# Patient Record
Sex: FEMALE | Race: WHITE | NOT HISPANIC OR LATINO | Employment: OTHER | URBAN - METROPOLITAN AREA
[De-identification: names, ages, dates, MRNs, and addresses within clinical notes are randomized per-mention and may not be internally consistent; named-entity substitution may affect disease eponyms.]

---

## 2018-03-15 PROBLEM — Z98.890 S/P LEFT ROTATOR CUFF REPAIR: Status: ACTIVE | Noted: 2018-03-15

## 2018-03-15 PROBLEM — F41.9 ANXIETY: Status: ACTIVE | Noted: 2018-03-15

## 2018-03-15 PROBLEM — F90.9 ATTENTION DEFICIT HYPERACTIVITY DISORDER (ADHD): Status: ACTIVE | Noted: 2018-03-15

## 2018-03-15 PROBLEM — E11.9 DIABETES MELLITUS WITHOUT COMPLICATION: Status: ACTIVE | Noted: 2018-03-15

## 2018-03-15 PROBLEM — E78.5 HYPERLIPIDEMIA: Status: ACTIVE | Noted: 2018-03-15

## 2018-03-15 PROBLEM — I10 HYPERTENSION: Status: ACTIVE | Noted: 2018-03-15

## 2018-04-04 LAB
LEFT EYE DM RETINOPATHY: NEGATIVE
RIGHT EYE DM RETINOPATHY: NEGATIVE

## 2018-05-02 ENCOUNTER — OFFICE VISIT (OUTPATIENT)
Dept: ORTHOPEDICS | Facility: CLINIC | Age: 68
End: 2018-05-02
Payer: COMMERCIAL

## 2018-05-02 VITALS
HEART RATE: 111 BPM | WEIGHT: 198 LBS | BODY MASS INDEX: 31.08 KG/M2 | HEIGHT: 67 IN | DIASTOLIC BLOOD PRESSURE: 95 MMHG | SYSTOLIC BLOOD PRESSURE: 132 MMHG

## 2018-05-02 DIAGNOSIS — M75.112 INCOMPLETE TEAR OF LEFT ROTATOR CUFF: Primary | ICD-10-CM

## 2018-05-02 PROCEDURE — 99024 POSTOP FOLLOW-UP VISIT: CPT | Mod: ,,, | Performed by: ORTHOPAEDIC SURGERY

## 2018-05-02 NOTE — PROGRESS NOTES
[facility] ORTHOPEDICS POST-OP NOTE    Subjective:           Chief Complaint:   Chief Complaint   Patient presents with    Left Shoulder - Post-op Evaluation     Follow up with left shoulder Scope 2/16/18. States that it is feeing pretty good. Feels a big difference with PT. Still has some limitation but not much.  States not much pain at all.        Past Medical History:   Diagnosis Date    Hyperlipidemia     Hypertension     Pre-diabetes        Past Surgical History:   Procedure Laterality Date    BACK SURGERY      BREAST SURGERY      GALLBLADDER SURGERY      HERNIA REPAIR      lymphnode remove      SHOULDER SURGERY         Current Outpatient Prescriptions   Medication Sig    ALPRAZolam (XANAX) 0.5 MG tablet Take 1 tablet (0.5 mg total) by mouth 2 (two) times daily as needed for Anxiety.    atorvastatin (LIPITOR) 40 MG tablet Take 40 mg by mouth once daily.    dextroamphetamine-amphetamine (ADDERALL) 20 mg tablet Take 1 tablet (20 mg total) by mouth 2 (two) times daily.    lisinopril (PRINIVIL,ZESTRIL) 40 MG tablet     metFORMIN (GLUCOPHAGE-XR) 500 MG 24 hr tablet      No current facility-administered medications for this visit.        Review of patient's allergies indicates:   Allergen Reactions    Bactrim [sulfamethoxazole-trimethoprim]     Levaquin [levofloxacin]     Pcn [penicillins]        Family History   Problem Relation Age of Onset    Mental illness Mother        Social History     Social History    Marital status:      Spouse name: N/A    Number of children: 2    Years of education: N/A     Occupational History    Not on file.     Social History Main Topics    Smoking status: Former Smoker    Smokeless tobacco: Former User    Alcohol use Yes      Comment: rare    Drug use: No    Sexual activity: Not on file     Other Topics Concern    Not on file     Social History Narrative    No narrative on file       History of present illness: Follow-up left shoulder rotator  cuff repair 2 and half months. Been going to therapy. She feels pretty good close to normal can sleep on the left shoulder.      Review of Systems:    Musculoskeletal:  See HPI      Objective:        Physical Examination:    Vital Signs:    Vitals:    05/02/18 1409   BP: (!) 132/95   Pulse: (!) 111       Body mass index is 31.01 kg/m².    This a well-developed, well nourished patient in no acute distress.  They are alert and oriented and cooperative to examination.        Physical exam shows good passive range of motion left shoulder very minimal painful arc pretty good abduction strength. Overall looks good    XRAY Report / Interpretation:       Assessment/Plan:      Plan we are going to send her back therapy one more visit to get a home program for strength. I'm satisfied with her motion. And core strength will take a while. We will see her back when necessary. 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    This note was created using Dragon voice recognition software that occasionally misinterpreted phrases or words.

## 2018-07-05 PROBLEM — G47.00 INSOMNIA: Status: ACTIVE | Noted: 2018-07-05

## 2018-07-05 PROBLEM — Z12.11 COLON CANCER SCREENING: Status: ACTIVE | Noted: 2018-07-05

## 2018-07-05 PROBLEM — F32.A DEPRESSION: Status: ACTIVE | Noted: 2018-07-05

## 2018-09-25 ENCOUNTER — OFFICE VISIT (OUTPATIENT)
Dept: GASTROENTEROLOGY | Facility: CLINIC | Age: 68
End: 2018-09-25
Payer: COMMERCIAL

## 2018-09-25 VITALS
TEMPERATURE: 99 F | SYSTOLIC BLOOD PRESSURE: 132 MMHG | RESPIRATION RATE: 18 BRPM | WEIGHT: 193.38 LBS | BODY MASS INDEX: 30.35 KG/M2 | DIASTOLIC BLOOD PRESSURE: 92 MMHG | HEIGHT: 67 IN | HEART RATE: 125 BPM

## 2018-09-25 DIAGNOSIS — K57.92 DIVERTICULITIS: Primary | ICD-10-CM

## 2018-09-25 DIAGNOSIS — E11.9 DIABETES MELLITUS WITHOUT COMPLICATION: ICD-10-CM

## 2018-09-25 DIAGNOSIS — R91.1 LUNG NODULE: ICD-10-CM

## 2018-09-25 DIAGNOSIS — K76.0 HEPATIC STEATOSIS: ICD-10-CM

## 2018-09-25 DIAGNOSIS — E78.5 HYPERLIPIDEMIA, UNSPECIFIED HYPERLIPIDEMIA TYPE: ICD-10-CM

## 2018-09-25 DIAGNOSIS — F32.A DEPRESSION, UNSPECIFIED DEPRESSION TYPE: ICD-10-CM

## 2018-09-25 DIAGNOSIS — K21.9 GASTROESOPHAGEAL REFLUX DISEASE, ESOPHAGITIS PRESENCE NOT SPECIFIED: ICD-10-CM

## 2018-09-25 DIAGNOSIS — I10 ESSENTIAL HYPERTENSION: ICD-10-CM

## 2018-09-25 PROCEDURE — 99205 OFFICE O/P NEW HI 60 MIN: CPT | Mod: ,,, | Performed by: INTERNAL MEDICINE

## 2018-09-25 PROCEDURE — 3075F SYST BP GE 130 - 139MM HG: CPT | Mod: ,,, | Performed by: INTERNAL MEDICINE

## 2018-09-25 PROCEDURE — 3080F DIAST BP >= 90 MM HG: CPT | Mod: ,,, | Performed by: INTERNAL MEDICINE

## 2018-09-25 PROCEDURE — 3046F HEMOGLOBIN A1C LEVEL >9.0%: CPT | Mod: ,,, | Performed by: INTERNAL MEDICINE

## 2018-09-25 PROCEDURE — 1101F PT FALLS ASSESS-DOCD LE1/YR: CPT | Mod: ,,, | Performed by: INTERNAL MEDICINE

## 2018-09-25 RX ORDER — LISINOPRIL 40 MG/1
40 TABLET ORAL DAILY
COMMUNITY
End: 2019-07-11 | Stop reason: SDUPTHER

## 2018-09-25 RX ORDER — POLYETHYLENE GLYCOL 3350, SODIUM SULFATE ANHYDROUS, SODIUM BICARBONATE, SODIUM CHLORIDE, POTASSIUM CHLORIDE 236; 22.74; 6.74; 5.86; 2.97 G/4L; G/4L; G/4L; G/4L; G/4L
4 POWDER, FOR SOLUTION ORAL ONCE
Qty: 1 BOTTLE | Refills: 0 | Status: SHIPPED | OUTPATIENT
Start: 2018-09-25 | End: 2018-09-25

## 2018-10-07 NOTE — PROGRESS NOTES
[facility] hospital New Patient Visit    Subjective:           PCP: Blane Mancini III    Referring Provider: No ref. provider found    Chief Complaint: Hospital Follow Up; Colonoscopy; and Diverticulitis       HPI:  Debby Brown is a 68 y.o. female here for a hospital follow up. Patient had recently gone to the ER on 4/13/2018. Patient's ER diagnosis was diverticulitis, an UTI, and pulmonary nodule. Patient was given antibiotics by way of Cefdinir and Flagyl. Lab work was reviewed WBC was 10.6. The rest of the parameters were normal. CMP showed a high glucose and mildly abnormal LFT's. Urine analysis showed positive nitrite, many WBC's, many hyaline casts. CT of abd and pelvis was reviewed under direct vision and shows hepatic steatosis. Evidence of colitis o the sigmoid colon. Consideration for repeat CT of the chest in 6 months. Left lower lobe nodular consolidation, which was not present in 11/2016. Old colonoscopy report was reviewed and did not show any evidence of tumor. Old colonoscopy was done in 2007.     ROS:   Constitutional: No fevers, chills, weight loss  ENT: No allergies, sore throat, rhinorrhea  CV: No chest pain, palpitations, edema  Pulm: No cough, shortness of breath, wheezing  Ophtho: No vision changes  GI: No blood in stools, change in bowel habits, nausea/vomiting  Denies alternating diarrhea/constipation.   Derm: No rash  Heme: No easy bruising or lymphadenopathy  MSK: No arthralgias or myalgias  : No dysuria, hematuria, frequency, polyuria, or flank tenderness  Endo: No hot or cold intolerance  Neuro: No syncope or seizure, or dizziness  Psych: No hallucination, depression or suicidal ideation      Medical History:  has a past medical history of Allergy, Anxiety, Depression, Hyperlipidemia, Hypertension, and Pre-diabetes.      Surgical History:  has a past surgical history that includes Shoulder surgery; Back surgery; Breast surgery; lymphnode remove; Gallbladder surgery; Hernia  "repair; Appendectomy; Colonoscopy; and Tubal ligation.    Family History:   Family History   Problem Relation Age of Onset    Mental illness Mother     Cancer Mother     Depression Mother     Heart disease Mother     Hypertension Mother     Stroke Mother     Heart disease Father     Hypertension Father     Stroke Father        Social History:   Social History     Tobacco Use    Smoking status: Former Smoker    Smokeless tobacco: Former User   Substance Use Topics    Alcohol use: Yes     Comment: rare    Drug use: No       The patient's social and family histories were reviewed by me and updated in the appropriate section of the electronic medical record.    Allergies:   Review of patient's allergies indicates:   Allergen Reactions    Bactrim [sulfamethoxazole-trimethoprim]     Levaquin [levofloxacin]     Pcn [penicillins]        Medications:   Current Outpatient Medications   Medication Sig Dispense Refill    ALPRAZolam (XANAX) 0.5 MG tablet Take 1 tablet (0.5 mg total) by mouth 2 (two) times daily as needed for Anxiety. 30 tablet 0    atorvastatin (LIPITOR) 40 MG tablet Take 1 tablet (40 mg total) by mouth once daily. 30 tablet 5    dextroamphetamine-amphetamine (ADDERALL) 20 mg tablet Take 1 tablet (20 mg total) by mouth 2 (two) times daily. (Patient taking differently: Take 20 mg by mouth once daily. ) 60 tablet 0    lisinopril (PRINIVIL,ZESTRIL) 40 MG tablet Take 40 mg by mouth once daily.      metFORMIN (GLUCOPHAGE-XR) 500 MG 24 hr tablet Take 2 tablets (1,000 mg total) by mouth 2 (two) times daily with meals. 60 tablet 5    zolpidem (AMBIEN) 10 mg Tab Take 1 tablet (10 mg total) by mouth nightly as needed. 30 tablet 2     No current facility-administered medications for this visit.      All medications and past history have been reviewed.        Objective:        Vital Signs:  Blood pressure (!) 132/92, pulse (!) 125, temperature 98.8 °F (37.1 °C), resp. rate 18, height 5' 7" (1.702 m), " weight 87.7 kg (193 lb 6.4 oz). Body mass index is 30.29 kg/m².    Physical Exam:   General Appearance: Well appearing in no acute distress, well developed, well                 nourished  Head: Normocephalic, without obvious abnormality  Eyes:  Pupils equal, round and reactive to light  Throat: Lips, mucosa, and tongue normal; teeth and gums normal  Lungs: CTA bilaterally in anterior and posterior fields, no wheezes, no crackles  Heart:  Regular rate and rhythm, no murmurs heard  Abdomen: Soft, non tender, non distended with positive bowel sounds in all four quadrants. No hepatosplenomegaly, ascites, or mass. Negative for succusion splash  : female   Extremities: No cyanosis, edema or deformity  Skin: No rash  Neurologic: Normal exam      Labs/Imaging:  All lab results and imaging results have been reviewed and discussed with the patient.    Assessment/Plan:    Assessment:       1. Diverticulitis        Plan:       Debby was seen today for hospital follow up, colonoscopy and diverticulitis.    Diagnoses and all orders for this visit:    Diverticulitis  -     polyethylene glycol (GOLYTELY,NULYTELY) 236-22.74-6.74 -5.86 gram suspension; Take 4,000 mLs (4 L total) by mouth once. Take as directed for 1 dose  -     Ambulatory Referral to External Surgery        See HPI    Follow-up for After Test(s).    The plan was discussed with the patient and all questions/concerns have been answered to the patient's satisfaction.    CC: No ref. provider found    Electronically signed by Piero Lomeli MD    This note was dictated using voice recognition software and may contain grammatical errors.

## 2018-11-29 PROBLEM — G25.0 BENIGN ESSENTIAL TREMOR: Status: ACTIVE | Noted: 2018-11-29

## 2018-11-29 PROBLEM — Z12.39 BREAST CANCER SCREENING: Status: ACTIVE | Noted: 2018-11-29

## 2018-11-29 PROBLEM — R20.0 NUMBNESS OF FOOT: Status: ACTIVE | Noted: 2018-11-29

## 2018-11-29 PROBLEM — R00.2 PALPITATION: Status: ACTIVE | Noted: 2018-11-29

## 2019-01-05 ENCOUNTER — OFFICE VISIT (OUTPATIENT)
Dept: URGENT CARE | Facility: CLINIC | Age: 69
End: 2019-01-05
Payer: COMMERCIAL

## 2019-01-05 VITALS
HEART RATE: 105 BPM | HEIGHT: 67 IN | SYSTOLIC BLOOD PRESSURE: 144 MMHG | TEMPERATURE: 97 F | BODY MASS INDEX: 29.51 KG/M2 | OXYGEN SATURATION: 99 % | RESPIRATION RATE: 14 BRPM | DIASTOLIC BLOOD PRESSURE: 86 MMHG | WEIGHT: 188 LBS

## 2019-01-05 DIAGNOSIS — J01.90 ACUTE NON-RECURRENT SINUSITIS, UNSPECIFIED LOCATION: Primary | ICD-10-CM

## 2019-01-05 DIAGNOSIS — R05.9 COUGH: ICD-10-CM

## 2019-01-05 PROCEDURE — 1101F PT FALLS ASSESS-DOCD LE1/YR: CPT | Mod: CPTII,S$GLB,, | Performed by: NURSE PRACTITIONER

## 2019-01-05 PROCEDURE — 1101F PR PT FALLS ASSESS DOC 0-1 FALLS W/OUT INJ PAST YR: ICD-10-PCS | Mod: CPTII,S$GLB,, | Performed by: NURSE PRACTITIONER

## 2019-01-05 PROCEDURE — 96372 THER/PROPH/DIAG INJ SC/IM: CPT | Mod: S$GLB,,, | Performed by: NURSE PRACTITIONER

## 2019-01-05 PROCEDURE — 3079F DIAST BP 80-89 MM HG: CPT | Mod: CPTII,S$GLB,, | Performed by: NURSE PRACTITIONER

## 2019-01-05 PROCEDURE — 3077F SYST BP >= 140 MM HG: CPT | Mod: CPTII,S$GLB,, | Performed by: NURSE PRACTITIONER

## 2019-01-05 PROCEDURE — 99204 PR OFFICE/OUTPT VISIT, NEW, LEVL IV, 45-59 MIN: ICD-10-PCS | Mod: 25,S$GLB,, | Performed by: NURSE PRACTITIONER

## 2019-01-05 PROCEDURE — 99204 OFFICE O/P NEW MOD 45 MIN: CPT | Mod: 25,S$GLB,, | Performed by: NURSE PRACTITIONER

## 2019-01-05 PROCEDURE — 3077F PR MOST RECENT SYSTOLIC BLOOD PRESSURE >= 140 MM HG: ICD-10-PCS | Mod: CPTII,S$GLB,, | Performed by: NURSE PRACTITIONER

## 2019-01-05 PROCEDURE — 3079F PR MOST RECENT DIASTOLIC BLOOD PRESSURE 80-89 MM HG: ICD-10-PCS | Mod: CPTII,S$GLB,, | Performed by: NURSE PRACTITIONER

## 2019-01-05 PROCEDURE — 96372 PR INJECTION,THERAP/PROPH/DIAG2ST, IM OR SUBCUT: ICD-10-PCS | Mod: S$GLB,,, | Performed by: NURSE PRACTITIONER

## 2019-01-05 RX ORDER — FLUTICASONE PROPIONATE 50 MCG
2 SPRAY, SUSPENSION (ML) NASAL DAILY
Qty: 15.8 ML | Refills: 0 | Status: SHIPPED | OUTPATIENT
Start: 2019-01-05 | End: 2019-03-15

## 2019-01-05 RX ORDER — AZITHROMYCIN 250 MG/1
TABLET, FILM COATED ORAL
Qty: 6 TABLET | Refills: 0 | Status: SHIPPED | OUTPATIENT
Start: 2019-01-05 | End: 2019-03-15

## 2019-01-05 RX ORDER — CETIRIZINE HYDROCHLORIDE 10 MG/1
10 TABLET ORAL DAILY
Qty: 30 TABLET | Refills: 0 | Status: SHIPPED | OUTPATIENT
Start: 2019-01-05 | End: 2020-07-14

## 2019-01-05 RX ORDER — PREDNISONE 20 MG/1
40 TABLET ORAL DAILY
Qty: 10 TABLET | Refills: 0 | Status: SHIPPED | OUTPATIENT
Start: 2019-01-05 | End: 2019-01-10

## 2019-01-05 RX ORDER — DEXAMETHASONE SODIUM PHOSPHATE 4 MG/ML
8 INJECTION, SOLUTION INTRA-ARTICULAR; INTRALESIONAL; INTRAMUSCULAR; INTRAVENOUS; SOFT TISSUE
Status: COMPLETED | OUTPATIENT
Start: 2019-01-05 | End: 2019-01-05

## 2019-01-05 RX ORDER — PROMETHAZINE HYDROCHLORIDE AND DEXTROMETHORPHAN HYDROBROMIDE 6.25; 15 MG/5ML; MG/5ML
5 SYRUP ORAL NIGHTLY PRN
Qty: 118 ML | Refills: 0 | Status: SHIPPED | OUTPATIENT
Start: 2019-01-05 | End: 2019-01-15

## 2019-01-05 RX ADMIN — DEXAMETHASONE SODIUM PHOSPHATE 8 MG: 4 INJECTION, SOLUTION INTRA-ARTICULAR; INTRALESIONAL; INTRAMUSCULAR; INTRAVENOUS; SOFT TISSUE at 05:01

## 2019-01-05 NOTE — PROGRESS NOTES
"Subjective:       Patient ID: Debby Brown is a 68 y.o. female.    Vitals:  height is 5' 7" (1.702 m) and weight is 85.3 kg (188 lb). Her temperature is 97.4 °F (36.3 °C). Her blood pressure is 144/86 (abnormal) and her pulse is 105. Her respiration is 14 and oxygen saturation is 99%.     Chief Complaint: Cough    Patient complains of sinus congestion, mild productive cough, muscles aches due to coughing for 2 days. Denies fever, sob, chest pain, nausea, vomiting, diarrhea.       Cough   This is a new problem. The current episode started in the past 7 days. The problem has been unchanged. The cough is non-productive. Associated symptoms include headaches and myalgias. Pertinent negatives include no chest pain, chills, fever, rash, sore throat or shortness of breath.       Constitution: Negative for chills, fatigue and fever.   HENT: Positive for congestion, sinus pain and sinus pressure. Negative for sore throat.    Neck: Negative for painful lymph nodes.   Cardiovascular: Negative for chest pain and leg swelling.   Eyes: Negative for double vision and blurred vision.   Respiratory: Positive for cough. Negative for sputum production and shortness of breath.    Gastrointestinal: Negative for nausea, vomiting and diarrhea.   Endocrine: negative.   Genitourinary: Negative for dysuria, frequency, urgency and history of kidney stones.   Musculoskeletal: Positive for muscle ache. Negative for joint pain, joint swelling and muscle cramps.   Skin: Negative for color change, pale, rash and bruising.   Allergic/Immunologic: Negative for seasonal allergies.   Neurological: Positive for headaches. Negative for dizziness, history of vertigo, light-headedness and passing out.   Hematologic/Lymphatic: Negative for swollen lymph nodes.   Psychiatric/Behavioral: Negative for nervous/anxious, sleep disturbance and depression. The patient is not nervous/anxious.        Objective:      Physical Exam   Constitutional: She is oriented " to person, place, and time. She appears well-developed and well-nourished. She is cooperative.  Non-toxic appearance. She does not have a sickly appearance. She does not appear ill. No distress.   HENT:   Head: Normocephalic and atraumatic.   Right Ear: Hearing, tympanic membrane, external ear and ear canal normal.   Left Ear: Hearing, tympanic membrane, external ear and ear canal normal.   Nose: Mucosal edema and rhinorrhea present. No nasal deformity. No epistaxis. Right sinus exhibits maxillary sinus tenderness and frontal sinus tenderness. Left sinus exhibits maxillary sinus tenderness and frontal sinus tenderness.   Mouth/Throat: Uvula is midline and mucous membranes are normal. No trismus in the jaw. Normal dentition. No uvula swelling. Posterior oropharyngeal erythema present. No oropharyngeal exudate or posterior oropharyngeal edema.   Eyes: Conjunctivae and lids are normal. Right eye exhibits no discharge. Left eye exhibits no discharge. No scleral icterus.   Neck: Trachea normal, normal range of motion, full passive range of motion without pain and phonation normal. Neck supple.   Cardiovascular: Normal rate, regular rhythm, normal heart sounds, intact distal pulses and normal pulses.   Pulmonary/Chest: Effort normal and breath sounds normal. No respiratory distress.   Abdominal: Soft. Normal appearance and bowel sounds are normal. She exhibits no distension, no pulsatile midline mass and no mass. There is no tenderness.   Musculoskeletal: Normal range of motion. She exhibits no edema or deformity.   Lymphadenopathy:     She has no cervical adenopathy.   Neurological: She is alert and oriented to person, place, and time. She exhibits normal muscle tone. Coordination normal. GCS eye subscore is 4. GCS verbal subscore is 5. GCS motor subscore is 6.   Skin: Skin is warm, dry and intact. No rash noted. She is not diaphoretic. No pallor.   Psychiatric: She has a normal mood and affect. Her speech is normal and  behavior is normal. Judgment and thought content normal. Cognition and memory are normal.   Nursing note and vitals reviewed.      Assessment:       1. Acute non-recurrent sinusitis, unspecified location    2. Cough        Plan:       Due to patient presentation as well as length of symptoms, will treat for bacterial sinusitis. Patient is being discharged home. Discussed reasons to return and importance of followup. All questions addressed and patient given discharge instructions and followup information.    Lungs clear throughout, sats 98% , and no respiratory distress.  I do not suspect pneumonia, pulmonary embolism or any other more serious condition requiring further treatment. Advised pt to return to clinic or go to ER for any worsening of symptoms. Based on my clinical evaluation, I do not appreciate any immediate, emergent, or life threatening condition or etiology that warrants additional workup today and feel that the patient can be discharged with close follow up car    Acute non-recurrent sinusitis, unspecified location    Cough    Other orders  -     dexamethasone injection 8 mg  -     fluticasone (FLONASE) 50 mcg/actuation nasal spray; 2 sprays (100 mcg total) by Each Nare route once daily.  Dispense: 15.8 mL; Refill: 0  -     cetirizine (ZYRTEC) 10 MG tablet; Take 1 tablet (10 mg total) by mouth once daily.  Dispense: 30 tablet; Refill: 0  -     azithromycin (ZITHROMAX) 250 MG tablet; Take 2 tablets (500 mg) on  Day 1,  followed by 1 tablet (250 mg) once daily on Days 2 through 5.  Dispense: 6 tablet; Refill: 0  -     predniSONE (DELTASONE) 20 MG tablet; Take 2 tablets (40 mg total) by mouth once daily. for 5 days  Dispense: 10 tablet; Refill: 0  -     promethazine-dextromethorphan (PROMETHAZINE-DM) 6.25-15 mg/5 mL Syrp; Take 5 mLs by mouth nightly as needed.  Dispense: 118 mL; Refill: 0

## 2019-01-05 NOTE — PATIENT INSTRUCTIONS
What Is Acute Bronchitis?  Acute bronchitis is when the airways in your lungs (bronchial tubes) become red and swollen (inflamed). It is usually caused by a viral infection. But it can also occur because of a bacteria or allergen. Symptoms include a cough that produces yellow or greenish mucus and can last for days or sometimes weeks.  Inside healthy lungs    Air travels in and out of the lungs through the airways. The linings of these airways produce sticky mucus. This mucus traps particles that enter the lungs. Tiny structures called cilia then sweep the particles out of the airways.     Healthy airway: Airways are normally open. Air moves in and out easily.      Healthy cilia: Tiny, hairlike cilia sweep mucus and particles up and out of the airways.   Lungs with bronchitis  Bronchitis often occurs with a cold or the flu virus. The airways become inflamed (red and swollen). There is a deep hacking cough from the extra mucus. Other symptoms may include:  · Wheezing  · Chest discomfort  · Shortness of breath  · Mild fever  A second infection, this time due to bacteria, may then occur. And airways irritated by allergens or smoke are more likely to get infected.        Inflamed airway: Inflammation and extra mucus narrow the airway, causing shortness of breath.      Impaired cilia: Extra mucus impairs cilia, causing congestion and wheezing. Smoking makes the problem worse.   Making a diagnosis  A physical exam, health history, and certain tests help your healthcare provider make the diagnosis.  Health history  Your healthcare provider will ask you about your symptoms.  The exam  Your provider listens to your chest for signs of congestion. He or she may also check your ears, nose, and throat.  Possible tests  · A sputum test for bacteria. This requires a sample of mucus from your lungs.  · A nasal or throat swab. This tests to see if you have a bacterial infection.  · A chest X-ray. This is done if your healthcare  provider thinks you have pneumonia.  · Tests to check for an underlying condition. Other tests may be done to check for things such as allergies, asthma, or COPD (chronic obstructive pulmonary disease). You may need to see a specialist for more lung function testing.  Treating a cough  The main treatment for bronchitis is easing symptoms. Avoiding smoke, allergens, and other things that trigger coughing can often help. If the infection is bacterial, you may be given antibiotics. During the illness, it's important to get plenty of sleep. To ease symptoms:  · Dont smoke. Also avoid secondhand smoke.  · Use a humidifier. Or try breathing in steam from a hot shower. This may help loosen mucus.  · Drink a lot of water and juice. They can soothe the throat and may help thin mucus.  · Sit up or use extra pillows when in bed. This helps to lessen coughing and congestion.  · Ask your provider about using medicine. Ask about using cough medicine, pain and fever medicine, or a decongestant.  Antibiotics  Most cases of bronchitis are caused by cold or flu viruses. They dont need antibiotics to treat them, even if your mucus is thick and green or yellow. Antibiotics dont treat viral illness and antibiotics have not been shown to have any benefit in cases of acute bronchitis. Taking antibiotics when they are not needed increases your risk of getting an infection later that is antibiotic-resistant. Antibiotics can also cause severe cases of diarrhea that require other antibiotics to treat.  It is important that you accept your healthcare provider's opinion to not use antibiotics. Your provider will prescribe antibiotics if the infection is caused by bacteria. If they are prescribed:  · Take all of the medicine. Take the medicine until it is used up, even if symptoms have improved. If you dont, the bronchitis may come back.  · Take the medicines as directed. For instance, some medicines should be taken with food.  · Ask about  side effects. Ask your provider or pharmacist what side effects are common, and what to do about them.  Follow-up care  You should see your provider again in 2 to 3 weeks. By this time, symptoms should have improved. An infection that lasts longer may mean you have a more serious problem.  Prevention  · Avoid tobacco smoke. If you smoke, quit. Stay away from smoky places. Ask friends and family not to smoke around you, or in your home or car.  · Get checked for allergies.  · Ask your provider about getting a yearly flu shot. Also ask about pneumococcal or pneumonia shots.  · Wash your hands often. This helps reduce the chance of picking up viruses that cause colds and flu.  Call your healthcare provider if:  · Symptoms worsen, or you have new symptoms  · Breathing problems worsen or  become severe  · Symptoms dont get better within a week, or within 3 days of taking antibiotics   Date Last Reviewed: 2/1/2017  © 9473-1243 [employer]. [address] [place] [address]. All rights reserved. This information is not intended as a substitute for professional medical care. Always follow your healthcare professional's instructions.        Sinusitis (Antibiotic Treatment)    The sinuses are air-filled spaces within the bones of the face. They connect to the inside of the nose. Sinusitis is an inflammation of the tissue lining the sinus cavity. Sinus inflammation can occur during a cold. It can also be due to allergies to pollens and other particles in the air. Sinusitis can cause symptoms of sinus congestion and fullness. A sinus infection causes fever, headache and facial pain. There is often green or yellow drainage from the nose or into the back of the throat (post-nasal drip). You have been given antibiotics to treat this condition.  Home care:  · Take the full course of antibiotics as instructed. Do not stop taking them, even if you feel better.  · Drink plenty of water, hot tea, and other  liquids. This may help thin mucus. It also may promote sinus drainage.  · Heat may help soothe painful areas of the face. Use a towel soaked in hot water. Or,  the shower and direct the hot spray onto your face. Using a vaporizer along with a menthol rub at night may also help.   · An expectorant containing guaifenesin may help thin the mucus and promote drainage from the sinuses.  · Over-the-counter decongestants may be used unless a similar medicine was prescribed. Nasal sprays work the fastest. Use one that contains phenylephrine or oxymetazoline. First blow the nose gently. Then use the spray. Do not use these medicines more often than directed on the label or symptoms may get worse. You may also use tablets containing pseudoephedrine. Avoid products that combine ingredients, because side effects may be increased. Read labels. You can also ask the pharmacist for help. (NOTE: Persons with high blood pressure should not use decongestants. They can raise blood pressure.)  · Over-the-counter antihistamines may help if allergies contributed to your sinusitis.    · Do not use nasal rinses or irrigation during an acute sinus infection, unless told to by your health care provider. Rinsing may spread the infection to other sinuses.  · Use acetaminophen or ibuprofen to control pain, unless another pain medicine was prescribed. (If you have chronic liver or kidney disease or ever had a stomach ulcer, talk with your doctor before using these medicines. Aspirin should never be used in anyone under 18 years of age who is ill with a fever. It may cause severe liver damage.)  · Don't smoke. This can worsen symptoms.  Follow-up care  Follow up with your healthcare provider or our staff if you are not improving within the next week.  When to seek medical advice  Call your healthcare provider if any of these occur:  · Facial pain or headache becoming more severe  · Stiff neck  · Unusual drowsiness or confusion  · Swelling  of the forehead or eyelids  · Vision problems, including blurred or double vision  · Fever of 100.4ºF (38ºC) or higher, or as directed by your healthcare provider  · Seizure  · Breathing problems  · Symptoms not resolving within 10 days  Date Last Reviewed: 4/13/2015  © 3600-5631 [employer]. [address] [place] [address], [place] [address]. All rights reserved. This information is not intended as a substitute for professional medical care. Always follow your healthcare professional's instructions.        Self-Care for Sinusitis     Drinking plenty of water can help sinuses drain.   Sinusitis can often be managed with self-care. Self-care can keep sinuses moist and make you feel more comfortable. Remember to follow your doctor's instructions closely. This can make a big difference in getting your sinus problem under control.  Drink fluids  Drinking extra fluids helps thin your mucus. This lets it drain from your sinuses more easily. Have a glass of water every hour or two. A humidifier helps in much the same way. Fluids can also offset the drying effects of certain medicines. If you use a humidifier, follow the product maker's instructions on how to use it. Clean it on a regular schedule.  Use saltwater rinses  Rinses help keep your sinuses and nose moist. Mix a teaspoon of salt in 8 ounces of fresh, warm water. Use a bulb syringe to gently squirt the water into your nose a few times a day. You can also buy ready-made saline nasal sprays.  Apply hot or cold packs  Applying heat to the area surrounding your sinuses may make you feel more comfortable. Use a hot water bottle or a hand towel dipped in hot water. Some people also find ice packs effective for relieving pain.  Medicines  Your doctor may prescribe medications to help treat your sinusitis. If you have an infection, antibiotics can help clear it up. If you are prescribed antibiotics, take all pills on schedule until they are gone, even if you feel  better. Decongestants help relieve swelling. Use decongestant sprays for short periods only under the direction of your doctor. If you have allergies, your doctor may prescribe medications to help relieve them.   Date Last Reviewed: 10/1/2016  © 8925-6360 [employer] [address] [place] [address]. All rights reserved. This information is not intended as a substitute for professional medical care. Always follow your healthcare professional's instructions.

## 2019-06-05 LAB
LEFT EYE DM RETINOPATHY: NEGATIVE
RIGHT EYE DM RETINOPATHY: NEGATIVE

## 2019-06-19 PROBLEM — H26.9 CATARACTS, BILATERAL: Status: ACTIVE | Noted: 2019-06-19

## 2020-02-13 DIAGNOSIS — N63.41 UNSPECIFIED LUMP IN RIGHT BREAST, SUBAREOLAR: Primary | ICD-10-CM

## 2020-02-19 ENCOUNTER — OFFICE VISIT (OUTPATIENT)
Dept: ORTHOPEDICS | Facility: CLINIC | Age: 70
End: 2020-02-19
Payer: COMMERCIAL

## 2020-02-19 VITALS
DIASTOLIC BLOOD PRESSURE: 74 MMHG | HEART RATE: 103 BPM | SYSTOLIC BLOOD PRESSURE: 101 MMHG | WEIGHT: 170 LBS | BODY MASS INDEX: 26.68 KG/M2 | HEIGHT: 67 IN

## 2020-02-19 DIAGNOSIS — M25.561 ACUTE PAIN OF RIGHT KNEE: ICD-10-CM

## 2020-02-19 DIAGNOSIS — M25.512 ACUTE PAIN OF LEFT SHOULDER: ICD-10-CM

## 2020-02-19 DIAGNOSIS — Z98.890 HISTORY OF REPAIR OF LEFT ROTATOR CUFF: ICD-10-CM

## 2020-02-19 DIAGNOSIS — M23.203 DEGENERATIVE TEAR OF MEDIAL MENISCUS, RIGHT: Primary | ICD-10-CM

## 2020-02-19 PROCEDURE — 1159F PR MEDICATION LIST DOCUMENTED IN MEDICAL RECORD: ICD-10-PCS | Mod: S$GLB,,, | Performed by: ORTHOPAEDIC SURGERY

## 2020-02-19 PROCEDURE — 99213 OFFICE O/P EST LOW 20 MIN: CPT | Mod: 25,S$GLB,, | Performed by: ORTHOPAEDIC SURGERY

## 2020-02-19 PROCEDURE — 1101F PT FALLS ASSESS-DOCD LE1/YR: CPT | Mod: S$GLB,,, | Performed by: ORTHOPAEDIC SURGERY

## 2020-02-19 PROCEDURE — 1159F MED LIST DOCD IN RCRD: CPT | Mod: S$GLB,,, | Performed by: ORTHOPAEDIC SURGERY

## 2020-02-19 PROCEDURE — 20610 DRAIN/INJ JOINT/BURSA W/O US: CPT | Mod: RT,S$GLB,, | Performed by: ORTHOPAEDIC SURGERY

## 2020-02-19 PROCEDURE — 1125F AMNT PAIN NOTED PAIN PRSNT: CPT | Mod: S$GLB,,, | Performed by: ORTHOPAEDIC SURGERY

## 2020-02-19 PROCEDURE — 3074F SYST BP LT 130 MM HG: CPT | Mod: S$GLB,,, | Performed by: ORTHOPAEDIC SURGERY

## 2020-02-19 PROCEDURE — 1101F PR PT FALLS ASSESS DOC 0-1 FALLS W/OUT INJ PAST YR: ICD-10-PCS | Mod: S$GLB,,, | Performed by: ORTHOPAEDIC SURGERY

## 2020-02-19 PROCEDURE — 1125F PR PAIN SEVERITY QUANTIFIED, PAIN PRESENT: ICD-10-PCS | Mod: S$GLB,,, | Performed by: ORTHOPAEDIC SURGERY

## 2020-02-19 PROCEDURE — 3074F PR MOST RECENT SYSTOLIC BLOOD PRESSURE < 130 MM HG: ICD-10-PCS | Mod: S$GLB,,, | Performed by: ORTHOPAEDIC SURGERY

## 2020-02-19 PROCEDURE — 99213 PR OFFICE/OUTPT VISIT, EST, LEVL III, 20-29 MIN: ICD-10-PCS | Mod: 25,S$GLB,, | Performed by: ORTHOPAEDIC SURGERY

## 2020-02-19 PROCEDURE — 3078F PR MOST RECENT DIASTOLIC BLOOD PRESSURE < 80 MM HG: ICD-10-PCS | Mod: S$GLB,,, | Performed by: ORTHOPAEDIC SURGERY

## 2020-02-19 PROCEDURE — 20610 LARGE JOINT ASPIRATION/INJECTION: R KNEE: ICD-10-PCS | Mod: RT,S$GLB,, | Performed by: ORTHOPAEDIC SURGERY

## 2020-02-19 PROCEDURE — 3078F DIAST BP <80 MM HG: CPT | Mod: S$GLB,,, | Performed by: ORTHOPAEDIC SURGERY

## 2020-02-19 RX ORDER — METHYLPREDNISOLONE ACETATE 40 MG/ML
40 INJECTION, SUSPENSION INTRA-ARTICULAR; INTRALESIONAL; INTRAMUSCULAR; SOFT TISSUE
Status: DISCONTINUED | OUTPATIENT
Start: 2020-02-19 | End: 2020-02-19 | Stop reason: HOSPADM

## 2020-02-19 RX ADMIN — METHYLPREDNISOLONE ACETATE 40 MG: 40 INJECTION, SUSPENSION INTRA-ARTICULAR; INTRALESIONAL; INTRAMUSCULAR; SOFT TISSUE at 08:02

## 2020-02-19 NOTE — PROGRESS NOTES
[facility]    Subjective:     Chief Complaint:   Chief Complaint   Patient presents with    Right Knee - Pain     Right knee pain x last week. States that her pain is worse when she goes from sitting to standing, the more she walks the better the knee feels.    Left Shoulder - Pain     Left shoulder pain a while. States that she had surgery on her shoulder back in 02/16/18. States that her shoulder bothers her off and on and lately her pain has been more painful with certain movements.         Past Medical History:   Diagnosis Date    Allergy     Anxiety     Depression     Diabetes mellitus, type 2     Hyperlipidemia     Hypertension     Pre-diabetes        Past Surgical History:   Procedure Laterality Date    APPENDECTOMY      BACK SURGERY      BREAST SURGERY      CHOLECYSTECTOMY      COLONOSCOPY      GALLBLADDER SURGERY      HERNIA REPAIR      lymphnode remove      SHOULDER SURGERY      TUBAL LIGATION         Current Outpatient Medications   Medication Sig    ALPRAZolam (XANAX) 0.5 MG tablet Take 1 tablet (0.5 mg total) by mouth 2 (two) times daily as needed for Anxiety.    dextroamphetamine-amphetamine (ADDERALL) 20 mg tablet Take 1 tablet (20 mg total) by mouth once daily.    FLUoxetine (PROZAC) 10 MG capsule Take 20 mg by mouth once daily.     lisinopril (PRINIVIL,ZESTRIL) 40 MG tablet Take 1 tablet (40 mg total) by mouth once daily.    metFORMIN (GLUCOPHAGE-XR) 500 MG 24 hr tablet Take 2 tablets (1,000 mg total) by mouth 2 (two) times daily with meals.    rosuvastatin (CRESTOR) 20 MG tablet Take 1 tablet (20 mg total) by mouth once daily.    zolpidem (AMBIEN) 10 mg Tab Take 1 tablet (10 mg total) by mouth nightly as needed.    cetirizine (ZYRTEC) 10 MG tablet Take 1 tablet (10 mg total) by mouth once daily.     No current facility-administered medications for this visit.        Review of patient's allergies indicates:   Allergen Reactions    Bactrim  [sulfamethoxazole-trimethoprim]     Codeine     Levaquin [levofloxacin]     Pcn [penicillins]        Family History   Problem Relation Age of Onset    Mental illness Mother     Cancer Mother     Depression Mother     Heart disease Mother     Hypertension Mother     Stroke Mother     Heart disease Father     Hypertension Father     Stroke Father        Social History     Socioeconomic History    Marital status:      Spouse name: Not on file    Number of children: 2    Years of education: Not on file    Highest education level: Not on file   Occupational History    Occupation: COMPTRALLER   Social Needs    Financial resource strain: Not on file    Food insecurity:     Worry: Not on file     Inability: Not on file    Transportation needs:     Medical: Not on file     Non-medical: Not on file   Tobacco Use    Smoking status: Former Smoker    Smokeless tobacco: Never Used   Substance and Sexual Activity    Alcohol use: Yes     Comment: rare    Drug use: No    Sexual activity: Yes   Lifestyle    Physical activity:     Days per week: Not on file     Minutes per session: Not on file    Stress: Not at all   Relationships    Social connections:     Talks on phone: Not on file     Gets together: Not on file     Attends [employer] service: Not on file     Active member of club or organization: Not on file     Attends meetings of clubs or organizations: Not on file     Relationship status: Not on file   Other Topics Concern    Not on file   Social History Narrative    Not on file       History of present illness:  Right knee pain.  Has really had discomfort when she gets up.  No real swelling there is no catching there is no giving way there is no particular injury right knee has been bothered her couple weeks never treated before this.  Does not have any unusual activities.  She also is having some occasional pain left shoulder.  She had rotator cuff surgery couple years ago left shoulder  ache comes and goes there are plenty a good times work functions normally.  An has never had an extended period of continuous pain in left shoulder since surgery    Review of Systems:    Constitution: Negative for chills, fever, and sweats.  Negative for unexplained weight loss.    HENT:  Negative for headaches and blurry vision.    Cardiovascular:Negative for chest pain or irregular heart beat. Negative for hypertension.    Respiratory:  Negative for cough and shortness of breath.    Gastrointestinal: Negative for abdominal pain, heartburn, melena, nausea, and vomitting.    Genitourinary:  Negative bladder incontinence and dysuria.    Musculoskeletal:  See HPI for details.     Neurological: Negative for numbness.    Psychiatric/Behavioral: Negative for depression.  The patient is not nervous/anxious.      Endocrine: Negative for polyuria    Hematologic/Lymphatic: Negative for bleeding problem.  Does not bruise/bleed easily.    Skin: Negative for poor would healing and rash    Objective:      Physical Examination:    Vital Signs:    Vitals:    02/19/20 0829   BP: 101/74   Pulse: 103       Body mass index is 26.63 kg/m².    This a well-developed, well nourished patient in no acute distress.  They are alert and oriented and cooperative to examination.        So the right knee shows she got excellent range of motion neutral alignment minimal tenderness at the medial joint line minimal medial Alejandro pain mild quad atrophy no effusion mild pain medially with full flexion she has no flexion contracture and there is no tenderness along the pes.  The left shoulder has minimal crepitation with arc.  She has got good abduction strength without pain she has got good passive range of motion left shoulder.  Pertinent New Results:    XRAY Report / Interpretation:   AP lateral sunrise right knee shows very minimal narrowing medial compartment.  Of note is some early calcification within the menisci more present left knee than  right consistent with the pseudogout not extensive.  No acute findings.  Neutral alignment. AP x-ray left shoulder shows the 3 anchors humeral head.  1 May be position slightly lateral but of none of in a suspicious zone.  No superior migration of the humeral head. No acute findings.  Signature    Assessment/Plan:      My impression is the right knee probably has a degenerative medial meniscus tear. I do not think this is a major tear co as we do not have that much Alejandro pain or swelling motor treat this with a steroid injection is our 1st treatment we did 1 cc Depo-Medrol 5 cc of lidocaine superolateral right knee.  If she does not get reasonable relief within a few weeks then we would get an MRI see if she needs arthroscopy.  She does not appear to need a new knee.  The left shoulder of she may not have 100% normal healed rotator cuff but she only has occasional mild pain so she was concerned if there is if she is doing something to aggravate her shoulder I do not think so she is not doing any unusual activities the fact that most of the time the shoulder behaves normally do not think she has a significant tear that needs another operation.  I am not at all concerned about the hardware.  If she has more consistent symptoms left shoulder I told her again MRI to examine the rotator cuff would be appropriate.      This note was created using Dragon voice recognition software that occasionally misinterpreted phrases or words.

## 2020-02-19 NOTE — PROCEDURES
Large Joint Aspiration/Injection: R knee  Performed by: Kevyn Linn Jr., MD  Authorized by: Kevyn Linn Jr., MD  Date/Time: 2/19/2020 8:00 AM    Consent Done?:  Yes (Verbal)  Indications:  pain  Timeout: Immediately prior to procedure a time out was called to verify the correct patient, procedure, equipment, support staff and site/side marked as required.  Prep:Patient was prepped and draped in the usual sterile fashion.  Procedure site marked: Yes     Anesthesia  Local anesthesia used  Anesthetic: lidocaine 1% without epinephrine    Details:   Needle size: 25 G    Ultrasonic Guidance for needle placement: No    Medications: 40 mg methylPREDNISolone acetate 40 mg/mL  Patient tolerance:  patient tolerated the procedure well with no immediate complications

## 2020-03-03 ENCOUNTER — HOSPITAL ENCOUNTER (OUTPATIENT)
Dept: RADIOLOGY | Facility: HOSPITAL | Age: 70
Discharge: HOME | End: 2020-03-03
Attending: FAMILY MEDICINE
Payer: COMMERCIAL

## 2020-03-03 VITALS — HEIGHT: 67 IN | WEIGHT: 170 LBS | BODY MASS INDEX: 26.68 KG/M2

## 2020-03-03 DIAGNOSIS — N63.0 BREAST NODULE: ICD-10-CM

## 2020-03-03 DIAGNOSIS — N63.41 UNSPECIFIED LUMP IN RIGHT BREAST, SUBAREOLAR: ICD-10-CM

## 2020-03-03 PROCEDURE — 77062 BREAST TOMOSYNTHESIS BI: CPT | Mod: TC,PO

## 2020-07-21 PROBLEM — F98.8 ATTENTION DEFICIT DISORDER: Status: ACTIVE | Noted: 2018-03-15

## 2020-08-11 ENCOUNTER — PATIENT OUTREACH (OUTPATIENT)
Dept: ADMINISTRATIVE | Facility: HOSPITAL | Age: 70
End: 2020-08-11

## 2020-08-12 ENCOUNTER — LAB VISIT (OUTPATIENT)
Dept: LAB | Facility: HOSPITAL | Age: 70
End: 2020-08-12
Attending: FAMILY MEDICINE
Payer: COMMERCIAL

## 2020-08-12 DIAGNOSIS — E11.9 DIABETES MELLITUS WITHOUT COMPLICATION: Primary | ICD-10-CM

## 2020-08-12 DIAGNOSIS — E78.5 HYPERLIPEMIA: ICD-10-CM

## 2020-08-12 DIAGNOSIS — G25.0 BENIGN ESSENTIAL TREMOR: ICD-10-CM

## 2020-08-12 DIAGNOSIS — F45.8 ANXIETY HYPERVENTILATION: ICD-10-CM

## 2020-08-12 DIAGNOSIS — F41.9 ANXIETY HYPERVENTILATION: ICD-10-CM

## 2020-08-12 LAB
ALBUMIN SERPL BCP-MCNC: 3.6 G/DL (ref 3.5–5.2)
ALBUMIN/CREAT UR: 14.3 UG/MG (ref 0–30)
ALP SERPL-CCNC: 67 U/L (ref 55–135)
ALT SERPL W/O P-5'-P-CCNC: 36 U/L (ref 10–44)
ANION GAP SERPL CALC-SCNC: 12 MMOL/L (ref 8–16)
AST SERPL-CCNC: 27 U/L (ref 10–40)
BASOPHILS # BLD AUTO: 0.07 K/UL (ref 0–0.2)
BASOPHILS NFR BLD: 1 % (ref 0–1.9)
BILIRUB SERPL-MCNC: 1 MG/DL (ref 0.1–1)
BUN SERPL-MCNC: 28 MG/DL (ref 8–23)
CALCIUM SERPL-MCNC: 9.4 MG/DL (ref 8.7–10.5)
CHLORIDE SERPL-SCNC: 94 MMOL/L (ref 95–110)
CHOLEST SERPL-MCNC: 334 MG/DL (ref 120–199)
CHOLEST/HDLC SERPL: 10.8 {RATIO} (ref 2–5)
CO2 SERPL-SCNC: 24 MMOL/L (ref 23–29)
CREAT SERPL-MCNC: 1.1 MG/DL (ref 0.5–1.4)
CREAT UR-MCNC: 70 MG/DL (ref 15–325)
DIFFERENTIAL METHOD: NORMAL
EOSINOPHIL # BLD AUTO: 0.3 K/UL (ref 0–0.5)
EOSINOPHIL NFR BLD: 4.7 % (ref 0–8)
ERYTHROCYTE [DISTWIDTH] IN BLOOD BY AUTOMATED COUNT: 13.2 % (ref 11.5–14.5)
EST. GFR  (AFRICAN AMERICAN): 59.2 ML/MIN/1.73 M^2
EST. GFR  (NON AFRICAN AMERICAN): 51.3 ML/MIN/1.73 M^2
ESTIMATED AVG GLUCOSE: 424 MG/DL (ref 68–131)
GLUCOSE SERPL-MCNC: 653 MG/DL (ref 70–110)
HBA1C MFR BLD HPLC: 16.4 % (ref 4.5–6.2)
HCT VFR BLD AUTO: 45 % (ref 37–48.5)
HDLC SERPL-MCNC: 31 MG/DL (ref 40–75)
HDLC SERPL: 9.3 % (ref 20–50)
HGB BLD-MCNC: 14.7 G/DL (ref 12–16)
IMM GRANULOCYTES # BLD AUTO: 0.02 K/UL (ref 0–0.04)
IMM GRANULOCYTES NFR BLD AUTO: 0.3 % (ref 0–0.5)
LDLC SERPL CALC-MCNC: ABNORMAL MG/DL (ref 63–159)
LYMPHOCYTES # BLD AUTO: 3.2 K/UL (ref 1–4.8)
LYMPHOCYTES NFR BLD: 45.2 % (ref 18–48)
MCH RBC QN AUTO: 28.7 PG (ref 27–31)
MCHC RBC AUTO-ENTMCNC: 32.7 G/DL (ref 32–36)
MCV RBC AUTO: 88 FL (ref 82–98)
MICROALBUMIN UR DL<=1MG/L-MCNC: 10 UG/ML
MONOCYTES # BLD AUTO: 0.5 K/UL (ref 0.3–1)
MONOCYTES NFR BLD: 7.6 % (ref 4–15)
NEUTROPHILS # BLD AUTO: 2.9 K/UL (ref 1.8–7.7)
NEUTROPHILS NFR BLD: 41.2 % (ref 38–73)
NONHDLC SERPL-MCNC: 303 MG/DL
NRBC BLD-RTO: 0 /100 WBC
PLATELET # BLD AUTO: 269 K/UL (ref 150–350)
PMV BLD AUTO: 11.9 FL (ref 9.2–12.9)
POTASSIUM SERPL-SCNC: 4.4 MMOL/L (ref 3.5–5.1)
PROT SERPL-MCNC: 7 G/DL (ref 6–8.4)
RBC # BLD AUTO: 5.13 M/UL (ref 4–5.4)
SODIUM SERPL-SCNC: 130 MMOL/L (ref 136–145)
TRIGL SERPL-MCNC: 866 MG/DL (ref 30–150)
WBC # BLD AUTO: 7.08 K/UL (ref 3.9–12.7)

## 2020-08-12 PROCEDURE — 80053 COMPREHEN METABOLIC PANEL: CPT

## 2020-08-12 PROCEDURE — 85025 COMPLETE CBC W/AUTO DIFF WBC: CPT

## 2020-08-12 PROCEDURE — 80061 LIPID PANEL: CPT

## 2020-08-12 PROCEDURE — 36415 COLL VENOUS BLD VENIPUNCTURE: CPT

## 2020-08-12 PROCEDURE — 82043 UR ALBUMIN QUANTITATIVE: CPT

## 2020-08-12 PROCEDURE — 83036 HEMOGLOBIN GLYCOSYLATED A1C: CPT

## 2020-09-28 ENCOUNTER — OFFICE VISIT (OUTPATIENT)
Dept: FAMILY MEDICINE | Facility: CLINIC | Age: 70
End: 2020-09-28
Payer: COMMERCIAL

## 2020-09-28 VITALS
TEMPERATURE: 98 F | HEIGHT: 65 IN | DIASTOLIC BLOOD PRESSURE: 80 MMHG | BODY MASS INDEX: 29.66 KG/M2 | HEART RATE: 106 BPM | SYSTOLIC BLOOD PRESSURE: 110 MMHG | WEIGHT: 178 LBS | OXYGEN SATURATION: 98 %

## 2020-09-28 DIAGNOSIS — E11.9 TYPE 2 DIABETES MELLITUS WITHOUT COMPLICATION, WITHOUT LONG-TERM CURRENT USE OF INSULIN: ICD-10-CM

## 2020-09-28 DIAGNOSIS — I10 ESSENTIAL HYPERTENSION: ICD-10-CM

## 2020-09-28 DIAGNOSIS — E78.5 HYPERLIPIDEMIA, UNSPECIFIED HYPERLIPIDEMIA TYPE: ICD-10-CM

## 2020-09-28 DIAGNOSIS — R94.31 ABNORMAL EKG: ICD-10-CM

## 2020-09-28 DIAGNOSIS — G47.00 INSOMNIA, UNSPECIFIED TYPE: ICD-10-CM

## 2020-09-28 DIAGNOSIS — F41.9 ANXIETY: ICD-10-CM

## 2020-09-28 DIAGNOSIS — R00.2 PALPITATIONS: Primary | ICD-10-CM

## 2020-09-28 PROCEDURE — 99214 PR OFFICE/OUTPT VISIT, EST, LEVL IV, 30-39 MIN: ICD-10-PCS | Mod: S$GLB,,, | Performed by: FAMILY MEDICINE

## 2020-09-28 PROCEDURE — 1159F MED LIST DOCD IN RCRD: CPT | Mod: S$GLB,,, | Performed by: FAMILY MEDICINE

## 2020-09-28 PROCEDURE — 99214 OFFICE O/P EST MOD 30 MIN: CPT | Mod: S$GLB,,, | Performed by: FAMILY MEDICINE

## 2020-09-28 PROCEDURE — 93010 EKG 12-LEAD: ICD-10-PCS | Mod: S$GLB,,, | Performed by: INTERNAL MEDICINE

## 2020-09-28 PROCEDURE — 93005 ELECTROCARDIOGRAM TRACING: CPT | Mod: S$GLB,,, | Performed by: FAMILY MEDICINE

## 2020-09-28 PROCEDURE — 3079F PR MOST RECENT DIASTOLIC BLOOD PRESSURE 80-89 MM HG: ICD-10-PCS | Mod: CPTII,S$GLB,, | Performed by: FAMILY MEDICINE

## 2020-09-28 PROCEDURE — 3074F SYST BP LT 130 MM HG: CPT | Mod: CPTII,S$GLB,, | Performed by: FAMILY MEDICINE

## 2020-09-28 PROCEDURE — 3008F PR BODY MASS INDEX (BMI) DOCUMENTED: ICD-10-PCS | Mod: CPTII,S$GLB,, | Performed by: FAMILY MEDICINE

## 2020-09-28 PROCEDURE — 1101F PT FALLS ASSESS-DOCD LE1/YR: CPT | Mod: CPTII,S$GLB,, | Performed by: FAMILY MEDICINE

## 2020-09-28 PROCEDURE — 1126F AMNT PAIN NOTED NONE PRSNT: CPT | Mod: S$GLB,,, | Performed by: FAMILY MEDICINE

## 2020-09-28 PROCEDURE — 3008F BODY MASS INDEX DOCD: CPT | Mod: CPTII,S$GLB,, | Performed by: FAMILY MEDICINE

## 2020-09-28 PROCEDURE — 3079F DIAST BP 80-89 MM HG: CPT | Mod: CPTII,S$GLB,, | Performed by: FAMILY MEDICINE

## 2020-09-28 PROCEDURE — 3074F PR MOST RECENT SYSTOLIC BLOOD PRESSURE < 130 MM HG: ICD-10-PCS | Mod: CPTII,S$GLB,, | Performed by: FAMILY MEDICINE

## 2020-09-28 PROCEDURE — 93010 ELECTROCARDIOGRAM REPORT: CPT | Mod: S$GLB,,, | Performed by: INTERNAL MEDICINE

## 2020-09-28 PROCEDURE — 1101F PR PT FALLS ASSESS DOC 0-1 FALLS W/OUT INJ PAST YR: ICD-10-PCS | Mod: CPTII,S$GLB,, | Performed by: FAMILY MEDICINE

## 2020-09-28 PROCEDURE — 93005 EKG 12-LEAD: ICD-10-PCS | Mod: S$GLB,,, | Performed by: FAMILY MEDICINE

## 2020-09-28 PROCEDURE — 3046F PR MOST RECENT HEMOGLOBIN A1C LEVEL > 9.0%: ICD-10-PCS | Mod: CPTII,S$GLB,, | Performed by: FAMILY MEDICINE

## 2020-09-28 PROCEDURE — 1126F PR PAIN SEVERITY QUANTIFIED, NO PAIN PRESENT: ICD-10-PCS | Mod: S$GLB,,, | Performed by: FAMILY MEDICINE

## 2020-09-28 PROCEDURE — 1159F PR MEDICATION LIST DOCUMENTED IN MEDICAL RECORD: ICD-10-PCS | Mod: S$GLB,,, | Performed by: FAMILY MEDICINE

## 2020-09-28 PROCEDURE — 3046F HEMOGLOBIN A1C LEVEL >9.0%: CPT | Mod: CPTII,S$GLB,, | Performed by: FAMILY MEDICINE

## 2020-09-28 RX ORDER — TRIAMCINOLONE ACETONIDE 0.25 MG/G
CREAM TOPICAL
COMMUNITY
Start: 2020-09-04 | End: 2022-07-28

## 2020-09-28 RX ORDER — ALPRAZOLAM 0.5 MG/1
0.5 TABLET ORAL 2 TIMES DAILY PRN
Qty: 30 TABLET | Refills: 0 | Status: SHIPPED | OUTPATIENT
Start: 2020-09-28 | End: 2020-12-09 | Stop reason: SDUPTHER

## 2020-09-28 RX ORDER — CALCIPOTRIENE 50 UG/G
AEROSOL, FOAM TOPICAL
COMMUNITY
Start: 2020-09-08 | End: 2021-09-15

## 2020-09-28 RX ORDER — CLOBETASOL PROPIONATE 0.5 MG/G
AEROSOL, FOAM TOPICAL
COMMUNITY
Start: 2020-09-04 | End: 2021-09-15

## 2020-09-28 RX ORDER — ZOLPIDEM TARTRATE 10 MG/1
10 TABLET ORAL NIGHTLY PRN
Qty: 30 TABLET | Refills: 0 | Status: SHIPPED | OUTPATIENT
Start: 2020-09-28 | End: 2020-12-09 | Stop reason: SDUPTHER

## 2020-09-28 RX ORDER — METFORMIN HYDROCHLORIDE 500 MG/1
TABLET, EXTENDED RELEASE ORAL
COMMUNITY
Start: 2020-08-29 | End: 2020-10-16 | Stop reason: SDUPTHER

## 2020-09-29 ENCOUNTER — LAB VISIT (OUTPATIENT)
Dept: LAB | Facility: HOSPITAL | Age: 70
End: 2020-09-29
Attending: FAMILY MEDICINE
Payer: COMMERCIAL

## 2020-09-29 DIAGNOSIS — R00.2 PALPITATIONS: ICD-10-CM

## 2020-09-29 LAB — TSH SERPL DL<=0.005 MIU/L-ACNC: 1.41 UIU/ML (ref 0.34–5.6)

## 2020-09-29 PROCEDURE — 84443 ASSAY THYROID STIM HORMONE: CPT

## 2020-09-29 PROCEDURE — 36415 COLL VENOUS BLD VENIPUNCTURE: CPT

## 2020-09-29 NOTE — PROGRESS NOTES
Follow-up of diabetes.  Still on 20 units.  Procedure which increase the dose.  Her fasting sugars room 2243.  Has a hard check sugars.  Using her Lantus daily.  Insomnia needs refill of Ambien takes 1/2 p.r.n..  Not tightly.  Anxiety Xanax also is as needed.  Less than use of the.  A.  She has improved her diet at off sugar.  Condition is she is complaining of heart racing she has checked her pulse.  Lasts for P acnes no shortness of or chest with.  She does get hot.  It does daily.  To also feels flushed occurs at rest bed at.  No.    EKG shows a first-degree AV block and Q-waves V 1 and V2.  Reviewed by me directly.    Physical examination vital signs are noted.  Obese female no acute distress.  Alert oriented x3.  Neck is without bruit.  Chest clear to auscultation.  No wheezes or crackles or dyspnea.  Heart regular rate rhythm without murmur gallop or rub..  Abdomen bowel sounds positive soft nontender no guarding or rebound no hepatosplenomegaly.  Extremities are without edema no clubbing cyanosis.    Impression diabetes mellitus type 2.  Anxiety.  Insomnia.  Palpitations.    Plan increase her Lantus to 30 units daily notify us of her fasting sugars after 4 days.  Want to get this under control as soon as possible.  Refill Ambien 10 mg HS p.r.n. number 30 no refills.  Refill Xanax 0.5 daily maximum number 30 with no refills.  Holter monitor ordered.  TSH ordered.  Echocardiogram ordered.  Follow-up regarding episodes and blood sugar in about 2 weeks.

## 2020-09-30 ENCOUNTER — TELEPHONE (OUTPATIENT)
Dept: FAMILY MEDICINE | Facility: CLINIC | Age: 70
End: 2020-09-30

## 2020-10-07 ENCOUNTER — TELEPHONE (OUTPATIENT)
Dept: FAMILY MEDICINE | Facility: CLINIC | Age: 70
End: 2020-10-07

## 2020-10-07 NOTE — TELEPHONE ENCOUNTER
PT CALLED WITH BLOOD SUGARS 10/1/20 329 10/2/20 238 10/3/20 241 10/4/20 217 10/5/20 285 10/6/20 230 ON LANTUS 30 UNITS. PT ADVISED UP 40 UNITS CALL IN 3 DAYS

## 2020-10-12 ENCOUNTER — CLINICAL SUPPORT (OUTPATIENT)
Dept: CARDIOLOGY | Facility: HOSPITAL | Age: 70
End: 2020-10-12
Attending: FAMILY MEDICINE
Payer: COMMERCIAL

## 2020-10-12 ENCOUNTER — TELEPHONE (OUTPATIENT)
Dept: FAMILY MEDICINE | Facility: CLINIC | Age: 70
End: 2020-10-12

## 2020-10-12 DIAGNOSIS — R00.2 PALPITATIONS: ICD-10-CM

## 2020-10-12 DIAGNOSIS — R94.31 ABNORMAL EKG: ICD-10-CM

## 2020-10-12 PROCEDURE — 93306 TTE W/DOPPLER COMPLETE: CPT

## 2020-10-12 PROCEDURE — 93227 HOLTER MONITOR - 24 HOUR (CUPID ONLY): ICD-10-PCS | Mod: ,,, | Performed by: INTERNAL MEDICINE

## 2020-10-12 PROCEDURE — 93306 TTE W/DOPPLER COMPLETE: CPT | Mod: 26,,, | Performed by: INTERNAL MEDICINE

## 2020-10-12 PROCEDURE — 93227 XTRNL ECG REC<48 HR R&I: CPT | Mod: ,,, | Performed by: INTERNAL MEDICINE

## 2020-10-12 PROCEDURE — 93226 XTRNL ECG REC<48 HR SCAN A/R: CPT

## 2020-10-12 PROCEDURE — 93306 ECHO (CUPID ONLY): ICD-10-PCS | Mod: 26,,, | Performed by: INTERNAL MEDICINE

## 2020-10-12 NOTE — TELEPHONE ENCOUNTER
----- Message from Stefania Rand MA sent at 10/12/2020  3:31 PM CDT -----  GLUCOSE LEVELS    10/12     244        10/11     249    10/10     216  10/09     252    10/08    226        TAKING UP IT TO 40 UNITS DAILY .    PT ADVISED UP LANTUS 55 UNITS

## 2020-10-15 LAB
OHS CV EVENT MONITOR DAY: 0
OHS CV HOLTER LENGTH DECIMAL HOURS: 24
OHS CV HOLTER LENGTH HOURS: 24
OHS CV HOLTER LENGTH MINUTES: 0

## 2020-10-16 ENCOUNTER — OFFICE VISIT (OUTPATIENT)
Dept: FAMILY MEDICINE | Facility: CLINIC | Age: 70
End: 2020-10-16
Payer: COMMERCIAL

## 2020-10-16 VITALS
BODY MASS INDEX: 29.99 KG/M2 | WEIGHT: 180 LBS | HEART RATE: 85 BPM | SYSTOLIC BLOOD PRESSURE: 132 MMHG | DIASTOLIC BLOOD PRESSURE: 80 MMHG | OXYGEN SATURATION: 99 % | HEIGHT: 65 IN | TEMPERATURE: 98 F

## 2020-10-16 DIAGNOSIS — I47.10 SVT (SUPRAVENTRICULAR TACHYCARDIA): ICD-10-CM

## 2020-10-16 DIAGNOSIS — I51.7 LVH (LEFT VENTRICULAR HYPERTROPHY): ICD-10-CM

## 2020-10-16 DIAGNOSIS — E11.9 TYPE 2 DIABETES MELLITUS WITHOUT COMPLICATION, WITH LONG-TERM CURRENT USE OF INSULIN: Primary | ICD-10-CM

## 2020-10-16 DIAGNOSIS — I35.0 AORTIC VALVE STENOSIS, ETIOLOGY OF CARDIAC VALVE DISEASE UNSPECIFIED: ICD-10-CM

## 2020-10-16 DIAGNOSIS — Z79.4 TYPE 2 DIABETES MELLITUS WITHOUT COMPLICATION, WITH LONG-TERM CURRENT USE OF INSULIN: Primary | ICD-10-CM

## 2020-10-16 LAB
AORTIC ROOT ANNULUS: 3.17 CM
AORTIC VALVE CUSP SEPERATION: 0.87 CM
AV INDEX (PROSTH): 0.48
AV MEAN GRADIENT: 9 MMHG
AV PEAK GRADIENT: 16 MMHG
AV VALVE AREA: 1.57 CM2
AV VELOCITY RATIO: 47.9
CV ECHO LV RWT: 0.53 CM
DOP CALC AO PEAK VEL: 1.99 M/S
DOP CALC AO VTI: 32.77 CM
DOP CALC LVOT AREA: 3.3 CM2
DOP CALC LVOT DIAMETER: 2.04 CM
DOP CALC LVOT PEAK VEL: 95.33 M/S
DOP CALC LVOT STROKE VOLUME: 51.39 CM3
DOP CALCLVOT PEAK VEL VTI: 15.73 CM
E WAVE DECELERATION TIME: 204.59 MSEC
E/A RATIO: 0.55
E/E' RATIO: 10.83 M/S
ECHO LV POSTERIOR WALL: 1.1 CM (ref 0.6–1.1)
FRACTIONAL SHORTENING: 26 % (ref 28–44)
INTERVENTRICULAR SEPTUM: 1.1 CM (ref 0.6–1.1)
LEFT ATRIUM SIZE: 3.63 CM
LEFT INTERNAL DIMENSION IN SYSTOLE: 3.06 CM (ref 2.1–4)
LEFT VENTRICULAR INTERNAL DIMENSION IN DIASTOLE: 4.12 CM (ref 3.5–6)
LEFT VENTRICULAR MASS: 152.44 G
LV LATERAL E/E' RATIO: 9.29 M/S
LV SEPTAL E/E' RATIO: 13 M/S
MV PEAK A VEL: 1.19 M/S
MV PEAK E VEL: 0.65 M/S
PISA TR MAX VEL: 2.72 M/S
PV PEAK VELOCITY: 78.22 CM/S
RA PRESSURE: 3 MMHG
TDI LATERAL: 0.07 M/S
TDI SEPTAL: 0.05 M/S
TDI: 0.06 M/S
TR MAX PG: 30 MMHG
TV REST PULMONARY ARTERY PRESSURE: 33 MMHG

## 2020-10-16 PROCEDURE — 3046F HEMOGLOBIN A1C LEVEL >9.0%: CPT | Mod: CPTII,S$GLB,, | Performed by: FAMILY MEDICINE

## 2020-10-16 PROCEDURE — 1159F MED LIST DOCD IN RCRD: CPT | Mod: S$GLB,,, | Performed by: FAMILY MEDICINE

## 2020-10-16 PROCEDURE — 99214 OFFICE O/P EST MOD 30 MIN: CPT | Mod: S$GLB,,, | Performed by: FAMILY MEDICINE

## 2020-10-16 PROCEDURE — 3079F DIAST BP 80-89 MM HG: CPT | Mod: CPTII,S$GLB,, | Performed by: FAMILY MEDICINE

## 2020-10-16 PROCEDURE — 1101F PT FALLS ASSESS-DOCD LE1/YR: CPT | Mod: CPTII,S$GLB,, | Performed by: FAMILY MEDICINE

## 2020-10-16 PROCEDURE — 1101F PR PT FALLS ASSESS DOC 0-1 FALLS W/OUT INJ PAST YR: ICD-10-PCS | Mod: CPTII,S$GLB,, | Performed by: FAMILY MEDICINE

## 2020-10-16 PROCEDURE — 1126F AMNT PAIN NOTED NONE PRSNT: CPT | Mod: S$GLB,,, | Performed by: FAMILY MEDICINE

## 2020-10-16 PROCEDURE — 99214 PR OFFICE/OUTPT VISIT, EST, LEVL IV, 30-39 MIN: ICD-10-PCS | Mod: S$GLB,,, | Performed by: FAMILY MEDICINE

## 2020-10-16 PROCEDURE — 3075F PR MOST RECENT SYSTOLIC BLOOD PRESS GE 130-139MM HG: ICD-10-PCS | Mod: CPTII,S$GLB,, | Performed by: FAMILY MEDICINE

## 2020-10-16 PROCEDURE — 1126F PR PAIN SEVERITY QUANTIFIED, NO PAIN PRESENT: ICD-10-PCS | Mod: S$GLB,,, | Performed by: FAMILY MEDICINE

## 2020-10-16 PROCEDURE — 3008F BODY MASS INDEX DOCD: CPT | Mod: CPTII,S$GLB,, | Performed by: FAMILY MEDICINE

## 2020-10-16 PROCEDURE — 3075F SYST BP GE 130 - 139MM HG: CPT | Mod: CPTII,S$GLB,, | Performed by: FAMILY MEDICINE

## 2020-10-16 PROCEDURE — 3046F PR MOST RECENT HEMOGLOBIN A1C LEVEL > 9.0%: ICD-10-PCS | Mod: CPTII,S$GLB,, | Performed by: FAMILY MEDICINE

## 2020-10-16 PROCEDURE — 3008F PR BODY MASS INDEX (BMI) DOCUMENTED: ICD-10-PCS | Mod: CPTII,S$GLB,, | Performed by: FAMILY MEDICINE

## 2020-10-16 PROCEDURE — 1159F PR MEDICATION LIST DOCUMENTED IN MEDICAL RECORD: ICD-10-PCS | Mod: S$GLB,,, | Performed by: FAMILY MEDICINE

## 2020-10-16 PROCEDURE — 3079F PR MOST RECENT DIASTOLIC BLOOD PRESSURE 80-89 MM HG: ICD-10-PCS | Mod: CPTII,S$GLB,, | Performed by: FAMILY MEDICINE

## 2020-10-16 RX ORDER — METOPROLOL SUCCINATE 25 MG/1
25 TABLET, EXTENDED RELEASE ORAL DAILY
Qty: 30 TABLET | Refills: 1 | Status: SHIPPED | OUTPATIENT
Start: 2020-10-16 | End: 2021-03-20

## 2020-10-16 RX ORDER — METFORMIN HYDROCHLORIDE 500 MG/1
500 TABLET, EXTENDED RELEASE ORAL 2 TIMES DAILY WITH MEALS
Qty: 180 TABLET | Refills: 1 | Status: SHIPPED | OUTPATIENT
Start: 2020-10-16 | End: 2021-04-19

## 2020-10-16 RX ORDER — INSULIN GLARGINE 100 [IU]/ML
80 INJECTION, SOLUTION SUBCUTANEOUS NIGHTLY
Qty: 8 SYRINGE | Refills: 4 | Status: SHIPPED | OUTPATIENT
Start: 2020-10-16 | End: 2020-12-09 | Stop reason: DRUGHIGH

## 2020-10-18 NOTE — PROGRESS NOTES
Follow-up of diabetes.  We had told her to increase to 55 units of insulin the other day when her sugars were still running in the 200s.  Most recent sugar is 253.  She only went up to 45 units for some reason.  TSH is 1.41 microalbumin is negative.  Cardiovascular she was having the palpitations.  So we had ordered echo and Holter on her.  She is still feeling some rapid heart rate here in there.  And did have it while she was wearing the Holter.  Pulmonary no cough or sputum.  She is on her metformin 1 per day and so far no GI upset with this.  The echocardiogram shows some left ventricular hypertrophy.  Ejection fraction of 55% some grade 1 diastolic dysfunction.  Mild aortic stenosis.  Holter monitor showed 3 minutes and 13 seconds of tachycardia at 120 2 beats per minute.  This occurred while she was at rest.  She did know the episode.    Physical examination vital signs are noted.  Overweight female no acute distress.  Neck is without bruit or adenopathy.  No thyromegaly.  Chest clear to auscultation no wheezes or crackles heart regular rate rhythm without murmur gallop or rub extremities are without edema.    Impression diabetes mellitus type 2 on insulin.  Aortic stenosis.  Mild left ventricular hypertrophy.  Tachycardia most likely PSVT.    Plan try metformin extended release 500 b.i.d. 1.  One hundred eighty with a refill.  Increase her Lantus to the 55 units and call us with her blood sugars as recommended in 4 days.  New prescription for Lantus written for up to 80 per day maximum.  Eight PMNs with 5 refills.  Start Toprol XL 25 daily 30 with 1 refill.  Follow-up in 1 month.  Diet weight reduction.  Avoid caffeine as much as possible.  May have to discontinue her ADD medication.

## 2020-10-19 ENCOUNTER — TELEPHONE (OUTPATIENT)
Dept: FAMILY MEDICINE | Facility: CLINIC | Age: 70
End: 2020-10-19

## 2020-10-19 NOTE — TELEPHONE ENCOUNTER
Left message with echo results.   ----- Message from Blane Mancini III, MD sent at 10/17/2020  7:38 PM CDT -----  NORMAL followup as needed.  Only mild aortic stenosis.  Will follow this with repeat echo in a year or 2.

## 2020-10-30 LAB
LEFT EYE DM RETINOPATHY: NEGATIVE
RIGHT EYE DM RETINOPATHY: NEGATIVE

## 2020-12-09 ENCOUNTER — OFFICE VISIT (OUTPATIENT)
Dept: FAMILY MEDICINE | Facility: CLINIC | Age: 70
End: 2020-12-09
Payer: COMMERCIAL

## 2020-12-09 VITALS
DIASTOLIC BLOOD PRESSURE: 80 MMHG | SYSTOLIC BLOOD PRESSURE: 119 MMHG | TEMPERATURE: 96 F | WEIGHT: 185 LBS | BODY MASS INDEX: 30.79 KG/M2 | OXYGEN SATURATION: 97 % | HEART RATE: 106 BPM

## 2020-12-09 DIAGNOSIS — Z20.822 EXPOSURE TO COVID-19 VIRUS: Primary | ICD-10-CM

## 2020-12-09 DIAGNOSIS — E11.9 TYPE 2 DIABETES MELLITUS WITHOUT COMPLICATION, WITHOUT LONG-TERM CURRENT USE OF INSULIN: ICD-10-CM

## 2020-12-09 DIAGNOSIS — F90.9 ATTENTION DEFICIT HYPERACTIVITY DISORDER (ADHD), UNSPECIFIED ADHD TYPE: ICD-10-CM

## 2020-12-09 DIAGNOSIS — G47.00 INSOMNIA, UNSPECIFIED TYPE: ICD-10-CM

## 2020-12-09 DIAGNOSIS — I10 ESSENTIAL HYPERTENSION: ICD-10-CM

## 2020-12-09 DIAGNOSIS — I51.7 LVH (LEFT VENTRICULAR HYPERTROPHY): ICD-10-CM

## 2020-12-09 DIAGNOSIS — E78.5 HYPERLIPIDEMIA, UNSPECIFIED HYPERLIPIDEMIA TYPE: ICD-10-CM

## 2020-12-09 DIAGNOSIS — M89.8X1 PAIN IN SCAPULA: ICD-10-CM

## 2020-12-09 DIAGNOSIS — I35.0 AORTIC VALVE STENOSIS, ETIOLOGY OF CARDIAC VALVE DISEASE UNSPECIFIED: ICD-10-CM

## 2020-12-09 PROCEDURE — 3288F PR FALLS RISK ASSESSMENT DOCUMENTED: ICD-10-PCS | Mod: CPTII,S$GLB,, | Performed by: FAMILY MEDICINE

## 2020-12-09 PROCEDURE — 3074F PR MOST RECENT SYSTOLIC BLOOD PRESSURE < 130 MM HG: ICD-10-PCS | Mod: CPTII,S$GLB,, | Performed by: FAMILY MEDICINE

## 2020-12-09 PROCEDURE — 3079F DIAST BP 80-89 MM HG: CPT | Mod: CPTII,S$GLB,, | Performed by: FAMILY MEDICINE

## 2020-12-09 PROCEDURE — 3046F PR MOST RECENT HEMOGLOBIN A1C LEVEL > 9.0%: ICD-10-PCS | Mod: CPTII,S$GLB,, | Performed by: FAMILY MEDICINE

## 2020-12-09 PROCEDURE — 99214 PR OFFICE/OUTPT VISIT, EST, LEVL IV, 30-39 MIN: ICD-10-PCS | Mod: S$GLB,,, | Performed by: FAMILY MEDICINE

## 2020-12-09 PROCEDURE — 3074F SYST BP LT 130 MM HG: CPT | Mod: CPTII,S$GLB,, | Performed by: FAMILY MEDICINE

## 2020-12-09 PROCEDURE — 3008F PR BODY MASS INDEX (BMI) DOCUMENTED: ICD-10-PCS | Mod: CPTII,S$GLB,, | Performed by: FAMILY MEDICINE

## 2020-12-09 PROCEDURE — 1101F PR PT FALLS ASSESS DOC 0-1 FALLS W/OUT INJ PAST YR: ICD-10-PCS | Mod: CPTII,S$GLB,, | Performed by: FAMILY MEDICINE

## 2020-12-09 PROCEDURE — 3079F PR MOST RECENT DIASTOLIC BLOOD PRESSURE 80-89 MM HG: ICD-10-PCS | Mod: CPTII,S$GLB,, | Performed by: FAMILY MEDICINE

## 2020-12-09 PROCEDURE — 1101F PT FALLS ASSESS-DOCD LE1/YR: CPT | Mod: CPTII,S$GLB,, | Performed by: FAMILY MEDICINE

## 2020-12-09 PROCEDURE — 3008F BODY MASS INDEX DOCD: CPT | Mod: CPTII,S$GLB,, | Performed by: FAMILY MEDICINE

## 2020-12-09 PROCEDURE — 3288F FALL RISK ASSESSMENT DOCD: CPT | Mod: CPTII,S$GLB,, | Performed by: FAMILY MEDICINE

## 2020-12-09 PROCEDURE — 3046F HEMOGLOBIN A1C LEVEL >9.0%: CPT | Mod: CPTII,S$GLB,, | Performed by: FAMILY MEDICINE

## 2020-12-09 PROCEDURE — 1125F PR PAIN SEVERITY QUANTIFIED, PAIN PRESENT: ICD-10-PCS | Mod: S$GLB,,, | Performed by: FAMILY MEDICINE

## 2020-12-09 PROCEDURE — 1125F AMNT PAIN NOTED PAIN PRSNT: CPT | Mod: S$GLB,,, | Performed by: FAMILY MEDICINE

## 2020-12-09 PROCEDURE — 1159F MED LIST DOCD IN RCRD: CPT | Mod: S$GLB,,, | Performed by: FAMILY MEDICINE

## 2020-12-09 PROCEDURE — 99214 OFFICE O/P EST MOD 30 MIN: CPT | Mod: S$GLB,,, | Performed by: FAMILY MEDICINE

## 2020-12-09 PROCEDURE — 1159F PR MEDICATION LIST DOCUMENTED IN MEDICAL RECORD: ICD-10-PCS | Mod: S$GLB,,, | Performed by: FAMILY MEDICINE

## 2020-12-09 RX ORDER — ZOLPIDEM TARTRATE 10 MG/1
10 TABLET ORAL NIGHTLY PRN
Qty: 30 TABLET | Refills: 0
Start: 2020-12-09 | End: 2021-01-19 | Stop reason: SDUPTHER

## 2020-12-09 RX ORDER — ALPRAZOLAM 0.5 MG/1
0.5 TABLET ORAL 2 TIMES DAILY PRN
Qty: 30 TABLET | Refills: 0
Start: 2020-12-09 | End: 2021-01-19 | Stop reason: SDUPTHER

## 2020-12-09 RX ORDER — INSULIN GLARGINE 100 [IU]/ML
80 INJECTION, SOLUTION SUBCUTANEOUS NIGHTLY
Qty: 8 SYRINGE | Refills: 4 | Status: CANCELLED | OUTPATIENT
Start: 2020-12-09 | End: 2021-12-09

## 2020-12-09 RX ORDER — INSULIN GLARGINE 300 U/ML
55 INJECTION, SOLUTION SUBCUTANEOUS DAILY
Qty: 10 SYRINGE | Refills: 0 | Status: SHIPPED | OUTPATIENT
Start: 2020-12-09 | End: 2021-01-19 | Stop reason: SDUPTHER

## 2020-12-09 RX ORDER — TIZANIDINE 4 MG/1
4 TABLET ORAL 2 TIMES DAILY PRN
Qty: 30 TABLET | Refills: 0 | Status: SHIPPED | OUTPATIENT
Start: 2020-12-09 | End: 2020-12-19

## 2020-12-09 NOTE — PROGRESS NOTES
Subjective:       Patient ID: Debby Brown is a 70 y.o. female.    Chief Complaint: Medication Refill    Covid exposure on Saturday by her daughter. Her daughter tested positive on Sunday. Her six year-old grandchild has been going back and forth between her house and her daughter's house since Monday. She is not showing any symptoms. She is working from home. Cardiovascular ok no chest pain or palpitations. DM 2 blood sugar running in the low 100s. Taking 55 units of the Lantus. Hemoglobin A1c is due. ADHD, not taking her Adderall much now that she is part time and working from home. Additionally notes intermittent scapular pain, worse on the right side than the left for about 3 weeks. No exacerbating factors. She tried a muscle relaxer, which she states helped some. States the pain sometimes occurs with neck pain.    Needs refill of her Ambien.  Does not leave the house much in is working from home does not need her Adderall as much.  Review of Systems   Constitutional: Negative.  Negative for appetite change, chills, fatigue and fever.   HENT: Negative.  Negative for ear pain, rhinorrhea, sinus pressure/congestion and sore throat.    Respiratory: Negative.  Negative for cough, chest tightness, shortness of breath and wheezing.    Cardiovascular: Negative.  Negative for chest pain, palpitations and leg swelling.   Gastrointestinal: Negative.  Negative for abdominal pain, diarrhea, nausea and vomiting.   Genitourinary: Negative.  Negative for dysuria, frequency, hematuria and urgency.   Musculoskeletal:        Scapular pain   Integumentary:  Negative.   Neurological: Negative.  Negative for dizziness, weakness, numbness and headaches.   All other systems reviewed and are negative.        Objective:      Physical Exam  Vitals signs reviewed.   Constitutional:       General: She is not in acute distress.     Appearance: Normal appearance.   HENT:      Head: Normocephalic and atraumatic.      Right Ear: External  ear normal.      Left Ear: External ear normal.      Nose: Nose normal.      Mouth/Throat:      Mouth: Mucous membranes are moist.   Eyes:      Pupils: Pupils are equal, round, and reactive to light.   Neck:      Musculoskeletal: Normal range of motion and neck supple.      Vascular: No carotid bruit.   Cardiovascular:      Rate and Rhythm: Normal rate and regular rhythm.      Pulses: Normal pulses.      Heart sounds: Normal heart sounds. No murmur. No friction rub. No gallop.    Pulmonary:      Effort: Pulmonary effort is normal.      Breath sounds: Normal breath sounds. No wheezing, rhonchi or rales.   Abdominal:      Palpations: Abdomen is soft.   Musculoskeletal: Normal range of motion.         General: No tenderness.      Right lower leg: No edema.      Left lower leg: No edema.   Skin:     General: Skin is warm and dry.      Capillary Refill: Capillary refill takes less than 2 seconds.   Neurological:      General: No focal deficit present.      Mental Status: She is alert and oriented to person, place, and time.   Psychiatric:         Mood and Affect: Mood normal.         Behavior: Behavior normal.         Assessment:       1. Exposure to COVID-19 virus    2. Type 2 diabetes mellitus without complication, without long-term current use of insulin    3. Essential hypertension    4. Hyperlipidemia, unspecified hyperlipidemia type    5. Attention deficit hyperactivity disorder (ADHD), unspecified ADHD type    6. LVH (left ventricular hypertrophy)    7. Aortic valve stenosis, etiology of cardiac valve disease unspecified        Plan:       **Lipid, CMP, and hemoglobin A1c ordered. Isolate for 12 days.  6-year-old granddaughter has ADD and quite hard to handle.  They are sending her back and forth between the mother who has COVID hand the patient.  Discussed the fact that this is really not a good idea with her comorbidities.  Either the girl needs to stay with her mother or with her exclusively to avoid  transmission.  Will change her from the Lantus to inject a smaller volume of insulin if her insurance will let her have the more concentrated insulin.  Change to Tujeo 55 units daily #10 pens. Xray thoracic spine. Chest xray. Zanaflex 4 mg bid prn #30.  Refill her Ambien 10 mg HS 30 with 2 refills.  *

## 2020-12-10 ENCOUNTER — TELEPHONE (OUTPATIENT)
Dept: FAMILY MEDICINE | Facility: CLINIC | Age: 70
End: 2020-12-10

## 2020-12-11 ENCOUNTER — HOSPITAL ENCOUNTER (OUTPATIENT)
Dept: RADIOLOGY | Facility: HOSPITAL | Age: 70
Discharge: HOME | End: 2020-12-11
Attending: FAMILY MEDICINE
Payer: COMMERCIAL

## 2020-12-11 DIAGNOSIS — M89.8X1 PAIN IN SCAPULA: ICD-10-CM

## 2020-12-11 DIAGNOSIS — Z20.822 EXPOSURE TO COVID-19 VIRUS: ICD-10-CM

## 2020-12-11 PROCEDURE — 72070 X-RAY EXAM THORAC SPINE 2VWS: CPT | Mod: TC

## 2020-12-11 PROCEDURE — 71046 X-RAY EXAM CHEST 2 VIEWS: CPT | Mod: TC

## 2021-01-05 ENCOUNTER — OFFICE VISIT (OUTPATIENT)
Dept: FAMILY MEDICINE | Facility: CLINIC | Age: 71
End: 2021-01-05
Payer: COMMERCIAL

## 2021-01-05 VITALS
DIASTOLIC BLOOD PRESSURE: 100 MMHG | WEIGHT: 188 LBS | HEART RATE: 88 BPM | OXYGEN SATURATION: 98 % | SYSTOLIC BLOOD PRESSURE: 142 MMHG | TEMPERATURE: 98 F | BODY MASS INDEX: 31.32 KG/M2 | HEIGHT: 65 IN

## 2021-01-05 DIAGNOSIS — I47.10 PSVT (PAROXYSMAL SUPRAVENTRICULAR TACHYCARDIA): ICD-10-CM

## 2021-01-05 DIAGNOSIS — E11.9 TYPE 2 DIABETES MELLITUS WITHOUT COMPLICATION, WITH LONG-TERM CURRENT USE OF INSULIN: Primary | ICD-10-CM

## 2021-01-05 DIAGNOSIS — R10.32 LEFT LOWER QUADRANT PAIN: ICD-10-CM

## 2021-01-05 DIAGNOSIS — R10.30 LOWER ABDOMINAL PAIN: ICD-10-CM

## 2021-01-05 DIAGNOSIS — M54.2 ARTHRALGIA OF CERVICAL SPINE: ICD-10-CM

## 2021-01-05 DIAGNOSIS — Z79.4 TYPE 2 DIABETES MELLITUS WITHOUT COMPLICATION, WITH LONG-TERM CURRENT USE OF INSULIN: Primary | ICD-10-CM

## 2021-01-05 PROCEDURE — 1125F AMNT PAIN NOTED PAIN PRSNT: CPT | Mod: S$GLB,,, | Performed by: FAMILY MEDICINE

## 2021-01-05 PROCEDURE — 1159F MED LIST DOCD IN RCRD: CPT | Mod: S$GLB,,, | Performed by: FAMILY MEDICINE

## 2021-01-05 PROCEDURE — 3080F PR MOST RECENT DIASTOLIC BLOOD PRESSURE >= 90 MM HG: ICD-10-PCS | Mod: CPTII,S$GLB,, | Performed by: FAMILY MEDICINE

## 2021-01-05 PROCEDURE — 3046F PR MOST RECENT HEMOGLOBIN A1C LEVEL > 9.0%: ICD-10-PCS | Mod: CPTII,S$GLB,, | Performed by: FAMILY MEDICINE

## 2021-01-05 PROCEDURE — 3288F PR FALLS RISK ASSESSMENT DOCUMENTED: ICD-10-PCS | Mod: CPTII,S$GLB,, | Performed by: FAMILY MEDICINE

## 2021-01-05 PROCEDURE — 3288F FALL RISK ASSESSMENT DOCD: CPT | Mod: CPTII,S$GLB,, | Performed by: FAMILY MEDICINE

## 2021-01-05 PROCEDURE — 3046F HEMOGLOBIN A1C LEVEL >9.0%: CPT | Mod: CPTII,S$GLB,, | Performed by: FAMILY MEDICINE

## 2021-01-05 PROCEDURE — 3077F SYST BP >= 140 MM HG: CPT | Mod: CPTII,S$GLB,, | Performed by: FAMILY MEDICINE

## 2021-01-05 PROCEDURE — 3008F BODY MASS INDEX DOCD: CPT | Mod: CPTII,S$GLB,, | Performed by: FAMILY MEDICINE

## 2021-01-05 PROCEDURE — 3080F DIAST BP >= 90 MM HG: CPT | Mod: CPTII,S$GLB,, | Performed by: FAMILY MEDICINE

## 2021-01-05 PROCEDURE — 3008F PR BODY MASS INDEX (BMI) DOCUMENTED: ICD-10-PCS | Mod: CPTII,S$GLB,, | Performed by: FAMILY MEDICINE

## 2021-01-05 PROCEDURE — 1125F PR PAIN SEVERITY QUANTIFIED, PAIN PRESENT: ICD-10-PCS | Mod: S$GLB,,, | Performed by: FAMILY MEDICINE

## 2021-01-05 PROCEDURE — 99214 PR OFFICE/OUTPT VISIT, EST, LEVL IV, 30-39 MIN: ICD-10-PCS | Mod: S$GLB,,, | Performed by: FAMILY MEDICINE

## 2021-01-05 PROCEDURE — 1159F PR MEDICATION LIST DOCUMENTED IN MEDICAL RECORD: ICD-10-PCS | Mod: S$GLB,,, | Performed by: FAMILY MEDICINE

## 2021-01-05 PROCEDURE — 3077F PR MOST RECENT SYSTOLIC BLOOD PRESSURE >= 140 MM HG: ICD-10-PCS | Mod: CPTII,S$GLB,, | Performed by: FAMILY MEDICINE

## 2021-01-05 PROCEDURE — 99214 OFFICE O/P EST MOD 30 MIN: CPT | Mod: S$GLB,,, | Performed by: FAMILY MEDICINE

## 2021-01-05 PROCEDURE — 1101F PT FALLS ASSESS-DOCD LE1/YR: CPT | Mod: CPTII,S$GLB,, | Performed by: FAMILY MEDICINE

## 2021-01-05 PROCEDURE — 1101F PR PT FALLS ASSESS DOC 0-1 FALLS W/OUT INJ PAST YR: ICD-10-PCS | Mod: CPTII,S$GLB,, | Performed by: FAMILY MEDICINE

## 2021-01-05 RX ORDER — METRONIDAZOLE 250 MG/1
250 TABLET ORAL 3 TIMES DAILY
COMMUNITY
End: 2021-01-05 | Stop reason: SDUPTHER

## 2021-01-05 RX ORDER — METRONIDAZOLE 250 MG/1
250 TABLET ORAL 3 TIMES DAILY
Qty: 30 TABLET | Refills: 0 | Status: ON HOLD | OUTPATIENT
Start: 2021-01-05 | End: 2021-09-01 | Stop reason: HOSPADM

## 2021-01-05 RX ORDER — CEFUROXIME AXETIL 500 MG/1
500 TABLET ORAL 2 TIMES DAILY
Qty: 20 TABLET | Refills: 0 | Status: ON HOLD | OUTPATIENT
Start: 2021-01-05 | End: 2021-09-01 | Stop reason: HOSPADM

## 2021-01-05 RX ORDER — CEFUROXIME AXETIL 500 MG/1
500 TABLET ORAL 2 TIMES DAILY
COMMUNITY
End: 2021-01-05 | Stop reason: SDUPTHER

## 2021-01-07 ENCOUNTER — TELEPHONE (OUTPATIENT)
Dept: FAMILY MEDICINE | Facility: CLINIC | Age: 71
End: 2021-01-07

## 2021-01-08 ENCOUNTER — PATIENT OUTREACH (OUTPATIENT)
Dept: ADMINISTRATIVE | Facility: HOSPITAL | Age: 71
End: 2021-01-08

## 2021-01-11 ENCOUNTER — PATIENT OUTREACH (OUTPATIENT)
Dept: ADMINISTRATIVE | Facility: HOSPITAL | Age: 71
End: 2021-01-11

## 2021-01-13 ENCOUNTER — HOSPITAL ENCOUNTER (OUTPATIENT)
Dept: RADIOLOGY | Facility: HOSPITAL | Age: 71
Discharge: HOME | End: 2021-01-13
Attending: FAMILY MEDICINE
Payer: COMMERCIAL

## 2021-01-13 DIAGNOSIS — R10.32 LEFT LOWER QUADRANT PAIN: ICD-10-CM

## 2021-01-13 LAB
CREAT SERPL-MCNC: 0.9 MG/DL (ref 0.5–1.4)
SAMPLE: NORMAL

## 2021-01-13 PROCEDURE — 74177 CT ABD & PELVIS W/CONTRAST: CPT | Mod: TC,PO

## 2021-01-13 PROCEDURE — 25500020 PHARM REV CODE 255: Mod: PO | Performed by: FAMILY MEDICINE

## 2021-01-13 RX ADMIN — IOHEXOL 100 ML: 350 INJECTION, SOLUTION INTRAVENOUS at 10:01

## 2021-01-14 ENCOUNTER — TELEPHONE (OUTPATIENT)
Dept: FAMILY MEDICINE | Facility: CLINIC | Age: 71
End: 2021-01-14

## 2021-01-19 ENCOUNTER — OFFICE VISIT (OUTPATIENT)
Dept: FAMILY MEDICINE | Facility: CLINIC | Age: 71
End: 2021-01-19
Payer: COMMERCIAL

## 2021-01-19 VITALS
BODY MASS INDEX: 31.28 KG/M2 | WEIGHT: 188 LBS | SYSTOLIC BLOOD PRESSURE: 137 MMHG | DIASTOLIC BLOOD PRESSURE: 84 MMHG | TEMPERATURE: 98 F | HEART RATE: 82 BPM | OXYGEN SATURATION: 97 %

## 2021-01-19 DIAGNOSIS — Z13.820 SPECIAL SCREENING FOR OSTEOPOROSIS: ICD-10-CM

## 2021-01-19 DIAGNOSIS — I10 ESSENTIAL HYPERTENSION: ICD-10-CM

## 2021-01-19 DIAGNOSIS — Z12.31 BREAST CANCER SCREENING BY MAMMOGRAM: ICD-10-CM

## 2021-01-19 DIAGNOSIS — Z79.4 TYPE 2 DIABETES MELLITUS WITHOUT COMPLICATION, WITH LONG-TERM CURRENT USE OF INSULIN: ICD-10-CM

## 2021-01-19 DIAGNOSIS — G47.00 INSOMNIA, UNSPECIFIED TYPE: ICD-10-CM

## 2021-01-19 DIAGNOSIS — E78.5 HYPERLIPIDEMIA, UNSPECIFIED HYPERLIPIDEMIA TYPE: ICD-10-CM

## 2021-01-19 DIAGNOSIS — F41.9 ANXIETY: Primary | ICD-10-CM

## 2021-01-19 DIAGNOSIS — Z78.0 MENOPAUSE: ICD-10-CM

## 2021-01-19 DIAGNOSIS — K57.92 DIVERTICULITIS: ICD-10-CM

## 2021-01-19 DIAGNOSIS — E11.9 TYPE 2 DIABETES MELLITUS WITHOUT COMPLICATION, WITH LONG-TERM CURRENT USE OF INSULIN: ICD-10-CM

## 2021-01-19 PROCEDURE — 3079F DIAST BP 80-89 MM HG: CPT | Mod: CPTII,S$GLB,, | Performed by: FAMILY MEDICINE

## 2021-01-19 PROCEDURE — 1126F PR PAIN SEVERITY QUANTIFIED, NO PAIN PRESENT: ICD-10-PCS | Mod: S$GLB,,, | Performed by: FAMILY MEDICINE

## 2021-01-19 PROCEDURE — 3075F SYST BP GE 130 - 139MM HG: CPT | Mod: CPTII,S$GLB,, | Performed by: FAMILY MEDICINE

## 2021-01-19 PROCEDURE — 1101F PR PT FALLS ASSESS DOC 0-1 FALLS W/OUT INJ PAST YR: ICD-10-PCS | Mod: CPTII,S$GLB,, | Performed by: FAMILY MEDICINE

## 2021-01-19 PROCEDURE — 99214 OFFICE O/P EST MOD 30 MIN: CPT | Mod: S$GLB,,, | Performed by: FAMILY MEDICINE

## 2021-01-19 PROCEDURE — 99214 PR OFFICE/OUTPT VISIT, EST, LEVL IV, 30-39 MIN: ICD-10-PCS | Mod: S$GLB,,, | Performed by: FAMILY MEDICINE

## 2021-01-19 PROCEDURE — 1159F PR MEDICATION LIST DOCUMENTED IN MEDICAL RECORD: ICD-10-PCS | Mod: S$GLB,,, | Performed by: FAMILY MEDICINE

## 2021-01-19 PROCEDURE — 3008F PR BODY MASS INDEX (BMI) DOCUMENTED: ICD-10-PCS | Mod: CPTII,S$GLB,, | Performed by: FAMILY MEDICINE

## 2021-01-19 PROCEDURE — 3008F BODY MASS INDEX DOCD: CPT | Mod: CPTII,S$GLB,, | Performed by: FAMILY MEDICINE

## 2021-01-19 PROCEDURE — 1126F AMNT PAIN NOTED NONE PRSNT: CPT | Mod: S$GLB,,, | Performed by: FAMILY MEDICINE

## 2021-01-19 PROCEDURE — 3046F PR MOST RECENT HEMOGLOBIN A1C LEVEL > 9.0%: ICD-10-PCS | Mod: CPTII,S$GLB,, | Performed by: FAMILY MEDICINE

## 2021-01-19 PROCEDURE — 3288F PR FALLS RISK ASSESSMENT DOCUMENTED: ICD-10-PCS | Mod: CPTII,S$GLB,, | Performed by: FAMILY MEDICINE

## 2021-01-19 PROCEDURE — 3075F PR MOST RECENT SYSTOLIC BLOOD PRESS GE 130-139MM HG: ICD-10-PCS | Mod: CPTII,S$GLB,, | Performed by: FAMILY MEDICINE

## 2021-01-19 PROCEDURE — 1159F MED LIST DOCD IN RCRD: CPT | Mod: S$GLB,,, | Performed by: FAMILY MEDICINE

## 2021-01-19 PROCEDURE — 3046F HEMOGLOBIN A1C LEVEL >9.0%: CPT | Mod: CPTII,S$GLB,, | Performed by: FAMILY MEDICINE

## 2021-01-19 PROCEDURE — 1101F PT FALLS ASSESS-DOCD LE1/YR: CPT | Mod: CPTII,S$GLB,, | Performed by: FAMILY MEDICINE

## 2021-01-19 PROCEDURE — 3288F FALL RISK ASSESSMENT DOCD: CPT | Mod: CPTII,S$GLB,, | Performed by: FAMILY MEDICINE

## 2021-01-19 PROCEDURE — 3079F PR MOST RECENT DIASTOLIC BLOOD PRESSURE 80-89 MM HG: ICD-10-PCS | Mod: CPTII,S$GLB,, | Performed by: FAMILY MEDICINE

## 2021-01-19 RX ORDER — INSULIN GLARGINE 300 U/ML
55 INJECTION, SOLUTION SUBCUTANEOUS DAILY
Qty: 10 SYRINGE | Refills: 0 | Status: SHIPPED | OUTPATIENT
Start: 2021-01-19 | End: 2021-04-19 | Stop reason: SDUPTHER

## 2021-01-19 RX ORDER — ALPRAZOLAM 0.5 MG/1
0.5 TABLET ORAL 2 TIMES DAILY PRN
Qty: 30 TABLET | Refills: 0 | Status: SHIPPED | OUTPATIENT
Start: 2021-01-19 | End: 2021-04-19 | Stop reason: SDUPTHER

## 2021-01-19 RX ORDER — ZOLPIDEM TARTRATE 10 MG/1
10 TABLET ORAL NIGHTLY PRN
Qty: 30 TABLET | Refills: 2 | Status: SHIPPED | OUTPATIENT
Start: 2021-01-19 | End: 2021-07-20

## 2021-01-19 RX ORDER — LISINOPRIL 40 MG/1
40 TABLET ORAL DAILY
Qty: 90 TABLET | Refills: 1 | Status: SHIPPED | OUTPATIENT
Start: 2021-01-19 | End: 2021-08-23 | Stop reason: SDUPTHER

## 2021-02-02 ENCOUNTER — OFFICE VISIT (OUTPATIENT)
Dept: FAMILY MEDICINE | Facility: CLINIC | Age: 71
End: 2021-02-02
Payer: COMMERCIAL

## 2021-02-02 VITALS
HEART RATE: 87 BPM | TEMPERATURE: 97 F | DIASTOLIC BLOOD PRESSURE: 69 MMHG | SYSTOLIC BLOOD PRESSURE: 126 MMHG | WEIGHT: 188 LBS | OXYGEN SATURATION: 96 % | BODY MASS INDEX: 31.28 KG/M2

## 2021-02-02 DIAGNOSIS — E11.9 TYPE 2 DIABETES MELLITUS WITHOUT COMPLICATION, WITH LONG-TERM CURRENT USE OF INSULIN: Primary | ICD-10-CM

## 2021-02-02 DIAGNOSIS — Z79.4 TYPE 2 DIABETES MELLITUS WITHOUT COMPLICATION, WITH LONG-TERM CURRENT USE OF INSULIN: Primary | ICD-10-CM

## 2021-02-02 DIAGNOSIS — J32.9 SINUSITIS, UNSPECIFIED CHRONICITY, UNSPECIFIED LOCATION: ICD-10-CM

## 2021-02-02 PROCEDURE — 96372 THER/PROPH/DIAG INJ SC/IM: CPT | Mod: S$GLB,,, | Performed by: FAMILY MEDICINE

## 2021-02-02 PROCEDURE — 1101F PT FALLS ASSESS-DOCD LE1/YR: CPT | Mod: CPTII,S$GLB,, | Performed by: FAMILY MEDICINE

## 2021-02-02 PROCEDURE — 96372 PR INJECTION,THERAP/PROPH/DIAG2ST, IM OR SUBCUT: ICD-10-PCS | Mod: S$GLB,,, | Performed by: FAMILY MEDICINE

## 2021-02-02 PROCEDURE — 99213 PR OFFICE/OUTPT VISIT, EST, LEVL III, 20-29 MIN: ICD-10-PCS | Mod: 25,S$GLB,, | Performed by: FAMILY MEDICINE

## 2021-02-02 PROCEDURE — 1159F MED LIST DOCD IN RCRD: CPT | Mod: S$GLB,,, | Performed by: FAMILY MEDICINE

## 2021-02-02 PROCEDURE — 1125F AMNT PAIN NOTED PAIN PRSNT: CPT | Mod: S$GLB,,, | Performed by: FAMILY MEDICINE

## 2021-02-02 PROCEDURE — 3288F PR FALLS RISK ASSESSMENT DOCUMENTED: ICD-10-PCS | Mod: CPTII,S$GLB,, | Performed by: FAMILY MEDICINE

## 2021-02-02 PROCEDURE — 3074F PR MOST RECENT SYSTOLIC BLOOD PRESSURE < 130 MM HG: ICD-10-PCS | Mod: CPTII,S$GLB,, | Performed by: FAMILY MEDICINE

## 2021-02-02 PROCEDURE — 3046F HEMOGLOBIN A1C LEVEL >9.0%: CPT | Mod: CPTII,S$GLB,, | Performed by: FAMILY MEDICINE

## 2021-02-02 PROCEDURE — 1159F PR MEDICATION LIST DOCUMENTED IN MEDICAL RECORD: ICD-10-PCS | Mod: S$GLB,,, | Performed by: FAMILY MEDICINE

## 2021-02-02 PROCEDURE — 99213 OFFICE O/P EST LOW 20 MIN: CPT | Mod: 25,S$GLB,, | Performed by: FAMILY MEDICINE

## 2021-02-02 PROCEDURE — 3008F PR BODY MASS INDEX (BMI) DOCUMENTED: ICD-10-PCS | Mod: CPTII,S$GLB,, | Performed by: FAMILY MEDICINE

## 2021-02-02 PROCEDURE — 3046F PR MOST RECENT HEMOGLOBIN A1C LEVEL > 9.0%: ICD-10-PCS | Mod: CPTII,S$GLB,, | Performed by: FAMILY MEDICINE

## 2021-02-02 PROCEDURE — 1101F PR PT FALLS ASSESS DOC 0-1 FALLS W/OUT INJ PAST YR: ICD-10-PCS | Mod: CPTII,S$GLB,, | Performed by: FAMILY MEDICINE

## 2021-02-02 PROCEDURE — 1125F PR PAIN SEVERITY QUANTIFIED, PAIN PRESENT: ICD-10-PCS | Mod: S$GLB,,, | Performed by: FAMILY MEDICINE

## 2021-02-02 PROCEDURE — 3078F DIAST BP <80 MM HG: CPT | Mod: CPTII,S$GLB,, | Performed by: FAMILY MEDICINE

## 2021-02-02 PROCEDURE — 3288F FALL RISK ASSESSMENT DOCD: CPT | Mod: CPTII,S$GLB,, | Performed by: FAMILY MEDICINE

## 2021-02-02 PROCEDURE — 3078F PR MOST RECENT DIASTOLIC BLOOD PRESSURE < 80 MM HG: ICD-10-PCS | Mod: CPTII,S$GLB,, | Performed by: FAMILY MEDICINE

## 2021-02-02 PROCEDURE — 3008F BODY MASS INDEX DOCD: CPT | Mod: CPTII,S$GLB,, | Performed by: FAMILY MEDICINE

## 2021-02-02 PROCEDURE — 3074F SYST BP LT 130 MM HG: CPT | Mod: CPTII,S$GLB,, | Performed by: FAMILY MEDICINE

## 2021-02-02 RX ORDER — DOXYCYCLINE 100 MG/1
100 CAPSULE ORAL EVERY 12 HOURS
COMMUNITY
End: 2021-02-02 | Stop reason: SDUPTHER

## 2021-02-02 RX ORDER — DEXAMETHASONE SODIUM PHOSPHATE 4 MG/ML
4 INJECTION, SOLUTION INTRA-ARTICULAR; INTRALESIONAL; INTRAMUSCULAR; INTRAVENOUS; SOFT TISSUE
Status: COMPLETED | OUTPATIENT
Start: 2021-02-02 | End: 2021-02-02

## 2021-02-02 RX ORDER — DOXYCYCLINE 100 MG/1
100 CAPSULE ORAL EVERY 12 HOURS
Qty: 20 CAPSULE | Refills: 0 | Status: ON HOLD | OUTPATIENT
Start: 2021-02-02 | End: 2021-09-01 | Stop reason: HOSPADM

## 2021-02-02 RX ADMIN — DEXAMETHASONE SODIUM PHOSPHATE 4 MG: 4 INJECTION, SOLUTION INTRA-ARTICULAR; INTRALESIONAL; INTRAMUSCULAR; INTRAVENOUS; SOFT TISSUE at 11:02

## 2021-03-09 ENCOUNTER — TELEPHONE (OUTPATIENT)
Dept: FAMILY MEDICINE | Facility: CLINIC | Age: 71
End: 2021-03-09

## 2021-03-27 ENCOUNTER — HOSPITAL ENCOUNTER (EMERGENCY)
Facility: HOSPITAL | Age: 71
Discharge: HOME | End: 2021-03-27
Attending: EMERGENCY MEDICINE
Payer: COMMERCIAL

## 2021-03-27 VITALS
TEMPERATURE: 99 F | OXYGEN SATURATION: 98 % | SYSTOLIC BLOOD PRESSURE: 123 MMHG | DIASTOLIC BLOOD PRESSURE: 77 MMHG | HEIGHT: 67 IN | HEART RATE: 79 BPM | WEIGHT: 179 LBS | BODY MASS INDEX: 28.09 KG/M2 | RESPIRATION RATE: 16 BRPM

## 2021-03-27 DIAGNOSIS — R00.2 PALPITATIONS: Primary | ICD-10-CM

## 2021-03-27 LAB
ALBUMIN SERPL BCP-MCNC: 3.7 G/DL (ref 3.5–5.2)
ALP SERPL-CCNC: 57 U/L (ref 55–135)
ALT SERPL W/O P-5'-P-CCNC: 28 U/L (ref 10–44)
ANION GAP SERPL CALC-SCNC: 8 MMOL/L (ref 8–16)
AST SERPL-CCNC: 22 U/L (ref 10–40)
BASOPHILS # BLD AUTO: 0.06 K/UL (ref 0–0.2)
BASOPHILS NFR BLD: 0.8 % (ref 0–1.9)
BILIRUB SERPL-MCNC: 0.6 MG/DL (ref 0.1–1)
BNP SERPL-MCNC: 26 PG/ML (ref 0–99)
BUN SERPL-MCNC: 18 MG/DL (ref 8–23)
CALCIUM SERPL-MCNC: 9.5 MG/DL (ref 8.7–10.5)
CHLORIDE SERPL-SCNC: 106 MMOL/L (ref 95–110)
CO2 SERPL-SCNC: 24 MMOL/L (ref 23–29)
CREAT SERPL-MCNC: 0.8 MG/DL (ref 0.5–1.4)
D DIMER PPP IA.FEU-MCNC: 0.71 UG/ML FEU
DIFFERENTIAL METHOD: ABNORMAL
EOSINOPHIL # BLD AUTO: 0.3 K/UL (ref 0–0.5)
EOSINOPHIL NFR BLD: 4.2 % (ref 0–8)
ERYTHROCYTE [DISTWIDTH] IN BLOOD BY AUTOMATED COUNT: 14.6 % (ref 11.5–14.5)
EST. GFR  (AFRICAN AMERICAN): >60 ML/MIN/1.73 M^2
EST. GFR  (NON AFRICAN AMERICAN): >60 ML/MIN/1.73 M^2
GLUCOSE SERPL-MCNC: 253 MG/DL (ref 70–110)
HCT VFR BLD AUTO: 42.5 % (ref 37–48.5)
HGB BLD-MCNC: 14 G/DL (ref 12–16)
IMM GRANULOCYTES # BLD AUTO: 0.02 K/UL (ref 0–0.04)
IMM GRANULOCYTES NFR BLD AUTO: 0.3 % (ref 0–0.5)
LYMPHOCYTES # BLD AUTO: 3.4 K/UL (ref 1–4.8)
LYMPHOCYTES NFR BLD: 44.2 % (ref 18–48)
MAGNESIUM SERPL-MCNC: 1.8 MG/DL (ref 1.6–2.6)
MCH RBC QN AUTO: 28.9 PG (ref 27–31)
MCHC RBC AUTO-ENTMCNC: 32.9 G/DL (ref 32–36)
MCV RBC AUTO: 88 FL (ref 82–98)
MONOCYTES # BLD AUTO: 0.5 K/UL (ref 0.3–1)
MONOCYTES NFR BLD: 7.1 % (ref 4–15)
NEUTROPHILS # BLD AUTO: 3.3 K/UL (ref 1.8–7.7)
NEUTROPHILS NFR BLD: 43.4 % (ref 38–73)
NRBC BLD-RTO: 0 /100 WBC
PLATELET # BLD AUTO: 310 K/UL (ref 150–350)
PMV BLD AUTO: 10.1 FL (ref 9.2–12.9)
POTASSIUM SERPL-SCNC: 3.9 MMOL/L (ref 3.5–5.1)
PROT SERPL-MCNC: 7.2 G/DL (ref 6–8.4)
RBC # BLD AUTO: 4.85 M/UL (ref 4–5.4)
SODIUM SERPL-SCNC: 138 MMOL/L (ref 136–145)
TROPONIN I SERPL DL<=0.01 NG/ML-MCNC: <0.03 NG/ML
TSH SERPL DL<=0.005 MIU/L-ACNC: 1.13 UIU/ML (ref 0.34–5.6)
WBC # BLD AUTO: 7.65 K/UL (ref 3.9–12.7)

## 2021-03-27 PROCEDURE — 93010 EKG 12-LEAD: ICD-10-PCS | Mod: ,,, | Performed by: SPECIALIST

## 2021-03-27 PROCEDURE — 83735 ASSAY OF MAGNESIUM: CPT | Performed by: STUDENT IN AN ORGANIZED HEALTH CARE EDUCATION/TRAINING PROGRAM

## 2021-03-27 PROCEDURE — 25500020 PHARM REV CODE 255: Performed by: EMERGENCY MEDICINE

## 2021-03-27 PROCEDURE — 93010 ELECTROCARDIOGRAM REPORT: CPT | Mod: ,,, | Performed by: SPECIALIST

## 2021-03-27 PROCEDURE — 93005 ELECTROCARDIOGRAM TRACING: CPT | Performed by: SPECIALIST

## 2021-03-27 PROCEDURE — 84484 ASSAY OF TROPONIN QUANT: CPT | Performed by: STUDENT IN AN ORGANIZED HEALTH CARE EDUCATION/TRAINING PROGRAM

## 2021-03-27 PROCEDURE — 85025 COMPLETE CBC W/AUTO DIFF WBC: CPT | Performed by: STUDENT IN AN ORGANIZED HEALTH CARE EDUCATION/TRAINING PROGRAM

## 2021-03-27 PROCEDURE — 85379 FIBRIN DEGRADATION QUANT: CPT | Performed by: STUDENT IN AN ORGANIZED HEALTH CARE EDUCATION/TRAINING PROGRAM

## 2021-03-27 PROCEDURE — 83880 ASSAY OF NATRIURETIC PEPTIDE: CPT | Performed by: STUDENT IN AN ORGANIZED HEALTH CARE EDUCATION/TRAINING PROGRAM

## 2021-03-27 PROCEDURE — 80053 COMPREHEN METABOLIC PANEL: CPT | Performed by: STUDENT IN AN ORGANIZED HEALTH CARE EDUCATION/TRAINING PROGRAM

## 2021-03-27 PROCEDURE — 99285 EMERGENCY DEPT VISIT HI MDM: CPT | Mod: 25

## 2021-03-27 PROCEDURE — 84443 ASSAY THYROID STIM HORMONE: CPT | Performed by: STUDENT IN AN ORGANIZED HEALTH CARE EDUCATION/TRAINING PROGRAM

## 2021-03-27 RX ADMIN — IOHEXOL 100 ML: 350 INJECTION, SOLUTION INTRAVENOUS at 07:03

## 2021-03-29 ENCOUNTER — OFFICE VISIT (OUTPATIENT)
Dept: FAMILY MEDICINE | Facility: CLINIC | Age: 71
End: 2021-03-29
Payer: COMMERCIAL

## 2021-03-29 VITALS
BODY MASS INDEX: 28.51 KG/M2 | HEART RATE: 80 BPM | OXYGEN SATURATION: 98 % | TEMPERATURE: 98 F | WEIGHT: 182 LBS | SYSTOLIC BLOOD PRESSURE: 131 MMHG | DIASTOLIC BLOOD PRESSURE: 76 MMHG

## 2021-03-29 DIAGNOSIS — Z79.4 TYPE 2 DIABETES MELLITUS WITHOUT COMPLICATION, WITH LONG-TERM CURRENT USE OF INSULIN: ICD-10-CM

## 2021-03-29 DIAGNOSIS — R00.0 TACHYCARDIA: Primary | ICD-10-CM

## 2021-03-29 DIAGNOSIS — R91.1 PULMONARY NODULE: ICD-10-CM

## 2021-03-29 DIAGNOSIS — E11.9 TYPE 2 DIABETES MELLITUS WITHOUT COMPLICATION, WITH LONG-TERM CURRENT USE OF INSULIN: ICD-10-CM

## 2021-03-29 PROCEDURE — 99214 OFFICE O/P EST MOD 30 MIN: CPT | Mod: S$GLB,,, | Performed by: FAMILY MEDICINE

## 2021-03-29 PROCEDURE — 1126F PR PAIN SEVERITY QUANTIFIED, NO PAIN PRESENT: ICD-10-PCS | Mod: S$GLB,,, | Performed by: FAMILY MEDICINE

## 2021-03-29 PROCEDURE — 3008F PR BODY MASS INDEX (BMI) DOCUMENTED: ICD-10-PCS | Mod: CPTII,S$GLB,, | Performed by: FAMILY MEDICINE

## 2021-03-29 PROCEDURE — 3046F HEMOGLOBIN A1C LEVEL >9.0%: CPT | Mod: CPTII,S$GLB,, | Performed by: FAMILY MEDICINE

## 2021-03-29 PROCEDURE — 3288F FALL RISK ASSESSMENT DOCD: CPT | Mod: CPTII,S$GLB,, | Performed by: FAMILY MEDICINE

## 2021-03-29 PROCEDURE — 3008F BODY MASS INDEX DOCD: CPT | Mod: CPTII,S$GLB,, | Performed by: FAMILY MEDICINE

## 2021-03-29 PROCEDURE — 3078F DIAST BP <80 MM HG: CPT | Mod: CPTII,S$GLB,, | Performed by: FAMILY MEDICINE

## 2021-03-29 PROCEDURE — 3288F PR FALLS RISK ASSESSMENT DOCUMENTED: ICD-10-PCS | Mod: CPTII,S$GLB,, | Performed by: FAMILY MEDICINE

## 2021-03-29 PROCEDURE — 3046F PR MOST RECENT HEMOGLOBIN A1C LEVEL > 9.0%: ICD-10-PCS | Mod: CPTII,S$GLB,, | Performed by: FAMILY MEDICINE

## 2021-03-29 PROCEDURE — 1159F PR MEDICATION LIST DOCUMENTED IN MEDICAL RECORD: ICD-10-PCS | Mod: S$GLB,,, | Performed by: FAMILY MEDICINE

## 2021-03-29 PROCEDURE — 1159F MED LIST DOCD IN RCRD: CPT | Mod: S$GLB,,, | Performed by: FAMILY MEDICINE

## 2021-03-29 PROCEDURE — 1101F PR PT FALLS ASSESS DOC 0-1 FALLS W/OUT INJ PAST YR: ICD-10-PCS | Mod: CPTII,S$GLB,, | Performed by: FAMILY MEDICINE

## 2021-03-29 PROCEDURE — 1101F PT FALLS ASSESS-DOCD LE1/YR: CPT | Mod: CPTII,S$GLB,, | Performed by: FAMILY MEDICINE

## 2021-03-29 PROCEDURE — 3075F SYST BP GE 130 - 139MM HG: CPT | Mod: CPTII,S$GLB,, | Performed by: FAMILY MEDICINE

## 2021-03-29 PROCEDURE — 1126F AMNT PAIN NOTED NONE PRSNT: CPT | Mod: S$GLB,,, | Performed by: FAMILY MEDICINE

## 2021-03-29 PROCEDURE — 3075F PR MOST RECENT SYSTOLIC BLOOD PRESS GE 130-139MM HG: ICD-10-PCS | Mod: CPTII,S$GLB,, | Performed by: FAMILY MEDICINE

## 2021-03-29 PROCEDURE — 99214 PR OFFICE/OUTPT VISIT, EST, LEVL IV, 30-39 MIN: ICD-10-PCS | Mod: S$GLB,,, | Performed by: FAMILY MEDICINE

## 2021-03-29 PROCEDURE — 3078F PR MOST RECENT DIASTOLIC BLOOD PRESSURE < 80 MM HG: ICD-10-PCS | Mod: CPTII,S$GLB,, | Performed by: FAMILY MEDICINE

## 2021-03-29 RX ORDER — METOPROLOL SUCCINATE 50 MG/1
50 TABLET, EXTENDED RELEASE ORAL DAILY
Qty: 90 TABLET | Refills: 1 | Status: SHIPPED | OUTPATIENT
Start: 2021-03-29 | End: 2021-08-23 | Stop reason: SDUPTHER

## 2021-03-31 PROBLEM — R91.1 PULMONARY NODULE: Status: ACTIVE | Noted: 2021-03-31

## 2021-04-05 ENCOUNTER — PATIENT MESSAGE (OUTPATIENT)
Dept: ADMINISTRATIVE | Facility: HOSPITAL | Age: 71
End: 2021-04-05

## 2021-04-19 ENCOUNTER — OFFICE VISIT (OUTPATIENT)
Dept: FAMILY MEDICINE | Facility: CLINIC | Age: 71
End: 2021-04-19
Payer: COMMERCIAL

## 2021-04-19 VITALS
WEIGHT: 192 LBS | DIASTOLIC BLOOD PRESSURE: 73 MMHG | OXYGEN SATURATION: 97 % | TEMPERATURE: 98 F | HEART RATE: 88 BPM | SYSTOLIC BLOOD PRESSURE: 117 MMHG | HEIGHT: 67 IN | BODY MASS INDEX: 30.13 KG/M2

## 2021-04-19 DIAGNOSIS — Z79.4 TYPE 2 DIABETES MELLITUS WITHOUT COMPLICATION, WITH LONG-TERM CURRENT USE OF INSULIN: ICD-10-CM

## 2021-04-19 DIAGNOSIS — F41.9 ANXIETY: ICD-10-CM

## 2021-04-19 DIAGNOSIS — N39.0 URINARY TRACT INFECTION WITH HEMATURIA, SITE UNSPECIFIED: Primary | ICD-10-CM

## 2021-04-19 DIAGNOSIS — R31.9 URINARY TRACT INFECTION WITH HEMATURIA, SITE UNSPECIFIED: Primary | ICD-10-CM

## 2021-04-19 DIAGNOSIS — E11.9 TYPE 2 DIABETES MELLITUS WITHOUT COMPLICATION, WITH LONG-TERM CURRENT USE OF INSULIN: ICD-10-CM

## 2021-04-19 DIAGNOSIS — K58.9 IRRITABLE BOWEL SYNDROME, UNSPECIFIED TYPE: ICD-10-CM

## 2021-04-19 LAB
BILIRUB SERPL-MCNC: ABNORMAL MG/DL
BLOOD URINE, POC: ABNORMAL
COLOR, POC UA: YELLOW
GLUCOSE UR QL STRIP: 250
KETONES UR QL STRIP: ABNORMAL
LEUKOCYTE ESTERASE URINE, POC: ABNORMAL
NITRITE, POC UA: ABNORMAL
PH, POC UA: 5
PROTEIN, POC: 30
SPECIFIC GRAVITY, POC UA: 1.01
UROBILINOGEN, POC UA: ABNORMAL

## 2021-04-19 PROCEDURE — 1101F PT FALLS ASSESS-DOCD LE1/YR: CPT | Mod: CPTII,S$GLB,, | Performed by: FAMILY MEDICINE

## 2021-04-19 PROCEDURE — 1126F PR PAIN SEVERITY QUANTIFIED, NO PAIN PRESENT: ICD-10-PCS | Mod: S$GLB,,, | Performed by: FAMILY MEDICINE

## 2021-04-19 PROCEDURE — 3008F PR BODY MASS INDEX (BMI) DOCUMENTED: ICD-10-PCS | Mod: CPTII,S$GLB,, | Performed by: FAMILY MEDICINE

## 2021-04-19 PROCEDURE — 3288F FALL RISK ASSESSMENT DOCD: CPT | Mod: CPTII,S$GLB,, | Performed by: FAMILY MEDICINE

## 2021-04-19 PROCEDURE — 99214 OFFICE O/P EST MOD 30 MIN: CPT | Mod: 25,S$GLB,, | Performed by: FAMILY MEDICINE

## 2021-04-19 PROCEDURE — 1101F PR PT FALLS ASSESS DOC 0-1 FALLS W/OUT INJ PAST YR: ICD-10-PCS | Mod: CPTII,S$GLB,, | Performed by: FAMILY MEDICINE

## 2021-04-19 PROCEDURE — 81003 POCT URINALYSIS W/O SCOPE: ICD-10-PCS | Mod: QW,S$GLB,, | Performed by: FAMILY MEDICINE

## 2021-04-19 PROCEDURE — 1126F AMNT PAIN NOTED NONE PRSNT: CPT | Mod: S$GLB,,, | Performed by: FAMILY MEDICINE

## 2021-04-19 PROCEDURE — 1159F MED LIST DOCD IN RCRD: CPT | Mod: S$GLB,,, | Performed by: FAMILY MEDICINE

## 2021-04-19 PROCEDURE — 81003 URINALYSIS AUTO W/O SCOPE: CPT | Mod: QW,S$GLB,, | Performed by: FAMILY MEDICINE

## 2021-04-19 PROCEDURE — 1159F PR MEDICATION LIST DOCUMENTED IN MEDICAL RECORD: ICD-10-PCS | Mod: S$GLB,,, | Performed by: FAMILY MEDICINE

## 2021-04-19 PROCEDURE — 3288F PR FALLS RISK ASSESSMENT DOCUMENTED: ICD-10-PCS | Mod: CPTII,S$GLB,, | Performed by: FAMILY MEDICINE

## 2021-04-19 PROCEDURE — 99214 PR OFFICE/OUTPT VISIT, EST, LEVL IV, 30-39 MIN: ICD-10-PCS | Mod: 25,S$GLB,, | Performed by: FAMILY MEDICINE

## 2021-04-19 PROCEDURE — 3008F BODY MASS INDEX DOCD: CPT | Mod: CPTII,S$GLB,, | Performed by: FAMILY MEDICINE

## 2021-04-19 RX ORDER — NITROFURANTOIN 25; 75 MG/1; MG/1
100 CAPSULE ORAL 2 TIMES DAILY
Qty: 20 CAPSULE | Refills: 0 | Status: ON HOLD | OUTPATIENT
Start: 2021-04-19 | End: 2021-09-01 | Stop reason: HOSPADM

## 2021-04-19 RX ORDER — ALPRAZOLAM 0.5 MG/1
0.5 TABLET ORAL 2 TIMES DAILY PRN
Qty: 30 TABLET | Refills: 1 | Status: SHIPPED | OUTPATIENT
Start: 2021-04-19 | End: 2021-08-23 | Stop reason: SDUPTHER

## 2021-04-19 RX ORDER — HYOSCYAMINE SULFATE 0.12 MG/1
0.12 TABLET SUBLINGUAL EVERY 6 HOURS PRN
Qty: 30 TABLET | Refills: 0 | Status: SHIPPED | OUTPATIENT
Start: 2021-04-19 | End: 2022-10-31

## 2021-04-19 RX ORDER — INSULIN GLARGINE 300 U/ML
55 INJECTION, SOLUTION SUBCUTANEOUS DAILY
Qty: 10 SYRINGE | Refills: 0 | Status: ON HOLD | OUTPATIENT
Start: 2021-04-19 | End: 2021-09-06 | Stop reason: SDUPTHER

## 2021-04-22 LAB — BACTERIA UR CULT: ABNORMAL

## 2021-07-07 ENCOUNTER — PATIENT MESSAGE (OUTPATIENT)
Dept: ADMINISTRATIVE | Facility: HOSPITAL | Age: 71
End: 2021-07-07

## 2021-08-04 ENCOUNTER — PATIENT MESSAGE (OUTPATIENT)
Dept: ADMINISTRATIVE | Facility: HOSPITAL | Age: 71
End: 2021-08-04

## 2021-08-17 ENCOUNTER — TELEPHONE (OUTPATIENT)
Dept: FAMILY MEDICINE | Facility: CLINIC | Age: 71
End: 2021-08-17

## 2021-08-23 ENCOUNTER — OFFICE VISIT (OUTPATIENT)
Dept: FAMILY MEDICINE | Facility: CLINIC | Age: 71
End: 2021-08-23
Payer: COMMERCIAL

## 2021-08-23 VITALS
TEMPERATURE: 98 F | OXYGEN SATURATION: 98 % | SYSTOLIC BLOOD PRESSURE: 128 MMHG | HEART RATE: 77 BPM | WEIGHT: 187 LBS | DIASTOLIC BLOOD PRESSURE: 77 MMHG | BODY MASS INDEX: 29.35 KG/M2 | HEIGHT: 67 IN

## 2021-08-23 DIAGNOSIS — J34.89 SINUS PRESSURE: ICD-10-CM

## 2021-08-23 DIAGNOSIS — Z79.4 TYPE 2 DIABETES MELLITUS WITHOUT COMPLICATION, WITH LONG-TERM CURRENT USE OF INSULIN: ICD-10-CM

## 2021-08-23 DIAGNOSIS — E11.9 TYPE 2 DIABETES MELLITUS WITHOUT COMPLICATION, WITH LONG-TERM CURRENT USE OF INSULIN: ICD-10-CM

## 2021-08-23 DIAGNOSIS — S90.222A CONTUSION OF TOENAIL OF LEFT FOOT, INITIAL ENCOUNTER: ICD-10-CM

## 2021-08-23 DIAGNOSIS — F98.8 ATTENTION DEFICIT DISORDER, UNSPECIFIED HYPERACTIVITY PRESENCE: Primary | ICD-10-CM

## 2021-08-23 DIAGNOSIS — F32.A DEPRESSION, UNSPECIFIED DEPRESSION TYPE: ICD-10-CM

## 2021-08-23 DIAGNOSIS — R00.0 TACHYCARDIA: ICD-10-CM

## 2021-08-23 DIAGNOSIS — I10 ESSENTIAL HYPERTENSION: ICD-10-CM

## 2021-08-23 DIAGNOSIS — R05.9 COUGH: ICD-10-CM

## 2021-08-23 DIAGNOSIS — G47.00 INSOMNIA, UNSPECIFIED TYPE: ICD-10-CM

## 2021-08-23 PROCEDURE — 1101F PT FALLS ASSESS-DOCD LE1/YR: CPT | Mod: CPTII,S$GLB,, | Performed by: FAMILY MEDICINE

## 2021-08-23 PROCEDURE — 4010F ACE/ARB THERAPY RXD/TAKEN: CPT | Mod: CPTII,S$GLB,, | Performed by: FAMILY MEDICINE

## 2021-08-23 PROCEDURE — 4010F PR ACE/ARB THEARPY RXD/TAKEN: ICD-10-PCS | Mod: CPTII,S$GLB,, | Performed by: FAMILY MEDICINE

## 2021-08-23 PROCEDURE — 3078F DIAST BP <80 MM HG: CPT | Mod: CPTII,S$GLB,, | Performed by: FAMILY MEDICINE

## 2021-08-23 PROCEDURE — 3288F FALL RISK ASSESSMENT DOCD: CPT | Mod: CPTII,S$GLB,, | Performed by: FAMILY MEDICINE

## 2021-08-23 PROCEDURE — 1101F PR PT FALLS ASSESS DOC 0-1 FALLS W/OUT INJ PAST YR: ICD-10-PCS | Mod: CPTII,S$GLB,, | Performed by: FAMILY MEDICINE

## 2021-08-23 PROCEDURE — 3046F HEMOGLOBIN A1C LEVEL >9.0%: CPT | Mod: CPTII,S$GLB,, | Performed by: FAMILY MEDICINE

## 2021-08-23 PROCEDURE — 3074F SYST BP LT 130 MM HG: CPT | Mod: CPTII,S$GLB,, | Performed by: FAMILY MEDICINE

## 2021-08-23 PROCEDURE — 1125F AMNT PAIN NOTED PAIN PRSNT: CPT | Mod: CPTII,S$GLB,, | Performed by: FAMILY MEDICINE

## 2021-08-23 PROCEDURE — 99214 PR OFFICE/OUTPT VISIT, EST, LEVL IV, 30-39 MIN: ICD-10-PCS | Mod: S$GLB,,, | Performed by: FAMILY MEDICINE

## 2021-08-23 PROCEDURE — 3288F PR FALLS RISK ASSESSMENT DOCUMENTED: ICD-10-PCS | Mod: CPTII,S$GLB,, | Performed by: FAMILY MEDICINE

## 2021-08-23 PROCEDURE — 3008F PR BODY MASS INDEX (BMI) DOCUMENTED: ICD-10-PCS | Mod: CPTII,S$GLB,, | Performed by: FAMILY MEDICINE

## 2021-08-23 PROCEDURE — 1125F PR PAIN SEVERITY QUANTIFIED, PAIN PRESENT: ICD-10-PCS | Mod: CPTII,S$GLB,, | Performed by: FAMILY MEDICINE

## 2021-08-23 PROCEDURE — 99214 OFFICE O/P EST MOD 30 MIN: CPT | Mod: S$GLB,,, | Performed by: FAMILY MEDICINE

## 2021-08-23 PROCEDURE — 3008F BODY MASS INDEX DOCD: CPT | Mod: CPTII,S$GLB,, | Performed by: FAMILY MEDICINE

## 2021-08-23 PROCEDURE — 3078F PR MOST RECENT DIASTOLIC BLOOD PRESSURE < 80 MM HG: ICD-10-PCS | Mod: CPTII,S$GLB,, | Performed by: FAMILY MEDICINE

## 2021-08-23 PROCEDURE — 3074F PR MOST RECENT SYSTOLIC BLOOD PRESSURE < 130 MM HG: ICD-10-PCS | Mod: CPTII,S$GLB,, | Performed by: FAMILY MEDICINE

## 2021-08-23 PROCEDURE — 3046F PR MOST RECENT HEMOGLOBIN A1C LEVEL > 9.0%: ICD-10-PCS | Mod: CPTII,S$GLB,, | Performed by: FAMILY MEDICINE

## 2021-08-23 RX ORDER — TERBINAFINE HYDROCHLORIDE 250 MG/1
250 TABLET ORAL DAILY
Qty: 90 TABLET | Refills: 0 | Status: ON HOLD | OUTPATIENT
Start: 2021-08-23 | End: 2021-09-01 | Stop reason: HOSPADM

## 2021-08-23 RX ORDER — METOPROLOL SUCCINATE 50 MG/1
50 TABLET, EXTENDED RELEASE ORAL DAILY
Qty: 90 TABLET | Refills: 1 | Status: SHIPPED | OUTPATIENT
Start: 2021-08-23 | End: 2021-10-08 | Stop reason: SDUPTHER

## 2021-08-23 RX ORDER — DOXYCYCLINE 100 MG/1
100 CAPSULE ORAL EVERY 12 HOURS
Qty: 20 CAPSULE | Refills: 0 | Status: ON HOLD | OUTPATIENT
Start: 2021-08-23 | End: 2021-09-01 | Stop reason: HOSPADM

## 2021-08-23 RX ORDER — ZOLPIDEM TARTRATE 10 MG/1
10 TABLET ORAL NIGHTLY PRN
Qty: 30 TABLET | Refills: 2 | Status: SHIPPED | OUTPATIENT
Start: 2021-08-23 | End: 2022-03-03 | Stop reason: SDUPTHER

## 2021-08-23 RX ORDER — ALPRAZOLAM 0.5 MG/1
0.5 TABLET ORAL 2 TIMES DAILY PRN
Qty: 30 TABLET | Refills: 2 | Status: SHIPPED | OUTPATIENT
Start: 2021-08-23 | End: 2022-03-03 | Stop reason: SDUPTHER

## 2021-08-23 RX ORDER — LISINOPRIL 40 MG/1
40 TABLET ORAL DAILY
Qty: 90 TABLET | Refills: 1 | Status: SHIPPED | OUTPATIENT
Start: 2021-08-23 | End: 2021-09-02

## 2021-08-23 RX ORDER — BUPROPION HYDROCHLORIDE 150 MG/1
150 TABLET ORAL DAILY
Qty: 30 TABLET | Refills: 0 | Status: SHIPPED | OUTPATIENT
Start: 2021-08-23 | End: 2021-10-08 | Stop reason: SDUPTHER

## 2021-09-01 ENCOUNTER — HOSPITAL ENCOUNTER (INPATIENT)
Facility: HOSPITAL | Age: 71
LOS: 1 days | Discharge: HOME | DRG: 682 | End: 2021-09-01
Attending: EMERGENCY MEDICINE | Admitting: INTERNAL MEDICINE
Payer: COMMERCIAL

## 2021-09-01 VITALS
DIASTOLIC BLOOD PRESSURE: 65 MMHG | HEART RATE: 78 BPM | TEMPERATURE: 99 F | OXYGEN SATURATION: 94 % | RESPIRATION RATE: 16 BRPM | SYSTOLIC BLOOD PRESSURE: 111 MMHG | WEIGHT: 188.5 LBS | HEIGHT: 67 IN | BODY MASS INDEX: 29.58 KG/M2

## 2021-09-01 DIAGNOSIS — U07.1 COVID-19: ICD-10-CM

## 2021-09-01 DIAGNOSIS — N17.9 ACUTE KIDNEY INJURY: Primary | ICD-10-CM

## 2021-09-01 LAB
ALBUMIN SERPL BCP-MCNC: 3.3 G/DL (ref 3.5–5.2)
ALP SERPL-CCNC: 53 U/L (ref 55–135)
ALT SERPL W/O P-5'-P-CCNC: 30 U/L (ref 10–44)
ANION GAP SERPL CALC-SCNC: 12 MMOL/L (ref 8–16)
AST SERPL-CCNC: 31 U/L (ref 10–40)
BILIRUB SERPL-MCNC: 0.6 MG/DL (ref 0.1–1)
BUN SERPL-MCNC: 31 MG/DL (ref 8–23)
CALCIUM SERPL-MCNC: 9 MG/DL (ref 8.7–10.5)
CHLORIDE SERPL-SCNC: 103 MMOL/L (ref 95–110)
CO2 SERPL-SCNC: 19 MMOL/L (ref 23–29)
CREAT SERPL-MCNC: 1.3 MG/DL (ref 0.5–1.4)
EST. GFR  (AFRICAN AMERICAN): 48 ML/MIN/1.73 M^2
EST. GFR  (NON AFRICAN AMERICAN): 41.7 ML/MIN/1.73 M^2
ESTIMATED AVG GLUCOSE: 298 MG/DL (ref 68–131)
ESTIMATED AVG GLUCOSE: 298 MG/DL (ref 68–131)
GLUCOSE SERPL-MCNC: 269 MG/DL (ref 70–110)
GLUCOSE SERPL-MCNC: 300 MG/DL (ref 70–110)
GLUCOSE SERPL-MCNC: 307 MG/DL (ref 70–110)
GLUCOSE SERPL-MCNC: 318 MG/DL (ref 70–110)
HBA1C MFR BLD: 12 % (ref 4.5–6.2)
HBA1C MFR BLD: 12 % (ref 4.5–6.2)
POTASSIUM SERPL-SCNC: 3.7 MMOL/L (ref 3.5–5.1)
PROT SERPL-MCNC: 6.5 G/DL (ref 6–8.4)
SODIUM SERPL-SCNC: 134 MMOL/L (ref 136–145)
TROPONIN I SERPL DL<=0.01 NG/ML-MCNC: <0.03 NG/ML
TROPONIN I SERPL DL<=0.01 NG/ML-MCNC: <0.03 NG/ML

## 2021-09-01 PROCEDURE — 63600175 PHARM REV CODE 636 W HCPCS: Performed by: INTERNAL MEDICINE

## 2021-09-01 PROCEDURE — C9399 UNCLASSIFIED DRUGS OR BIOLOG: HCPCS | Performed by: INTERNAL MEDICINE

## 2021-09-01 PROCEDURE — 36415 COLL VENOUS BLD VENIPUNCTURE: CPT | Performed by: INTERNAL MEDICINE

## 2021-09-01 PROCEDURE — 83036 HEMOGLOBIN GLYCOSYLATED A1C: CPT | Performed by: INTERNAL MEDICINE

## 2021-09-01 PROCEDURE — 99900035 HC TECH TIME PER 15 MIN (STAT)

## 2021-09-01 PROCEDURE — 80053 COMPREHEN METABOLIC PANEL: CPT | Performed by: INTERNAL MEDICINE

## 2021-09-01 PROCEDURE — 25000003 PHARM REV CODE 250: Performed by: INTERNAL MEDICINE

## 2021-09-01 PROCEDURE — 94761 N-INVAS EAR/PLS OXIMETRY MLT: CPT

## 2021-09-01 PROCEDURE — 99285 EMERGENCY DEPT VISIT HI MDM: CPT

## 2021-09-01 PROCEDURE — 21400001 HC TELEMETRY ROOM

## 2021-09-01 PROCEDURE — 63600175 PHARM REV CODE 636 W HCPCS: Performed by: EMERGENCY MEDICINE

## 2021-09-01 PROCEDURE — 84484 ASSAY OF TROPONIN QUANT: CPT | Mod: 91 | Performed by: INTERNAL MEDICINE

## 2021-09-01 RX ORDER — ALPRAZOLAM 0.5 MG/1
0.5 TABLET ORAL 2 TIMES DAILY PRN
Status: DISCONTINUED | OUTPATIENT
Start: 2021-09-01 | End: 2021-09-01 | Stop reason: HOSPADM

## 2021-09-01 RX ORDER — ACETAMINOPHEN 325 MG/1
650 TABLET ORAL EVERY 6 HOURS PRN
Status: DISCONTINUED | OUTPATIENT
Start: 2021-09-01 | End: 2021-09-01 | Stop reason: HOSPADM

## 2021-09-01 RX ORDER — ZOLPIDEM TARTRATE 5 MG/1
10 TABLET ORAL NIGHTLY PRN
Status: DISCONTINUED | OUTPATIENT
Start: 2021-09-01 | End: 2021-09-01 | Stop reason: HOSPADM

## 2021-09-01 RX ORDER — METOPROLOL SUCCINATE 50 MG/1
50 TABLET, EXTENDED RELEASE ORAL DAILY
Status: DISCONTINUED | OUTPATIENT
Start: 2021-09-01 | End: 2021-09-01 | Stop reason: HOSPADM

## 2021-09-01 RX ORDER — BUPROPION HYDROCHLORIDE 150 MG/1
150 TABLET ORAL DAILY
Status: DISCONTINUED | OUTPATIENT
Start: 2021-09-01 | End: 2021-09-01 | Stop reason: HOSPADM

## 2021-09-01 RX ORDER — SODIUM CHLORIDE 9 MG/ML
INJECTION, SOLUTION INTRAVENOUS CONTINUOUS
Status: DISCONTINUED | OUTPATIENT
Start: 2021-09-01 | End: 2021-09-01 | Stop reason: HOSPADM

## 2021-09-01 RX ORDER — LISINOPRIL 20 MG/1
40 TABLET ORAL DAILY
Status: DISCONTINUED | OUTPATIENT
Start: 2021-09-01 | End: 2021-09-01 | Stop reason: HOSPADM

## 2021-09-01 RX ORDER — ONDANSETRON 2 MG/ML
4 INJECTION INTRAMUSCULAR; INTRAVENOUS EVERY 6 HOURS PRN
Status: DISCONTINUED | OUTPATIENT
Start: 2021-09-01 | End: 2021-09-01 | Stop reason: HOSPADM

## 2021-09-01 RX ADMIN — LISINOPRIL 40 MG: 20 TABLET ORAL at 08:09

## 2021-09-01 RX ADMIN — SODIUM CHLORIDE: 0.9 INJECTION, SOLUTION INTRAVENOUS at 04:09

## 2021-09-01 RX ADMIN — BUPROPION HYDROCHLORIDE 150 MG: 150 TABLET, FILM COATED, EXTENDED RELEASE ORAL at 08:09

## 2021-09-01 RX ADMIN — HUMAN INSULIN 5 UNITS: 100 INJECTION, SOLUTION SUBCUTANEOUS at 08:09

## 2021-09-01 RX ADMIN — SODIUM CHLORIDE, SODIUM LACTATE, POTASSIUM CHLORIDE, AND CALCIUM CHLORIDE 1000 ML: .6; .31; .03; .02 INJECTION, SOLUTION INTRAVENOUS at 02:09

## 2021-09-01 RX ADMIN — ACETAMINOPHEN 650 MG: 325 TABLET, FILM COATED ORAL at 08:09

## 2021-09-01 RX ADMIN — METOPROLOL SUCCINATE 50 MG: 50 TABLET, FILM COATED, EXTENDED RELEASE ORAL at 08:09

## 2021-09-01 RX ADMIN — INSULIN DETEMIR 55 UNITS: 100 INJECTION, SOLUTION SUBCUTANEOUS at 04:09

## 2021-09-03 ENCOUNTER — INFUSION (OUTPATIENT)
Dept: INFECTIOUS DISEASES | Facility: HOSPITAL | Age: 71
End: 2021-09-03
Attending: EMERGENCY MEDICINE
Payer: COMMERCIAL

## 2021-09-03 ENCOUNTER — NURSE TRIAGE (OUTPATIENT)
Dept: ADMINISTRATIVE | Facility: CLINIC | Age: 71
End: 2021-09-03

## 2021-09-03 ENCOUNTER — HOSPITAL ENCOUNTER (INPATIENT)
Facility: HOSPITAL | Age: 71
LOS: 2 days | Discharge: HOME | DRG: 177 | End: 2021-09-06
Attending: EMERGENCY MEDICINE | Admitting: STUDENT IN AN ORGANIZED HEALTH CARE EDUCATION/TRAINING PROGRAM
Payer: COMMERCIAL

## 2021-09-03 VITALS
HEART RATE: 75 BPM | OXYGEN SATURATION: 98 % | TEMPERATURE: 98 F | DIASTOLIC BLOOD PRESSURE: 69 MMHG | RESPIRATION RATE: 18 BRPM | SYSTOLIC BLOOD PRESSURE: 132 MMHG

## 2021-09-03 DIAGNOSIS — U07.1 COVID-19: Primary | ICD-10-CM

## 2021-09-03 DIAGNOSIS — U07.1 COVID-19: ICD-10-CM

## 2021-09-03 DIAGNOSIS — Z20.822 SUSPECTED COVID-19 VIRUS INFECTION: ICD-10-CM

## 2021-09-03 PROCEDURE — M0243 CASIRIVI AND IMDEVI INFUSION: HCPCS | Performed by: EMERGENCY MEDICINE

## 2021-09-03 PROCEDURE — 25000003 PHARM REV CODE 250: Performed by: EMERGENCY MEDICINE

## 2021-09-03 PROCEDURE — 93005 ELECTROCARDIOGRAM TRACING: CPT

## 2021-09-03 PROCEDURE — 99285 EMERGENCY DEPT VISIT HI MDM: CPT | Mod: 25

## 2021-09-03 PROCEDURE — 63600175 PHARM REV CODE 636 W HCPCS: Performed by: EMERGENCY MEDICINE

## 2021-09-03 RX ORDER — EPINEPHRINE 0.3 MG/.3ML
0.3 INJECTION SUBCUTANEOUS
Status: DISCONTINUED | OUTPATIENT
Start: 2021-09-03 | End: 2021-09-06 | Stop reason: HOSPADM

## 2021-09-03 RX ORDER — ALBUTEROL SULFATE 90 UG/1
2 AEROSOL, METERED RESPIRATORY (INHALATION)
Status: DISCONTINUED | OUTPATIENT
Start: 2021-09-03 | End: 2021-09-06 | Stop reason: HOSPADM

## 2021-09-03 RX ORDER — SODIUM CHLORIDE 0.9 % (FLUSH) 0.9 %
10 SYRINGE (ML) INJECTION
Status: DISCONTINUED | OUTPATIENT
Start: 2021-09-03 | End: 2021-09-06 | Stop reason: HOSPADM

## 2021-09-03 RX ORDER — ONDANSETRON 4 MG/1
4 TABLET, ORALLY DISINTEGRATING ORAL ONCE AS NEEDED
Status: DISCONTINUED | OUTPATIENT
Start: 2021-09-03 | End: 2021-09-06 | Stop reason: HOSPADM

## 2021-09-03 RX ORDER — ACETAMINOPHEN 325 MG/1
650 TABLET ORAL ONCE AS NEEDED
Status: DISCONTINUED | OUTPATIENT
Start: 2021-09-03 | End: 2021-09-06 | Stop reason: HOSPADM

## 2021-09-03 RX ORDER — DIPHENHYDRAMINE HYDROCHLORIDE 50 MG/ML
25 INJECTION INTRAMUSCULAR; INTRAVENOUS ONCE AS NEEDED
Status: DISCONTINUED | OUTPATIENT
Start: 2021-09-03 | End: 2021-09-06 | Stop reason: HOSPADM

## 2021-09-03 RX ADMIN — CASIRIVIMAB AND IMDEVIMAB 600 MG: 600; 600 INJECTION, SOLUTION, CONCENTRATE INTRAVENOUS at 11:09

## 2021-09-04 PROBLEM — Z20.822 SUSPECTED COVID-19 VIRUS INFECTION: Status: ACTIVE | Noted: 2021-09-04

## 2021-09-04 PROBLEM — E11.10 DKA (DIABETIC KETOACIDOSES): Status: ACTIVE | Noted: 2021-09-04

## 2021-09-04 LAB
25(OH)D3+25(OH)D2 SERPL-MCNC: 27 NG/ML (ref 30–96)
ALBUMIN SERPL BCP-MCNC: 3.1 G/DL (ref 3.5–5.2)
ALBUMIN SERPL BCP-MCNC: 3.5 G/DL (ref 3.5–5.2)
ALP SERPL-CCNC: 53 U/L (ref 55–135)
ALP SERPL-CCNC: 61 U/L (ref 55–135)
ALT SERPL W/O P-5'-P-CCNC: 36 U/L (ref 10–44)
ALT SERPL W/O P-5'-P-CCNC: 40 U/L (ref 10–44)
AMMONIA PLAS-SCNC: 33 UMOL/L (ref 10–50)
AMORPH CRY URNS QL MICRO: NORMAL
ANION GAP SERPL CALC-SCNC: 11 MMOL/L (ref 8–16)
ANION GAP SERPL CALC-SCNC: 13 MMOL/L (ref 8–16)
ANION GAP SERPL CALC-SCNC: 14 MMOL/L (ref 8–16)
ANION GAP SERPL CALC-SCNC: 17 MMOL/L (ref 8–16)
ANION GAP SERPL CALC-SCNC: 17 MMOL/L (ref 8–16)
APTT BLDCRRT: 31.1 SEC (ref 21–32)
AST SERPL-CCNC: 41 U/L (ref 10–40)
AST SERPL-CCNC: 46 U/L (ref 10–40)
B-OH-BUTYR BLD STRIP-SCNC: 2.8 MMOL/L (ref 0–0.5)
BACTERIA #/AREA URNS HPF: NORMAL /HPF
BASOPHILS # BLD AUTO: 0.02 K/UL (ref 0–0.2)
BASOPHILS # BLD AUTO: 0.02 K/UL (ref 0–0.2)
BASOPHILS NFR BLD: 0.2 % (ref 0–1.9)
BASOPHILS NFR BLD: 0.2 % (ref 0–1.9)
BILIRUB SERPL-MCNC: 0.3 MG/DL (ref 0.1–1)
BILIRUB SERPL-MCNC: 0.3 MG/DL (ref 0.1–1)
BILIRUB UR QL STRIP: NEGATIVE
BNP SERPL-MCNC: 29 PG/ML (ref 0–99)
BUN SERPL-MCNC: 17 MG/DL (ref 8–23)
BUN SERPL-MCNC: 20 MG/DL (ref 8–23)
BUN SERPL-MCNC: 21 MG/DL (ref 8–23)
BUN SERPL-MCNC: 25 MG/DL (ref 8–23)
BUN SERPL-MCNC: 29 MG/DL (ref 8–23)
CALCIUM SERPL-MCNC: 8.5 MG/DL (ref 8.7–10.5)
CALCIUM SERPL-MCNC: 8.6 MG/DL (ref 8.7–10.5)
CALCIUM SERPL-MCNC: 8.8 MG/DL (ref 8.7–10.5)
CALCIUM SERPL-MCNC: 9.2 MG/DL (ref 8.7–10.5)
CALCIUM SERPL-MCNC: 9.3 MG/DL (ref 8.7–10.5)
CHLORIDE SERPL-SCNC: 102 MMOL/L (ref 95–110)
CHLORIDE SERPL-SCNC: 103 MMOL/L (ref 95–110)
CHLORIDE SERPL-SCNC: 108 MMOL/L (ref 95–110)
CK SERPL-CCNC: 162 U/L (ref 20–180)
CLARITY UR: CLEAR
CO2 SERPL-SCNC: 13 MMOL/L (ref 23–29)
CO2 SERPL-SCNC: 13 MMOL/L (ref 23–29)
CO2 SERPL-SCNC: 14 MMOL/L (ref 23–29)
CO2 SERPL-SCNC: 16 MMOL/L (ref 23–29)
CO2 SERPL-SCNC: 17 MMOL/L (ref 23–29)
COLOR UR: YELLOW
CREAT SERPL-MCNC: 1.2 MG/DL (ref 0.5–1.4)
CREAT SERPL-MCNC: 1.2 MG/DL (ref 0.5–1.4)
CREAT SERPL-MCNC: 1.3 MG/DL (ref 0.5–1.4)
CREAT SERPL-MCNC: 1.3 MG/DL (ref 0.5–1.4)
CREAT SERPL-MCNC: 1.4 MG/DL (ref 0.5–1.4)
CRP SERPL-MCNC: 55.2 MG/L (ref 0–8.2)
D DIMER PPP IA.FEU-MCNC: 1.22 MG/L FEU
DIFFERENTIAL METHOD: ABNORMAL
DIFFERENTIAL METHOD: ABNORMAL
EOSINOPHIL # BLD AUTO: 0 K/UL (ref 0–0.5)
EOSINOPHIL # BLD AUTO: 0 K/UL (ref 0–0.5)
EOSINOPHIL NFR BLD: 0 % (ref 0–8)
EOSINOPHIL NFR BLD: 0 % (ref 0–8)
ERYTHROCYTE [DISTWIDTH] IN BLOOD BY AUTOMATED COUNT: 13.6 % (ref 11.5–14.5)
ERYTHROCYTE [DISTWIDTH] IN BLOOD BY AUTOMATED COUNT: 13.6 % (ref 11.5–14.5)
ERYTHROCYTE [SEDIMENTATION RATE] IN BLOOD BY WESTERGREN METHOD: 21 MM/HR (ref 0–20)
EST. GFR  (AFRICAN AMERICAN): 44 ML/MIN/1.73 M^2
EST. GFR  (AFRICAN AMERICAN): 48 ML/MIN/1.73 M^2
EST. GFR  (AFRICAN AMERICAN): 48 ML/MIN/1.73 M^2
EST. GFR  (AFRICAN AMERICAN): 53 ML/MIN/1.73 M^2
EST. GFR  (AFRICAN AMERICAN): 53 ML/MIN/1.73 M^2
EST. GFR  (NON AFRICAN AMERICAN): 38 ML/MIN/1.73 M^2
EST. GFR  (NON AFRICAN AMERICAN): 42 ML/MIN/1.73 M^2
EST. GFR  (NON AFRICAN AMERICAN): 42 ML/MIN/1.73 M^2
EST. GFR  (NON AFRICAN AMERICAN): 46 ML/MIN/1.73 M^2
EST. GFR  (NON AFRICAN AMERICAN): 46 ML/MIN/1.73 M^2
FERRITIN SERPL-MCNC: 1534 NG/ML (ref 20–300)
GLUCOSE SERPL-MCNC: 357 MG/DL (ref 70–110)
GLUCOSE SERPL-MCNC: 379 MG/DL (ref 70–110)
GLUCOSE SERPL-MCNC: 383 MG/DL (ref 70–110)
GLUCOSE SERPL-MCNC: 487 MG/DL (ref 70–110)
GLUCOSE SERPL-MCNC: 502 MG/DL (ref 70–110)
GLUCOSE UR QL STRIP: ABNORMAL
HCT VFR BLD AUTO: 46.3 % (ref 37–48.5)
HCT VFR BLD AUTO: 48.6 % (ref 37–48.5)
HGB BLD-MCNC: 15.4 G/DL (ref 12–16)
HGB BLD-MCNC: 15.6 G/DL (ref 12–16)
HGB UR QL STRIP: ABNORMAL
HYALINE CASTS #/AREA URNS LPF: 0 /LPF
IMM GRANULOCYTES # BLD AUTO: 0.03 K/UL (ref 0–0.04)
IMM GRANULOCYTES # BLD AUTO: 0.07 K/UL (ref 0–0.04)
IMM GRANULOCYTES NFR BLD AUTO: 0.3 % (ref 0–0.5)
IMM GRANULOCYTES NFR BLD AUTO: 0.5 % (ref 0–0.5)
INR PPP: 1.1 (ref 0.8–1.2)
KETONES UR QL STRIP: ABNORMAL
LACTATE SERPL-SCNC: 1.9 MMOL/L (ref 0.5–2.2)
LACTATE SERPL-SCNC: 2.6 MMOL/L (ref 0.5–2.2)
LDH SERPL L TO P-CCNC: 274 U/L (ref 110–260)
LEUKOCYTE ESTERASE UR QL STRIP: NEGATIVE
LYMPHOCYTES # BLD AUTO: 2 K/UL (ref 1–4.8)
LYMPHOCYTES # BLD AUTO: 3.3 K/UL (ref 1–4.8)
LYMPHOCYTES NFR BLD: 18.2 % (ref 18–48)
LYMPHOCYTES NFR BLD: 24.7 % (ref 18–48)
MAGNESIUM SERPL-MCNC: 1.8 MG/DL (ref 1.6–2.6)
MCH RBC QN AUTO: 28.9 PG (ref 27–31)
MCH RBC QN AUTO: 28.9 PG (ref 27–31)
MCHC RBC AUTO-ENTMCNC: 32.1 G/DL (ref 32–36)
MCHC RBC AUTO-ENTMCNC: 33.3 G/DL (ref 32–36)
MCV RBC AUTO: 87 FL (ref 82–98)
MCV RBC AUTO: 90 FL (ref 82–98)
MICROSCOPIC COMMENT: NORMAL
MONOCYTES # BLD AUTO: 0.5 K/UL (ref 0.3–1)
MONOCYTES # BLD AUTO: 0.7 K/UL (ref 0.3–1)
MONOCYTES NFR BLD: 4.3 % (ref 4–15)
MONOCYTES NFR BLD: 5 % (ref 4–15)
NEUTROPHILS # BLD AUTO: 8.5 K/UL (ref 1.8–7.7)
NEUTROPHILS # BLD AUTO: 9.3 K/UL (ref 1.8–7.7)
NEUTROPHILS NFR BLD: 69.6 % (ref 38–73)
NEUTROPHILS NFR BLD: 77 % (ref 38–73)
NITRITE UR QL STRIP: NEGATIVE
NRBC BLD-RTO: 0 /100 WBC
NRBC BLD-RTO: 0 /100 WBC
PH UR STRIP: 6 [PH] (ref 5–8)
PHOSPHATE SERPL-MCNC: 2.8 MG/DL (ref 2.7–4.5)
PLATELET # BLD AUTO: 139 K/UL (ref 150–450)
PLATELET # BLD AUTO: 151 K/UL (ref 150–450)
PLATELET BLD QL SMEAR: ABNORMAL
PMV BLD AUTO: 10.5 FL (ref 9.2–12.9)
PMV BLD AUTO: 10.9 FL (ref 9.2–12.9)
POCT GLUCOSE: 333 MG/DL (ref 70–110)
POCT GLUCOSE: 336 MG/DL (ref 70–110)
POCT GLUCOSE: 345 MG/DL (ref 70–110)
POCT GLUCOSE: 357 MG/DL (ref 70–110)
POCT GLUCOSE: 365 MG/DL (ref 70–110)
POCT GLUCOSE: 441 MG/DL (ref 70–110)
POCT GLUCOSE: 452 MG/DL (ref 70–110)
POCT GLUCOSE: 455 MG/DL (ref 70–110)
POCT GLUCOSE: 460 MG/DL (ref 70–110)
POCT GLUCOSE: 463 MG/DL (ref 70–110)
POCT GLUCOSE: 476 MG/DL (ref 70–110)
POCT GLUCOSE: 490 MG/DL (ref 70–110)
POCT GLUCOSE: >500 MG/DL (ref 70–110)
POTASSIUM SERPL-SCNC: 3.6 MMOL/L (ref 3.5–5.1)
POTASSIUM SERPL-SCNC: 3.7 MMOL/L (ref 3.5–5.1)
POTASSIUM SERPL-SCNC: 3.8 MMOL/L (ref 3.5–5.1)
POTASSIUM SERPL-SCNC: 3.9 MMOL/L (ref 3.5–5.1)
POTASSIUM SERPL-SCNC: 4.4 MMOL/L (ref 3.5–5.1)
PROCALCITONIN SERPL IA-MCNC: 0.57 NG/ML
PROCALCITONIN SERPL IA-MCNC: 0.87 NG/ML
PROT SERPL-MCNC: 7.2 G/DL (ref 6–8.4)
PROT SERPL-MCNC: 7.8 G/DL (ref 6–8.4)
PROT UR QL STRIP: ABNORMAL
PROTHROMBIN TIME: 11.8 SEC (ref 9–12.5)
RBC # BLD AUTO: 5.32 M/UL (ref 4–5.4)
RBC # BLD AUTO: 5.39 M/UL (ref 4–5.4)
RBC #/AREA URNS HPF: 2 /HPF (ref 0–4)
SODIUM SERPL-SCNC: 130 MMOL/L (ref 136–145)
SODIUM SERPL-SCNC: 132 MMOL/L (ref 136–145)
SODIUM SERPL-SCNC: 132 MMOL/L (ref 136–145)
SODIUM SERPL-SCNC: 133 MMOL/L (ref 136–145)
SODIUM SERPL-SCNC: 135 MMOL/L (ref 136–145)
SP GR UR STRIP: 1.02 (ref 1–1.03)
SQUAMOUS #/AREA URNS HPF: 4 /HPF
TROPONIN I SERPL DL<=0.01 NG/ML-MCNC: <0.006 NG/ML (ref 0–0.03)
URN SPEC COLLECT METH UR: ABNORMAL
UROBILINOGEN UR STRIP-ACNC: NEGATIVE EU/DL
WBC # BLD AUTO: 10.97 K/UL (ref 3.9–12.7)
WBC # BLD AUTO: 13.3 K/UL (ref 3.9–12.7)
WBC #/AREA URNS HPF: 5 /HPF (ref 0–5)
YEAST URNS QL MICRO: NORMAL

## 2021-09-04 PROCEDURE — 83735 ASSAY OF MAGNESIUM: CPT | Performed by: NURSE PRACTITIONER

## 2021-09-04 PROCEDURE — 36415 COLL VENOUS BLD VENIPUNCTURE: CPT | Performed by: EMERGENCY MEDICINE

## 2021-09-04 PROCEDURE — 25000003 PHARM REV CODE 250: Performed by: NURSE PRACTITIONER

## 2021-09-04 PROCEDURE — 27000207 HC ISOLATION

## 2021-09-04 PROCEDURE — 81000 URINALYSIS NONAUTO W/SCOPE: CPT | Performed by: EMERGENCY MEDICINE

## 2021-09-04 PROCEDURE — 25000242 PHARM REV CODE 250 ALT 637 W/ HCPCS: Performed by: NURSE PRACTITIONER

## 2021-09-04 PROCEDURE — 63600175 PHARM REV CODE 636 W HCPCS: Performed by: NURSE PRACTITIONER

## 2021-09-04 PROCEDURE — 82306 VITAMIN D 25 HYDROXY: CPT | Performed by: NURSE PRACTITIONER

## 2021-09-04 PROCEDURE — 36415 COLL VENOUS BLD VENIPUNCTURE: CPT | Performed by: NURSE PRACTITIONER

## 2021-09-04 PROCEDURE — 25000003 PHARM REV CODE 250: Performed by: STUDENT IN AN ORGANIZED HEALTH CARE EDUCATION/TRAINING PROGRAM

## 2021-09-04 PROCEDURE — 83605 ASSAY OF LACTIC ACID: CPT | Mod: 91 | Performed by: NURSE PRACTITIONER

## 2021-09-04 PROCEDURE — 80053 COMPREHEN METABOLIC PANEL: CPT | Performed by: EMERGENCY MEDICINE

## 2021-09-04 PROCEDURE — 27100098 HC SPACER

## 2021-09-04 PROCEDURE — 94761 N-INVAS EAR/PLS OXIMETRY MLT: CPT

## 2021-09-04 PROCEDURE — 83615 LACTATE (LD) (LDH) ENZYME: CPT | Performed by: EMERGENCY MEDICINE

## 2021-09-04 PROCEDURE — 82140 ASSAY OF AMMONIA: CPT | Performed by: NURSE PRACTITIONER

## 2021-09-04 PROCEDURE — 94640 AIRWAY INHALATION TREATMENT: CPT

## 2021-09-04 PROCEDURE — 85610 PROTHROMBIN TIME: CPT | Performed by: NURSE PRACTITIONER

## 2021-09-04 PROCEDURE — 84100 ASSAY OF PHOSPHORUS: CPT | Performed by: NURSE PRACTITIONER

## 2021-09-04 PROCEDURE — 82728 ASSAY OF FERRITIN: CPT | Performed by: EMERGENCY MEDICINE

## 2021-09-04 PROCEDURE — 84145 PROCALCITONIN (PCT): CPT | Mod: 91 | Performed by: NURSE PRACTITIONER

## 2021-09-04 PROCEDURE — 83880 ASSAY OF NATRIURETIC PEPTIDE: CPT | Performed by: NURSE PRACTITIONER

## 2021-09-04 PROCEDURE — 80048 BASIC METABOLIC PNL TOTAL CA: CPT | Mod: 91 | Performed by: NURSE PRACTITIONER

## 2021-09-04 PROCEDURE — 63600175 PHARM REV CODE 636 W HCPCS: Performed by: STUDENT IN AN ORGANIZED HEALTH CARE EDUCATION/TRAINING PROGRAM

## 2021-09-04 PROCEDURE — 20000000 HC ICU ROOM

## 2021-09-04 PROCEDURE — 82550 ASSAY OF CK (CPK): CPT | Performed by: EMERGENCY MEDICINE

## 2021-09-04 PROCEDURE — 86140 C-REACTIVE PROTEIN: CPT | Performed by: EMERGENCY MEDICINE

## 2021-09-04 PROCEDURE — 85730 THROMBOPLASTIN TIME PARTIAL: CPT | Performed by: NURSE PRACTITIONER

## 2021-09-04 PROCEDURE — 82010 KETONE BODYS QUAN: CPT | Performed by: NURSE PRACTITIONER

## 2021-09-04 PROCEDURE — 83605 ASSAY OF LACTIC ACID: CPT | Performed by: EMERGENCY MEDICINE

## 2021-09-04 PROCEDURE — 80053 COMPREHEN METABOLIC PANEL: CPT | Mod: 91 | Performed by: NURSE PRACTITIONER

## 2021-09-04 PROCEDURE — 85025 COMPLETE CBC W/AUTO DIFF WBC: CPT | Performed by: EMERGENCY MEDICINE

## 2021-09-04 PROCEDURE — 85651 RBC SED RATE NONAUTOMATED: CPT | Performed by: NURSE PRACTITIONER

## 2021-09-04 PROCEDURE — 84484 ASSAY OF TROPONIN QUANT: CPT | Performed by: EMERGENCY MEDICINE

## 2021-09-04 PROCEDURE — 85025 COMPLETE CBC W/AUTO DIFF WBC: CPT | Mod: 91 | Performed by: NURSE PRACTITIONER

## 2021-09-04 PROCEDURE — 85379 FIBRIN DEGRADATION QUANT: CPT | Performed by: NURSE PRACTITIONER

## 2021-09-04 PROCEDURE — 84145 PROCALCITONIN (PCT): CPT | Performed by: EMERGENCY MEDICINE

## 2021-09-04 RX ORDER — DEXTROSE MONOHYDRATE 100 MG/ML
INJECTION, SOLUTION INTRAVENOUS
Status: DISCONTINUED | OUTPATIENT
Start: 2021-09-04 | End: 2021-09-06 | Stop reason: HOSPADM

## 2021-09-04 RX ORDER — IBUPROFEN 200 MG
24 TABLET ORAL
Status: DISCONTINUED | OUTPATIENT
Start: 2021-09-04 | End: 2021-09-06 | Stop reason: HOSPADM

## 2021-09-04 RX ORDER — SODIUM CHLORIDE 9 MG/ML
INJECTION, SOLUTION INTRAVENOUS CONTINUOUS
Status: DISCONTINUED | OUTPATIENT
Start: 2021-09-04 | End: 2021-09-04

## 2021-09-04 RX ORDER — BUPROPION HYDROCHLORIDE 150 MG/1
150 TABLET ORAL DAILY
Status: DISCONTINUED | OUTPATIENT
Start: 2021-09-04 | End: 2021-09-06 | Stop reason: HOSPADM

## 2021-09-04 RX ORDER — SODIUM,POTASSIUM PHOSPHATES 280-250MG
2 POWDER IN PACKET (EA) ORAL
Status: DISCONTINUED | OUTPATIENT
Start: 2021-09-04 | End: 2021-09-06 | Stop reason: HOSPADM

## 2021-09-04 RX ORDER — LISINOPRIL 40 MG/1
40 TABLET ORAL DAILY
Status: DISCONTINUED | OUTPATIENT
Start: 2021-09-04 | End: 2021-09-06 | Stop reason: HOSPADM

## 2021-09-04 RX ORDER — LANOLIN ALCOHOL/MO/W.PET/CERES
800 CREAM (GRAM) TOPICAL
Status: DISCONTINUED | OUTPATIENT
Start: 2021-09-04 | End: 2021-09-06 | Stop reason: HOSPADM

## 2021-09-04 RX ORDER — SODIUM CHLORIDE AND POTASSIUM CHLORIDE 150; 900 MG/100ML; MG/100ML
INJECTION, SOLUTION INTRAVENOUS CONTINUOUS
Status: DISCONTINUED | OUTPATIENT
Start: 2021-09-04 | End: 2021-09-05

## 2021-09-04 RX ORDER — GUAIFENESIN/DEXTROMETHORPHAN 100-10MG/5
10 SYRUP ORAL EVERY 4 HOURS PRN
Status: DISCONTINUED | OUTPATIENT
Start: 2021-09-04 | End: 2021-09-06 | Stop reason: HOSPADM

## 2021-09-04 RX ORDER — ENOXAPARIN SODIUM 100 MG/ML
1 INJECTION SUBCUTANEOUS 2 TIMES DAILY
Status: DISCONTINUED | OUTPATIENT
Start: 2021-09-04 | End: 2021-09-04

## 2021-09-04 RX ORDER — ONDANSETRON 2 MG/ML
8 INJECTION INTRAMUSCULAR; INTRAVENOUS EVERY 8 HOURS PRN
Status: DISCONTINUED | OUTPATIENT
Start: 2021-09-04 | End: 2021-09-06 | Stop reason: HOSPADM

## 2021-09-04 RX ORDER — LOPERAMIDE HYDROCHLORIDE 2 MG/1
2 CAPSULE ORAL 4 TIMES DAILY PRN
Status: DISCONTINUED | OUTPATIENT
Start: 2021-09-04 | End: 2021-09-06 | Stop reason: HOSPADM

## 2021-09-04 RX ORDER — METOPROLOL SUCCINATE 50 MG/1
50 TABLET, EXTENDED RELEASE ORAL DAILY
Status: DISCONTINUED | OUTPATIENT
Start: 2021-09-04 | End: 2021-09-06 | Stop reason: HOSPADM

## 2021-09-04 RX ORDER — ENOXAPARIN SODIUM 100 MG/ML
40 INJECTION SUBCUTANEOUS 2 TIMES DAILY
Status: DISCONTINUED | OUTPATIENT
Start: 2021-09-04 | End: 2021-09-04

## 2021-09-04 RX ORDER — SODIUM CHLORIDE 0.9 % (FLUSH) 0.9 %
10 SYRINGE (ML) INJECTION
Status: DISCONTINUED | OUTPATIENT
Start: 2021-09-04 | End: 2021-09-04

## 2021-09-04 RX ORDER — ENOXAPARIN SODIUM 100 MG/ML
40 INJECTION SUBCUTANEOUS
Status: DISCONTINUED | OUTPATIENT
Start: 2021-09-04 | End: 2021-09-06 | Stop reason: HOSPADM

## 2021-09-04 RX ORDER — ASCORBIC ACID 500 MG
500 TABLET ORAL 2 TIMES DAILY
Status: DISCONTINUED | OUTPATIENT
Start: 2021-09-04 | End: 2021-09-06 | Stop reason: HOSPADM

## 2021-09-04 RX ORDER — ACETAMINOPHEN 325 MG/1
650 TABLET ORAL EVERY 8 HOURS PRN
Status: DISCONTINUED | OUTPATIENT
Start: 2021-09-04 | End: 2021-09-06 | Stop reason: HOSPADM

## 2021-09-04 RX ORDER — INSULIN ASPART 100 [IU]/ML
1-10 INJECTION, SOLUTION INTRAVENOUS; SUBCUTANEOUS
Status: DISCONTINUED | OUTPATIENT
Start: 2021-09-04 | End: 2021-09-06 | Stop reason: HOSPADM

## 2021-09-04 RX ORDER — IBUPROFEN 200 MG
16 TABLET ORAL
Status: DISCONTINUED | OUTPATIENT
Start: 2021-09-04 | End: 2021-09-06 | Stop reason: HOSPADM

## 2021-09-04 RX ORDER — GLUCAGON 1 MG
1 KIT INJECTION
Status: DISCONTINUED | OUTPATIENT
Start: 2021-09-04 | End: 2021-09-06 | Stop reason: HOSPADM

## 2021-09-04 RX ORDER — ALBUTEROL SULFATE 90 UG/1
2 AEROSOL, METERED RESPIRATORY (INHALATION) EVERY 6 HOURS
Status: DISCONTINUED | OUTPATIENT
Start: 2021-09-04 | End: 2021-09-05

## 2021-09-04 RX ORDER — CALCIUM CARBONATE 200(500)MG
1000 TABLET,CHEWABLE ORAL 3 TIMES DAILY PRN
Status: DISCONTINUED | OUTPATIENT
Start: 2021-09-04 | End: 2021-09-06 | Stop reason: HOSPADM

## 2021-09-04 RX ORDER — ALBUTEROL SULFATE 90 UG/1
2 AEROSOL, METERED RESPIRATORY (INHALATION)
Status: DISCONTINUED | OUTPATIENT
Start: 2021-09-04 | End: 2021-09-06 | Stop reason: HOSPADM

## 2021-09-04 RX ORDER — ALPRAZOLAM 0.25 MG/1
0.5 TABLET ORAL 2 TIMES DAILY PRN
Status: DISCONTINUED | OUTPATIENT
Start: 2021-09-04 | End: 2021-09-06 | Stop reason: HOSPADM

## 2021-09-04 RX ORDER — SODIUM CHLORIDE 0.9 % (FLUSH) 0.9 %
10 SYRINGE (ML) INJECTION
Status: DISCONTINUED | OUTPATIENT
Start: 2021-09-04 | End: 2021-09-06 | Stop reason: HOSPADM

## 2021-09-04 RX ORDER — INSULIN ASPART 100 [IU]/ML
10 INJECTION, SOLUTION INTRAVENOUS; SUBCUTANEOUS ONCE
Status: COMPLETED | OUTPATIENT
Start: 2021-09-04 | End: 2021-09-04

## 2021-09-04 RX ADMIN — CEFTRIAXONE 1 G: 1 INJECTION, SOLUTION INTRAVENOUS at 03:09

## 2021-09-04 RX ADMIN — ONDANSETRON 8 MG: 2 INJECTION INTRAMUSCULAR; INTRAVENOUS at 08:09

## 2021-09-04 RX ADMIN — DEXAMETHASONE 6 MG: 4 TABLET ORAL at 09:09

## 2021-09-04 RX ADMIN — ONDANSETRON 8 MG: 2 INJECTION INTRAMUSCULAR; INTRAVENOUS at 11:09

## 2021-09-04 RX ADMIN — LISINOPRIL 40 MG: 40 TABLET ORAL at 09:09

## 2021-09-04 RX ADMIN — POTASSIUM BICARBONATE 50 MEQ: 977.5 TABLET, EFFERVESCENT ORAL at 05:09

## 2021-09-04 RX ADMIN — LOPERAMIDE HYDROCHLORIDE 2 MG: 2 CAPSULE ORAL at 11:09

## 2021-09-04 RX ADMIN — INSULIN ASPART 10 UNITS: 100 INJECTION, SOLUTION INTRAVENOUS; SUBCUTANEOUS at 12:09

## 2021-09-04 RX ADMIN — BUPROPION HYDROCHLORIDE 150 MG: 150 TABLET, EXTENDED RELEASE ORAL at 03:09

## 2021-09-04 RX ADMIN — CALCIUM CARBONATE (ANTACID) CHEW TAB 500 MG 1000 MG: 500 CHEW TAB at 08:09

## 2021-09-04 RX ADMIN — ALBUTEROL SULFATE 2 PUFF: 90 AEROSOL, METERED RESPIRATORY (INHALATION) at 12:09

## 2021-09-04 RX ADMIN — GUAIFENESIN SYRUP AND DEXTROMETHORPHAN 10 ML: 100; 10 SYRUP ORAL at 08:09

## 2021-09-04 RX ADMIN — ALPRAZOLAM 0.5 MG: 0.25 TABLET ORAL at 08:09

## 2021-09-04 RX ADMIN — OXYCODONE HYDROCHLORIDE AND ACETAMINOPHEN 500 MG: 500 TABLET ORAL at 09:09

## 2021-09-04 RX ADMIN — SODIUM CHLORIDE 3 UNITS/HR: 9 INJECTION, SOLUTION INTRAVENOUS at 04:09

## 2021-09-04 RX ADMIN — MULTIPLE VITAMINS W/ MINERALS TAB 1 TABLET: TAB at 09:09

## 2021-09-04 RX ADMIN — ALBUTEROL SULFATE 2 PUFF: 90 AEROSOL, METERED RESPIRATORY (INHALATION) at 10:09

## 2021-09-04 RX ADMIN — OXYCODONE HYDROCHLORIDE AND ACETAMINOPHEN 500 MG: 500 TABLET ORAL at 08:09

## 2021-09-04 RX ADMIN — ALBUTEROL SULFATE 2 PUFF: 90 AEROSOL, METERED RESPIRATORY (INHALATION) at 07:09

## 2021-09-04 RX ADMIN — ENOXAPARIN SODIUM 90 MG: 100 INJECTION SUBCUTANEOUS at 09:09

## 2021-09-04 RX ADMIN — REMDESIVIR 200 MG: 100 INJECTION, POWDER, LYOPHILIZED, FOR SOLUTION INTRAVENOUS at 05:09

## 2021-09-04 RX ADMIN — ACETAMINOPHEN 650 MG: 325 TABLET ORAL at 06:09

## 2021-09-04 RX ADMIN — AZITHROMYCIN MONOHYDRATE 500 MG: 500 INJECTION, POWDER, LYOPHILIZED, FOR SOLUTION INTRAVENOUS at 05:09

## 2021-09-04 RX ADMIN — METOPROLOL SUCCINATE 50 MG: 50 TABLET, EXTENDED RELEASE ORAL at 09:09

## 2021-09-04 RX ADMIN — SODIUM CHLORIDE: 0.9 INJECTION, SOLUTION INTRAVENOUS at 12:09

## 2021-09-04 RX ADMIN — SODIUM CHLORIDE AND POTASSIUM CHLORIDE: .9; .15 SOLUTION INTRAVENOUS at 04:09

## 2021-09-04 RX ADMIN — INSULIN ASPART 10 UNITS: 100 INJECTION, SOLUTION INTRAVENOUS; SUBCUTANEOUS at 11:09

## 2021-09-05 PROBLEM — E66.811 OBESITY (BMI 30.0-34.9): Status: ACTIVE | Noted: 2021-09-05

## 2021-09-05 PROBLEM — R00.2 PALPITATION: Status: RESOLVED | Noted: 2018-11-29 | Resolved: 2021-09-05

## 2021-09-05 PROBLEM — E11.10 DKA (DIABETIC KETOACIDOSES): Status: RESOLVED | Noted: 2021-09-04 | Resolved: 2021-09-05

## 2021-09-05 PROBLEM — E66.9 OBESITY (BMI 30.0-34.9): Status: ACTIVE | Noted: 2021-09-05

## 2021-09-05 PROBLEM — R20.0 NUMBNESS OF FOOT: Status: RESOLVED | Noted: 2018-11-29 | Resolved: 2021-09-05

## 2021-09-05 LAB
ALBUMIN SERPL BCP-MCNC: 2.7 G/DL (ref 3.5–5.2)
ALP SERPL-CCNC: 47 U/L (ref 55–135)
ALT SERPL W/O P-5'-P-CCNC: 31 U/L (ref 10–44)
ANION GAP SERPL CALC-SCNC: 10 MMOL/L (ref 8–16)
ANION GAP SERPL CALC-SCNC: 11 MMOL/L (ref 8–16)
ANION GAP SERPL CALC-SCNC: 13 MMOL/L (ref 8–16)
AST SERPL-CCNC: 31 U/L (ref 10–40)
BASOPHILS # BLD AUTO: 0.02 K/UL (ref 0–0.2)
BASOPHILS NFR BLD: 0.2 % (ref 0–1.9)
BILIRUB SERPL-MCNC: 0.2 MG/DL (ref 0.1–1)
BUN SERPL-MCNC: 33 MG/DL (ref 8–23)
BUN SERPL-MCNC: 33 MG/DL (ref 8–23)
BUN SERPL-MCNC: 34 MG/DL (ref 8–23)
CALCIUM SERPL-MCNC: 8.4 MG/DL (ref 8.7–10.5)
CALCIUM SERPL-MCNC: 8.5 MG/DL (ref 8.7–10.5)
CALCIUM SERPL-MCNC: 8.7 MG/DL (ref 8.7–10.5)
CHLORIDE SERPL-SCNC: 110 MMOL/L (ref 95–110)
CHLORIDE SERPL-SCNC: 111 MMOL/L (ref 95–110)
CHLORIDE SERPL-SCNC: 111 MMOL/L (ref 95–110)
CO2 SERPL-SCNC: 12 MMOL/L (ref 23–29)
CO2 SERPL-SCNC: 13 MMOL/L (ref 23–29)
CO2 SERPL-SCNC: 15 MMOL/L (ref 23–29)
CREAT SERPL-MCNC: 1.2 MG/DL (ref 0.5–1.4)
CREAT SERPL-MCNC: 1.2 MG/DL (ref 0.5–1.4)
CREAT SERPL-MCNC: 1.3 MG/DL (ref 0.5–1.4)
DIFFERENTIAL METHOD: ABNORMAL
EOSINOPHIL # BLD AUTO: 0 K/UL (ref 0–0.5)
EOSINOPHIL NFR BLD: 0 % (ref 0–8)
ERYTHROCYTE [DISTWIDTH] IN BLOOD BY AUTOMATED COUNT: 13.9 % (ref 11.5–14.5)
EST. GFR  (AFRICAN AMERICAN): 48 ML/MIN/1.73 M^2
EST. GFR  (AFRICAN AMERICAN): 53 ML/MIN/1.73 M^2
EST. GFR  (AFRICAN AMERICAN): 53 ML/MIN/1.73 M^2
EST. GFR  (NON AFRICAN AMERICAN): 42 ML/MIN/1.73 M^2
EST. GFR  (NON AFRICAN AMERICAN): 46 ML/MIN/1.73 M^2
EST. GFR  (NON AFRICAN AMERICAN): 46 ML/MIN/1.73 M^2
GLUCOSE SERPL-MCNC: 219 MG/DL (ref 70–110)
GLUCOSE SERPL-MCNC: 236 MG/DL (ref 70–110)
GLUCOSE SERPL-MCNC: 302 MG/DL (ref 70–110)
HCT VFR BLD AUTO: 42.6 % (ref 37–48.5)
HGB BLD-MCNC: 13.8 G/DL (ref 12–16)
IMM GRANULOCYTES # BLD AUTO: 0.05 K/UL (ref 0–0.04)
IMM GRANULOCYTES NFR BLD AUTO: 0.5 % (ref 0–0.5)
LYMPHOCYTES # BLD AUTO: 2.3 K/UL (ref 1–4.8)
LYMPHOCYTES NFR BLD: 25.6 % (ref 18–48)
MAGNESIUM SERPL-MCNC: 1.9 MG/DL (ref 1.6–2.6)
MCH RBC QN AUTO: 29 PG (ref 27–31)
MCHC RBC AUTO-ENTMCNC: 32.4 G/DL (ref 32–36)
MCV RBC AUTO: 90 FL (ref 82–98)
MONOCYTES # BLD AUTO: 0.6 K/UL (ref 0.3–1)
MONOCYTES NFR BLD: 6.2 % (ref 4–15)
NEUTROPHILS # BLD AUTO: 6.1 K/UL (ref 1.8–7.7)
NEUTROPHILS NFR BLD: 67.5 % (ref 38–73)
NRBC BLD-RTO: 0 /100 WBC
PHOSPHATE SERPL-MCNC: 2.1 MG/DL (ref 2.7–4.5)
PLATELET # BLD AUTO: 164 K/UL (ref 150–450)
PMV BLD AUTO: 11.1 FL (ref 9.2–12.9)
POCT GLUCOSE: 187 MG/DL (ref 70–110)
POCT GLUCOSE: 212 MG/DL (ref 70–110)
POCT GLUCOSE: 213 MG/DL (ref 70–110)
POCT GLUCOSE: 248 MG/DL (ref 70–110)
POCT GLUCOSE: 273 MG/DL (ref 70–110)
POCT GLUCOSE: 274 MG/DL (ref 70–110)
POCT GLUCOSE: 275 MG/DL (ref 70–110)
POCT GLUCOSE: 282 MG/DL (ref 70–110)
POCT GLUCOSE: 291 MG/DL (ref 70–110)
POCT GLUCOSE: 291 MG/DL (ref 70–110)
POCT GLUCOSE: 309 MG/DL (ref 70–110)
POCT GLUCOSE: 311 MG/DL (ref 70–110)
POCT GLUCOSE: 331 MG/DL (ref 70–110)
POCT GLUCOSE: 344 MG/DL (ref 70–110)
POTASSIUM SERPL-SCNC: 3.8 MMOL/L (ref 3.5–5.1)
POTASSIUM SERPL-SCNC: 3.8 MMOL/L (ref 3.5–5.1)
POTASSIUM SERPL-SCNC: 4.1 MMOL/L (ref 3.5–5.1)
PROT SERPL-MCNC: 6.3 G/DL (ref 6–8.4)
RBC # BLD AUTO: 4.76 M/UL (ref 4–5.4)
SODIUM SERPL-SCNC: 135 MMOL/L (ref 136–145)
SODIUM SERPL-SCNC: 135 MMOL/L (ref 136–145)
SODIUM SERPL-SCNC: 136 MMOL/L (ref 136–145)
WBC # BLD AUTO: 9.1 K/UL (ref 3.9–12.7)

## 2021-09-05 PROCEDURE — 36415 COLL VENOUS BLD VENIPUNCTURE: CPT | Performed by: NURSE PRACTITIONER

## 2021-09-05 PROCEDURE — 63600175 PHARM REV CODE 636 W HCPCS: Performed by: NURSE PRACTITIONER

## 2021-09-05 PROCEDURE — C9399 UNCLASSIFIED DRUGS OR BIOLOG: HCPCS | Performed by: STUDENT IN AN ORGANIZED HEALTH CARE EDUCATION/TRAINING PROGRAM

## 2021-09-05 PROCEDURE — 25000003 PHARM REV CODE 250: Performed by: NURSE PRACTITIONER

## 2021-09-05 PROCEDURE — 85025 COMPLETE CBC W/AUTO DIFF WBC: CPT | Performed by: STUDENT IN AN ORGANIZED HEALTH CARE EDUCATION/TRAINING PROGRAM

## 2021-09-05 PROCEDURE — 80048 BASIC METABOLIC PNL TOTAL CA: CPT | Mod: 91 | Performed by: NURSE PRACTITIONER

## 2021-09-05 PROCEDURE — 80053 COMPREHEN METABOLIC PANEL: CPT | Performed by: STUDENT IN AN ORGANIZED HEALTH CARE EDUCATION/TRAINING PROGRAM

## 2021-09-05 PROCEDURE — 83735 ASSAY OF MAGNESIUM: CPT | Performed by: STUDENT IN AN ORGANIZED HEALTH CARE EDUCATION/TRAINING PROGRAM

## 2021-09-05 PROCEDURE — 63600175 PHARM REV CODE 636 W HCPCS: Performed by: STUDENT IN AN ORGANIZED HEALTH CARE EDUCATION/TRAINING PROGRAM

## 2021-09-05 PROCEDURE — 36415 COLL VENOUS BLD VENIPUNCTURE: CPT | Performed by: STUDENT IN AN ORGANIZED HEALTH CARE EDUCATION/TRAINING PROGRAM

## 2021-09-05 PROCEDURE — 84100 ASSAY OF PHOSPHORUS: CPT | Performed by: STUDENT IN AN ORGANIZED HEALTH CARE EDUCATION/TRAINING PROGRAM

## 2021-09-05 PROCEDURE — 99900035 HC TECH TIME PER 15 MIN (STAT)

## 2021-09-05 PROCEDURE — 27000207 HC ISOLATION

## 2021-09-05 PROCEDURE — 94640 AIRWAY INHALATION TREATMENT: CPT

## 2021-09-05 PROCEDURE — 80048 BASIC METABOLIC PNL TOTAL CA: CPT | Performed by: NURSE PRACTITIONER

## 2021-09-05 PROCEDURE — 25000003 PHARM REV CODE 250: Performed by: STUDENT IN AN ORGANIZED HEALTH CARE EDUCATION/TRAINING PROGRAM

## 2021-09-05 PROCEDURE — 20000000 HC ICU ROOM

## 2021-09-05 PROCEDURE — 94761 N-INVAS EAR/PLS OXIMETRY MLT: CPT

## 2021-09-05 RX ORDER — DEXTROSE MONOHYDRATE AND SODIUM CHLORIDE 5; .9 G/100ML; G/100ML
INJECTION, SOLUTION INTRAVENOUS CONTINUOUS
Status: DISCONTINUED | OUTPATIENT
Start: 2021-09-05 | End: 2021-09-05

## 2021-09-05 RX ORDER — MUPIROCIN 20 MG/G
OINTMENT TOPICAL 2 TIMES DAILY
Status: DISCONTINUED | OUTPATIENT
Start: 2021-09-05 | End: 2021-09-06 | Stop reason: HOSPADM

## 2021-09-05 RX ORDER — ALBUTEROL SULFATE 90 UG/1
2 AEROSOL, METERED RESPIRATORY (INHALATION) EVERY 6 HOURS PRN
Status: DISCONTINUED | OUTPATIENT
Start: 2021-09-05 | End: 2021-09-06 | Stop reason: HOSPADM

## 2021-09-05 RX ADMIN — INSULIN ASPART 6 UNITS: 100 INJECTION, SOLUTION INTRAVENOUS; SUBCUTANEOUS at 12:09

## 2021-09-05 RX ADMIN — ONDANSETRON 8 MG: 2 INJECTION INTRAMUSCULAR; INTRAVENOUS at 08:09

## 2021-09-05 RX ADMIN — SODIUM CHLORIDE AND POTASSIUM CHLORIDE: .9; .15 SOLUTION INTRAVENOUS at 12:09

## 2021-09-05 RX ADMIN — OXYCODONE HYDROCHLORIDE AND ACETAMINOPHEN 500 MG: 500 TABLET ORAL at 08:09

## 2021-09-05 RX ADMIN — INSULIN ASPART 6 UNITS: 100 INJECTION, SOLUTION INTRAVENOUS; SUBCUTANEOUS at 06:09

## 2021-09-05 RX ADMIN — CEFTRIAXONE SODIUM 1 G: 1 INJECTION, POWDER, FOR SOLUTION INTRAMUSCULAR; INTRAVENOUS at 04:09

## 2021-09-05 RX ADMIN — ALBUTEROL SULFATE 2 PUFF: 90 AEROSOL, METERED RESPIRATORY (INHALATION) at 07:09

## 2021-09-05 RX ADMIN — ALBUTEROL SULFATE 2 PUFF: 90 AEROSOL, METERED RESPIRATORY (INHALATION) at 12:09

## 2021-09-05 RX ADMIN — AZITHROMYCIN MONOHYDRATE 500 MG: 500 INJECTION, POWDER, LYOPHILIZED, FOR SOLUTION INTRAVENOUS at 05:09

## 2021-09-05 RX ADMIN — ALPRAZOLAM 0.5 MG: 0.25 TABLET ORAL at 08:09

## 2021-09-05 RX ADMIN — BUPROPION HYDROCHLORIDE 150 MG: 150 TABLET, EXTENDED RELEASE ORAL at 08:09

## 2021-09-05 RX ADMIN — GUAIFENESIN SYRUP AND DEXTROMETHORPHAN 10 ML: 100; 10 SYRUP ORAL at 08:09

## 2021-09-05 RX ADMIN — MUPIROCIN: 20 OINTMENT TOPICAL at 08:09

## 2021-09-05 RX ADMIN — CALCIUM CARBONATE (ANTACID) CHEW TAB 500 MG 1000 MG: 500 CHEW TAB at 08:09

## 2021-09-05 RX ADMIN — INSULIN ASPART 4 UNITS: 100 INJECTION, SOLUTION INTRAVENOUS; SUBCUTANEOUS at 09:09

## 2021-09-05 RX ADMIN — LISINOPRIL 40 MG: 40 TABLET ORAL at 08:09

## 2021-09-05 RX ADMIN — METOPROLOL SUCCINATE 50 MG: 50 TABLET, EXTENDED RELEASE ORAL at 08:09

## 2021-09-05 RX ADMIN — INSULIN DETEMIR 30 UNITS: 100 INJECTION, SOLUTION SUBCUTANEOUS at 09:09

## 2021-09-05 RX ADMIN — DEXTROSE AND SODIUM CHLORIDE: 5; .9 INJECTION, SOLUTION INTRAVENOUS at 03:09

## 2021-09-05 RX ADMIN — ENOXAPARIN SODIUM 40 MG: 100 INJECTION SUBCUTANEOUS at 08:09

## 2021-09-05 RX ADMIN — MULTIPLE VITAMINS W/ MINERALS TAB 1 TABLET: TAB at 08:09

## 2021-09-05 RX ADMIN — SODIUM CHLORIDE 1.5 UNITS/HR: 9 INJECTION, SOLUTION INTRAVENOUS at 03:09

## 2021-09-06 ENCOUNTER — PATIENT MESSAGE (OUTPATIENT)
Dept: ADMINISTRATIVE | Facility: CLINIC | Age: 71
End: 2021-09-06

## 2021-09-06 VITALS
BODY MASS INDEX: 29.94 KG/M2 | TEMPERATURE: 98 F | OXYGEN SATURATION: 100 % | HEIGHT: 65 IN | HEART RATE: 58 BPM | DIASTOLIC BLOOD PRESSURE: 63 MMHG | RESPIRATION RATE: 20 BRPM | WEIGHT: 179.69 LBS | SYSTOLIC BLOOD PRESSURE: 111 MMHG

## 2021-09-06 LAB
ALBUMIN SERPL BCP-MCNC: 2.5 G/DL (ref 3.5–5.2)
ALP SERPL-CCNC: 44 U/L (ref 55–135)
ALT SERPL W/O P-5'-P-CCNC: 48 U/L (ref 10–44)
ANION GAP SERPL CALC-SCNC: 10 MMOL/L (ref 8–16)
AST SERPL-CCNC: 59 U/L (ref 10–40)
BASOPHILS # BLD AUTO: 0.01 K/UL (ref 0–0.2)
BASOPHILS NFR BLD: 0.1 % (ref 0–1.9)
BILIRUB SERPL-MCNC: 0.2 MG/DL (ref 0.1–1)
BUN SERPL-MCNC: 31 MG/DL (ref 8–23)
CALCIUM SERPL-MCNC: 8.9 MG/DL (ref 8.7–10.5)
CHLORIDE SERPL-SCNC: 113 MMOL/L (ref 95–110)
CO2 SERPL-SCNC: 16 MMOL/L (ref 23–29)
CREAT SERPL-MCNC: 1.2 MG/DL (ref 0.5–1.4)
CRP SERPL-MCNC: 35 MG/L (ref 0–8.2)
D DIMER PPP IA.FEU-MCNC: 1 MG/L FEU
DIFFERENTIAL METHOD: NORMAL
EOSINOPHIL # BLD AUTO: 0 K/UL (ref 0–0.5)
EOSINOPHIL NFR BLD: 0 % (ref 0–8)
ERYTHROCYTE [DISTWIDTH] IN BLOOD BY AUTOMATED COUNT: 14 % (ref 11.5–14.5)
EST. GFR  (AFRICAN AMERICAN): 53 ML/MIN/1.73 M^2
EST. GFR  (NON AFRICAN AMERICAN): 46 ML/MIN/1.73 M^2
FERRITIN SERPL-MCNC: 1832 NG/ML (ref 20–300)
GLUCOSE SERPL-MCNC: 200 MG/DL (ref 70–110)
HCT VFR BLD AUTO: 40.3 % (ref 37–48.5)
HGB BLD-MCNC: 13.4 G/DL (ref 12–16)
IMM GRANULOCYTES # BLD AUTO: 0.04 K/UL (ref 0–0.04)
IMM GRANULOCYTES NFR BLD AUTO: 0.4 % (ref 0–0.5)
LDH SERPL L TO P-CCNC: 300 U/L (ref 110–260)
LYMPHOCYTES # BLD AUTO: 2.6 K/UL (ref 1–4.8)
LYMPHOCYTES NFR BLD: 29.6 % (ref 18–48)
MAGNESIUM SERPL-MCNC: 1.9 MG/DL (ref 1.6–2.6)
MCH RBC QN AUTO: 29.2 PG (ref 27–31)
MCHC RBC AUTO-ENTMCNC: 33.3 G/DL (ref 32–36)
MCV RBC AUTO: 88 FL (ref 82–98)
MONOCYTES # BLD AUTO: 0.6 K/UL (ref 0.3–1)
MONOCYTES NFR BLD: 6.5 % (ref 4–15)
NEUTROPHILS # BLD AUTO: 5.6 K/UL (ref 1.8–7.7)
NEUTROPHILS NFR BLD: 63.4 % (ref 38–73)
NRBC BLD-RTO: 0 /100 WBC
PHOSPHATE SERPL-MCNC: 2.3 MG/DL (ref 2.7–4.5)
PLATELET # BLD AUTO: 173 K/UL (ref 150–450)
PMV BLD AUTO: 11 FL (ref 9.2–12.9)
POCT GLUCOSE: 183 MG/DL (ref 70–110)
POCT GLUCOSE: 207 MG/DL (ref 70–110)
POTASSIUM SERPL-SCNC: 3.4 MMOL/L (ref 3.5–5.1)
PROCALCITONIN SERPL IA-MCNC: 0.36 NG/ML
PROT SERPL-MCNC: 5.8 G/DL (ref 6–8.4)
RBC # BLD AUTO: 4.59 M/UL (ref 4–5.4)
SODIUM SERPL-SCNC: 139 MMOL/L (ref 136–145)
WBC # BLD AUTO: 8.89 K/UL (ref 3.9–12.7)

## 2021-09-06 PROCEDURE — 80053 COMPREHEN METABOLIC PANEL: CPT | Performed by: STUDENT IN AN ORGANIZED HEALTH CARE EDUCATION/TRAINING PROGRAM

## 2021-09-06 PROCEDURE — 83615 LACTATE (LD) (LDH) ENZYME: CPT | Performed by: STUDENT IN AN ORGANIZED HEALTH CARE EDUCATION/TRAINING PROGRAM

## 2021-09-06 PROCEDURE — 83735 ASSAY OF MAGNESIUM: CPT | Performed by: STUDENT IN AN ORGANIZED HEALTH CARE EDUCATION/TRAINING PROGRAM

## 2021-09-06 PROCEDURE — 25000003 PHARM REV CODE 250: Performed by: NURSE PRACTITIONER

## 2021-09-06 PROCEDURE — 85379 FIBRIN DEGRADATION QUANT: CPT | Performed by: STUDENT IN AN ORGANIZED HEALTH CARE EDUCATION/TRAINING PROGRAM

## 2021-09-06 PROCEDURE — 36415 COLL VENOUS BLD VENIPUNCTURE: CPT | Performed by: STUDENT IN AN ORGANIZED HEALTH CARE EDUCATION/TRAINING PROGRAM

## 2021-09-06 PROCEDURE — 86140 C-REACTIVE PROTEIN: CPT | Performed by: STUDENT IN AN ORGANIZED HEALTH CARE EDUCATION/TRAINING PROGRAM

## 2021-09-06 PROCEDURE — 94618 PULMONARY STRESS TESTING: CPT

## 2021-09-06 PROCEDURE — 25000003 PHARM REV CODE 250: Performed by: STUDENT IN AN ORGANIZED HEALTH CARE EDUCATION/TRAINING PROGRAM

## 2021-09-06 PROCEDURE — 63600175 PHARM REV CODE 636 W HCPCS: Performed by: NURSE PRACTITIONER

## 2021-09-06 PROCEDURE — C9399 UNCLASSIFIED DRUGS OR BIOLOG: HCPCS | Performed by: STUDENT IN AN ORGANIZED HEALTH CARE EDUCATION/TRAINING PROGRAM

## 2021-09-06 PROCEDURE — 82728 ASSAY OF FERRITIN: CPT | Performed by: STUDENT IN AN ORGANIZED HEALTH CARE EDUCATION/TRAINING PROGRAM

## 2021-09-06 PROCEDURE — 84100 ASSAY OF PHOSPHORUS: CPT | Performed by: STUDENT IN AN ORGANIZED HEALTH CARE EDUCATION/TRAINING PROGRAM

## 2021-09-06 PROCEDURE — 94761 N-INVAS EAR/PLS OXIMETRY MLT: CPT

## 2021-09-06 PROCEDURE — 63600175 PHARM REV CODE 636 W HCPCS: Performed by: STUDENT IN AN ORGANIZED HEALTH CARE EDUCATION/TRAINING PROGRAM

## 2021-09-06 PROCEDURE — 84145 PROCALCITONIN (PCT): CPT | Performed by: STUDENT IN AN ORGANIZED HEALTH CARE EDUCATION/TRAINING PROGRAM

## 2021-09-06 PROCEDURE — 85025 COMPLETE CBC W/AUTO DIFF WBC: CPT | Performed by: STUDENT IN AN ORGANIZED HEALTH CARE EDUCATION/TRAINING PROGRAM

## 2021-09-06 PROCEDURE — 99900035 HC TECH TIME PER 15 MIN (STAT)

## 2021-09-06 RX ORDER — INSULIN GLARGINE 300 U/ML
55 INJECTION, SOLUTION SUBCUTANEOUS DAILY
Qty: 2 PEN | Refills: 3 | Status: SHIPPED | OUTPATIENT
Start: 2021-09-06 | End: 2021-09-07

## 2021-09-06 RX ORDER — ONDANSETRON 4 MG/1
4 TABLET, FILM COATED ORAL EVERY 8 HOURS PRN
Qty: 12 TABLET | Refills: 0 | Status: SHIPPED | OUTPATIENT
Start: 2021-09-06 | End: 2021-09-07

## 2021-09-06 RX ADMIN — ONDANSETRON 8 MG: 2 INJECTION INTRAMUSCULAR; INTRAVENOUS at 11:09

## 2021-09-06 RX ADMIN — METOPROLOL SUCCINATE 50 MG: 50 TABLET, EXTENDED RELEASE ORAL at 08:09

## 2021-09-06 RX ADMIN — MULTIPLE VITAMINS W/ MINERALS TAB 1 TABLET: TAB at 08:09

## 2021-09-06 RX ADMIN — LOPERAMIDE HYDROCHLORIDE 2 MG: 2 CAPSULE ORAL at 08:09

## 2021-09-06 RX ADMIN — BUPROPION HYDROCHLORIDE 150 MG: 150 TABLET, EXTENDED RELEASE ORAL at 08:09

## 2021-09-06 RX ADMIN — INSULIN DETEMIR 30 UNITS: 100 INJECTION, SOLUTION SUBCUTANEOUS at 09:09

## 2021-09-06 RX ADMIN — OXYCODONE HYDROCHLORIDE AND ACETAMINOPHEN 500 MG: 500 TABLET ORAL at 08:09

## 2021-09-06 RX ADMIN — INSULIN ASPART 4 UNITS: 100 INJECTION, SOLUTION INTRAVENOUS; SUBCUTANEOUS at 11:09

## 2021-09-06 RX ADMIN — INSULIN ASPART 2 UNITS: 100 INJECTION, SOLUTION INTRAVENOUS; SUBCUTANEOUS at 05:09

## 2021-09-06 RX ADMIN — MUPIROCIN: 20 OINTMENT TOPICAL at 08:09

## 2021-09-06 RX ADMIN — ENOXAPARIN SODIUM 40 MG: 100 INJECTION SUBCUTANEOUS at 08:09

## 2021-09-06 RX ADMIN — LISINOPRIL 40 MG: 40 TABLET ORAL at 08:09

## 2021-09-07 ENCOUNTER — TELEPHONE (OUTPATIENT)
Dept: ADMINISTRATIVE | Facility: OTHER | Age: 71
End: 2021-09-07

## 2021-09-07 ENCOUNTER — TELEPHONE (OUTPATIENT)
Dept: MEDSURG UNIT | Facility: HOSPITAL | Age: 71
End: 2021-09-07

## 2021-09-07 ENCOUNTER — TELEPHONE (OUTPATIENT)
Dept: FAMILY MEDICINE | Facility: CLINIC | Age: 71
End: 2021-09-07

## 2021-09-07 RX ORDER — INSULIN GLARGINE 300 U/ML
55 INJECTION, SOLUTION SUBCUTANEOUS DAILY
Qty: 2 PEN | Refills: 3 | Status: SHIPPED | OUTPATIENT
Start: 2021-09-07 | End: 2022-03-03 | Stop reason: SDUPTHER

## 2021-09-07 RX ORDER — ONDANSETRON 4 MG/1
4 TABLET, FILM COATED ORAL EVERY 8 HOURS PRN
Qty: 12 TABLET | Refills: 0 | Status: SHIPPED | OUTPATIENT
Start: 2021-09-07 | End: 2021-09-15

## 2021-09-11 PROBLEM — Z12.39 BREAST CANCER SCREENING: Status: RESOLVED | Noted: 2018-11-29 | Resolved: 2021-09-11

## 2021-09-11 PROBLEM — E66.9 OBESITY (BMI 30.0-34.9): Status: RESOLVED | Noted: 2021-09-05 | Resolved: 2021-09-11

## 2021-09-11 PROBLEM — Z12.11 COLON CANCER SCREENING: Status: RESOLVED | Noted: 2018-07-05 | Resolved: 2021-09-11

## 2021-09-11 PROBLEM — E66.811 OBESITY (BMI 30.0-34.9): Status: RESOLVED | Noted: 2021-09-05 | Resolved: 2021-09-11

## 2021-09-13 ENCOUNTER — OFFICE VISIT (OUTPATIENT)
Dept: FAMILY MEDICINE | Facility: CLINIC | Age: 71
End: 2021-09-13
Payer: COMMERCIAL

## 2021-09-13 VITALS
OXYGEN SATURATION: 97 % | HEART RATE: 81 BPM | DIASTOLIC BLOOD PRESSURE: 75 MMHG | HEIGHT: 65 IN | SYSTOLIC BLOOD PRESSURE: 132 MMHG | TEMPERATURE: 97 F | WEIGHT: 178 LBS | BODY MASS INDEX: 29.66 KG/M2

## 2021-09-13 DIAGNOSIS — E11.9 TYPE 2 DIABETES MELLITUS WITHOUT COMPLICATION, WITH LONG-TERM CURRENT USE OF INSULIN: Primary | ICD-10-CM

## 2021-09-13 DIAGNOSIS — Z00.00 PHYSICAL EXAM: ICD-10-CM

## 2021-09-13 DIAGNOSIS — Z79.4 TYPE 2 DIABETES MELLITUS WITHOUT COMPLICATION, WITH LONG-TERM CURRENT USE OF INSULIN: Primary | ICD-10-CM

## 2021-09-13 DIAGNOSIS — I10 ESSENTIAL HYPERTENSION: ICD-10-CM

## 2021-09-13 PROCEDURE — 3008F PR BODY MASS INDEX (BMI) DOCUMENTED: ICD-10-PCS | Mod: CPTII,S$GLB,, | Performed by: FAMILY MEDICINE

## 2021-09-13 PROCEDURE — 3008F BODY MASS INDEX DOCD: CPT | Mod: CPTII,S$GLB,, | Performed by: FAMILY MEDICINE

## 2021-09-13 PROCEDURE — 4010F PR ACE/ARB THEARPY RXD/TAKEN: ICD-10-PCS | Mod: CPTII,S$GLB,, | Performed by: FAMILY MEDICINE

## 2021-09-13 PROCEDURE — 99214 PR OFFICE/OUTPT VISIT, EST, LEVL IV, 30-39 MIN: ICD-10-PCS | Mod: S$GLB,,, | Performed by: FAMILY MEDICINE

## 2021-09-13 PROCEDURE — 3078F DIAST BP <80 MM HG: CPT | Mod: CPTII,S$GLB,, | Performed by: FAMILY MEDICINE

## 2021-09-13 PROCEDURE — 99214 OFFICE O/P EST MOD 30 MIN: CPT | Mod: S$GLB,,, | Performed by: FAMILY MEDICINE

## 2021-09-13 PROCEDURE — 3078F PR MOST RECENT DIASTOLIC BLOOD PRESSURE < 80 MM HG: ICD-10-PCS | Mod: CPTII,S$GLB,, | Performed by: FAMILY MEDICINE

## 2021-09-13 PROCEDURE — 3075F SYST BP GE 130 - 139MM HG: CPT | Mod: CPTII,S$GLB,, | Performed by: FAMILY MEDICINE

## 2021-09-13 PROCEDURE — 4010F ACE/ARB THERAPY RXD/TAKEN: CPT | Mod: CPTII,S$GLB,, | Performed by: FAMILY MEDICINE

## 2021-09-13 PROCEDURE — 3046F PR MOST RECENT HEMOGLOBIN A1C LEVEL > 9.0%: ICD-10-PCS | Mod: CPTII,S$GLB,, | Performed by: FAMILY MEDICINE

## 2021-09-13 PROCEDURE — 3288F PR FALLS RISK ASSESSMENT DOCUMENTED: ICD-10-PCS | Mod: CPTII,S$GLB,, | Performed by: FAMILY MEDICINE

## 2021-09-13 PROCEDURE — 1126F PR PAIN SEVERITY QUANTIFIED, NO PAIN PRESENT: ICD-10-PCS | Mod: CPTII,S$GLB,, | Performed by: FAMILY MEDICINE

## 2021-09-13 PROCEDURE — 3046F HEMOGLOBIN A1C LEVEL >9.0%: CPT | Mod: CPTII,S$GLB,, | Performed by: FAMILY MEDICINE

## 2021-09-13 PROCEDURE — 1101F PT FALLS ASSESS-DOCD LE1/YR: CPT | Mod: CPTII,S$GLB,, | Performed by: FAMILY MEDICINE

## 2021-09-13 PROCEDURE — 3288F FALL RISK ASSESSMENT DOCD: CPT | Mod: CPTII,S$GLB,, | Performed by: FAMILY MEDICINE

## 2021-09-13 PROCEDURE — 1126F AMNT PAIN NOTED NONE PRSNT: CPT | Mod: CPTII,S$GLB,, | Performed by: FAMILY MEDICINE

## 2021-09-13 PROCEDURE — 1101F PR PT FALLS ASSESS DOC 0-1 FALLS W/OUT INJ PAST YR: ICD-10-PCS | Mod: CPTII,S$GLB,, | Performed by: FAMILY MEDICINE

## 2021-09-13 PROCEDURE — 3075F PR MOST RECENT SYSTOLIC BLOOD PRESS GE 130-139MM HG: ICD-10-PCS | Mod: CPTII,S$GLB,, | Performed by: FAMILY MEDICINE

## 2021-09-15 ENCOUNTER — OFFICE VISIT (OUTPATIENT)
Dept: FAMILY MEDICINE | Facility: CLINIC | Age: 71
End: 2021-09-15
Payer: COMMERCIAL

## 2021-09-15 ENCOUNTER — TELEPHONE (OUTPATIENT)
Dept: FAMILY MEDICINE | Facility: CLINIC | Age: 71
End: 2021-09-15

## 2021-09-15 DIAGNOSIS — Z20.822 SUSPECTED COVID-19 VIRUS INFECTION: ICD-10-CM

## 2021-09-15 DIAGNOSIS — Z79.4 TYPE 2 DIABETES MELLITUS WITHOUT COMPLICATION, WITH LONG-TERM CURRENT USE OF INSULIN: Primary | ICD-10-CM

## 2021-09-15 DIAGNOSIS — E11.9 TYPE 2 DIABETES MELLITUS WITHOUT COMPLICATION, WITH LONG-TERM CURRENT USE OF INSULIN: Primary | ICD-10-CM

## 2021-09-15 PROCEDURE — 1159F PR MEDICATION LIST DOCUMENTED IN MEDICAL RECORD: ICD-10-PCS | Mod: CPTII,95,, | Performed by: FAMILY MEDICINE

## 2021-09-15 PROCEDURE — 4010F PR ACE/ARB THEARPY RXD/TAKEN: ICD-10-PCS | Mod: CPTII,95,, | Performed by: FAMILY MEDICINE

## 2021-09-15 PROCEDURE — 1111F PR DISCHARGE MEDS RECONCILED W/ CURRENT OUTPATIENT MED LIST: ICD-10-PCS | Mod: CPTII,95,, | Performed by: FAMILY MEDICINE

## 2021-09-15 PROCEDURE — 1160F RVW MEDS BY RX/DR IN RCRD: CPT | Mod: CPTII,95,, | Performed by: FAMILY MEDICINE

## 2021-09-15 PROCEDURE — 3046F PR MOST RECENT HEMOGLOBIN A1C LEVEL > 9.0%: ICD-10-PCS | Mod: CPTII,95,, | Performed by: FAMILY MEDICINE

## 2021-09-15 PROCEDURE — 99213 PR OFFICE/OUTPT VISIT, EST, LEVL III, 20-29 MIN: ICD-10-PCS | Mod: 95,,, | Performed by: FAMILY MEDICINE

## 2021-09-15 PROCEDURE — 1159F MED LIST DOCD IN RCRD: CPT | Mod: CPTII,95,, | Performed by: FAMILY MEDICINE

## 2021-09-15 PROCEDURE — 1111F DSCHRG MED/CURRENT MED MERGE: CPT | Mod: CPTII,95,, | Performed by: FAMILY MEDICINE

## 2021-09-15 PROCEDURE — 99213 OFFICE O/P EST LOW 20 MIN: CPT | Mod: 95,,, | Performed by: FAMILY MEDICINE

## 2021-09-15 PROCEDURE — 3046F HEMOGLOBIN A1C LEVEL >9.0%: CPT | Mod: CPTII,95,, | Performed by: FAMILY MEDICINE

## 2021-09-15 PROCEDURE — 4010F ACE/ARB THERAPY RXD/TAKEN: CPT | Mod: CPTII,95,, | Performed by: FAMILY MEDICINE

## 2021-09-15 PROCEDURE — 1160F PR REVIEW ALL MEDS BY PRESCRIBER/CLIN PHARMACIST DOCUMENTED: ICD-10-PCS | Mod: CPTII,95,, | Performed by: FAMILY MEDICINE

## 2021-09-15 RX ORDER — PEN NEEDLE, DIABETIC 30 GX3/16"
NEEDLE, DISPOSABLE MISCELLANEOUS
Qty: 100 EACH | Refills: 5 | Status: SHIPPED | OUTPATIENT
Start: 2021-09-15 | End: 2022-07-26 | Stop reason: SDUPTHER

## 2021-10-05 ENCOUNTER — PATIENT MESSAGE (OUTPATIENT)
Dept: ADMINISTRATIVE | Facility: HOSPITAL | Age: 71
End: 2021-10-05

## 2021-10-08 ENCOUNTER — PATIENT MESSAGE (OUTPATIENT)
Dept: ADMINISTRATIVE | Facility: HOSPITAL | Age: 71
End: 2021-10-08

## 2021-10-08 ENCOUNTER — OFFICE VISIT (OUTPATIENT)
Dept: FAMILY MEDICINE | Facility: CLINIC | Age: 71
End: 2021-10-08
Payer: COMMERCIAL

## 2021-10-08 ENCOUNTER — TELEPHONE (OUTPATIENT)
Dept: FAMILY MEDICINE | Facility: CLINIC | Age: 71
End: 2021-10-08

## 2021-10-08 ENCOUNTER — PATIENT OUTREACH (OUTPATIENT)
Dept: ADMINISTRATIVE | Facility: HOSPITAL | Age: 71
End: 2021-10-08

## 2021-10-08 VITALS
DIASTOLIC BLOOD PRESSURE: 84 MMHG | OXYGEN SATURATION: 98 % | SYSTOLIC BLOOD PRESSURE: 138 MMHG | HEIGHT: 65 IN | WEIGHT: 179 LBS | BODY MASS INDEX: 29.82 KG/M2 | TEMPERATURE: 98 F | HEART RATE: 71 BPM

## 2021-10-08 DIAGNOSIS — E11.9 DIABETES MELLITUS WITHOUT COMPLICATION: Primary | ICD-10-CM

## 2021-10-08 DIAGNOSIS — Z12.31 BREAST CANCER SCREENING BY MAMMOGRAM: ICD-10-CM

## 2021-10-08 DIAGNOSIS — E11.9 TYPE 2 DIABETES MELLITUS WITHOUT COMPLICATION, WITH LONG-TERM CURRENT USE OF INSULIN: Primary | ICD-10-CM

## 2021-10-08 DIAGNOSIS — Z79.4 TYPE 2 DIABETES MELLITUS WITHOUT COMPLICATION, WITH LONG-TERM CURRENT USE OF INSULIN: Primary | ICD-10-CM

## 2021-10-08 DIAGNOSIS — F32.A DEPRESSION, UNSPECIFIED DEPRESSION TYPE: ICD-10-CM

## 2021-10-08 DIAGNOSIS — J32.9 SINUSITIS, UNSPECIFIED CHRONICITY, UNSPECIFIED LOCATION: ICD-10-CM

## 2021-10-08 PROCEDURE — 4010F ACE/ARB THERAPY RXD/TAKEN: CPT | Mod: CPTII,S$GLB,, | Performed by: FAMILY MEDICINE

## 2021-10-08 PROCEDURE — 1126F PR PAIN SEVERITY QUANTIFIED, NO PAIN PRESENT: ICD-10-PCS | Mod: CPTII,S$GLB,, | Performed by: FAMILY MEDICINE

## 2021-10-08 PROCEDURE — 99214 OFFICE O/P EST MOD 30 MIN: CPT | Mod: S$GLB,,, | Performed by: FAMILY MEDICINE

## 2021-10-08 PROCEDURE — 1126F AMNT PAIN NOTED NONE PRSNT: CPT | Mod: CPTII,S$GLB,, | Performed by: FAMILY MEDICINE

## 2021-10-08 PROCEDURE — 4010F PR ACE/ARB THEARPY RXD/TAKEN: ICD-10-PCS | Mod: CPTII,S$GLB,, | Performed by: FAMILY MEDICINE

## 2021-10-08 PROCEDURE — 1101F PR PT FALLS ASSESS DOC 0-1 FALLS W/OUT INJ PAST YR: ICD-10-PCS | Mod: CPTII,S$GLB,, | Performed by: FAMILY MEDICINE

## 2021-10-08 PROCEDURE — 3288F PR FALLS RISK ASSESSMENT DOCUMENTED: ICD-10-PCS | Mod: CPTII,S$GLB,, | Performed by: FAMILY MEDICINE

## 2021-10-08 PROCEDURE — 3046F PR MOST RECENT HEMOGLOBIN A1C LEVEL > 9.0%: ICD-10-PCS | Mod: CPTII,S$GLB,, | Performed by: FAMILY MEDICINE

## 2021-10-08 PROCEDURE — 3075F PR MOST RECENT SYSTOLIC BLOOD PRESS GE 130-139MM HG: ICD-10-PCS | Mod: CPTII,S$GLB,, | Performed by: FAMILY MEDICINE

## 2021-10-08 PROCEDURE — 3046F HEMOGLOBIN A1C LEVEL >9.0%: CPT | Mod: CPTII,S$GLB,, | Performed by: FAMILY MEDICINE

## 2021-10-08 PROCEDURE — 1101F PT FALLS ASSESS-DOCD LE1/YR: CPT | Mod: CPTII,S$GLB,, | Performed by: FAMILY MEDICINE

## 2021-10-08 PROCEDURE — 3008F PR BODY MASS INDEX (BMI) DOCUMENTED: ICD-10-PCS | Mod: CPTII,S$GLB,, | Performed by: FAMILY MEDICINE

## 2021-10-08 PROCEDURE — 1159F MED LIST DOCD IN RCRD: CPT | Mod: CPTII,S$GLB,, | Performed by: FAMILY MEDICINE

## 2021-10-08 PROCEDURE — 99214 PR OFFICE/OUTPT VISIT, EST, LEVL IV, 30-39 MIN: ICD-10-PCS | Mod: S$GLB,,, | Performed by: FAMILY MEDICINE

## 2021-10-08 PROCEDURE — 3075F SYST BP GE 130 - 139MM HG: CPT | Mod: CPTII,S$GLB,, | Performed by: FAMILY MEDICINE

## 2021-10-08 PROCEDURE — 3288F FALL RISK ASSESSMENT DOCD: CPT | Mod: CPTII,S$GLB,, | Performed by: FAMILY MEDICINE

## 2021-10-08 PROCEDURE — 3079F PR MOST RECENT DIASTOLIC BLOOD PRESSURE 80-89 MM HG: ICD-10-PCS | Mod: CPTII,S$GLB,, | Performed by: FAMILY MEDICINE

## 2021-10-08 PROCEDURE — 3008F BODY MASS INDEX DOCD: CPT | Mod: CPTII,S$GLB,, | Performed by: FAMILY MEDICINE

## 2021-10-08 PROCEDURE — 1160F PR REVIEW ALL MEDS BY PRESCRIBER/CLIN PHARMACIST DOCUMENTED: ICD-10-PCS | Mod: CPTII,S$GLB,, | Performed by: FAMILY MEDICINE

## 2021-10-08 PROCEDURE — 1159F PR MEDICATION LIST DOCUMENTED IN MEDICAL RECORD: ICD-10-PCS | Mod: CPTII,S$GLB,, | Performed by: FAMILY MEDICINE

## 2021-10-08 PROCEDURE — 1160F RVW MEDS BY RX/DR IN RCRD: CPT | Mod: CPTII,S$GLB,, | Performed by: FAMILY MEDICINE

## 2021-10-08 PROCEDURE — 3079F DIAST BP 80-89 MM HG: CPT | Mod: CPTII,S$GLB,, | Performed by: FAMILY MEDICINE

## 2021-10-08 RX ORDER — BUPROPION HYDROCHLORIDE 150 MG/1
150 TABLET ORAL DAILY
Qty: 90 TABLET | Refills: 1 | Status: SHIPPED | OUTPATIENT
Start: 2021-10-08 | End: 2022-03-03 | Stop reason: SDUPTHER

## 2021-10-08 RX ORDER — DOXYCYCLINE 100 MG/1
100 CAPSULE ORAL EVERY 12 HOURS
Qty: 20 CAPSULE | Refills: 0 | Status: SHIPPED | OUTPATIENT
Start: 2021-10-08 | End: 2022-05-12

## 2021-10-08 RX ORDER — METOPROLOL SUCCINATE 50 MG/1
50 TABLET, EXTENDED RELEASE ORAL DAILY
Qty: 90 TABLET | Refills: 1 | Status: SHIPPED | OUTPATIENT
Start: 2021-10-08 | End: 2022-03-03 | Stop reason: SDUPTHER

## 2021-10-18 ENCOUNTER — PATIENT MESSAGE (OUTPATIENT)
Dept: ADMINISTRATIVE | Facility: HOSPITAL | Age: 71
End: 2021-10-18
Payer: MEDICARE

## 2021-12-01 ENCOUNTER — TELEPHONE (OUTPATIENT)
Dept: FAMILY MEDICINE | Facility: CLINIC | Age: 71
End: 2021-12-01
Payer: MEDICARE

## 2021-12-09 ENCOUNTER — OFFICE VISIT (OUTPATIENT)
Dept: FAMILY MEDICINE | Facility: CLINIC | Age: 71
End: 2021-12-09
Payer: COMMERCIAL

## 2021-12-09 VITALS
TEMPERATURE: 98 F | OXYGEN SATURATION: 98 % | BODY MASS INDEX: 30.99 KG/M2 | HEART RATE: 68 BPM | RESPIRATION RATE: 18 BRPM | HEIGHT: 65 IN | WEIGHT: 186 LBS | SYSTOLIC BLOOD PRESSURE: 128 MMHG | DIASTOLIC BLOOD PRESSURE: 84 MMHG

## 2021-12-09 DIAGNOSIS — G47.00 INSOMNIA, UNSPECIFIED TYPE: ICD-10-CM

## 2021-12-09 DIAGNOSIS — R41.3 MEMORY LOSS: ICD-10-CM

## 2021-12-09 DIAGNOSIS — F41.9 ANXIETY: ICD-10-CM

## 2021-12-09 DIAGNOSIS — R00.0 TACHYCARDIA: ICD-10-CM

## 2021-12-09 DIAGNOSIS — I10 HYPERTENSION, ESSENTIAL: ICD-10-CM

## 2021-12-09 DIAGNOSIS — R26.89 BALANCE PROBLEM: ICD-10-CM

## 2021-12-09 DIAGNOSIS — G25.0 BENIGN ESSENTIAL TREMOR: ICD-10-CM

## 2021-12-09 DIAGNOSIS — U09.9 COVID-19 LONG HAULER MANIFESTING CHRONIC CONCENTRATION DEFICIT: ICD-10-CM

## 2021-12-09 DIAGNOSIS — E11.9 TYPE 2 DIABETES MELLITUS WITHOUT COMPLICATION, WITH LONG-TERM CURRENT USE OF INSULIN: Primary | ICD-10-CM

## 2021-12-09 DIAGNOSIS — R41.840 COVID-19 LONG HAULER MANIFESTING CHRONIC CONCENTRATION DEFICIT: ICD-10-CM

## 2021-12-09 DIAGNOSIS — Z79.4 TYPE 2 DIABETES MELLITUS WITHOUT COMPLICATION, WITH LONG-TERM CURRENT USE OF INSULIN: Primary | ICD-10-CM

## 2021-12-09 DIAGNOSIS — E53.8 VITAMIN B12 DEFICIENCY: ICD-10-CM

## 2021-12-09 PROCEDURE — 99214 PR OFFICE/OUTPT VISIT, EST, LEVL IV, 30-39 MIN: ICD-10-PCS | Mod: S$GLB,,, | Performed by: FAMILY MEDICINE

## 2021-12-09 PROCEDURE — 4010F PR ACE/ARB THEARPY RXD/TAKEN: ICD-10-PCS | Mod: CPTII,S$GLB,, | Performed by: FAMILY MEDICINE

## 2021-12-09 PROCEDURE — 99214 OFFICE O/P EST MOD 30 MIN: CPT | Mod: S$GLB,,, | Performed by: FAMILY MEDICINE

## 2021-12-09 PROCEDURE — 3062F POS MACROALBUMINURIA REV: CPT | Mod: CPTII,S$GLB,, | Performed by: FAMILY MEDICINE

## 2021-12-09 PROCEDURE — 4010F ACE/ARB THERAPY RXD/TAKEN: CPT | Mod: CPTII,S$GLB,, | Performed by: FAMILY MEDICINE

## 2021-12-09 PROCEDURE — 3066F NEPHROPATHY DOC TX: CPT | Mod: CPTII,S$GLB,, | Performed by: FAMILY MEDICINE

## 2021-12-09 PROCEDURE — 3062F PR POS MACROALBUMINURIA RESULT DOCUMENTED/REVIEW: ICD-10-PCS | Mod: CPTII,S$GLB,, | Performed by: FAMILY MEDICINE

## 2021-12-09 PROCEDURE — 3066F PR DOCUMENTATION OF TREATMENT FOR NEPHROPATHY: ICD-10-PCS | Mod: CPTII,S$GLB,, | Performed by: FAMILY MEDICINE

## 2021-12-13 ENCOUNTER — PATIENT OUTREACH (OUTPATIENT)
Dept: ADMINISTRATIVE | Facility: HOSPITAL | Age: 71
End: 2021-12-13
Payer: MEDICARE

## 2021-12-13 ENCOUNTER — PATIENT MESSAGE (OUTPATIENT)
Dept: ADMINISTRATIVE | Facility: HOSPITAL | Age: 71
End: 2021-12-13
Payer: MEDICARE

## 2021-12-14 ENCOUNTER — HOSPITAL ENCOUNTER (OUTPATIENT)
Dept: RADIOLOGY | Facility: CLINIC | Age: 71
Discharge: HOME | End: 2021-12-14
Payer: COMMERCIAL

## 2021-12-14 ENCOUNTER — TELEPHONE (OUTPATIENT)
Dept: FAMILY MEDICINE | Facility: CLINIC | Age: 71
End: 2021-12-14
Payer: MEDICARE

## 2021-12-14 DIAGNOSIS — Z12.31 BREAST CANCER SCREENING BY MAMMOGRAM: Primary | ICD-10-CM

## 2021-12-14 DIAGNOSIS — Z12.31 BREAST CANCER SCREENING BY MAMMOGRAM: ICD-10-CM

## 2021-12-14 PROCEDURE — 77067 MAMMO DIGITAL SCREENING BILAT WITH TOMO: ICD-10-PCS | Mod: 26,,, | Performed by: RADIOLOGY

## 2021-12-14 PROCEDURE — 77067 SCR MAMMO BI INCL CAD: CPT | Mod: TC,PO

## 2021-12-14 PROCEDURE — 77063 BREAST TOMOSYNTHESIS BI: CPT | Mod: 26,,, | Performed by: RADIOLOGY

## 2021-12-14 PROCEDURE — 77063 MAMMO DIGITAL SCREENING BILAT WITH TOMO: ICD-10-PCS | Mod: 26,,, | Performed by: RADIOLOGY

## 2021-12-14 PROCEDURE — 77067 SCR MAMMO BI INCL CAD: CPT | Mod: 26,,, | Performed by: RADIOLOGY

## 2021-12-15 ENCOUNTER — LAB VISIT (OUTPATIENT)
Dept: LAB | Facility: HOSPITAL | Age: 71
End: 2021-12-15
Attending: FAMILY MEDICINE
Payer: COMMERCIAL

## 2021-12-15 DIAGNOSIS — Z79.4 TYPE 2 DIABETES MELLITUS WITHOUT COMPLICATION, WITH LONG-TERM CURRENT USE OF INSULIN: ICD-10-CM

## 2021-12-15 DIAGNOSIS — E11.9 DIABETES MELLITUS WITHOUT COMPLICATION: ICD-10-CM

## 2021-12-15 DIAGNOSIS — E78.5 HYPERLIPIDEMIA, UNSPECIFIED HYPERLIPIDEMIA TYPE: ICD-10-CM

## 2021-12-15 DIAGNOSIS — E11.9 TYPE 2 DIABETES MELLITUS WITHOUT COMPLICATION, WITH LONG-TERM CURRENT USE OF INSULIN: ICD-10-CM

## 2021-12-15 LAB
ALBUMIN/CREAT UR: 583.3 UG/MG (ref 0–30)
CHOLEST SERPL-MCNC: 238 MG/DL (ref 120–199)
CHOLEST/HDLC SERPL: 5.2 {RATIO} (ref 2–5)
CREAT UR-MCNC: 78 MG/DL (ref 15–325)
ESTIMATED AVG GLUCOSE: 249 MG/DL (ref 68–131)
HBA1C MFR BLD: 10.3 % (ref 4–5.6)
HDLC SERPL-MCNC: 46 MG/DL (ref 40–75)
HDLC SERPL: 19.3 % (ref 20–50)
LDLC SERPL CALC-MCNC: 154.4 MG/DL (ref 63–159)
MICROALBUMIN UR DL<=1MG/L-MCNC: 455 UG/ML
NONHDLC SERPL-MCNC: 192 MG/DL
TRIGL SERPL-MCNC: 188 MG/DL (ref 30–150)

## 2021-12-15 PROCEDURE — 82570 ASSAY OF URINE CREATININE: CPT | Performed by: FAMILY MEDICINE

## 2021-12-15 PROCEDURE — 80061 LIPID PANEL: CPT | Performed by: FAMILY MEDICINE

## 2021-12-15 PROCEDURE — 36415 COLL VENOUS BLD VENIPUNCTURE: CPT | Mod: PO | Performed by: FAMILY MEDICINE

## 2021-12-15 PROCEDURE — 83036 HEMOGLOBIN GLYCOSYLATED A1C: CPT | Performed by: FAMILY MEDICINE

## 2021-12-19 PROBLEM — E53.8 VITAMIN B12 DEFICIENCY: Status: ACTIVE | Noted: 2021-12-19

## 2021-12-19 PROBLEM — R26.89 BALANCE PROBLEM: Status: ACTIVE | Noted: 2021-12-19

## 2021-12-19 PROBLEM — R41.3 MEMORY LOSS: Status: ACTIVE | Noted: 2021-12-19

## 2021-12-19 PROBLEM — R41.840 COVID-19 LONG HAULER MANIFESTING CHRONIC CONCENTRATION DEFICIT: Status: ACTIVE | Noted: 2021-12-19

## 2021-12-19 PROBLEM — U09.9 COVID-19 LONG HAULER MANIFESTING CHRONIC CONCENTRATION DEFICIT: Status: ACTIVE | Noted: 2021-12-19

## 2021-12-22 ENCOUNTER — PATIENT OUTREACH (OUTPATIENT)
Dept: ADMINISTRATIVE | Facility: HOSPITAL | Age: 71
End: 2021-12-22
Payer: MEDICARE

## 2021-12-30 ENCOUNTER — TELEPHONE (OUTPATIENT)
Dept: CARDIOLOGY | Facility: HOSPITAL | Age: 71
End: 2021-12-30
Payer: MEDICARE

## 2022-01-03 ENCOUNTER — CLINICAL SUPPORT (OUTPATIENT)
Dept: CARDIOLOGY | Facility: HOSPITAL | Age: 72
End: 2022-01-03
Attending: FAMILY MEDICINE
Payer: COMMERCIAL

## 2022-01-03 DIAGNOSIS — R00.0 TACHYCARDIA: ICD-10-CM

## 2022-01-03 PROCEDURE — 93226 XTRNL ECG REC<48 HR SCAN A/R: CPT

## 2022-01-03 PROCEDURE — 93227 XTRNL ECG REC<48 HR R&I: CPT | Mod: ,,, | Performed by: INTERNAL MEDICINE

## 2022-01-03 PROCEDURE — 93227 HOLTER MONITOR - 24 HOUR (CUPID ONLY): ICD-10-PCS | Mod: ,,, | Performed by: INTERNAL MEDICINE

## 2022-01-06 LAB
OHS CV EVENT MONITOR DAY: 0
OHS CV HOLTER LENGTH DECIMAL HOURS: 24
OHS CV HOLTER LENGTH HOURS: 24
OHS CV HOLTER LENGTH MINUTES: 0
OHS CV HOLTER SINUS AVERAGE HR: 73
OHS CV HOLTER SINUS MAX HR: 98
OHS CV HOLTER SINUS MIN HR: 53

## 2022-03-03 ENCOUNTER — OFFICE VISIT (OUTPATIENT)
Dept: FAMILY MEDICINE | Facility: CLINIC | Age: 72
End: 2022-03-03
Payer: COMMERCIAL

## 2022-03-03 VITALS
BODY MASS INDEX: 32.32 KG/M2 | OXYGEN SATURATION: 99 % | DIASTOLIC BLOOD PRESSURE: 82 MMHG | SYSTOLIC BLOOD PRESSURE: 126 MMHG | HEART RATE: 76 BPM | HEIGHT: 65 IN | RESPIRATION RATE: 18 BRPM | WEIGHT: 194 LBS | TEMPERATURE: 98 F

## 2022-03-03 DIAGNOSIS — I10 HYPERTENSION, ESSENTIAL: Primary | ICD-10-CM

## 2022-03-03 DIAGNOSIS — Z13.820 SPECIAL SCREENING FOR OSTEOPOROSIS: ICD-10-CM

## 2022-03-03 DIAGNOSIS — F41.9 ANXIETY: ICD-10-CM

## 2022-03-03 DIAGNOSIS — G47.00 INSOMNIA, UNSPECIFIED TYPE: ICD-10-CM

## 2022-03-03 DIAGNOSIS — F32.A DEPRESSION, UNSPECIFIED DEPRESSION TYPE: ICD-10-CM

## 2022-03-03 PROCEDURE — 3288F PR FALLS RISK ASSESSMENT DOCUMENTED: ICD-10-PCS | Mod: CPTII,S$GLB,, | Performed by: FAMILY MEDICINE

## 2022-03-03 PROCEDURE — 3008F PR BODY MASS INDEX (BMI) DOCUMENTED: ICD-10-PCS | Mod: CPTII,S$GLB,, | Performed by: FAMILY MEDICINE

## 2022-03-03 PROCEDURE — 1159F PR MEDICATION LIST DOCUMENTED IN MEDICAL RECORD: ICD-10-PCS | Mod: CPTII,S$GLB,, | Performed by: FAMILY MEDICINE

## 2022-03-03 PROCEDURE — 3008F BODY MASS INDEX DOCD: CPT | Mod: CPTII,S$GLB,, | Performed by: FAMILY MEDICINE

## 2022-03-03 PROCEDURE — 3079F DIAST BP 80-89 MM HG: CPT | Mod: CPTII,S$GLB,, | Performed by: FAMILY MEDICINE

## 2022-03-03 PROCEDURE — 1126F AMNT PAIN NOTED NONE PRSNT: CPT | Mod: CPTII,S$GLB,, | Performed by: FAMILY MEDICINE

## 2022-03-03 PROCEDURE — 1101F PT FALLS ASSESS-DOCD LE1/YR: CPT | Mod: CPTII,S$GLB,, | Performed by: FAMILY MEDICINE

## 2022-03-03 PROCEDURE — 1159F MED LIST DOCD IN RCRD: CPT | Mod: CPTII,S$GLB,, | Performed by: FAMILY MEDICINE

## 2022-03-03 PROCEDURE — 3288F FALL RISK ASSESSMENT DOCD: CPT | Mod: CPTII,S$GLB,, | Performed by: FAMILY MEDICINE

## 2022-03-03 PROCEDURE — 99214 PR OFFICE/OUTPT VISIT, EST, LEVL IV, 30-39 MIN: ICD-10-PCS | Mod: S$GLB,,, | Performed by: FAMILY MEDICINE

## 2022-03-03 PROCEDURE — 3074F PR MOST RECENT SYSTOLIC BLOOD PRESSURE < 130 MM HG: ICD-10-PCS | Mod: CPTII,S$GLB,, | Performed by: FAMILY MEDICINE

## 2022-03-03 PROCEDURE — 1101F PR PT FALLS ASSESS DOC 0-1 FALLS W/OUT INJ PAST YR: ICD-10-PCS | Mod: CPTII,S$GLB,, | Performed by: FAMILY MEDICINE

## 2022-03-03 PROCEDURE — 99214 OFFICE O/P EST MOD 30 MIN: CPT | Mod: S$GLB,,, | Performed by: FAMILY MEDICINE

## 2022-03-03 PROCEDURE — 3074F SYST BP LT 130 MM HG: CPT | Mod: CPTII,S$GLB,, | Performed by: FAMILY MEDICINE

## 2022-03-03 PROCEDURE — 4010F ACE/ARB THERAPY RXD/TAKEN: CPT | Mod: CPTII,S$GLB,, | Performed by: FAMILY MEDICINE

## 2022-03-03 PROCEDURE — 1126F PR PAIN SEVERITY QUANTIFIED, NO PAIN PRESENT: ICD-10-PCS | Mod: CPTII,S$GLB,, | Performed by: FAMILY MEDICINE

## 2022-03-03 PROCEDURE — 3079F PR MOST RECENT DIASTOLIC BLOOD PRESSURE 80-89 MM HG: ICD-10-PCS | Mod: CPTII,S$GLB,, | Performed by: FAMILY MEDICINE

## 2022-03-03 PROCEDURE — 4010F PR ACE/ARB THEARPY RXD/TAKEN: ICD-10-PCS | Mod: CPTII,S$GLB,, | Performed by: FAMILY MEDICINE

## 2022-03-03 RX ORDER — ZOLPIDEM TARTRATE 10 MG/1
10 TABLET ORAL NIGHTLY PRN
Qty: 30 TABLET | Refills: 3 | Status: SHIPPED | OUTPATIENT
Start: 2022-03-03 | End: 2022-07-15 | Stop reason: SDUPTHER

## 2022-03-03 RX ORDER — ALPRAZOLAM 0.5 MG/1
0.5 TABLET ORAL 2 TIMES DAILY PRN
Qty: 30 TABLET | Refills: 2 | Status: SHIPPED | OUTPATIENT
Start: 2022-03-03 | End: 2022-07-15 | Stop reason: SDUPTHER

## 2022-03-03 RX ORDER — INSULIN GLARGINE 300 U/ML
55 INJECTION, SOLUTION SUBCUTANEOUS DAILY
Qty: 4 PEN | Refills: 4 | Status: SHIPPED | OUTPATIENT
Start: 2022-03-03 | End: 2022-06-21

## 2022-03-03 RX ORDER — METOPROLOL SUCCINATE 50 MG/1
50 TABLET, EXTENDED RELEASE ORAL DAILY
Qty: 90 TABLET | Refills: 1 | Status: SHIPPED | OUTPATIENT
Start: 2022-03-03 | End: 2022-07-26 | Stop reason: SDUPTHER

## 2022-03-03 RX ORDER — LISINOPRIL 40 MG/1
40 TABLET ORAL DAILY
Qty: 90 TABLET | Refills: 1 | Status: SHIPPED | OUTPATIENT
Start: 2022-03-03 | End: 2022-07-26 | Stop reason: SDUPTHER

## 2022-03-03 RX ORDER — BUPROPION HYDROCHLORIDE 150 MG/1
150 TABLET ORAL DAILY
Qty: 90 TABLET | Refills: 2 | Status: SHIPPED | OUTPATIENT
Start: 2022-03-03 | End: 2022-05-12

## 2022-03-04 NOTE — PROGRESS NOTES
Needs medication refills.  Has had a sore throat since yesterday.  Needs her Ambien refill for insomnia.  Does not take it every single night.  Dog frequently gets her up at night.  The medication does not seem to last the entire night for her.  Also anxiety issues.  Out of Xanax for a couple of months now.  Would like some more.  Uses it sparingly.  Hypertension doing well.  No chest pain.  She did have her Holter monitor done.  It was normal.  Rare PACs and rare PVCs.  Only occasional increased heart rate primarily in bed at night.  Depression doing better on the 150 mg a Wellbutrin but would like to increase the dose.    Physical examination vital signs noted.  Neck without bruit.  Chest clear.  Heart regular rate rhythm.  Abdomen bowel sounds are positive soft and nontender.  Extremities without edema positive pedal pulses.  Pharynx without erythema.    Impression.  Pharyngitis most likely viral.  Depression in need of increased medication.  Anxiety mostly controlled.  Insomnia.  Hypertension controlled.  Diabetes mellitus type 2 on insulin.  Positive microalbumin.    Plan needs to do the labs as ordered in December.  Including her A1c.  DEXA ordered.  Treat her throat only if it worsens.  Eye exam done by Dr. Valdez was Dr. De La Rosa.  Need to get a copy of this.  Refill her Toujeo 50 units daily.  Refill Wellbutrin but increased to 300 mg daily 90 with a refill.  Refill lisinopril 40 mg Toprol XL 50 mg both 90 day supplies with a refill.  Refill Ambien 10 mg HS p.r.n. 30 with 2 refills.  May want to try taking half a pill at bedtime another half she wakes up during the night.  Refill the Xanax 0.5 p.r.n. only daily maximum.  Thirty pills with no refill.

## 2022-03-07 ENCOUNTER — HOSPITAL ENCOUNTER (OUTPATIENT)
Dept: RADIOLOGY | Facility: HOSPITAL | Age: 72
Discharge: HOME | End: 2022-03-07
Attending: FAMILY MEDICINE
Payer: COMMERCIAL

## 2022-03-07 DIAGNOSIS — Z13.820 SPECIAL SCREENING FOR OSTEOPOROSIS: ICD-10-CM

## 2022-03-07 PROCEDURE — 77080 DXA BONE DENSITY AXIAL: CPT | Mod: 26,,, | Performed by: RADIOLOGY

## 2022-03-07 PROCEDURE — 77080 DXA BONE DENSITY AXIAL: CPT | Mod: TC

## 2022-03-07 PROCEDURE — 77080 DEXA BONE DENSITY SPINE HIP: ICD-10-PCS | Mod: 26,,, | Performed by: RADIOLOGY

## 2022-03-13 PROBLEM — Z20.822 SUSPECTED COVID-19 VIRUS INFECTION: Status: RESOLVED | Noted: 2021-09-04 | Resolved: 2022-03-13

## 2022-05-12 RX ORDER — BUPROPION HYDROCHLORIDE 300 MG/1
300 TABLET ORAL DAILY
Qty: 90 TABLET | Refills: 2 | Status: SHIPPED | OUTPATIENT
Start: 2022-05-12 | End: 2022-07-26 | Stop reason: SDUPTHER

## 2022-05-12 NOTE — TELEPHONE ENCOUNTER
----- Message from Yue Kennedi sent at 5/12/2022  9:57 AM CDT -----  Contact: pt  Type:  Sooner Appointment Request    Caller is requesting a sooner appointment.  Caller declined first available appointment listed below.  Caller will not accept being placed on the waitlist and is requesting a message be sent to doctor.    Name of Caller:  pt  When is the first available appointment?  05/24/2022  Symptoms:  had to call 911 Saturday night due to passing out and they had problems getting her to come back around. Sugar level dropped. Has lump on arm  Best Call Back Number:  [phone]

## 2022-05-12 NOTE — TELEPHONE ENCOUNTER
Care Due:                  Date            Visit Type   Department     Provider  --------------------------------------------------------------------------------                                EP                               PRIMARY      [facility]  Last Visit: 03-      [facility] ([facility])   [facility]  Next Visit: None Scheduled  None         None Found                                                            Last  Test          Frequency    Reason                     Performed    Due Date  --------------------------------------------------------------------------------    HBA1C.......  6 months...  insulin..................  12-   06-    [facility] Embedded Care Gaps. Reference number: [id number]. 5/12/2022   11:48:53 AM CDT

## 2022-06-01 ENCOUNTER — PATIENT MESSAGE (OUTPATIENT)
Dept: ADMINISTRATIVE | Facility: HOSPITAL | Age: 72
End: 2022-06-01
Payer: MEDICARE

## 2022-06-13 ENCOUNTER — PATIENT MESSAGE (OUTPATIENT)
Dept: ADMINISTRATIVE | Facility: HOSPITAL | Age: 72
End: 2022-06-13
Payer: MEDICARE

## 2022-06-21 RX ORDER — INSULIN GLARGINE 300 U/ML
INJECTION, SOLUTION SUBCUTANEOUS
Qty: 3 PEN | Refills: 0 | Status: SHIPPED | OUTPATIENT
Start: 2022-06-21 | End: 2022-07-06 | Stop reason: SDUPTHER

## 2022-06-21 NOTE — TELEPHONE ENCOUNTER
Care Due:                  Date            Visit Type   Department     Provider  --------------------------------------------------------------------------------                                EP [facility]  Last Visit: 03-      CARE ([facility])   [facility]  Next Visit: None Scheduled  None         None Found                                                            Last  Test          Frequency    Reason                     Performed    Due Date  --------------------------------------------------------------------------------    CMP.........  12 months..  insulin, lisinopriL......  09- 09-    [facility] Embedded Care Gaps. Reference number: [id number]. 6/21/2022   8:07:09 AM CDT

## 2022-06-21 NOTE — TELEPHONE ENCOUNTER
Refill Routing Note   Medication(s) are not appropriate for processing by [facility] for the following reason(s):      - Required laboratory values are outdated    [facility] action(s):  Defer          Medication reconciliation completed: No     Appointments  past 12m or future 3m with PCP    Date Provider   Last Visit   3/3/2022 Blane Mancini III, MD   Next Visit   Visit date not found Blane Mancini III, MD   ED visits in past 90 days: 0        Note composed:8:45 AM 06/21/2022

## 2022-07-05 NOTE — TELEPHONE ENCOUNTER
----- Message from Aakash Fox sent at 7/5/2022  2:45 PM CDT -----  Contact: Self  Type:  RX Refill Request    Who Called:  Patient  Refill or New Rx:  Refill  RX Name and Strength:  TOUJEO SOLOSTAR U-300 INSULIN 300 unit/mL (1.5 mL) InPn pen  How is the patient currently taking it? (ex. 1XDay):  as directed  Is this a 30 day or 90 day RX:  30  Preferred Pharmacy with phone number:      [id number] [address] & [address] [address]  Phone: [phone] Fax: [phone]      Local or Mail Order:  Local  Ordering Provider:  Live [facility] Call Back Number:  [phone]   Additional Information:

## 2022-07-06 RX ORDER — INSULIN GLARGINE 300 U/ML
INJECTION, SOLUTION SUBCUTANEOUS
Qty: 1 PEN | Refills: 0 | Status: SHIPPED | OUTPATIENT
Start: 2022-07-06 | End: 2022-07-15 | Stop reason: SDUPTHER

## 2022-07-15 ENCOUNTER — OFFICE VISIT (OUTPATIENT)
Dept: FAMILY MEDICINE | Facility: CLINIC | Age: 72
End: 2022-07-15
Payer: COMMERCIAL

## 2022-07-15 VITALS
HEART RATE: 109 BPM | RESPIRATION RATE: 20 BRPM | OXYGEN SATURATION: 96 % | WEIGHT: 196.81 LBS | DIASTOLIC BLOOD PRESSURE: 86 MMHG | TEMPERATURE: 98 F | SYSTOLIC BLOOD PRESSURE: 134 MMHG | BODY MASS INDEX: 32.79 KG/M2 | HEIGHT: 65 IN

## 2022-07-15 DIAGNOSIS — E11.9 TYPE 2 DIABETES MELLITUS WITHOUT COMPLICATION, WITH LONG-TERM CURRENT USE OF INSULIN: ICD-10-CM

## 2022-07-15 DIAGNOSIS — I10 HYPERTENSION, ESSENTIAL: ICD-10-CM

## 2022-07-15 DIAGNOSIS — Z13.31 POSITIVE DEPRESSION SCREENING: Primary | ICD-10-CM

## 2022-07-15 DIAGNOSIS — E53.8 VITAMIN B12 DEFICIENCY: ICD-10-CM

## 2022-07-15 DIAGNOSIS — E78.5 HYPERLIPIDEMIA, UNSPECIFIED HYPERLIPIDEMIA TYPE: ICD-10-CM

## 2022-07-15 DIAGNOSIS — E11.21 MACROALBUMINURIC DIABETIC NEPHROPATHY: ICD-10-CM

## 2022-07-15 DIAGNOSIS — M79.671 RIGHT FOOT PAIN: ICD-10-CM

## 2022-07-15 DIAGNOSIS — F32.A DEPRESSION, UNSPECIFIED DEPRESSION TYPE: ICD-10-CM

## 2022-07-15 DIAGNOSIS — Z79.4 TYPE 2 DIABETES MELLITUS WITHOUT COMPLICATION, WITH LONG-TERM CURRENT USE OF INSULIN: ICD-10-CM

## 2022-07-15 DIAGNOSIS — G47.00 INSOMNIA, UNSPECIFIED TYPE: ICD-10-CM

## 2022-07-15 PROCEDURE — 99214 OFFICE O/P EST MOD 30 MIN: CPT | Mod: S$GLB,,, | Performed by: FAMILY MEDICINE

## 2022-07-15 PROCEDURE — 99214 PR OFFICE/OUTPT VISIT, EST, LEVL IV, 30-39 MIN: ICD-10-PCS | Mod: S$GLB,,, | Performed by: FAMILY MEDICINE

## 2022-07-15 NOTE — PROGRESS NOTES
Subjective:       Patient ID: Debby Brown is a 72 y.o. female.    Chief Complaint: Follow-up, Medication Refill, Shortness of Breath, Chest Pain, Headache, Tremors, Shoulder Pain, and Foot Pain (right)    Insomnia issues.  Has to use the medicine 1/2 at bedtime that a half later on.  Depression is bad now.  Has been off work now retired.  Scared about snf.  Has no hobbies to keep her busy.  Thinking back going back to some type of work part-time.  Hypertension controlled.  No chest pain or palpitations.  Diabetes mellitus type 2 on her insulin.  Does have macro albuminuria.  Hyperlipidemia check is due.  B12 deficiency also.  Right 1st toe pain at the MTP joint.    Physical examination vital signs noted.  No acute distress.  Neck without bruit.  Chest clear.  Heart regular rate rhythm.  Abdomen bowel sounds are positive soft and nontender.  Extremities without edema positive pedal pulses.  Right 1st toe joint is slightly tender not red or warm.      Objective:        Assessment:       1. Positive depression screening    2. Hypertension, essential    3. Hyperlipidemia, unspecified hyperlipidemia type    4. Insomnia, unspecified type    5. Depression, unspecified depression type    6. Type 2 diabetes mellitus without complication, with long-term current use of insulin    7. Vitamin B12 deficiency    8. Right foot pain    9. Macroalbuminuric diabetic nephropathy        Plan:       Positive depression screening  Comments:  I have reviewed the positive depression score which warrants active treatment with psychotherapy and/or medications.    Hypertension, essential  -     Comprehensive Metabolic Panel; Future; Expected date: 07/15/2022    Hyperlipidemia, unspecified hyperlipidemia type  -     Lipid Panel; Future; Expected date: 07/15/2022    Insomnia, unspecified type    Depression, unspecified depression type    Type 2 diabetes mellitus without complication, with long-term current use of insulin  -      Hemoglobin A1C; Future; Expected date: 07/15/2022    Vitamin B12 deficiency    Right foot pain  -     X-Ray Foot Complete Right; Future; Expected date: 07/15/2022  -     Uric Acid; Future; Expected date: 07/15/2022    Macroalbuminuric diabetic nephropathy    X-ray the right foot.  Refill her Ambien in her Xanax.  Discussed depressive issues.  Recommend part-time job.  Ordered A1c CMP lipids and uric acid.  Go for diet medic eye exam.  She thinks it is current.  With Dr. De La Rosa.  Continue her antidepressant.

## 2022-07-16 PROBLEM — E11.21 MACROALBUMINURIC DIABETIC NEPHROPATHY: Status: ACTIVE | Noted: 2022-07-16

## 2022-07-16 RX ORDER — ZOLPIDEM TARTRATE 10 MG/1
10 TABLET ORAL NIGHTLY PRN
Qty: 30 TABLET | Refills: 3 | Status: SHIPPED | OUTPATIENT
Start: 2022-07-16 | End: 2022-10-31

## 2022-07-16 RX ORDER — INSULIN GLARGINE 300 U/ML
INJECTION, SOLUTION SUBCUTANEOUS
Qty: 10 PEN | Refills: 1 | Status: SHIPPED | OUTPATIENT
Start: 2022-07-16 | End: 2022-07-26 | Stop reason: SDUPTHER

## 2022-07-16 RX ORDER — ALPRAZOLAM 0.5 MG/1
0.5 TABLET ORAL 2 TIMES DAILY PRN
Qty: 30 TABLET | Refills: 2 | Status: SHIPPED | OUTPATIENT
Start: 2022-07-16 | End: 2022-07-26 | Stop reason: SDUPTHER

## 2022-07-19 ENCOUNTER — PATIENT OUTREACH (OUTPATIENT)
Dept: ADMINISTRATIVE | Facility: HOSPITAL | Age: 72
End: 2022-07-19
Payer: MEDICARE

## 2022-07-19 ENCOUNTER — HOSPITAL ENCOUNTER (OUTPATIENT)
Dept: RADIOLOGY | Facility: HOSPITAL | Age: 72
Discharge: HOME | End: 2022-07-19
Attending: FAMILY MEDICINE

## 2022-07-19 DIAGNOSIS — M79.671 RIGHT FOOT PAIN: ICD-10-CM

## 2022-07-19 PROCEDURE — 73630 X-RAY EXAM OF FOOT: CPT | Mod: TC,RT

## 2022-07-19 NOTE — LETTER
AUTHORIZATION FOR RELEASE OF   CONFIDENTIAL INFORMATION    DR CAMERON    We are seeing Debby Brown, date of birth 1950, in the clinic at [facility]. Blane Mancini III, MD is the patient's PCP. Debby Brown has an outstanding lab/procedure at the time we reviewed her chart. In order to help keep her health information updated, she has authorized us to request the following medical record(s):        (  )  MAMMOGRAM                                      (  )  COLONOSCOPY      (  )  PAP SMEAR                                          (  )  OUTSIDE LAB RESULTS     (  )  DEXA SCAN                                          (X  )  EYE EXAM            (  )  FOOT EXAM                                          (  )  ENTIRE RECORD     (  )  OUTSIDE IMMUNIZATIONS                 (  )  _______________         Please fax [facility] to [facility], Clinton H Sharp III, MD, [phone]    Thank you in advance,      Olga WINSTON  Care Coordinator  [facility] [facility] [address] [place] [address]  Phone [phone]  Fax [phone]          Patient Name: Debby Brown  : 1950  Patient Phone #: [phone]

## 2022-07-26 ENCOUNTER — OFFICE VISIT (OUTPATIENT)
Dept: FAMILY MEDICINE | Facility: CLINIC | Age: 72
End: 2022-07-26
Payer: MEDICARE

## 2022-07-26 VITALS
BODY MASS INDEX: 32.55 KG/M2 | HEIGHT: 65 IN | RESPIRATION RATE: 18 BRPM | TEMPERATURE: 98 F | OXYGEN SATURATION: 96 % | WEIGHT: 195.38 LBS | DIASTOLIC BLOOD PRESSURE: 86 MMHG | HEART RATE: 110 BPM | SYSTOLIC BLOOD PRESSURE: 142 MMHG

## 2022-07-26 DIAGNOSIS — E78.5 HYPERLIPIDEMIA, UNSPECIFIED HYPERLIPIDEMIA TYPE: ICD-10-CM

## 2022-07-26 DIAGNOSIS — E79.0 HYPERURICEMIA: ICD-10-CM

## 2022-07-26 DIAGNOSIS — I10 HYPERTENSION, ESSENTIAL: ICD-10-CM

## 2022-07-26 DIAGNOSIS — Z79.4 TYPE 2 DIABETES MELLITUS WITHOUT COMPLICATION, WITH LONG-TERM CURRENT USE OF INSULIN: Primary | ICD-10-CM

## 2022-07-26 DIAGNOSIS — E11.9 TYPE 2 DIABETES MELLITUS WITHOUT COMPLICATION, WITH LONG-TERM CURRENT USE OF INSULIN: Primary | ICD-10-CM

## 2022-07-26 PROCEDURE — G0009 PNEUMOCOCCAL CONJUGATE VACCINE 20-VALENT: ICD-10-PCS | Mod: S$GLB,,, | Performed by: FAMILY MEDICINE

## 2022-07-26 PROCEDURE — 93010 EKG 12-LEAD: ICD-10-PCS | Mod: S$GLB,,, | Performed by: INTERNAL MEDICINE

## 2022-07-26 PROCEDURE — 90677 PNEUMOCOCCAL CONJUGATE VACCINE 20-VALENT: ICD-10-PCS | Mod: S$GLB,,, | Performed by: FAMILY MEDICINE

## 2022-07-26 PROCEDURE — 93010 ELECTROCARDIOGRAM REPORT: CPT | Mod: S$GLB,,, | Performed by: INTERNAL MEDICINE

## 2022-07-26 PROCEDURE — 99214 PR OFFICE/OUTPT VISIT, EST, LEVL IV, 30-39 MIN: ICD-10-PCS | Mod: S$GLB,,, | Performed by: FAMILY MEDICINE

## 2022-07-26 PROCEDURE — 99214 OFFICE O/P EST MOD 30 MIN: CPT | Mod: S$GLB,,, | Performed by: FAMILY MEDICINE

## 2022-07-26 PROCEDURE — 93005 EKG 12-LEAD: ICD-10-PCS | Mod: S$GLB,,, | Performed by: FAMILY MEDICINE

## 2022-07-26 PROCEDURE — 93005 ELECTROCARDIOGRAM TRACING: CPT | Mod: S$GLB,,, | Performed by: FAMILY MEDICINE

## 2022-07-26 PROCEDURE — G0009 ADMIN PNEUMOCOCCAL VACCINE: HCPCS | Mod: S$GLB,,, | Performed by: FAMILY MEDICINE

## 2022-07-26 PROCEDURE — 90677 PCV20 VACCINE IM: CPT | Mod: S$GLB,,, | Performed by: FAMILY MEDICINE

## 2022-07-26 RX ORDER — PEN NEEDLE, DIABETIC 30 GX3/16"
NEEDLE, DISPOSABLE MISCELLANEOUS
Qty: 100 EACH | Refills: 5 | Status: SHIPPED | OUTPATIENT
Start: 2022-07-26 | End: 2023-08-01 | Stop reason: SDUPTHER

## 2022-07-26 RX ORDER — ALLOPURINOL 100 MG/1
100 TABLET ORAL DAILY
Qty: 180 TABLET | Refills: 1 | Status: SHIPPED | OUTPATIENT
Start: 2022-07-26 | End: 2022-10-31

## 2022-07-26 RX ORDER — LISINOPRIL 40 MG/1
40 TABLET ORAL DAILY
Qty: 90 TABLET | Refills: 1 | Status: SHIPPED | OUTPATIENT
Start: 2022-07-26 | End: 2023-02-17 | Stop reason: SDUPTHER

## 2022-07-26 RX ORDER — INSULIN GLARGINE 300 U/ML
INJECTION, SOLUTION SUBCUTANEOUS
Qty: 10 PEN | Refills: 1 | Status: SHIPPED | OUTPATIENT
Start: 2022-07-26 | End: 2022-08-26

## 2022-07-26 RX ORDER — BUPROPION HYDROCHLORIDE 300 MG/1
300 TABLET ORAL DAILY
Qty: 90 TABLET | Refills: 1 | Status: SHIPPED | OUTPATIENT
Start: 2022-07-26 | End: 2023-02-17 | Stop reason: SDUPTHER

## 2022-07-26 RX ORDER — ROSUVASTATIN CALCIUM 20 MG/1
20 TABLET, COATED ORAL DAILY
Qty: 90 TABLET | Refills: 1 | Status: SHIPPED | OUTPATIENT
Start: 2022-07-26 | End: 2022-08-29 | Stop reason: SDUPTHER

## 2022-07-26 NOTE — PROGRESS NOTES
"Subjective:       Patient ID: Debby Brown is a 72 y.o. female.    Chief Complaint: Results    HPI   Patient presents to clinic for results. Insurance currently lapsed. Will be covered again in August. Hypertension is slightly elevated. She has been off of Metoprolol for 2 months. States there were no refills at the pharmacy. Cardiovascular ok. Denies chest pain or palpitations. Some chest pain on exertion. Resolves when she rests. Referral to Cardiologist. Type 2 DM on insulin. Eye exam up to date- Dr. De La Rosa. Currently on 50 U Toujeo. A1C 10.9 . Numbers at home are the same. Hyperlipidemia. Cholesterol 284 HDL 42  Triglycerides 311. Not currently on any medication. Hyperuricemia 6.2. Due for pneumonia vaccination. Due for Shingles vaccination. She has had Shingles before putting her at higher risk. Has not had any Covid vaccinations.   Review of Systems   Respiratory: Negative.    Cardiovascular: Negative.    Psychiatric/Behavioral: Negative.          Objective:      Physical Exam  Constitutional:       Appearance: Normal appearance.   Neck:      Vascular: No carotid bruit.   Cardiovascular:      Rate and Rhythm: Normal rate and regular rhythm.      Pulses: Normal pulses.      Heart sounds: Normal heart sounds.   Pulmonary:      Effort: Pulmonary effort is normal.      Breath sounds: Normal breath sounds.   Lymphadenopathy:      Cervical: No cervical adenopathy.   Skin:     General: Skin is warm and dry.   Neurological:      Mental Status: She is alert.   Psychiatric:         Mood and Affect: Mood normal.         Behavior: Behavior normal.         Assessment/Plan:     Type 2 diabetes mellitus without complication, with long-term current use of insulin  -     pen needle, diabetic (BD JERI 2ND GEN PEN NEEDLE) 32 gauge x 5/32" Ndle; USE AS DIRECTED  Dispense: 100 each; Refill: 5    Hyperlipidemia, unspecified hyperlipidemia type    Hypertension, essential  -     Ambulatory referral/consult to " Cardiology; Future; Expected date: 08/02/2022  -     IN OFFICE EKG 12-LEAD (to Muse)    Hyperuricemia    BMI 32.0-32.9,adult    Other orders  -     rosuvastatin (CRESTOR) 20 MG tablet; Take 1 tablet (20 mg total) by mouth once daily.  Dispense: 90 tablet; Refill: 1  -     lisinopriL (PRINIVIL,ZESTRIL) 40 MG tablet; Take 1 tablet (40 mg total) by mouth once daily.  Dispense: 90 tablet; Refill: 1  -     buPROPion (WELLBUTRIN XL) 300 MG 24 hr tablet; Take 1 tablet (300 mg total) by mouth Daily.  Dispense: 90 tablet; Refill: 1  -     insulin glargine, TOUJEO, (TOUJEO SOLOSTAR U-300 INSULIN) 300 unit/mL (1.5 mL) InPn pen; ADMINISTER 55 UNITS UNDER THE SKIN EVERY DAY  Dispense: 10 pen; Refill: 1  -     allopurinoL (ZYLOPRIM) 100 MG tablet; Take 1 tablet (100 mg total) by mouth once daily.  Dispense: 180 tablet; Refill: 1  -     (In Office Administered) Pneumococcal Conjugate Vaccine (20 Valent) (IM)       EKG done.  Some changes but these are chronic.  Referred her to Cardiology.  Follow-up here in 3 weeks with her glucose numbers.  Allopurinol 100 mg 2 a day.  Get results of her eye exam from Dr. De La Rosa.  Prevbrandy 20 recommended.  Consider shingles vaccine.  Crestor 20 mg daily 90 with a refill.  Refill all medications 90 day supply with a refill.  Increase her insulin to 65 units per day.  Call us with her morning sugars in about 5 days to adjust further.

## 2022-07-27 ENCOUNTER — TELEPHONE (OUTPATIENT)
Dept: FAMILY MEDICINE | Facility: CLINIC | Age: 72
End: 2022-07-27
Payer: MEDICARE

## 2022-07-28 ENCOUNTER — PATIENT OUTREACH (OUTPATIENT)
Dept: ADMINISTRATIVE | Facility: HOSPITAL | Age: 72
End: 2022-07-28
Payer: MEDICARE

## 2022-07-28 PROBLEM — E79.0 HYPERURICEMIA: Status: ACTIVE | Noted: 2022-07-28

## 2022-07-28 RX ORDER — METOPROLOL SUCCINATE 50 MG/1
50 TABLET, EXTENDED RELEASE ORAL DAILY
Qty: 90 TABLET | Refills: 1 | Status: SHIPPED | OUTPATIENT
Start: 2022-07-28 | End: 2023-03-02

## 2022-07-28 RX ORDER — ALPRAZOLAM 0.5 MG/1
0.5 TABLET ORAL 2 TIMES DAILY PRN
Qty: 30 TABLET | Refills: 2 | Status: SHIPPED | OUTPATIENT
Start: 2022-07-28 | End: 2023-02-02 | Stop reason: SDUPTHER

## 2022-07-28 NOTE — LETTER
AUTHORIZATION FOR RELEASE OF   CONFIDENTIAL INFORMATION    DR CAMERON    We are seeing Debby Brown, date of birth 1950, in the clinic at [facility]. Blane Mancini III, MD is the patient's PCP. Debby Brown has an outstanding lab/procedure at the time we reviewed her chart. In order to help keep her health information updated, she has authorized us to request the following medical record(s):        (  )  MAMMOGRAM                                      (  )  COLONOSCOPY      (  )  PAP SMEAR                                          (  )  OUTSIDE LAB RESULTS     (  )  DEXA SCAN                                          (X  )  EYE EXAM            (  )  FOOT EXAM                                          (  )  ENTIRE RECORD     (  )  OUTSIDE IMMUNIZATIONS                 (  )  _______________         Please fax records to [facility], Clinton H Sharp III, MD, [phone]    Thank you in advance,      Olga WINSTON  Care Coordinator  [place] [facility] [address] [place] [address]  Phone [phone]  Fax [phone]          Patient Name: Debby Brown  : 1950  Patient Phone #: [phone]

## 2022-08-03 ENCOUNTER — TELEPHONE (OUTPATIENT)
Dept: FAMILY MEDICINE | Facility: CLINIC | Age: 72
End: 2022-08-03
Payer: MEDICARE

## 2022-08-03 NOTE — TELEPHONE ENCOUNTER
----- Message from Ioana Man MA sent at 8/3/2022  9:46 AM CDT -----  Contact: REID KIM [[id number]]  Type: Needs Medical Advice    Who Called: REID KIM [[id number]]  Best Call Back Number: [phone]  Inquiry/Question: Please call REID KIM [[id number]] regarding glucose readings      Thank you~

## 2022-08-03 NOTE — TELEPHONE ENCOUNTER
Pt called with glucose readings 144, 167, 134, 152  On toujeau 65 u qd , dr arcos said up to 70 u qd pt notified.

## 2022-08-23 ENCOUNTER — OFFICE VISIT (OUTPATIENT)
Dept: FAMILY MEDICINE | Facility: CLINIC | Age: 72
End: 2022-08-23
Payer: MEDICARE

## 2022-08-23 VITALS
RESPIRATION RATE: 18 BRPM | HEART RATE: 72 BPM | WEIGHT: 194 LBS | TEMPERATURE: 99 F | HEIGHT: 65 IN | SYSTOLIC BLOOD PRESSURE: 108 MMHG | OXYGEN SATURATION: 96 % | BODY MASS INDEX: 32.32 KG/M2 | DIASTOLIC BLOOD PRESSURE: 72 MMHG

## 2022-08-23 DIAGNOSIS — Z79.4 TYPE 2 DIABETES MELLITUS WITHOUT COMPLICATION, WITH LONG-TERM CURRENT USE OF INSULIN: Primary | ICD-10-CM

## 2022-08-23 DIAGNOSIS — E11.21 MACROALBUMINURIC DIABETIC NEPHROPATHY: ICD-10-CM

## 2022-08-23 DIAGNOSIS — R25.1 TREMOR OF LEFT HAND: ICD-10-CM

## 2022-08-23 DIAGNOSIS — I10 HYPERTENSION, ESSENTIAL: ICD-10-CM

## 2022-08-23 DIAGNOSIS — E11.9 TYPE 2 DIABETES MELLITUS WITHOUT COMPLICATION, WITH LONG-TERM CURRENT USE OF INSULIN: Primary | ICD-10-CM

## 2022-08-23 DIAGNOSIS — G25.0 BENIGN FAMILIAL TREMOR: ICD-10-CM

## 2022-08-23 PROCEDURE — 99214 OFFICE O/P EST MOD 30 MIN: CPT | Mod: S$GLB,,, | Performed by: FAMILY MEDICINE

## 2022-08-23 PROCEDURE — 99214 PR OFFICE/OUTPT VISIT, EST, LEVL IV, 30-39 MIN: ICD-10-PCS | Mod: S$GLB,,, | Performed by: FAMILY MEDICINE

## 2022-08-23 NOTE — PROGRESS NOTES
Subjective:       Patient ID: Dbeby Brown is a 72 y.o. female.    Chief Complaint: Follow-up (1 month)    HPI   Patient presents to clinic for follow up. Hypertension stable. Cardiovascular ok. Denies chest pain or palpitations. Appointment with cardiologist next week. Type 2 DM. Glucose at home much better 97 to mid 100's. Complaining of tremor in left hand. Tremor worse with movement such as grabbing items. States this is not a new issue but that it has worsened. No known family history of tremors. Offered medication to help tremors. She declined.   Review of Systems   Respiratory: Negative.    Cardiovascular: Negative.  Negative for chest pain and palpitations.   Neurological: Positive for tremors.   Psychiatric/Behavioral: Negative.          Objective:      Physical Exam  Constitutional:       Appearance: Normal appearance.   Neck:      Vascular: No carotid bruit.   Cardiovascular:      Rate and Rhythm: Normal rate and regular rhythm.      Pulses: Normal pulses.      Heart sounds: Normal heart sounds.   Pulmonary:      Effort: Pulmonary effort is normal.      Breath sounds: Normal breath sounds.   Lymphadenopathy:      Cervical: No cervical adenopathy.   Skin:     General: Skin is warm and dry.   Neurological:      General: No focal deficit present.      Mental Status: She is alert.      Motor: No weakness.      Coordination: Coordination normal.      Comments: Tremor    Psychiatric:         Mood and Affect: Mood normal.         Behavior: Behavior normal.       no resting tremor noted.  Does have some tremor of left and right hand both on finger-to-nose testing.  No cogwheel.  Gait is normal.  Assessment/Plan:     Type 2 diabetes mellitus without complication, with long-term current use of insulin  -     Hemoglobin A1C; Future; Expected date: 08/23/2022    Hypertension, essential    Macroalbuminuric diabetic nephropathy    Tremor of left hand  -     TSH; Future; Expected date: 08/23/2022    Benign familial  tremor       Observed tremor for now.  Discuss things improve it.  Follow-up in 2 months with an A1c.  Same doses of medications.  Check a TS H.

## 2022-08-24 PROBLEM — G25.0 BENIGN FAMILIAL TREMOR: Status: ACTIVE | Noted: 2022-08-24

## 2022-08-29 ENCOUNTER — OFFICE VISIT (OUTPATIENT)
Dept: CARDIOLOGY | Facility: CLINIC | Age: 72
End: 2022-08-29
Payer: MEDICARE

## 2022-08-29 VITALS
HEART RATE: 79 BPM | BODY MASS INDEX: 32.12 KG/M2 | DIASTOLIC BLOOD PRESSURE: 78 MMHG | WEIGHT: 193 LBS | SYSTOLIC BLOOD PRESSURE: 126 MMHG | OXYGEN SATURATION: 94 %

## 2022-08-29 DIAGNOSIS — E53.8 VITAMIN B12 DEFICIENCY: ICD-10-CM

## 2022-08-29 DIAGNOSIS — R52 PAIN AGGRAVATED BY EXERCISE: ICD-10-CM

## 2022-08-29 DIAGNOSIS — Z79.4 TYPE 2 DIABETES MELLITUS WITHOUT COMPLICATION, WITH LONG-TERM CURRENT USE OF INSULIN: Primary | ICD-10-CM

## 2022-08-29 DIAGNOSIS — I10 HYPERTENSION, ESSENTIAL: ICD-10-CM

## 2022-08-29 DIAGNOSIS — I35.0 AORTIC VALVE STENOSIS, ETIOLOGY OF CARDIAC VALVE DISEASE UNSPECIFIED: ICD-10-CM

## 2022-08-29 DIAGNOSIS — E11.9 TYPE 2 DIABETES MELLITUS WITHOUT COMPLICATION, WITH LONG-TERM CURRENT USE OF INSULIN: Primary | ICD-10-CM

## 2022-08-29 DIAGNOSIS — Z71.82 EXERCISE COUNSELING: ICD-10-CM

## 2022-08-29 DIAGNOSIS — I51.7 LVH (LEFT VENTRICULAR HYPERTROPHY): ICD-10-CM

## 2022-08-29 DIAGNOSIS — R06.02 SOB (SHORTNESS OF BREATH): ICD-10-CM

## 2022-08-29 DIAGNOSIS — G25.0 BENIGN ESSENTIAL TREMOR: ICD-10-CM

## 2022-08-29 DIAGNOSIS — R26.89 BALANCE PROBLEM: ICD-10-CM

## 2022-08-29 DIAGNOSIS — E78.2 MIXED HYPERLIPIDEMIA: ICD-10-CM

## 2022-08-29 PROCEDURE — 99204 PR OFFICE/OUTPT VISIT, NEW, LEVL IV, 45-59 MIN: ICD-10-PCS | Mod: S$GLB,,, | Performed by: GENERAL PRACTICE

## 2022-08-29 PROCEDURE — 99204 OFFICE O/P NEW MOD 45 MIN: CPT | Mod: S$GLB,,, | Performed by: GENERAL PRACTICE

## 2022-08-29 PROCEDURE — 93000 EKG 12-LEAD: ICD-10-PCS | Mod: S$GLB,,, | Performed by: GENERAL PRACTICE

## 2022-08-29 PROCEDURE — 93000 ELECTROCARDIOGRAM COMPLETE: CPT | Mod: S$GLB,,, | Performed by: GENERAL PRACTICE

## 2022-08-29 RX ORDER — ROSUVASTATIN CALCIUM 40 MG/1
40 TABLET, COATED ORAL DAILY
Qty: 90 TABLET | Refills: 1 | Status: SHIPPED | OUTPATIENT
Start: 2022-08-29 | End: 2023-12-04

## 2022-08-29 NOTE — PROGRESS NOTES
Subjective:    Patient ID:  Debby Brown is a 72 y.o. female who presents for evaluation of   Chief Complaint   Patient presents with    Establish Care         HPI:      Review of patient's allergies indicates:   Allergen Reactions    Bactrim [sulfamethoxazole-trimethoprim]     Codeine     Levaquin [levofloxacin]     Pcn [penicillins]        Past Medical History:   Diagnosis Date    Allergy     Anxiety     Depression     Diabetes mellitus, type 2     Hyperlipidemia     Hypertension     Pre-diabetes      Past Surgical History:   Procedure Laterality Date    APPENDECTOMY      BACK SURGERY      BREAST SURGERY      CHOLECYSTECTOMY      COLONOSCOPY      GALLBLADDER SURGERY      HERNIA REPAIR      lymphnode remove      SHOULDER SURGERY      TUBAL LIGATION       Social History     Tobacco Use    Smoking status: Former    Smokeless tobacco: Never   Substance Use Topics    Alcohol use: Yes     Comment: rare    Drug use: No     Family History   Problem Relation Age of Onset    Mental illness Mother     Cancer Mother     Depression Mother     Heart disease Mother     Hypertension Mother     Stroke Mother     Heart disease Father     Hypertension Father     Stroke Father         Review of Systems:   Constitution: Negative for diaphoresis and fever.   HEENT: Negative for nosebleeds.    Cardiovascular: Negative for chest pain       No dyspnea on exertion       No leg swelling        No palpitations  Respiratory: Negative for shortness of breath and wheezing.    Hematologic/Lymphatic: Negative for bleeding problem. Does not bruise/bleed easily.   Skin: Negative for color change and rash.   Musculoskeletal: Negative for falls and myalgias.   Gastrointestinal: Negative for hematemesis and hematochezia.   Genitourinary: Negative for hematuria.   Neurological: Negative for dizziness and light-headedness.   Psychiatric/Behavioral: Negative for altered mental status and memory loss.          Objective:        Vitals:    08/29/22 1412    BP: 126/78   Pulse: 79       Lab Results   Component Value Date    WBC 8.89 09/06/2021    HGB 13.4 09/06/2021    HCT 40.3 09/06/2021     09/06/2021    CHOL 284 (H) 07/19/2022    TRIG 311 (H) 07/19/2022    HDL 42 07/19/2022    ALT 21 07/19/2022    AST 17 07/19/2022     (L) 07/19/2022    K 4.0 07/19/2022     07/19/2022    CREATININE 1.0 07/19/2022    BUN 21 07/19/2022    CO2 25 07/19/2022    TSH 1.130 03/27/2021    INR 1.1 09/04/2021    HGBA1C 10.9 (H) 07/19/2022    MICROALBUR 0.9 01/09/2020        ECHOCARDIOGRAM RESULTS  Results for orders placed in visit on 10/12/20    Echo Color Flow Doppler? Yes    Interpretation Summary  · There is mild left ventricular concentric hypertrophy.  · The estimated PA systolic pressure is 33 mmHg.  · The estimated ejection fraction is 55%.  · Grade I diastolic dysfunction.  · Normal right ventricular systolic function.  · Mild left atrial enlargement.  · Mild aortic valve stenosis.  · Aortic valve area is 1.57 cm2; peak velocity is 1.99 m/s; mean gradient is 9 mmHg.  · Mild tricuspid regurgitation.  · Normal central venous pressure (3 mmHg).  · Small circumferential pericardial effusion.  · There is no evidence of tamponade.        CURRENT/PREVIOUS VISIT EKG  Results for orders placed or performed in visit on 07/26/22   IN OFFICE EKG 12-LEAD (to [place])    Collection Time: 07/26/22  5:51 PM    Narrative    Test Reason : I10,    Vent. Rate : 097 BPM     Atrial Rate : 097 BPM     P-R Int : 214 ms          QRS Dur : 084 ms      QT Int : 344 ms       P-R-T Axes : 047 -12 076 degrees     QTc Int : 436 ms    Sinus rhythm with 1st degree A-V block  Anterior infarct (cited on or before 26-JUL-2022)  Abnormal ECG  When compared with ECG of 03-SEP-2021 23:00,  VA interval has increased  Confirmed by Pradeep Villegas MD ([id number]) on 7/28/2022 5:42:23 PM    Referred By:             Confirmed By:Pradeep Villegas MD     No valid procedures specified.   No results found for this or  "any previous visit.      Physical Exam:  CONSTITUTIONAL: No fever, no chills  HEENT: Normocephalic, atraumatic,pupils reactive to light                 NECK:  No JVD no carotid bruit  CVS: S1S2+, RRR, no murmurs,   LUNGS: Clear  ABDOMEN: Soft, NT, BS+  EXTREMITIES: No cyanosis, edema  : No mckeon catheter  NEURO: AAO X 3  PSY: Normal affect      Medication List with Changes/Refills   Current Medications    ALLOPURINOL (ZYLOPRIM) 100 MG TABLET    Take 1 tablet (100 mg total) by mouth once daily.    ALPRAZOLAM (XANAX) 0.5 MG TABLET    Take 1 tablet (0.5 mg total) by mouth 2 (two) times daily as needed for Anxiety.    BUPROPION (WELLBUTRIN XL) 300 MG 24 HR TABLET    Take 1 tablet (300 mg total) by mouth Daily.    HYOSCYAMINE (LEVSIN/SL) 0.125 MG SUBL    Place 1 tablet (0.125 mg total) under the tongue every 6 (six) hours as needed (cramps).    INSULIN GLARGINE, TOUJEO, (TOUJEO SOLOSTAR U-300 INSULIN) 300 UNIT/ML (1.5 ML) INPN PEN    Inject 55 Units into the skin once daily.    LISINOPRIL (PRINIVIL,ZESTRIL) 40 MG TABLET    Take 1 tablet (40 mg total) by mouth once daily.    METOPROLOL SUCCINATE (TOPROL-XL) 50 MG 24 HR TABLET    Take 1 tablet (50 mg total) by mouth once daily.    PEN NEEDLE, DIABETIC (BD JERI 2ND GEN PEN NEEDLE) 32 GAUGE X 5/32" NDLE    USE AS DIRECTED    TRUE METRIX GLUCOSE METER MISC    U UTD    TRUE METRIX GLUCOSE TEST STRIP STRP    U QAM    TRUEPLUS LANCETS 30 GAUGE MISC    U QAM    ZOLPIDEM (AMBIEN) 10 MG TAB    Take 1 tablet (10 mg total) by mouth nightly as needed (insomnia).   Changed and/or Refilled Medications    Modified Medication Previous Medication    ROSUVASTATIN (CRESTOR) 40 MG TAB rosuvastatin (CRESTOR) 20 MG tablet       Take 1 tablet (40 mg total) by mouth once daily.    Take 1 tablet (20 mg total) by mouth once daily.             Assessment:       1. Type 2 diabetes mellitus without complication, with long-term current use of insulin    2. Pain aggravated by exercise    3. SOB " (shortness of breath)    4. Benign essential tremor    5. Exercise counseling    6. BMI 32.0-32.9,adult    7. Mixed hyperlipidemia    8. LVH (left ventricular hypertrophy)    9. Aortic valve stenosis, etiology of cardiac valve disease unspecified    10. Vitamin B12 deficiency    11. Balance problem    12. Hypertension, essential         Plan:     Problem List Items Addressed This Visit          Neuro    Benign essential tremor    Relevant Orders    Echo    Nuclear Stress - Cardiology Interpreted    Holter monitor - 48 hour       Cardiac/Vascular    Hypertension, essential    Hyperlipidemia    LVH (left ventricular hypertrophy)    Aortic valve stenosis       Endocrine    BMI 32.0-32.9,adult    Type 2 diabetes mellitus without complication, with long-term current use of insulin - Primary    Relevant Orders    Echo    Nuclear Stress - Cardiology Interpreted    Holter monitor - 48 hour    Vitamin B12 deficiency       Other    Balance problem     Other Visit Diagnoses       Pain aggravated by exercise        SOB (shortness of breath)        Relevant Orders    IN OFFICE EKG 12-LEAD (to [place])    Echo    Nuclear Stress - Cardiology Interpreted    Holter monitor - 48 hour    Exercise counseling        Relevant Orders    Echo    Nuclear Stress - Cardiology Interpreted    Holter monitor - 48 hour            Follow up in about 4 weeks (around 9/26/2022).    The patients questions were answered, they verbalized understanding, and agreed with the treatment plan.     BENJAMIN GUILLEN MD  [facility]

## 2022-08-29 NOTE — PROGRESS NOTES
"Subjective:    Patient ID:  Debby Brown is a 72 y.o. female who presents for evaluation of   Chief Complaint   Patient presents with    Establish Care         HPI:Blane Mancini III, MD   Patient presents to clinic for results. Insurance currently lapsed. Will be covered again in August. Hypertension is slightly elevated. She has been off of Metoprolol for 2 months. States there were no refills at the pharmacy. Cardiovascular ok. Denies chest pain or palpitations. Some chest pain on exertion. Resolves when she rests. Referral to Cardiologist. Type 2 DM on insulin. Eye exam up to date- Dr. De La Rosa. Currently on 50 U Toujeo. A1C 10.9 . Numbers at home are the same. Hyperlipidemia. Cholesterol 284 HDL 42  Triglycerides 311. Not currently on any medication. Hyperuricemia 6.2. Due for pneumonia vaccination. Due for Shingles vaccination. She has had Shingles before putting her at higher risk. Has not had any Covid vaccinations    Type 2 diabetes mellitus without complication, with long-term current use of insulin  -     pen needle, diabetic (BD JERI 2ND GEN PEN NEEDLE) 32 gauge x 5/32" Ndle; USE AS DIRECTED  Dispense: 100 each; Refill: 5     Hyperlipidemia, unspecified hyperlipidemia type     Hypertension, essential  -     Ambulatory referral/consult to Cardiology; Future; Expected date: 08/02/2022  -     IN OFFICE EKG 12-LEAD (to [place])     Hyperuricemia     BMI 32.0-32.9,adult     Other orders  -     rosuvastatin (CRESTOR) 20 MG tablet; Take 1 tablet (20 mg total) by mouth once daily.  Dispense: 90 tablet; Refill: 1  -     lisinopriL (PRINIVIL,ZESTRIL) 40 MG tablet; Take 1 tablet (40 mg total) by mouth once daily.  Dispense: 90 tablet; Refill: 1  -     buPROPion (WELLBUTRIN XL) 300 MG 24 hr tablet; Take 1 tablet (300 mg total) by mouth Daily.  Dispense: 90 tablet; Refill: 1  -     insulin glargine, TOUJEO, (TOUJEO SOLOSTAR U-300 INSULIN) 300 unit/mL (1.5 mL) InPn pen; ADMINISTER 55 UNITS UNDER THE SKIN " EVERY DAY  Dispense: 10 pen; Refill: 1  -     allopurinoL (ZYLOPRIM) 100 MG tablet; Take 1 tablet (100 mg total) by mouth once daily.  Dispense: 180 tablet; Refill: 1  -     (In Office Administered) Pneumococcal Conjugate Vaccine (20 Valent) (IM)        EKG done.  Some changes but these are chronic.  Referred her to Cardiology.  Follow-up here in 3 weeks with her glucose numbers.  Allopurinol 100 mg 2 a day.  Get results of her eye exam from Dr. De La Rosa.  Prevnar 20 recommended.  Consider shingles vaccine.  Crestor 20 mg daily 90 with a refill.  Refill all medications 90 day supply with a refill.  Increase her insulin to 65 units per day.  Call us with her morning sugars in about 5 days to adjust further.        8/29/22    He is referred by Dr. Mancini.  She has a history of diabetes for 2 years on insulin, hypertension, hyperlipidemia.  She used to work out at the gym more than 2 years ago with lifting weights and aerobic.  She wants to restart that.  She had COVID pneumonia about 1 years ago, and to that she had some exertional chest pain going up stairs shortness of breath.  She feels like it his gone away.  She wakes up in the morning her heart is beating fast.  He had previous echo which revealed mild aortic stenosis over 12th 2020.    Review of patient's allergies indicates:   Allergen Reactions    Bactrim [sulfamethoxazole-trimethoprim]     Codeine     Levaquin [levofloxacin]     Pcn [penicillins]        Past Medical History:   Diagnosis Date    Allergy     Anxiety     Depression     Diabetes mellitus, type 2     Hyperlipidemia     Hypertension     Pre-diabetes      Past Surgical History:   Procedure Laterality Date    APPENDECTOMY      BACK SURGERY      BREAST SURGERY      CHOLECYSTECTOMY      COLONOSCOPY      GALLBLADDER SURGERY      HERNIA REPAIR      lymphnode remove      SHOULDER SURGERY      TUBAL LIGATION       Social History     Tobacco Use    Smoking status: Former    Smokeless tobacco: Never    Substance Use Topics    Alcohol use: Yes     Comment: rare    Drug use: No     Family History   Problem Relation Age of Onset    Mental illness Mother     Cancer Mother     Depression Mother     Heart disease Mother     Hypertension Mother     Stroke Mother     Heart disease Father     Hypertension Father     Stroke Father         Review of Systems:   Constitution: Negative for diaphoresis and fever.   HEENT: Negative for nosebleeds.    Cardiovascular: POS for chest pain       POSdyspnea on exertion       No leg swelling        POSpalpitations  Respiratory: Negative for shortness of breath and wheezing.    Hematologic/Lymphatic: Negative for bleeding problem. Does not bruise/bleed easily.   Skin: Negative for color change and rash.   Musculoskeletal: Negative for falls and myalgias.   Gastrointestinal: Negative for hematemesis and hematochezia.   Genitourinary: Negative for hematuria.   Neurological: Negative for dizziness and light-headedness.   Psychiatric/Behavioral: Negative for altered mental status and memory loss.          Objective:        Vitals:    08/29/22 1412   BP: 126/78   Pulse: 79       Lab Results   Component Value Date    WBC 8.89 09/06/2021    HGB 13.4 09/06/2021    HCT 40.3 09/06/2021     09/06/2021    CHOL 284 (H) 07/19/2022    TRIG 311 (H) 07/19/2022    HDL 42 07/19/2022    ALT 21 07/19/2022    AST 17 07/19/2022     (L) 07/19/2022    K 4.0 07/19/2022     07/19/2022    CREATININE 1.0 07/19/2022    BUN 21 07/19/2022    CO2 25 07/19/2022    TSH 1.130 03/27/2021    INR 1.1 09/04/2021    HGBA1C 10.9 (H) 07/19/2022    MICROALBUR 0.9 01/09/2020        ECHOCARDIOGRAM RESULTS  Results for orders placed in visit on 10/12/20    Echo Color Flow Doppler? Yes    Interpretation Summary  · There is mild left ventricular concentric hypertrophy.  · The estimated PA systolic pressure is 33 mmHg.  · The estimated ejection fraction is 55%.  · Grade I diastolic dysfunction.  · Normal right  ventricular systolic function.  · Mild left atrial enlargement.  · Mild aortic valve stenosis.  · Aortic valve area is 1.57 cm2; peak velocity is 1.99 m/s; mean gradient is 9 mmHg.  · Mild tricuspid regurgitation.  · Normal central venous pressure (3 mmHg).  · Small circumferential pericardial effusion.  · There is no evidence of tamponade.        CURRENT/PREVIOUS VISIT EKG  Results for orders placed or performed in visit on 07/26/22   IN OFFICE EKG 12-LEAD (to [place])    Collection Time: 07/26/22  5:51 PM    Narrative    Test Reason : I10,    Vent. Rate : 097 BPM     Atrial Rate : 097 BPM     P-R Int : 214 ms          QRS Dur : 084 ms      QT Int : 344 ms       P-R-T Axes : 047 -12 076 degrees     QTc Int : 436 ms    Sinus rhythm with 1st degree A-V block  Anterior infarct (cited on or before 26-JUL-2022)  Abnormal ECG  When compared with ECG of 03-SEP-2021 23:00,  MS interval has increased  Confirmed by Pradeep Villegas MD ([id number]) on 7/28/2022 5:42:23 PM    Referred By:             Confirmed By:Pradeep Villegas MD     No valid procedures specified.   No results found for this or any previous visit.      Physical Exam:  CONSTITUTIONAL: No fever, no chills  HEENT: Normocephalic, atraumatic,pupils reactive to light                 NECK:  No JVD no carotid bruit  CVS: S1S2+, RRR, no murmurs,   LUNGS: Clear  ABDOMEN: Soft, NT, BS+  EXTREMITIES: No cyanosis, edema  : No mckeon catheter  NEURO: AAO X 3  PSY: Normal affect      Medication List with Changes/Refills   Current Medications    ALLOPURINOL (ZYLOPRIM) 100 MG TABLET    Take 1 tablet (100 mg total) by mouth once daily.    ALPRAZOLAM (XANAX) 0.5 MG TABLET    Take 1 tablet (0.5 mg total) by mouth 2 (two) times daily as needed for Anxiety.    BUPROPION (WELLBUTRIN XL) 300 MG 24 HR TABLET    Take 1 tablet (300 mg total) by mouth Daily.    HYOSCYAMINE (LEVSIN/SL) 0.125 MG SUBL    Place 1 tablet (0.125 mg total) under the tongue every 6 (six) hours as needed (cramps).     "INSULIN GLARGINE, TOUJEO, (TOUJEO SOLOSTAR U-300 INSULIN) 300 UNIT/ML (1.5 ML) INPN PEN    Inject 55 Units into the skin once daily.    LISINOPRIL (PRINIVIL,ZESTRIL) 40 MG TABLET    Take 1 tablet (40 mg total) by mouth once daily.    METOPROLOL SUCCINATE (TOPROL-XL) 50 MG 24 HR TABLET    Take 1 tablet (50 mg total) by mouth once daily.    PEN NEEDLE, DIABETIC (BD JERI 2ND GEN PEN NEEDLE) 32 GAUGE X 5/32" NDLE    USE AS DIRECTED    TRUE METRIX GLUCOSE METER MISC    U UTD    TRUE METRIX GLUCOSE TEST STRIP STRP    U QAM    TRUEPLUS LANCETS 30 GAUGE MISC    U QAM    ZOLPIDEM (AMBIEN) 10 MG TAB    Take 1 tablet (10 mg total) by mouth nightly as needed (insomnia).   Changed and/or Refilled Medications    Modified Medication Previous Medication    ROSUVASTATIN (CRESTOR) 40 MG TAB rosuvastatin (CRESTOR) 20 MG tablet       Take 1 tablet (40 mg total) by mouth once daily.    Take 1 tablet (20 mg total) by mouth once daily.             Assessment:       1. Type 2 diabetes mellitus without complication, with long-term current use of insulin    2. Pain aggravated by exercise    3. SOB (shortness of breath)    4. Benign essential tremor    5. Exercise counseling    6. BMI 32.0-32.9,adult    7. Mixed hyperlipidemia    8. LVH (left ventricular hypertrophy)    9. Aortic valve stenosis, etiology of cardiac valve disease unspecified    10. Vitamin B12 deficiency    11. Balance problem    12. Hypertension, essential         Plan:     Problem List Items Addressed This Visit          Neuro    Benign essential tremor    Relevant Orders    Echo    Nuclear Stress - Cardiology Interpreted    Holter monitor - 48 hour       Cardiac/Vascular    Hypertension, essential    Hyperlipidemia    LVH (left ventricular hypertrophy)    Aortic valve stenosis       Endocrine    BMI 32.0-32.9,adult    Type 2 diabetes mellitus without complication, with long-term current use of insulin - Primary    Relevant Orders    Echo    Nuclear Stress - Cardiology " Interpreted    Holter monitor - 48 hour    Vitamin B12 deficiency       Other    Balance problem     Other Visit Diagnoses       Pain aggravated by exercise        SOB (shortness of breath)        Relevant Orders    IN OFFICE EKG 12-LEAD (to [place])    Echo    Nuclear Stress - Cardiology Interpreted    Holter monitor - 48 hour    Exercise counseling        Relevant Orders    Echo    Nuclear Stress - Cardiology Interpreted    Holter monitor - 48 hour          HE HAS SOME CHEST DISC COMFORT GOING UP STAIRS OVER THE LAST YEAR AFTER HAVING COVID SEEMS TO BEEN RESOLVED AT THIS TIME.  THE S PALPITATIONS WHEN SHE WAKES UP IN THE MORNING WANTS CLEARANCE TO RESUME EXERCISE PROGRAM.  HER CHOLESTEROL  HDL 42  TRIGLYCERIDES 311 AND CRESTOR HAS BEEN STARTED AT 20 MG.  SHE IS NOT AWARE OF FAMILY MEMBERS WITH HIGH CHOLESTEROL  EKG TODAY REVEALS NORMAL SINUS RHYTHM FIRST-DEGREE AV BLOCK POSSIBLE ANTERIOR INFARCT AGE INDETERMINATE POOR R-WAVE PROGRESSION VERSUS LEAD PLACEMENT    CARDIOVASCULAR SCREENING WITH EXERCISE STRESS TEST AND NUCLEAR IS RECOMMENDED AS WELL AS REPEAT ECHO FOR LV FUNCTION AND AORTIC STENOSIS.  HER LDL CHOLESTEROL IS EXTREMELY HIGH AND NEEDS TO BE DROPPED DOWN TO 70 OR LESS AND THEREFORE I INCREASED THE CRESTOR TO 40 MG SHE WILL MOST LIKELY NEED A PCSK9 TO GET TO GOAL.  HOLTER WILL BE ORDERED FOR PALPITATIONS IN THE MORNING WE DID DISCUSS POSSIBLY TAKING METOPROLOL AT NIGHT SHOULD BE SEEN BACK IN FOLLOW-UP SHORT-TERM.    MIGHT BENEFIT  FROM VASCEPA FOR TRIGLYCERIDES.  HER HEMOGLOBIN A1C IS 10.9 HER BMI IS 32.12 SHE MIGHT BENEFIT FROM ALL OZEMPIC OR WEGOVY  FOR WEIGHT LOSS.        Follow up in about 4 weeks (around 9/26/2022).    The patients questions were answered, they verbalized understanding, and agreed with the treatment plan.     BENJAMIN GUILLEN MD  [facility]

## 2022-08-30 NOTE — TELEPHONE ENCOUNTER
----- Message from Yue Kolb sent at 8/30/2022  3:34 PM CDT -----  Contact: pt  Type: Needs Medical Advice    Who Called: pt  Best Call Back Number: [phone]    Inquiry/Question: pt saw you about tremors in her hand and she told you to hold off on the medication. Pt states she would like you to send it in.          Thank you~

## 2022-09-01 RX ORDER — CLONAZEPAM 0.5 MG/1
0.5 TABLET ORAL 2 TIMES DAILY PRN
Qty: 30 TABLET | Refills: 0 | Status: SHIPPED | OUTPATIENT
Start: 2022-09-01 | End: 2022-10-31

## 2022-09-02 ENCOUNTER — TELEPHONE (OUTPATIENT)
Dept: CARDIOLOGY | Facility: HOSPITAL | Age: 72
End: 2022-09-02

## 2022-09-02 ENCOUNTER — TELEPHONE (OUTPATIENT)
Dept: FAMILY MEDICINE | Facility: CLINIC | Age: 72
End: 2022-09-02
Payer: MEDICARE

## 2022-09-02 NOTE — TELEPHONE ENCOUNTER
Patient advised, test will be at [facility] ([address] [facility]   Will need to register on the first floor at the main entrance.   Patient advised that arrival time is 7:20am.  Patient advised that she may be here about 3.5-4 hours, and may want to bring something to occupy their time, as there will be periods of waiting.    Patient advised, may take her medications prior to testing if you need to.  Patient should HOLD Metoprolol. Advised if she needs to eat to take her medications, please keep it light, like toast and juice.    Patient advised to avoid all caffeine 12 hours prior to testing.  This includes decaf tea and coffee.    Will provide peanut butter crackers for a snack after stress test.  If patient would prefer something else, please bring a snack from home.    Wear comfortable clothing.   No lotions, oils, or powders to the upper chest area. May wear deodorant.    No metal jewelry, buttons, or zippers to the upper body.  Patient verbalizes understanding of instructions.   7:

## 2022-09-02 NOTE — TELEPHONE ENCOUNTER
----- Message from Yue Kolb sent at 9/2/2022 11:47 AM CDT -----  Contact: pt  Type: Pharmacy    Who Called: pt   Best Call Back Number: [phone]    Inquiry/Question: pt was prescribed ALPRAZolam (XANAX) 0.5 MG tablet on 08/26/2022 and today was given clonazePAM (KLONOPIN) 0.5 MG tablet. They want to know if she needs to stop one and start the other.

## 2022-09-06 ENCOUNTER — HOSPITAL ENCOUNTER (OUTPATIENT)
Dept: RADIOLOGY | Facility: HOSPITAL | Age: 72
Discharge: HOME | End: 2022-09-06
Attending: GENERAL PRACTICE
Payer: MEDICARE

## 2022-09-06 ENCOUNTER — HOSPITAL ENCOUNTER (OUTPATIENT)
Dept: CARDIOLOGY | Facility: HOSPITAL | Age: 72
Discharge: HOME | End: 2022-09-06
Attending: GENERAL PRACTICE
Payer: MEDICARE

## 2022-09-06 VITALS — WEIGHT: 193 LBS | HEIGHT: 65 IN | BODY MASS INDEX: 32.15 KG/M2

## 2022-09-06 DIAGNOSIS — Z79.4 TYPE 2 DIABETES MELLITUS WITHOUT COMPLICATION, WITH LONG-TERM CURRENT USE OF INSULIN: Primary | ICD-10-CM

## 2022-09-06 DIAGNOSIS — E11.9 TYPE 2 DIABETES MELLITUS WITHOUT COMPLICATION, WITH LONG-TERM CURRENT USE OF INSULIN: ICD-10-CM

## 2022-09-06 DIAGNOSIS — G25.0 BENIGN ESSENTIAL TREMOR: ICD-10-CM

## 2022-09-06 DIAGNOSIS — Z71.82 EXERCISE COUNSELING: ICD-10-CM

## 2022-09-06 DIAGNOSIS — Z79.4 TYPE 2 DIABETES MELLITUS WITHOUT COMPLICATION, WITH LONG-TERM CURRENT USE OF INSULIN: ICD-10-CM

## 2022-09-06 DIAGNOSIS — R06.02 SOB (SHORTNESS OF BREATH): ICD-10-CM

## 2022-09-06 DIAGNOSIS — E11.9 TYPE 2 DIABETES MELLITUS WITHOUT COMPLICATION, WITH LONG-TERM CURRENT USE OF INSULIN: Primary | ICD-10-CM

## 2022-09-06 LAB
AORTIC VALVE CUSP SEPERATION: 1 CM
AV INDEX (PROSTH): 0.43
AV MEAN GRADIENT: 18 MMHG
AV PEAK GRADIENT: 18 MMHG
AV VALVE AREA: 1.36 CM2
AV VELOCITY RATIO: 0.33
BSA FOR ECHO PROCEDURE: 2 M2
CV ECHO LV RWT: 0.59 CM
CV PHARM DOSE: 0.4 MG
CV STRESS BASE HR: 65 BPM
DIASTOLIC BLOOD PRESSURE: 82 MMHG
DOP CALC AO PEAK VEL: 2.14 M/S
DOP CALC AO VTI: 47.1 CM
DOP CALC LVOT AREA: 3.1 CM2
DOP CALC LVOT DIAMETER: 2 CM
DOP CALC LVOT PEAK VEL: 0.7 M/S
DOP CALC LVOT STROKE VOLUME: 64.06 CM3
DOP CALCLVOT PEAK VEL VTI: 20.4 CM
E WAVE DECELERATION TIME: 306 MS
ECHO LV POSTERIOR WALL: 1.17 CM (ref 0.6–1.1)
EJECTION FRACTION- HIGH: 59 %
EJECTION FRACTION: 65 %
END DIASTOLIC INDEX-HIGH: 155 ML/M2
END DIASTOLIC INDEX-LOW: 91 ML/M2
END SYSTOLIC INDEX-HIGH: 78 ML/M2
END SYSTOLIC INDEX-LOW: 40 ML/M2
FRACTIONAL SHORTENING: 34 % (ref 28–44)
INTERVENTRICULAR SEPTUM: 1.17 CM (ref 0.6–1.1)
IVRT: 79 MS
LEFT ATRIUM SIZE: 4.7 CM
LEFT INTERNAL DIMENSION IN SYSTOLE: 2.63 CM (ref 2.1–4)
LEFT VENTRICLE MASS INDEX: 81 G/M2
LEFT VENTRICULAR INTERNAL DIMENSION IN DIASTOLE: 3.99 CM (ref 3.5–6)
LEFT VENTRICULAR MASS: 158.78 G
MV PEAK A VEL: 1.01 M/S
MV STENOSIS PRESSURE HALF TIME: 68 MS
MV VALVE AREA P 1/2 METHOD: 3.24 CM2
NUC STRESS DIASTOLIC VOLUME INDEX: 38
NUC STRESS EJECTION FRACTION: 81 %
NUC STRESS SYSTOLIC VOLUME INDEX: 7
OHS CV CPX 1 MINUTE RECOVERY HEART RATE: 86 BPM
OHS CV CPX 85 PERCENT MAX PREDICTED HEART RATE MALE: 122
OHS CV CPX MAX PREDICTED HEART RATE: 144
OHS CV CPX PATIENT IS FEMALE: 1
OHS CV CPX PATIENT IS MALE: 0
OHS CV CPX PEAK DIASTOLIC BLOOD PRESSURE: 80 MMHG
OHS CV CPX PEAK HEAR RATE: 87 BPM
OHS CV CPX PEAK RATE PRESSURE PRODUCT: NORMAL
OHS CV CPX PEAK SYSTOLIC BLOOD PRESSURE: 140 MMHG
OHS CV CPX PERCENT MAX PREDICTED HEART RATE ACHIEVED: 61
OHS CV CPX RATE PRESSURE PRODUCT PRESENTING: 9230
RA PRESSURE: 3 MMHG
RETIRED EF AND QEF - SEE NOTES: 47 %
RIGHT VENTRICULAR END-DIASTOLIC DIMENSION: 2.34 CM
STRESS ST DEPRESSION: 1 MM
SYSTOLIC BLOOD PRESSURE: 142 MMHG
TDI LATERAL: 0.08 M/S
TDI SEPTAL: 0.04 M/S
TDI: 0.06 M/S

## 2022-09-06 PROCEDURE — 93306 TTE W/DOPPLER COMPLETE: CPT | Mod: 26,,, | Performed by: GENERAL PRACTICE

## 2022-09-06 PROCEDURE — 93306 ECHO (CUPID ONLY): ICD-10-PCS | Mod: 26,,, | Performed by: GENERAL PRACTICE

## 2022-09-06 PROCEDURE — 93018 STRESS TEST WITH MYOCARDIAL PERFUSION (CUPID ONLY): ICD-10-PCS | Mod: ,,, | Performed by: GENERAL PRACTICE

## 2022-09-06 PROCEDURE — 93016 CV STRESS TEST SUPVJ ONLY: CPT | Mod: ,,, | Performed by: NURSE PRACTITIONER

## 2022-09-06 PROCEDURE — 78452 STRESS TEST WITH MYOCARDIAL PERFUSION (CUPID ONLY): ICD-10-PCS | Mod: 26,,, | Performed by: GENERAL PRACTICE

## 2022-09-06 PROCEDURE — 78452 HT MUSCLE IMAGE SPECT MULT: CPT | Mod: 26,,, | Performed by: GENERAL PRACTICE

## 2022-09-06 PROCEDURE — 93018 CV STRESS TEST I&R ONLY: CPT | Mod: ,,, | Performed by: GENERAL PRACTICE

## 2022-09-06 PROCEDURE — A9502 TC99M TETROFOSMIN: HCPCS

## 2022-09-06 PROCEDURE — 93306 TTE W/DOPPLER COMPLETE: CPT

## 2022-09-06 PROCEDURE — 93016 PR CARDIAC STRESS TST,DR SUPERV ONLY: ICD-10-PCS | Mod: ,,, | Performed by: NURSE PRACTITIONER

## 2022-09-06 RX ORDER — REGADENOSON 0.08 MG/ML
0.4 INJECTION, SOLUTION INTRAVENOUS ONCE
Status: COMPLETED | OUTPATIENT
Start: 2022-09-06 | End: 2022-09-06

## 2022-09-06 RX ADMIN — REGADENOSON 0.4 MG: 0.08 INJECTION, SOLUTION INTRAVENOUS at 10:09

## 2022-09-27 NOTE — PROGRESS NOTES
"Subjective:    Patient ID:  Debby Brown is a 72 y.o. female who presents for follow-up of   Chief Complaint   Patient presents with    Results       HPI:  :Blane Mancini III, MD   Patient presents to clinic for results. Insurance currently lapsed. Will be covered again in August. Hypertension is slightly elevated. She has been off of Metoprolol for 2 months. States there were no refills at the pharmacy. Cardiovascular ok. Denies chest pain or palpitations. Some chest pain on exertion. Resolves when she rests. Referral to Cardiologist. Type 2 DM on insulin. Eye exam up to date- Dr. De La Rosa. Currently on 50 U Toujeo. A1C 10.9 . Numbers at home are the same. Hyperlipidemia. Cholesterol 284 HDL 42  Triglycerides 311. Not currently on any medication. Hyperuricemia 6.2. Due for pneumonia vaccination. Due for Shingles vaccination. She has had Shingles before putting her at higher risk. Has not had any Covid vaccinations     Type 2 diabetes mellitus without complication, with long-term current use of insulin  -     pen needle, diabetic (BD JERI 2ND GEN PEN NEEDLE) 32 gauge x 5/32" Ndle; USE AS DIRECTED  Dispense: 100 each; Refill: 5     Hyperlipidemia, unspecified hyperlipidemia type     Hypertension, essential  -     Ambulatory referral/consult to Cardiology; Future; Expected date: 08/02/2022  -     IN OFFICE EKG 12-LEAD (to [place])     Hyperuricemia     BMI 32.0-32.9,adult     Other orders  -     rosuvastatin (CRESTOR) 20 MG tablet; Take 1 tablet (20 mg total) by mouth once daily.  Dispense: 90 tablet; Refill: 1  -     lisinopriL (PRINIVIL,ZESTRIL) 40 MG tablet; Take 1 tablet (40 mg total) by mouth once daily.  Dispense: 90 tablet; Refill: 1  -     buPROPion (WELLBUTRIN XL) 300 MG 24 hr tablet; Take 1 tablet (300 mg total) by mouth Daily.  Dispense: 90 tablet; Refill: 1  -     insulin glargine, TOUJEO, (TOUJEO SOLOSTAR U-300 INSULIN) 300 unit/mL (1.5 mL) InPn pen; ADMINISTER 55 UNITS UNDER THE SKIN EVERY " DAY  Dispense: 10 pen; Refill: 1  -     allopurinoL (ZYLOPRIM) 100 MG tablet; Take 1 tablet (100 mg total) by mouth once daily.  Dispense: 180 tablet; Refill: 1  -     (In Office Administered) Pneumococcal Conjugate Vaccine (20 Valent) (IM)        EKG done.  Some changes but these are chronic.  Referred her to Cardiology.  Follow-up here in 3 weeks with her glucose numbers.  Allopurinol 100 mg 2 a day.  Get results of her eye exam from Dr. De La Rosa.  Prevnar 20 recommended.  Consider shingles vaccine.  Crestor 20 mg daily 90 with a refill.  Refill all medications 90 day supply with a refill.  Increase her insulin to 65 units per day.  Call us with her morning sugars in about 5 days to adjust further.        8/29/22     He is referred by Dr. Mancini.  She has a history of diabetes for 2 years on insulin, hypertension, hyperlipidemia.  She used to work out at the gym more than 2 years ago with lifting weights and aerobic.  She wants to restart that.  She had COVID pneumonia about 1 years ago, and to that she had some exertional chest pain going up stairs shortness of breath.  She feels like it his gone away.  She wakes up in the morning her heart is beating fast.  He had previous echo which revealed mild aortic stenosis over 12th 2020.   HE HAS SOME CHEST DISC COMFORT GOING UP STAIRS OVER THE LAST YEAR AFTER HAVING COVID SEEMS TO BEEN RESOLVED AT THIS TIME.  THE S PALPITATIONS WHEN SHE WAKES UP IN THE MORNING WANTS CLEARANCE TO RESUME EXERCISE PROGRAM.  HER CHOLESTEROL  HDL 42  TRIGLYCERIDES 311 AND CRESTOR HAS BEEN STARTED AT 20 MG.  SHE IS NOT AWARE OF FAMILY MEMBERS WITH HIGH CHOLESTEROL  EKG TODAY REVEALS NORMAL SINUS RHYTHM FIRST-DEGREE AV BLOCK POSSIBLE ANTERIOR INFARCT AGE INDETERMINATE POOR R-WAVE PROGRESSION VERSUS LEAD PLACEMENT     CARDIOVASCULAR SCREENING WITH EXERCISE STRESS TEST AND NUCLEAR IS RECOMMENDED AS WELL AS REPEAT ECHO FOR LV FUNCTION AND AORTIC STENOSIS.  HER LDL CHOLESTEROL IS  EXTREMELY HIGH AND NEEDS TO BE DROPPED DOWN TO 70 OR LESS AND THEREFORE I INCREASED THE CRESTOR TO 40 MG SHE WILL MOST LIKELY NEED A PCSK9 TO GET TO GOAL.  HOLTER WILL BE ORDERED FOR PALPITATIONS IN THE MORNING WE DID DISCUSS POSSIBLY TAKING METOPROLOL AT NIGHT SHOULD BE SEEN BACK IN FOLLOW-UP SHORT-TERM.     MIGHT BENEFIT  FROM VASCEPA FOR TRIGLYCERIDES.  HER HEMOGLOBIN A1C IS 10.9 HER BMI IS 32.12 SHE MIGHT BENEFIT FROM ALL OZEMPIC OR WEGOVY  FOR WEIGHT LOSS.           Follow up in about 4 weeks (around 9/26/2022).      9/28/22  Here for followup evaluation for dyspnea on exertion shortness of breath.  Chest and back pains. Brain fog.  Her symptoms started after she had COVID about 1 year ago and lasted for about 10 months.  Currently she feels like her symptoms of resolved.    Cardiac workup revealed:    Normal myocardial perfusion scan. There is no evidence of myocardial ischemia or infarction. Small inf lateral hypoperfusion with reperfusion does not make clinical significance. Clinical coorelation req.    The gated perfusion images showed an ejection fraction of 81% post stress. Normal ejection fraction is greater than 47%.TID.87    There is normal wall motion at rest and post stress.    LV cavity size is normal at rest.    The EKG portion of this study is negative for ischemia.    The patient reported no chest pain during the stress test.     The left ventricle is normal in size with concentric remodeling and normal systolic function.  The estimated ejection fraction is 65%.  Normal left ventricular diastolic function.  Mild mitral regurgitation.  Mild left atrial enlargement.  Normal right ventricular size with normal right ventricular systolic function.  Normal central venous pressure (3 mmHg).  Trivial pericardial effusion.  Aortic valve calcification. Mean gradient 9 mmhg     Result Text  1. Sinus rhythm with rare periods of tachycardia. Rates from 48 bpm on day two at 9:56:08 am to 104 bpm on day one at  7:40:20 pm. Mean rate 75 bpm.  2. Five mostly unifocal PVC's including one episode of bigeminy.  3. 11 PAC's including one four beat run on day one at 10:19:01 am with a rate of 126 bpm.  4. No symptoms were reported        Review of patient's allergies indicates:   Allergen Reactions    Bactrim [sulfamethoxazole-trimethoprim]     Codeine     Levaquin [levofloxacin]     Pcn [penicillins]        Past Medical History:   Diagnosis Date    Allergy     Anxiety     Depression     Diabetes mellitus, type 2     Hyperlipidemia     Hypertension     Pre-diabetes      Past Surgical History:   Procedure Laterality Date    APPENDECTOMY      BACK SURGERY      BREAST SURGERY      CHOLECYSTECTOMY      COLONOSCOPY      GALLBLADDER SURGERY      HERNIA REPAIR      lymphnode remove      SHOULDER SURGERY      TUBAL LIGATION       Social History     Tobacco Use    Smoking status: Former    Smokeless tobacco: Never   Substance Use Topics    Alcohol use: Yes     Comment: rare    Drug use: No     Family History   Problem Relation Age of Onset    Mental illness Mother     Cancer Mother     Depression Mother     Heart disease Mother     Hypertension Mother     Stroke Mother     Heart disease Father     Hypertension Father     Stroke Father         Review of Systems:   Constitution: Negative for diaphoresis and fever.   HEENT: Negative for nosebleeds.    Cardiovascular: Negative for chest pain       No dyspnea on exertion       No leg swelling        No palpitations  Respiratory: Negative for shortness of breath and wheezing.    Hematologic/Lymphatic: Negative for bleeding problem. Does not bruise/bleed easily.   Skin: Negative for color change and rash.   Musculoskeletal: Negative for falls and myalgias.   Gastrointestinal: Negative for hematemesis and hematochezia.   Genitourinary: Negative for hematuria.   Neurological: Negative for dizziness and light-headedness.   Psychiatric/Behavioral: Negative for altered mental status and memory loss.           Objective:        Vitals:    09/28/22 0838   BP: (!) 139/97   Pulse: 74       Lab Results   Component Value Date    WBC 8.89 09/06/2021    HGB 13.4 09/06/2021    HCT 40.3 09/06/2021     09/06/2021    CHOL 284 (H) 07/19/2022    TRIG 311 (H) 07/19/2022    HDL 42 07/19/2022    ALT 21 07/19/2022    AST 17 07/19/2022     (L) 07/19/2022    K 4.0 07/19/2022     07/19/2022    CREATININE 1.0 07/19/2022    BUN 21 07/19/2022    CO2 25 07/19/2022    TSH 1.130 03/27/2021    INR 1.1 09/04/2021    HGBA1C 10.9 (H) 07/19/2022    MICROALBUR 0.9 01/09/2020        ECHOCARDIOGRAM RESULTS  Results for orders placed during the hospital encounter of 09/06/22    Echo    Interpretation Summary  · The left ventricle is normal in size with concentric remodeling and normal systolic function.  · The estimated ejection fraction is 65%.  · Normal left ventricular diastolic function.  · Mild mitral regurgitation.  · Mild left atrial enlargement.  · Normal right ventricular size with normal right ventricular systolic function.  · Normal central venous pressure (3 mmHg).  · Trivial pericardial effusion.  · Aortic valve calcification. Mean gradient 9 mmhg        CURRENT/PREVIOUS VISIT EKG  Results for orders placed or performed in visit on 08/29/22   IN OFFICE EKG 12-LEAD (to [place])    Collection Time: 08/29/22  2:19 PM    Narrative    Test Reason : R06.02,    Vent. Rate : 074 BPM     Atrial Rate : 074 BPM     P-R Int : 216 ms          QRS Dur : 088 ms      QT Int : 388 ms       P-R-T Axes : 063 026 062 degrees     QTc Int : 430 ms    Sinus rhythm with 1st degree A-V block  Possible Anterior infarct (cited on or before 26-JUL-2022)  Abnormal ECG  When compared with ECG of 26-JUL-2022 17:51,  No significant change was found  Confirmed by Renea BROWNING, Primitivo TEE ([id number]) on 9/11/2022 2:39:49 PM    Referred By: VICTOR M ANAND           Confirmed By:Primitivo Meier MD     No valid procedures specified.   Results for orders placed  during the hospital encounter of 09/06/22    Nuclear Stress - Cardiology Interpreted    Interpretation Summary    Normal myocardial perfusion scan. There is no evidence of myocardial ischemia or infarction. Small inf lateral hypoperfusion with reperfusion does not make clinical significance. Clinical coorelation req.    The gated perfusion images showed an ejection fraction of 81% post stress. Normal ejection fraction is greater than 47%.TID.87    There is normal wall motion at rest and post stress.    LV cavity size is normal at rest.    The EKG portion of this study is negative for ischemia.    The patient reported no chest pain during the stress test.      Physical Exam:  CONSTITUTIONAL: No fever, no chills  HEENT: Normocephalic, atraumatic,pupils reactive to light                 NECK:  No JVD no carotid bruit  CVS: S1S2+, RRR, no murmurs,   LUNGS: Clear  ABDOMEN: Soft, NT, BS+  EXTREMITIES: No cyanosis, edema  : No mckeon catheter  NEURO: AAO X 3  PSY: Normal affect      Medication List with Changes/Refills   Current Medications    ALLOPURINOL (ZYLOPRIM) 100 MG TABLET    Take 1 tablet (100 mg total) by mouth once daily.    ALPRAZOLAM (XANAX) 0.5 MG TABLET    Take 1 tablet (0.5 mg total) by mouth 2 (two) times daily as needed for Anxiety.    BUPROPION (WELLBUTRIN XL) 300 MG 24 HR TABLET    Take 1 tablet (300 mg total) by mouth Daily.    CLONAZEPAM (KLONOPIN) 0.5 MG TABLET    Take 1 tablet (0.5 mg total) by mouth 2 (two) times daily as needed (TREMORS).    FLUOCINONIDE (LIDEX) 0.05 % EXTERNAL SOLUTION    SMARTSIG:Sparingly Topical Twice Daily PRN    HYOSCYAMINE (LEVSIN/SL) 0.125 MG SUBL    Place 1 tablet (0.125 mg total) under the tongue every 6 (six) hours as needed (cramps).    INSULIN GLARGINE, TOUJEO, (TOUJEO SOLOSTAR U-300 INSULIN) 300 UNIT/ML (1.5 ML) INPN PEN    INJECT 55 UNITS SUBCUTANEOUSLY ONCE DAILY    LISINOPRIL (PRINIVIL,ZESTRIL) 40 MG TABLET    Take 1 tablet (40 mg total) by mouth once daily.     "METOPROLOL SUCCINATE (TOPROL-XL) 50 MG 24 HR TABLET    Take 1 tablet (50 mg total) by mouth once daily.    PEN NEEDLE, DIABETIC (BD JERI 2ND GEN PEN NEEDLE) 32 GAUGE X 5/32" NDLE    USE AS DIRECTED    ROSUVASTATIN (CRESTOR) 40 MG TAB    Take 1 tablet (40 mg total) by mouth once daily.    TRUE METRIX GLUCOSE METER MISC    U UTD    TRUE METRIX GLUCOSE TEST STRIP STRP    U QAM    TRUEPLUS LANCETS 30 GAUGE MISC    U QAM    ZOLPIDEM (AMBIEN) 10 MG TAB    Take 1 tablet (10 mg total) by mouth nightly as needed (insomnia).             Assessment:       1. Type 2 diabetes mellitus without complication, with long-term current use of insulin    2. BMI 32.0-32.9,adult    3. Aortic valve stenosis, etiology of cardiac valve disease unspecified    4. Mixed hyperlipidemia    5. Pain aggravated by exercise    6. Exercise counseling    7. COVID-19         Plan:     Problem List Items Addressed This Visit          Cardiac/Vascular    Hyperlipidemia    Aortic valve stenosis       Endocrine    BMI 32.0-32.9,adult    Exercise counseling    Type 2 diabetes mellitus without complication, with long-term current use of insulin - Primary     Other Visit Diagnoses       Pain aggravated by exercise        COVID-19              OK to exercise. No limitation.  Glucose lipid tx by Dr ANAND.  Consider Roosevelt   Consider Bernardo for weight loss.     No follow-ups on file.    The patients questions were answered, they verbalized understanding, and agreed with the treatment plan.     BENJAMIN GUILLEN MD  [facility]      "

## 2022-09-28 ENCOUNTER — OFFICE VISIT (OUTPATIENT)
Dept: CARDIOLOGY | Facility: CLINIC | Age: 72
End: 2022-09-28
Payer: MEDICARE

## 2022-09-28 VITALS
SYSTOLIC BLOOD PRESSURE: 139 MMHG | OXYGEN SATURATION: 98 % | DIASTOLIC BLOOD PRESSURE: 97 MMHG | HEART RATE: 74 BPM | WEIGHT: 194.31 LBS | BODY MASS INDEX: 32.34 KG/M2

## 2022-09-28 DIAGNOSIS — E11.9 TYPE 2 DIABETES MELLITUS WITHOUT COMPLICATION, WITH LONG-TERM CURRENT USE OF INSULIN: Primary | ICD-10-CM

## 2022-09-28 DIAGNOSIS — R52 PAIN AGGRAVATED BY EXERCISE: ICD-10-CM

## 2022-09-28 DIAGNOSIS — E78.2 MIXED HYPERLIPIDEMIA: ICD-10-CM

## 2022-09-28 DIAGNOSIS — Z79.4 TYPE 2 DIABETES MELLITUS WITHOUT COMPLICATION, WITH LONG-TERM CURRENT USE OF INSULIN: Primary | ICD-10-CM

## 2022-09-28 DIAGNOSIS — I35.0 AORTIC VALVE STENOSIS, ETIOLOGY OF CARDIAC VALVE DISEASE UNSPECIFIED: ICD-10-CM

## 2022-09-28 DIAGNOSIS — U07.1 COVID-19: ICD-10-CM

## 2022-09-28 DIAGNOSIS — Z71.82 EXERCISE COUNSELING: ICD-10-CM

## 2022-09-28 PROCEDURE — 99213 PR OFFICE/OUTPT VISIT, EST, LEVL III, 20-29 MIN: ICD-10-PCS | Mod: CR,S$GLB,, | Performed by: GENERAL PRACTICE

## 2022-09-28 PROCEDURE — 99213 OFFICE O/P EST LOW 20 MIN: CPT | Mod: CR,S$GLB,, | Performed by: GENERAL PRACTICE

## 2022-09-28 RX ORDER — FLUOCINONIDE TOPICAL SOLUTION USP, 0.05% 0.5 MG/ML
SOLUTION TOPICAL
COMMUNITY
Start: 2022-08-24 | End: 2023-02-02

## 2022-10-07 RX ORDER — INSULIN GLARGINE 300 U/ML
70 INJECTION, SOLUTION SUBCUTANEOUS DAILY
Qty: 10 PEN | Refills: 0 | Status: SHIPPED | OUTPATIENT
Start: 2022-10-07 | End: 2022-11-07 | Stop reason: SDUPTHER

## 2022-10-07 NOTE — TELEPHONE ENCOUNTER
----- Message from Aakash Fox sent at 10/7/2022  8:20 AM CDT -----  Contact: Self  Type: Needs Medical Advice  Who Called:  Patient  Pharmacy name and phone #:      [id number] - [place], [place] - [address]  [address]  [place] [address]  Phone: [phone] Fax: [phone]      Best Call Back Number: [phone]   Additional Information:  States insulin dose was adjusted in appt to 70u/day, and she is out early. Would like new script to phcy with adjusted dose.

## 2022-10-28 ENCOUNTER — LAB VISIT (OUTPATIENT)
Dept: LAB | Facility: HOSPITAL | Age: 72
End: 2022-10-28
Attending: FAMILY MEDICINE
Payer: MEDICARE

## 2022-10-28 DIAGNOSIS — R00.0 TACHYCARDIA: ICD-10-CM

## 2022-10-28 DIAGNOSIS — E53.8 VITAMIN B12 DEFICIENCY: ICD-10-CM

## 2022-10-28 DIAGNOSIS — Z79.4 TYPE 2 DIABETES MELLITUS WITHOUT COMPLICATION, WITH LONG-TERM CURRENT USE OF INSULIN: ICD-10-CM

## 2022-10-28 DIAGNOSIS — G25.0 BENIGN ESSENTIAL TREMOR: ICD-10-CM

## 2022-10-28 DIAGNOSIS — R25.1 TREMOR OF LEFT HAND: ICD-10-CM

## 2022-10-28 DIAGNOSIS — E11.9 TYPE 2 DIABETES MELLITUS WITHOUT COMPLICATION, WITH LONG-TERM CURRENT USE OF INSULIN: ICD-10-CM

## 2022-10-28 DIAGNOSIS — R41.3 MEMORY LOSS: ICD-10-CM

## 2022-10-28 LAB
ALBUMIN/CREAT UR: 5.6 UG/MG (ref 0–30)
BASOPHILS # BLD AUTO: 0.06 K/UL (ref 0–0.2)
BASOPHILS NFR BLD: 0.8 % (ref 0–1.9)
CREAT UR-MCNC: 153 MG/DL (ref 15–325)
DIFFERENTIAL METHOD: ABNORMAL
EOSINOPHIL # BLD AUTO: 0.3 K/UL (ref 0–0.5)
EOSINOPHIL NFR BLD: 4.5 % (ref 0–8)
ERYTHROCYTE [DISTWIDTH] IN BLOOD BY AUTOMATED COUNT: 14.1 % (ref 11.5–14.5)
ERYTHROCYTE [SEDIMENTATION RATE] IN BLOOD BY WESTERGREN METHOD: 9 MM/HR (ref 0–20)
ESTIMATED AVG GLUCOSE: 200 MG/DL (ref 68–131)
HBA1C MFR BLD: 8.6 % (ref 4.5–6.2)
HCT VFR BLD AUTO: 46.4 % (ref 37–48.5)
HGB BLD-MCNC: 14.7 G/DL (ref 12–16)
IMM GRANULOCYTES # BLD AUTO: 0.01 K/UL (ref 0–0.04)
IMM GRANULOCYTES NFR BLD AUTO: 0.1 % (ref 0–0.5)
LYMPHOCYTES # BLD AUTO: 3.4 K/UL (ref 1–4.8)
LYMPHOCYTES NFR BLD: 47.4 % (ref 18–48)
MAGNESIUM SERPL-MCNC: 1.9 MG/DL (ref 1.6–2.6)
MCH RBC QN AUTO: 28.9 PG (ref 27–31)
MCHC RBC AUTO-ENTMCNC: 31.7 G/DL (ref 32–36)
MCV RBC AUTO: 91 FL (ref 82–98)
MICROALBUMIN UR DL<=1MG/L-MCNC: 8.5 UG/ML
MONOCYTES # BLD AUTO: 0.4 K/UL (ref 0.3–1)
MONOCYTES NFR BLD: 6.2 % (ref 4–15)
NEUTROPHILS # BLD AUTO: 2.9 K/UL (ref 1.8–7.7)
NEUTROPHILS NFR BLD: 41 % (ref 38–73)
NRBC BLD-RTO: 0 /100 WBC
PLATELET # BLD AUTO: 280 K/UL (ref 150–450)
PMV BLD AUTO: 10.5 FL (ref 9.2–12.9)
RBC # BLD AUTO: 5.08 M/UL (ref 4–5.4)
TSH SERPL DL<=0.005 MIU/L-ACNC: 2.32 UIU/ML (ref 0.34–5.6)
TSH SERPL DL<=0.005 MIU/L-ACNC: 2.32 UIU/ML (ref 0.34–5.6)
VIT B12 SERPL-MCNC: 531 PG/ML (ref 210–950)
WBC # BLD AUTO: 7.15 K/UL (ref 3.9–12.7)

## 2022-10-28 PROCEDURE — 82570 ASSAY OF URINE CREATININE: CPT | Performed by: FAMILY MEDICINE

## 2022-10-28 PROCEDURE — 85651 RBC SED RATE NONAUTOMATED: CPT | Performed by: FAMILY MEDICINE

## 2022-10-28 PROCEDURE — 83036 HEMOGLOBIN GLYCOSYLATED A1C: CPT | Performed by: FAMILY MEDICINE

## 2022-10-28 PROCEDURE — 86038 ANTINUCLEAR ANTIBODIES: CPT | Performed by: FAMILY MEDICINE

## 2022-10-28 PROCEDURE — 83735 ASSAY OF MAGNESIUM: CPT | Performed by: FAMILY MEDICINE

## 2022-10-28 PROCEDURE — 36415 COLL VENOUS BLD VENIPUNCTURE: CPT | Performed by: FAMILY MEDICINE

## 2022-10-28 PROCEDURE — 82607 VITAMIN B-12: CPT | Performed by: FAMILY MEDICINE

## 2022-10-28 PROCEDURE — 86592 SYPHILIS TEST NON-TREP QUAL: CPT | Performed by: FAMILY MEDICINE

## 2022-10-28 PROCEDURE — 82043 UR ALBUMIN QUANTITATIVE: CPT | Performed by: FAMILY MEDICINE

## 2022-10-28 PROCEDURE — 85025 COMPLETE CBC W/AUTO DIFF WBC: CPT | Performed by: FAMILY MEDICINE

## 2022-10-28 PROCEDURE — 84443 ASSAY THYROID STIM HORMONE: CPT | Performed by: FAMILY MEDICINE

## 2022-10-29 LAB
ANA TITR SER IF: NEGATIVE {TITER}
RPR SER QL: NON REACTIVE

## 2022-10-31 ENCOUNTER — TELEPHONE (OUTPATIENT)
Dept: FAMILY MEDICINE | Facility: CLINIC | Age: 72
End: 2022-10-31

## 2022-10-31 ENCOUNTER — OFFICE VISIT (OUTPATIENT)
Dept: FAMILY MEDICINE | Facility: CLINIC | Age: 72
End: 2022-10-31
Payer: MEDICARE

## 2022-10-31 VITALS
OXYGEN SATURATION: 97 % | TEMPERATURE: 98 F | DIASTOLIC BLOOD PRESSURE: 82 MMHG | HEART RATE: 79 BPM | SYSTOLIC BLOOD PRESSURE: 128 MMHG | BODY MASS INDEX: 31.88 KG/M2 | HEIGHT: 65 IN | WEIGHT: 191.31 LBS

## 2022-10-31 DIAGNOSIS — Z79.4 TYPE 2 DIABETES MELLITUS WITH DIABETIC POLYNEUROPATHY, WITH LONG-TERM CURRENT USE OF INSULIN: ICD-10-CM

## 2022-10-31 DIAGNOSIS — E11.42 TYPE 2 DIABETES MELLITUS WITH DIABETIC POLYNEUROPATHY, WITH LONG-TERM CURRENT USE OF INSULIN: ICD-10-CM

## 2022-10-31 DIAGNOSIS — S80.11XA HEMATOMA OF LEG, RIGHT, INITIAL ENCOUNTER: ICD-10-CM
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
## 2022-10-31 DIAGNOSIS — E78.5 HYPERLIPIDEMIA, UNSPECIFIED HYPERLIPIDEMIA TYPE: ICD-10-CM

## 2022-10-31 DIAGNOSIS — Z79.4 TYPE 2 DIABETES MELLITUS WITHOUT COMPLICATION, WITH LONG-TERM CURRENT USE OF INSULIN: ICD-10-CM

## 2022-10-31 DIAGNOSIS — I10 HYPERTENSION, ESSENTIAL: ICD-10-CM

## 2022-10-31 DIAGNOSIS — E11.21 MACROALBUMINURIC DIABETIC NEPHROPATHY: Primary | ICD-10-CM

## 2022-10-31 DIAGNOSIS — G25.0 BENIGN ESSENTIAL TREMOR: ICD-10-CM

## 2022-10-31 DIAGNOSIS — E11.9 TYPE 2 DIABETES MELLITUS WITHOUT COMPLICATION, WITH LONG-TERM CURRENT USE OF INSULIN: ICD-10-CM

## 2022-10-31 DIAGNOSIS — L02.415 CUTANEOUS ABSCESS OF RIGHT LOWER EXTREMITY: ICD-10-CM

## 2022-10-31 PROBLEM — E53.8 VITAMIN B12 DEFICIENCY: Status: RESOLVED | Noted: 2021-12-19 | Resolved: 2022-10-31

## 2022-10-31 PROCEDURE — G0008 FLU VACCINE - QUADRIVALENT - ADJUVANTED: ICD-10-PCS | Mod: S$GLB,,, | Performed by: FAMILY MEDICINE

## 2022-10-31 PROCEDURE — G0008 ADMIN INFLUENZA VIRUS VAC: HCPCS | Mod: S$GLB,,, | Performed by: FAMILY MEDICINE

## 2022-10-31 PROCEDURE — 90694 VACC AIIV4 NO PRSRV 0.5ML IM: CPT | Mod: S$GLB,,, | Performed by: FAMILY MEDICINE

## 2022-10-31 PROCEDURE — 99214 PR OFFICE/OUTPT VISIT, EST, LEVL IV, 30-39 MIN: ICD-10-PCS | Mod: S$GLB,,, | Performed by: FAMILY MEDICINE

## 2022-10-31 PROCEDURE — 90694 FLU VACCINE - QUADRIVALENT - ADJUVANTED: ICD-10-PCS | Mod: S$GLB,,, | Performed by: FAMILY MEDICINE

## 2022-10-31 PROCEDURE — 99214 OFFICE O/P EST MOD 30 MIN: CPT | Mod: S$GLB,,, | Performed by: FAMILY MEDICINE

## 2022-10-31 RX ORDER — DOXYCYCLINE 100 MG/1
100 CAPSULE ORAL EVERY 12 HOURS
Qty: 20 CAPSULE | Refills: 0 | Status: SHIPPED | OUTPATIENT
Start: 2022-10-31 | End: 2023-02-02

## 2022-10-31 RX ORDER — MUPIROCIN 20 MG/G
OINTMENT TOPICAL 3 TIMES DAILY
Qty: 22 G | Refills: 0 | Status: SHIPPED | OUTPATIENT
Start: 2022-10-31 | End: 2023-02-02

## 2022-10-31 NOTE — PROGRESS NOTES
Subjective:       Patient ID: Debby Brown is a 72 y.o. female.    Chief Complaint: Animal Bite and Follow-up    Patient presents to clinic for follow up. Right lower extremity nodule. She believes it was an insect bite. She was in [place] last week visiting family. Appears to be staph. Dog bite 2 weeks ago while she was in [place]. Does not appear to be infected. Skin non tender. Hypertension stable. Cardiovascular ok. Denies chest pain or palpitations. Hyperlipidemia. Dr. Meier increased Crestor to 40 mg. Tolerating well. Type 2 DM. Eye exam with Dr. De La Rosa. Will request record of this. A1C 8.6 . Not checking glucose at home. Toujeo 70 U. Negative microalbumin. She would like to try Ozempic. No personal history of pancreatitis. No family history of thyroid cancer. Hypothyroidism. TSH 2.3. Memory is the same. Negative sed rate and NORBERTO. B12 normal. Magnesium normal. Klonopin did not help tremor. Mammogram due in December. No Covid vaccinations. Due for shingles and influenza.   Review of Systems   Respiratory: Negative.     Cardiovascular: Negative.  Negative for chest pain and palpitations.   Integumentary:         right lower extremity nodule    Psychiatric/Behavioral: Negative.         Objective:      Physical Exam  Constitutional:       Appearance: Normal appearance.   Neck:      Vascular: No carotid bruit.   Cardiovascular:      Rate and Rhythm: Normal rate and regular rhythm.      Pulses: Normal pulses.           Dorsalis pedis pulses are 2+ on the right side and 2+ on the left side.        Posterior tibial pulses are 2+ on the right side and 2+ on the left side.      Heart sounds: Normal heart sounds.   Pulmonary:      Effort: Pulmonary effort is normal.      Breath sounds: Normal breath sounds.   Feet:      Right foot:      Protective Sensation: 5 sites tested.  2 sites sensed.      Skin integrity: Skin integrity normal.      Toenail Condition: Right toenails are normal.      Left foot:      Protective  Sensation: 5 sites tested.  2 sites sensed.      Skin integrity: Skin integrity normal.      Toenail Condition: Left toenails are normal.   Lymphadenopathy:      Cervical: No cervical adenopathy.   Skin:     General: Skin is warm and dry.      Comments: Right lower extremity nodule. Appears to be staph infection.    Neurological:      General: No focal deficit present.      Mental Status: She is alert.   Psychiatric:         Mood and Affect: Mood normal.         Behavior: Behavior normal.     All abscess less than 1 cm right lower leg/ankle.  Anterior.    Dog bite left leg.  Hematoma about 3 cm left posterior medial knee.  No redness.  Not tender.  Assessment/Plan:     Macroalbuminuric diabetic nephropathy    Type 2 diabetes mellitus without complication, with long-term current use of insulin    Hypertension, essential  -     Basic Metabolic Panel; Future; Expected date: 10/31/2022    Benign essential tremor    Dog bite, initial encounter    Hematoma of leg, right, initial encounter    Type 2 diabetes mellitus with diabetic polyneuropathy, with long-term current use of insulin    Hyperlipidemia, unspecified hyperlipidemia type  -     Lipid Panel; Future; Expected date: 10/31/2022    Cutaneous abscess of right lower extremity    Other orders  -     Influenza - Quadrivalent (Adjuvanted)  -     doxycycline (VIBRAMYCIN) 100 MG Cap; Take 1 capsule (100 mg total) by mouth every 12 (twelve) hours.  Dispense: 20 capsule; Refill: 0  -     mupirocin (BACTROBAN) 2 % ointment; Apply topically 3 (three) times daily. Apply to nostrils  Bid for 10 days using q-tip  Dispense: 22 g; Refill: 0  -     semaglutide (OZEMPIC) 0.25 mg or 0.5 mg(2 mg/1.5 mL) pen injector; Inject 0.25 mg into the skin every 7 days x 2 weeks. Then inject 0.5 mg into the skin every 7 days  Dispense: 1 pen; Refill: 2     No treatment of the dog bite.  The hematoma should resolve.  Vibramycin 100 mg b.i.d. for the possible staph abscess on the distal right  lower leg.  Bactroban b.i.d. to the nostrils.  Go for mammogram.  Flu shot high-dose.  Need copy of her eye exam from Dr. De La Rosa.  Lipids and BMP ordered.  Try Ozempic 0.25 weekly for 2 weeks then 0.5 weekly thereafter.  Follow-up on it in about 6 weeks.

## 2022-10-31 NOTE — TELEPHONE ENCOUNTER
Done left it on their voicemail    ----- Message from Royal Emerson sent at 10/31/2022  4:41 PM CDT -----  Type:  Pharmacy Calling to Clarify an RX    Name of Caller:  Sampson  Pharmacy Name:      [id number] - [place], [place] - [address]  [address]  [place] [address]  Phone: [phone] Fax: [phone]        Prescription Name:  mupirocin (BACTROBAN) 2 % ointment  What do they need to clarify?:  directions  Best Call Back Number:  [phone]  Additional Information:  Please advise -- Thank you

## 2022-11-01 ENCOUNTER — PATIENT OUTREACH (OUTPATIENT)
Dept: ADMINISTRATIVE | Facility: HOSPITAL | Age: 72
End: 2022-11-01
Payer: MEDICARE

## 2022-11-01 ENCOUNTER — PATIENT MESSAGE (OUTPATIENT)
Dept: ADMINISTRATIVE | Facility: HOSPITAL | Age: 72
End: 2022-11-01
Payer: MEDICARE

## 2022-11-01 DIAGNOSIS — Z12.31 SCREENING MAMMOGRAM FOR BREAST CANCER: Primary | ICD-10-CM

## 2022-11-01 NOTE — LETTER
AUTHORIZATION FOR RELEASE OF   CONFIDENTIAL INFORMATION    Dear Dr De La Rosa,    We are seeing Debby Brown, date of birth 1950, in the clinic at [facility]. Blane Mancini III, MD is the patient's PCP. Debby Brown has an outstanding lab/procedure at the time we reviewed her chart. In order to help keep her health information updated, she has authorized us to request the following medical record(s):        (  )  MAMMOGRAM                                      (  )  COLONOSCOPY      (  )  PAP SMEAR                                          (  )  OUTSIDE LAB RESULTS     (  )  DEXA SCAN                                          ( X )  EYE EXAM            (  )  FOOT EXAM                                          (  )  ENTIRE RECORD     (  )  OUTSIDE IMMUNIZATIONS                 (  )  _______________         Please fax records to [facility], Clinton H Sharp III, MD at [phone]     Thanks so much and have a great day!    Gisella Amaya LPN [address] [place] [address]  - [phone]   [phone]          Patient Name: Debby Brown  : 1950  Patient Phone #: [phone]

## 2022-11-01 NOTE — PROGRESS NOTES
[facility] Mammogram order placed and scheduled. Pt will send me a My Chart message when she schedules her Diabetic eye exam with Dr De La Rosa.

## 2022-11-07 RX ORDER — INSULIN GLARGINE 300 U/ML
70 INJECTION, SOLUTION SUBCUTANEOUS DAILY
Qty: 10 PEN | Refills: 0 | Status: SHIPPED | OUTPATIENT
Start: 2022-11-07 | End: 2022-12-02

## 2022-11-23 ENCOUNTER — TELEPHONE (OUTPATIENT)
Dept: FAMILY MEDICINE | Facility: CLINIC | Age: 72
End: 2022-11-23
Payer: MEDICARE

## 2022-11-23 NOTE — TELEPHONE ENCOUNTER
Pt stated sugar is 309 today just took toujeo 70 units isrrael stated strict diet  do not double toujeo     ----- Message from Julianne Bagley sent at 11/23/2022 10:03 AM CST -----  Type: Needs Medical Advice  Who Called: Pt   Symptoms (please be specific):   How long has patient had these symptoms:    Pharmacy name and phone #:    Best Call Back Number: [phone]  Additional Information: Pt requesting a call back concerning a medication that she was given at the [facility] on yesterday, pt requesting advise on her b/p and a injection she has to take.

## 2022-12-01 NOTE — TELEPHONE ENCOUNTER
No new care gaps identified.  [facility] Embedded Care Gaps. Reference number: [id number]. 12/01/2022   2:47:01 PM CST

## 2022-12-02 RX ORDER — INSULIN GLARGINE 300 U/ML
INJECTION, SOLUTION SUBCUTANEOUS
Qty: 9 PEN | Refills: 1 | Status: SHIPPED | OUTPATIENT
Start: 2022-12-02 | End: 2024-01-24

## 2022-12-02 NOTE — TELEPHONE ENCOUNTER
Refill Routing Note   Medication(s) are not appropriate for processing by [facility] for the following reason(s):      - Medication requested has undergone a recent dosage adjustment (<3 months)    [facility] action(s):  Defer Medication-related problems identified: Dose adjustment        Medication reconciliation completed: No     Appointments  past 12m or future 3m with PCP    Date Provider   Last Visit   10/31/2022 lBane Mancini III, MD   Next Visit   Visit date not found Blane Mancini III, MD   ED visits in past 90 days: 0        Note composed:10:55 AM 12/02/2022

## 2022-12-15 ENCOUNTER — HOSPITAL ENCOUNTER (OUTPATIENT)
Dept: RADIOLOGY | Facility: HOSPITAL | Age: 72
Discharge: HOME | End: 2022-12-15
Attending: FAMILY MEDICINE
Payer: MEDICARE

## 2022-12-15 DIAGNOSIS — Z12.31 SCREENING MAMMOGRAM FOR BREAST CANCER: ICD-10-CM

## 2022-12-15 PROCEDURE — 77067 SCR MAMMO BI INCL CAD: CPT | Mod: TC,PO

## 2022-12-15 PROCEDURE — 77063 BREAST TOMOSYNTHESIS BI: CPT | Mod: TC,PO

## 2023-01-25 LAB
LEFT EYE DM RETINOPATHY: NEGATIVE
RIGHT EYE DM RETINOPATHY: NEGATIVE

## 2023-01-30 ENCOUNTER — TELEPHONE (OUTPATIENT)
Dept: FAMILY MEDICINE | Facility: CLINIC | Age: 73
End: 2023-01-30
Payer: MEDICARE

## 2023-01-30 NOTE — TELEPHONE ENCOUNTER
----- Message from Sina [place] sent at 1/30/2023 10:46 AM CST -----  Regarding: appointment  Contact: Patient  Patient want to speak with a nurse regarding scheduling appointment one day this week for tremors, please call back at [phone] (home) [phone] (work)    Case number [id number]

## 2023-01-31 ENCOUNTER — TELEPHONE (OUTPATIENT)
Dept: FAMILY MEDICINE | Facility: CLINIC | Age: 73
End: 2023-01-31
Payer: MEDICARE

## 2023-01-31 NOTE — TELEPHONE ENCOUNTER
----- Message from Royal Emerson sent at 1/31/2023  2:48 PM CST -----  Type:  Patient Returning Call    Who Called:  Pt  Who Left Message for Patient:  Kelly  Does the patient know what this is regarding?:     Best Call Back Number:  [phone]   Additional Information:  Please advise -- Thank you

## 2023-02-02 ENCOUNTER — OFFICE VISIT (OUTPATIENT)
Dept: FAMILY MEDICINE | Facility: CLINIC | Age: 73
End: 2023-02-02
Payer: MEDICARE

## 2023-02-02 ENCOUNTER — TELEPHONE (OUTPATIENT)
Dept: FAMILY MEDICINE | Facility: CLINIC | Age: 73
End: 2023-02-02

## 2023-02-02 VITALS
SYSTOLIC BLOOD PRESSURE: 140 MMHG | OXYGEN SATURATION: 97 % | HEIGHT: 65 IN | BODY MASS INDEX: 32.57 KG/M2 | TEMPERATURE: 98 F | RESPIRATION RATE: 18 BRPM | DIASTOLIC BLOOD PRESSURE: 80 MMHG | WEIGHT: 195.5 LBS | HEART RATE: 72 BPM

## 2023-02-02 DIAGNOSIS — J01.00 ACUTE MAXILLARY SINUSITIS, RECURRENCE NOT SPECIFIED: Primary | ICD-10-CM

## 2023-02-02 DIAGNOSIS — I10 HYPERTENSION, ESSENTIAL: ICD-10-CM

## 2023-02-02 DIAGNOSIS — E11.42 TYPE 2 DIABETES MELLITUS WITH DIABETIC POLYNEUROPATHY, WITH LONG-TERM CURRENT USE OF INSULIN: ICD-10-CM

## 2023-02-02 DIAGNOSIS — R05.1 ACUTE COUGH: ICD-10-CM

## 2023-02-02 DIAGNOSIS — E78.5 HYPERLIPIDEMIA, UNSPECIFIED HYPERLIPIDEMIA TYPE: ICD-10-CM

## 2023-02-02 DIAGNOSIS — F41.9 ANXIETY: ICD-10-CM

## 2023-02-02 DIAGNOSIS — Z79.4 TYPE 2 DIABETES MELLITUS WITH DIABETIC POLYNEUROPATHY, WITH LONG-TERM CURRENT USE OF INSULIN: ICD-10-CM

## 2023-02-02 PROCEDURE — 99214 PR OFFICE/OUTPT VISIT, EST, LEVL IV, 30-39 MIN: ICD-10-PCS | Mod: S$GLB,,,

## 2023-02-02 PROCEDURE — 99214 OFFICE O/P EST MOD 30 MIN: CPT | Mod: S$GLB,,,

## 2023-02-02 RX ORDER — PREDNISONE 20 MG/1
20 TABLET ORAL DAILY
Qty: 5 TABLET | Refills: 0 | Status: SHIPPED | OUTPATIENT
Start: 2023-02-02 | End: 2023-02-07

## 2023-02-02 RX ORDER — DOXYCYCLINE 100 MG/1
100 CAPSULE ORAL EVERY 12 HOURS
Qty: 14 CAPSULE | Refills: 0 | Status: SHIPPED | OUTPATIENT
Start: 2023-02-02 | End: 2023-02-09

## 2023-02-02 RX ORDER — ALPRAZOLAM 0.5 MG/1
0.5 TABLET ORAL 2 TIMES DAILY PRN
Qty: 30 TABLET | Refills: 0 | Status: SHIPPED | OUTPATIENT
Start: 2023-02-02 | End: 2023-03-16

## 2023-02-02 RX ORDER — BENZONATATE 200 MG/1
200 CAPSULE ORAL 3 TIMES DAILY PRN
Qty: 30 CAPSULE | Refills: 0 | Status: SHIPPED | OUTPATIENT
Start: 2023-02-02 | End: 2023-02-12

## 2023-02-02 NOTE — TELEPHONE ENCOUNTER
----- Message from Royal Emerson sent at 2/2/2023  3:56 PM CST -----  Type:  Pharmacy Calling to Clarify an RX    Name of Caller:Sampson  Pharmacy Name:    [id number]  [place] - [address]  [address]  [place] [address]  Phone: [phone] Fax: [phone]        Prescription Name:ALPRAZolam (XANAX) 0.5 MG tablet  What do they need to clarify?:sts needs last inperson visit date for the pt.    Best Call Back Number:[phone]  Additional Information: Please advise-- thank you

## 2023-02-02 NOTE — PROGRESS NOTES
Subjective:       Patient ID: Debby Brown is a 72 y.o. female.    Chief Complaint: Medication Refill and Sore Throat    Patient presents to clinic with complaints of sore throat.       She is also requesting refill of xanax for anxiety.  She is due for well visit. Takes only as needed. Has been feeling very anxious lately.   checked.  Depression: taking wellbutrin and doing well.  No thoughts of suicide.  History of hypertension: Blood pressure elevated today. Is compliant with medications. Type 2 DM: Last HA1C was 8.6 which is improved form 10.9.  She is due for repeat HA1C.  Taking Toujeo and Ozempic.  Due for blood work.     Medication Refill  Associated symptoms include congestion, coughing, headaches and a sore throat. Associated symptoms comments: Headaches  .   Sore Throat   This is a new problem. The current episode started yesterday. The problem has been unchanged. Neither side of throat is experiencing more pain than the other. There has been no fever. The pain is mild. Associated symptoms include congestion, coughing, headaches, a hoarse voice and trouble swallowing. Pertinent negatives include no ear pain or shortness of breath. She has tried nothing for the symptoms.   Review of patient's allergies indicates:   Allergen Reactions    Bactrim [sulfamethoxazole-trimethoprim]     Codeine     Levaquin [levofloxacin]     Pcn [penicillins]      Social Determinants of Health     Tobacco Use: Medium Risk    Smoking Tobacco Use: Former    Smokeless Tobacco Use: Never    Passive Exposure: Not on file   Alcohol Use: Not on file   Financial Resource Strain: Not on file   Food Insecurity: Not on file   Transportation Needs: Not on file   Physical Activity: Not on file   Stress: Not on file   Social Connections: Not on file   Housing Stability: Not on file   Depression: Low Risk     Last PHQ Score: 2      Past Medical History:   Diagnosis Date    Allergy     Anxiety     Depression     Diabetes mellitus, type 2   "   Hyperlipidemia     Hypertension     Pre-diabetes       Past Surgical History:   Procedure Laterality Date    APPENDECTOMY      BACK SURGERY      BREAST SURGERY      CHOLECYSTECTOMY      COLONOSCOPY      GALLBLADDER SURGERY      HERNIA REPAIR      lymphnode remove      SHOULDER SURGERY      TUBAL LIGATION        Social History     Socioeconomic History    Marital status:     Number of children: 2         Current Outpatient Medications:     buPROPion (WELLBUTRIN XL) 300 MG 24 hr tablet, Take 1 tablet (300 mg total) by mouth Daily., Disp: 90 tablet, Rfl: 1    lisinopriL (PRINIVIL,ZESTRIL) 40 MG tablet, Take 1 tablet (40 mg total) by mouth once daily., Disp: 90 tablet, Rfl: 1    metoprolol succinate (TOPROL-XL) 50 MG 24 hr tablet, Take 1 tablet (50 mg total) by mouth once daily., Disp: 90 tablet, Rfl: 1    pen needle, diabetic (BD JERI 2ND GEN PEN NEEDLE) 32 gauge x 5/32" Ndle, USE AS DIRECTED, Disp: 100 each, Rfl: 5    TOUJEO SOLOSTAR U-300 INSULIN 300 unit/mL (1.5 mL) InPn pen, INJECT 70 UNITS SUBCUTANEOUSLY ONCE DAILY, Disp: 9 pen, Rfl: 1    TRUE METRIX GLUCOSE METER Misc, U UTD, Disp: , Rfl:     TRUE METRIX GLUCOSE TEST STRIP Strp, U QAM, Disp: , Rfl:     TRUEPLUS LANCETS 30 gauge Misc, U QAM, Disp: , Rfl:     ALPRAZolam (XANAX) 0.5 MG tablet, Take 1 tablet (0.5 mg total) by mouth 2 (two) times daily as needed for Anxiety., Disp: 30 tablet, Rfl: 0    benzonatate (TESSALON) 200 MG capsule, Take 1 capsule (200 mg total) by mouth 3 (three) times daily as needed for Cough., Disp: 30 capsule, Rfl: 0    doxycycline (VIBRAMYCIN) 100 MG Cap, Take 1 capsule (100 mg total) by mouth every 12 (twelve) hours. for 7 days, Disp: 14 capsule, Rfl: 0    predniSONE (DELTASONE) 20 MG tablet, Take 1 tablet (20 mg total) by mouth once daily. for 5 days, Disp: 5 tablet, Rfl: 0    rosuvastatin (CRESTOR) 40 MG Tab, Take 1 tablet (40 mg total) by mouth once daily. (Patient not taking: Reported on 2/2/2023), Disp: 90 tablet, Rfl: " 1    semaglutide (OZEMPIC) 0.25 mg or 0.5 mg(2 mg/1.5 mL) pen injector, Inject 0.25 mg into the skin every 7 days x 2 weeks. Then inject 0.5 mg into the skin every 7 days (Patient not taking: Reported on 2/2/2023), Disp: 1 pen, Rfl: 2    Lab Results   Component Value Date    WBC 7.15 10/28/2022    HGB 14.7 10/28/2022    HCT 46.4 10/28/2022     10/28/2022    CHOL 284 (H) 07/19/2022    TRIG 311 (H) 07/19/2022    HDL 42 07/19/2022    ALT 21 07/19/2022    AST 17 07/19/2022     (L) 07/19/2022    K 4.0 07/19/2022     07/19/2022    CREATININE 1.0 07/19/2022    BUN 21 07/19/2022    CO2 25 07/19/2022    TSH 2.320 10/28/2022    TSH 2.320 10/28/2022    INR 1.1 09/04/2021    HGBA1C 8.6 (H) 10/28/2022    MICROALBUR 0.9 01/09/2020       Review of Systems   HENT:  Positive for nasal congestion, hoarse voice, sore throat and trouble swallowing. Negative for ear pain.    Respiratory:  Positive for cough. Negative for shortness of breath.    Neurological:  Positive for headaches.     Objective:      Physical Exam  Constitutional:       Appearance: Normal appearance.   HENT:      Head: Normocephalic.      Right Ear: Tympanic membrane normal.      Left Ear: A middle ear effusion is present. Tympanic membrane is not erythematous or bulging.      Mouth/Throat:      Mouth: Mucous membranes are moist.      Pharynx: Oropharynx is clear. Posterior oropharyngeal erythema present. No oropharyngeal exudate.   Eyes:      Conjunctiva/sclera: Conjunctivae normal.   Cardiovascular:      Rate and Rhythm: Normal rate and regular rhythm.      Pulses: Normal pulses.      Heart sounds: Normal heart sounds.   Pulmonary:      Effort: Pulmonary effort is normal.      Breath sounds: Normal breath sounds.   Skin:     General: Skin is warm and dry.      Capillary Refill: Capillary refill takes less than 2 seconds.   Neurological:      Mental Status: She is alert.   Psychiatric:         Attention and Perception: Attention normal.          Mood and Affect: Mood normal.         Speech: Speech normal.         Behavior: Behavior normal. Behavior is cooperative.         Thought Content: Thought content normal. Thought content does not include homicidal or suicidal ideation.         Judgment: Judgment normal.       Assessment:       1. Acute maxillary sinusitis, recurrence not specified    2. Acute cough    3. Anxiety    4. Hypertension, essential    5. Hyperlipidemia, unspecified hyperlipidemia type    6. BMI 32.0-32.9,adult    7. Type 2 diabetes mellitus with diabetic polyneuropathy, with long-term current use of insulin        Plan:       Debby was seen today for medication refill and sore throat.    Diagnoses and all orders for this visit:    Acute maxillary sinusitis, recurrence not specified  -     doxycycline (VIBRAMYCIN) 100 MG Cap; Take 1 capsule (100 mg total) by mouth every 12 (twelve) hours. for 7 days  -     predniSONE (DELTASONE) 20 MG tablet; Take 1 tablet (20 mg total) by mouth once daily. for 5 days    Acute cough  -     benzonatate (TESSALON) 200 MG capsule; Take 1 capsule (200 mg total) by mouth 3 (three) times daily as needed for Cough.    Anxiety  -     ALPRAZolam (XANAX) 0.5 MG tablet; Take 1 tablet (0.5 mg total) by mouth 2 (two) times daily as needed for Anxiety.    Hypertension, essential  -     CBC Auto Differential; Future  -     Comprehensive Metabolic Panel; Future    Hyperlipidemia, unspecified hyperlipidemia type  -     Lipid Panel; Future    BMI 32.0-32.9,adult    Type 2 diabetes mellitus with diabetic polyneuropathy, with long-term current use of insulin  -     Hemoglobin A1C; Future     Have fasting labs as ordered.     Will refill xanax for 1 month.  Patient will need a well visit for further refills.   Medications as prescribed for Sinusitis.   Hypertension: b/p elevated.  Patient to take blood pressures at home and keep daily log.  Will follow up in 2 weeks to recheck.  Has well visit scheduled with Dr. Mancini in March.

## 2023-02-03 ENCOUNTER — PATIENT OUTREACH (OUTPATIENT)
Dept: ADMINISTRATIVE | Facility: HOSPITAL | Age: 73
End: 2023-02-03
Payer: MEDICARE

## 2023-03-01 ENCOUNTER — LAB VISIT (OUTPATIENT)
Dept: LAB | Facility: HOSPITAL | Age: 73
End: 2023-03-01
Payer: MEDICARE

## 2023-03-01 DIAGNOSIS — I10 HYPERTENSION, ESSENTIAL: ICD-10-CM

## 2023-03-01 DIAGNOSIS — E78.5 HYPERLIPIDEMIA, UNSPECIFIED HYPERLIPIDEMIA TYPE: ICD-10-CM

## 2023-03-01 DIAGNOSIS — E11.42 TYPE 2 DIABETES MELLITUS WITH DIABETIC POLYNEUROPATHY, WITH LONG-TERM CURRENT USE OF INSULIN: ICD-10-CM

## 2023-03-01 DIAGNOSIS — Z79.4 TYPE 2 DIABETES MELLITUS WITH DIABETIC POLYNEUROPATHY, WITH LONG-TERM CURRENT USE OF INSULIN: ICD-10-CM

## 2023-03-01 LAB
ALBUMIN SERPL BCP-MCNC: 3.9 G/DL (ref 3.5–5.2)
ALP SERPL-CCNC: 51 U/L (ref 55–135)
ALT SERPL W/O P-5'-P-CCNC: 18 U/L (ref 10–44)
ANION GAP SERPL CALC-SCNC: 11 MMOL/L (ref 8–16)
AST SERPL-CCNC: 17 U/L (ref 10–40)
BASOPHILS # BLD AUTO: 0.06 K/UL (ref 0–0.2)
BASOPHILS NFR BLD: 0.8 % (ref 0–1.9)
BILIRUB SERPL-MCNC: 0.7 MG/DL (ref 0.1–1)
BUN SERPL-MCNC: 22 MG/DL (ref 8–23)
CALCIUM SERPL-MCNC: 9.5 MG/DL (ref 8.7–10.5)
CHLORIDE SERPL-SCNC: 104 MMOL/L (ref 95–110)
CHOLEST SERPL-MCNC: 252 MG/DL (ref 120–199)
CHOLEST/HDLC SERPL: 5.5 {RATIO} (ref 2–5)
CO2 SERPL-SCNC: 24 MMOL/L (ref 23–29)
CREAT SERPL-MCNC: 1.1 MG/DL (ref 0.5–1.4)
DIFFERENTIAL METHOD: ABNORMAL
EOSINOPHIL # BLD AUTO: 0.3 K/UL (ref 0–0.5)
EOSINOPHIL NFR BLD: 4.1 % (ref 0–8)
ERYTHROCYTE [DISTWIDTH] IN BLOOD BY AUTOMATED COUNT: 15.2 % (ref 11.5–14.5)
EST. GFR  (NO RACE VARIABLE): 53.7 ML/MIN/1.73 M^2
ESTIMATED AVG GLUCOSE: 180 MG/DL (ref 68–131)
GLUCOSE SERPL-MCNC: 127 MG/DL (ref 70–110)
HBA1C MFR BLD: 7.9 % (ref 4.5–6.2)
HCT VFR BLD AUTO: 43.5 % (ref 37–48.5)
HDLC SERPL-MCNC: 46 MG/DL (ref 40–75)
HDLC SERPL: 18.3 % (ref 20–50)
HGB BLD-MCNC: 14.4 G/DL (ref 12–16)
IMM GRANULOCYTES # BLD AUTO: 0.02 K/UL (ref 0–0.04)
IMM GRANULOCYTES NFR BLD AUTO: 0.3 % (ref 0–0.5)
LDLC SERPL CALC-MCNC: 173.2 MG/DL (ref 63–159)
LYMPHOCYTES # BLD AUTO: 3.3 K/UL (ref 1–4.8)
LYMPHOCYTES NFR BLD: 46 % (ref 18–48)
MCH RBC QN AUTO: 29.3 PG (ref 27–31)
MCHC RBC AUTO-ENTMCNC: 33.1 G/DL (ref 32–36)
MCV RBC AUTO: 89 FL (ref 82–98)
MONOCYTES # BLD AUTO: 0.5 K/UL (ref 0.3–1)
MONOCYTES NFR BLD: 7 % (ref 4–15)
NEUTROPHILS # BLD AUTO: 3 K/UL (ref 1.8–7.7)
NEUTROPHILS NFR BLD: 41.8 % (ref 38–73)
NONHDLC SERPL-MCNC: 206 MG/DL
NRBC BLD-RTO: 0 /100 WBC
PLATELET # BLD AUTO: 310 K/UL (ref 150–450)
PMV BLD AUTO: 9.9 FL (ref 9.2–12.9)
POTASSIUM SERPL-SCNC: 4.3 MMOL/L (ref 3.5–5.1)
PROT SERPL-MCNC: 7.8 G/DL (ref 6–8.4)
RBC # BLD AUTO: 4.91 M/UL (ref 4–5.4)
SODIUM SERPL-SCNC: 139 MMOL/L (ref 136–145)
TRIGL SERPL-MCNC: 164 MG/DL (ref 30–150)
WBC # BLD AUTO: 7.26 K/UL (ref 3.9–12.7)

## 2023-03-01 PROCEDURE — 80053 COMPREHEN METABOLIC PANEL: CPT

## 2023-03-01 PROCEDURE — 36415 COLL VENOUS BLD VENIPUNCTURE: CPT

## 2023-03-01 PROCEDURE — 83036 HEMOGLOBIN GLYCOSYLATED A1C: CPT

## 2023-03-01 PROCEDURE — 85025 COMPLETE CBC W/AUTO DIFF WBC: CPT

## 2023-03-01 PROCEDURE — 80061 LIPID PANEL: CPT

## 2023-03-02 RX ORDER — METOPROLOL SUCCINATE 50 MG/1
TABLET, EXTENDED RELEASE ORAL
Qty: 90 TABLET | Refills: 0 | Status: SHIPPED | OUTPATIENT
Start: 2023-03-02 | End: 2023-06-05

## 2023-03-02 NOTE — TELEPHONE ENCOUNTER
----- Message from Ashlie oTro NP sent at 3/1/2023 10:07 PM CST -----  HA1C is improved.   Cholesterol is improved but still high.  I recommend a heart healthy diet rich in fiber, fresh vegetables and fruit and low in saturated fats (fried foods, red meat, etc.).  I also recommend regular exercise.   Follow up as recommended.

## 2023-03-02 NOTE — TELEPHONE ENCOUNTER
Refill Routing Note   Medication(s) are not appropriate for processing by [facility] for the following reason(s):       Required vitals abnormal    [facility] action(s):  Defer         Appointments  past 12m or future 3m with PCP    Date Provider   Last Visit   10/31/2022 Blane Mancini III, MD   Next Visit   4/6/2023 Blane Mancini III, MD   ED visits in past 90 days: 0        Note composed:9:55 AM 03/02/2023

## 2023-03-02 NOTE — TELEPHONE ENCOUNTER
No new care gaps identified.  [facility] Embedded Care Gaps. Reference number: [id number]. 3/02/2023   9:41:51 AM CST

## 2023-03-02 NOTE — TELEPHONE ENCOUNTER
----- Message from Nika Mosley sent at 3/2/2023 10:59 AM CST -----  Type:  Patient Returning Call    Who Called:  pt  Who Left Message for Patient:  Marycarmen  Does the patient know what this is regarding?:  no  Best Call Back Number:  [phone] (home) [phone] (work)    Additional Information:  please call and advise--thank you

## 2023-03-02 NOTE — PROGRESS NOTES
HA1C is improved.   Cholesterol is improved but still high.  I recommend a heart healthy diet rich in fiber, fresh vegetables and fruit and low in saturated fats (fried foods, red meat, etc.).  I also recommend regular exercise.   Follow up as recommended.

## 2023-04-04 ENCOUNTER — TELEPHONE (OUTPATIENT)
Dept: FAMILY MEDICINE | Facility: CLINIC | Age: 73
End: 2023-04-04
Payer: MEDICARE

## 2023-04-05 NOTE — TELEPHONE ENCOUNTER
----- Message from Ghislaine Lawrence sent at 4/4/2023 11:09 AM CDT -----  Contact: pt  Type: Needs Medical Advice         Who Called: pt  Best Call Back Number:[phone]  Additional Information: Requesting a call back regarding pt is asking if she needs labs for her upcoming appt. If so were the orders sent to [facility] . Pt did do labs on 03/01  Please Advise- Thank you

## 2023-04-06 ENCOUNTER — TELEPHONE (OUTPATIENT)
Dept: FAMILY MEDICINE | Facility: CLINIC | Age: 73
End: 2023-04-06

## 2023-04-06 ENCOUNTER — OFFICE VISIT (OUTPATIENT)
Dept: FAMILY MEDICINE | Facility: CLINIC | Age: 73
End: 2023-04-06
Payer: MEDICARE

## 2023-04-06 VITALS
DIASTOLIC BLOOD PRESSURE: 74 MMHG | WEIGHT: 198.31 LBS | HEART RATE: 83 BPM | SYSTOLIC BLOOD PRESSURE: 132 MMHG | HEIGHT: 65 IN | BODY MASS INDEX: 33.04 KG/M2 | TEMPERATURE: 98 F | OXYGEN SATURATION: 96 %

## 2023-04-06 DIAGNOSIS — F41.0 PANIC ATTACKS: ICD-10-CM

## 2023-04-06 DIAGNOSIS — E11.29 TYPE 2 DIABETES MELLITUS WITH MICROALBUMINURIA, WITH LONG-TERM CURRENT USE OF INSULIN: ICD-10-CM

## 2023-04-06 DIAGNOSIS — Z00.00 PHYSICAL EXAM: Primary | ICD-10-CM

## 2023-04-06 DIAGNOSIS — Z79.4 TYPE 2 DIABETES MELLITUS WITH MICROALBUMINURIA, WITH LONG-TERM CURRENT USE OF INSULIN: ICD-10-CM

## 2023-04-06 DIAGNOSIS — G25.0 BENIGN ESSENTIAL TREMOR: ICD-10-CM

## 2023-04-06 DIAGNOSIS — E78.5 HYPERLIPIDEMIA, UNSPECIFIED HYPERLIPIDEMIA TYPE: ICD-10-CM

## 2023-04-06 DIAGNOSIS — F41.9 ANXIETY: ICD-10-CM

## 2023-04-06 DIAGNOSIS — E11.42 TYPE 2 DIABETES MELLITUS WITH DIABETIC POLYNEUROPATHY, WITH LONG-TERM CURRENT USE OF INSULIN: ICD-10-CM

## 2023-04-06 DIAGNOSIS — Z79.4 TYPE 2 DIABETES MELLITUS WITH DIABETIC POLYNEUROPATHY, WITH LONG-TERM CURRENT USE OF INSULIN: ICD-10-CM

## 2023-04-06 DIAGNOSIS — M54.50 ACUTE LOW BACK PAIN, UNSPECIFIED BACK PAIN LATERALITY, UNSPECIFIED WHETHER SCIATICA PRESENT: ICD-10-CM

## 2023-04-06 DIAGNOSIS — R80.9 TYPE 2 DIABETES MELLITUS WITH MICROALBUMINURIA, WITH LONG-TERM CURRENT USE OF INSULIN: ICD-10-CM

## 2023-04-06 DIAGNOSIS — F33.9 DEPRESSION, RECURRENT: ICD-10-CM

## 2023-04-06 DIAGNOSIS — N18.31 CHRONIC KIDNEY DISEASE, STAGE 3A: ICD-10-CM

## 2023-04-06 PROCEDURE — 99213 OFFICE O/P EST LOW 20 MIN: CPT | Mod: 25,S$GLB,, | Performed by: FAMILY MEDICINE

## 2023-04-06 PROCEDURE — 99213 PR OFFICE/OUTPT VISIT, EST, LEVL III, 20-29 MIN: ICD-10-PCS | Mod: 25,S$GLB,, | Performed by: FAMILY MEDICINE

## 2023-04-06 RX ORDER — PRIMIDONE 50 MG/1
TABLET ORAL
COMMUNITY
End: 2023-04-06 | Stop reason: SDUPTHER

## 2023-04-06 RX ORDER — PRIMIDONE 50 MG/1
50 TABLET ORAL 3 TIMES DAILY
Qty: 90 TABLET | Refills: 2 | Status: SHIPPED | OUTPATIENT
Start: 2023-04-06 | End: 2023-07-07

## 2023-04-06 RX ORDER — ALPRAZOLAM 0.5 MG/1
0.5 TABLET ORAL 2 TIMES DAILY
Qty: 30 TABLET | Refills: 0 | Status: SHIPPED | OUTPATIENT
Start: 2023-04-06 | End: 2023-07-07 | Stop reason: SDUPTHER

## 2023-04-06 RX ORDER — EZETIMIBE 10 MG/1
10 TABLET ORAL DAILY
Qty: 90 TABLET | Refills: 1 | Status: ON HOLD | OUTPATIENT
Start: 2023-04-06 | End: 2024-03-13

## 2023-04-06 RX ORDER — EZETIMIBE 10 MG/1
10 TABLET ORAL DAILY
COMMUNITY
End: 2023-04-06 | Stop reason: SDUPTHER

## 2023-04-07 NOTE — PROGRESS NOTES
Subjective:       Patient ID: Debby Brown is a 72 y.o. female.    Chief Complaint: Annual Exam and Back Pain    Some issues with benign tremor Effexor writing.  Uses p.r.n. Xanax does help.  Panic attacks uses it p.r.n. for that also.  Diabetes mellitus type 2 with polyneuropathy on insulin.  Could not get Ozempic due to the cost.  BMI is 33.  Diabetes mellitus type 2 with microalbuminuria on insulin.  And hyperlipidemia.  She did not tolerate Crestor.  A1c 7.9.  Cholesterol 252.  HDL 46  GFR is 53.7.    Social history few beers or wine.  Retired now.  No smoking.  Little exercise.      Family history no changes.    Past medical history.  Benign tremor.  Anxiety and panic attacks.  Diabetes mellitus type 2 with polyneuropathy on insulin.  Positive microalbumin.  BMI of 33.  Hyperlipidemia.  Statin intolerance.    Review of systems in general feeling okay.  Psych depression is okay.  Pulmonary no cough or sputum.  Cardiovascular chest pain or palpitations.  GI no nausea vomiting or diarrhea.   no dysuria frequency urgency or hematuria.  Neuro tremor as stated.  Musculoskeletal some lower back pain.  Occurs with standing and walking.  Knots up.  Legs are okay.  No radiation there.      Physical examination.  Vital signs are noted.  Neck without bruit.  No adenopathy.  Chest clear.  Heart regular rate rhythm.  2/6 systolic ejection murmur.  Prior echo normal.  Abdomen bowel sounds are positive soft nontender no guarding or rebound.          Objective:        Assessment:       1. Physical exam    2. Anxiety    3. Acute low back pain, unspecified back pain laterality, unspecified whether sciatica present    4. Benign essential tremor    5. Panic attacks    6. Type 2 diabetes mellitus with diabetic polyneuropathy, with long-term current use of insulin    7. BMI 33.0-33.9,adult    8. Hyperlipidemia, unspecified hyperlipidemia type    9. Type 2 diabetes mellitus with microalbuminuria, with long-term current use  of insulin    10. Chronic kidney disease, stage 3a    11. Depression, recurrent        Plan:       Physical exam    Anxiety    Acute low back pain, unspecified back pain laterality, unspecified whether sciatica present  -     Cancel: POCT Urinalysis(Instrument)  -     X-Ray Lumbar Spine 5 View; Future; Expected date: 04/06/2023  -     Ambulatory referral/consult to Physical/Occupational Therapy; Future; Expected date: 04/13/2023    Benign essential tremor    Panic attacks    Type 2 diabetes mellitus with diabetic polyneuropathy, with long-term current use of insulin    BMI 33.0-33.9,adult    Hyperlipidemia, unspecified hyperlipidemia type    Type 2 diabetes mellitus with microalbuminuria, with long-term current use of insulin    Chronic kidney disease, stage 3a    Depression, recurrent    Other orders  -     ALPRAZolam (XANAX) 0.5 MG tablet; Take 1 tablet (0.5 mg total) by mouth 2 (two) times daily. as needed for anxiety.  Dispense: 30 tablet; Refill: 0  -     primidone (MYSOLINE) 50 MG Tab; Take 1 tablet (50 mg total) by mouth 3 (three) times daily.  Dispense: 90 tablet; Refill: 2  -     ezetimibe (ZETIA) 10 mg tablet; Take 1 tablet (10 mg total) by mouth once daily.  Dispense: 90 tablet; Refill: 1    In addition to physical.  Needs refill of Xanax for tremor and p.r.n. for panic attacks.  Brother also has tremor.  Little relief with Klonopin before.  Also lumbar back pain.  No injury.  But hurts with standing and walking.  No radiation into the legs.    Physical examination.  Vital signs noted.  Chest clear.  Heart regular rate and rhythm with 2/6 systolic ejection murmur.  Finger-to-nose normal.  No resting tremor.  Back pain is around L5-S1 in the midline.  Straight leg raising is negative.      Impression.  Anxiety and panic.  Benign familial tremor.  Lumbar back pain.      Plan physical therapy to the lumbar spine.  X-ray the lumbar spine.  Try Zetia for her hyperlipidemia.  Diet weight reduction.  Primidone  50 mg t.i.d. for tremor.  Follow-up on this in a month.  Xanax 0.5 p.r.n. number 30

## 2023-04-10 ENCOUNTER — HOSPITAL ENCOUNTER (OUTPATIENT)
Dept: RADIOLOGY | Facility: HOSPITAL | Age: 73
Discharge: HOME | End: 2023-04-10
Attending: FAMILY MEDICINE
Payer: MEDICARE

## 2023-04-10 DIAGNOSIS — M54.50 ACUTE LOW BACK PAIN, UNSPECIFIED BACK PAIN LATERALITY, UNSPECIFIED WHETHER SCIATICA PRESENT: ICD-10-CM

## 2023-04-10 PROCEDURE — 72110 X-RAY EXAM L-2 SPINE 4/>VWS: CPT | Mod: TC

## 2023-05-12 ENCOUNTER — CLINICAL SUPPORT (OUTPATIENT)
Dept: REHABILITATION | Facility: HOSPITAL | Age: 73
End: 2023-05-12
Payer: MEDICARE

## 2023-05-12 DIAGNOSIS — M54.50 ACUTE LOW BACK PAIN, UNSPECIFIED BACK PAIN LATERALITY, UNSPECIFIED WHETHER SCIATICA PRESENT: ICD-10-CM

## 2023-05-12 PROCEDURE — 97110 THERAPEUTIC EXERCISES: CPT | Mod: PN

## 2023-05-12 PROCEDURE — 97161 PT EVAL LOW COMPLEX 20 MIN: CPT | Mod: PN

## 2023-05-12 NOTE — PLAN OF CARE
[facility]   Physical Therapy Initial Evaluation      Name: Debby Brown  Clinic Number: [id number]    Therapy Diagnosis:   Encounter Diagnosis   Name Primary?    Acute low back pain, unspecified back pain laterality, unspecified whether sciatica present         Physician: Blane Mancini III, *    Physician Orders: PT Eval and Treat   Medical Diagnosis from Referral: Acute LBP,unspecified laterality,unspecified if sciatica present  Evaluation Date: 5/12/2023  Authorization Period Expiration: 4/5/24  Plan of Care Expiration: 7/14/23  Progress Note Due: 6/11/23  Visit # / Visits authorized: 1/ 1   FOTO: 68/100    Precautions: Standard Hx of l/sx ~1990.    Time In: 1006  Time Out: 1150  Total Appointment Time (timed & untimed codes): 45 minutes    Subjective     Date of onset: insidious,chronic problem    History of current condition - Debby reports: chronic LBP for many yrs.She underwent lumbar sx ~ 1990.Pain on and of since then.Recent onset of of LBP started ~ 2 months ago without trauma,gradually getting worse.Pt reports difficulties with ADLs,functional activities,homemaking.     Falls: no    Imaging: see epic:     Prior Therapy: Long time ago after sx.  Social History: in house lives alone, 2 dogs.   Occupation: Not working.  Prior Level of Function: Independent.  Current Level of Function: Modified independent.    Pain:  Current 0/10, worst 5/10, best 0/10   Location: bilateral back    Description: Aching, Dull, Deep, and Variable  Aggravating Factors: Standing, Bending, Walking, Extension, Flexing, and Lifting  Easing Factors: relaxation, lying down, and rest    Patients goals: no pain,restore previous LOF.     Medical History:   Past Medical History:   Diagnosis Date    Allergy     Anxiety     Depression     Diabetes mellitus, type 2     Hyperlipidemia     Hypertension     Pre-diabetes        Surgical History:   Debby Brown  has a past surgical history that includes  Shoulder surgery; Back surgery; Breast surgery; lymphnode remove; Gallbladder surgery; Hernia repair; Appendectomy; Colonoscopy; Tubal ligation; and Cholecystectomy.    Medications:   Debby has a current medication list which includes the following prescription(s): alprazolam, bupropion, ezetimibe, lisinopril, metoprolol succinate, pen needle, diabetic, primidone, rosuvastatin, toujeo solostar u-300 insulin, true metrix glucose meter, true metrix glucose test strip, and trueplus lancets.    Allergies:   Review of patient's allergies indicates:   Allergen Reactions    Bactrim [sulfamethoxazole-trimethoprim]     Codeine     Levaquin [levofloxacin]     Pcn [penicillins]         Objective        Cervical/Thoracic/Lumbar AROM: Pain/Dysfunction with Movement:   Flexion  40    Extension  10    Right side bending 15    Left side bending  15    Right rotation  10    Left rotation  10      Strength of l/spine is grossly 3-/5 in all planes.  Posture;scoliosis,flexed hips.  Special tests;axial loading;neg.  Slump test;neg.  SLR;LT;80, RT;80; neg.  FADIR;NEG  Yaneth's;neg.  Palpation;L2-L5 paraspinals tender bilaterally.     Limitation/Restriction for FOTO lumbar Survey    Therapist reviewed FOTO scores for Debby Brown on 5/12/2023.   FOTO documents entered into EPIC - see Media section.    Limitation Score: 32%         Treatment     Total Treatment time (time-based codes) separate from Evaluation: 8 minutes     Debby received the treatments listed below:      therapeutic exercises to develop strength for 8 minutes including:  Bike 8'.    Patient Education and Home Exercises     Education provided:   - Role of PT.POC.    Assessment     Debby is a 73 y.o. female referred to outpatient Physical Therapy with a medical diagnosis of acute LBP. Patient presents with ROM and strength deficits,poor posture,impaired function due to pain and above listed deficits.    Patient prognosis is Good.   Patient will benefit from skilled  outpatient Physical Therapy to address the deficits stated above and in the chart below, provide patient /family education, and to maximize patientt's level of independence.     Plan of care discussed with patient: Yes  Patient's spiritual, cultural and educational needs considered and patient is agreeable to the plan of care and goals as stated below:     Anticipated Barriers for therapy: no    Medical Necessity is demonstrated by the following  History  Co-morbidities and personal factors that may impact the plan of care [x] LOW: no personal factors / co-morbidities  [] MODERATE: 1-2 personal factors / co-morbidities  [] HIGH: 3+ personal factors / co-morbidities    Moderate / High Support Documentation:      Examination  Body Structures and Functions, activity limitations and participation restrictions that may impact the plan of care [x] LOW: addressing 1-2 elements  [] MODERATE: 3+ elements  [] HIGH: 4+ elements (please support below)    Moderate / High Support Documentation:      Clinical Presentation [x] LOW: stable  [] MODERATE: Evolving  [] HIGH: Unstable     Decision Making/ Complexity Score: low       Goals:    SHORT TERM GOALS:  3 weeks  Progress Date met   Recent signs and systems trend is improving in order to progress towards Long term goals.  [] Met  [] Not Met  [] Progressing    Patient will be independent with Home Exercise Program  in order to further progress and return to maximal function. [] Met  [] Not Met  [] Progressing    Pain rating at Worst: 4 /10 in order to progress towards increased independence with activity. [] Met  [] Not Met  [] Progressing    Patient will be able to correct postural deviations in sitting and standing, to decrease pain and promote postural awareness for injury prevention.  [] Met  [] Not Met  [] Progressing    Patient will improve functional outcome (FOTO) score: by 5% to increase self-worth & perceived functional ability towards long term goals [] Met  [] Not  Met  [] Progressing      LONG TERM GOALS: 6 weeks Progress Date met   Patient will return to normal activites of daily living, recreational, and work related activities with less pain and limitation.  [] Met  [] Not Met  [] Progressing    Patient will improve range of motion  to stated goals in order to return to maximal functional potential. ROM WFL/WNL in all planes. [] Met  [] Not Met  [] Progressing    Patient will improve Strength to stated goals of appropriate musculature in order to improve functional independence. Strength 5/5 in all planes. [] Met  [] Not Met  [] Progressing    Pain Rating at Best:0-2/10 to improve Quality of Life.  [] Met  [] Not Met  [] Progressing    Patient will meet predicted functional outcome (FOTO) score: 30% to increase self-worth & perceived functional ability. [] Met  [] Not Met  [] Progressing    Patient will have met/partially met personal goal of: no pain and restore previous LOF. [] Met  [] Not Met  [] Progressing       Plan     Plan of care Certification: 5/12/2023 to 7/14/23.    Outpatient Physical Therapy 2 times weekly for 6 weeks to include the following interventions: Electrical Stimulation PRN, Manual Therapy, Moist Heat/ Ice, Patient Education, Therapeutic Activities, Therapeutic Exercise, and dry needling PRN.IMS PRN. .     Raj Damico, PT

## 2023-05-18 ENCOUNTER — CLINICAL SUPPORT (OUTPATIENT)
Dept: REHABILITATION | Facility: HOSPITAL | Age: 73
End: 2023-05-18
Payer: MEDICARE

## 2023-05-18 DIAGNOSIS — M54.50 LOW BACK PAIN, UNSPECIFIED BACK PAIN LATERALITY, UNSPECIFIED CHRONICITY, UNSPECIFIED WHETHER SCIATICA PRESENT: Primary | ICD-10-CM

## 2023-05-18 PROCEDURE — 97110 THERAPEUTIC EXERCISES: CPT | Mod: PN

## 2023-05-18 NOTE — PROGRESS NOTES
[facility]   Physical Therapy Treatment Note      Name: Debby MOLINA [facility] [place]  Clinic Number: [id number]    Therapy Diagnosis:   Encounter Diagnosis   Name Primary?    Low back pain, unspecified back pain laterality, unspecified chronicity, unspecified whether sciatica present Yes     Physician: Blane Mancini III, *    Visit Date: 5/18/2023       Physician Orders: PT Eval and Treat   Medical Diagnosis from Referral: Acute LBP,unspecified laterality,unspecified if sciatica present  Evaluation Date: 5/12/2023  Authorization Period Expiration: 4/5/24  Plan of Care Expiration: 7/14/23  Progress Note Due: 6/11/23  Visit # / Visits authorized: 1/ 1   FOTO: 68/100     Precautions: Standard Hx of l/sx ~1990.     PTA Visit #: 0/5     Time In: 1500  Time Out: 1555  Total Billable Time: 45 minutes    Subjective     Pt reports: no new c/o.  She was compliant with home exercise program.  Response to previous treatment: first visit after eval  Functional change: not reported    Pain: 1/10  Location: bilateral back      Objective      Objective Measures updated at progress report unless specified.     Treatment     Debby received the treatments listed below:      therapeutic exercises to develop strength, endurance, ROM, and flexibility for 45 minutes including:  Bike 10'  HS stretch 3x30  Piriformis stretch 3x30  Supine;  LTR 2x30  DKTC 30  PPT 3X10  Bridging 3x10  Hip ADD 30 w/ball      Patient Education and Home Exercises       Education provided:   - Posture ed      Assessment     Performed ex well    Debby Is progressing well towards her goals.   Pt prognosis is Good.     Pt will continue to benefit from skilled outpatient physical therapy to address the deficits listed in the problem list box on initial evaluation, provide pt/family education and to maximize pt's level of independence in the home and community environment.     Pt's spiritual, cultural and educational needs considered and pt  agreeable to plan of care and goals.     Anticipated barriers to physical therapy: no    Goals:   SHORT TERM GOALS:  3 weeks  Progress Date met   Recent signs and systems trend is improving in order to progress towards Long term goals.  [] Met  [] Not Met  [] Progressing     Patient will be independent with Home Exercise Program  in order to further progress and return to maximal function. [] Met  [] Not Met  [] Progressing     Pain rating at Worst: 4 /10 in order to progress towards increased independence with activity. [] Met  [] Not Met  [] Progressing     Patient will be able to correct postural deviations in sitting and standing, to decrease pain and promote postural awareness for injury prevention.  [] Met  [] Not Met  [] Progressing     Patient will improve functional outcome (FOTO) score: by 5% to increase self-worth & perceived functional ability towards long term goals [] Met  [] Not Met  [] Progressing        LONG TERM GOALS: 6 weeks Progress Date met   Patient will return to normal activites of daily living, recreational, and work related activities with less pain and limitation.  [] Met  [] Not Met  [] Progressing     Patient will improve range of motion  to stated goals in order to return to maximal functional potential. ROM WFL/WNL in all planes. [] Met  [] Not Met  [] Progressing     Patient will improve Strength to stated goals of appropriate musculature in order to improve functional independence. Strength 5/5 in all planes. [] Met  [] Not Met  [] Progressing     Pain Rating at Best:0-2/10 to improve Quality of Life.  [] Met  [] Not Met  [] Progressing     Patient will meet predicted functional outcome (FOTO) score: 30% to increase self-worth & perceived functional ability. [] Met  [] Not Met  [] Progressing     Patient will have met/partially met personal goal of: no pain and restore previous LOF. [] Met  [] Not Met  [] Progressing        Plan     Progress as able.    Raj Damico, PT

## 2023-05-22 ENCOUNTER — CLINICAL SUPPORT (OUTPATIENT)
Dept: REHABILITATION | Facility: HOSPITAL | Age: 73
End: 2023-05-22
Payer: MEDICARE

## 2023-05-22 DIAGNOSIS — M54.50 LOW BACK PAIN, UNSPECIFIED BACK PAIN LATERALITY, UNSPECIFIED CHRONICITY, UNSPECIFIED WHETHER SCIATICA PRESENT: Primary | ICD-10-CM

## 2023-05-22 PROCEDURE — 97110 THERAPEUTIC EXERCISES: CPT | Mod: PN

## 2023-05-22 NOTE — PROGRESS NOTES
[facility]   Physical Therapy Treatment Note      Name: Debby MOLINA [facility]  Clinic Number: [id number]    Therapy Diagnosis:   No diagnosis found.    Physician: Blane Mancini III, *    Visit Date: 5/22/2023       Physician Orders: PT Eval and Treat   Medical Diagnosis from Referral: Acute LBP,unspecified laterality,unspecified if sciatica present  Evaluation Date: 5/12/2023  Authorization Period Expiration: 4/5/24  Plan of Care Expiration: 7/14/23  Progress Note Due: 6/11/23  Visit # / Visits authorized: 1/ 1   FOTO: 68/100     Precautions: Standard Hx of l/sx ~1990.     PTA Visit #: 0/5     Time In:  0933   (Pnt arrived late)  Time Out: 1026  Total Billable Time: 53 minutes    Subjective     Pt reports: no new c/o.  She was compliant with home exercise program.  Response to previous treatment: first visit after eval  Functional change: not reported    Pain: 0/10  Location: bilateral back      Objective      Objective Measures updated at progress report unless specified.     Treatment     Debby received the treatments listed below:      therapeutic exercises to develop strength, enduranc'e, ROM, and flexibility for 53 minutes including:  Bike 10'  HS stretch 3x30  Piriformis stretch 3x30  Seated PB rollout   x20  Supine;  LTR 2x30  DKTC 30  Hip abduction with red tb   3x10  PPT 3X10  Bridging 3x10  Hip ADD 30 w/ball  Standing gastroc stretch 3x30 secs B;    Patient Education and Home Exercises       Education provided:   - Posture ed      Assessment     Performed ex well    Debby Is progressing well towards her goals.   Pt prognosis is Good.     Pt will continue to benefit from skilled outpatient physical therapy to address the deficits listed in the problem list box on initial evaluation, provide pt/family education and to maximize pt's level of independence in the home and community environment.     Pt's spiritual, cultural and educational needs considered and pt agreeable to plan of  care and goals.     Anticipated barriers to physical therapy: no    Goals:   SHORT TERM GOALS:  3 weeks  Progress Date met   Recent signs and systems trend is improving in order to progress towards Long term goals.  [] Met  [] Not Met  [x] Progressing     Patient will be independent with Home Exercise Program  in order to further progress and return to maximal function. [] Met  [] Not Met  [x] Progressing     Pain rating at Worst: 4 /10 in order to progress towards increased independence with activity. [] Met  [] Not Met  [x] Progressing     Patient will be able to correct postural deviations in sitting and standing, to decrease pain and promote postural awareness for injury prevention.  [] Met  [] Not Met  [x] Progressing     Patient will improve functional outcome (FOTO) score: by 5% to increase self-worth & perceived functional ability towards long term goals [] Met  [] Not Met  [x] Progressing        LONG TERM GOALS: 6 weeks Progress Date met   Patient will return to normal activites of daily living, recreational, and work related activities with less pain and limitation.  [] Met  [] Not Met  [x] Progressing     Patient will improve range of motion  to stated goals in order to return to maximal functional potential. ROM WFL/WNL in all planes. [] Met  [] Not Met  [x] Progressing     Patient will improve Strength to stated goals of appropriate musculature in order to improve functional independence. Strength 5/5 in all planes. [] Met  [] Not Met  [x] Progressing     Pain Rating at Best:0-2/10 to improve Quality of Life.  [] Met  [] Not Met  [x] Progressing     Patient will meet predicted functional outcome (FOTO) score: 30% to increase self-worth & perceived functional ability. [] Met  [] Not Met  [x] Progressing     Patient will have met/partially met personal goal of: no pain and restore previous LOF. [] Met  [] Not Met  [x] Progressing        Plan     Progress as able.    Charlie Brower, PT

## 2023-05-30 ENCOUNTER — CLINICAL SUPPORT (OUTPATIENT)
Dept: REHABILITATION | Facility: HOSPITAL | Age: 73
End: 2023-05-30
Payer: MEDICARE

## 2023-05-30 DIAGNOSIS — M54.50 LOW BACK PAIN, UNSPECIFIED BACK PAIN LATERALITY, UNSPECIFIED CHRONICITY, UNSPECIFIED WHETHER SCIATICA PRESENT: Primary | ICD-10-CM

## 2023-05-30 PROCEDURE — 97110 THERAPEUTIC EXERCISES: CPT | Mod: PN

## 2023-05-30 NOTE — PROGRESS NOTES
[facility]   Physical Therapy Treatment Note      Name: Debby MOLINA [facility] [place]  Clinic Number: [id number]    Therapy Diagnosis:   No diagnosis found.    Physician: Blane Mancini III, *    Visit Date: 5/30/2023       Physician Orders: PT Eval and Treat   Medical Diagnosis from Referral: Acute LBP,unspecified laterality,unspecified if sciatica present  Evaluation Date: 5/12/2023  Authorization Period Expiration: 4/5/24  Plan of Care Expiration: 7/14/23  Progress Note Due: 6/11/23  Visit # / Visits authorized: 1/ 1   FOTO: 68/100     Precautions: Standard Hx of l/sx ~1990.     PTA Visit #: 0/5     Time In:  1300  Time Out: 1355  Total Billable Time: 45 minutes    Subjective     Pt reports: no new c/o.  She was compliant with home exercise program.  Response to previous treatment: first visit after eval  Functional change: not reported    Pain: 0/10  Location: bilateral back      Objective      Objective Measures updated at progress report unless specified.     Treatment     Debby received the treatments listed below:      therapeutic exercises to develop strength, enduranc'e, ROM, and flexibility for 53 minutes including:  Bike 10'  HS stretch 3x30  Piriformis stretch 3x30  Seated PB rollout   x20  Supine;  LTR 2x30  DKTC 30  Hip abduction with red tb   3x10  PPT 3X10  Bridging 3x10  Hip ADD 30 w/ball  Standing gastroc stretch 3x30 secs B;    Patient Education and Home Exercises       Education provided:   - Posture ed      Assessment     Performed ex well    Debby Is progressing well towards her goals.   Pt prognosis is Good.     Pt will continue to benefit from skilled outpatient physical therapy to address the deficits listed in the problem list box on initial evaluation, provide pt/family education and to maximize pt's level of independence in the home and community environment.     Pt's spiritual, cultural and educational needs considered and pt agreeable to plan of care and goals.      Anticipated barriers to physical therapy: no    Goals:   SHORT TERM GOALS:  3 weeks  Progress Date met   Recent signs and systems trend is improving in order to progress towards Long term goals.  [] Met  [] Not Met  [x] Progressing     Patient will be independent with Home Exercise Program  in order to further progress and return to maximal function. [] Met  [] Not Met  [x] Progressing     Pain rating at Worst: 4 /10 in order to progress towards increased independence with activity. [] Met  [] Not Met  [x] Progressing     Patient will be able to correct postural deviations in sitting and standing, to decrease pain and promote postural awareness for injury prevention.  [] Met  [] Not Met  [x] Progressing     Patient will improve functional outcome (FOTO) score: by 5% to increase self-worth & perceived functional ability towards long term goals [] Met  [] Not Met  [x] Progressing        LONG TERM GOALS: 6 weeks Progress Date met   Patient will return to normal activites of daily living, recreational, and work related activities with less pain and limitation.  [] Met  [] Not Met  [x] Progressing     Patient will improve range of motion  to stated goals in order to return to maximal functional potential. ROM WFL/WNL in all planes. [] Met  [] Not Met  [x] Progressing     Patient will improve Strength to stated goals of appropriate musculature in order to improve functional independence. Strength 5/5 in all planes. [] Met  [] Not Met  [x] Progressing     Pain Rating at Best:0-2/10 to improve Quality of Life.  [] Met  [] Not Met  [x] Progressing     Patient will meet predicted functional outcome (FOTO) score: 30% to increase self-worth & perceived functional ability. [] Met  [] Not Met  [x] Progressing     Patient will have met/partially met personal goal of: no pain and restore previous LOF. [] Met  [] Not Met  [x] Progressing        Plan     Progress as able.    Raj Damico, PT

## 2023-06-02 ENCOUNTER — CLINICAL SUPPORT (OUTPATIENT)
Dept: REHABILITATION | Facility: HOSPITAL | Age: 73
End: 2023-06-02
Payer: MEDICARE

## 2023-06-02 DIAGNOSIS — M54.50 ACUTE LOW BACK PAIN, UNSPECIFIED BACK PAIN LATERALITY, UNSPECIFIED WHETHER SCIATICA PRESENT: Primary | ICD-10-CM

## 2023-06-02 PROCEDURE — 97112 NEUROMUSCULAR REEDUCATION: CPT | Mod: PN,CQ

## 2023-06-02 PROCEDURE — 97110 THERAPEUTIC EXERCISES: CPT | Mod: PN,CQ

## 2023-06-14 ENCOUNTER — TELEPHONE (OUTPATIENT)
Dept: FAMILY MEDICINE | Facility: CLINIC | Age: 73
End: 2023-06-14
Payer: MEDICARE

## 2023-06-14 ENCOUNTER — CLINICAL SUPPORT (OUTPATIENT)
Dept: REHABILITATION | Facility: HOSPITAL | Age: 73
End: 2023-06-14
Payer: MEDICARE

## 2023-06-14 DIAGNOSIS — M54.50 ACUTE LOW BACK PAIN, UNSPECIFIED BACK PAIN LATERALITY, UNSPECIFIED WHETHER SCIATICA PRESENT: Primary | ICD-10-CM

## 2023-06-14 PROCEDURE — 97110 THERAPEUTIC EXERCISES: CPT | Mod: PN

## 2023-06-14 PROCEDURE — 97112 NEUROMUSCULAR REEDUCATION: CPT | Mod: PN

## 2023-06-14 NOTE — PROGRESS NOTES
"[facility]   Physical Therapy Treatment Note      Name: Debby MOLINA [facility]  Clinic Number: [id number]    Therapy Diagnosis:   Encounter Diagnosis   Name Primary?    Acute low back pain, unspecified back pain laterality, unspecified whether sciatica present Yes       Physician: Blane Mancini III, *    Visit Date: 6/14/2023       Physician Orders: PT Eval and Treat   Medical Diagnosis from Referral: Acute LBP,unspecified laterality,unspecified if sciatica present  Evaluation Date: 5/12/2023  Authorization Period Expiration: 12/31/2023  Plan of Care Expiration: 7/14/23  Progress Note Due: 6/11/23  Visit # / Visits authorized: 6/ 20 (Plan of Care 4/20)  FOTO: 68/100     Precautions: Standard Hx of l/sx ~1990.     PTA Visit #: 0/5     Time In:  11 am   Time Out: 1155 am  Total Billable Time: 55 minutes  Total Unbillable Time: 0 minutes    Subjective     Pt reports: she's doing much better. Feels like we are on the right track  She has not received a Home exercise program prior to today.   Response to previous treatment: first visit after eval  Functional change: not reported    Pain: 0/10  Location: bilateral back      Objective      Objective Measures updated at progress report unless specified.     Treatment     Debby received the treatments listed below:      therapeutic exercises to develop strength, enduranc'e, ROM, and flexibility for 35 minutes including:  Neuromuscular re-education 18 minutes    Bike 10', level 3    Seated exercises:  physioball  rollout  x 20 reps (Neuromuscular re-education)  Hamstring stretch 3 x 30 seconds   Piriformis stretch 3 x 30 seconds       Supine exercises:  Lower trunk rotation with Physio-ball  x 30 reps   Double knee to chest with Physio-ball x 30 reps   Hip abduction with green  theraband   3 x 10 reps   Posterior pelvic tilts  3"H x 10 reps (Neuromuscular re-education)  Supine marching with posterior pelvic tilt x 10 (NMR)  Bridging 3 x 10 reps green " thera-band (Neuromuscular re-education)  Hip adduction x 30 reps with ball (Neuromuscular re-education)    Side-lying open books x 10 ea  Side-lying clams x 15 ea      Standing exercises:  gastroc stretch 3 x 30 seconds bilateral lower extremity    Patient Education and Home Exercises       Education provided:   -Posture education  -apply ice as needed for delayed muscle soreness  -Continue with Home exercise program daily       Assessment   Patient with good tolerance to treatment this date. Patient provided good effort and participation in therapy with increased focus on core stabilization. Able to progress without complaint     Debby Is progressing well towards her goals.   Pt prognosis is Good.     Pt will continue to benefit from skilled outpatient physical therapy to address the deficits listed in the problem list box on initial evaluation, provide pt/family education and to maximize pt's level of independence in the home and community environment.     Pt's spiritual, cultural and educational needs considered and pt agreeable to plan of care and goals.     Anticipated barriers to physical therapy: no    Goals:   SHORT TERM GOALS:  3 weeks  Progress Date met   Recent signs and systems trend is improving in order to progress towards Long term goals.  [] Met  [] Not Met  [x] Progressing     Patient will be independent with Home Exercise Program  in order to further progress and return to maximal function. [] Met  [] Not Met  [x] Progressing     Pain rating at Worst: 4 /10 in order to progress towards increased independence with activity. [] Met  [] Not Met  [x] Progressing     Patient will be able to correct postural deviations in sitting and standing, to decrease pain and promote postural awareness for injury prevention.  [] Met  [] Not Met  [x] Progressing     Patient will improve functional outcome (FOTO) score: by 5% to increase self-worth & perceived functional ability towards long term goals [] Met  [] Not  Met  [x] Progressing        LONG TERM GOALS: 6 weeks Progress Date met   Patient will return to normal activites of daily living, recreational, and work related activities with less pain and limitation.  [] Met  [] Not Met  [x] Progressing     Patient will improve range of motion  to stated goals in order to return to maximal functional potential. ROM WFL/WNL in all planes. [] Met  [] Not Met  [x] Progressing     Patient will improve Strength to stated goals of appropriate musculature in order to improve functional independence. Strength 5/5 in all planes. [] Met  [] Not Met  [x] Progressing     Pain Rating at Best:0-2/10 to improve Quality of Life.  [] Met  [] Not Met  [x] Progressing     Patient will meet predicted functional outcome (FOTO) score: 30% to increase self-worth & perceived functional ability. [] Met  [] Not Met  [x] Progressing     Patient will have met/partially met personal goal of: no pain and restore previous LOF. [] Met  [] Not Met  [x] Progressing        Plan     Plan of care Certification: 5/12/2023 to 7/14/23.     Outpatient Physical Therapy 2 times weekly for 6 weeks to include the following interventions: Electrical Stimulation PRN, Manual Therapy, Moist Heat/ Ice, Patient Education, Therapeutic Activities, Therapeutic Exercise, and dry needling PRN.IMS PRN. .     Progress as able.    Katelyn Doshi, PT

## 2023-06-15 NOTE — TELEPHONE ENCOUNTER
----- Message from Emily Dozier sent at 6/14/2023  9:29 AM CDT -----  Contact: pt  Pt is calling and would like a call back   Please give pt a call back [phone]

## 2023-07-06 ENCOUNTER — TELEPHONE (OUTPATIENT)
Dept: FAMILY MEDICINE | Facility: CLINIC | Age: 73
End: 2023-07-06
Payer: MEDICARE

## 2023-07-06 DIAGNOSIS — E78.5 HYPERLIPIDEMIA, UNSPECIFIED HYPERLIPIDEMIA TYPE: ICD-10-CM

## 2023-07-06 DIAGNOSIS — E11.42 TYPE 2 DIABETES MELLITUS WITH DIABETIC POLYNEUROPATHY, WITH LONG-TERM CURRENT USE OF INSULIN: ICD-10-CM

## 2023-07-06 DIAGNOSIS — Z79.4 TYPE 2 DIABETES MELLITUS WITH DIABETIC POLYNEUROPATHY, WITH LONG-TERM CURRENT USE OF INSULIN: ICD-10-CM

## 2023-07-06 DIAGNOSIS — I10 HYPERTENSION, ESSENTIAL: Primary | ICD-10-CM

## 2023-07-06 NOTE — TELEPHONE ENCOUNTER
----- Message from Ghislaine Lawrence sent at 7/6/2023 10:38 AM CDT -----  Contact: pt  Type: Needs Medical Advice         Who Called: pt  Best Call Back Number:[phone]  Additional Information: Requesting a call back regarding pt is asking if she was to have labs for her appt tomorrow   Please Advise- Thank you

## 2023-07-07 ENCOUNTER — LAB VISIT (OUTPATIENT)
Dept: LAB | Facility: HOSPITAL | Age: 73
End: 2023-07-07
Attending: FAMILY MEDICINE
Payer: MEDICARE

## 2023-07-07 ENCOUNTER — OFFICE VISIT (OUTPATIENT)
Dept: FAMILY MEDICINE | Facility: CLINIC | Age: 73
End: 2023-07-07
Payer: MEDICARE

## 2023-07-07 VITALS
OXYGEN SATURATION: 93 % | BODY MASS INDEX: 33.59 KG/M2 | SYSTOLIC BLOOD PRESSURE: 138 MMHG | HEART RATE: 92 BPM | TEMPERATURE: 99 F | WEIGHT: 201.63 LBS | DIASTOLIC BLOOD PRESSURE: 86 MMHG | HEIGHT: 65 IN

## 2023-07-07 DIAGNOSIS — E78.5 HYPERLIPIDEMIA, UNSPECIFIED HYPERLIPIDEMIA TYPE: ICD-10-CM

## 2023-07-07 DIAGNOSIS — R10.9 ABDOMINAL PAIN, UNSPECIFIED ABDOMINAL LOCATION: ICD-10-CM

## 2023-07-07 DIAGNOSIS — G25.0 BENIGN ESSENTIAL TREMOR: ICD-10-CM

## 2023-07-07 DIAGNOSIS — I10 HYPERTENSION, ESSENTIAL: ICD-10-CM

## 2023-07-07 DIAGNOSIS — E11.42 TYPE 2 DIABETES MELLITUS WITH DIABETIC POLYNEUROPATHY, WITH LONG-TERM CURRENT USE OF INSULIN: ICD-10-CM

## 2023-07-07 DIAGNOSIS — Z79.4 TYPE 2 DIABETES MELLITUS WITH DIABETIC POLYNEUROPATHY, WITH LONG-TERM CURRENT USE OF INSULIN: ICD-10-CM

## 2023-07-07 DIAGNOSIS — J01.00 ACUTE MAXILLARY SINUSITIS, RECURRENCE NOT SPECIFIED: ICD-10-CM

## 2023-07-07 DIAGNOSIS — I10 HYPERTENSION, ESSENTIAL: Primary | ICD-10-CM

## 2023-07-07 DIAGNOSIS — R80.9 TYPE 2 DIABETES MELLITUS WITH MICROALBUMINURIA, WITH LONG-TERM CURRENT USE OF INSULIN: ICD-10-CM

## 2023-07-07 DIAGNOSIS — F41.0 PANIC ATTACKS: ICD-10-CM

## 2023-07-07 DIAGNOSIS — E11.29 TYPE 2 DIABETES MELLITUS WITH MICROALBUMINURIA, WITH LONG-TERM CURRENT USE OF INSULIN: ICD-10-CM

## 2023-07-07 DIAGNOSIS — F41.9 ANXIETY: ICD-10-CM

## 2023-07-07 DIAGNOSIS — I35.0 AORTIC VALVE STENOSIS, ETIOLOGY OF CARDIAC VALVE DISEASE UNSPECIFIED: ICD-10-CM

## 2023-07-07 DIAGNOSIS — Z79.4 TYPE 2 DIABETES MELLITUS WITH MICROALBUMINURIA, WITH LONG-TERM CURRENT USE OF INSULIN: ICD-10-CM

## 2023-07-07 PROBLEM — Z71.82 EXERCISE COUNSELING: Status: RESOLVED | Noted: 2018-11-29 | Resolved: 2023-07-07

## 2023-07-07 LAB
ALBUMIN SERPL BCP-MCNC: 3.7 G/DL (ref 3.5–5.2)
ALP SERPL-CCNC: 63 U/L (ref 55–135)
ALT SERPL W/O P-5'-P-CCNC: 16 U/L (ref 10–44)
ANION GAP SERPL CALC-SCNC: 5 MMOL/L (ref 8–16)
ANION GAP SERPL CALC-SCNC: 5 MMOL/L (ref 8–16)
AST SERPL-CCNC: 14 U/L (ref 10–40)
BASOPHILS # BLD AUTO: 0.08 K/UL (ref 0–0.2)
BASOPHILS NFR BLD: 0.7 % (ref 0–1.9)
BILIRUB SERPL-MCNC: 0.9 MG/DL (ref 0.1–1)
BUN SERPL-MCNC: 18 MG/DL (ref 8–23)
BUN SERPL-MCNC: 18 MG/DL (ref 8–23)
CALCIUM SERPL-MCNC: 9.2 MG/DL (ref 8.7–10.5)
CALCIUM SERPL-MCNC: 9.2 MG/DL (ref 8.7–10.5)
CHLORIDE SERPL-SCNC: 108 MMOL/L (ref 95–110)
CHLORIDE SERPL-SCNC: 108 MMOL/L (ref 95–110)
CHOLEST SERPL-MCNC: 189 MG/DL (ref 120–199)
CHOLEST/HDLC SERPL: 4.3 {RATIO} (ref 2–5)
CO2 SERPL-SCNC: 24 MMOL/L (ref 23–29)
CO2 SERPL-SCNC: 24 MMOL/L (ref 23–29)
CREAT SERPL-MCNC: 1 MG/DL (ref 0.5–1.4)
CREAT SERPL-MCNC: 1 MG/DL (ref 0.5–1.4)
DIFFERENTIAL METHOD: ABNORMAL
EOSINOPHIL # BLD AUTO: 0.4 K/UL (ref 0–0.5)
EOSINOPHIL NFR BLD: 3.7 % (ref 0–8)
ERYTHROCYTE [DISTWIDTH] IN BLOOD BY AUTOMATED COUNT: 14 % (ref 11.5–14.5)
EST. GFR  (NO RACE VARIABLE): 59.9 ML/MIN/1.73 M^2
EST. GFR  (NO RACE VARIABLE): 59.9 ML/MIN/1.73 M^2
ESTIMATED AVG GLUCOSE: 189 MG/DL (ref 68–131)
GLUCOSE SERPL-MCNC: 151 MG/DL (ref 70–110)
GLUCOSE SERPL-MCNC: 151 MG/DL (ref 70–110)
HBA1C MFR BLD: 8.2 % (ref 4.5–6.2)
HCT VFR BLD AUTO: 44.3 % (ref 37–48.5)
HDLC SERPL-MCNC: 44 MG/DL (ref 40–75)
HDLC SERPL: 23.3 % (ref 20–50)
HGB BLD-MCNC: 14.5 G/DL (ref 12–16)
IMM GRANULOCYTES # BLD AUTO: 0.06 K/UL (ref 0–0.04)
IMM GRANULOCYTES NFR BLD AUTO: 0.5 % (ref 0–0.5)
LDLC SERPL CALC-MCNC: 113.4 MG/DL (ref 63–159)
LYMPHOCYTES # BLD AUTO: 3.8 K/UL (ref 1–4.8)
LYMPHOCYTES NFR BLD: 33.2 % (ref 18–48)
MCH RBC QN AUTO: 29 PG (ref 27–31)
MCHC RBC AUTO-ENTMCNC: 32.7 G/DL (ref 32–36)
MCV RBC AUTO: 89 FL (ref 82–98)
MONOCYTES # BLD AUTO: 0.9 K/UL (ref 0.3–1)
MONOCYTES NFR BLD: 8.1 % (ref 4–15)
NEUTROPHILS # BLD AUTO: 6.1 K/UL (ref 1.8–7.7)
NEUTROPHILS NFR BLD: 53.8 % (ref 38–73)
NONHDLC SERPL-MCNC: 145 MG/DL
NRBC BLD-RTO: 0 /100 WBC
PLATELET # BLD AUTO: 301 K/UL (ref 150–450)
PMV BLD AUTO: 10.3 FL (ref 9.2–12.9)
POTASSIUM SERPL-SCNC: 4 MMOL/L (ref 3.5–5.1)
POTASSIUM SERPL-SCNC: 4 MMOL/L (ref 3.5–5.1)
PROT SERPL-MCNC: 7.7 G/DL (ref 6–8.4)
RBC # BLD AUTO: 5 M/UL (ref 4–5.4)
SODIUM SERPL-SCNC: 137 MMOL/L (ref 136–145)
SODIUM SERPL-SCNC: 137 MMOL/L (ref 136–145)
TRIGL SERPL-MCNC: 158 MG/DL (ref 30–150)
WBC # BLD AUTO: 11.3 K/UL (ref 3.9–12.7)

## 2023-07-07 PROCEDURE — 36415 COLL VENOUS BLD VENIPUNCTURE: CPT | Performed by: FAMILY MEDICINE

## 2023-07-07 PROCEDURE — 83036 HEMOGLOBIN GLYCOSYLATED A1C: CPT | Performed by: FAMILY MEDICINE

## 2023-07-07 PROCEDURE — 80053 COMPREHEN METABOLIC PANEL: CPT | Performed by: FAMILY MEDICINE

## 2023-07-07 PROCEDURE — 80061 LIPID PANEL: CPT | Performed by: FAMILY MEDICINE

## 2023-07-07 PROCEDURE — 85025 COMPLETE CBC W/AUTO DIFF WBC: CPT | Performed by: FAMILY MEDICINE

## 2023-07-07 PROCEDURE — 99214 PR OFFICE/OUTPT VISIT, EST, LEVL IV, 30-39 MIN: ICD-10-PCS | Mod: S$GLB,,, | Performed by: FAMILY MEDICINE

## 2023-07-07 PROCEDURE — 99214 OFFICE O/P EST MOD 30 MIN: CPT | Mod: S$GLB,,, | Performed by: FAMILY MEDICINE

## 2023-07-07 RX ORDER — DOXYCYCLINE 100 MG/1
100 CAPSULE ORAL EVERY 12 HOURS
Qty: 20 CAPSULE | Refills: 0 | Status: SHIPPED | OUTPATIENT
Start: 2023-07-07 | End: 2024-02-19

## 2023-07-07 RX ORDER — CALCIUM CITRATE/VITAMIN D3 200MG-6.25
TABLET ORAL
Qty: 100 EACH | Refills: 5 | Status: SHIPPED | OUTPATIENT
Start: 2023-07-07

## 2023-07-07 RX ORDER — ALPRAZOLAM 0.5 MG/1
0.5 TABLET ORAL 2 TIMES DAILY
Qty: 30 TABLET | Refills: 2 | Status: SHIPPED | OUTPATIENT
Start: 2023-07-07 | End: 2023-10-06 | Stop reason: SDUPTHER

## 2023-07-07 RX ORDER — GLUCOSAM/CHON-MSM1/C/MANG/BOSW 500-416.6
TABLET ORAL
Qty: 100 EACH | Refills: 5 | Status: SHIPPED | OUTPATIENT
Start: 2023-07-07

## 2023-07-07 NOTE — PROGRESS NOTES
Subjective:       Patient ID: Debby Brown is a 73 y.o. female.    Chief Complaint: Follow-up    Patient presents to clinic for follow up and medication refills. Hypertension stable. Cardiovascular ok. Denies chest pain or palpitations. Type 2 DM on insulin. 70 U Trujeo. Positive microalbumin. . A1C pending. Not checking glucose at home. Requesting prescription for lancets and strips. Hyperlipidemia. Cholesterol 189 HDL 44 . Better control. BMI 33. CKD 3a. GFR 60. Anxiety and panic disorder. Xanax PRN.  checked. Benign essential tremor. D/C'd Primidone after one dose. Could not tolerate. Discussed starting Propanol and D/C Metoprolol.     In addition to follow up complaining of sinus pain. Congestion no rhinitis. Denies fever or chills. Also complaining of abdominal pain for 2 days. Nonbothersome at this time. No diarrhea or constipation.   Review of Systems   HENT:  Positive for sinus pressure/congestion.    Respiratory: Negative.     Cardiovascular: Negative.  Negative for chest pain and palpitations.   Gastrointestinal:  Positive for abdominal pain.   Neurological:  Positive for tremors.   Psychiatric/Behavioral: Negative.         Objective:      Physical Exam  Constitutional:       Appearance: Normal appearance.   Neck:      Vascular: No carotid bruit.   Cardiovascular:      Rate and Rhythm: Normal rate and regular rhythm.      Pulses: Normal pulses.      Heart sounds: Murmur heard.   Systolic murmur is present.   Pulmonary:      Effort: Pulmonary effort is normal.      Breath sounds: Normal breath sounds.   Abdominal:      General: Abdomen is flat. Bowel sounds are normal.      Palpations: Abdomen is soft.      Tenderness: There is no abdominal tenderness.   Musculoskeletal:      Right lower leg: No edema.      Left lower leg: No edema.   Lymphadenopathy:      Cervical: No cervical adenopathy.   Skin:     General: Skin is warm and dry.   Neurological:      General: No focal deficit present.       Mental Status: She is alert.      Motor: Tremor present.      Coordination: Coordination normal. Finger-Nose-Finger Test normal.      Comments: Slight tremor at rest left hand  Normal finger to nose    Psychiatric:         Mood and Affect: Mood normal.         Behavior: Behavior normal.       Assessment/Plan:     Hypertension, essential    Hyperlipidemia, unspecified hyperlipidemia type    Anxiety    Panic attacks    Benign essential tremor    BMI 33.0-33.9,adult    Type 2 diabetes mellitus with microalbuminuria, with long-term current use of insulin  -     TRUEPLUS LANCETS 30 gauge Misc; U QAM  Dispense: 100 each; Refill: 5  -     TRUE METRIX GLUCOSE TEST STRIP Strp; U QAM  Dispense: 100 each; Refill: 5    Aortic valve stenosis, etiology of cardiac valve disease unspecified    Acute maxillary sinusitis, recurrence not specified    Abdominal pain, unspecified abdominal location    Other orders  -     ALPRAZolam (XANAX) 0.5 MG tablet; Take 1 tablet (0.5 mg total) by mouth 2 (two) times daily. as needed for anxiety.  Dispense: 30 tablet; Refill: 2  -     doxycycline (VIBRAMYCIN) 100 MG Cap; Take 1 capsule (100 mg total) by mouth every 12 (twelve) hours.  Dispense: 20 capsule; Refill: 0       Glucose test strips lancets and supplies sent to [facility].  Xanax 0.5 daily maximum 30 with 2 refills.  Vibramycin 100 mg b.i.d. for 10 days for her sinus symptoms.  Follow-up here in 3 months.  Check an A1c.  Call if her abdominal discomfort returns.  Stay off the primidone since she had side effects from it.         no

## 2023-07-13 ENCOUNTER — OFFICE VISIT (OUTPATIENT)
Dept: FAMILY MEDICINE | Facility: CLINIC | Age: 73
End: 2023-07-13
Payer: MEDICARE

## 2023-07-13 VITALS
BODY MASS INDEX: 32.46 KG/M2 | HEART RATE: 82 BPM | HEIGHT: 65 IN | WEIGHT: 194.81 LBS | TEMPERATURE: 99 F | DIASTOLIC BLOOD PRESSURE: 80 MMHG | OXYGEN SATURATION: 99 % | SYSTOLIC BLOOD PRESSURE: 124 MMHG

## 2023-07-13 DIAGNOSIS — Z79.4 TYPE 2 DIABETES MELLITUS WITH MICROALBUMINURIA, WITH LONG-TERM CURRENT USE OF INSULIN: ICD-10-CM

## 2023-07-13 DIAGNOSIS — R40.0 DROWSINESS: ICD-10-CM

## 2023-07-13 DIAGNOSIS — E11.29 TYPE 2 DIABETES MELLITUS WITH MICROALBUMINURIA, WITH LONG-TERM CURRENT USE OF INSULIN: ICD-10-CM

## 2023-07-13 DIAGNOSIS — R10.32 LLQ PAIN: ICD-10-CM

## 2023-07-13 DIAGNOSIS — R80.9 TYPE 2 DIABETES MELLITUS WITH MICROALBUMINURIA, WITH LONG-TERM CURRENT USE OF INSULIN: ICD-10-CM

## 2023-07-13 DIAGNOSIS — R10.9 ABDOMINAL PAIN, UNSPECIFIED ABDOMINAL LOCATION: ICD-10-CM

## 2023-07-13 DIAGNOSIS — I10 HYPERTENSION, ESSENTIAL: Primary | ICD-10-CM

## 2023-07-13 DIAGNOSIS — Z79.4 TYPE 2 DIABETES MELLITUS WITH DIABETIC POLYNEUROPATHY, WITH LONG-TERM CURRENT USE OF INSULIN: ICD-10-CM

## 2023-07-13 DIAGNOSIS — E11.42 TYPE 2 DIABETES MELLITUS WITH DIABETIC POLYNEUROPATHY, WITH LONG-TERM CURRENT USE OF INSULIN: ICD-10-CM

## 2023-07-13 PROCEDURE — 99214 PR OFFICE/OUTPT VISIT, EST, LEVL IV, 30-39 MIN: ICD-10-PCS | Mod: S$GLB,,, | Performed by: FAMILY MEDICINE

## 2023-07-13 PROCEDURE — 99214 OFFICE O/P EST MOD 30 MIN: CPT | Mod: S$GLB,,, | Performed by: FAMILY MEDICINE

## 2023-07-13 RX ORDER — DAPAGLIFLOZIN 5 MG/1
5 TABLET, FILM COATED ORAL DAILY
COMMUNITY
End: 2023-07-13 | Stop reason: SDUPTHER

## 2023-07-13 RX ORDER — METRONIDAZOLE 250 MG/1
250 TABLET ORAL 3 TIMES DAILY
Qty: 30 TABLET | Refills: 0 | Status: SHIPPED | OUTPATIENT
Start: 2023-07-13 | End: 2024-02-19

## 2023-07-13 RX ORDER — CEFUROXIME AXETIL 250 MG/1
250 TABLET ORAL 2 TIMES DAILY
Qty: 20 TABLET | Refills: 0 | Status: SHIPPED | OUTPATIENT
Start: 2023-07-13 | End: 2024-02-19

## 2023-07-13 RX ORDER — CEFUROXIME AXETIL 250 MG/1
250 TABLET ORAL 2 TIMES DAILY
COMMUNITY
End: 2023-07-13 | Stop reason: SDUPTHER

## 2023-07-13 RX ORDER — DAPAGLIFLOZIN 5 MG/1
5 TABLET, FILM COATED ORAL DAILY
Qty: 90 TABLET | Refills: 1 | Status: SHIPPED | OUTPATIENT
Start: 2023-07-13 | End: 2024-02-19 | Stop reason: SDUPTHER

## 2023-07-14 ENCOUNTER — TELEPHONE (OUTPATIENT)
Dept: FAMILY MEDICINE | Facility: CLINIC | Age: 73
End: 2023-07-14
Payer: MEDICARE

## 2023-07-14 NOTE — TELEPHONE ENCOUNTER
Please change from Farxiga $600 co payment  She cannot afford it        ----- Message from Ghislaine Lawrence sent at 7/14/2023  3:11 PM CDT -----  Contact: pt  Type: Needs Medical Advice         Who Called: pt  Best Call Back Number:[phone]  Additional Information: Requesting a call back regarding  pt said the rx for the dapagliflozin propanediol (FARXIGA) 5 mg Tab tablet is $600 with her insurance. Pt is asking if there is something else she can take that will be covered without the high copay.       [facility] [address] [place] [address] - [address] [place] [id number]  Phone: [phone] Fax: [phone]        Please Advise- Thank you

## 2023-07-17 NOTE — PROGRESS NOTES
Subjective:       Patient ID: Debby Brown is a 73 y.o. female.    Chief Complaint: Follow-up and Abdominal Pain    Drowsiness.  No fever chills.  On doxycycline now.  Hypertension is controlled.  Abdominal pain lower abdomen.  Left lower quadrant.  No dysuria frequency urgency hematuria.  Bowel movements are normal.  Colonoscopy November of 2018.  Done by Dr. Mohsen Tovar.  Has several drug allergies but is able to take Ceftin.  Diabetes mellitus type 2 with positive microalbumin on insulin.  7 units daily.  Glucose 151 A1c up to 8.2 from 7.9.  Intolerant of metformin.  Cholesterol 189 with HDL of 44 .  GFR is 60.      Physical examination.  Vital signs noted.  Neck without bruit no adenopathy.  Chest clear.  Heart regular rate rhythm.  Abdomen bowel sounds are positive slightly tender in the left lower quadrant.      Objective:        Assessment:       1. Hypertension, essential    2. Abdominal pain, unspecified abdominal location    3. LLQ pain    4. Type 2 diabetes mellitus with microalbuminuria, with long-term current use of insulin    5. Type 2 diabetes mellitus with diabetic polyneuropathy, with long-term current use of insulin    6. Drowsiness        Plan:       Hypertension, essential    Abdominal pain, unspecified abdominal location    LLQ pain    Type 2 diabetes mellitus with microalbuminuria, with long-term current use of insulin    Type 2 diabetes mellitus with diabetic polyneuropathy, with long-term current use of insulin    Drowsiness    Other orders  -     metroNIDAZOLE (FLAGYL) 250 MG tablet; Take 1 tablet (250 mg total) by mouth 3 (three) times daily.  Dispense: 30 tablet; Refill: 0  -     cefUROXime (CEFTIN) 250 MG tablet; Take 1 tablet (250 mg total) by mouth 2 (two) times daily.  Dispense: 20 tablet; Refill: 0  -     dapagliflozin propanediol (FARXIGA) 5 mg Tab tablet; Take 1 tablet (5 mg total) by mouth once daily.  Dispense: 90 tablet; Refill: 1    Discontinue the Vibramycin.  Start  Ceftin 250 b.i.d. for 10 days.  Flagyl 250 t.i.d. for 10 days.  Farxiga 5 mg daily 90 with a refill.  Same insulin dose.  Recheck abdomen 10 days.

## 2023-08-01 DIAGNOSIS — Z79.4 TYPE 2 DIABETES MELLITUS WITHOUT COMPLICATION, WITH LONG-TERM CURRENT USE OF INSULIN: ICD-10-CM

## 2023-08-01 DIAGNOSIS — E11.9 TYPE 2 DIABETES MELLITUS WITHOUT COMPLICATION, WITH LONG-TERM CURRENT USE OF INSULIN: ICD-10-CM

## 2023-08-01 RX ORDER — PEN NEEDLE, DIABETIC 30 GX3/16"
NEEDLE, DISPOSABLE MISCELLANEOUS
Qty: 100 EACH | Refills: 5 | Status: SHIPPED | OUTPATIENT
Start: 2023-08-01

## 2023-08-01 NOTE — TELEPHONE ENCOUNTER
No care due was identified.  [facility] Embedded Care Due Messages. Reference number: [id number].   8/01/2023 11:59:04 AM CDT

## 2023-08-31 ENCOUNTER — HOSPITAL ENCOUNTER (EMERGENCY)
Facility: HOSPITAL | Age: 73
Discharge: HOME | End: 2023-08-31
Attending: STUDENT IN AN ORGANIZED HEALTH CARE EDUCATION/TRAINING PROGRAM
Payer: MEDICARE

## 2023-08-31 VITALS
HEIGHT: 67 IN | TEMPERATURE: 98 F | DIASTOLIC BLOOD PRESSURE: 66 MMHG | WEIGHT: 185 LBS | OXYGEN SATURATION: 95 % | RESPIRATION RATE: 16 BRPM | SYSTOLIC BLOOD PRESSURE: 148 MMHG | HEART RATE: 79 BPM | BODY MASS INDEX: 29.03 KG/M2

## 2023-08-31 DIAGNOSIS — S32.020A COMPRESSION FRACTURE OF L2 VERTEBRA, INITIAL ENCOUNTER: ICD-10-CM
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
## 2023-08-31 DIAGNOSIS — M54.50 ACUTE LOW BACK PAIN, UNSPECIFIED BACK PAIN LATERALITY, UNSPECIFIED WHETHER SCIATICA PRESENT: ICD-10-CM

## 2023-08-31 DIAGNOSIS — S32.010A COMPRESSION FRACTURE OF L1 VERTEBRA, INITIAL ENCOUNTER: Primary | ICD-10-CM

## 2023-08-31 PROCEDURE — 25000003 PHARM REV CODE 250

## 2023-08-31 PROCEDURE — 99284 EMERGENCY DEPT VISIT MOD MDM: CPT | Mod: 25

## 2023-08-31 RX ORDER — HYDROCODONE BITARTRATE AND ACETAMINOPHEN 5; 325 MG/1; MG/1
1 TABLET ORAL EVERY 6 HOURS PRN
Qty: 12 TABLET | Refills: 0 | Status: SHIPPED | OUTPATIENT
Start: 2023-08-31 | End: 2023-08-31 | Stop reason: SDUPTHER

## 2023-08-31 RX ORDER — LIDOCAINE 50 MG/G
1 PATCH TOPICAL DAILY
Qty: 5 PATCH | Refills: 0 | Status: SHIPPED | OUTPATIENT
Start: 2023-08-31 | End: 2023-08-31 | Stop reason: SDUPTHER

## 2023-08-31 RX ORDER — LIDOCAINE 50 MG/G
1 PATCH TOPICAL
Status: DISCONTINUED | OUTPATIENT
Start: 2023-08-31 | End: 2023-08-31 | Stop reason: HOSPADM

## 2023-08-31 RX ORDER — METHOCARBAMOL 500 MG/1
1000 TABLET, FILM COATED ORAL 3 TIMES DAILY
Qty: 18 TABLET | Refills: 0 | Status: SHIPPED | OUTPATIENT
Start: 2023-08-31 | End: 2023-08-31 | Stop reason: SDUPTHER

## 2023-08-31 RX ORDER — METHOCARBAMOL 500 MG/1
1000 TABLET, FILM COATED ORAL 3 TIMES DAILY
Qty: 18 TABLET | Refills: 0 | Status: SHIPPED | OUTPATIENT
Start: 2023-08-31 | End: 2023-09-03

## 2023-08-31 RX ORDER — ACETAMINOPHEN 500 MG
1000 TABLET ORAL
Status: COMPLETED | OUTPATIENT
Start: 2023-08-31 | End: 2023-08-31

## 2023-08-31 RX ORDER — TRAMADOL HYDROCHLORIDE 50 MG/1
50 TABLET ORAL EVERY 6 HOURS PRN
Qty: 12 TABLET | Refills: 0 | Status: SHIPPED | OUTPATIENT
Start: 2023-08-31 | End: 2023-09-03

## 2023-08-31 RX ORDER — METHOCARBAMOL 500 MG/1
1000 TABLET, FILM COATED ORAL
Status: COMPLETED | OUTPATIENT
Start: 2023-08-31 | End: 2023-08-31

## 2023-08-31 RX ORDER — LIDOCAINE 50 MG/G
1 PATCH TOPICAL DAILY
Qty: 5 PATCH | Refills: 0 | Status: SHIPPED | OUTPATIENT
Start: 2023-08-31 | End: 2023-09-05

## 2023-08-31 RX ORDER — HYDROCODONE BITARTRATE AND ACETAMINOPHEN 5; 325 MG/1; MG/1
1 TABLET ORAL EVERY 6 HOURS PRN
Qty: 12 TABLET | Refills: 0 | Status: SHIPPED | OUTPATIENT
Start: 2023-08-31 | End: 2023-08-31 | Stop reason: RX

## 2023-08-31 RX ADMIN — METHOCARBAMOL 1000 MG: 500 TABLET ORAL at 10:08

## 2023-08-31 RX ADMIN — ACETAMINOPHEN 1000 MG: 500 TABLET ORAL at 10:08

## 2023-08-31 RX ADMIN — LIDOCAINE 5% 1 PATCH: 700 PATCH TOPICAL at 10:08

## 2023-08-31 NOTE — ED PROVIDER NOTES
Encounter Date: 8/31/2023       History     Chief Complaint   Patient presents with    Back Pain     Pts dog jumped on her and pulled her to the ground     Patient is a 73 y.o. female with past medical history of diabetes and hypertension who presents to ED via self for concern for low back pain which began around 845 this morning.  Patient reports she was bringing her dog to the vet and the dog got nervous and pulled her.  Patient reports she went down to her knees and got drug a few feet.  Patient denies hitting her head or loss of consciousness.  Patient reports she has pain in her lower back radiating all the way across.  Patient reports a few months ago she was in PT for lower back pain but denies injury at that time.  Patient reports in the 90s she had surgery on her lumbar spine for herniated disc.  Patient denies fever.  Patient denies saddle anesthesia.  Patient denies loss of bowel or bladder.  Patient is awake and alert in no acute distress.      Review of patient's allergies indicates:   Allergen Reactions    Bactrim [sulfamethoxazole-trimethoprim]     Codeine     Levaquin [levofloxacin]     Pcn [penicillins]      Past Medical History:   Diagnosis Date    Allergy     Anxiety     Depression     Diabetes mellitus, type 2     Hyperlipidemia     Hypertension     Pre-diabetes      Past Surgical History:   Procedure Laterality Date    APPENDECTOMY      BACK SURGERY      BREAST SURGERY      CHOLECYSTECTOMY      COLONOSCOPY      GALLBLADDER SURGERY      HERNIA REPAIR      lymphnode remove      SHOULDER SURGERY      TUBAL LIGATION       Family History   Problem Relation Age of Onset    Mental illness Mother     Cancer Mother     Depression Mother     Heart disease Mother     Hypertension Mother     Stroke Mother     Heart disease Father     Hypertension Father     Stroke Father      Social History     Tobacco Use    Smoking status: Former    Smokeless tobacco: Never   Substance Use Topics    Alcohol use: Yes      Comment: rare    Drug use: No     Review of Systems   Constitutional: Negative.  Negative for fever.   HENT: Negative.     Respiratory: Negative.  Negative for cough and shortness of breath.    Cardiovascular: Negative.  Negative for chest pain.   Gastrointestinal: Negative.    Genitourinary: Negative.    Musculoskeletal:  Positive for back pain. Negative for neck pain and neck stiffness.   Skin:  Negative for color change, pallor and rash.   Neurological: Negative.  Negative for weakness.   Hematological:  Does not bruise/bleed easily.   Psychiatric/Behavioral: Negative.         Physical Exam     Initial Vitals [08/31/23 1005]   BP Pulse Resp Temp SpO2   (!) 170/80 69 20 98.3 °F (36.8 °C) 97 %      MAP       --         Physical Exam    Nursing note and vitals reviewed.  Constitutional: She appears well-developed and well-nourished. She is not diaphoretic. No distress.   HENT:   Head: Normocephalic and atraumatic.   Right Ear: External ear normal.   Left Ear: External ear normal.   Nose: Nose normal.   Eyes: EOM are normal.   Neck:   Normal range of motion.  Cardiovascular:  Normal rate, regular rhythm, normal heart sounds and intact distal pulses.     Exam reveals no gallop and no friction rub.       No murmur heard.  Pulmonary/Chest: Breath sounds normal. No respiratory distress. She has no wheezes. She has no rhonchi. She has no rales. She exhibits no tenderness.   Musculoskeletal:      Cervical back: Normal and normal range of motion.      Thoracic back: Normal.      Lumbar back: Tenderness and bony tenderness present. No swelling, edema, deformity or lacerations. Decreased range of motion. Negative right straight leg raise test and negative left straight leg raise test.        Back:       Right foot: Normal pulse.      Left foot: Normal pulse.     Neurological: She is alert and oriented to person, place, and time. She has normal strength. GCS score is 15. GCS eye subscore is 4. GCS verbal subscore is 5. GCS  motor subscore is 6.   Skin: Skin is warm and dry. Capillary refill takes less than 2 seconds.   Psychiatric: She has a normal mood and affect. Her behavior is normal. Judgment and thought content normal.         ED Course   Procedures  Labs Reviewed - No data to display       Imaging Results              CT Lumbar Spine Without Contrast (Final result)  Result time 08/31/23 12:00:34      Final result by Kolton Godfrey MD (08/31/23 12:00:34)                   Narrative:    CT LUMBAR SPINE WITHOUT CONTRAST    CLINICAL DATA: Trauma, lower back pain    CMS MANDATED QUALITY DATA - CT RADIATION  436    All CT scans at this facility utilize dose modulation, iterative reconstruction, and/or weight based dosing when appropriate to reduce radiation dose to as low as reasonably achievable.    Findings: Thin section axial images were obtained, with reformatted imaging in the sagittal and coronal planes. Comparison is made to radiographs from April 10, 2023.    Best demonstrated on sagittal reformatted images, acute superior endplate fractures of L1 and L2 are noted. The L1 fracture is associated with mild depression of the superior endplate. Posterior elements are intact and there is no osseous retropulsion. The superior endplate fracture of L2 is associated with minimal endplate invagination. Posterior elements are intact. There is no osseous retropulsion.    No other fractures are demonstrated. Minimal L3 anterolisthesis is chronic and unchanged. Alignment on all of the levels is normal.    At the L2-3 level, there is moderate to severe bilateral degenerative facet hypertrophy with mild broad-based disc bulging and mild spinal stenosis. The disc bulge is asymmetrically prominent in a right lateral and far lateral distribution, with probable mild right L2 nerve root impingement.    At the L3-4 level, there is severe degenerative facet hypertrophy. In combination with broad-based disc bulge and L3 anterolisthesis, there is  probable severe spinal stenosis. There is marked disc space narrowing.    At L4-5, there is severe bilateral degenerative facet hypertrophy. In combination with dorsal osteophytic ridging, there is moderate narrowing of the spinal canal.    At L5-S1, there is severe disc space narrowing with mild dorsal osteophytic ridging and moderate degenerative facet hypertrophy. There is no high-grade spinal stenosis or definite nerve root impingement.    IMPRESSION:  1. Acute mild superior endplate compression deformities of L1 and L2 as above. Posterior elements appear intact and there is no osseous retropulsion.  2. Multilevel lumbar degenerative disc/facet disease. Please see details as reported at each level above.    Electronically signed by:  Kolton Godfrey MD  8/31/2023 12:00 PM CDT Workstation: [id number]                                     Medications   methocarbamoL tablet 1,000 mg (1,000 mg Oral Given 8/31/23 1050)   acetaminophen tablet 1,000 mg (1,000 mg Oral Given 8/31/23 1050)     Medical Decision Making  MDM    Patient presents for emergent evaluation of acute back pain that poses a possible threat to life and/or bodily function.    Differential diagnosis included but not limited to fracture, dislocation, subluxation, musculoskeletal pain.  In the ED patient found to have acute lumbar pain to palpation with pain to palpation to the left and right lower back.  Patient has normal strength in bilateral lower extremities with normal pedal pulses and posterior tibial pulses bilaterally.  Patient has normal cap refill and sensation distally.  Patient denies saddle anesthesia or loss of bowel or bladder.  Patient afebrile while in the ED. patient has clear lung sounds bilaterally with no increased work of breathing on exam.      Discharge MDM  I discussed the patient presentation CT findings with my attending Dr. Rosado.    Patient was managed in the ED with oral robaxin, tylenol, and topical lidocaine patch.     The response to treatment was resolution of pain.   Case management contacted to obtain a back brace for patient. Soni Griffith [facility] reported that she was able to set up a home visit for the patient to get her fitted for a TSLO brace.  Patient was discharged in stable condition with close follow up with Neurosurgery.  Detailed return precautions discussed to return to the ED for saddle anesthesia, muscle weakness, loss of bowel or bladder, inability urinate or have bowel, fever, or any new or worsening concerns.  Patient states understanding.    Amount and/or Complexity of Data Reviewed  Radiology: ordered.     Details: CT significant for IMPRESSION:  1. Acute mild superior endplate compression deformities of L1 and L2 as above. Posterior elements appear intact and there is no osseous retropulsion.  2. Multilevel lumbar degenerative disc/facet disease. Please see details as reported at each level above.    Risk  OTC drugs.  Prescription drug management.    [facility]           ED Course as of 08/31/23 1731   Thu Aug 31, 2023   1250 On reassessment patient denies pain.  Updated patient that we are working on getting her a back brace for discharge. [MP]      ED Course User Index  [MP] Maty Alvarez NP                    Clinical Impression:   Final diagnoses:  [S32.010A] Compression fracture of L1 vertebra, initial encounter (Primary)  [S32.020A] Compression fracture of L2 vertebra, initial encounter  [W19.XXXA] Fall, initial encounter        ED Disposition Condition    Discharge Stable          ED Prescriptions       Medication Sig Dispense Start Date End Date Auth. Provider    HYDROcodone-acetaminophen (NORCO) 5-325 mg per tablet  (Status: Discontinued) Take 1 tablet by mouth every 6 (six) hours as needed for Pain. 12 tablet 8/31/2023 8/31/2023 Maty Alvarez NP    methocarbamoL (ROBAXIN) 500 MG Tab  (Status: Discontinued) Take 2 tablets (1,000 mg total) by mouth 3 (three) times daily. for 3 days 18 tablet  8/31/2023 8/31/2023 Maty Alvarez NP    LIDOcaine (LIDODERM) 5 %  (Status: Discontinued) Place 1 patch onto the skin once daily. Remove & Discard patch within 12 hours, do not apply more than 1 patch in a 24 hour period for 5 days 5 patch 8/31/2023 8/31/2023 Maty Alvarez NP    HYDROcodone-acetaminophen (NORCO) 5-325 mg per tablet Take 1 tablet by mouth every 6 (six) hours as needed for Pain. 12 tablet 8/31/2023 9/3/2023 Maty Alvarez NP    LIDOcaine (LIDODERM) 5 % Place 1 patch onto the skin once daily. Remove & Discard patch within 12 hours, do not apply more than 1 patch in a 24 hour period for 5 days 5 patch 8/31/2023 9/5/2023 Maty Alvarez NP    methocarbamoL (ROBAXIN) 500 MG Tab Take 2 tablets (1,000 mg total) by mouth 3 (three) times daily. for 3 days 18 tablet 8/31/2023 9/3/2023 Maty Alvarez NP          Follow-up Information       Follow up With Specialties Details Why Contact Info Additional Information    Walter Wade MD Neurosurgery Schedule an appointment as soon as possible for a visit  For recheck/continuing care [facility]  [place] [address]  [phone]       [facility] - Emergency Dept Emergency Medicine  If symptoms worsen [address]  [facility] [address] [phone] 1st floor             Maty Alvarez NP  08/31/23 1034

## 2023-08-31 NOTE — PLAN OF CARE
[facility] can be called for set up at [phone] once order for brace has been entered. CHRISSY updated nurse for order. CM will update THEA Shaver to arrange delivery to patient. Soni Griffith, CARYW    2:20-  CHRISSY spoke to Leonora with the [facility] brace [facility] at [phone] and updated her that the pt has an order for a TLSO brace and she stated that a  is not available until 4:30 and someone can be here at 5pm. I asked if it is possible to arrange delivery at the pts home and she replied no because if the pt is still hospitalized then they can dispense immediately. CM updated nursing staff via secure chat.     ETA of TLSO brace is 5 pm for fitting and delivery.     3:00-  CHRISSY contacted Brace line [phone] to request fitting at pts home address.    08/31/23 1255   Discharge Assessment   Assessment Type Discharge Planning Brief Assessment

## 2023-08-31 NOTE — DISCHARGE INSTRUCTIONS
Someone will be out later today to fit you for your back brace.  He can take the narcotic pain medication every 6 hours as needed for pain.  Do not take Tylenol in addition with this medication.  Please call and make a follow-up when they are surgery as soon as possible.  Please return to the ED for numbness tingling between eyes, leg weakness, fever, loss of bowel or bladder, difficulty having a bowel movement urinating, uncontrollable pain, or any new or worsening status.    Do not drive, swim, climb to height, take a bath, operate heavy machinery, drink alcohol, or take potentially sedating medications, sign any legal documents, or make any important decisions for 24 hours if you have received any pain medications, sedatives, or mood altering drugs during her ER visit or within 24 hours of taking them if they have been prescribed to you.

## 2023-08-31 NOTE — ED NOTES
Pt resting comfortably at this time, denies any needs. Denies pain. Pt instructed to use call light if pt has any further needs arise.

## 2023-09-06 ENCOUNTER — TELEPHONE (OUTPATIENT)
Dept: FAMILY MEDICINE | Facility: CLINIC | Age: 73
End: 2023-09-06

## 2023-09-06 ENCOUNTER — OFFICE VISIT (OUTPATIENT)
Dept: FAMILY MEDICINE | Facility: CLINIC | Age: 73
End: 2023-09-06
Payer: MEDICARE

## 2023-09-06 VITALS
TEMPERATURE: 98 F | BODY MASS INDEX: 30.16 KG/M2 | HEART RATE: 90 BPM | OXYGEN SATURATION: 98 % | SYSTOLIC BLOOD PRESSURE: 134 MMHG | WEIGHT: 192.13 LBS | DIASTOLIC BLOOD PRESSURE: 78 MMHG | HEIGHT: 67 IN

## 2023-09-06 DIAGNOSIS — S32.010A COMPRESSION FRACTURE OF L1 VERTEBRA, INITIAL ENCOUNTER: ICD-10-CM

## 2023-09-06 DIAGNOSIS — M43.16 ANTEROLISTHESIS OF LUMBAR SPINE: ICD-10-CM

## 2023-09-06 DIAGNOSIS — E11.29 TYPE 2 DIABETES MELLITUS WITH MICROALBUMINURIA, WITH LONG-TERM CURRENT USE OF INSULIN: ICD-10-CM

## 2023-09-06 DIAGNOSIS — R80.9 TYPE 2 DIABETES MELLITUS WITH MICROALBUMINURIA, WITH LONG-TERM CURRENT USE OF INSULIN: ICD-10-CM

## 2023-09-06 DIAGNOSIS — M54.50 LUMBAR BACK PAIN: Primary | ICD-10-CM

## 2023-09-06 DIAGNOSIS — M51.9 LUMBAR DISC DISEASE: ICD-10-CM

## 2023-09-06 DIAGNOSIS — S32.020A COMPRESSION FRACTURE OF L2 VERTEBRA, INITIAL ENCOUNTER: ICD-10-CM

## 2023-09-06 DIAGNOSIS — Z79.4 TYPE 2 DIABETES MELLITUS WITH MICROALBUMINURIA, WITH LONG-TERM CURRENT USE OF INSULIN: ICD-10-CM

## 2023-09-06 PROCEDURE — 99214 PR OFFICE/OUTPT VISIT, EST, LEVL IV, 30-39 MIN: ICD-10-PCS | Mod: S$GLB,,, | Performed by: FAMILY MEDICINE

## 2023-09-06 PROCEDURE — 99214 OFFICE O/P EST MOD 30 MIN: CPT | Mod: S$GLB,,, | Performed by: FAMILY MEDICINE

## 2023-09-06 RX ORDER — METHOCARBAMOL 500 MG/1
500 TABLET, FILM COATED ORAL 4 TIMES DAILY
Qty: 30 TABLET | Refills: 0 | Status: SHIPPED | OUTPATIENT
Start: 2023-09-06 | End: 2023-09-16

## 2023-09-06 RX ORDER — HYDROCODONE BITARTRATE AND ACETAMINOPHEN 5; 325 MG/1; MG/1
1 TABLET ORAL EVERY 6 HOURS PRN
COMMUNITY
Start: 2023-08-31 | End: 2023-10-02

## 2023-09-06 RX ORDER — PREDNISONE 20 MG/1
20 TABLET ORAL DAILY
Qty: 10 TABLET | Refills: 0 | Status: SHIPPED | OUTPATIENT
Start: 2023-09-06 | End: 2024-02-19

## 2023-09-06 RX ORDER — TRAMADOL HYDROCHLORIDE 50 MG/1
50 TABLET ORAL EVERY 6 HOURS PRN
Qty: 28 TABLET | Refills: 0 | Status: SHIPPED | OUTPATIENT
Start: 2023-09-06 | End: 2023-10-02 | Stop reason: SDUPTHER

## 2023-09-06 NOTE — PROGRESS NOTES
Subjective:       Patient ID: Debby Brown is a 73 y.o. female.    Chief Complaint: Follow-up    Her dog bolted and pulled her down about 6 days ago.  Landed on knees and right hip.  Landed on gravel.  Went to urgent care and then to the emergency room.  X-ray showed degenerative joint disease.  And anterolisthesis on L3-4 in very severe narrowing of the L5-S1 disc space.  CT scan showed compression fracture of L1 and L2.  She was discharged after being told she was going to have a brace.  But did not get 1.  Hydrocodone was given her but cause nausea and vomiting.  Has Ultram.  Doing better with that but only had 12 pills.  She is only out of pain when flat in bed.  Otherwise quite a bit of pain.  A lot of the pain is in the lower back.  L5-S1 level rather than up at the L1-L2 fracture site.    Physical examination.  Vital signs noted.  Chest clear.  Heart regular rate rhythm.  Tender lower back.  Straight leg raising is negative.  Pain upon standing.    Reviewed x-rays with her.  L3-L4 anterolisthesis.  Marked narrowing L5-S1.  Compression fractures are not evident on the plain x-rays.          Objective:        Assessment:       1. Lumbar back pain    2. Lumbar disc disease    3. Compression fracture of L1 vertebra, initial encounter    4. Compression fracture of L2 vertebra, initial encounter    5. Anterolisthesis of lumbar spine    6. Type 2 diabetes mellitus with microalbuminuria, with long-term current use of insulin        Plan:       Lumbar back pain    Lumbar disc disease    Compression fracture of L1 vertebra, initial encounter  -     Ambulatory referral/consult to Orthopedics; Future; Expected date: 09/13/2023    Compression fracture of L2 vertebra, initial encounter  -     Ambulatory referral/consult to Orthopedics; Future; Expected date: 09/13/2023    Anterolisthesis of lumbar spine    Type 2 diabetes mellitus with microalbuminuria, with long-term current use of insulin    Other orders  -      traMADoL (ULTRAM) 50 mg tablet; Take 1 tablet (50 mg total) by mouth every 6 (six) hours as needed for Pain.  Dispense: 28 tablet; Refill: 0  -     methocarbamoL (ROBAXIN) 500 MG Tab; Take 1 tablet (500 mg total) by mouth 4 (four) times daily. for 10 days  Dispense: 30 tablet; Refill: 0  -     predniSONE (DELTASONE) 20 MG tablet; Take 1 tablet (20 mg total) by mouth once daily.  Dispense: 10 tablet; Refill: 0    Refer her to Dr. Johnson back surgeon regarding her fracture and be fitted for a brace.  She is going on a trip to [place] in 5 weeks with 2 friends of hers.  Recommend she cancel this do not feel she will be able to make the trip.  Ultram 50 mg.  Twenty-eight pills.  Robaxin 500 mg up to q.i.d. 30 pills.  Prednisone 20 mg 2 a day for 5 days to see if this will help the disc component of her pain.  Monitor her glucose closely with the steroid.

## 2023-09-07 NOTE — TELEPHONE ENCOUNTER
----- Message from Ghislaine Lawrence sent at 9/6/2023  3:06 PM CDT -----  Contact: pharm  Type: Needs Medical Advice         Who Called:  [facility]  Best Call Back Number: [phone]   Additional Information: Requesting a call back regarding  there is a interaction with the traMADoL (ULTRAM) 50 mg tablet and ALPRAZolam (XANAX) 0.5 MG tablet,  they also  need the diagnosis code for the traMADoL       [facility] [address] [address]  Phone: [phone] Fax: [phone]      Please Advise- Thank you

## 2023-09-11 ENCOUNTER — TELEPHONE (OUTPATIENT)
Dept: FAMILY MEDICINE | Facility: CLINIC | Age: 73
End: 2023-09-11
Payer: MEDICARE

## 2023-09-11 NOTE — TELEPHONE ENCOUNTER
----- Message from Shiela Mtz sent at 9/11/2023 10:06 AM CDT -----  Contact: Patient  Type:  Needs Medical Advice    Who Called:   Patient    Would the patient rather a call back or a response via [facility]?   Call back  Best Call Back Number:   [phone]    Additional Information:   States she would like to speak with Dr Mancini about taking predniSONE (DELTASONE) 20 MG tablet - states she would like to get off of it - please call to advise - thank you

## 2023-09-18 ENCOUNTER — OFFICE VISIT (OUTPATIENT)
Dept: ORTHOPEDICS | Facility: CLINIC | Age: 73
End: 2023-09-18
Payer: MEDICARE

## 2023-09-18 VITALS — HEIGHT: 67 IN | BODY MASS INDEX: 30.13 KG/M2 | WEIGHT: 192 LBS

## 2023-09-18 DIAGNOSIS — S32.010A COMPRESSION FRACTURE OF L1 LUMBAR VERTEBRA, CLOSED, INITIAL ENCOUNTER: ICD-10-CM

## 2023-09-18 DIAGNOSIS — M43.16 SPONDYLOLISTHESIS OF LUMBAR REGION: ICD-10-CM

## 2023-09-18 DIAGNOSIS — Z98.890 HISTORY OF BACK SURGERY: ICD-10-CM

## 2023-09-18 DIAGNOSIS — S32.020A COMPRESSION FRACTURE OF L2 VERTEBRA, INITIAL ENCOUNTER: Primary | ICD-10-CM

## 2023-09-18 DIAGNOSIS — M51.36 DISC DEGENERATION, LUMBAR: ICD-10-CM

## 2023-09-18 DIAGNOSIS — M47.816 FACET ARTHRITIS, DEGENERATIVE, LUMBAR SPINE: ICD-10-CM

## 2023-09-18 PROCEDURE — 99203 OFFICE O/P NEW LOW 30 MIN: CPT | Mod: S$GLB,,, | Performed by: ORTHOPAEDIC SURGERY

## 2023-09-18 PROCEDURE — 99203 PR OFFICE/OUTPT VISIT, NEW, LEVL III, 30-44 MIN: ICD-10-PCS | Mod: S$GLB,,, | Performed by: ORTHOPAEDIC SURGERY

## 2023-09-18 NOTE — PROGRESS NOTES
Subjective:       Patient ID: Debby Brown is a 73 y.o. female.    Chief Complaint: Injury and Pain of the Lumbar Spine (L1-L2 Compression fracture after her dog pulled her down on 08/31/23. Describes her pain as on/off depending on her activity.)      History of Present Illness    Prior to meeting with the patient I reviewed the medical chart in [facility]. This included reviewing the previous progress notes from our office, review of the patient's last appointment with their primary care provider, review of any visits to the emergency room, and review of any pain management appointments or procedures.   73-year-old female, presents to clinic today as a new patient for follow-up evaluation after emergency department visit.  On August 31st, she went to the emergency department.  She was noted to have a fall, she states that her rodrigues retriever who she was taking to have new tired when she got out of the car jerked her pulled on the leash causing her to fall.  She had plain film x-rays done which showed compression fractures of the L1 and L2 vertebral bodies on CT scan.    She has a history of back surgery, she laminectomy done by Dr. Linn many many years ago.  She does not complain of pain today in the midline or over vertebral bodies.  Her pain right lumbar sacral pain.  No radiculitis.    Current Medications  Current Outpatient Medications   Medication Sig Dispense Refill    ALPRAZolam (XANAX) 0.5 MG tablet Take 1 tablet (0.5 mg total) by mouth 2 (two) times daily. as needed for anxiety. 30 tablet 2    buPROPion (WELLBUTRIN XL) 300 MG 24 hr tablet Take 1 tablet (300 mg total) by mouth Daily. 90 tablet 1    ezetimibe (ZETIA) 10 mg tablet Take 1 tablet (10 mg total) by mouth once daily. 90 tablet 1    lisinopriL (PRINIVIL,ZESTRIL) 40 MG tablet Take 1 tablet (40 mg total) by mouth once daily. 90 tablet 1    metoprolol succinate (TOPROL-XL) 50 MG 24 hr tablet Take 1 tablet by mouth once daily 90 tablet 3    TOUJEO  "SOLOSTAR U-300 INSULIN 300 unit/mL (1.5 mL) InPn pen INJECT 70 UNITS SUBCUTANEOUSLY ONCE DAILY 9 pen 1    cefUROXime (CEFTIN) 250 MG tablet Take 1 tablet (250 mg total) by mouth 2 (two) times daily. (Patient not taking: Reported on 9/6/2023) 20 tablet 0    dapagliflozin propanediol (FARXIGA) 5 mg Tab tablet Take 1 tablet (5 mg total) by mouth once daily. 90 tablet 1    doxycycline (VIBRAMYCIN) 100 MG Cap Take 1 capsule (100 mg total) by mouth every 12 (twelve) hours. (Patient not taking: Reported on 9/6/2023) 20 capsule 0    HYDROcodone-acetaminophen (NORCO) 5-325 mg per tablet Take 1 tablet by mouth every 6 (six) hours as needed.      metroNIDAZOLE (FLAGYL) 250 MG tablet Take 1 tablet (250 mg total) by mouth 3 (three) times daily. (Patient not taking: Reported on 9/6/2023) 30 tablet 0    pen needle, diabetic (BD JERI 2ND GEN PEN NEEDLE) 32 gauge x 5/32" Ndle USE AS DIRECTED (Patient not taking: Reported on 9/18/2023) 100 each 5    predniSONE (DELTASONE) 20 MG tablet Take 1 tablet (20 mg total) by mouth once daily. (Patient not taking: Reported on 9/18/2023) 10 tablet 0    rosuvastatin (CRESTOR) 40 MG Tab Take 1 tablet (40 mg total) by mouth once daily. 90 tablet 1    traMADoL (ULTRAM) 50 mg tablet Take 1 tablet (50 mg total) by mouth every 6 (six) hours as needed for Pain. (Patient not taking: Reported on 9/18/2023) 28 tablet 0    TRUE METRIX GLUCOSE METER Misc U UTD      TRUE METRIX GLUCOSE TEST STRIP Strp U QAM (Patient not taking: Reported on 9/18/2023) 100 each 5    TRUEPLUS LANCETS 30 gauge Misc U QAM (Patient not taking: Reported on 9/18/2023) 100 each 5     No current facility-administered medications for this visit.       Allergies  Review of patient's allergies indicates:   Allergen Reactions    Bactrim [sulfamethoxazole-trimethoprim]     Codeine     Levaquin [levofloxacin]     Pcn [penicillins]        Past Medical History  Past Medical History:   Diagnosis Date    Allergy     Anxiety     Depression     " Diabetes mellitus, type 2     Hyperlipidemia     Hypertension     Pre-diabetes        Surgical History  Past Surgical History:   Procedure Laterality Date    APPENDECTOMY      BACK SURGERY      BREAST SURGERY      CHOLECYSTECTOMY      COLONOSCOPY      GALLBLADDER SURGERY      HERNIA REPAIR      lymphnode remove      SHOULDER SURGERY      TUBAL LIGATION         Family History:   Family History   Problem Relation Age of Onset    Mental illness Mother     Cancer Mother     Depression Mother     Heart disease Mother     Hypertension Mother     Stroke Mother     Heart disease Father     Hypertension Father     Stroke Father        Social History:   Social History     Socioeconomic History    Marital status:     Number of children: 2   Occupational History    Occupation: COMPTRALLER   Tobacco Use    Smoking status: Former    Smokeless tobacco: Never   Substance and Sexual Activity    Alcohol use: Yes     Comment: rare    Drug use: No    Sexual activity: Yes     Social Determinants of Health     Stress: No Stress Concern Present (8/1/2019)    [facility] - Occupational Stress Questionnaire     Feeling of Stress : Not at all       Hospitalization/Major Diagnostic Procedure:     Review of Systems     General/Constitutional:  Chills denies. Fatigue denies. Fever denies. Weight gain denies. Weight loss denies.    Respiratory:  Shortness of breath denies.    Cardiovascular:  Chest pain denies.    Gastrointestinal:  Constipation denies. Diarrhea denies. Nausea denies. Vomiting denies.     Hematology:  Easy bruising denies. Prolonged bleeding denies.     Genitourinary:  Frequent urination denies. Pain in lower back denies. Painful urination denies.     Musculoskeletal:  See HPI for details    Skin:  Rash denies.    Neurologic:  Dizziness denies. Gait abnormalities denies. Seizures denies. Tingling/Numbess denies.    Psychiatric:  Anxiety denies. Depressed mood denies.     Objective:   Vital  Signs: There were no vitals filed for this visit.     Physical Exam      General Examination:     Constitutional: The patient is alert and oriented to lace person and time. Mood is pleasant.     Head/Face: Normal facial features normal eyebrows    Eyes: Normal extraocular motion bilaterally    Lungs: Respirations are equal and unlabored    Gait is coordinated.    Cardiovascular: There are no swelling or varicosities present.    Lymphatic: Negative for adenopathy    Skin: Normal    Neurological: Level of consciousness normal. Oriented to place person and time and situation    Psychiatric: Oriented to time place person and situation    Examination lumbar spine, there is a well-healed midline surgical incision in the lower back without evidence of erythema ecchymosis other signs symptoms of infection or wound failure dehiscence.  At the L4-5 levels at the top of the iliac crest, the patient is not tender, nor is she tender cephalad.  She has no midline vertebral tenderness at all on the exam.  She has some right lumbar sacral paraspinous tenderness.  Mildly restricted range of motion in all planes.  Bilateral straight leg raises are negative.  Calf is soft and nontender.    XRAY Report/ Interpretation :  AP and lateral views lumbar spine taken today in the office compared to images from April of this year.  These were done by the referring provider, Dr. Mancini for documented acute low back pain with unspecified sciatica symptoms.  Images today demonstrate multilevel degenerative changes, there are superior endplate changes of the L1 and L2 vertebral bodies when compared to the prior images.  These were not present back in April.  There is anterolisthesis of L3 on L4 of at least several mm.  There is advanced degeneration at the L4-5 and the L5-1 levels with significant facet arthrosis L3-S1      Assessment:       1. Compression fracture of L2 vertebra, initial encounter    2. Compression fracture of L1 lumbar vertebra,  "closed, initial encounter    3. History of back surgery    4. Disc degeneration, lumbar    5. Spondylolisthesis of lumbar region    6. Facet arthritis, degenerative, lumbar spine        Plan:       Debby was seen today for injury and pain.    Diagnoses and all orders for this visit:    Compression fracture of L2 vertebra, initial encounter  -     X-Ray Lumbar Spine Ap And Lateral  -     Ambulatory referral/consult to Orthopedics    Compression fracture of L1 lumbar vertebra, closed, initial encounter  -     X-Ray Lumbar Spine Ap And Lateral  -     Ambulatory referral/consult to Orthopedics    History of back surgery    Disc degeneration, lumbar  Comments:  L4-5, L5-S1    Spondylolisthesis of lumbar region  Comments:  Anterolisthesis L3 on L4    Facet arthritis, degenerative, lumbar spine  Comments:  L3-S1         Follow up in about 2 weeks (around 10/2/2023) for Re-check symptoms, MRI Results, Mon/Wed with Dr. Johnson.  This is to attest that the physician's assistant Josué Rosenthal served in the capacity as a scribe" for this patient encounter.  This is also to verify that I have reviewed the patient's history and helped formulate the treatment plan and discussed it with the physician's assistant.  I have actively participated in the evaluation and treatment plan for this patient is visit.  The treatment plan and medical decision-making is outlined below:  Low back pain, the patient does have remote history of trauma, a fall few weeks ago and a CT scan which does show a interval change with L1 and L2 superior endplate compression fractures.  Plain film x-rays done today in the office and compared to images from April of this year do show this interval change.  However, these are age indeterminate.  In addition, the patient does not have significant vertebral or point tenderness in the area of clinical concern for the L1 and or L2 compression fractures.  Her pain is more caudal in the right lumbar sacral region.  " No radiculitis.  I have recommended an MRI of the lumbar spine to evaluate 1.  The acuity of the suspected compression fractures in addition to to better evaluate the prior diskectomy and degenerative changes in the lower lumbar region.  I suspect that her pain is more likely symptomatic from axial back pain than the compression fractures.    Treatment options were discussed with regards to the nature of the medical condition. Conservative pain intervention and surgical options were discussed in detail. The probability of success of each separate treatment option was discussed. The patient expressed a clear understanding of the treatment options. With regards to surgery, the procedure risk, benefits, complications, and outcomes were discussed. No guarantees were given with regards to surgical outcome.   The risk of complications, morbidity, and mortality of patient management decisions have been made at the time of this visit. These are associated with the patient's problems, diagnostic procedures and treatment options. This includes the possible management options selected and those considered but not selected by the patient after shared medical decision making we discussed with the patient.   This note was created using Dragon voice recognition software that occasionally misinterpreted phrases or words.

## 2023-09-25 RX ORDER — BUPROPION HYDROCHLORIDE 300 MG/1
300 TABLET ORAL
Qty: 90 TABLET | Refills: 3 | Status: SHIPPED | OUTPATIENT
Start: 2023-09-25

## 2023-09-25 RX ORDER — LISINOPRIL 40 MG/1
40 TABLET ORAL
Qty: 90 TABLET | Refills: 3 | Status: SHIPPED | OUTPATIENT
Start: 2023-09-25

## 2023-09-25 NOTE — TELEPHONE ENCOUNTER
No care due was identified.  Health [facility] Embedded Care Due Messages. Reference number: [id number].   9/25/2023 10:25:16 AM CDT

## 2023-09-26 ENCOUNTER — HOSPITAL ENCOUNTER (OUTPATIENT)
Dept: RADIOLOGY | Facility: HOSPITAL | Age: 73
Discharge: HOME | End: 2023-09-26
Attending: ORTHOPAEDIC SURGERY
Payer: MEDICARE

## 2023-09-26 DIAGNOSIS — S32.010A COMPRESSION FRACTURE OF L1 LUMBAR VERTEBRA, CLOSED, INITIAL ENCOUNTER: ICD-10-CM

## 2023-09-26 DIAGNOSIS — S32.020A COMPRESSION FRACTURE OF L2 VERTEBRA, INITIAL ENCOUNTER: ICD-10-CM

## 2023-09-26 PROCEDURE — 72148 MRI LUMBAR SPINE W/O DYE: CPT | Mod: TC,PO

## 2023-09-26 NOTE — TELEPHONE ENCOUNTER
Refill Decision Note   Debby Kevin  is requesting a refill authorization.  Brief Assessment and Rationale for Refill:  Approve     Medication Therapy Plan:         Comments:     Note composed:9:50 PM 09/25/2023

## 2023-10-02 ENCOUNTER — OFFICE VISIT (OUTPATIENT)
Dept: ORTHOPEDICS | Facility: CLINIC | Age: 73
End: 2023-10-02
Payer: MEDICARE

## 2023-10-02 VITALS — BODY MASS INDEX: 30.13 KG/M2 | HEIGHT: 67 IN | WEIGHT: 192 LBS

## 2023-10-02 DIAGNOSIS — S32.010D COMPRESSION FRACTURE OF L1 VERTEBRA WITH ROUTINE HEALING, SUBSEQUENT ENCOUNTER: Primary | ICD-10-CM

## 2023-10-02 DIAGNOSIS — Z98.890 HISTORY OF LUMBAR LAMINECTOMY: ICD-10-CM

## 2023-10-02 DIAGNOSIS — M51.36 DISC DEGENERATION, LUMBAR: ICD-10-CM

## 2023-10-02 DIAGNOSIS — M47.816 FACET ARTHRITIS, DEGENERATIVE, LUMBAR SPINE: ICD-10-CM

## 2023-10-02 DIAGNOSIS — S32.020D COMPRESSION FRACTURE OF L2 VERTEBRA WITH ROUTINE HEALING, SUBSEQUENT ENCOUNTER: ICD-10-CM

## 2023-10-02 DIAGNOSIS — M43.16 SPONDYLOLISTHESIS OF LUMBAR REGION: ICD-10-CM

## 2023-10-02 PROCEDURE — 99213 OFFICE O/P EST LOW 20 MIN: CPT | Mod: S$GLB,,, | Performed by: ORTHOPAEDIC SURGERY

## 2023-10-02 PROCEDURE — 99213 PR OFFICE/OUTPT VISIT, EST, LEVL III, 20-29 MIN: ICD-10-PCS | Mod: S$GLB,,, | Performed by: ORTHOPAEDIC SURGERY

## 2023-10-02 RX ORDER — TRAMADOL HYDROCHLORIDE 50 MG/1
50 TABLET ORAL EVERY 6 HOURS PRN
Qty: 28 TABLET | Refills: 0 | Status: SHIPPED | OUTPATIENT
Start: 2023-10-02 | End: 2024-02-19

## 2023-10-02 NOTE — PROGRESS NOTES
Subjective:       Patient ID: Debby Brown is a 73 y.o. female.    Chief Complaint: Pain of the Lumbar Spine (Compression fracture L1 and L2/lumbar pain follow up. Here for MRI results review. States that her pain remains the same. Denies pain radiating down legs)      History of Present Illness    Prior to meeting with the patient I reviewed the medical chart in [facility]. This included reviewing the previous progress notes from our office, review of the patient's last appointment with their primary care provider, review of any visits to the emergency room, and review of any pain management appointments or procedures.   73-year-old female, presents to clinic today to review her MRI of the lumbar spine.  Her back pain is not significantly better however it is not any worse than it was only saw her 2 weeks ago.    Prior HPI:    On September 18th she presented to the clinic today a new patient for follow-up evaluation after emergency department visit.  On August 31st, she went to the emergency department.  She was noted to have a fall, she states that her rodrigues retriever who she was taking to have new tired when she got out of the car jerked her pulled on the leash causing her to fall.  She had plain film x-rays done which showed compression fractures of the L1 and L2 vertebral bodies on CT scan.     She has a history of back surgery, she laminectomy done by Dr. Linn many many years ago.  She does not complain of pain today in the midline or over vertebral bodies.  Her pain right lumbar sacral pain.  No radiculitis.    Current Medications  Current Outpatient Medications   Medication Sig Dispense Refill    ALPRAZolam (XANAX) 0.5 MG tablet Take 1 tablet (0.5 mg total) by mouth 2 (two) times daily. as needed for anxiety. 30 tablet 2    buPROPion (WELLBUTRIN XL) 300 MG 24 hr tablet Take 1 tablet by mouth once daily 90 tablet 3    dapagliflozin propanediol (FARXIGA) 5 mg Tab tablet Take 1 tablet (5 mg total) by mouth once  "daily. 90 tablet 1    ezetimibe (ZETIA) 10 mg tablet Take 1 tablet (10 mg total) by mouth once daily. 90 tablet 1    lisinopriL (PRINIVIL,ZESTRIL) 40 MG tablet Take 1 tablet by mouth once daily 90 tablet 3    metoprolol succinate (TOPROL-XL) 50 MG 24 hr tablet Take 1 tablet by mouth once daily 90 tablet 3    TOUJEO SOLOSTAR U-300 INSULIN 300 unit/mL (1.5 mL) InPn pen INJECT 70 UNITS SUBCUTANEOUSLY ONCE DAILY 9 pen 1    cefUROXime (CEFTIN) 250 MG tablet Take 1 tablet (250 mg total) by mouth 2 (two) times daily. (Patient not taking: Reported on 9/6/2023) 20 tablet 0    doxycycline (VIBRAMYCIN) 100 MG Cap Take 1 capsule (100 mg total) by mouth every 12 (twelve) hours. (Patient not taking: Reported on 9/6/2023) 20 capsule 0    metroNIDAZOLE (FLAGYL) 250 MG tablet Take 1 tablet (250 mg total) by mouth 3 (three) times daily. (Patient not taking: Reported on 9/6/2023) 30 tablet 0    pen needle, diabetic (BD JERI 2ND GEN PEN NEEDLE) 32 gauge x 5/32" Ndle USE AS DIRECTED (Patient not taking: Reported on 9/18/2023) 100 each 5    predniSONE (DELTASONE) 20 MG tablet Take 1 tablet (20 mg total) by mouth once daily. (Patient not taking: Reported on 9/18/2023) 10 tablet 0    rosuvastatin (CRESTOR) 40 MG Tab Take 1 tablet (40 mg total) by mouth once daily. 90 tablet 1    traMADoL (ULTRAM) 50 mg tablet Take 1 tablet (50 mg total) by mouth every 6 (six) hours as needed for Pain. 28 tablet 0    TRUE METRIX GLUCOSE METER Misc U UTD      TRUE METRIX GLUCOSE TEST STRIP Strp U QAM (Patient not taking: Reported on 9/18/2023) 100 each 5    TRUEPLUS LANCETS 30 gauge Misc U QAM (Patient not taking: Reported on 9/18/2023) 100 each 5     No current facility-administered medications for this visit.       Allergies  Review of patient's allergies indicates:   Allergen Reactions    Bactrim [sulfamethoxazole-trimethoprim]     Codeine     Levaquin [levofloxacin]     Pcn [penicillins]        Past Medical History  Past Medical History:   Diagnosis Date "    Allergy     Anxiety     Depression     Diabetes mellitus, type 2     Hyperlipidemia     Hypertension     Pre-diabetes        Surgical History  Past Surgical History:   Procedure Laterality Date    APPENDECTOMY      BACK SURGERY      BREAST SURGERY      CHOLECYSTECTOMY      COLONOSCOPY      GALLBLADDER SURGERY      HERNIA REPAIR      lymphnode remove      SHOULDER SURGERY      TUBAL LIGATION         Family History:   Family History   Problem Relation Age of Onset    Mental illness Mother     Cancer Mother     Depression Mother     Heart disease Mother     Hypertension Mother     Stroke Mother     Heart disease Father     Hypertension Father     Stroke Father        Social History:   Social History     Socioeconomic History    Marital status:     Number of children: 2   Occupational History    Occupation: COMPTRALLER   Tobacco Use    Smoking status: Former    Smokeless tobacco: Never   Substance and Sexual Activity    Alcohol use: Yes     Comment: rare    Drug use: No    Sexual activity: Yes     Social Determinants of Health     Stress: No Stress Concern Present (8/1/2019)    [facility] - Occupational Stress Questionnaire     Feeling of Stress : Not at all       Hospitalization/Major Diagnostic Procedure:     Review of Systems     General/Constitutional:  Chills denies. Fatigue denies. Fever denies. Weight gain denies. Weight loss denies.    Respiratory:  Shortness of breath denies.    Cardiovascular:  Chest pain denies.    Gastrointestinal:  Constipation denies. Diarrhea denies. Nausea denies. Vomiting denies.     Hematology:  Easy bruising denies. Prolonged bleeding denies.     Genitourinary:  Frequent urination denies. Pain in lower back denies. Painful urination denies.     Musculoskeletal:  See HPI for details    Skin:  Rash denies.    Neurologic:  Dizziness denies. Gait abnormalities denies. Seizures denies. Tingling/Numbess denies.    Psychiatric:  Anxiety denies.  Depressed mood denies.     Objective:   Vital Signs: There were no vitals filed for this visit.     Physical Exam      General Examination:     Constitutional: The patient is alert and oriented to lace person and time. Mood is pleasant.     Head/Face: Normal facial features normal eyebrows    Eyes: Normal extraocular motion bilaterally    Lungs: Respirations are equal and unlabored    Gait is coordinated.    Cardiovascular: There are no swelling or varicosities present.    Lymphatic: Negative for adenopathy    Skin: Normal    Neurological: Level of consciousness normal. Oriented to place person and time and situation    Psychiatric: Oriented to time place person and situation    Examination lumbar spine, is essentially unchanged from her initial physical exam on September 18th. There is a well-healed midline surgical incision in the lower back without evidence of erythema ecchymosis other signs symptoms of infection or wound failure dehiscence.  At the L4-5 levels at the top of the iliac crest, the patient is not tender, nor is she tender cephalad.  She has no midline vertebral tenderness at all on the exam.  She has some right lumbar sacral paraspinous tenderness.  Mildly restricted range of motion in all planes.  Bilateral straight leg raises are negative.  Calf is soft and nontender.    XRAY Report/ Interpretation:    Narrative & Impression  CLINICAL HISTORY:  73 years (1950) Female Compression fracture, lumbar; L1 and L2 compression deformities, acute vs subacute Low back & bilateral hip pain after fall Aug 31, 2023.; Back surgery 1990.     TECHNIQUE:  MR LUMBAR SPINE WITHOUT IV CONTRAST. 203mages obtained. MRI of the lumbar spine was performed utilizing 1.5 T magnet.     CONTRAST:  No contrast was administered.     COMPARISON:  Radiograph from September 18, 2023. CT from August 31, 2023.     FINDINGS:  This report assumes that there are 5 non-rib bearing lumbar vertebral bodies with the L5 vertebral body  articulating with the sacrum. Accurate numbering of the spine levels would require imaging of the thoracolumbar spine to count ribs.     The prevertebral soft tissues are normal.  Conus is normal in caliber and signal. The conus terminates at L1, with mild waviness of the distal nerve roots, possibly secondary to impingement at the level of L3-L4. Marrow signal shows a diffusely heterogeneous somewhat mottled decreased T1 and T2 signal intensity. This is a somewhat nonspecific finding but one which may suggest red marrow reconversion as can be seen in chronic anemic states, hypoxia, obesity or smoking.     Vertebral bodies:  -L1 anterior superior endplate compression deformity with approximately 25% height loss of the anterior column and mild patchy bone marrow edema compatible with an acute-subacute fracture.  -L2 similar anterior superior endplate compression deformity, with approximately 25% height loss of the anterior, no retropulsion.     At T12-L1 there is mild disc height loss with a tiny broad-based posterior disc bulge. No facet arthropathy and ligamentum flavum hypertrophy is present. The spinal canal is patent with mild bilateral neural foraminal stenosis.     At L1-2, there is mild disc height loss with a tiny broad-based posterior disc bulge. There is mild facet arthropathy with ligament flavum hypertrophy. This results in moderate left and mild right neural foraminal stenosis with minimal spinal canal stenosis.     At L2-3, there is mild disc height loss with a small broad-based posterior disc bulge. There is severe bilateral facet arthropathy and mild ligament flavum hypertrophy. There is a trace right greater than left facet joint effusion. These findings result in moderate bilateral neural foraminal stenosis and mild overall spinal canal stenosis. There is mild crowding of the nerve root centrally.     At L3-4, there is moderate disc height loss with a small broad-based posterior disc bulge. There  is grade 1 anterolisthesis at this level (6 mm) with severe bilateral facet arthropathy. This results in moderate bilateral neural foraminal stenosis and moderate spinal canal stenosis. There is crowding of the nerve roots centrally. There is effacement of left greater than right lateral recess with likely impingement on the traversing L4 nerve root in the lateral recess.     At L4-5, there is severe disc height loss with a small broad-based posterior disc osteophyte complex. There is severe bilateral facet arthropathy without ligament flavum hypertrophy. There is a small right lateral recess posterior disc osteophyte complex (sagittal image 8). These findings in combination results in moderate right greater than left neural foraminal stenosis and moderate spinal canal stenosis. There is effacement of the right greater than left lateral recess with likely impingement of the traversing L5 nerve roots and lateral views since.     At L5-S1, there is severe disc height loss with a moderate-sized broad-based posterior disc osteophyte complex. There is moderate facet arthropathy with ligament flavum hypertrophy. This results in moderate to severe bilateral neural foraminal stenosis and mild spinal canal stenosis.     The SI joints and visualized pelvis are within normal limits.     IMPRESSION:  1. Subacute anterior superior endplate compression fracture deformities at L1 and L2, with approximately 25% height loss of the anterior column and no retropulsion.  2. Grade 1 anterolisthesis of L3 on L4.  3. Degenerative changes of the lumbar spine as outlined detail above most pronounced at L5-S1, L4-L5, L3-L4 and L2-L3.                          .     Electronically signed by:  Felipe Walden MD  09/26/2023 03:57 PM CDT Workstation: [id number]           Specimen Collected: 09/26/23 14:06 Last Resulted: 09/26/23 15:57               Assessment:       1. Compression fracture of L1 vertebra with routine healing, subsequent  "encounter    2. Compression fracture of L2 vertebra with routine healing, subsequent encounter    3. History of lumbar laminectomy    4. Disc degeneration, lumbar    5. Facet arthritis, degenerative, lumbar spine    6. Spondylolisthesis of lumbar region        Plan:       Debby was seen today for pain.    Diagnoses and all orders for this visit:    Compression fracture of L1 vertebra with routine healing, subsequent encounter  -     traMADoL (ULTRAM) 50 mg tablet; Take 1 tablet (50 mg total) by mouth every 6 (six) hours as needed for Pain.    Compression fracture of L2 vertebra with routine healing, subsequent encounter  -     traMADoL (ULTRAM) 50 mg tablet; Take 1 tablet (50 mg total) by mouth every 6 (six) hours as needed for Pain.    History of lumbar laminectomy    Disc degeneration, lumbar    Facet arthritis, degenerative, lumbar spine    Spondylolisthesis of lumbar region         Follow up in about 2 months (around 12/2/2023).  This is to attest that the physician's assistant Josué Rosenthal served in the capacity as a scribe" for this patient encounter.  This is also to verify that I have reviewed the patient's history and helped formulate the treatment plan and discussed it with the physician's assistant.  I have actively participated in the evaluation and treatment plan for this patient is visit.  The treatment plan and medical decision-making is outlined below:  Follow-up, office visit 2 weeks ago, emergency department visit August 31st with low back pain and diagnosis of L1 and L2 compression fractures.  The patient was found to have interval changes suggestive of acute versus subacute L1 and L2 compression fractures at her last visit.  She also had x-ray findings of multilevel degenerative changes, facet arthritis and spondylolisthesis.  All this is demonstrate able on x-ray today.  In light of the possibility of acute L1 and L2 compression fractures, it is hard to evaluate her for axial back pain " versus the compression fractures.  At this point I do not think she is a candidate for vertebral or kyphoplasty.  Would like to give this some more time and see how her back pain improves.  If this is acute back pain from the fractures, we would expect this to continue to improve over the next 2-3 months.  If she continues to have persistent low back symptoms, consider pain management for possible injections.    Treatment options were discussed with regards to the nature of the medical condition. Conservative pain intervention and surgical options were discussed in detail. The probability of success of each separate treatment option was discussed. The patient expressed a clear understanding of the treatment options. With regards to surgery, the procedure risk, benefits, complications, and outcomes were discussed. No guarantees were given with regards to surgical outcome.   The risk of complications, morbidity, and mortality of patient management decisions have been made at the time of this visit. These are associated with the patient's problems, diagnostic procedures and treatment options. This includes the possible management options selected and those considered but not selected by the patient after shared medical decision making we discussed with the patient.     This note was created using Dragon voice recognition software that occasionally misinterpreted phrases or words.

## 2023-10-06 ENCOUNTER — OFFICE VISIT (OUTPATIENT)
Dept: FAMILY MEDICINE | Facility: CLINIC | Age: 73
End: 2023-10-06
Payer: MEDICARE

## 2023-10-06 VITALS
BODY MASS INDEX: 29.81 KG/M2 | SYSTOLIC BLOOD PRESSURE: 128 MMHG | DIASTOLIC BLOOD PRESSURE: 76 MMHG | HEIGHT: 67 IN | TEMPERATURE: 98 F | HEART RATE: 73 BPM | WEIGHT: 189.94 LBS | OXYGEN SATURATION: 97 %

## 2023-10-06 DIAGNOSIS — G25.0 BENIGN ESSENTIAL TREMOR: ICD-10-CM

## 2023-10-06 DIAGNOSIS — Z79.4 TYPE 2 DIABETES MELLITUS WITH MICROALBUMINURIA, WITH LONG-TERM CURRENT USE OF INSULIN: ICD-10-CM

## 2023-10-06 DIAGNOSIS — E11.29 TYPE 2 DIABETES MELLITUS WITH MICROALBUMINURIA, WITH LONG-TERM CURRENT USE OF INSULIN: ICD-10-CM

## 2023-10-06 DIAGNOSIS — R80.9 TYPE 2 DIABETES MELLITUS WITH MICROALBUMINURIA, WITH LONG-TERM CURRENT USE OF INSULIN: ICD-10-CM

## 2023-10-06 DIAGNOSIS — Z12.31 BREAST CANCER SCREENING BY MAMMOGRAM: ICD-10-CM

## 2023-10-06 DIAGNOSIS — Z12.11 COLON CANCER SCREENING: ICD-10-CM

## 2023-10-06 DIAGNOSIS — S32.010D COMPRESSION FRACTURE OF L1 VERTEBRA WITH ROUTINE HEALING, SUBSEQUENT ENCOUNTER: ICD-10-CM

## 2023-10-06 DIAGNOSIS — S32.020D COMPRESSION FRACTURE OF L2 VERTEBRA WITH ROUTINE HEALING, SUBSEQUENT ENCOUNTER: ICD-10-CM

## 2023-10-06 DIAGNOSIS — F41.9 ANXIETY: ICD-10-CM

## 2023-10-06 DIAGNOSIS — I10 HYPERTENSION, ESSENTIAL: Primary | ICD-10-CM

## 2023-10-06 PROCEDURE — 99214 PR OFFICE/OUTPT VISIT, EST, LEVL IV, 30-39 MIN: ICD-10-PCS | Mod: S$GLB,,, | Performed by: FAMILY MEDICINE

## 2023-10-06 PROCEDURE — 99214 OFFICE O/P EST MOD 30 MIN: CPT | Mod: S$GLB,,, | Performed by: FAMILY MEDICINE

## 2023-10-06 RX ORDER — ALPRAZOLAM 0.5 MG/1
0.5 TABLET ORAL 2 TIMES DAILY
Qty: 30 TABLET | Refills: 2 | Status: SHIPPED | OUTPATIENT
Start: 2023-10-06 | End: 2024-01-05 | Stop reason: SDUPTHER

## 2023-10-08 NOTE — PROGRESS NOTES
Subjective:       Patient ID: Debby Brown is a 73 y.o. female.    Chief Complaint: Follow-up    Fracture of L1 and L2 vertebrae.  Compressed about 25%.  Canceled her trip to [place] due to the fractures.  She is still having significant pain and definitely would not have been able to go.  Has 2 month follow-up with Dr. Johnson.  Had MRI.  Using tramadol p.r.n..  Rarely uses it.  Mostly Tylenol.  Anxiety 1 or 2 Xanax per day for her tremor.  Benign familial tremor.  Hypertension controlled.  Diabetes mellitus type 2 with microalbuminuria on insulin.  Glucose 104 today.  Colon cancer screening.  Colonoscopy November of 2018 so it is due this November.    Physical examination.  Vital signs noted.  No acute distress.  Neck without bruit no adenopathy.  Chest clear.  Heart regular rate rhythm.  Abdomen bowel sounds are positive soft nontender.  Extremities without edema 2+ pedal pulses.  Vibratory and position sense intact.  No calluses skin breakdown or ulcerations.  Light touch intact 5 of 5 spots tested each foot.  Straight leg raising is negative.        Objective:        Assessment:       1. Hypertension, essential    2. Compression fracture of L2 vertebra with routine healing, subsequent encounter    3. Compression fracture of L1 vertebra with routine healing, subsequent encounter    4. Anxiety    5. Benign essential tremor    6. Type 2 diabetes mellitus with microalbuminuria, with long-term current use of insulin    7. Colon cancer screening    8. Breast cancer screening by mammogram        Plan:       Hypertension, essential    Compression fracture of L2 vertebra with routine healing, subsequent encounter    Compression fracture of L1 vertebra with routine healing, subsequent encounter    Anxiety    Benign essential tremor    Type 2 diabetes mellitus with microalbuminuria, with long-term current use of insulin  -     Hemoglobin A1C; Future; Expected date: 10/06/2023  -     Microalbumin/Creatinine Ratio, Urine;  Future; Expected date: 10/06/2023    Colon cancer screening  -     Case Request Endoscopy: COLONOSCOPY    Breast cancer screening by mammogram  -     Mammo Digital Screening Bilat; Future; Expected date: 01/06/2024    Other orders  -     ALPRAZolam (XANAX) 0.5 MG tablet; Take 1 tablet (0.5 mg total) by mouth 2 (two) times daily. as needed for anxiety.  Dispense: 30 tablet; Refill: 2    Refill Xanax 0.5 up to b.i.d. p.r.n. 30 with 2 refills.  Declines flu shot.  Hemoglobin A1c.  Microalbumin.  Mammogram.  Colonoscopy.  Follow-up in 3 months.

## 2023-10-10 ENCOUNTER — PATIENT MESSAGE (OUTPATIENT)
Dept: GASTROENTEROLOGY | Facility: CLINIC | Age: 73
End: 2023-10-10
Payer: MEDICARE

## 2023-10-13 ENCOUNTER — PATIENT MESSAGE (OUTPATIENT)
Dept: ADMINISTRATIVE | Facility: HOSPITAL | Age: 73
End: 2023-10-13
Payer: MEDICARE

## 2023-10-13 ENCOUNTER — PATIENT OUTREACH (OUTPATIENT)
Dept: ADMINISTRATIVE | Facility: HOSPITAL | Age: 73
End: 2023-10-13
Payer: MEDICARE

## 2023-12-04 ENCOUNTER — OFFICE VISIT (OUTPATIENT)
Dept: ORTHOPEDICS | Facility: CLINIC | Age: 73
End: 2023-12-04
Payer: MEDICARE

## 2023-12-04 VITALS — HEIGHT: 67 IN | BODY MASS INDEX: 29.66 KG/M2 | WEIGHT: 189 LBS

## 2023-12-04 DIAGNOSIS — Z98.890 HISTORY OF LUMBAR LAMINECTOMY: ICD-10-CM

## 2023-12-04 DIAGNOSIS — S32.010D COMPRESSION FRACTURE OF L1 VERTEBRA WITH ROUTINE HEALING, SUBSEQUENT ENCOUNTER: Primary | ICD-10-CM

## 2023-12-04 DIAGNOSIS — S32.020D COMPRESSION FRACTURE OF L2 VERTEBRA WITH ROUTINE HEALING, SUBSEQUENT ENCOUNTER: ICD-10-CM

## 2023-12-04 DIAGNOSIS — M47.816 FACET ARTHRITIS, DEGENERATIVE, LUMBAR SPINE: ICD-10-CM

## 2023-12-04 DIAGNOSIS — M51.36 DISC DEGENERATION, LUMBAR: ICD-10-CM

## 2023-12-04 PROCEDURE — 99213 PR OFFICE/OUTPT VISIT, EST, LEVL III, 20-29 MIN: ICD-10-PCS | Mod: S$GLB,,, | Performed by: ORTHOPAEDIC SURGERY

## 2023-12-04 PROCEDURE — 99213 OFFICE O/P EST LOW 20 MIN: CPT | Mod: S$GLB,,, | Performed by: ORTHOPAEDIC SURGERY

## 2023-12-04 RX ORDER — LATANOPROST 50 UG/ML
1 SOLUTION/ DROPS OPHTHALMIC NIGHTLY
COMMUNITY
Start: 2023-11-30

## 2023-12-04 NOTE — PROGRESS NOTES
Subjective:       Patient ID: Debby Brown is a 73 y.o. female.    Chief Complaint: Pain of the Spine (Patient is here for a f/up on Lumbar Compression Fracture at L1, L2, states her pain has resolved. )      History of Present Illness    Prior to meeting with the patient I reviewed the medical chart in [facility]. This included reviewing the previous progress notes from our office, review of the patient's last appointment with their primary care provider, review of any visits to the emergency room, and review of any pain management appointments or procedures.   Patient is here follow-up for back pain related to an L1 and L2 compression fracture related to an injury.  Overall she is doing great with no pain today.  She occasionally has lower lumbar pain with prolonged standing.    Current Medications  Current Outpatient Medications   Medication Sig Dispense Refill    ALPRAZolam (XANAX) 0.5 MG tablet Take 1 tablet (0.5 mg total) by mouth 2 (two) times daily. as needed for anxiety. 30 tablet 2    buPROPion (WELLBUTRIN XL) 300 MG 24 hr tablet Take 1 tablet by mouth once daily 90 tablet 3    dapagliflozin propanediol (FARXIGA) 5 mg Tab tablet Take 1 tablet (5 mg total) by mouth once daily. 90 tablet 1    ezetimibe (ZETIA) 10 mg tablet Take 1 tablet (10 mg total) by mouth once daily. 90 tablet 1    latanoprost 0.005 % ophthalmic solution Place 1 drop into both eyes every evening.      lisinopriL (PRINIVIL,ZESTRIL) 40 MG tablet Take 1 tablet by mouth once daily 90 tablet 3    metoprolol succinate (TOPROL-XL) 50 MG 24 hr tablet Take 1 tablet by mouth once daily 90 tablet 3    predniSONE (DELTASONE) 20 MG tablet Take 1 tablet (20 mg total) by mouth once daily. 10 tablet 0    rosuvastatin (CRESTOR) 40 MG Tab Take 1 tablet (40 mg total) by mouth once daily. 90 tablet 1    TOUJEO SOLOSTAR U-300 INSULIN 300 unit/mL (1.5 mL) InPn pen INJECT 70 UNITS SUBCUTANEOUSLY ONCE DAILY 9 pen 1    traMADoL (ULTRAM) 50 mg tablet Take 1 tablet  "(50 mg total) by mouth every 6 (six) hours as needed for Pain. 28 tablet 0    TRUE METRIX GLUCOSE METER Misc U UTD      TRUE METRIX GLUCOSE TEST STRIP Strp U  each 5    TRUEPLUS LANCETS 30 gauge Misc U  each 5    cefUROXime (CEFTIN) 250 MG tablet Take 1 tablet (250 mg total) by mouth 2 (two) times daily. (Patient not taking: Reported on 9/6/2023) 20 tablet 0    doxycycline (VIBRAMYCIN) 100 MG Cap Take 1 capsule (100 mg total) by mouth every 12 (twelve) hours. (Patient not taking: Reported on 9/6/2023) 20 capsule 0    metroNIDAZOLE (FLAGYL) 250 MG tablet Take 1 tablet (250 mg total) by mouth 3 (three) times daily. (Patient not taking: Reported on 9/6/2023) 30 tablet 0    pen needle, diabetic (BD JERI 2ND GEN PEN NEEDLE) 32 gauge x 5/32" Ndle USE AS DIRECTED (Patient not taking: Reported on 9/18/2023) 100 each 5     No current facility-administered medications for this visit.       Allergies  Review of patient's allergies indicates:   Allergen Reactions    Bactrim [sulfamethoxazole-trimethoprim]     Codeine     Levaquin [levofloxacin]     Pcn [penicillins]        Past Medical History  Past Medical History:   Diagnosis Date    Allergy     Anxiety     Depression     Diabetes mellitus, type 2     Hyperlipidemia     Hypertension     Pre-diabetes        Surgical History  Past Surgical History:   Procedure Laterality Date    APPENDECTOMY      BACK SURGERY      BREAST SURGERY      CHOLECYSTECTOMY      COLONOSCOPY      GALLBLADDER SURGERY      HERNIA REPAIR      lymphnode remove      SHOULDER SURGERY      TUBAL LIGATION         Family History:   Family History   Problem Relation Age of Onset    Mental illness Mother     Cancer Mother     Depression Mother     Heart disease Mother     Hypertension Mother     Stroke Mother     Heart disease Father     Hypertension Father     Stroke Father        Social History:   Social History     Socioeconomic History    Marital status:     Number of children: 2 "   Occupational History    Occupation: COMPTRALLER   Tobacco Use    Smoking status: Former    Smokeless tobacco: Never   Substance and Sexual Activity    Alcohol use: Yes     Comment: rare    Drug use: No    Sexual activity: Yes     Social Determinants of Health     Stress: No Stress Concern Present (8/1/2019)    [facility] - Occupational Stress Questionnaire     Feeling of Stress : Not at all       Hospitalization/Major Diagnostic Procedure:     Review of Systems     General/Constitutional:  Chills denies. Fatigue denies. Fever denies. Weight gain denies. Weight loss denies.    Respiratory:  Shortness of breath denies.    Cardiovascular:  Chest pain denies.    Gastrointestinal:  Constipation denies. Diarrhea denies. Nausea denies. Vomiting denies.     Hematology:  Easy bruising denies. Prolonged bleeding denies.     Genitourinary:  Frequent urination denies. Pain in lower back denies. Painful urination denies.     Musculoskeletal:  See HPI for details    Skin:  Rash denies.    Neurologic:  Dizziness denies. Gait abnormalities denies. Seizures denies. Tingling/Numbess denies.    Psychiatric:  Anxiety denies. Depressed mood denies.     Objective:   Vital Signs: There were no vitals filed for this visit.     Physical Exam      General Examination:     Constitutional: The patient is alert and oriented to lace person and time. Mood is pleasant.     Head/Face: Normal facial features normal eyebrows    Eyes: Normal extraocular motion bilaterally    Lungs: Respirations are equal and unlabored    Gait is coordinated.    Cardiovascular: There are no swelling or varicosities present.    Lymphatic: Negative for adenopathy    Skin: Normal    Neurological: Level of consciousness normal. Oriented to place person and time and situation    Psychiatric: Oriented to time place person and situation    Nonantalgic gait.  Lumbar extension does cause some increased pain and she has tenderness to palpation  over her lumbar and lumbosacral regions bilateral.  Bilateral lower extremities are distal neurovascular intact.    XRAY Report/ Interpretation:  Lumbar AP and lateral x-rays taken in the office today reviewed the patient demonstrate that both the L1 and L2 superior endplate compression fractures appear to be healed with good bony callus formation.  However she has multilevel degenerative disc disease and facet arthropathy and sclerosis.      Assessment:       1. Compression fracture of L1 vertebra with routine healing, subsequent encounter    2. Compression fracture of L2 vertebra with routine healing, subsequent encounter    3. Facet arthritis, degenerative, lumbar spine    4. History of lumbar laminectomy    5. Disc degeneration, lumbar        Plan:       Debby was seen today for pain.    Diagnoses and all orders for this visit:    Compression fracture of L1 vertebra with routine healing, subsequent encounter  -     X-Ray Lumbar Spine Ap And Lateral    Compression fracture of L2 vertebra with routine healing, subsequent encounter  -     X-Ray Lumbar Spine Ap And Lateral    Facet arthritis, degenerative, lumbar spine    History of lumbar laminectomy    Disc degeneration, lumbar         Follow up if symptoms worsen or fail to improve.  This is to attest that the physician's assistant Greg Lorenzana served in the capacity as a scribe for this patient's encounter.  This is also to verify that I have reviewed the patient's history and helped formulate the treatment plan and discussed it with the physician's assistant.  I have actively participated  in the evaluation and treatment plan for this patient visit.  The treatment plan and medical decision-making is as outlined below:  We briefly discussed the option of a referral to Pain Management for lumbar medial branch blocks with progression towards rhizotomy.  The patient will research this option and get back to us if she wants to proceed in this direction.   Otherwise follow-up as needed.    Treatment options were discussed with regards to the nature of the medical condition. Conservative pain intervention and surgical options were discussed in detail. The probability of success of each separate treatment option was discussed. The patient expressed a clear understanding of the treatment options. With regards to surgery, the procedure risk, benefits, complications, and outcomes were discussed. No guarantees were given with regards to surgical outcome.   The risk of complications, morbidity, and mortality of patient management decisions have been made at the time of this visit. These are associated with the patient's problems, diagnostic procedures and treatment options. This includes the possible management options selected and those considered but not selected by the patient after shared medical decision making we discussed with the patient.     This note was created using Dragon voice recognition software that occasionally misinterpreted phrases or words.

## 2023-12-28 ENCOUNTER — TELEPHONE (OUTPATIENT)
Dept: GASTROENTEROLOGY | Facility: CLINIC | Age: 73
End: 2023-12-28
Payer: MEDICARE

## 2023-12-28 NOTE — TELEPHONE ENCOUNTER
Colonoscopy 3/13 at 830am arrive for 730am instructions reviewed and patient states understanding. Copy to portal.  No glp-1 medication

## 2024-01-05 ENCOUNTER — OFFICE VISIT (OUTPATIENT)
Dept: FAMILY MEDICINE | Facility: CLINIC | Age: 74
End: 2024-01-05
Payer: MEDICARE

## 2024-01-05 VITALS — HEART RATE: 70 BPM | OXYGEN SATURATION: 96 % | DIASTOLIC BLOOD PRESSURE: 72 MMHG | SYSTOLIC BLOOD PRESSURE: 132 MMHG

## 2024-01-05 DIAGNOSIS — N18.31 STAGE 3A CHRONIC KIDNEY DISEASE: ICD-10-CM

## 2024-01-05 DIAGNOSIS — R80.9 TYPE 2 DIABETES MELLITUS WITH MICROALBUMINURIA, WITH LONG-TERM CURRENT USE OF INSULIN: Primary | ICD-10-CM

## 2024-01-05 DIAGNOSIS — E11.29 TYPE 2 DIABETES MELLITUS WITH MICROALBUMINURIA, WITH LONG-TERM CURRENT USE OF INSULIN: Primary | ICD-10-CM

## 2024-01-05 DIAGNOSIS — R20.2 RIGHT HAND PARESTHESIA: ICD-10-CM

## 2024-01-05 DIAGNOSIS — F41.9 ANXIETY: ICD-10-CM

## 2024-01-05 DIAGNOSIS — E78.5 HYPERLIPIDEMIA, UNSPECIFIED HYPERLIPIDEMIA TYPE: ICD-10-CM

## 2024-01-05 DIAGNOSIS — L21.9 SEBORRHEIC DERMATITIS: ICD-10-CM

## 2024-01-05 DIAGNOSIS — Z79.4 TYPE 2 DIABETES MELLITUS WITH MICROALBUMINURIA, WITH LONG-TERM CURRENT USE OF INSULIN: Primary | ICD-10-CM

## 2024-01-05 DIAGNOSIS — R20.2 PARESTHESIA: ICD-10-CM

## 2024-01-05 DIAGNOSIS — F33.9 DEPRESSION, RECURRENT: ICD-10-CM

## 2024-01-05 PROCEDURE — G0008 ADMIN INFLUENZA VIRUS VAC: HCPCS | Mod: PBBFAC,PN

## 2024-01-05 PROCEDURE — 99214 OFFICE O/P EST MOD 30 MIN: CPT | Mod: S$PBB,,, | Performed by: FAMILY MEDICINE

## 2024-01-05 PROCEDURE — 99213 OFFICE O/P EST LOW 20 MIN: CPT | Mod: PBBFAC,PN | Performed by: FAMILY MEDICINE

## 2024-01-05 PROCEDURE — 99999PBSHW FLU VACCINE - QUADRIVALENT - ADJUVANTED: Mod: PBBFAC,,,

## 2024-01-05 PROCEDURE — 90694 VACC AIIV4 NO PRSRV 0.5ML IM: CPT | Mod: PBBFAC,PN

## 2024-01-05 PROCEDURE — 99999 PR PBB SHADOW E&M-EST. PATIENT-LVL III: CPT | Mod: PBBFAC,,, | Performed by: FAMILY MEDICINE

## 2024-01-05 RX ORDER — KETOCONAZOLE 20 MG/ML
SHAMPOO, SUSPENSION TOPICAL
COMMUNITY
End: 2024-01-05 | Stop reason: SDUPTHER

## 2024-01-05 RX ORDER — KETOCONAZOLE 20 MG/ML
SHAMPOO, SUSPENSION TOPICAL
Qty: 120 ML | Refills: 5 | Status: SHIPPED | OUTPATIENT
Start: 2024-01-05

## 2024-01-05 RX ORDER — ALPRAZOLAM 0.5 MG/1
0.5 TABLET ORAL 2 TIMES DAILY
Qty: 30 TABLET | Refills: 2 | Status: SHIPPED | OUTPATIENT
Start: 2024-01-05

## 2024-01-08 ENCOUNTER — HOSPITAL ENCOUNTER (OUTPATIENT)
Dept: RADIOLOGY | Facility: CLINIC | Age: 74
Discharge: HOME | End: 2024-01-08
Attending: FAMILY MEDICINE
Payer: MEDICARE

## 2024-01-08 ENCOUNTER — LAB VISIT (OUTPATIENT)
Dept: LAB | Facility: HOSPITAL | Age: 74
End: 2024-01-08
Attending: FAMILY MEDICINE
Payer: MEDICARE

## 2024-01-08 DIAGNOSIS — Z79.4 TYPE 2 DIABETES MELLITUS WITH MICROALBUMINURIA, WITH LONG-TERM CURRENT USE OF INSULIN: ICD-10-CM

## 2024-01-08 DIAGNOSIS — N18.31 STAGE 3A CHRONIC KIDNEY DISEASE: ICD-10-CM

## 2024-01-08 DIAGNOSIS — R20.2 RIGHT HAND PARESTHESIA: ICD-10-CM

## 2024-01-08 DIAGNOSIS — F41.9 ANXIETY: ICD-10-CM

## 2024-01-08 DIAGNOSIS — Z12.31 BREAST CANCER SCREENING BY MAMMOGRAM: ICD-10-CM

## 2024-01-08 DIAGNOSIS — R80.9 TYPE 2 DIABETES MELLITUS WITH MICROALBUMINURIA, WITH LONG-TERM CURRENT USE OF INSULIN: ICD-10-CM

## 2024-01-08 DIAGNOSIS — E78.5 HYPERLIPIDEMIA, UNSPECIFIED HYPERLIPIDEMIA TYPE: ICD-10-CM

## 2024-01-08 DIAGNOSIS — R20.2 PARESTHESIA: ICD-10-CM

## 2024-01-08 DIAGNOSIS — F33.9 DEPRESSION, RECURRENT: ICD-10-CM

## 2024-01-08 DIAGNOSIS — E11.29 TYPE 2 DIABETES MELLITUS WITH MICROALBUMINURIA, WITH LONG-TERM CURRENT USE OF INSULIN: ICD-10-CM

## 2024-01-08 LAB
ALBUMIN SERPL BCP-MCNC: 4 G/DL (ref 3.5–5.2)
ALP SERPL-CCNC: 76 U/L (ref 55–135)
ALT SERPL W/O P-5'-P-CCNC: 16 U/L (ref 10–44)
ANION GAP SERPL CALC-SCNC: 10 MMOL/L (ref 8–16)
AST SERPL-CCNC: 14 U/L (ref 10–40)
BASOPHILS # BLD AUTO: 0.08 K/UL (ref 0–0.2)
BASOPHILS NFR BLD: 0.8 % (ref 0–1.9)
BILIRUB SERPL-MCNC: 0.5 MG/DL (ref 0.1–1)
BUN SERPL-MCNC: 21 MG/DL (ref 8–23)
CALCIUM SERPL-MCNC: 10 MG/DL (ref 8.7–10.5)
CHLORIDE SERPL-SCNC: 104 MMOL/L (ref 95–110)
CHOLEST SERPL-MCNC: 219 MG/DL (ref 120–199)
CHOLEST/HDLC SERPL: 4.2 {RATIO} (ref 2–5)
CO2 SERPL-SCNC: 24 MMOL/L (ref 23–29)
CREAT SERPL-MCNC: 1.1 MG/DL (ref 0.5–1.4)
DIFFERENTIAL METHOD BLD: NORMAL
EOSINOPHIL # BLD AUTO: 0.4 K/UL (ref 0–0.5)
EOSINOPHIL NFR BLD: 4.4 % (ref 0–8)
ERYTHROCYTE [DISTWIDTH] IN BLOOD BY AUTOMATED COUNT: 14.5 % (ref 11.5–14.5)
EST. GFR  (NO RACE VARIABLE): 53.4 ML/MIN/1.73 M^2
ESTIMATED AVG GLUCOSE: 189 MG/DL (ref 68–131)
GLUCOSE SERPL-MCNC: 166 MG/DL (ref 70–110)
HBA1C MFR BLD: 8.2 % (ref 4.5–6.2)
HCT VFR BLD AUTO: 44.9 % (ref 37–48.5)
HDLC SERPL-MCNC: 52 MG/DL (ref 40–75)
HDLC SERPL: 23.7 % (ref 20–50)
HGB BLD-MCNC: 14.6 G/DL (ref 12–16)
IMM GRANULOCYTES # BLD AUTO: 0.03 K/UL (ref 0–0.04)
IMM GRANULOCYTES NFR BLD AUTO: 0.3 % (ref 0–0.5)
LDLC SERPL CALC-MCNC: 130.8 MG/DL (ref 63–159)
LYMPHOCYTES # BLD AUTO: 4 K/UL (ref 1–4.8)
LYMPHOCYTES NFR BLD: 42.2 % (ref 18–48)
MCH RBC QN AUTO: 28.9 PG (ref 27–31)
MCHC RBC AUTO-ENTMCNC: 32.5 G/DL (ref 32–36)
MCV RBC AUTO: 89 FL (ref 82–98)
MONOCYTES # BLD AUTO: 0.6 K/UL (ref 0.3–1)
MONOCYTES NFR BLD: 6.6 % (ref 4–15)
NEUTROPHILS # BLD AUTO: 4.3 K/UL (ref 1.8–7.7)
NEUTROPHILS NFR BLD: 45.7 % (ref 38–73)
NONHDLC SERPL-MCNC: 167 MG/DL
NRBC BLD-RTO: 0 /100 WBC
PLATELET # BLD AUTO: 302 K/UL (ref 150–450)
PMV BLD AUTO: 10.1 FL (ref 9.2–12.9)
POTASSIUM SERPL-SCNC: 4.4 MMOL/L (ref 3.5–5.1)
PROT SERPL-MCNC: 7.6 G/DL (ref 6–8.4)
RBC # BLD AUTO: 5.06 M/UL (ref 4–5.4)
SODIUM SERPL-SCNC: 138 MMOL/L (ref 136–145)
TRIGL SERPL-MCNC: 181 MG/DL (ref 30–150)
TSH SERPL DL<=0.005 MIU/L-ACNC: 2.63 UIU/ML (ref 0.34–5.6)
VIT B12 SERPL-MCNC: 428 PG/ML (ref 210–950)
WBC # BLD AUTO: 9.48 K/UL (ref 3.9–12.7)

## 2024-01-08 PROCEDURE — 82607 VITAMIN B-12: CPT | Performed by: FAMILY MEDICINE

## 2024-01-08 PROCEDURE — 80053 COMPREHEN METABOLIC PANEL: CPT | Performed by: FAMILY MEDICINE

## 2024-01-08 PROCEDURE — 77067 SCR MAMMO BI INCL CAD: CPT | Mod: 26,,, | Performed by: RADIOLOGY

## 2024-01-08 PROCEDURE — 77063 BREAST TOMOSYNTHESIS BI: CPT | Mod: 26,,, | Performed by: RADIOLOGY

## 2024-01-08 PROCEDURE — 36415 COLL VENOUS BLD VENIPUNCTURE: CPT | Performed by: FAMILY MEDICINE

## 2024-01-08 PROCEDURE — 77067 SCR MAMMO BI INCL CAD: CPT | Mod: TC,PO

## 2024-01-08 PROCEDURE — 84443 ASSAY THYROID STIM HORMONE: CPT | Performed by: FAMILY MEDICINE

## 2024-01-08 PROCEDURE — 82043 UR ALBUMIN QUANTITATIVE: CPT | Performed by: FAMILY MEDICINE

## 2024-01-08 PROCEDURE — 85025 COMPLETE CBC W/AUTO DIFF WBC: CPT | Performed by: FAMILY MEDICINE

## 2024-01-08 PROCEDURE — 80061 LIPID PANEL: CPT | Performed by: FAMILY MEDICINE

## 2024-01-08 PROCEDURE — 83036 HEMOGLOBIN GLYCOSYLATED A1C: CPT | Performed by: FAMILY MEDICINE

## 2024-01-09 LAB
ALBUMIN/CREAT UR: 15.1 UG/MG (ref 0–30)
CREAT UR-MCNC: 103.9 MG/DL (ref 15–325)
MICROALBUMIN UR DL<=1MG/L-MCNC: 15.7 UG/ML

## 2024-01-09 NOTE — PROGRESS NOTES
Subjective:       Patient ID: Debby Brown is a 73 y.o. female.    Chief Complaint: No chief complaint on file.    Paresthesias burning sensation of the right hand for 3 weeks often on.  One spot dorsum of the right hand over the thenar area.  Anxiety issues also.  Needs refill of medication.  Some itching of the scalp for few weeks now.  And flaking.  Forgot to do his labs.  Diabetes mellitus type 2 with positive microalbumin on insulin.  Fasting glucose 104.  CKD 3A.  Depression.  Seborrheic dermatitis also.    Physical examination.  Scalp somewhat flaky.  Neck without bruit.  Chest clear.  Heart regular rate rhythm.  Abdomen bowel sounds are positive soft nontender.        Objective:        Assessment:       1. Type 2 diabetes mellitus with microalbuminuria, with long-term current use of insulin    2. Stage 3a chronic kidney disease    3. Anxiety    4. Depression, recurrent    5. Right hand paresthesia    6. Paresthesia    7. Seborrheic dermatitis    8. Hyperlipidemia, unspecified hyperlipidemia type        Plan:       Type 2 diabetes mellitus with microalbuminuria, with long-term current use of insulin  -     Hemoglobin A1C; Future; Expected date: 01/05/2024  -     Microalbumin/Creatinine Ratio, Urine; Future; Expected date: 01/05/2024    Stage 3a chronic kidney disease  -     Comprehensive Metabolic Panel; Future; Expected date: 01/05/2024    Anxiety  -     TSH; Future; Expected date: 01/05/2024    Depression, recurrent  -     TSH; Future; Expected date: 01/05/2024    Right hand paresthesia  -     Vitamin B12; Future; Expected date: 01/05/2024    Paresthesia  -     Vitamin B12; Future; Expected date: 01/05/2024    Seborrheic dermatitis    Hyperlipidemia, unspecified hyperlipidemia type  -     CBC Auto Differential; Future; Expected date: 01/05/2024  -     Lipid Panel; Future; Expected date: 01/05/2024    Other orders  -     ALPRAZolam (XANAX) 0.5 MG tablet; Take 1 tablet (0.5 mg total) by mouth 2 (two) times  daily. as needed for anxiety.  Dispense: 30 tablet; Refill: 2  -     Influenza - Quadrivalent (Adjuvanted)  -     ketoconazole (NIZORAL) 2 % shampoo; Apply topically 3 (three) times a week.  Dispense: 120 mL; Refill: 5      A1c CMP CBC lipids B12 TSH.  Microalbumin.  Flu shot.  Colonoscopy refer for this.  Eye exam from Dr. Gasca mailed.  RSV vaccine.  Xanax refilled p.r.n..  Nizoral shampoo 16 oz with 5 refills.  Use 3 times weekly.

## 2024-01-22 NOTE — TELEPHONE ENCOUNTER
No care due was identified.  [facility] Embedded Care Due Messages. Reference number: [id number].   1/22/2024 10:42:40 AM CST

## 2024-01-24 RX ORDER — INSULIN GLARGINE 300 U/ML
INJECTION, SOLUTION SUBCUTANEOUS
Qty: 22.5 ML | Refills: 1 | Status: SHIPPED | OUTPATIENT
Start: 2024-01-24

## 2024-01-24 NOTE — TELEPHONE ENCOUNTER
Refill Decision Note   Debbykiara Brown  is requesting a refill authorization.  Brief Assessment and Rationale for Refill:  Approve     Medication Therapy Plan:        Comments:     Note composed:5:47 PM 01/24/2024

## 2024-01-26 ENCOUNTER — TELEPHONE (OUTPATIENT)
Dept: FAMILY MEDICINE | Facility: CLINIC | Age: 74
End: 2024-01-26
Payer: MEDICARE

## 2024-01-26 DIAGNOSIS — Z79.4 TYPE 2 DIABETES MELLITUS WITH MICROALBUMINURIA, WITH LONG-TERM CURRENT USE OF INSULIN: ICD-10-CM

## 2024-01-26 DIAGNOSIS — R80.9 TYPE 2 DIABETES MELLITUS WITH MICROALBUMINURIA, WITH LONG-TERM CURRENT USE OF INSULIN: ICD-10-CM

## 2024-01-26 DIAGNOSIS — E78.2 MIXED HYPERLIPIDEMIA: ICD-10-CM

## 2024-01-26 DIAGNOSIS — E11.29 TYPE 2 DIABETES MELLITUS WITH MICROALBUMINURIA, WITH LONG-TERM CURRENT USE OF INSULIN: ICD-10-CM

## 2024-01-26 DIAGNOSIS — I10 HYPERTENSION, ESSENTIAL: Primary | ICD-10-CM

## 2024-01-26 NOTE — TELEPHONE ENCOUNTER
Notified pt chol high zetia not helping enough , need to go up on farxiga to 10 mg daily,  fu 3 m with labs ,in for [facility].

## 2024-01-26 NOTE — TELEPHONE ENCOUNTER
Pt called back after looking through her meds and she is not on any chol med . Not on zetia ,farxiga . Told pt will talk to dr arcos Monday and get back with her what to do and will tell him to use [facility].

## 2024-01-30 RX ORDER — DAPAGLIFLOZIN 5 MG/1
5 TABLET, FILM COATED ORAL DAILY
Qty: 90 TABLET | Refills: 1 | Status: SHIPPED | OUTPATIENT
Start: 2024-01-30

## 2024-01-30 RX ORDER — ROSUVASTATIN CALCIUM 10 MG/1
10 TABLET, COATED ORAL DAILY
Qty: 90 TABLET | Refills: 1 | Status: SHIPPED | OUTPATIENT
Start: 2024-01-30 | End: 2025-01-29

## 2024-02-17 ENCOUNTER — OFFICE VISIT (OUTPATIENT)
Dept: URGENT CARE | Facility: CLINIC | Age: 74
End: 2024-02-17
Payer: MEDICARE

## 2024-02-17 VITALS
WEIGHT: 192 LBS | RESPIRATION RATE: 12 BRPM | HEART RATE: 78 BPM | OXYGEN SATURATION: 98 % | DIASTOLIC BLOOD PRESSURE: 69 MMHG | BODY MASS INDEX: 30.13 KG/M2 | TEMPERATURE: 98 F | HEIGHT: 67 IN | SYSTOLIC BLOOD PRESSURE: 118 MMHG

## 2024-02-17 DIAGNOSIS — R05.2 SUBACUTE COUGH: Primary | ICD-10-CM

## 2024-02-17 DIAGNOSIS — J01.00 ACUTE MAXILLARY SINUSITIS, RECURRENCE NOT SPECIFIED: ICD-10-CM

## 2024-02-17 DIAGNOSIS — R42 VERTIGO: ICD-10-CM

## 2024-02-17 PROCEDURE — 99214 OFFICE O/P EST MOD 30 MIN: CPT | Mod: S$GLB,,,

## 2024-02-17 RX ORDER — BENZONATATE 100 MG/1
100 CAPSULE ORAL 3 TIMES DAILY PRN
Qty: 30 CAPSULE | Refills: 0 | Status: SHIPPED | OUTPATIENT
Start: 2024-02-17 | End: 2024-02-27

## 2024-02-17 RX ORDER — AZITHROMYCIN 250 MG/1
TABLET, FILM COATED ORAL
Qty: 6 TABLET | Refills: 0 | Status: ON HOLD | OUTPATIENT
Start: 2024-02-17 | End: 2024-03-13 | Stop reason: ALTCHOICE

## 2024-02-17 RX ORDER — MECLIZINE HCL 12.5 MG 12.5 MG/1
12.5 TABLET ORAL 3 TIMES DAILY PRN
Qty: 15 TABLET | Refills: 0 | Status: SHIPPED | OUTPATIENT
Start: 2024-02-17

## 2024-02-17 RX ORDER — PREDNISONE 20 MG/1
20 TABLET ORAL 2 TIMES DAILY
Qty: 10 TABLET | Refills: 0 | Status: SHIPPED | OUTPATIENT
Start: 2024-02-17 | End: 2024-02-22

## 2024-02-17 NOTE — PROGRESS NOTES
"Subjective:      Patient ID: Debby Brown is a 73 y.o. female.    Vitals:  height is 5' 7" (1.702 m) and weight is 87.1 kg (192 lb). Her oral temperature is 97.9 °F (36.6 °C). Her blood pressure is 118/69 and her pulse is 78. Her respiration is 12 and oxygen saturation is 98%.     Chief Complaint: Headache and Dizziness    In clinic with a chief complaint of facial pains, nasal congestion, headache, dizziness with a sensation of unsteadiness, and worsening cough over the last several weeks.  She has been using multiple medications with no improvement.  She reports nasal congestion and cough or productive of green thick phlegm.    Nasreen-Hallpike maneuvers performed in clinic with a positive response.    Further testing was offered and declined by patient    Headache   This is a new problem. The current episode started 1 to 4 weeks ago. The problem occurs constantly. The problem has been unchanged. The pain is located in the Frontal region. The pain quality is not similar to prior headaches. The quality of the pain is described as aching. The pain is at a severity of 3/10. The pain is mild. Associated symptoms include coughing, dizziness, a fever, nausea, rhinorrhea and sinus pressure. Pertinent negatives include no anorexia, loss of balance, photophobia or vomiting. She has tried acetaminophen for the symptoms. The treatment provided no relief.   Dizziness:   Chronicity:  New  Onset:  In the past 7 days  Progression since onset:  Unchanged  Frequency:  Every few hours  Pain Scale:  0/10  Duration:  Very brief  Dizziness characteristics:  Off-balance, height vertigo, sensation of movement and walking on uneven surface  Frequency of Spells:  Daily   Associated symptoms: a fever, headaches and nausea.no vomiting, no visual disturbances, no light-headedness, no syncope, no palpitations, no facial weakness, no slurred speech and no chest pain.  Aggravated by:  Getting up and position changes  Treatments tried:  " Nothing  Improvements on treatment:  No relief      Constitution: Positive for fever.   HENT:  Positive for congestion and sinus pressure.    Neck: neck negative.   Cardiovascular:  Negative for chest pain, palpitations and passing out.   Eyes:  Negative for photophobia.   Respiratory:  Positive for cough and sputum production. Negative for shortness of breath, stridor, wheezing and asthma.    Gastrointestinal:  Positive for nausea. Negative for vomiting.   Endocrine: negative.   Genitourinary: Negative.    Musculoskeletal: Negative.  Negative for history of spine disorder.   Skin: Negative.    Allergic/Immunologic: Positive for immunizations up-to-date. Negative for asthma.   Neurological:  Positive for dizziness and headaches. Negative for history of vertigo, light-headedness, coordination disturbances, loss of balance and disorientation.   Hematologic/Lymphatic: Negative.    Psychiatric/Behavioral:  Negative for disorientation.       Objective:     Physical Exam   Constitutional: She is oriented to person, place, and time. She appears well-developed. She is cooperative.   HENT:   Head: Normocephalic and atraumatic.   Ears:   Right Ear: Hearing, tympanic membrane, external ear and ear canal normal.   Left Ear: Hearing, tympanic membrane, external ear and ear canal normal.   Nose: Rhinorrhea, purulent discharge and congestion present. No mucosal edema or nasal deformity. No epistaxis. Right sinus exhibits maxillary sinus tenderness and frontal sinus tenderness. Left sinus exhibits maxillary sinus tenderness and frontal sinus tenderness.   Mouth/Throat: Uvula is midline, oropharynx is clear and moist and mucous membranes are normal. Mucous membranes are moist. No trismus in the jaw. Normal dentition. No uvula swelling. No posterior oropharyngeal erythema. Oropharynx is clear.   Eyes: Conjunctivae and lids are normal. Pupils are equal, round, and reactive to light. Extraocular movement intact   Neck: Trachea normal  and phonation normal. Neck supple.   Cardiovascular: Normal rate, regular rhythm, normal heart sounds and normal pulses.   Pulmonary/Chest: Effort normal and breath sounds normal.   Abdominal: Normal appearance. Soft. flat abdomen There is no abdominal tenderness. There is no left CVA tenderness and no right CVA tenderness.   Musculoskeletal: Normal range of motion.         General: Normal range of motion.   Neurological: no focal deficit. She is alert and oriented to person, place, and time. She has normal motor skills, normal sensation and intact cranial nerves (2-12). She exhibits normal muscle tone. Coordination normal. GCS eye subscore is 4. GCS verbal subscore is 5. GCS motor subscore is 6.   Skin: Skin is warm, dry and intact. Capillary refill takes 2 to 3 seconds.   Psychiatric: Her speech is normal and behavior is normal. Mood, judgment and thought content normal.   Nursing note and vitals reviewed.      Assessment:     1. Subacute cough    2. Vertigo    3. Acute maxillary sinusitis, recurrence not specified        Plan:       Subacute cough  -     benzonatate (TESSALON) 100 MG capsule; Take 1 capsule (100 mg total) by mouth 3 (three) times daily as needed for Cough.  Dispense: 30 capsule; Refill: 0  -     azithromycin (Z-CYNTHIA) 250 MG tablet; Take 2 tablets by mouth on day 1; Take 1 tablet by mouth on days 2-5  Dispense: 6 tablet; Refill: 0  -     predniSONE (DELTASONE) 20 MG tablet; Take 1 tablet (20 mg total) by mouth 2 (two) times daily. for 5 days  Dispense: 10 tablet; Refill: 0    Vertigo  -     meclizine (ANTIVERT) 12.5 mg tablet; Take 1 tablet (12.5 mg total) by mouth 3 (three) times daily as needed for Dizziness.  Dispense: 15 tablet; Refill: 0    Acute maxillary sinusitis, recurrence not specified  -     azithromycin (Z-CYNTHIA) 250 MG tablet; Take 2 tablets by mouth on day 1; Take 1 tablet by mouth on days 2-5  Dispense: 6 tablet; Refill: 0  -     predniSONE (DELTASONE) 20 MG tablet; Take 1 tablet (20  mg total) by mouth 2 (two) times daily. for 5 days  Dispense: 10 tablet; Refill: 0      Emergency room for no improvement.  With a testing was offered to patient and declined.  States had flu vaccine so does not flu, and had COVID previously so he has not COVID.  Further workup for dizziness including EKG declined by patient.

## 2024-02-19 ENCOUNTER — OFFICE VISIT (OUTPATIENT)
Dept: FAMILY MEDICINE | Facility: CLINIC | Age: 74
End: 2024-02-19
Payer: MEDICARE

## 2024-02-19 VITALS
BODY MASS INDEX: 30.04 KG/M2 | RESPIRATION RATE: 18 BRPM | HEART RATE: 81 BPM | TEMPERATURE: 98 F | WEIGHT: 191.38 LBS | OXYGEN SATURATION: 97 % | SYSTOLIC BLOOD PRESSURE: 118 MMHG | DIASTOLIC BLOOD PRESSURE: 86 MMHG | HEIGHT: 67 IN

## 2024-02-19 DIAGNOSIS — J01.01 ACUTE RECURRENT MAXILLARY SINUSITIS: Primary | ICD-10-CM

## 2024-02-19 DIAGNOSIS — R05.2 SUBACUTE COUGH: ICD-10-CM

## 2024-02-19 PROCEDURE — 99214 OFFICE O/P EST MOD 30 MIN: CPT | Mod: PBBFAC,PN

## 2024-02-19 PROCEDURE — 99214 OFFICE O/P EST MOD 30 MIN: CPT | Mod: S$PBB,,,

## 2024-02-19 PROCEDURE — 99999 PR PBB SHADOW E&M-EST. PATIENT-LVL IV: CPT | Mod: PBBFAC,,,

## 2024-02-19 RX ORDER — PROMETHAZINE HYDROCHLORIDE AND DEXTROMETHORPHAN HYDROBROMIDE 6.25; 15 MG/5ML; MG/5ML
5 SYRUP ORAL EVERY 8 HOURS PRN
Qty: 150 ML | Refills: 0 | Status: SHIPPED | OUTPATIENT
Start: 2024-02-19 | End: 2024-02-29

## 2024-02-19 RX ORDER — FLUTICASONE PROPIONATE 50 MCG
1 SPRAY, SUSPENSION (ML) NASAL DAILY
Qty: 16 G | Refills: 0 | Status: SHIPPED | OUTPATIENT
Start: 2024-02-19

## 2024-02-19 NOTE — PROGRESS NOTES
Subjective:       Patient ID: Debby Brown is a 73 y.o. female.    Chief Complaint: Dizziness, Cough, Back Pain, and Leg Pain (left)    Debby Brown is a 73 y.o. female patient that presents to clinic with complaints of cough and dizziness. She states she started feeling bad on Wednesday. She went to urgent care on Saturday and was given a zpack, prednisone, and tessalon for cough. She is doing a little bit better with her breathing but is still coughing a lot.  She is coughing so hard at time she is urinating on herself.  Her cough is productive of green mucous.  She does not feel like tessalon is helping at all.  She has no fever or chills. She also complained of dizziness at urgent care and was put on meclizine for vertigo which is helping her. Her dizziness is better with medication.         Review of patient's allergies indicates:   Allergen Reactions    Bactrim [sulfamethoxazole-trimethoprim]     Codeine     Levaquin [levofloxacin]     Pcn [penicillins]      Social Determinants of Health     Tobacco Use: Medium Risk (2/19/2024)    Patient History     Smoking Tobacco Use: Former     Smokeless Tobacco Use: Never     Passive Exposure: Not on file   Alcohol Use: Not on file   Financial Resource Strain: Not on file   Food Insecurity: Not on file   Transportation Needs: Not on file   Physical Activity: Not on file   Stress: No Stress Concern Present (8/1/2019)    [employer] - Occupational Stress Questionnaire     Feeling of Stress : Not at all   Social Connections: Not on file   Housing Stability: Not on file   Depression: Low Risk  (1/5/2024)    Depression     Last PHQ-4: Flowsheet Data: 0      Past Medical History:   Diagnosis Date    Allergy     Anxiety     Depression     Diabetes mellitus, type 2     Hyperlipidemia     Hypertension     Pre-diabetes       Past Surgical History:   Procedure Laterality Date    APPENDECTOMY      BACK SURGERY      BREAST SURGERY       "CHOLECYSTECTOMY      COLONOSCOPY      GALLBLADDER SURGERY      HERNIA REPAIR      lymphnode remove      SHOULDER SURGERY      TOTAL REDUCTION MAMMOPLASTY Bilateral 2001    TUBAL LIGATION        Social History     Socioeconomic History    Marital status:     Number of children: 2         Current Outpatient Medications:     ALPRAZolam (XANAX) 0.5 MG tablet, Take 1 tablet (0.5 mg total) by mouth 2 (two) times daily. as needed for anxiety., Disp: 30 tablet, Rfl: 2    azithromycin (Z-CYNTHIA) 250 MG tablet, Take 2 tablets by mouth on day 1; Take 1 tablet by mouth on days 2-5, Disp: 6 tablet, Rfl: 0    benzonatate (TESSALON) 100 MG capsule, Take 1 capsule (100 mg total) by mouth 3 (three) times daily as needed for Cough., Disp: 30 capsule, Rfl: 0    buPROPion (WELLBUTRIN XL) 300 MG 24 hr tablet, Take 1 tablet by mouth once daily, Disp: 90 tablet, Rfl: 3    dapagliflozin propanediol (FARXIGA) 5 mg Tab tablet, Take 1 tablet (5 mg total) by mouth once daily., Disp: 90 tablet, Rfl: 1    insulin glargine, TOUJEO, (TOUJEO SOLOSTAR U-300 INSULIN) 300 unit/mL (1.5 mL) InPn pen, INJECT 70 UNITS SUBCUTANEOUSLY ONCE DAILY, Disp: 22.5 mL, Rfl: 1    ketoconazole (NIZORAL) 2 % shampoo, Apply topically 3 (three) times a week., Disp: 120 mL, Rfl: 5    lisinopriL (PRINIVIL,ZESTRIL) 40 MG tablet, Take 1 tablet by mouth once daily, Disp: 90 tablet, Rfl: 3    meclizine (ANTIVERT) 12.5 mg tablet, Take 1 tablet (12.5 mg total) by mouth 3 (three) times daily as needed for Dizziness., Disp: 15 tablet, Rfl: 0    metoprolol succinate (TOPROL-XL) 50 MG 24 hr tablet, Take 1 tablet by mouth once daily, Disp: 90 tablet, Rfl: 3    pen needle, diabetic (BD JERI 2ND GEN PEN NEEDLE) 32 gauge x 5/32" Ndle, USE AS DIRECTED, Disp: 100 each, Rfl: 5    predniSONE (DELTASONE) 20 MG tablet, Take 1 tablet (20 mg total) by mouth 2 (two) times daily. for 5 days, Disp: 10 tablet, Rfl: 0    rosuvastatin (CRESTOR) 10 MG tablet, Take 1 tablet (10 mg total) by mouth " once daily., Disp: 90 tablet, Rfl: 1    TRUE METRIX GLUCOSE METER Misc, U UTD, Disp: , Rfl:     TRUE METRIX GLUCOSE TEST STRIP Strp, U QAM, Disp: 100 each, Rfl: 5    TRUEPLUS LANCETS 30 gauge Misc, U QAM, Disp: 100 each, Rfl: 5    ezetimibe (ZETIA) 10 mg tablet, Take 1 tablet (10 mg total) by mouth once daily. (Patient not taking: Reported on 2/19/2024), Disp: 90 tablet, Rfl: 1    fluticasone propionate (FLONASE) 50 mcg/actuation nasal spray, 1 spray (50 mcg total) by Each Nostril route once daily., Disp: 16 g, Rfl: 0    latanoprost 0.005 % ophthalmic solution, Place 1 drop into both eyes every evening., Disp: , Rfl:     promethazine-dextromethorphan (PROMETHAZINE-DM) 6.25-15 mg/5 mL Syrp, Take 5 mLs by mouth every 8 (eight) hours as needed (cough)., Disp: 150 mL, Rfl: 0    Lab Results   Component Value Date    WBC 9.48 01/08/2024    HGB 14.6 01/08/2024    HCT 44.9 01/08/2024     01/08/2024    CHOL 219 (H) 01/08/2024    TRIG 181 (H) 01/08/2024    HDL 52 01/08/2024    ALT 16 01/08/2024    AST 14 01/08/2024     01/08/2024    K 4.4 01/08/2024     01/08/2024    CREATININE 1.1 01/08/2024    BUN 21 01/08/2024    CO2 24 01/08/2024    TSH 2.630 01/08/2024    INR 1.1 09/04/2021    HGBA1C 8.2 (H) 01/08/2024    MICROALBUR 0.9 01/09/2020       Review of Systems   Constitutional:  Negative for chills and fever.   HENT:  Positive for nasal congestion. Negative for sore throat and trouble swallowing.    Respiratory:  Positive for cough. Negative for shortness of breath.    Cardiovascular:  Negative for chest pain and palpitations.       Objective:      Physical Exam  Vitals reviewed.   Constitutional:       Appearance: Normal appearance.   HENT:      Right Ear: Tympanic membrane normal.      Left Ear: Tympanic membrane normal.      Nose: Congestion present.      Mouth/Throat:      Pharynx: Posterior oropharyngeal erythema present.   Eyes:      Conjunctiva/sclera: Conjunctivae normal.   Cardiovascular:      Rate  and Rhythm: Normal rate and regular rhythm.      Pulses: Normal pulses.      Heart sounds: Normal heart sounds.   Pulmonary:      Effort: Pulmonary effort is normal.      Breath sounds: Normal breath sounds.   Skin:     General: Skin is warm and dry.      Capillary Refill: Capillary refill takes less than 2 seconds.   Neurological:      Mental Status: She is alert.   Psychiatric:         Mood and Affect: Mood normal.         Behavior: Behavior normal.         Thought Content: Thought content normal.         Judgment: Judgment normal.         Assessment:       1. Acute recurrent maxillary sinusitis    2. Subacute cough        Plan:       Debby was seen today for dizziness, cough, back pain and leg pain.    Diagnoses and all orders for this visit:    Acute recurrent maxillary sinusitis  -     fluticasone propionate (FLONASE) 50 mcg/actuation nasal spray; 1 spray (50 mcg total) by Each Nostril route once daily.    Subacute cough  -     promethazine-dextromethorphan (PROMETHAZINE-DM) 6.25-15 mg/5 mL Syrp; Take 5 mLs by mouth every 8 (eight) hours as needed (cough).  -     X-Ray Chest PA And Lateral; Future    Continue zpack and prednisone till completed. Continue to take zyrtec daily. Start using Flonase nasal spray.  Stop tessalon and start OTC Delsym for cough during day and Promethazine DM at night.  Have chest xray.  Continue meclizine for vertigo.  If this doesn't resolve will refer to ENT for further evaluation.  Follow up if symptoms worsen or do not improve.

## 2024-02-20 ENCOUNTER — HOSPITAL ENCOUNTER (OUTPATIENT)
Dept: RADIOLOGY | Facility: HOSPITAL | Age: 74
Discharge: HOME | End: 2024-02-20
Payer: MEDICARE

## 2024-02-20 DIAGNOSIS — R05.2 SUBACUTE COUGH: ICD-10-CM

## 2024-02-20 PROCEDURE — 71046 X-RAY EXAM CHEST 2 VIEWS: CPT | Mod: TC,PO

## 2024-02-27 DIAGNOSIS — Z00.00 ENCOUNTER FOR MEDICARE ANNUAL WELLNESS EXAM: ICD-10-CM

## 2024-03-13 ENCOUNTER — ANESTHESIA EVENT (OUTPATIENT)
Dept: ENDOSCOPY | Facility: HOSPITAL | Age: 74
End: 2024-03-13
Payer: MEDICARE

## 2024-03-13 ENCOUNTER — ANESTHESIA (OUTPATIENT)
Dept: ENDOSCOPY | Facility: HOSPITAL | Age: 74
End: 2024-03-13
Payer: MEDICARE

## 2024-03-13 ENCOUNTER — HOSPITAL ENCOUNTER (OUTPATIENT)
Facility: HOSPITAL | Age: 74
Discharge: HOME | End: 2024-03-13
Attending: INTERNAL MEDICINE | Admitting: INTERNAL MEDICINE
Payer: MEDICARE

## 2024-03-13 VITALS
HEART RATE: 72 BPM | SYSTOLIC BLOOD PRESSURE: 126 MMHG | BODY MASS INDEX: 30.13 KG/M2 | WEIGHT: 192 LBS | DIASTOLIC BLOOD PRESSURE: 76 MMHG | RESPIRATION RATE: 16 BRPM | TEMPERATURE: 99 F | HEIGHT: 67 IN | OXYGEN SATURATION: 96 %

## 2024-03-13 DIAGNOSIS — Z86.010 HISTORY OF COLON POLYPS: ICD-10-CM

## 2024-03-13 PROCEDURE — 63600175 PHARM REV CODE 636 W HCPCS: Performed by: NURSE ANESTHETIST, CERTIFIED REGISTERED

## 2024-03-13 PROCEDURE — G0105 COLORECTAL SCRN; HI RISK IND: HCPCS | Mod: ,,, | Performed by: INTERNAL MEDICINE

## 2024-03-13 PROCEDURE — 37000009 HC ANESTHESIA EA ADD 15 MINS: Performed by: INTERNAL MEDICINE

## 2024-03-13 PROCEDURE — G0105 COLORECTAL SCRN; HI RISK IND: HCPCS | Performed by: INTERNAL MEDICINE

## 2024-03-13 PROCEDURE — 37000008 HC ANESTHESIA 1ST 15 MINUTES: Performed by: INTERNAL MEDICINE

## 2024-03-13 PROCEDURE — 25000003 PHARM REV CODE 250: Performed by: NURSE ANESTHETIST, CERTIFIED REGISTERED

## 2024-03-13 RX ORDER — SODIUM CHLORIDE 9 MG/ML
INJECTION, SOLUTION INTRAVENOUS CONTINUOUS
Status: DISCONTINUED | OUTPATIENT
Start: 2024-03-13 | End: 2024-03-13 | Stop reason: HOSPADM

## 2024-03-13 RX ORDER — PROPOFOL 10 MG/ML
VIAL (ML) INTRAVENOUS
Status: DISCONTINUED | OUTPATIENT
Start: 2024-03-13 | End: 2024-03-13

## 2024-03-13 RX ORDER — LIDOCAINE HYDROCHLORIDE 20 MG/ML
INJECTION INTRAVENOUS
Status: DISCONTINUED | OUTPATIENT
Start: 2024-03-13 | End: 2024-03-13

## 2024-03-13 RX ADMIN — PROPOFOL 50 MG: 10 INJECTION, EMULSION INTRAVENOUS at 08:03

## 2024-03-13 RX ADMIN — LIDOCAINE HYDROCHLORIDE 20 MG: 20 INJECTION, SOLUTION INTRAVENOUS at 08:03

## 2024-03-13 RX ADMIN — SODIUM CHLORIDE: 0.9 INJECTION, SOLUTION INTRAVENOUS at 08:03

## 2024-03-13 NOTE — TRANSFER OF CARE
"Anesthesia Transfer of Care Note    Patient: Debby Brown    Procedure(s) Performed: Procedure(s) (LRB):  COLONOSCOPY (N/A)    Patient location: GI    Anesthesia Type: general    Transport from OR: Transported from OR on room air with adequate spontaneous ventilation    Post pain: adequate analgesia    Post assessment: no apparent anesthetic complications    Post vital signs: stable    Level of consciousness: awake and alert    Nausea/Vomiting: no nausea/vomiting    Complications: none    Transfer of care protocol was followed      Last vitals: Visit Vitals  /84 (BP Location: Left arm, Patient Position: Lying)   Pulse 88   Temp 36.8 °C (98.2 °F) (Skin)   Resp 16   Ht 5' 7" (1.702 m)   Wt 87.1 kg (192 lb)   SpO2 96%   Breastfeeding No   BMI 30.07 kg/m²     "

## 2024-03-13 NOTE — PLAN OF CARE
Vss, denies pain, ambulates easily. IV removed, catheter intact. Discharge instructions provided and states understanding. States ready to go home.

## 2024-03-13 NOTE — [employer] PATIENT INSTRUCTIONS
Discharge Summary/Instructions after an Endoscopic Procedure  Patient Name: Debby Brown  Patient MRN: [id number]  Patient YOB: 1950 Wednesday, March 13, 2024  Isabel Noriega MD  Dear patient,  As a result of recent federal legislation (The Federal Cures Act), you may   receive lab or pathology results from your procedure in your [facility]   account before your physician is able to contact you. Your physician or   their representative will relay the results to you with their   recommendations at their soonest availability.  Thank you,  RESTRICTIONS:  During your procedure today, you received medications for sedation.  These   medications may affect your judgment, balance and coordination.  Therefore,   for 24 hours, you have the following restrictions:   - DO NOT drive a car, operate machinery, make legal/financial decisions,   sign important papers or drink alcohol.    ACTIVITY:  Today: no heavy lifting, straining or running due to procedural   sedation/anesthesia.  The following day: return to full activity including work.  DIET:  Eat and drink normally unless instructed otherwise.     TREATMENT FOR COMMON SIDE EFFECTS:  - Mild abdominal pain, nausea, belching, bloating or excessive gas:  rest,   eat lightly and use a heating pad.  - Sore Throat: treat with throat lozenges and/or gargle with warm salt   water.  - Because air was used during the procedure, expelling large amounts of air   from your rectum or belching is normal.  - If a bowel prep was taken, you may not have a bowel movement for 1-3 days.    This is normal.  SYMPTOMS TO WATCH FOR AND REPORT TO YOUR PHYSICIAN:  1. Abdominal pain or bloating, other than gas cramps.  2. Chest pain.  3. Back pain.  4. Signs of infection such as: chills or fever occurring within 24 hours   after the procedure.  5. Rectal bleeding, which would show as bright red, maroon, or black stools.   (A tablespoon of blood from the rectum is not serious,  especially if   hemorrhoids are present.)  6. Vomiting.  7. Weakness or dizziness.  GO DIRECTLY TO THE NEAREST EMERGENCY ROOM IF YOU HAVE ANY OF THE FOLLOWING:      Difficulty breathing              Chills and/or fever over 101 F   Persistent vomiting and/or vomiting blood   Severe abdominal pain   Severe chest pain   Black, tarry stools   Bleeding- more than one tablespoon   Any other symptom or condition that you feel may need urgent attention  Your doctor recommends these additional instructions:  If any biopsies were taken, your doctors clinic will contact you in 1 to 2   weeks with any results.  - Discharge patient to home (with escort).   - Patient has a contact number available for emergencies.  The signs and   symptoms of potential delayed complications were discussed with the   patient.  Return to normal activities tomorrow.  Written discharge   instructions were provided to the patient.   - Resume previous diet.   - Continue present medications.   - No repeat colonoscopy due to current age (66 years or older) and the   absence of colonic polyps.   - Return to my office PRN.  For questions, problems or results please call your physician - Isabel Noriega MD at Work:  [phone].  [facility], EMERGENCY ROOM PHONE NUMBER: [phone]  IF A COMPLICATION OR EMERGENCY SITUATION ARISES AND YOU ARE UNABLE TO REACH   YOUR PHYSICIAN - GO DIRECTLY TO THE EMERGENCY ROOM.  Isabel Noriega MD  3/13/2024 9:04:29 AM  This report has been verified and signed electronically.  Dear patient,  As a result of recent federal legislation (The Federal Cures Act), you may   receive lab or pathology results from your procedure in your [facility]   account before your physician is able to contact you. Your physician or   their representative will relay the results to you with their   recommendations at their soonest availability.  Thank you,  [employer]ATION

## 2024-03-13 NOTE — H&P
"[facility] Note    CC: History of polyps    HPI 73 y.o. female with personal history of colon polyps presents for surveillance colonsocopy, last colonoscopy 2018    Past Medical History:   Diagnosis Date    Allergy     Anxiety     Depression     Diabetes mellitus, type 2     Hyperlipidemia     Hypertension     Personal history of colonic polyps     Pre-diabetes        Allergies and Medications reviewed     Review of Systems  General ROS: negative for - chills, fever or weight loss  Cardiovascular ROS: no chest pain or dyspnea on exertion  Gastrointestinal ROS: no blood in stool    Physical Examination  /84 (BP Location: Left arm, Patient Position: Lying)   Pulse 88   Temp 98.2 °F (36.8 °C) (Skin)   Resp 16   Ht 5' 7" (1.702 m)   Wt 87.1 kg (192 lb)   SpO2 96%   Breastfeeding No   BMI 30.07 kg/m²   General appearance: alert, cooperative, no distress  HENT: Normocephalic, atraumatic, neck symmetrical, no nasal discharge, sclera anicteric   Lungs: clear to auscultation bilaterally, symmetric chest wall expansion bilaterally  Heart: regular rate and rhythm without rub; no displacement of the PMI   Abdomen: soft  Extremities: extremities symmetric; no clubbing, cyanosis, or edema        Labs:  Lab Results   Component Value Date    WBC 9.48 01/08/2024    HGB 14.6 01/08/2024    HCT 44.9 01/08/2024    MCV 89 01/08/2024     01/08/2024           Assessment:   73 y.o. female presents for colonoscopy    Plan:  -Proceed to colonoscopy     Adrienne Arbour Carona, MD [facility]  [address], [address]  [place] [address]  Office: [phone]  Fax: [phone]   "

## 2024-03-13 NOTE — ANESTHESIA POSTPROCEDURE EVALUATION
Anesthesia Post Evaluation    Patient: Debby Brown    Procedure(s) Performed: Procedure(s) (LRB):  COLONOSCOPY (N/A)    Final Anesthesia Type: general      Patient location during evaluation: PACU  Patient participation: Yes- Able to Participate  Level of consciousness: awake and alert  Post-procedure vital signs: reviewed and stable  Pain management: adequate  Airway patency: patent    PONV status at discharge: No PONV  Anesthetic complications: no      Cardiovascular status: blood pressure returned to baseline  Respiratory status: unassisted  Hydration status: euvolemic  Follow-up not needed.              Vitals Value Taken Time   /76 03/13/24 0916   Temp 37 °C (98.6 °F) 03/13/24 0911   Pulse 74 03/13/24 0918   Resp 16 03/13/24 0916   SpO2 95 % 03/13/24 0918   Vitals shown include unvalidated device data.      Event Time   Out of Recovery 09:19:04         Pain/Deondre Score: Deondre Score: 10 (3/13/2024  9:16 AM)

## 2024-03-13 NOTE — ANESTHESIA PREPROCEDURE EVALUATION
03/13/2024  Debby Brown is a 73 y.o., female.      Pre-op Assessment    I have reviewed the Patient Summary Reports.     I have reviewed the Nursing Notes. I have reviewed the NPO Status.   I have reviewed the Medications.     Review of Systems  Anesthesia Hx:             Denies Family Hx of Anesthesia complications.    Denies Personal Hx of Anesthesia complications.                    Social:  Former Smoker       Cardiovascular:     Hypertension, poorly controlled Valvular problems/Murmurs, AS              LVH                         Pulmonary:      Shortness of breath                  Renal/:  Chronic Renal Disease, CKD                Musculoskeletal:         Spine Disorders: lumbar            Endocrine:  Diabetes         Obesity / BMI > 30  Psych:   anxiety depression                Physical Exam  General: Cooperative, Alert, Oriented and Anxious    Airway:  Mallampati: III / II    Dental:  Caps / Implants    Chest/Lungs:  Normal Respiratory Rate    Heart:  Rate: Normal  Rhythm: Regular Rhythm        Anesthesia Plan  Type of Anesthesia, risks & benefits discussed:    Anesthesia Type: Gen Natural Airway  Intra-op Monitoring Plan: Standard ASA Monitors  Induction:  IV  Informed Consent: Informed consent signed with the Patient and all parties understand the risks and agree with anesthesia plan.  All questions answered.   ASA Score: 3    Ready For Surgery From Anesthesia Perspective.     .

## 2024-04-04 ENCOUNTER — LAB VISIT (OUTPATIENT)
Dept: LAB | Facility: HOSPITAL | Age: 74
End: 2024-04-04
Attending: FAMILY MEDICINE
Payer: MEDICARE

## 2024-04-04 DIAGNOSIS — R80.9 TYPE 2 DIABETES MELLITUS WITH MICROALBUMINURIA, WITH LONG-TERM CURRENT USE OF INSULIN: ICD-10-CM

## 2024-04-04 DIAGNOSIS — E11.29 TYPE 2 DIABETES MELLITUS WITH MICROALBUMINURIA, WITH LONG-TERM CURRENT USE OF INSULIN: ICD-10-CM

## 2024-04-04 DIAGNOSIS — E78.2 MIXED HYPERLIPIDEMIA: ICD-10-CM

## 2024-04-04 DIAGNOSIS — I10 HYPERTENSION, ESSENTIAL: ICD-10-CM

## 2024-04-04 DIAGNOSIS — Z79.4 TYPE 2 DIABETES MELLITUS WITH MICROALBUMINURIA, WITH LONG-TERM CURRENT USE OF INSULIN: ICD-10-CM

## 2024-04-04 LAB
ALBUMIN SERPL BCP-MCNC: 4 G/DL (ref 3.5–5.2)
ALP SERPL-CCNC: 77 U/L (ref 55–135)
ALT SERPL W/O P-5'-P-CCNC: 21 U/L (ref 10–44)
ANION GAP SERPL CALC-SCNC: 6 MMOL/L (ref 8–16)
AST SERPL-CCNC: 15 U/L (ref 10–40)
BILIRUB SERPL-MCNC: 0.4 MG/DL (ref 0.1–1)
BUN SERPL-MCNC: 18 MG/DL (ref 8–23)
CALCIUM SERPL-MCNC: 9.6 MG/DL (ref 8.7–10.5)
CHLORIDE SERPL-SCNC: 108 MMOL/L (ref 95–110)
CHOLEST SERPL-MCNC: 143 MG/DL (ref 120–199)
CHOLEST/HDLC SERPL: 3.7 {RATIO} (ref 2–5)
CO2 SERPL-SCNC: 24 MMOL/L (ref 23–29)
CREAT SERPL-MCNC: 1.2 MG/DL (ref 0.5–1.4)
EST. GFR  (NO RACE VARIABLE): 48.1 ML/MIN/1.73 M^2
ESTIMATED AVG GLUCOSE: 255 MG/DL (ref 68–131)
GLUCOSE SERPL-MCNC: 215 MG/DL (ref 70–110)
HBA1C MFR BLD: 10.5 % (ref 4.5–6.2)
HDLC SERPL-MCNC: 39 MG/DL (ref 40–75)
HDLC SERPL: 27.3 % (ref 20–50)
LDLC SERPL CALC-MCNC: 81.2 MG/DL (ref 63–159)
NONHDLC SERPL-MCNC: 104 MG/DL
POTASSIUM SERPL-SCNC: 4.2 MMOL/L (ref 3.5–5.1)
PROT SERPL-MCNC: 7.4 G/DL (ref 6–8.4)
SODIUM SERPL-SCNC: 138 MMOL/L (ref 136–145)
TRIGL SERPL-MCNC: 114 MG/DL (ref 30–150)

## 2024-04-04 PROCEDURE — 80053 COMPREHEN METABOLIC PANEL: CPT | Performed by: FAMILY MEDICINE

## 2024-04-04 PROCEDURE — 80061 LIPID PANEL: CPT | Performed by: FAMILY MEDICINE

## 2024-04-04 PROCEDURE — 83036 HEMOGLOBIN GLYCOSYLATED A1C: CPT | Performed by: FAMILY MEDICINE

## 2024-04-04 PROCEDURE — 36415 COLL VENOUS BLD VENIPUNCTURE: CPT | Performed by: FAMILY MEDICINE

## 2024-04-12 ENCOUNTER — TELEPHONE (OUTPATIENT)
Dept: FAMILY MEDICINE | Facility: CLINIC | Age: 74
End: 2024-04-12
Payer: MEDICARE

## 2024-05-08 RX ORDER — ALPRAZOLAM 0.5 MG/1
0.5 TABLET ORAL 2 TIMES DAILY
Qty: 30 TABLET | Refills: 0 | Status: SHIPPED | OUTPATIENT
Start: 2024-05-08

## 2024-05-08 NOTE — TELEPHONE ENCOUNTER
No care due was identified.  [facility] Embedded Care Due Messages. Reference number: [id number].   5/08/2024 12:28:09 PM CDT

## 2024-05-13 ENCOUNTER — OFFICE VISIT (OUTPATIENT)
Dept: FAMILY MEDICINE | Facility: CLINIC | Age: 74
End: 2024-05-13
Payer: MEDICARE

## 2024-05-13 VITALS
SYSTOLIC BLOOD PRESSURE: 132 MMHG | DIASTOLIC BLOOD PRESSURE: 88 MMHG | HEIGHT: 67 IN | HEART RATE: 74 BPM | BODY MASS INDEX: 29.77 KG/M2 | WEIGHT: 189.69 LBS | TEMPERATURE: 98 F | OXYGEN SATURATION: 98 %

## 2024-05-13 DIAGNOSIS — I70.0 AORTIC ATHEROSCLEROSIS: ICD-10-CM

## 2024-05-13 DIAGNOSIS — N18.31 STAGE 3A CHRONIC KIDNEY DISEASE: ICD-10-CM

## 2024-05-13 DIAGNOSIS — Z79.4 TYPE 2 DIABETES MELLITUS WITH MICROALBUMINURIA, WITH LONG-TERM CURRENT USE OF INSULIN: ICD-10-CM

## 2024-05-13 DIAGNOSIS — I10 HYPERTENSION, ESSENTIAL: Primary | ICD-10-CM

## 2024-05-13 DIAGNOSIS — E78.5 HYPERLIPIDEMIA, UNSPECIFIED HYPERLIPIDEMIA TYPE: ICD-10-CM

## 2024-05-13 DIAGNOSIS — E11.29 TYPE 2 DIABETES MELLITUS WITH MICROALBUMINURIA, WITH LONG-TERM CURRENT USE OF INSULIN: ICD-10-CM

## 2024-05-13 DIAGNOSIS — R80.9 TYPE 2 DIABETES MELLITUS WITH MICROALBUMINURIA, WITH LONG-TERM CURRENT USE OF INSULIN: ICD-10-CM

## 2024-05-13 PROCEDURE — 99999 PR PBB SHADOW E&M-EST. PATIENT-LVL IV: CPT | Mod: PBBFAC,,, | Performed by: FAMILY MEDICINE

## 2024-05-13 PROCEDURE — 99214 OFFICE O/P EST MOD 30 MIN: CPT | Mod: S$PBB,,, | Performed by: FAMILY MEDICINE

## 2024-05-13 PROCEDURE — 99214 OFFICE O/P EST MOD 30 MIN: CPT | Mod: PBBFAC,PN | Performed by: FAMILY MEDICINE

## 2024-05-13 RX ORDER — TIRZEPATIDE 2.5 MG/.5ML
2.5 INJECTION, SOLUTION SUBCUTANEOUS
Qty: 4 PEN | Refills: 0 | Status: SHIPPED | OUTPATIENT
Start: 2024-05-13

## 2024-05-13 RX ORDER — TIRZEPATIDE 2.5 MG/.5ML
2.5 INJECTION, SOLUTION SUBCUTANEOUS
COMMUNITY
End: 2024-05-13 | Stop reason: SDUPTHER

## 2024-05-13 NOTE — PROGRESS NOTES
Subjective     Patient ID: Debby Brown is a 74 y.o. female.    Chief Complaint: Follow-up (3 month/)    Debby Brown is a 74 y.o. female who presents for a 3 month follow-up. Has Hypertension, blood pressure is okay. Has type 2 diabetes mellitus with microalbuminuria. Diabetes not well managed. States she is staying away from sugar and fried foods. On 70 units of Toujeo. Not taking 5 mg Farxiga. A1C is 10.5 (up from 8.2). Fasting blood glucose is 215. Has Hyperlipidemia. Cholesterol is 143. HDL is 39. LDL is 81. Tasking Crestor. Has CKD 3A, GFR is 48 (down from 60). Eye exam is current, done at [facility] in [place], unable to view results. No hx of pancreatitis. No Fx of thyroid cancer. BMI is 29.7.    Aortic atherosclerosis cirrhosis with no claudication.  Review of Systems   Constitutional: Negative.    HENT: Negative.     Eyes: Negative.    Respiratory: Negative.     Cardiovascular: Negative.    Gastrointestinal: Negative.    Endocrine: Negative.    Genitourinary: Negative.    Musculoskeletal: Negative.    Integumentary:  Negative.   Allergic/Immunologic: Negative.    Neurological: Negative.    Hematological: Negative.    Psychiatric/Behavioral: Negative.     All other systems reviewed and are negative.         Objective     Physical Exam  Vitals reviewed.   Constitutional:       General: She is not in acute distress.  Neck:      Vascular: No carotid bruit.   Cardiovascular:      Rate and Rhythm: Normal rate and regular rhythm.      Pulses:           Dorsalis pedis pulses are 2+ on the right side and 2+ on the left side.   Pulmonary:      Effort: Pulmonary effort is normal.      Breath sounds: Normal breath sounds.   Abdominal:      General: Bowel sounds are normal.      Palpations: Abdomen is soft.      Tenderness: There is no abdominal tenderness.   Musculoskeletal:      Right lower leg: No edema.      Left lower leg: No edema.            Assessment and Plan     1. Hypertension, essential    2.  Hyperlipidemia, unspecified hyperlipidemia type    3. Type 2 diabetes mellitus with microalbuminuria, with long-term current use of insulin    4. Stage 3a chronic kidney disease    5. BMI 29.0-29.9,adult        Get result of eye exam.  Done at [facility].  Mounjaro 2.5 weekly 4 pens.  Follow-up in 1 month.  Continue her insulin.         No follow-ups on file.

## 2024-05-14 ENCOUNTER — PATIENT OUTREACH (OUTPATIENT)
Dept: ADMINISTRATIVE | Facility: HOSPITAL | Age: 74
End: 2024-05-14
Payer: MEDICARE

## 2024-05-14 PROBLEM — I70.0 AORTIC ATHEROSCLEROSIS: Status: ACTIVE | Noted: 2024-05-14

## 2024-05-14 NOTE — PROGRESS NOTES
Population Health Chart Review & Patient Outreach Details      Additional [facility] Health Notes:               Updates Requested / Reviewed:      Updated Care Coordination Note         Health Maintenance Topics Overdue:      [facility] Score: 1     Eye Exam    Shingles/Zoster Vaccine  RSV Vaccine                  Health Maintenance Topic(s) Outreach Outcomes & Actions Taken:    Eye Exam - Outreach Outcomes & Actions Taken  : External Records Requested & Care Team Updated if Applicable

## 2024-05-14 NOTE — LETTER
FAX      AUTHORIZATION FOR RELEASE OF   CONFIDENTIAL INFORMATION      [facility],      We are seeing Debby Brown, date of birth 1950, in the clinic at [facility]. Blane Mancini III, MD is the patient's PCP. Debby Brown has an outstanding lab/procedure at the time we reviewed their chart. In order to help keep their health information updated, Debby Brown has authorized us to request the following medical records:        EYE EXAM - WITH RETINAL SCREENING (MOST RECENT WITHIN 48 MONTHS)          Please fax records to Blane Mancini III, MD at [facility] [phone].    If you have any questions, please contact Afshin at [phone]    Thank you,    Afshin Montanez LPN Clinical Care Coordinator  [facility]                                 Debby Brown  MRN: [id number]  : 1950  Age: 73 y.o.  Sex: female         Patient/Legal Guardian Signature  This signature was collected at 2024    Debby Brown     Self  _______________________________   Printed Name/Relationship to Patient      Consent for Examination and Treatment: I hereby authorize the providers and employees of [facility] ([facility]) to provide medical treatment/services which includes, but is not limited to, performing and administering tests and diagnostic procedures that are deemed necessary, including, but not limited to, imaging examinations, blood tests and other laboratory procedures as may be required by the hospital, clinic, or may be ordered by my physician(s) or persons working under the general and/or special instructions of my physician(s).      I understand and agree that this consent covers all authorized persons, including but not limited to physicians, residents, nurse practitioners, physicians' assistants, specialists, consultants, student nurses, and independently contracted physicians, who are called upon by the physician in charge, to carry  out the diagnostic procedures and medical or surgical treatment.     I hereby authorize [facility] to retain or dispose of any specimens or tissue, should there be such remaining from any test or procedure.     I hereby authorize and give consent for [facility] providers and employees to take photographs, images or videotapes of such diagnostic, surgical or treatment procedures of Patient as may be required by [facility] or as may be ordered by a physician. I further acknowledge and agree that [facility] may use cameras or other devices for patient monitoring.     I am aware that the practice of medicine is not an exact science, and I acknowledge that no guarantees have been made to me as to the outcome of any tests, procedures or treatment.     Authorization for Release of Information: I understand that my insurance company and/or their agents may need information necessary to make determinations about payment/reimbursement. I hereby provide authorization to release to all insurance companies, their successors, assignees, other parties with whom they may have contracted, or others acting on their behalf, that are involved with payment for any hospital and/or clinic charges incurred by the patient, any information that they request and deem necessary for payment/reimbursement, and/or quality review.  I further authorize the release of my health information to physicians or other health care practitioners on staff who are involved in my health care now and in the future, and to other health care providers, entities, or institutions for the purpose of my continued care and treatment, including referrals.     REGISTRATION AUTHORIZATION  Form No. 35640 (Rev. 3/25/2024)    Page 1 of 3                       Medicare Patient's Certification and Authorization to Release Information and Payment Request:  I certify that the information given by me in applying for payment under Title XVIII of the Social Security Act is correct. I  authorize any monsivais of medical or other information about me to release to the [facility], or its intermediaries or carriers, any information needed for this or a related Medicare claim. I request that payment of authorized benefits be made on my behalf.     Assignment of Insurance Benefits:   I hereby authorize any and all insurance companies, health plans, defined   benefit plans, health insurers or any entity that is or may be responsible for payment of my medical expenses to pay all hospital and medical benefits now due, and to become due and payable to me under any hospital benefits, sick benefits, injury benefits or any other benefit for services rendered to me, including Major Medical Benefits, direct to [facility] and all independently contracted physicians. I assign any and all rights that I may have against any and all insurance companies, health plans, defined benefit plans, health insurers or any entity that is or may be responsible for payment of my medical expenses, including, but not limited to any right to appeal a denial of a claim, any right to bring any action, lawsuit, administrative proceeding, or other cause of action on my behalf. I specifically assign my right to pursue litigation against any and all insurance companies, health plans, defined benefit plans, health insurers or any entity that is or may be responsible for payment of my medical expenses based upon a refusal to pay charges.            E. Valuables: It is understood and agreed that [facility] is not liable for the damage to or loss of any money, jewelry,   documents, dentures, eye glasses, hearing aids, prosthetics, or other property of value.     F. Computer Equipment: I understand and agree that should I choose to use computer equipment owned by [facility] or if I choose to access the Internet via [facility]s network, I do so at my own risk. [facility] is not responsible for any damage to my computer equipment or to any  damages of any type that might arise from my loss of equipment or data.     G. Acceptance of Financial Responsibility:  I agree that in consideration of the services and   supplies that have been   or will be furnished to the patient, I am hereby obligated to pay all charges made for or on the account of the patient according to the standard rates (in effect at the time the services and supplies are delivered) established by [facility], including its Patient Financial Assistance Policy to the extent it is applicable. I understand that I am responsible for all charges, or portions thereof, not covered by insurance or other sources. Patient refunds will be distributed only after balances at all [facility] facilities are paid.     H. Communication Authorization:  I hereby authorize [facility] and its representatives, along with any billing service   or  who may work on their behalf, to contact me on   my cell phone and/or home phone using pre- recorded messages, artificial voice messages, automatic telephone dialing devices or other computer assisted technology, or by electronic      mail, text messaging, or by any other form of electronic communication. This includes, but is not limited to, appointment reminders, yearly physical exam reminders, preventive care reminders, patient campaigns, welcome calls, and calls about account balances on my account or any account on which I am listed as a guarantor. I understand I have the right to opt out of these communications at any time.      Relationship  Between  Facility and  Provider:      I understand that some, but not all, providers furnishing services to the patient are not employees or agents of [facility]. The patient is under the care and supervision of his/her attending physician, and it is the responsibility of the facility and its nursing staff to carry out the instructions of such physicians. It is the responsibility of the patient's physician/designee to  obtain the patient's informed consent, when required, for medical or surgical treatment, special diagnostic or therapeutic procedures, or hospital services rendered for the patient under the special instructions of the physician/designee.           REGISTRATION AUTHORIZATION  Form No. 94793 (Rev. 3/25/2024)    Page 2 of 3                       Immunizations: [facility] shares immunization information with state sponsored health departments to help you and your doctor keep track of your immunization records. By signing, you consent to have this information shared with the health department in your state:                                [place] - LINKS ([place] Immunization Network for Kids Statewide)                                [place] - MIIX ([place] Immunization Information eXchange)                                [place] - ImmPRINT (Immunization Patient Registry with Integrated Technology)     TERM: This authorization is valid for this and subsequent care/treatment I receive at [facility] and will remain valid unless/until revoked in writing by me.     [facility]: As used in this document, [facility] means all [facility] owned and managed facilities, including, but not limited to, all health centers, surgery centers, clinics, urgent care centers, and hospitals.         [facility] complies with applicable Federal civil rights laws and does not discriminate on the basis of race, color, national origin, age, disability, or sex.  ATENCIÓN: si graciela park, tiene a cee disposición servicios gratuitos de asistencia lingüística. Suzi freedman [phone].  CHÚ Ý: N?u b?n nói Ti?ng Vi?t, có các d?ch v? h? tr? ngôn ng? mi?n phí dành cho b?n. G?i s? [phone].        REGISTRATION AUTHORIZATION  Form No. 44074 (Rev. 3/25/2024)   Page 3 of 3        Patient Name: Debby Brown  : 1950  Patient Phone #: [phone]

## 2024-05-15 ENCOUNTER — PATIENT OUTREACH (OUTPATIENT)
Dept: ADMINISTRATIVE | Facility: HOSPITAL | Age: 74
End: 2024-05-15
Payer: MEDICARE

## 2024-05-15 ENCOUNTER — TELEPHONE (OUTPATIENT)
Dept: ADMINISTRATIVE | Facility: CLINIC | Age: 74
End: 2024-05-15
Payer: MEDICARE

## 2024-05-15 NOTE — TELEPHONE ENCOUNTER
Called pt, informed pt I was calling to remind pt of her in office EAWV on 5/16/24; clinic location provided to patient; pt confirmed appointment

## 2024-05-15 NOTE — LETTER
AUTHORIZATION FOR RELEASE OF   CONFIDENTIAL INFORMATION    [facility]    We are seeing Debby Brown, date of birth 1950, in the clinic at [facility]. Blane Mancini III, MD is the patient's PCP. Debby Brown has an outstanding lab/procedure at the time we reviewed her chart. In order to help keep her health information updated, she has authorized us to request the following medical record(s):          EYE EXAM                Please [facility] to [facility], [facility], Clinton H. III, MD, [phone]     If you have any questions, please contact Didi Vaca, Care Coordinator   at [phone].              Patient Name: Debby Brown  : 1950  Patient Phone #: [phone]

## 2024-05-15 NOTE — PROGRESS NOTES
Population Health Chart Review & Patient Outreach Details      Additional [facility] Health Notes:               Updates Requested / Reviewed:      Updated Care Coordination Note, Care Everywhere, , and Care Team Updated         Health Maintenance Topics Overdue:      [facility] Score: 1     Eye Exam    Shingles/Zoster Vaccine  RSV Vaccine                  Health Maintenance Topic(s) Outreach Outcomes & Actions Taken:    Eye Exam - Outreach Outcomes & Actions Taken  : External Records Requested & Care Team Updated if Applicable

## 2024-05-16 ENCOUNTER — OFFICE VISIT (OUTPATIENT)
Dept: FAMILY MEDICINE | Facility: CLINIC | Age: 74
End: 2024-05-16
Payer: MEDICARE

## 2024-05-16 VITALS
DIASTOLIC BLOOD PRESSURE: 76 MMHG | TEMPERATURE: 98 F | BODY MASS INDEX: 29.24 KG/M2 | HEIGHT: 67 IN | HEART RATE: 99 BPM | OXYGEN SATURATION: 98 % | WEIGHT: 186.31 LBS | SYSTOLIC BLOOD PRESSURE: 118 MMHG

## 2024-05-16 DIAGNOSIS — Z79.4 TYPE 2 DIABETES MELLITUS WITH MICROALBUMINURIA, WITH LONG-TERM CURRENT USE OF INSULIN: ICD-10-CM

## 2024-05-16 DIAGNOSIS — F33.9 DEPRESSION, RECURRENT: ICD-10-CM

## 2024-05-16 DIAGNOSIS — I70.0 CALCIFICATION OF AORTA: ICD-10-CM

## 2024-05-16 DIAGNOSIS — E11.59 HYPERTENSION ASSOCIATED WITH DIABETES: ICD-10-CM

## 2024-05-16 DIAGNOSIS — E11.29 TYPE 2 DIABETES MELLITUS WITH MICROALBUMINURIA, WITH LONG-TERM CURRENT USE OF INSULIN: ICD-10-CM

## 2024-05-16 DIAGNOSIS — R80.9 TYPE 2 DIABETES MELLITUS WITH MICROALBUMINURIA, WITH LONG-TERM CURRENT USE OF INSULIN: ICD-10-CM

## 2024-05-16 DIAGNOSIS — I15.2 HYPERTENSION ASSOCIATED WITH DIABETES: ICD-10-CM

## 2024-05-16 DIAGNOSIS — Z00.00 ENCOUNTER FOR MEDICARE ANNUAL WELLNESS EXAM: Primary | ICD-10-CM

## 2024-05-16 DIAGNOSIS — Z00.00 ENCOUNTER FOR PREVENTIVE HEALTH EXAMINATION: ICD-10-CM

## 2024-05-16 PROCEDURE — G0439 PPPS, SUBSEQ VISIT: HCPCS | Mod: ,,, | Performed by: NURSE PRACTITIONER

## 2024-05-16 PROCEDURE — 99999 PR PBB SHADOW E&M-EST. PATIENT-LVL V: CPT | Mod: PBBFAC,,, | Performed by: NURSE PRACTITIONER

## 2024-05-16 PROCEDURE — 99215 OFFICE O/P EST HI 40 MIN: CPT | Mod: PBBFAC,PO | Performed by: NURSE PRACTITIONER

## 2024-05-16 NOTE — PATIENT INSTRUCTIONS
Counseling and Referral of Other Preventative  (Italic type indicates deductible and co-insurance are waived)    Patient Name: Debby Brown  Today's Date: 5/16/2024    Health Maintenance       Date Due Completion Date    Shingles Vaccine (1 of 2) Never done ---    RSV Vaccine (Age 60+ and Pregnant patients) (1 - 1-dose 60+ series) Never done ---    COVID-19 Vaccine (1 - 2023-24 season) Never done ---    Eye Exam 01/25/2024 1/25/2023    Hemoglobin A1c 07/04/2024 4/4/2024    Foot Exam 10/06/2024 10/6/2023 (Done)    Override on 10/6/2023: Done    Override on 10/8/2021: Done    Override on 8/20/2020: Done    Override on 11/27/2018: Done    Mammogram 01/08/2025 1/8/2024    Diabetes Urine Screening 01/09/2025 1/9/2024    DEXA Scan 03/07/2025 3/7/2022    TETANUS VACCINE 03/12/2025 3/12/2015    Lipid Panel 04/04/2025 4/4/2024    High Dose Statin 05/13/2025 5/13/2024    Colorectal Cancer Screening 03/13/2029 3/13/2024    Override on 8/22/2007: Done        No orders of the defined types were placed in this encounter.    The following information is provided to all patients.  This information is to help you find resources for any of the problems found today that may be affecting your health:                  Living healthy guide: www.[facility].[place].gov      Understanding Diabetes: www.diabetes.org      Eating healthy: www.cdc.gov/healthyweight      CDC home safety checklist: www.cdc.gov/steadi/patient.html      Agency on Aging: www.goea.[place].gov      Alcoholics anonymous (AA): www.aa.org      Physical Activity: www.[place].nih.gov/qn1qgel      Tobacco use: www.quitwithusla.org

## 2024-05-16 NOTE — PROGRESS NOTES
"Debby Brown presented for a  Medicare AWV and comprehensive Health Risk Assessment today. The following components were reviewed and updated:    Medical history  Family History  Social history  Allergies and Current Medications  Health Risk Assessment  Health Maintenance  Care Team         ** See Completed Assessments for Annual Wellness Visit within the encounter summary.**         The following assessments were completed:  Living Situation  CAGE  Depression Screening  Timed Get Up and Go  Whisper Test  Cognitive Function Screening  Nutrition Screening  ADL Screening  PAQ Screening    Clock in media   Opioid documentation:      Patient does not have a current opioid prescription.        Vitals:    05/16/24 0950   BP: 118/76   Pulse: 99   Temp: 98.2 °F (36.8 °C)   TempSrc: Oral   SpO2: 98%   Weight: 84.5 kg (186 lb 4.6 oz)   Height: 5' 7" (1.702 m)     Body mass index is 29.18 kg/m².  Physical Exam  Constitutional:       Appearance: She is well-developed.   HENT:      Head: Normocephalic and atraumatic.      Nose: Nose normal.   Eyes:      General: Lids are normal.      Conjunctiva/sclera: Conjunctivae normal.   Cardiovascular:      Rate and Rhythm: Normal rate.   Pulmonary:      Effort: Pulmonary effort is normal.   Neurological:      Mental Status: She is alert and oriented to person, place, and time.   Psychiatric:         Speech: Speech normal.         Behavior: Behavior normal.               Diagnoses and health risks identified today and associated recommendations/orders:    1. Encounter for Medicare annual wellness exam  Discussed health maintenance guidelines appropriate for age.      - Ambulatory Referral/Consult to Enhanced Annual Wellness Visit (eAWV)    2. Encounter for preventive health examination      3. Type 2 diabetes mellitus with microalbuminuria, with long-term current use of insulin  Uncontrolled, with last hga1c 10.5  Patient takes her medication, - follow ADA diet and exercise  Diabetic " education  Would like to establish with new pcp   - Ambulatory referral/consult to Diabetes Education; Future    4. Hypertension associated with diabetes  Controlled, continue current medication regimen  Low salt diet  Increase physical activity  Followed by pcp      5. Calcification of aorta  Stable, continue to monitor  Followed by pcp   6. Depression, recurrent  Stable, continue current medication   Followed by pcp       Provided Debby with a 5-10 year written screening schedule and personal prevention plan. Recommendations were developed using the USPSTF age appropriate recommendations. Education, counseling, and referrals were provided as needed. After Visit Summary printed and given to patient which includes a list of additional screenings\tests needed.    Follow up for One year for Annual Wellness Visit.    Katherine Scruggs, NP      I offered to discuss advanced care planning, including how to pick a person who would make decisions for you if you were unable to make them for yourself, called a health care power of , and what kind of decisions you might make such as use of life sustaining treatments such as ventilators and tube feeding when faced with a life limiting illness recorded on a living will that they will need to know. (How you want to be cared for as you near the end of your natural life)     X Patient is interested in learning more about how to make advanced directives.  I provided them paperwork and offered to discuss this with them.

## 2024-05-27 ENCOUNTER — CLINICAL SUPPORT (OUTPATIENT)
Dept: DIABETES | Facility: CLINIC | Age: 74
End: 2024-05-27
Payer: MEDICARE

## 2024-05-27 DIAGNOSIS — Z79.4 TYPE 2 DIABETES MELLITUS WITH MICROALBUMINURIA, WITH LONG-TERM CURRENT USE OF INSULIN: ICD-10-CM

## 2024-05-27 DIAGNOSIS — R80.9 TYPE 2 DIABETES MELLITUS WITH MICROALBUMINURIA, WITH LONG-TERM CURRENT USE OF INSULIN: ICD-10-CM

## 2024-05-27 DIAGNOSIS — E11.29 TYPE 2 DIABETES MELLITUS WITH MICROALBUMINURIA, WITH LONG-TERM CURRENT USE OF INSULIN: ICD-10-CM

## 2024-05-27 PROCEDURE — 99999PBSHW PR PBB SHADOW TECHNICAL ONLY FILED TO HB: Mod: PBBFAC,,,

## 2024-05-27 PROCEDURE — 99213 OFFICE O/P EST LOW 20 MIN: CPT | Mod: PBBFAC,PO | Performed by: NUTRITIONIST

## 2024-05-27 PROCEDURE — G0108 DIAB MANAGE TRN  PER INDIV: HCPCS | Mod: PBBFAC,PO | Performed by: NUTRITIONIST

## 2024-05-27 PROCEDURE — 99999 PR PBB SHADOW E&M-EST. PATIENT-LVL III: CPT | Mod: PBBFAC,,, | Performed by: NUTRITIONIST

## 2024-05-27 NOTE — PROGRESS NOTES
Diabetes Care Specialist Progress Note  Author: Hyun Junior RD  Date: 5/27/2024    Referral 5/16/24    Program Intake  Reason for Diabetes Program Visit:: Initial Diabetes Assessment  Current diabetes risk level:: high (T2DM Outcomes Risk=3.5)  In the last 12 months, have you:: none  Permission to speak with others about care:: no  Continuous Glucose Monitoring  Patient has CGM: No    Lab Results   Component Value Date    LABA1C 9.2 (H) 07/03/2018    HGBA1C 10.5 (H) 04/04/2024       Clinical    Patient Health Rating  Compared to other people your age, how would you rate your health?: Good    Problem Review  Reviewed Problem List with Patient: yes  Active comorbidities affecting diabetes self-care.: yes  Comorbidities: Hypertension, Cardiovascular Disease  Reviewed health maintenance: yes    Clinical Assessment  Current Diabetes Treatment: Insulin (Toujeo 70 units AM.  NOT TAKING farxiga or Mounjaro)  Have you ever experienced hypoglycemia (low blood sugar)?: no  Have you ever experienced hyperglycemia (high blood sugar)?: yes  In the last month, how often have you experienced high blood sugar?: more than once a day  Are you able to tell when your blood sugar is high?: No (comment)  Have you ever been hospitalized because your blood sugar was high?: no    Medication Information  How do you obtain your medications?: Patient drives  Do you sometimes have difficulty refilling your medications?: No  Medication adherence impacting ability to self-manage diabetes?: Yes (not taking farxiga or Mounjaro and did not notify MD)    Labs  Do you have regular lab work to monitor your medications?: Yes  Type of Regular Lab Work: A1c, Cholesterol, CBC, Other  Where do you get your labs drawn?: [facility]  Lab Compliance Barriers: No    Nutritional Status  Diet: Regular  Meal Plan 24 Hour Recall: Breakfast, Lunch, Dinner, Snack  Meal Plan 24 Hour Recall - Breakfast:  (9-10 am:  eggs, 2 pc toast; coffee w/ S&L, heavy cream)  Meal Plan  24 Hour Recall - Lunch:  (usually skips)  Meal Plan 24 Hour Recall - Dinner:  (5 heidi:  chili dog w/relish; water or crystal light)  Meal Plan 24 Hour Recall - Snack:  (during day: fruit)  Change in appetite?: No  Dentation:: Intact  Recent Changes in Weight: No Recent Weight Change  Current nutritional status an area of need that is impacting patient's ability to self-manage diabetes?: No    Additional Social History    Support  Does anyone support you with your diabetes care?: yes  Who supports you?: self  Who takes you to your medical appointments?: self  Does the current support meet the patient's needs?: Yes  Is Support an area impacting ability to self-manage diabetes?: No    Access to Mass Media & Technology  Does the patient have access to any of the following devices or technologies?: Smart phone  Media or technology needs impacting ability to self-manage diabetes?: No    Cognitive/Behavioral Health  Alert and Oriented: Yes  Difficulty Thinking: No  Requires Prompting: No  Requires assistance for routine expression?: No  Cognitive or behavioral barriers impacting ability to self-manage diabetes?: No    Culture/Church  Culture or Congregational beliefs that may impact ability to access healthcare: No    Communication  Language preference: English  Hearing Problems: No  Vision Problems: Yes  Vision problem type:: Decreased Vision  Vision Assistance: Glasses  Communication needs impacting ability to self-manage diabetes?: No    Health Literacy  Preferred Learning Method: Face to Face  How often do you need to have someone help you read instructions, pamphlets, or written material from your doctor or pharmacy?: Never  Health literacy needs impacting ability to self-manage diabetes?: No      Diabetes Self-Management Skills Assessment    Diabetes Disease Process/Treatment Options  Patient/caregiver able to state what happens when someone has diabetes.: yes  Patient/caregiver knows what type of diabetes they have.:  "yes  Diabetes Type : Type II  Patient/caregiver able to identify at least three signs and symptoms of diabetes.: yes  Identified signs and symptoms:: blurred vision, fatigue, frequent infections, frequent urination, increased thirst  Patient able to identify at least three risk factors for diabetes.: yes  Identified risk factors:: age over 40, being overweight, reduced activity, family history  Diabetes Disease Process/Treatment Options: Skills Assessment Completed: Yes  Assessment indicates:: Adequate understanding  Area of need?: No    Nutrition/Healthy Eating  Challenges to healthy eating:: portion control, other (see comments) (skipping meals)  Method of carbohydrate measurement:: no method  Patient can identify foods that impact blood sugar.: yes  Patient-identified foods:: fruit/fruit juice, milk, soda, starchy vegetables (corn, peas, beans), starches (bread, pasta, rice, cereal), sweets, yogurt  Nutrition/Healthy Eating Skills Assessment Completed:: Yes  Assessment indicates:: Instruction Needed, Knowledge deficit  Area of need?: Yes    Physical Activity/Exercise  Patient's daily activity level:: sedentary  Patient formally exercises outside of work.: no  Reasons for not exercising:: physically unable to exercise currently, other (see comments) (some lower back pain; pt states "lazy")  Patient can identify forms of physical activity.: yes  Stated forms of physical activity:: any movement performed by muscles that uses energy  Patient can identify reasons why exercise/physical activity is important in diabetes management.: yes  Identified reasons:: strengthens heart, muscles, and bones, lowers blood glucose, blood pressure, and cholesterol, lowers risk of heart disease and stroke, relieves stress, tones muscles  Physical Activity/Exercise Skills Assessment Completed: : Yes  Assessment indicates:: Instruction Needed  Area of need?: Yes    Medications  Patient is able to describe current diabetes management " routine.: yes  Diabetes management routine:: insulin (Toujeo 70 units AM. NOT TAKING farxiga or Mounjaro)  Patient is able to identify current diabetes medications, dosages, and appropriate timing of medications.: yes  Patient understands the purpose of the medications taken for diabetes.: yes  Patient reports problems or concerns with current medication regimen.: yes  Medication regimen problems/concerns:: financial concerns  Medication Skills Assessment Completed:: Yes  Assessment indicates:: Adequate understanding  Area of need?: Yes    Home Blood Glucose Monitoring  Patient states that blood sugar is checked at home daily.: yes  Monitoring Method:: home glucometer    5/27   150  5/25   216  5/22   174  5/21   169  5/20   307  5/19   221      How often do you check your blood sugar?: Once a day  When do you check your blood sugar?: Before breakfast  When you check what is your typical blood sugar range? :  (pt brought in glucometer)  Blood glucose logs reviewed today?: yes  Home Blood Glucose Monitoring Skills Assessment Completed: : Yes  Assessment indicates:: Adequate understanding  Area of need?: No    Acute Complications  Acute Complications Skills Assessment Completed: : No  Deffered due to:: Time  Area of need?: Deferred    Chronic Complications  Patient can identify major chronic complications of diabetes.: yes  Stated chronic complications:: heart disease/heart attack, kidney disease, neuropathy/nerve damage, retinopathy, stroke  Patient can identify ways to prevent or delay diabetes complications.: yes  Stated ways to prevent complications:: healthy eating and regular activity, controlling blood sugar  Patient is aware that having diabetes increases risk of heart disease?: Yes  Patient is aware that heart disease is the leading cause of death and disability in people with diabetes?: Yes  Patient able to state risk factors for heart disease?: Yes  Patient stated risk factors for heart disease:: High blood  pressure, High cholesterol, Diet, Limited activity, Medication non-adherance, Having diabetes  Patient is taking statin?: Yes  Chronic Complications Skills Assessment Completed: : Yes  Assessment indicates:: Adequate understanding  Area of need?: No    Psychosocial/Coping  Patient can identify ways of coping with chronic disease.: yes  Patient-stated ways of coping with chronic disease:: support from loved ones  Psychosocial/Coping Skills Assessment Completed: : Yes  Assessment indicates:: Adequate understanding  Area of need?: No      Assessment Summary and Plan    Based on today's diabetes care assessment, the following areas of need were identified:          5/27/2024    12:01 AM   Social   Support No   Access to Mass Media/Tech No   Cognitive/Behavioral Health No   Culture/Quaker No   Communication No   Health Literacy No            5/27/2024    12:01 AM   Clinical   Medication Adherence Yes   Lab Compliance No   Nutritional Status No            5/27/2024    12:01 AM   Diabetes Self-Management Skills   Diabetes Disease Process/Treatment Options No   Nutrition/Healthy Eating Yes--see Care Plan   Physical Activity/Exercise Yes--encouraged increased activity as tolerated based on back discomfort; also re-start exercises that worked when going to PT   Medication Yes--Pt is not taking Farxiga or Mounjaro and has not notified MD/NP.  Will see what BG log looks like at f/u and forward to Katheirne Scruggs NP   Home Blood Glucose Monitoring Yes--see Care Plan   Acute Complications Deferred   Chronic Complications No   Psychosocial/Coping No          Today's interventions were provided through individual discussion, instruction, and written materials were provided.      Patient verbalized understanding of instruction and written materials.  Pt was able to return back demonstration of instructions today. Patient understood key points, needs reinforcement and further instruction.     Diabetes Self-Management Care  "Plan:    Today's Diabetes Self-Management Care Plan was developed with Debby's input. Debby has agreed to work toward the following goal(s) to improve his/her overall diabetes control.      Care Plan: Diabetes Management   Updates made since 4/27/2024 12:00 AM        Problem: Healthy Eating         Goal: Eat 3 meals daily with 30 g/2 servings of Carbohydrate per meal.    Start Date: 5/27/2024   Expected End Date: 8/27/2024   Priority: High   Barriers: No Barriers Identified   Note:    Pt admits she is "lazy"--does not really want to cook or prepare foods.  Thinking about using a meal delivery service for meals where she can just warm up the food.  Pt will look into ones that deliver food to home (does not want to have to go p/u food herself).  Strongly encouraged pt to ask for DM meals and to look at nutrition info in regards to carb content and PRO.    Emphasized importance of combining carbs w/PRO; reading labels; and having consistent meals w/consistent amount of carbs.  Pt will"  *look at labels for total carbs  *add PRO to snacks that consist of fruit   *increase non-starchy veggies as much as possible (bags of salads, frozen veggies)  *consider plain oatmeal w/nuts or PNB, use S&L  *investigate mail order food services  *continue with water or Crystal light       Task: Reviewed the sources and role of Carbohydrate, Protein, and Fat and how each nutrient impacts blood sugar. Completed 5/27/2024        Task: Provided visual examples using dry measuring cups, food models, and other familiar objects such as computer mouse, deck or cards, tennis ball etc. to help with visualization of portions. Completed 5/27/2024        Task: Explained how to count carbohydrates using the food label and the use of dry measuring cups for accurate carb counting. Completed 5/27/2024        Task: Discussed strategies for choosing healthier menu options when dining out. Completed 5/27/2024        Task: Recommended replacing beverages " containing high sugar content with noncaloric/sugar free options and/or water. Completed 5/27/2024        Task: Review the importance of balancing carbohydrates with each meal using portion control techniques to count servings of carbohydrate and label reading to identify serving size and amount of total carbs per serving. Completed 5/27/2024        Task: Provided Sample plate method and reviewed the use of the plate to estimate amounts of carbohydrate per meal. Completed 5/27/2024        Problem: Blood Glucose Self-Monitoring         Goal: Patient agrees to check and record blood sugars daily    Start Date: 5/27/2024   Expected End Date: 8/27/2024   Priority: Medium   Barriers: No Barriers Identified   Note:    Pt admits that she has not been checking BG levels for quite some time, but did start after 5/16/24 appt with Katherine Scruggs.  Pt had glucometer and BG levels taken from there.  Reviewed target BG levels and HA1C. Provided BG log and asked pt to document fasting BG every morning and to alternate once in a while with fasting supper.       Task: Provided patient with a meter today and sent Rx request to provider to send to patients pharmacy. Completed 5/27/2024        Task: Reviewed the importance of self-monitoring blood glucose and keeping logs. Completed 5/27/2024        Task: Provided patient with blood glucose logs, reviewed appropriate timing and frequency to SMBG, education on parameters on when to notify provider and advised patient to bring logs to all appts with PCP/Endocrinologist/Diabetes Care Specialist. Completed 5/27/2024        Task: Discussed ways to minimize pain when monitoring blood glucose. Completed 5/27/2024          Follow Up Plan     Follow up in about 2 weeks (around 6/10/2024) for f/u 6/12/24 to review BG log, discuss changes in diet and possiblity of exercise.  Once we have a good BG log, will send to Katherine Scruggs for review since pt only taking Toujeo; pt states she doesn't know  anything about Farziga and Mounjara is too Expensive.    Today's care plan and follow up schedule was discussed with patient.  Debby verbalized understanding of the care plan, goals, and agrees to follow up plan.        The patient was encouraged to communicate with his/her health care provider/physician and care team regarding his/her condition(s) and treatment.  I provided the patient with my contact information today and encouraged to contact me via phone or [facility] Patient Portal as needed.     Length of Visit   Total Time: 60 Minutes

## 2024-06-25 RX ORDER — ALPRAZOLAM 0.5 MG/1
0.5 TABLET ORAL 2 TIMES DAILY
Qty: 30 TABLET | Refills: 0 | Status: SHIPPED | OUTPATIENT
Start: 2024-06-25

## 2024-06-25 NOTE — TELEPHONE ENCOUNTER
No care due was identified.  [facility] Embedded Care Due Messages. Reference number: [id number].   6/25/2024 1:41:42 PM CDT

## 2024-07-01 ENCOUNTER — PATIENT OUTREACH (OUTPATIENT)
Dept: DIABETES | Facility: CLINIC | Age: 74
End: 2024-07-01
Payer: MEDICARE

## 2024-07-01 NOTE — PATIENT INSTRUCTIONS
Pt missed appt for DM Education on 6/12/24  Pt is interested in coming for a f/u but needs to check on her schedule.  Phone number provided.

## 2024-07-12 ENCOUNTER — OFFICE VISIT (OUTPATIENT)
Dept: FAMILY MEDICINE | Facility: CLINIC | Age: 74
End: 2024-07-12
Payer: MEDICARE

## 2024-07-12 ENCOUNTER — LAB VISIT (OUTPATIENT)
Dept: LAB | Facility: HOSPITAL | Age: 74
End: 2024-07-12
Attending: STUDENT IN AN ORGANIZED HEALTH CARE EDUCATION/TRAINING PROGRAM
Payer: MEDICARE

## 2024-07-12 VITALS
OXYGEN SATURATION: 97 % | HEART RATE: 71 BPM | DIASTOLIC BLOOD PRESSURE: 84 MMHG | BODY MASS INDEX: 30.1 KG/M2 | SYSTOLIC BLOOD PRESSURE: 118 MMHG | WEIGHT: 191.81 LBS | RESPIRATION RATE: 16 BRPM | HEIGHT: 67 IN

## 2024-07-12 DIAGNOSIS — R53.83 LOW ENERGY: ICD-10-CM

## 2024-07-12 DIAGNOSIS — Z79.4 TYPE 2 DIABETES MELLITUS WITH MICROALBUMINURIA, WITH LONG-TERM CURRENT USE OF INSULIN: ICD-10-CM

## 2024-07-12 DIAGNOSIS — E11.29 TYPE 2 DIABETES MELLITUS WITH MICROALBUMINURIA, WITH LONG-TERM CURRENT USE OF INSULIN: Primary | ICD-10-CM

## 2024-07-12 DIAGNOSIS — I70.0 CALCIFICATION OF AORTA: ICD-10-CM

## 2024-07-12 DIAGNOSIS — E11.29 TYPE 2 DIABETES MELLITUS WITH MICROALBUMINURIA, WITH LONG-TERM CURRENT USE OF INSULIN: ICD-10-CM

## 2024-07-12 DIAGNOSIS — R80.9 TYPE 2 DIABETES MELLITUS WITH MICROALBUMINURIA, WITH LONG-TERM CURRENT USE OF INSULIN: Primary | ICD-10-CM

## 2024-07-12 DIAGNOSIS — Z79.4 TYPE 2 DIABETES MELLITUS WITH MICROALBUMINURIA, WITH LONG-TERM CURRENT USE OF INSULIN: Primary | ICD-10-CM

## 2024-07-12 DIAGNOSIS — R80.9 TYPE 2 DIABETES MELLITUS WITH MICROALBUMINURIA, WITH LONG-TERM CURRENT USE OF INSULIN: ICD-10-CM

## 2024-07-12 PROCEDURE — 83036 HEMOGLOBIN GLYCOSYLATED A1C: CPT | Performed by: STUDENT IN AN ORGANIZED HEALTH CARE EDUCATION/TRAINING PROGRAM

## 2024-07-12 PROCEDURE — 80053 COMPREHEN METABOLIC PANEL: CPT | Performed by: STUDENT IN AN ORGANIZED HEALTH CARE EDUCATION/TRAINING PROGRAM

## 2024-07-12 PROCEDURE — 99214 OFFICE O/P EST MOD 30 MIN: CPT | Mod: S$PBB,,, | Performed by: STUDENT IN AN ORGANIZED HEALTH CARE EDUCATION/TRAINING PROGRAM

## 2024-07-12 PROCEDURE — G2211 COMPLEX E/M VISIT ADD ON: HCPCS | Mod: S$PBB,,, | Performed by: STUDENT IN AN ORGANIZED HEALTH CARE EDUCATION/TRAINING PROGRAM

## 2024-07-12 PROCEDURE — 99215 OFFICE O/P EST HI 40 MIN: CPT | Mod: PBBFAC,PO | Performed by: STUDENT IN AN ORGANIZED HEALTH CARE EDUCATION/TRAINING PROGRAM

## 2024-07-12 PROCEDURE — 36415 COLL VENOUS BLD VENIPUNCTURE: CPT | Mod: PO | Performed by: STUDENT IN AN ORGANIZED HEALTH CARE EDUCATION/TRAINING PROGRAM

## 2024-07-12 PROCEDURE — 84439 ASSAY OF FREE THYROXINE: CPT | Performed by: STUDENT IN AN ORGANIZED HEALTH CARE EDUCATION/TRAINING PROGRAM

## 2024-07-12 PROCEDURE — 99999 PR PBB SHADOW E&M-EST. PATIENT-LVL V: CPT | Mod: PBBFAC,,, | Performed by: STUDENT IN AN ORGANIZED HEALTH CARE EDUCATION/TRAINING PROGRAM

## 2024-07-12 PROCEDURE — 84443 ASSAY THYROID STIM HORMONE: CPT | Performed by: STUDENT IN AN ORGANIZED HEALTH CARE EDUCATION/TRAINING PROGRAM

## 2024-07-12 RX ORDER — DAPAGLIFLOZIN 10 MG/1
10 TABLET, FILM COATED ORAL DAILY
Qty: 90 TABLET | Refills: 1 | Status: SHIPPED | OUTPATIENT
Start: 2024-07-12

## 2024-07-12 RX ORDER — ROSUVASTATIN CALCIUM 10 MG/1
10 TABLET, COATED ORAL DAILY
Qty: 90 TABLET | Refills: 3 | Status: SHIPPED | OUTPATIENT
Start: 2024-07-12 | End: 2025-07-12

## 2024-07-12 NOTE — PATIENT INSTRUCTIONS
Ezra Guardado,     If you are due for any health screening(s) below please notify me so we can arrange them to be ordered and scheduled. Most healthy patients at your age complete them, but you are free to accept or refuse.     If you can't do it, I'll definitely understand. If you can, I'd certainly appreciate it!    Tests to Keep You Healthy    Mammogram: Met on 1/8/2024  Eye Exam: DUE  Colon Cancer Screening: Met on 3/13/2024  Last Blood Pressure <= 139/89 (7/12/2024): Yes  Last HbA1c < 8 (04/04/2024): NO      Lets manage your A1c levels     Your last hemoglobin A1c was higher than the goal of less than 8. Hemoglobin A1c or HbA1c is a blood test that measures your average blood sugar levels over the past 3 months. It is the main test to help you and your health care team manage your diabetes.     Higher A1c levels are linked to diabetes complications, such as loss of vision, kidney disease, and nerve damage. Keeping your A1c at least less than 8 is important to stay healthy and we are here to help. Talk with your provider on how you can further improve your A1c.     We recommend that you set up blood work to get a repeat hemoglobin A1c in 3 months to monitor your diabetes. Let your health care team know if you have questions.    Your diabetic retinal eye exam is due     Diabetes is the #1 cause of blindness in the [place] - early detection before signs or symptoms develop can prevent debilitating blindness.     Our records indicate that you may be overdue for your annual diabetic eye exam. Eye screening can help identify patients at risk for developing vision loss which is common in diabetes. This simple screening is an important step to keeping you healthy and preventing complications from diabetes.     This recommended diabetic eye exam should take place once per year and can prevent and treat diabetes complications in the eye before developing symptoms. This can be done with a special camera is used to take  photographs of the back of your eye without having to dilate them, or you can see an eye doctor for a full dilated exam.     If you recently had your yearly diabetic eye exam performed outside of [facility], please let your Health care team know so that they can update your health record.

## 2024-07-12 NOTE — PROGRESS NOTES
[facility]  Office Visit Note     SUBJECTIVE:   HPI: Debby Brown  is a 74 y.o. female here to establish care     Medical conditions: essential tremor, memory loss, anxiety , ADHD, panic attacks, HTN, HLD, stage 3a CKD, insomnia, type 2 diabetes    Last A1c April 2024 -- 10.5     Meds: xanax 0.5 mg BID, bupropion 300 mg daily, farxiga 5 mg daily, insulin toujeo 70 units daily     Takes xanax for tremor and panic attacks -- on average, she takes it 3 times per week  Denies any substance use.  Occasional alcohol usage.   Lives alone with her dog     For diabetes, she has preprandial glucose over 200 some days.   For Mounjaro, due to pricing and fear of potential side effects, patient was not able to take it     Lab Visit on 07/12/2024   Component Date Value Ref Range Status    Microalbumin, Urine 07/12/2024 25.0  ug/mL Final    Creatinine, Urine 07/12/2024 138.0  15.0 - 325.0 mg/dL Final    Microalb/Creat Ratio 07/12/2024 18.1  0.0 - 30.0 ug/mg Final   Lab Visit on 07/12/2024   Component Date Value Ref Range Status    Hemoglobin A1C 07/12/2024 10.0 (H)  4.0 - 5.6 % Final    Estimated Avg Glucose 07/12/2024 240 (H)  68 - 131 mg/dL Final    Sodium 07/12/2024 138  136 - 145 mmol/L Final    Potassium 07/12/2024 4.3  3.5 - 5.1 mmol/L Final    Chloride 07/12/2024 106  95 - 110 mmol/L Final    CO2 07/12/2024 21 (L)  23 - 29 mmol/L Final    Glucose 07/12/2024 143 (H)  70 - 110 mg/dL Final    BUN 07/12/2024 20  8 - 23 mg/dL Final    Creatinine 07/12/2024 1.3  0.5 - 1.4 mg/dL Final    Calcium 07/12/2024 9.7  8.7 - 10.5 mg/dL Final    Total Protein 07/12/2024 7.4  6.0 - 8.4 g/dL Final    Albumin 07/12/2024 3.6  3.5 - 5.2 g/dL Final    Total Bilirubin 07/12/2024 0.4  0.1 - 1.0 mg/dL Final    Alkaline Phosphatase 07/12/2024 66  55 - 135 U/L Final    AST 07/12/2024 19  10 - 40 U/L Final    ALT 07/12/2024 29  10 - 44 U/L Final    eGFR 07/12/2024 43.4 (A)  >60 mL/min/1.73 m^2 Final    Anion Gap 07/12/2024 11   8 - 16 mmol/L Final    TSH 07/12/2024 1.605  0.400 - 4.000 uIU/mL Final    Free T4 07/12/2024 0.89  0.71 - 1.51 ng/dL Final   Lab Visit on 04/04/2024   Component Date Value Ref Range Status    Sodium 04/04/2024 138  136 - 145 mmol/L Final    Potassium 04/04/2024 4.2  3.5 - 5.1 mmol/L Final    Chloride 04/04/2024 108  95 - 110 mmol/L Final    CO2 04/04/2024 24  23 - 29 mmol/L Final    Glucose 04/04/2024 215 (H)  70 - 110 mg/dL Final    BUN 04/04/2024 18  8 - 23 mg/dL Final    Creatinine 04/04/2024 1.2  0.5 - 1.4 mg/dL Final    Calcium 04/04/2024 9.6  8.7 - 10.5 mg/dL Final    Total Protein 04/04/2024 7.4  6.0 - 8.4 g/dL Final    Albumin 04/04/2024 4.0  3.5 - 5.2 g/dL Final    Total Bilirubin 04/04/2024 0.4  0.1 - 1.0 mg/dL Final    Alkaline Phosphatase 04/04/2024 77  55 - 135 U/L Final    AST 04/04/2024 15  10 - 40 U/L Final    ALT 04/04/2024 21  10 - 44 U/L Final    eGFR 04/04/2024 48.1 (A)  >60 mL/min/1.73 m^2 Final    Anion Gap 04/04/2024 6 (L)  8 - 16 mmol/L Final    Cholesterol 04/04/2024 143  120 - 199 mg/dL Final    Triglycerides 04/04/2024 114  30 - 150 mg/dL Final    HDL 04/04/2024 39 (L)  40 - 75 mg/dL Final    LDL Cholesterol 04/04/2024 81.2  63.0 - 159.0 mg/dL Final    HDL/Cholesterol Ratio 04/04/2024 27.3  20.0 - 50.0 % Final    Total Cholesterol/HDL Ratio 04/04/2024 3.7  2.0 - 5.0 Final    Non-HDL Cholesterol 04/04/2024 104  mg/dL Final    Hemoglobin A1C 04/04/2024 10.5 (H)  4.5 - 6.2 % Final    Estimated Avg Glucose 04/04/2024 255 (H)  68 - 131 mg/dL Final         Current Outpatient Medications on File Prior to Visit   Medication Sig Dispense Refill    ALPRAZolam (XANAX) 0.5 MG tablet Take 1 tablet by mouth twice daily as needed for anxiety 30 tablet 0    buPROPion (WELLBUTRIN XL) 300 MG 24 hr tablet Take 1 tablet by mouth once daily 90 tablet 3    fluticasone propionate (FLONASE) 50 mcg/actuation nasal spray 1 spray (50 mcg total) by Each Nostril route once daily. 16 g 0    insulin glargine, TOUJEO,  "(TOUJEO SOLOSTAR U-300 INSULIN) 300 unit/mL (1.5 mL) InPn pen INJECT 70 UNITS SUBCUTANEOUSLY ONCE DAILY 22.5 mL 1    ketoconazole (NIZORAL) 2 % shampoo Apply topically 3 (three) times a week. 120 mL 5    lisinopriL (PRINIVIL,ZESTRIL) 40 MG tablet Take 1 tablet by mouth once daily 90 tablet 3    meclizine (ANTIVERT) 12.5 mg tablet Take 1 tablet (12.5 mg total) by mouth 3 (three) times daily as needed for Dizziness. 15 tablet 0    metoprolol succinate (TOPROL-XL) 50 MG 24 hr tablet Take 1 tablet by mouth once daily 90 tablet 3    pen needle, diabetic (BD JERI 2ND GEN PEN NEEDLE) 32 gauge x 5/32" Ndle USE AS DIRECTED 100 each 5    latanoprost 0.005 % ophthalmic solution Place 1 drop into both eyes every evening. (Patient not taking: Reported on 5/16/2024)      TRUE METRIX GLUCOSE METER Misc U UTD (Patient not taking: Reported on 5/16/2024)      TRUE METRIX GLUCOSE TEST STRIP Strp U QAM (Patient not taking: Reported on 5/16/2024) 100 each 5    TRUEPLUS LANCETS 30 gauge Misc U QAM (Patient not taking: Reported on 5/16/2024) 100 each 5    [DISCONTINUED] tirzepatide (MOUNJARO) 2.5 mg/0.5 mL PnIj Inject 2.5 mg into the skin every 7 days. (Patient not taking: Reported on 7/12/2024) 4 Pen 0     No current facility-administered medications on file prior to visit.     Past Medical History:   Diagnosis Date    Allergy     Anxiety     Depression     Diabetes mellitus, type 2     Hyperlipidemia     Hypertension     Personal history of colonic polyps     Pre-diabetes      Past Surgical History:   Procedure Laterality Date    APPENDECTOMY      BACK SURGERY      BREAST SURGERY      CHOLECYSTECTOMY      COLONOSCOPY      COLONOSCOPY N/A 03/13/2024    Procedure: COLONOSCOPY;  Surgeon: Isabel Noriega MD;  Location: [facility];  Service: Endoscopy;  Laterality: N/A;  ppm    GALLBLADDER SURGERY      HERNIA REPAIR      umbilical    lymphnode remove      SHOULDER SURGERY Left     RTR    TOTAL REDUCTION MAMMOPLASTY Bilateral 2001    TUBAL " LIGATION       Past Surgical History:   Procedure Laterality Date    APPENDECTOMY      BACK SURGERY      BREAST SURGERY      CHOLECYSTECTOMY      COLONOSCOPY      COLONOSCOPY N/A 03/13/2024    Procedure: COLONOSCOPY;  Surgeon: Isabel Noriega MD;  [facility]: [facility];  Service: Endoscopy;  Laterality: N/A;  ppm    GALLBLADDER SURGERY      HERNIA REPAIR      umbilical    lymphnode remove      SHOULDER SURGERY Left     RTR    TOTAL REDUCTION MAMMOPLASTY Bilateral 2001    TUBAL LIGATION       Family History   Problem Relation Name Age of Onset    Mental illness Mother      Cancer Mother          female cancer?    Depression Mother      Heart disease Mother      Hypertension Mother      Stroke Mother      Heart disease Father      Hypertension Father      Stroke Father      Cancer Brother          bladder       Marital Status:   Alcohol History:  reports current alcohol use.  Tobacco History:  reports that she has quit smoking. She has been exposed to tobacco smoke. She has never used smokeless tobacco.  Drug History:  reports no history of drug use.    Health Maintenance Topics with due status: Not Due       Topic Last Completion Date    TETANUS VACCINE 03/12/2015    DEXA Scan 03/07/2022    Foot Exam 10/06/2023    Influenza Vaccine 01/05/2024    Mammogram 01/08/2024    Colorectal Cancer Screening 03/13/2024    Lipid Panel 04/04/2024    High Dose Statin 07/12/2024    Diabetes Urine Screening 07/12/2024    Hemoglobin A1c 07/12/2024     Immunization History   Administered Date(s) Administered    Hepatitis A, Adult 03/12/2015    Influenza (FLUAD) - Quadrivalent - Adjuvanted - PF *Preferred* (65+) 10/31/2022, 01/05/2024    Influenza - High Dose - PF (65 years and older) 10/11/2016    Pneumococcal Conjugate - 20 Valent 07/26/2022    Pneumococcal Polysaccharide - 23 Valent 10/11/2016    Tdap 03/12/2015       Review of patient's allergies indicates:   Allergen Reactions    Bactrim [sulfamethoxazole-trimethoprim]   "   Codeine     Levaquin [levofloxacin]     Pcn [penicillins]        Current Outpatient Medications:     ALPRAZolam (XANAX) 0.5 MG tablet, Take 1 tablet by mouth twice daily as needed for anxiety, Disp: 30 tablet, Rfl: 0    buPROPion (WELLBUTRIN XL) 300 MG 24 hr tablet, Take 1 tablet by mouth once daily, Disp: 90 tablet, Rfl: 3    fluticasone propionate (FLONASE) 50 mcg/actuation nasal spray, 1 spray (50 mcg total) by Each Nostril route once daily., Disp: 16 g, Rfl: 0    insulin glargine, TOUJEO, (TOUJEO SOLOSTAR U-300 INSULIN) 300 unit/mL (1.5 mL) InPn pen, INJECT 70 UNITS SUBCUTANEOUSLY ONCE DAILY, Disp: 22.5 mL, Rfl: 1    ketoconazole (NIZORAL) 2 % shampoo, Apply topically 3 (three) times a week., Disp: 120 mL, Rfl: 5    lisinopriL (PRINIVIL,ZESTRIL) 40 MG tablet, Take 1 tablet by mouth once daily, Disp: 90 tablet, Rfl: 3    meclizine (ANTIVERT) 12.5 mg tablet, Take 1 tablet (12.5 mg total) by mouth 3 (three) times daily as needed for Dizziness., Disp: 15 tablet, Rfl: 0    metoprolol succinate (TOPROL-XL) 50 MG 24 hr tablet, Take 1 tablet by mouth once daily, Disp: 90 tablet, Rfl: 3    pen needle, diabetic (BD JERI 2ND GEN PEN NEEDLE) 32 gauge x 5/32" Ndle, USE AS DIRECTED, Disp: 100 each, Rfl: 5    dapagliflozin propanediol (FARXIGA) 10 mg tablet, Take 1 tablet (10 mg total) by mouth once daily., Disp: 90 tablet, Rfl: 1    dulaglutide (TRULICITY) 0.75 mg/0.5 mL pen injector, Inject 0.75 mg into the skin every 7 days., Disp: 2 pen , Rfl: 3    latanoprost 0.005 % ophthalmic solution, Place 1 drop into both eyes every evening. (Patient not taking: Reported on 5/16/2024), Disp: , Rfl:     rosuvastatin (CRESTOR) 10 MG tablet, Take 1 tablet (10 mg total) by mouth once daily., Disp: 90 tablet, Rfl: 3    TRUE METRIX GLUCOSE METER Misc, U UTD (Patient not taking: Reported on 5/16/2024), Disp: , Rfl:     TRUE METRIX GLUCOSE TEST STRIP Strvictor m U QAM (Patient not taking: Reported on 5/16/2024), Disp: 100 each, Rfl: 5    " "TRUEPLUS LANCETS 30 gauge Misc, U QAM (Patient not taking: Reported on 5/16/2024), Disp: 100 each, Rfl: 5    Review of Systems   Constitutional:  Negative for chills and fever.   Respiratory:  Negative for shortness of breath.    Cardiovascular:  Negative for chest pain.   Gastrointestinal:  Negative for abdominal pain, blood in stool, constipation, nausea and vomiting.   Genitourinary:  Negative for hematuria.       OBJECTIVE:      Vitals:    07/12/24 1101   BP: 118/84   BP Location: Right arm   Patient Position: Sitting   BP Method: Large (Manual)   Pulse: 71   Resp: 16   SpO2: 97%   Weight: 87 kg (191 lb 12.8 oz)   Height: 5' 7" (1.702 m)     Physical Exam  Constitutional:       General: She is not in acute distress.     Appearance: She is obese. She is not ill-appearing or toxic-appearing.   HENT:      Head: Normocephalic and atraumatic.      Mouth/Throat:      Mouth: Mucous membranes are moist.      Pharynx: Uvula midline. No pharyngeal swelling.   Cardiovascular:      Rate and Rhythm: Normal rate and regular rhythm.   Pulmonary:      Effort: Pulmonary effort is normal. No tachypnea, bradypnea, accessory muscle usage, prolonged expiration or respiratory distress.      Breath sounds: Normal breath sounds. No stridor. No wheezing, rhonchi or rales.   Feet:      Right foot:      Toenail Condition: Right toenails are abnormally thick.   Neurological:      General: No focal deficit present.      Mental Status: She is alert.   Psychiatric:         Mood and Affect: Mood normal.         Behavior: Behavior normal.        Assessment:       1. Type 2 diabetes mellitus with microalbuminuria, with long-term current use of insulin    2. Low energy    3. Calcification of aorta        Plan:       Type 2 diabetes mellitus with microalbuminuria, with long-term current use of insulin  -     HEMOGLOBIN A1C; Future; Expected date: 07/12/2024  -     Microalbumin/Creatinine Ratio, Urine; Future; Expected date: 07/12/2024  -     " Comprehensive Metabolic Panel; Future; Expected date: 07/12/2024  -     rosuvastatin (CRESTOR) 10 MG tablet; Take 1 tablet (10 mg total) by mouth once daily.  Dispense: 90 tablet; Refill: 3  -     dapagliflozin propanediol (FARXIGA) 10 mg tablet; Take 1 tablet (10 mg total) by mouth once daily.  Dispense: 90 tablet; Refill: 1  -     Ambulatory referral/consult to Podiatry; Future; Expected date: 07/19/2024  -     Ambulatory referral/consult to Optometry; Future; Expected date: 07/19/2024  A1c not controlled  Will further increase Farxiga from 5 mg to 10 mg daily   Continue with Toujeo 70 units daily   Goal fasting glucose is < 140  Will also consider adding GLP-1 agonist     Low energy  -     TSH; Future; Expected date: 07/12/2024  -     T4, FREE; Future; Expected date: 07/12/2024  -     Misc Sendout Test, Blood vitamin D; Future; Expected date: 07/12/2024    Calcification of aorta  -     rosuvastatin (CRESTOR) 10 MG tablet; Take 1 tablet (10 mg total) by mouth once daily.  Dispense: 90 tablet; Refill: 3  Patient has not been compliant with her statin   Advised on the importance of statin therapy    Counseled on age and gender appropriate medical preventative services, including cancer screenings, immunizations, overall nutritional health, need for a consistent exercise regimen and an overall push towards maintaining a vigorous and active lifestyle.      Follow up in 3 months for chronic conditions follow up    Shawna Costello M.D.  7/14/2024    This note was created using [employer] voice recognition software that occasionally misinterprets phrases or words

## 2024-07-13 LAB
ALBUMIN SERPL BCP-MCNC: 3.6 G/DL (ref 3.5–5.2)
ALP SERPL-CCNC: 66 U/L (ref 55–135)
ALT SERPL W/O P-5'-P-CCNC: 29 U/L (ref 10–44)
ANION GAP SERPL CALC-SCNC: 11 MMOL/L (ref 8–16)
AST SERPL-CCNC: 19 U/L (ref 10–40)
BILIRUB SERPL-MCNC: 0.4 MG/DL (ref 0.1–1)
BUN SERPL-MCNC: 20 MG/DL (ref 8–23)
CALCIUM SERPL-MCNC: 9.7 MG/DL (ref 8.7–10.5)
CHLORIDE SERPL-SCNC: 106 MMOL/L (ref 95–110)
CO2 SERPL-SCNC: 21 MMOL/L (ref 23–29)
CREAT SERPL-MCNC: 1.3 MG/DL (ref 0.5–1.4)
EST. GFR  (NO RACE VARIABLE): 43.4 ML/MIN/1.73 M^2
ESTIMATED AVG GLUCOSE: 240 MG/DL (ref 68–131)
GLUCOSE SERPL-MCNC: 143 MG/DL (ref 70–110)
HBA1C MFR BLD: 10 % (ref 4–5.6)
POTASSIUM SERPL-SCNC: 4.3 MMOL/L (ref 3.5–5.1)
PROT SERPL-MCNC: 7.4 G/DL (ref 6–8.4)
SODIUM SERPL-SCNC: 138 MMOL/L (ref 136–145)
T4 FREE SERPL-MCNC: 0.89 NG/DL (ref 0.71–1.51)
TSH SERPL DL<=0.005 MIU/L-ACNC: 1.6 UIU/ML (ref 0.4–4)

## 2024-07-14 DIAGNOSIS — E11.65 UNCONTROLLED TYPE 2 DIABETES MELLITUS WITH HYPERGLYCEMIA: Primary | ICD-10-CM

## 2024-07-14 RX ORDER — DULAGLUTIDE 0.75 MG/.5ML
0.75 INJECTION, SOLUTION SUBCUTANEOUS
Qty: 2 PEN | Refills: 3 | Status: SHIPPED | OUTPATIENT
Start: 2024-07-14 | End: 2025-07-14

## 2024-07-16 ENCOUNTER — TELEPHONE (OUTPATIENT)
Dept: FAMILY MEDICINE | Facility: CLINIC | Age: 74
End: 2024-07-16
Payer: MEDICARE

## 2024-07-16 NOTE — TELEPHONE ENCOUNTER
----- Message from Laury Sweeney, Patient Care Assistant sent at 7/16/2024  8:53 AM CDT -----  Regarding: advice  Contact: Tyrese with [employer]  Type: Needs Medical Advice    Who Called:  Tyrese with [facility] [address] [place] [address] - [address] [place] [address]  Phone: [phone] Fax: [phone]    Best Call Back Number: [phone] (home)     Additional Information: Tyrese with [employer] states he would like a callback regarding dulaglutide (TRULICITY) 0.75 mg/0.5 mL pen injector. Please call to advise. Thanks!

## 2024-07-16 NOTE — TELEPHONE ENCOUNTER
----- Message from Royal Emerson sent at 7/15/2024  9:58 AM CDT -----  Regarding: Pharm Auth  Type:  Pharmacy Calling to Clarify an RX    Name of Caller:Sampson    Pharmacy Name:  [id number] - [place] - [address] [facility]  [address] [facility] [place] [address]  Phone: [phone] Fax: [phone]      Prescription Name:dulaglutide (TRULICITY) 0.75 mg/0.5 mL pen injector     What do they need to clarify?:should it be 2 boxes or 2 ml or 1 box directions are confusing     Best Call Back Number:[phone]    Additional Information:     Please advise -- Thank you

## 2024-07-16 NOTE — TELEPHONE ENCOUNTER
Spoke with [employer] Trulicity is supplied 4 pens to one box with 3 refills  Is this OK?   Please advise

## 2024-08-13 NOTE — PATIENT INSTRUCTIONS
Your Diabetes Foot Care Program    Every day you depend on your feet to keep you moving. But when you have diabetes, your feet need special care. Even a small foot problem can become very serious. So dont take your feet for granted. By working with your diabetes healthcare team, you can learn how to protect your feet and keep them healthy.  Evaluating your feet  An evaluation helps your healthcare provider check the condition of your feet. The evaluation includes a review of your diabetes history and overall health. It may also include a foot exam, X-rays, or other tests. These can help show problems beneath the skin that you cant see or feel.  Medical history  You will be asked about your overall health and any history of foot problems. Youll also discuss your diabetes history, such as whether your blood sugar level has changed over time. It also includes questions about sensations of pain, tingling, pins and needles, or numbness. Your healthcare provider will also want to know if you have high blood pressure and heart disease, or if you smoke. Be sure to mention any medicines (including over-the-counter), supplements, or herbal remedies you take.  Foot exam  A foot exam checks the condition of different parts of your foot. First, your skin and nails are examined for any signs of infection. Blood flow is checked by feeling for the pulses in each foot. You may also have tests to study the nerves in the foot. These include using a small filament (wire) to see how sensitive your feet are. In certain cases, you will be asked to walk a short distance to check for bone, joint, and muscle problems.  Diagnostic tests  If needed, your healthcare provider will suggest certain tests to learn more about your feet. These include:  Doppler tests to measure blood flow in the feet and lower leg.  X-rays, which can show bone or joint problems.  Other imaging tests, such as an MRI (magnetic resonance imaging), bone scan, and CT  (computed tomography) scan. These can help show bone infections.  Other tests, such as vascular tests, which study the blood flow in your feet and legs. You may also have nerve studies to learn how sensitive your feet are.  Creating a foot care program  Based on the evaluation, your healthcare provider will create a foot care program for you. Your program may be as simple as starting a daily self-care routine and changing the types of shoes your wear. It may also involve treating minor foot problems, such as a corn or blister. In some cases, surgery will be needed to treat an infection or mechanical problems, such as hammer toes.  Preventing problems  When you have diabetes, its easier to prevent problems than to treat them later on. So see your healthcare team for regular checkups and foot care. Your healthcare team can also help you learn more about caring for your feet at home. For example, you may be told to avoid walking barefoot. Or you may be told that special footwear is needed to protect your feet.  Have regular checkups  Foot problems can develop quickly. So be sure to follow your healthcare teams schedule for regular checkups. During office visits, take off your shoes and socks as soon as you get in the exam room. Ask your healthcare provider to examine your feet for problems. This will make it easier to find and treat small skin irritations before they get worse. Regular checkups can also help keep track of the blood flow and feeling in your feet. If you have neuropathy (lack of feeling in your feet), you will need to have checkups more often.  Learn about self-care  The more you know about diabetes and your feet, the easier it will be to prevent problems. Members of your healthcare team can teach you how to inspect your feet and teach you to look for warning signs. They can also give you other foot care tips. During office visits, be sure to ask any questions you have.  Date Last Reviewed: 7/1/2016  ©  4584-3188 [employer]. [address] [place] [address], [place] [address]. All rights reserved. This information is not intended as a substitute for professional medical care. Always follow your healthcare professional's instructions.

## 2024-08-14 ENCOUNTER — OFFICE VISIT (OUTPATIENT)
Dept: PODIATRY | Facility: CLINIC | Age: 74
End: 2024-08-14
Payer: MEDICARE

## 2024-08-14 VITALS — HEIGHT: 67 IN | WEIGHT: 193.56 LBS | BODY MASS INDEX: 30.38 KG/M2

## 2024-08-14 DIAGNOSIS — B35.1 ONYCHOMYCOSIS DUE TO DERMATOPHYTE: ICD-10-CM

## 2024-08-14 DIAGNOSIS — R80.9 TYPE 2 DIABETES MELLITUS WITH MICROALBUMINURIA, WITH LONG-TERM CURRENT USE OF INSULIN: Primary | ICD-10-CM

## 2024-08-14 DIAGNOSIS — Z79.4 TYPE 2 DIABETES MELLITUS WITH MICROALBUMINURIA, WITH LONG-TERM CURRENT USE OF INSULIN: Primary | ICD-10-CM

## 2024-08-14 DIAGNOSIS — E11.29 TYPE 2 DIABETES MELLITUS WITH MICROALBUMINURIA, WITH LONG-TERM CURRENT USE OF INSULIN: Primary | ICD-10-CM

## 2024-08-14 DIAGNOSIS — E11.9 ENCOUNTER FOR DIABETIC FOOT EXAM: ICD-10-CM

## 2024-08-14 PROCEDURE — 99214 OFFICE O/P EST MOD 30 MIN: CPT | Mod: PBBFAC,PN | Performed by: PODIATRIST

## 2024-08-14 PROCEDURE — 99204 OFFICE O/P NEW MOD 45 MIN: CPT | Mod: S$PBB,,, | Performed by: PODIATRIST

## 2024-08-14 PROCEDURE — 99999 PR PBB SHADOW E&M-EST. PATIENT-LVL IV: CPT | Mod: PBBFAC,,, | Performed by: PODIATRIST

## 2024-08-14 RX ORDER — FLUCONAZOLE 200 MG/1
200 TABLET ORAL WEEKLY
Qty: 28 TABLET | Refills: 0 | Status: SHIPPED | OUTPATIENT
Start: 2024-08-14 | End: 2025-02-20

## 2024-08-14 NOTE — PROGRESS NOTES
"    [address] [facility] [address]  [place] [address]  Phone: [phone]   [phone]    Patient's PCP:Shawna Costello MD  Referring Provider: Dr. Shawna Costello    Subjective:      Chief Complaint:: Nail Problem (Right foot big toe possible fungus ), Diabetic Foot Exam, and Diabetes Mellitus    Nail Problem  Pertinent negatives include no abdominal pain, arthralgias, chest pain, chills, coughing, fatigue, fever, headaches, joint swelling, myalgias, nausea, neck pain, numbness, rash or weakness.     Debby Brown is a 74 y.o. female who presents today for a diabetic foot exam.  Pt has seen  on 7/12/24 who treats them for their diabetes.  Pt has been a diabetic for about 3 years.  Taking Toujeo to treat diabetes.    Blood sugar: not taken  Hemoglobin A1C: 10      Pt also complaint of possible fungus on right foot big toe     Vitals:    08/14/24 1041   Weight: 87.8 kg (193 lb 9 oz)   Height: 5' 7" (1.702 m)   PainSc: 0-No pain      Shoe Size: 7    Past Surgical History:   Procedure Laterality Date    APPENDECTOMY      BACK SURGERY      BREAST SURGERY      CHOLECYSTECTOMY      COLONOSCOPY      COLONOSCOPY N/A 03/13/2024    Procedure: COLONOSCOPY;  Surgeon: Isabel Noriega MD;  Location: [facility];  Service: Endoscopy;  Laterality: N/A;  ppm    GALLBLADDER SURGERY      HERNIA REPAIR      umbilical    lymphnode remove      SHOULDER SURGERY Left     RTR    TOTAL REDUCTION MAMMOPLASTY Bilateral 2001    TUBAL LIGATION       Past Medical History:   Diagnosis Date    Allergy     Anxiety     Depression     Diabetes mellitus, type 2     Hyperlipidemia     Hypertension     Personal history of colonic polyps     Pre-diabetes      Family History   Problem Relation Name Age of Onset    Mental illness Mother      Cancer Mother          female cancer?    Depression Mother      Heart disease Mother      Hypertension Mother      Stroke Mother      Heart disease Father      Hypertension Father      Stroke Father      " "Cancer Brother          bladder        Social History:   Marital Status:   Alcohol History:  reports current alcohol use.  Tobacco History:  reports that she has quit smoking. She has been exposed to tobacco smoke. She has never used smokeless tobacco.  Drug History:  reports no history of drug use.    Review of patient's allergies indicates:   Allergen Reactions    Bactrim [sulfamethoxazole-trimethoprim]     Codeine     Levaquin [levofloxacin]     Pcn [penicillins]        Current Outpatient Medications   Medication Sig Dispense Refill    ALPRAZolam (XANAX) 0.5 MG tablet Take 1 tablet by mouth twice daily as needed for anxiety 30 tablet 0    buPROPion (WELLBUTRIN XL) 300 MG 24 hr tablet Take 1 tablet by mouth once daily 90 tablet 3    dapagliflozin propanediol (FARXIGA) 10 mg tablet Take 1 tablet (10 mg total) by mouth once daily. 90 tablet 1    insulin glargine, TOUJEO, (TOUJEO SOLOSTAR U-300 INSULIN) 300 unit/mL (1.5 mL) InPn pen INJECT 70 UNITS SUBCUTANEOUSLY ONCE DAILY 22.5 mL 1    ketoconazole (NIZORAL) 2 % shampoo Apply topically 3 (three) times a week. 120 mL 5    lisinopriL (PRINIVIL,ZESTRIL) 40 MG tablet Take 1 tablet by mouth once daily 90 tablet 3    meclizine (ANTIVERT) 12.5 mg tablet Take 1 tablet (12.5 mg total) by mouth 3 (three) times daily as needed for Dizziness. 15 tablet 0    metoprolol succinate (TOPROL-XL) 50 MG 24 hr tablet Take 1 tablet by mouth once daily 90 tablet 3    pen needle, diabetic (BD JERI 2ND GEN PEN NEEDLE) 32 gauge x 5/32" Ndle USE AS DIRECTED 100 each 5    rosuvastatin (CRESTOR) 10 MG tablet Take 1 tablet (10 mg total) by mouth once daily. 90 tablet 3    dulaglutide (TRULICITY) 0.75 mg/0.5 mL pen injector Inject 0.75 mg into the skin every 7 days. (Patient not taking: Reported on 8/14/2024) 2 pen 3    fluconazole (DIFLUCAN) 200 MG Tab Take 1 tablet (200 mg total) by mouth once a week. for 28 doses 28 tablet 0    latanoprost 0.005 % ophthalmic solution Place 1 drop into " both eyes every evening. (Patient not taking: Reported on 5/16/2024)      TRUE METRIX GLUCOSE METER Misc U UTD (Patient not taking: Reported on 5/16/2024)      TRUE METRIX GLUCOSE TEST STRIP Strp U QAM (Patient not taking: Reported on 5/16/2024) 100 each 5    TRUEPLUS LANCETS 30 gauge Misc U QAM (Patient not taking: Reported on 5/16/2024) 100 each 5     No current facility-administered medications for this visit.       Review of Systems   Constitutional:  Negative for chills, fatigue, fever and unexpected weight change.   HENT:  Negative for hearing loss and trouble swallowing.    Eyes:  Negative for photophobia and visual disturbance.   Respiratory:  Negative for cough, shortness of breath and wheezing.    Cardiovascular:  Negative for chest pain, palpitations and leg swelling.   Gastrointestinal:  Negative for abdominal pain and nausea.   Genitourinary:  Negative for dysuria and frequency.   Musculoskeletal:  Negative for arthralgias, back pain, gait problem, joint swelling, myalgias and neck pain.   Skin:  Negative for rash and wound.   Neurological:  Negative for tremors, seizures, weakness, numbness and headaches.   Hematological:  Does not bruise/bleed easily.         Objective:        Physical Exam:   Foot Exam    General  General Appearance: appears stated age and healthy   Orientation: alert and oriented to person, place, and time   Affect: appropriate   Gait: unimpaired       Right Foot/Ankle     Inspection and Palpation  Ecchymosis: none  Tenderness: none   Swelling: none   Arch: normal  Skin Exam: skin intact; no drainage, no ulcer and no erythema   Fungus Toenails: present    Neurovascular  Dorsalis pedis: 2+  Posterior tibial: 2+  Capillary Refill: 2+  Varicose veins: not present  Saphenous nerve sensation: normal  Tibial nerve sensation: normal  Superficial peroneal nerve sensation: normal  Deep peroneal nerve sensation: normal  Sural nerve sensation: normal    Edema  Type of edema:  non-pitting    Muscle Strength  Ankle dorsiflexion: 5  Ankle plantar flexion: 5  Ankle inversion: 5  Ankle eversion: 5  Great toe extension: 5  Great toe flexion: 5    Range of Motion    Normal right ankle ROM    Tests  Anterior drawer: negative   Talar tilt: negative   PT Tinel's sign: negative    Paresthesia: negative    Left Foot/Ankle      Inspection and Palpation  Ecchymosis: none  Tenderness: none   Swelling: none   Arch: normal  Skin Exam: skin intact; no drainage, no ulcer and no erythema   Neurovascular  Dorsalis pedis: 2+  Posterior tibial: 2+  Capillary refill: 2+  Varicose veins: not present  Saphenous nerve sensation: normal  Tibial nerve sensation: normal  Superficial peroneal nerve sensation: normal  Deep peroneal nerve sensation: normal  Sural nerve sensation: normal    Edema  Type of edema: non-pitting    Muscle Strength  Ankle dorsiflexion: 5  Ankle plantar flexion: 5  Ankle inversion: 5  Ankle eversion: 5  Great toe extension: 5  Great toe flexion: 5    Range of Motion    Normal left ankle ROM    Tests  Anterior drawer: negative   Talar tilt: negative   PT Tinel's sign: negative  Paresthesia: negative      Physical Exam  Cardiovascular:      Pulses:           Dorsalis pedis pulses are 2+ on the right side and 2+ on the left side.        Posterior tibial pulses are 2+ on the right side and 2+ on the left side.   Feet:      Right foot:      Skin integrity: No ulcer or erythema.      Toenail Condition: Fungal disease present.     Left foot:      Skin integrity: No ulcer or erythema.         Imaging: none            Assessment:       1. Type 2 diabetes mellitus with microalbuminuria, with long-term current use of insulin    2. Encounter for diabetic foot exam    3. Onychomycosis due to dermatophyte      Plan:   Type 2 diabetes mellitus with microalbuminuria, with long-term current use of insulin  -     Ambulatory referral/consult to Podiatry  -      DIABETES FOOT EXAM    Encounter for diabetic foot  exam  -      DIABETES FOOT EXAM    Onychomycosis due to dermatophyte  -     fluconazole (DIFLUCAN) 200 MG Tab; Take 1 tablet (200 mg total) by mouth once a week. for 28 doses  Dispense: 28 tablet; Refill: 0      Follow up in about 1 year (around 8/14/2025), or if symptoms worsen or fail to improve.    Procedures        Counseled patient on the aspects of diabetes and how it pertains to the feet.  I explained the importance of proper diabetic foot care and how it is essential for the health of their feet.    I discussed the importance of knowing their HGA1c and that the level needs to be as close to 6 as possible.  I discussed the increase complications of high blood sugar including stroke, blindness, heart attack, kidney failure and loss of limb secondary to neuropathy and PVD.    Patient  was made aware of inspecting their feet.  Patient was told to be aware of any breaks in the skin or redness.  With neuropathy, these areas are not recognized early due to the numbness.  I discussed different treatments available to control the symptoms but whcih may not cure the problem.      Shoe inspection. Patient instructed on proper foot hygeine. We discussed wearing proper shoe gear, daily foot inspections, never walking without protective shoe gear, never putting sharp instruments to feet.    Fungal infection of toenails explained. Treatment options including no treatment, periodic debridement, topical medications, oral medications, and removal of the nail were discussed, as well as success rates and risks of recurrence.       Counseling:     I provided patient education verbally regarding:   Patient diagnosis, treatment options, as well as alternatives, risks, and benefits.     This note was created using Dragon voice recognition software that occasionally misinterpreted phrases or words.

## 2024-08-22 ENCOUNTER — OFFICE VISIT (OUTPATIENT)
Dept: OPTOMETRY | Facility: CLINIC | Age: 74
End: 2024-08-22
Payer: MEDICARE

## 2024-08-22 DIAGNOSIS — E11.29 TYPE 2 DIABETES MELLITUS WITH MICROALBUMINURIA, WITH LONG-TERM CURRENT USE OF INSULIN: Primary | ICD-10-CM

## 2024-08-22 DIAGNOSIS — Z79.4 TYPE 2 DIABETES MELLITUS WITH MICROALBUMINURIA, WITH LONG-TERM CURRENT USE OF INSULIN: Primary | ICD-10-CM

## 2024-08-22 DIAGNOSIS — Z96.1 PSEUDOPHAKIA OF BOTH EYES: ICD-10-CM

## 2024-08-22 DIAGNOSIS — H40.053 BILATERAL OCULAR HYPERTENSION: ICD-10-CM

## 2024-08-22 DIAGNOSIS — H10.13 ALLERGIC CONJUNCTIVITIS, BILATERAL: ICD-10-CM

## 2024-08-22 DIAGNOSIS — E11.3292 MILD NONPROLIFERATIVE DIABETIC RETINOPATHY OF LEFT EYE WITHOUT MACULAR EDEMA ASSOCIATED WITH TYPE 2 DIABETES MELLITUS: ICD-10-CM

## 2024-08-22 DIAGNOSIS — R80.9 TYPE 2 DIABETES MELLITUS WITH MICROALBUMINURIA, WITH LONG-TERM CURRENT USE OF INSULIN: Primary | ICD-10-CM

## 2024-08-22 PROCEDURE — 99999 PR PBB SHADOW E&M-EST. PATIENT-LVL III: CPT | Mod: PBBFAC,,, | Performed by: OPTOMETRIST

## 2024-08-22 PROCEDURE — 99213 OFFICE O/P EST LOW 20 MIN: CPT | Mod: PBBFAC,PO | Performed by: OPTOMETRIST

## 2024-08-22 PROCEDURE — 92004 COMPRE OPH EXAM NEW PT 1/>: CPT | Mod: S$PBB,,, | Performed by: OPTOMETRIST

## 2024-08-22 NOTE — PROGRESS NOTES
HPI     Diabetic Eye Exam     Additional comments: Type 2 DM           Comments    Pt. Here for annual eye exam. ALEXANDR - 6-8 Months [facility]    Pt. States VA is stable, but through out the day becomes blurry   occasionally. No other issues at this time. Has Latanoprost gtts, but   never used it. See's halos as lights at night or when in dark areas. H/O   floaters. Itchy eyes, needs drop recommendation  Pt is trying to get DM2 under control through different medicines.  LBSL: 190  Hemoglobin A1C       Date                     Value               Ref Range             Status                07/12/2024               10.0 (H)            4.0 - 5.6 %           Final                 04/04/2024               10.5 (H)            4.5 - 6.2 %           Final                 01/08/2024               8.2 (H)             4.5 - 6.2 %           Final                       Last edited by Aakash Jackson, OD on 8/22/2024  3:15 PM.            Assessment /Plan     For exam results, see Encounter Report.    Type 2 diabetes mellitus with microalbuminuria, with long-term current use of insulin  -     Ambulatory referral/consult to Optometry    Mild nonproliferative diabetic retinopathy of left eye without macular edema associated with type 2 diabetes mellitus    Pseudophakia of both eyes    Bilateral ocular hypertension    Allergic conjunctivitis, bilateral      1,2. Mild diabetic retinopathy, no csme. Return in 1 year for dilated eye exam.  3. Monitor condition. Patient to report any changes. RTC 1 year recheck.  4. Normal iop and moderate cup today, no restart Latanaprost, rtc yearly  5. Recommended OTC Pataday OU for itching

## 2024-08-29 NOTE — PROGRESS NOTES
"[facility]   OFFICE VISIT NOTE    Patient Name: Debby Brown  YOB: 1950    PRESENTING HISTORY     History of Present Illness:  Ms. Debby Brown is a 74 y.o. female   Last follow up July 2024   Patient follows up today because she ran out of all her meds since 3-4 weeks prior  Only medications she is taking right now is Toujeo 70 units nightly   Fasting glucose runs around 100 to 190 depending on the food she eats     The 10-year ASCVD risk score (Veronica ORTIZ, et al., 2019) is: 39.2%    Values used to calculate the score:      Age: 74 years      Sex: Female      Is Non- : No      Diabetic: Yes      Tobacco smoker: No      Systolic Blood Pressure: 152 mmHg      Is BP treated: Yes      HDL Cholesterol: 39 mg/dL      Total Cholesterol: 143 mg/dL      Review of Systems   Constitutional:  Positive for fatigue.   Cardiovascular:  Positive for chest pain.   Endocrine: Positive for polyuria. Negative for polydipsia and polyphagia.   Integumentary:  Negative for pallor.   Neurological:  Positive for dizziness, tremors and headaches. Negative for seizures, speech difficulty and weakness.   Psychiatric/Behavioral:  The patient is nervous/anxious.           OBJECTIVE:   Vital Signs:  Vitals:    08/30/24 1007   BP: (!) 152/108   Pulse: 99   Resp: 18   SpO2: 98%   Weight: 89.1 kg (196 lb 6.9 oz)   Height: 5' 7" (1.702 m)       Physical Exam  Constitutional:       General: She is not in acute distress.     Appearance: She is not ill-appearing or toxic-appearing.   HENT:      Head: Normocephalic and atraumatic.      Mouth/Throat:      Mouth: Mucous membranes are moist.      Pharynx: Uvula midline. No pharyngeal swelling.   Cardiovascular:      Rate and Rhythm: Normal rate and regular rhythm.      Heart sounds: Murmur heard.   Pulmonary:      Effort: Pulmonary effort is normal. No tachypnea, bradypnea, accessory muscle usage, prolonged expiration or respiratory " distress.      Breath sounds: Normal breath sounds. No stridor. No wheezing, rhonchi or rales.   Chest:      Comments: Chest pain not reproducible with palpation   Musculoskeletal:      Thoracic back: No swelling, edema, deformity, signs of trauma or lacerations.   Neurological:      General: No focal deficit present.      Mental Status: She is alert.   Psychiatric:         Mood and Affect: Mood normal.         Behavior: Behavior normal.         ASSESSMENT & PLAN:     Anterior chest wall pain  -     Comprehensive Metabolic Panel; Future; Expected date: 08/30/2024  -     IN OFFICE EKG 12-LEAD (to Muse)  -     TROPONIN I; Future; Expected date: 08/30/2024  -     D-DIMER, QUANTITATIVE; Future; Expected date: 08/30/2024  -     X-Ray Chest PA And Lateral; Future; Expected date: 08/30/2024  Intermittent chest wall pain since a few weeks ago  It starts in the upper back and sometimes radiate to the chest around her sternum  EKG shows possible 1st degree AV block   Will also obtain lab work to rule out possible causes of chest pain  Cardiac causes: pericarditis, stable angina, heart failure exacerbation.  Pulmonary causes: PNA, PTX, pleural effusion, asthma/COPD exacerbation   GI causes: GERD, gastric ulcer, pancreatic disease, gastritis   Advised the patient to be seen by a medical provider even in the emergency room setting if she were having worsening chest pain or dyspnea or diaphoresis. Patient verbalized understanding.     Uncontrolled type 2 diabetes mellitus with hyperglycemia  -     LIPASE; Future; Expected date: 08/30/2024  -     rosuvastatin (CRESTOR) 40 MG Tab; Take 1 tablet (40 mg total) by mouth once daily.  Dispense: 90 tablet; Refill: 3  -     Ambulatory referral/consult to Endocrinology; Future; Expected date: 09/06/2024  -     semaglutide (OZEMPIC) 0.25 mg or 0.5 mg (2 mg/3 mL) pen injector; Inject 0.5 mg into the skin every 7 days.  Dispense: 3 mL; Refill: 5  -     pioglitazone (ACTOS) 15 MG tablet; Take  "1 tablet (15 mg total) by mouth once daily.  Dispense: 90 tablet; Refill: 3  Emphasized the importance of medication compliance   Denies any history of heart failure, will add actos  Will also order Ozempic as based on my research, Ozempic is a cheaper option compared to Trulicity   Patient states that she cannot tolerate metformin as it "tears up" her stomach   Endocrinology referral also placed for her     Calcification of aorta  -     rosuvastatin (CRESTOR) 40 MG Tab; Take 1 tablet (40 mg total) by mouth once daily.  Dispense: 90 tablet; Refill: 3  The 10-year ASCVD risk score (Veronica ORTIZ, et al., 2019) is: 39.2%    Values used to calculate the score:      Age: 74 years      Sex: Female      Is Non- : No      Diabetic: Yes      Tobacco smoker: No      Systolic Blood Pressure: 152 mmHg      Is BP treated: Yes      HDL Cholesterol: 39 mg/dL      Total Cholesterol: 143 mg/dL    Hypertension associated with diabetes  -     CBC Auto Differential; Future; Expected date: 08/30/2024  -     Comprehensive Metabolic Panel; Future; Expected date: 08/30/2024  BP uncontrolled  152/108 today   Patient has been out with her med in 3-4 weeks  Resuming lisinopril and metoprolol    Depression, recurrent  -     buPROPion (WELLBUTRIN XL) 300 MG 24 hr tablet; Take 1 tablet (300 mg total) by mouth once daily.  Dispense: 90 tablet; Refill: 3    Stage 3b chronic kidney disease    Hyperlipidemia associated with type 2 diabetes mellitus  -     rosuvastatin (CRESTOR) 40 MG Tab; Take 1 tablet (40 mg total) by mouth once daily.  Dispense: 90 tablet; Refill: 3    Heart murmur  -     Echo; Future  Patient has heart murmur and was instructed to do echocardiogram at the last visit which she has not done  Order placed for the patient again     Follow up with PA in 1 month and with me in 4 months      Shawna Costello MD  Family Medicine  [facility]     This note was created using MModal voice recognition " software that occasionally misinterprets phrases or words    Answers submitted by the patient for this visit:  Diabetes Questionnaire (Submitted on 8/24/2024)  Chief Complaint: Diabetes problem  Diabetes type: type 2  MedicAlert ID: No  foot paresthesias: Yes  foot ulcerations: No  visual change: Yes  weight loss: No  Symptom course: worsening  hunger: No  mood changes: Yes  sleepiness: No  sweats: No  blackouts: No  hospitalization: No  CVA: No  CAD risks: dyslipidemia, family history, obesity, sedentary lifestyle  Current treatments: diet, insulin injections  Treatment compliance: most of the time  Dose schedule: pre-breakfast  Given by: patient  Injection sites: abdominal wall  Home blood tests: 1-2 x per day  Monitoring compliance: good  Blood glucose trend: increasing steadily  Weight trend: fluctuating dramatically  Current diet: diabetic  Meal planning: avoidance of concentrated sweets  Exercise: intermittently  Dietitian visit: No  Eye exam current: Yes  Sees podiatrist: Yes

## 2024-08-30 ENCOUNTER — HOSPITAL ENCOUNTER (INPATIENT)
Facility: HOSPITAL | Age: 74
LOS: 12 days | Discharge: HOME HEALTH | DRG: 234 | End: 2024-09-12
Attending: STUDENT IN AN ORGANIZED HEALTH CARE EDUCATION/TRAINING PROGRAM | Admitting: FAMILY MEDICINE
Payer: MEDICARE

## 2024-08-30 ENCOUNTER — OFFICE VISIT (OUTPATIENT)
Dept: FAMILY MEDICINE | Facility: CLINIC | Age: 74
End: 2024-08-30
Payer: MEDICARE

## 2024-08-30 ENCOUNTER — TELEPHONE (OUTPATIENT)
Dept: FAMILY MEDICINE | Facility: CLINIC | Age: 74
End: 2024-08-30
Payer: MEDICARE

## 2024-08-30 VITALS
DIASTOLIC BLOOD PRESSURE: 108 MMHG | OXYGEN SATURATION: 98 % | WEIGHT: 196.44 LBS | SYSTOLIC BLOOD PRESSURE: 152 MMHG | RESPIRATION RATE: 18 BRPM | HEART RATE: 99 BPM | HEIGHT: 67 IN | BODY MASS INDEX: 30.83 KG/M2

## 2024-08-30 DIAGNOSIS — M43.16 ANTEROLISTHESIS OF LUMBAR SPINE: ICD-10-CM

## 2024-08-30 DIAGNOSIS — I51.7 LVH (LEFT VENTRICULAR HYPERTROPHY): ICD-10-CM

## 2024-08-30 DIAGNOSIS — E11.21 MACROALBUMINURIC DIABETIC NEPHROPATHY: ICD-10-CM

## 2024-08-30 DIAGNOSIS — I70.0 CALCIFICATION OF AORTA: ICD-10-CM

## 2024-08-30 DIAGNOSIS — R07.9 CHEST PAIN: ICD-10-CM

## 2024-08-30 DIAGNOSIS — R26.89 BALANCE PROBLEM: ICD-10-CM

## 2024-08-30 DIAGNOSIS — G25.0 BENIGN FAMILIAL TREMOR: ICD-10-CM

## 2024-08-30 DIAGNOSIS — R41.840 COVID-19 LONG HAULER MANIFESTING CHRONIC CONCENTRATION DEFICIT: ICD-10-CM

## 2024-08-30 DIAGNOSIS — E11.29 TYPE 2 DIABETES MELLITUS WITH MICROALBUMINURIA, WITH LONG-TERM CURRENT USE OF INSULIN: ICD-10-CM

## 2024-08-30 DIAGNOSIS — N18.32 STAGE 3B CHRONIC KIDNEY DISEASE: ICD-10-CM

## 2024-08-30 DIAGNOSIS — N18.31 STAGE 3A CHRONIC KIDNEY DISEASE: ICD-10-CM

## 2024-08-30 DIAGNOSIS — I15.2 HYPERTENSION ASSOCIATED WITH DIABETES: ICD-10-CM

## 2024-08-30 DIAGNOSIS — E11.65 UNCONTROLLED TYPE 2 DIABETES MELLITUS WITH HYPERGLYCEMIA: ICD-10-CM

## 2024-08-30 DIAGNOSIS — I25.118 CORONARY ARTERY DISEASE OF NATIVE ARTERY OF NATIVE HEART WITH STABLE ANGINA PECTORIS: ICD-10-CM

## 2024-08-30 DIAGNOSIS — F41.9 ANXIETY: ICD-10-CM

## 2024-08-30 DIAGNOSIS — R80.9 TYPE 2 DIABETES MELLITUS WITH MICROALBUMINURIA, WITH LONG-TERM CURRENT USE OF INSULIN: ICD-10-CM

## 2024-08-30 DIAGNOSIS — Z98.890 S/P LEFT ROTATOR CUFF REPAIR: ICD-10-CM

## 2024-08-30 DIAGNOSIS — G25.0 BENIGN ESSENTIAL TREMOR: ICD-10-CM

## 2024-08-30 DIAGNOSIS — E78.5 HYPERLIPIDEMIA ASSOCIATED WITH TYPE 2 DIABETES MELLITUS: ICD-10-CM

## 2024-08-30 DIAGNOSIS — F41.0 PANIC ATTACKS: ICD-10-CM

## 2024-08-30 DIAGNOSIS — I21.4 NON-ST ELEVATION MYOCARDIAL INFARCTION (NSTEMI): ICD-10-CM

## 2024-08-30 DIAGNOSIS — E79.0 HYPERURICEMIA: ICD-10-CM

## 2024-08-30 DIAGNOSIS — I21.4 NSTEMI (NON-ST ELEVATED MYOCARDIAL INFARCTION): ICD-10-CM

## 2024-08-30 DIAGNOSIS — F33.9 DEPRESSION, RECURRENT: ICD-10-CM

## 2024-08-30 DIAGNOSIS — I21.4 NSTEMI (NON-ST ELEVATION MYOCARDIAL INFARCTION): Primary | ICD-10-CM

## 2024-08-30 DIAGNOSIS — R41.3 MEMORY LOSS: ICD-10-CM

## 2024-08-30 DIAGNOSIS — R01.1 HEART MURMUR: ICD-10-CM

## 2024-08-30 DIAGNOSIS — R91.1 PULMONARY NODULE: ICD-10-CM

## 2024-08-30 DIAGNOSIS — R07.89 ANTERIOR CHEST WALL PAIN: Primary | ICD-10-CM

## 2024-08-30 DIAGNOSIS — R06.02 SOB (SHORTNESS OF BREATH): ICD-10-CM

## 2024-08-30 DIAGNOSIS — U09.9 COVID-19 LONG HAULER MANIFESTING CHRONIC CONCENTRATION DEFICIT: ICD-10-CM

## 2024-08-30 DIAGNOSIS — E11.69 HYPERLIPIDEMIA ASSOCIATED WITH TYPE 2 DIABETES MELLITUS: ICD-10-CM

## 2024-08-30 DIAGNOSIS — I25.10 CAD (CORONARY ARTERY DISEASE): ICD-10-CM

## 2024-08-30 DIAGNOSIS — E11.59 HYPERTENSION ASSOCIATED WITH DIABETES: ICD-10-CM

## 2024-08-30 DIAGNOSIS — Z79.4 TYPE 2 DIABETES MELLITUS WITH MICROALBUMINURIA, WITH LONG-TERM CURRENT USE OF INSULIN: ICD-10-CM

## 2024-08-30 PROBLEM — E11.9 DIABETES: Status: ACTIVE | Noted: 2024-08-30

## 2024-08-30 LAB
ABO + RH BLD: NORMAL
ALBUMIN SERPL BCP-MCNC: 3.8 G/DL (ref 3.5–5.2)
ALP SERPL-CCNC: 62 U/L (ref 55–135)
ALT SERPL W/O P-5'-P-CCNC: 15 U/L (ref 10–44)
ANION GAP SERPL CALC-SCNC: 10 MMOL/L (ref 8–16)
APTT PPP: 28.5 SEC (ref 21–32)
APTT PPP: 47.2 SEC (ref 21–32)
AST SERPL-CCNC: 15 U/L (ref 10–40)
BASOPHILS # BLD AUTO: 0.07 K/UL (ref 0–0.2)
BASOPHILS # BLD AUTO: 0.08 K/UL (ref 0–0.2)
BASOPHILS NFR BLD: 0.8 % (ref 0–1.9)
BASOPHILS NFR BLD: 0.8 % (ref 0–1.9)
BILIRUB SERPL-MCNC: 0.4 MG/DL (ref 0.1–1)
BLD GP AB SCN CELLS X3 SERPL QL: NORMAL
BNP SERPL-MCNC: 62 PG/ML (ref 0–99)
BUN SERPL-MCNC: 23 MG/DL (ref 8–23)
CALCIUM SERPL-MCNC: 9.2 MG/DL (ref 8.7–10.5)
CHLORIDE SERPL-SCNC: 104 MMOL/L (ref 95–110)
CHOLEST SERPL-MCNC: 164 MG/DL (ref 120–199)
CHOLEST/HDLC SERPL: 4.7 {RATIO} (ref 2–5)
CO2 SERPL-SCNC: 23 MMOL/L (ref 23–29)
CREAT SERPL-MCNC: 1.2 MG/DL (ref 0.5–1.4)
CREAT SERPL-MCNC: 1.2 MG/DL (ref 0.5–1.4)
DIFFERENTIAL METHOD BLD: NORMAL
DIFFERENTIAL METHOD BLD: NORMAL
EOSINOPHIL # BLD AUTO: 0.2 K/UL (ref 0–0.5)
EOSINOPHIL # BLD AUTO: 0.3 K/UL (ref 0–0.5)
EOSINOPHIL NFR BLD: 2.1 % (ref 0–8)
EOSINOPHIL NFR BLD: 3.3 % (ref 0–8)
ERYTHROCYTE [DISTWIDTH] IN BLOOD BY AUTOMATED COUNT: 14.1 % (ref 11.5–14.5)
ERYTHROCYTE [DISTWIDTH] IN BLOOD BY AUTOMATED COUNT: 14.1 % (ref 11.5–14.5)
EST. GFR  (NO RACE VARIABLE): 47.8 ML/MIN/1.73 M^2
GLUCOSE SERPL-MCNC: 337 MG/DL (ref 70–110)
HCT VFR BLD AUTO: 39.1 % (ref 37–48.5)
HCT VFR BLD AUTO: 42.6 % (ref 37–48.5)
HDLC SERPL-MCNC: 35 MG/DL (ref 40–75)
HDLC SERPL: 21.3 % (ref 20–50)
HGB BLD-MCNC: 12.5 G/DL (ref 12–16)
HGB BLD-MCNC: 13.9 G/DL (ref 12–16)
IMM GRANULOCYTES # BLD AUTO: 0.02 K/UL (ref 0–0.04)
IMM GRANULOCYTES # BLD AUTO: 0.03 K/UL (ref 0–0.04)
IMM GRANULOCYTES NFR BLD AUTO: 0.2 % (ref 0–0.5)
IMM GRANULOCYTES NFR BLD AUTO: 0.3 % (ref 0–0.5)
INR PPP: 1 (ref 0.8–1.2)
INR PPP: 1 (ref 0.8–1.2)
LDLC SERPL CALC-MCNC: 92.2 MG/DL (ref 63–159)
LYMPHOCYTES # BLD AUTO: 3.4 K/UL (ref 1–4.8)
LYMPHOCYTES # BLD AUTO: 3.5 K/UL (ref 1–4.8)
LYMPHOCYTES NFR BLD: 36.2 % (ref 18–48)
LYMPHOCYTES NFR BLD: 37.2 % (ref 18–48)
MAGNESIUM SERPL-MCNC: 1.6 MG/DL (ref 1.6–2.6)
MCH RBC QN AUTO: 28.3 PG (ref 27–31)
MCH RBC QN AUTO: 29 PG (ref 27–31)
MCHC RBC AUTO-ENTMCNC: 32 G/DL (ref 32–36)
MCHC RBC AUTO-ENTMCNC: 32.6 G/DL (ref 32–36)
MCV RBC AUTO: 89 FL (ref 82–98)
MCV RBC AUTO: 89 FL (ref 82–98)
MONOCYTES # BLD AUTO: 0.7 K/UL (ref 0.3–1)
MONOCYTES # BLD AUTO: 0.7 K/UL (ref 0.3–1)
MONOCYTES NFR BLD: 7.1 % (ref 4–15)
MONOCYTES NFR BLD: 7.2 % (ref 4–15)
NEUTROPHILS # BLD AUTO: 4.8 K/UL (ref 1.8–7.7)
NEUTROPHILS # BLD AUTO: 5 K/UL (ref 1.8–7.7)
NEUTROPHILS NFR BLD: 52.4 % (ref 38–73)
NEUTROPHILS NFR BLD: 52.4 % (ref 38–73)
NONHDLC SERPL-MCNC: 129 MG/DL
NRBC BLD-RTO: 0 /100 WBC
NRBC BLD-RTO: 0 /100 WBC
OHS QRS DURATION: 94 MS
OHS QTC CALCULATION: 427 MS
PLATELET # BLD AUTO: 274 K/UL (ref 150–450)
PLATELET # BLD AUTO: 295 K/UL (ref 150–450)
PMV BLD AUTO: 10.4 FL (ref 9.2–12.9)
PMV BLD AUTO: 10.9 FL (ref 9.2–12.9)
POTASSIUM SERPL-SCNC: 4.1 MMOL/L (ref 3.5–5.1)
PROT SERPL-MCNC: 7 G/DL (ref 6–8.4)
PROTHROMBIN TIME: 10.9 SEC (ref 9–12.5)
PROTHROMBIN TIME: 10.9 SEC (ref 9–12.5)
RBC # BLD AUTO: 4.41 M/UL (ref 4–5.4)
RBC # BLD AUTO: 4.79 M/UL (ref 4–5.4)
SAMPLE: NORMAL
SODIUM SERPL-SCNC: 137 MMOL/L (ref 136–145)
SPECIMEN OUTDATE: NORMAL
TRIGL SERPL-MCNC: 184 MG/DL (ref 30–150)
TROPONIN I SERPL HS-MCNC: 2366.7 PG/ML (ref 0–14.9)
TROPONIN I SERPL HS-MCNC: 908.4 PG/ML (ref 0–14.9)
WBC # BLD AUTO: 9.2 K/UL (ref 3.9–12.7)
WBC # BLD AUTO: 9.58 K/UL (ref 3.9–12.7)

## 2024-08-30 PROCEDURE — 86900 BLOOD TYPING SEROLOGIC ABO: CPT

## 2024-08-30 PROCEDURE — S0017 INJECTION, AMINOCAPROIC ACID: HCPCS

## 2024-08-30 PROCEDURE — 99900035 HC TECH TIME PER 15 MIN (STAT)

## 2024-08-30 PROCEDURE — 99223 1ST HOSP IP/OBS HIGH 75: CPT | Mod: 25,,, | Performed by: INTERNAL MEDICINE

## 2024-08-30 PROCEDURE — 36415 COLL VENOUS BLD VENIPUNCTURE: CPT | Performed by: STUDENT IN AN ORGANIZED HEALTH CARE EDUCATION/TRAINING PROGRAM

## 2024-08-30 PROCEDURE — G0378 HOSPITAL OBSERVATION PER HR: HCPCS

## 2024-08-30 PROCEDURE — 94761 N-INVAS EAR/PLS OXIMETRY MLT: CPT

## 2024-08-30 PROCEDURE — 25500020 PHARM REV CODE 255: Performed by: STUDENT IN AN ORGANIZED HEALTH CARE EDUCATION/TRAINING PROGRAM

## 2024-08-30 PROCEDURE — 25000003 PHARM REV CODE 250

## 2024-08-30 PROCEDURE — 85730 THROMBOPLASTIN TIME PARTIAL: CPT | Mod: 91 | Performed by: STUDENT IN AN ORGANIZED HEALTH CARE EDUCATION/TRAINING PROGRAM

## 2024-08-30 PROCEDURE — 83735 ASSAY OF MAGNESIUM: CPT | Performed by: STUDENT IN AN ORGANIZED HEALTH CARE EDUCATION/TRAINING PROGRAM

## 2024-08-30 PROCEDURE — 36415 COLL VENOUS BLD VENIPUNCTURE: CPT

## 2024-08-30 PROCEDURE — 86901 BLOOD TYPING SEROLOGIC RH(D): CPT

## 2024-08-30 PROCEDURE — 93454 CORONARY ARTERY ANGIO S&I: CPT | Performed by: INTERNAL MEDICINE

## 2024-08-30 PROCEDURE — 94799 UNLISTED PULMONARY SVC/PX: CPT

## 2024-08-30 PROCEDURE — 93454 CORONARY ARTERY ANGIO S&I: CPT | Mod: 26,,, | Performed by: INTERNAL MEDICINE

## 2024-08-30 PROCEDURE — 99291 CRITICAL CARE FIRST HOUR: CPT

## 2024-08-30 PROCEDURE — C1894 INTRO/SHEATH, NON-LASER: HCPCS | Performed by: INTERNAL MEDICINE

## 2024-08-30 PROCEDURE — 25000003 PHARM REV CODE 250: Performed by: STUDENT IN AN ORGANIZED HEALTH CARE EDUCATION/TRAINING PROGRAM

## 2024-08-30 PROCEDURE — 83036 HEMOGLOBIN GLYCOSYLATED A1C: CPT

## 2024-08-30 PROCEDURE — 80053 COMPREHEN METABOLIC PANEL: CPT | Performed by: STUDENT IN AN ORGANIZED HEALTH CARE EDUCATION/TRAINING PROGRAM

## 2024-08-30 PROCEDURE — 99999 PR PBB SHADOW E&M-EST. PATIENT-LVL V: CPT | Mod: PBBFAC,,, | Performed by: STUDENT IN AN ORGANIZED HEALTH CARE EDUCATION/TRAINING PROGRAM

## 2024-08-30 PROCEDURE — 25000242 PHARM REV CODE 250 ALT 637 W/ HCPCS: Performed by: STUDENT IN AN ORGANIZED HEALTH CARE EDUCATION/TRAINING PROGRAM

## 2024-08-30 PROCEDURE — 63600175 PHARM REV CODE 636 W HCPCS

## 2024-08-30 PROCEDURE — P9047 ALBUMIN (HUMAN), 25%, 50ML: HCPCS | Mod: JG

## 2024-08-30 PROCEDURE — 27000221 HC OXYGEN, UP TO 24 HOURS

## 2024-08-30 PROCEDURE — 93010 ELECTROCARDIOGRAM REPORT: CPT | Mod: S$PBB,,, | Performed by: INTERNAL MEDICINE

## 2024-08-30 PROCEDURE — C1760 CLOSURE DEV, VASC: HCPCS | Performed by: INTERNAL MEDICINE

## 2024-08-30 PROCEDURE — 85730 THROMBOPLASTIN TIME PARTIAL: CPT

## 2024-08-30 PROCEDURE — 99152 MOD SED SAME PHYS/QHP 5/>YRS: CPT | Performed by: INTERNAL MEDICINE

## 2024-08-30 PROCEDURE — B211YZZ FLUOROSCOPY OF MULTIPLE CORONARY ARTERIES USING OTHER CONTRAST: ICD-10-PCS | Performed by: INTERNAL MEDICINE

## 2024-08-30 PROCEDURE — 99900031 HC PATIENT EDUCATION (STAT)

## 2024-08-30 PROCEDURE — 93005 ELECTROCARDIOGRAM TRACING: CPT | Mod: PBBFAC,PO | Performed by: INTERNAL MEDICINE

## 2024-08-30 PROCEDURE — 93005 ELECTROCARDIOGRAM TRACING: CPT | Performed by: GENERAL PRACTICE

## 2024-08-30 PROCEDURE — 84484 ASSAY OF TROPONIN QUANT: CPT | Performed by: STUDENT IN AN ORGANIZED HEALTH CARE EDUCATION/TRAINING PROGRAM

## 2024-08-30 PROCEDURE — C1769 GUIDE WIRE: HCPCS | Performed by: INTERNAL MEDICINE

## 2024-08-30 PROCEDURE — 80061 LIPID PANEL: CPT

## 2024-08-30 PROCEDURE — 85610 PROTHROMBIN TIME: CPT

## 2024-08-30 PROCEDURE — 82565 ASSAY OF CREATININE: CPT

## 2024-08-30 PROCEDURE — 63600175 PHARM REV CODE 636 W HCPCS: Performed by: STUDENT IN AN ORGANIZED HEALTH CARE EDUCATION/TRAINING PROGRAM

## 2024-08-30 PROCEDURE — 84484 ASSAY OF TROPONIN QUANT: CPT | Mod: 91

## 2024-08-30 PROCEDURE — 85610 PROTHROMBIN TIME: CPT | Mod: 91 | Performed by: STUDENT IN AN ORGANIZED HEALTH CARE EDUCATION/TRAINING PROGRAM

## 2024-08-30 PROCEDURE — 63600175 PHARM REV CODE 636 W HCPCS: Performed by: INTERNAL MEDICINE

## 2024-08-30 PROCEDURE — 99152 MOD SED SAME PHYS/QHP 5/>YRS: CPT | Mod: ,,, | Performed by: INTERNAL MEDICINE

## 2024-08-30 PROCEDURE — 85025 COMPLETE CBC W/AUTO DIFF WBC: CPT | Performed by: STUDENT IN AN ORGANIZED HEALTH CARE EDUCATION/TRAINING PROGRAM

## 2024-08-30 PROCEDURE — 99153 MOD SED SAME PHYS/QHP EA: CPT | Performed by: INTERNAL MEDICINE

## 2024-08-30 PROCEDURE — 96365 THER/PROPH/DIAG IV INF INIT: CPT

## 2024-08-30 PROCEDURE — 83880 ASSAY OF NATRIURETIC PEPTIDE: CPT | Performed by: STUDENT IN AN ORGANIZED HEALTH CARE EDUCATION/TRAINING PROGRAM

## 2024-08-30 PROCEDURE — 86850 RBC ANTIBODY SCREEN: CPT

## 2024-08-30 PROCEDURE — 85025 COMPLETE CBC W/AUTO DIFF WBC: CPT | Mod: 91

## 2024-08-30 PROCEDURE — 99215 OFFICE O/P EST HI 40 MIN: CPT | Mod: PBBFAC,25,PO | Performed by: STUDENT IN AN ORGANIZED HEALTH CARE EDUCATION/TRAINING PROGRAM

## 2024-08-30 PROCEDURE — 93010 ELECTROCARDIOGRAM REPORT: CPT | Mod: ,,, | Performed by: GENERAL PRACTICE

## 2024-08-30 PROCEDURE — 25500020 PHARM REV CODE 255: Performed by: INTERNAL MEDICINE

## 2024-08-30 PROCEDURE — 25000003 PHARM REV CODE 250: Performed by: INTERNAL MEDICINE

## 2024-08-30 DEVICE — ANGIO-SEAL VIP VASCULAR CLOSURE DEVICE
Type: IMPLANTABLE DEVICE | Site: CORONARY | Status: FUNCTIONAL
Brand: ANGIO-SEAL

## 2024-08-30 RX ORDER — SODIUM,POTASSIUM PHOSPHATES 280-250MG
2 POWDER IN PACKET (EA) ORAL
Status: DISCONTINUED | OUTPATIENT
Start: 2024-08-30 | End: 2024-09-06

## 2024-08-30 RX ORDER — ROSUVASTATIN CALCIUM 40 MG/1
40 TABLET, COATED ORAL DAILY
Qty: 90 TABLET | Refills: 3 | Status: ON HOLD | OUTPATIENT
Start: 2024-08-30 | End: 2025-08-30

## 2024-08-30 RX ORDER — LISINOPRIL 20 MG/1
40 TABLET ORAL DAILY
Status: DISCONTINUED | OUTPATIENT
Start: 2024-08-31 | End: 2024-09-01

## 2024-08-30 RX ORDER — HEPARIN SODIUM,PORCINE/D5W 25000/250
0-40 INTRAVENOUS SOLUTION INTRAVENOUS CONTINUOUS
Status: DISCONTINUED | OUTPATIENT
Start: 2024-08-30 | End: 2024-09-06

## 2024-08-30 RX ORDER — LISINOPRIL 40 MG/1
40 TABLET ORAL DAILY
Qty: 90 TABLET | Refills: 3 | Status: ON HOLD | OUTPATIENT
Start: 2024-08-30

## 2024-08-30 RX ORDER — HEPARIN SODIUM,PORCINE/D5W 25000/250
0-40 INTRAVENOUS SOLUTION INTRAVENOUS CONTINUOUS
Status: DISCONTINUED | OUTPATIENT
Start: 2024-08-30 | End: 2024-08-30

## 2024-08-30 RX ORDER — AMOXICILLIN 250 MG
1 CAPSULE ORAL DAILY PRN
Status: DISCONTINUED | OUTPATIENT
Start: 2024-08-30 | End: 2024-09-06

## 2024-08-30 RX ORDER — FENTANYL CITRATE 50 UG/ML
INJECTION, SOLUTION INTRAMUSCULAR; INTRAVENOUS
Status: DISCONTINUED | OUTPATIENT
Start: 2024-08-30 | End: 2024-08-30 | Stop reason: HOSPADM

## 2024-08-30 RX ORDER — METOPROLOL SUCCINATE 50 MG/1
50 TABLET, EXTENDED RELEASE ORAL DAILY
Status: DISCONTINUED | OUTPATIENT
Start: 2024-08-31 | End: 2024-09-12 | Stop reason: HOSPADM

## 2024-08-30 RX ORDER — NITROGLYCERIN 0.4 MG/1
0.4 TABLET SUBLINGUAL EVERY 5 MIN PRN
Status: DISCONTINUED | OUTPATIENT
Start: 2024-08-30 | End: 2024-09-06

## 2024-08-30 RX ORDER — BUPROPION HYDROCHLORIDE 300 MG/1
300 TABLET ORAL DAILY
Qty: 90 TABLET | Refills: 3 | Status: ON HOLD | OUTPATIENT
Start: 2024-08-30

## 2024-08-30 RX ORDER — NITROGLYCERIN 0.4 MG/1
0.4 TABLET SUBLINGUAL EVERY 5 MIN PRN
Status: COMPLETED | OUTPATIENT
Start: 2024-08-30 | End: 2024-08-30

## 2024-08-30 RX ORDER — ONDANSETRON HYDROCHLORIDE 2 MG/ML
4 INJECTION, SOLUTION INTRAVENOUS EVERY 6 HOURS PRN
Status: DISCONTINUED | OUTPATIENT
Start: 2024-08-30 | End: 2024-09-06

## 2024-08-30 RX ORDER — ONDANSETRON HYDROCHLORIDE 2 MG/ML
INJECTION, SOLUTION INTRAVENOUS
Status: DISCONTINUED | OUTPATIENT
Start: 2024-08-30 | End: 2024-08-30 | Stop reason: HOSPADM

## 2024-08-30 RX ORDER — INSULIN ASPART 100 [IU]/ML
0-5 INJECTION, SOLUTION INTRAVENOUS; SUBCUTANEOUS EVERY 6 HOURS PRN
Status: DISCONTINUED | OUTPATIENT
Start: 2024-08-30 | End: 2024-08-31

## 2024-08-30 RX ORDER — METOPROLOL SUCCINATE 50 MG/1
50 TABLET, EXTENDED RELEASE ORAL DAILY
Qty: 90 TABLET | Refills: 3 | Status: ON HOLD | OUTPATIENT
Start: 2024-08-30

## 2024-08-30 RX ORDER — LANOLIN ALCOHOL/MO/W.PET/CERES
800 CREAM (GRAM) TOPICAL
Status: DISCONTINUED | OUTPATIENT
Start: 2024-08-30 | End: 2024-09-06

## 2024-08-30 RX ORDER — NITROGLYCERIN 20 MG/100ML
0-400 INJECTION INTRAVENOUS CONTINUOUS
Status: DISCONTINUED | OUTPATIENT
Start: 2024-08-30 | End: 2024-09-04

## 2024-08-30 RX ORDER — GLUCAGON 1 MG
1 KIT INJECTION
Status: DISCONTINUED | OUTPATIENT
Start: 2024-08-30 | End: 2024-09-06

## 2024-08-30 RX ORDER — IODIXANOL 320 MG/ML
INJECTION, SOLUTION INTRAVASCULAR
Status: DISCONTINUED | OUTPATIENT
Start: 2024-08-30 | End: 2024-08-30 | Stop reason: HOSPADM

## 2024-08-30 RX ORDER — NAPROXEN SODIUM 220 MG/1
81 TABLET, FILM COATED ORAL DAILY
Status: DISCONTINUED | OUTPATIENT
Start: 2024-08-31 | End: 2024-09-12 | Stop reason: HOSPADM

## 2024-08-30 RX ORDER — NITROGLYCERIN 20 MG/100ML
INJECTION INTRAVENOUS
Status: DISCONTINUED | OUTPATIENT
Start: 2024-08-30 | End: 2024-09-06

## 2024-08-30 RX ORDER — ASPIRIN 325 MG
325 TABLET ORAL
Status: COMPLETED | OUTPATIENT
Start: 2024-08-30 | End: 2024-08-30

## 2024-08-30 RX ORDER — LIDOCAINE HYDROCHLORIDE 10 MG/ML
INJECTION, SOLUTION EPIDURAL; INFILTRATION; INTRACAUDAL; PERINEURAL
Status: DISCONTINUED | OUTPATIENT
Start: 2024-08-30 | End: 2024-08-30 | Stop reason: HOSPADM

## 2024-08-30 RX ORDER — ATORVASTATIN CALCIUM 40 MG/1
80 TABLET, FILM COATED ORAL DAILY
Status: DISCONTINUED | OUTPATIENT
Start: 2024-08-31 | End: 2024-09-12 | Stop reason: HOSPADM

## 2024-08-30 RX ORDER — PIOGLITAZONEHYDROCHLORIDE 15 MG/1
15 TABLET ORAL DAILY
Qty: 90 TABLET | Refills: 3 | Status: ON HOLD | OUTPATIENT
Start: 2024-08-30 | End: 2025-08-30

## 2024-08-30 RX ORDER — SODIUM CHLORIDE 9 MG/ML
INJECTION, SOLUTION INTRAVENOUS CONTINUOUS
Status: ACTIVE | OUTPATIENT
Start: 2024-08-30 | End: 2024-08-30

## 2024-08-30 RX ORDER — ACETAMINOPHEN 325 MG/1
650 TABLET ORAL EVERY 6 HOURS PRN
Status: DISCONTINUED | OUTPATIENT
Start: 2024-08-30 | End: 2024-09-12 | Stop reason: HOSPADM

## 2024-08-30 RX ORDER — MIDAZOLAM HYDROCHLORIDE 1 MG/ML
INJECTION INTRAMUSCULAR; INTRAVENOUS
Status: DISCONTINUED | OUTPATIENT
Start: 2024-08-30 | End: 2024-08-30 | Stop reason: HOSPADM

## 2024-08-30 RX ORDER — SEMAGLUTIDE 0.68 MG/ML
0.5 INJECTION, SOLUTION SUBCUTANEOUS
Qty: 3 ML | Refills: 5 | Status: ON HOLD | OUTPATIENT
Start: 2024-08-30

## 2024-08-30 RX ADMIN — NITROGLYCERIN 0.4 MG: 0.4 TABLET SUBLINGUAL at 03:08

## 2024-08-30 RX ADMIN — IOHEXOL 100 ML: 350 INJECTION, SOLUTION INTRAVENOUS at 03:08

## 2024-08-30 RX ADMIN — ASPIRIN 325 MG ORAL TABLET 325 MG: 325 PILL ORAL at 03:08

## 2024-08-30 RX ADMIN — HEPARIN SODIUM 12 UNITS/KG/HR: 10000 INJECTION, SOLUTION INTRAVENOUS at 04:08

## 2024-08-30 RX ADMIN — HEPARIN SODIUM 12 UNITS/KG/HR: 10000 INJECTION, SOLUTION INTRAVENOUS at 10:08

## 2024-08-30 RX ADMIN — NITROGLYCERIN 1 INCH: 20 OINTMENT TOPICAL at 04:08

## 2024-08-30 RX ADMIN — ACETAMINOPHEN 650 MG: 325 TABLET ORAL at 08:08

## 2024-08-30 NOTE — CONSULTS
[facility] - Emergency Dept  Department of Cardiology  Consult Note      PATIENT NAME: Debby Brown    MRN: [id number]  TODAY'S DATE: 08/30/2024  ADMIT DATE: 8/30/2024                          CONSULT REQUESTED BY: David Lomeli,*    SUBJECTIVE     PRINCIPAL PROBLEM: Chest pain     HPI:  74 year old female with a medical history significant for DM Type II, HTN, hyperlipidemia, and remote tobacco abuse presenting with 10/10 chest pain which started today while at rest. The patient notes that over the past several months, she has been experiencing intermittent chest pain with exertion. Today the chest pain occurred while she was resting on the couch. The pain radiates to her back and upper extremity.  She notes that she had some improvement initially with nitroglycerin; however, the pain is now starting to increase again. In the ED, she was treated with ASA 325mg PO x 1 and started on a heparin gtt.  Cardiology team consulted for evaluation and management of NSTEMI.       REASON FOR CONSULT:  From Hospitalist H&P:  NSTEMI      Review of patient's allergies indicates:   Allergen Reactions    Bactrim [sulfamethoxazole-trimethoprim]     Codeine     Levaquin [levofloxacin]     Pcn [penicillins]        Past Medical History:   Diagnosis Date    Allergy     Anxiety     Depression     Diabetes mellitus, type 2     Hyperlipidemia     Hypertension     Personal history of colonic polyps     Pre-diabetes      Past Surgical History:   Procedure Laterality Date    APPENDECTOMY      BACK SURGERY      BREAST SURGERY      CHOLECYSTECTOMY      COLONOSCOPY      COLONOSCOPY N/A 03/13/2024    Procedure: COLONOSCOPY;  Surgeon: Isabel Noriega MD;  Location: [facility];  Service: Endoscopy;  Laterality: N/A;  ppm    GALLBLADDER SURGERY      HERNIA REPAIR      umbilical    lymphnode remove      SHOULDER SURGERY Left     RTR    TOTAL REDUCTION MAMMOPLASTY Bilateral 2001    TUBAL LIGATION       Social History      Tobacco Use    Smoking status: Former     Passive exposure: Past    Smokeless tobacco: Never   Substance Use Topics    Alcohol use: Yes     Comment: occasionally    Drug use: No        REVIEW OF SYSTEMS  Per HPI    OBJECTIVE     VITAL SIGNS (Most Recent)  Temp: 98 °F (36.7 °C) (08/30/24 1349)  Pulse: 103 (08/30/24 1600)  Resp: 17 (08/30/24 1600)  BP: (!) 153/93 (08/30/24 1600)  SpO2: 96 % (08/30/24 1600)    VENTILATION STATUS  Resp: 17 (08/30/24 1600)  SpO2: 96 % (08/30/24 1600)           I & O (Last 24H):No intake or output data in the 24 hours ending 08/30/24 1629    WEIGHTS  Wt Readings from Last 1 Encounters:   08/30/24 1349 86.2 kg (190 lb)       PHYSICAL EXAM  CONSTITUTIONAL: No fever, no chills  HEENT: Normocephalic, atraumatic,pupils reactive to light                 NECK:  No JVD no carotid bruit  CVS: S1S2+, RRR  LUNGS: Clear  ABDOMEN: Soft, NT, BS+  EXTREMITIES: No cyanosis, edema  : No mckeon catheter  NEURO: AAO X 3  PSY: Normal affect      HOME MEDICATIONS:  No current facility-administered medications on file prior to encounter.     Current Outpatient Medications on File Prior to Encounter   Medication Sig Dispense Refill    ALPRAZolam (XANAX) 0.5 MG tablet Take 1 tablet by mouth twice daily as needed for anxiety 30 tablet 0    insulin glargine, TOUJEO, (TOUJEO SOLOSTAR U-300 INSULIN) 300 unit/mL (1.5 mL) InPn pen INJECT 70 UNITS SUBCUTANEOUSLY ONCE DAILY (Patient taking differently: Inject 70 Units into the skin once daily.) 22.5 mL 1    buPROPion (WELLBUTRIN XL) 300 MG 24 hr tablet Take 1 tablet (300 mg total) by mouth once daily. (Patient not taking: Reported on 8/30/2024) 90 tablet 3    dapagliflozin propanediol (FARXIGA) 10 mg tablet Take 1 tablet (10 mg total) by mouth once daily. (Patient not taking: Reported on 8/30/2024) 90 tablet 1    fluconazole (DIFLUCAN) 200 MG Tab Take 1 tablet (200 mg total) by mouth once a week. for 28 doses (Patient not taking: Reported on 8/30/2024) 28 tablet 0  "   lisinopriL (PRINIVIL,ZESTRIL) 40 MG tablet Take 1 tablet (40 mg total) by mouth once daily. (Patient not taking: Reported on 8/30/2024) 90 tablet 3    metoprolol succinate (TOPROL-XL) 50 MG 24 hr tablet Take 1 tablet (50 mg total) by mouth once daily. (Patient not taking: Reported on 8/30/2024) 90 tablet 3    pen needle, diabetic (BD JERI 2ND GEN PEN NEEDLE) 32 gauge x 5/32" Ndle USE AS DIRECTED 100 each 5    pioglitazone (ACTOS) 15 MG tablet Take 1 tablet (15 mg total) by mouth once daily. (Patient not taking: Reported on 8/30/2024) 90 tablet 3    rosuvastatin (CRESTOR) 40 MG Tab Take 1 tablet (40 mg total) by mouth once daily. (Patient not taking: Reported on 8/30/2024) 90 tablet 3    semaglutide (OZEMPIC) 0.25 mg or 0.5 mg (2 mg/3 mL) pen injector Inject 0.5 mg into the skin every 7 days. (Patient not taking: Reported on 8/30/2024) 3 mL 5    TRUE METRIX GLUCOSE METER Misc       TRUE METRIX GLUCOSE TEST STRIP Strp U  each 5    TRUEPLUS LANCETS 30 gauge Misc U  each 5    [DISCONTINUED] buPROPion (WELLBUTRIN XL) 300 MG 24 hr tablet Take 1 tablet by mouth once daily (Patient not taking: Reported on 8/30/2024) 90 tablet 3    [DISCONTINUED] dulaglutide (TRULICITY) 0.75 mg/0.5 mL pen injector Inject 0.75 mg into the skin every 7 days. (Patient not taking: Reported on 8/14/2024) 2 pen 3    [DISCONTINUED] ketoconazole (NIZORAL) 2 % shampoo Apply topically 3 (three) times a week. 120 mL 5    [DISCONTINUED] latanoprost 0.005 % ophthalmic solution Place 1 drop into both eyes every evening.      [DISCONTINUED] lisinopriL (PRINIVIL,ZESTRIL) 40 MG tablet Take 1 tablet by mouth once daily (Patient not taking: Reported on 8/30/2024) 90 tablet 3    [DISCONTINUED] meclizine (ANTIVERT) 12.5 mg tablet Take 1 tablet (12.5 mg total) by mouth 3 (three) times daily as needed for Dizziness. 15 tablet 0    [DISCONTINUED] metoprolol succinate (TOPROL-XL) 50 MG 24 hr tablet Take 1 tablet by mouth once daily (Patient not " "taking: Reported on 8/30/2024) 90 tablet 3    [DISCONTINUED] rosuvastatin (CRESTOR) 10 MG tablet Take 1 tablet (10 mg total) by mouth once daily. (Patient not taking: Reported on 8/30/2024) 90 tablet 3       SCHEDULED MEDS:    CONTINUOUS INFUSIONS:   heparin (porcine) in D5W  0-40 Units/kg/hr (Adjusted) Intravenous Continuous 8.6 mL/hr at 08/30/24 1625 12 Units/kg/hr at 08/30/24 1625       PRN MEDS:  Current Facility-Administered Medications:     heparin (PORCINE), 56 Units/kg (Adjusted), Intravenous, PRN    heparin (PORCINE), 30 Units/kg (Adjusted), Intravenous, PRN    LABS AND DIAGNOSTICS     CBC LAST 3 DAYS  Recent Labs   Lab 08/30/24  1420   WBC 9.58   RBC 4.79   HGB 13.9   HCT 42.6   MCV 89   MCH 29.0   MCHC 32.6   RDW 14.1      MPV 10.4   GRAN 52.4  5.0   LYMPH 36.2  3.5   MONO 7.1  0.7   BASO 0.08   NRBC 0       COAGULATION LAST 3 DAYS  No results for input(s): "LABPT", "INR", "APTT" in the last 168 hours.    CHEMISTRY LAST 3 DAYS  Recent Labs   Lab 08/30/24  1420      K 4.1      CO2 23   ANIONGAP 10   BUN 23   CREATININE 1.2   *   CALCIUM 9.2   MG 1.6   ALBUMIN 3.8   PROT 7.0   ALKPHOS 62   ALT 15   AST 15   BILITOT 0.4       CARDIAC PROFILE LAST 3 DAYS  Recent Labs   Lab 08/30/24  1420   BNP 62   TROPONINIHS 908.4*       ENDOCRINE LAST 3 DAYS  No results for input(s): "TSH", "PROCAL" in the last 168 hours.    LAST ARTERIAL BLOOD GAS  ABG  No results for input(s): "PH", "PO2", "PCO2", "HCO3", "BE" in the last 168 hours.    LAST 7 DAYS MICROBIOLOGY   Microbiology Results (last 7 days)       ** No results found for the last 168 hours. **            MOST RECENT IMAGING  CTA Chest Abdomen Non Coronary  Narrative: CMS MANDATED QUALITY DATA - CT RADIATION - 436    All CT scans at this facility utilize dose modulation, iterative reconstruction, and/or weight based dosing when appropriate to reduce radiation dose to as low as reasonably achievable.    CLINICAL HISTORY:  ([id number] " y/o  (1950) F    Aortic dissection suspected;    TECHNIQUE:  (A#[id number], exam time 8/30/2024 15:19)    CTA CHEST ABDOMEN NON CORONARY (XPD) [id number]    Axial CT images of the chest and abdomen were obtained from the thoracic inlet to the proximal thigh.    Additional coronal and sagittal reformatted images are available for review.  3D post processing was performed by the radiologist.    COMPARISON:  Radiograph of the chest from the same day.    FINDINGS:  CT Chest:    Visualized neck: normal    Lungs: There is dependent atelectasis in the bilateral lower lobes.  The lungs are otherwise clear.    Airway: The trachea and central bronchial tree appear normal.    Pleura: There is no pleural effusion. There is no pneumothorax.    Cardiovascular: The heart is top-normal in size with scattered coronary arterial calcifications most notably in the proximal LAD and mid RCA distribution.  There are calcifications along the aortic and mitral valve and along the undersurface of the aortic arch.    Mediastinum: There is no supraclavicular  axillary  mediastinal  or hilar lymphadenopathy.    Soft tissues: The peripheral soft tissues appear normal.    Musculoskeletal: There are moderate degenerative changes of the thoracic spine.  There are no suspicious osseous lesions.    Esophagus: normal    CTA aorta:    The aorta is normal in course and caliber with no aneurysm, dissection or penetrating ulcer identified.    Celiac axis: Patent without stenosis.    SMA: Patent.    Right Renal artery: Small calcified plaque at the origin causing less than 20% stenosis.  There is an incidental accessory right renal artery which arises slightly more anterior and inferior to the main renal artery appearing to supply the inferior 3rd of the right kidney.    Left Renal artery: Patent.    Common iliacs: Small calcified pleural plaques are seen in the right common iliac causing less than 20% stenosis.    External iliac arteries: The visualized  external iliacs are patent.    Internal iliacs: Calcified plaques are seen in the internal iliacs bilaterally causing less than 30% stenosis on the right and less than 10% stenosis on the left.    CT Abdomen:    Liver: The liver is normal in size and imaging appearance.    Gallbladder: The gallbladder is surgically absent.    Biliary Tree: No intra or extrahepatic ductal dilation.    Spleen: Normal.    Pancreas: The pancreas is normal.    Adrenal Glands: Normal    Kidneys: The kidneys are normal in imaging appearance without hydronephrosis or hydroureter.    Lymph nodes: No abdominal lymphadenopathy is seen.    Intraperitoneal structures: There is no ascites.    Bowel: Moderate sigmoid diverticulosis and descending colonic diverticulosis without focal diverticulitis.    Abdominal wall: Supraumbilical ventral abdominal wall hernia containing omentum/fat.  There is a moderate size fat containing umbilical hernia as well.    Musculoskeletal: Moderate to severe degenerative disc height loss and facet arthropathy is seen throughout the lumbar spine.  There is an age-indeterminate anterior superior endplate compression fracture deformity a L2, with approximately 25% height loss of the anterior common no retropulsion.  There is a similar anterior superior endplate compression fracture deformity at L1, with 20% height loss of the anterior column, with no retropulsion.  This appears to have been present on studies dating back to 09/26/2023.  There is grade 1 anterolisthesis of L4 on L5 (3-4 mm).  Impression: 1.  Mild cardiomegaly with scattered coronary artery calcifications (predominantly in the LAD and circumflex distribution).    2.  Calcified plaques in the thoracoabdominal aorta and iliacs with no aneurysm as above.    3.  Chronic anterior superior endplate compression fracture deformities at L1 and L2, similar to the previous exam.    4.  Numerous additional, and incidental findings as noted above.    Electronically  signed by: Felipe Walden  Date:    08/30/2024  Time:    15:28  X-Ray Chest AP Portable  Narrative: EXAMINATION:  XR CHEST AP PORTABLE    CLINICAL HISTORY:  Chest Pain;    FINDINGS:  Portable chest radiograph at 14:17 hours compared to prior exams shows the cardiomediastinal silhouette and pulmonary vasculature are within normal limits.    The lungs are normally expanded, with no consolidation, large pleural effusion, or evidence of pulmonary edema. No pneumothorax. There are no significant osseous abnormalities.  Impression: No evidence of active cardiopulmonary disease.    Electronically signed by: Ubaldo Dumont  Date:    08/30/2024  Time:    14:33      ECHOCARDIOGRAM RESULTS (last 5)  Results for orders placed during the hospital encounter of 09/06/22    Echo    Interpretation Summary  · The left ventricle is normal in size with concentric remodeling and normal systolic function.  · The estimated ejection fraction is 65%.  · Normal left ventricular diastolic function.  · Mild mitral regurgitation.  · Mild left atrial enlargement.  · Normal right ventricular size with normal right ventricular systolic function.  · Normal central venous pressure (3 mmHg).  · Trivial pericardial effusion.  · Aortic valve calcification. Mean gradient 9 mmhg      Results for orders placed in visit on 10/12/20    Echo Color Flow Doppler? Yes    Interpretation Summary  · There is mild left ventricular concentric hypertrophy.  · The estimated PA systolic pressure is 33 mmHg.  · The estimated ejection fraction is 55%.  · Grade I diastolic dysfunction.  · Normal right ventricular systolic function.  · Mild left atrial enlargement.  · Mild aortic valve stenosis.  · Aortic valve area is 1.57 cm2; peak velocity is 1.99 m/s; mean gradient is 9 mmHg.  · Mild tricuspid regurgitation.  · Normal central venous pressure (3 mmHg).  · Small circumferential pericardial effusion.  · There is no evidence of tamponade.      CURRENT/PREVIOUS VISIT  EKG  Results for orders placed or performed during the hospital encounter of 08/30/24   EKG 12-lead    Collection Time: 08/30/24  1:46 PM   Result Value Ref Range    QRS Duration 90 ms    [facility] QTC Calculation 443 ms    Narrative    Test Reason : R07.9,    Vent. Rate : 110 BPM     Atrial Rate : 110 BPM     P-R Int : 204 ms          QRS Dur : 090 ms      QT Int : 328 ms       P-R-T Axes : 062 -18 083 degrees     QTc Int : 443 ms    Sinus tachycardia  Otherwise normal ECG  When compared with ECG of 30-AUG-2024 10:06,  No significant change was found    Referred By: [email or web address]   SELF           Confirmed By:            ASSESSMENT/PLAN:     Active Hospital Problems    Diagnosis    Diabetes    Stage 3a chronic kidney disease    Aortic valve stenosis    Hyperlipidemia       ASSESSMENT & PLAN:   ACS      RECOMMENDATIONS:  ACS  - Recommend coronary angiogram for further evaluation.   - Dicussed risks, benefits and alternatives - consent signed and in chart   - Rest of plan to follow after coronary angiography.     2. DM Type II  - Per Hospitalist team.     3. HTN  - Blood pressure above goal.   - Will augment antihypertensive regimen    4. Full Code         Ashleigh Chambers MD  Date of Service: 08/30/2024  4:29 PM

## 2024-08-30 NOTE — TELEPHONE ENCOUNTER
I called and spoke to patient to inquire why the xray and labs were not done. She states that she left without them being scheduled at checkout. I apologized and asked if she could come back to the clinic to have it done. She stated she could, but then asked if she should seek medical care for the chest pain she was currently having. Per Dr. Costello, she should go to the ED and not to come here for labs and xray. Expressed this information to the patient and she stated that she will go to [facility] ED.

## 2024-08-30 NOTE — PATIENT INSTRUCTIONS
Please go to the emergency room if you have severe chest pain or difficulty breathing for immediate evaluation and treatment    Echocardiogram number:  [phone]    Please  your medications

## 2024-08-30 NOTE — SEDATION DOCUMENTATION
Pre-sedation Assessment:    1. ASA Score: ASA 2 - Patient with mild systemic disease with no functional limitations  2. Mallampati Class: II (hard and soft palate, upper portion of tonsils anduvula visible)  3. Patient or family history of any reaction to anesthesia or sedation: Yes, No  4. Plan for Sedation: Moderate  5. H&P within 30 days of the procedure and updated within 24 hrs of admission or registration: Yes

## 2024-08-30 NOTE — Clinical Note
The catheter was inserted into the ostium   right coronary artery. An angiography was performed of the right coronary arteries. Multiple views were taken. The angiography was performed via power injection. The injected amount was 6 mL contrast at 3 mL/s. 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       L cranioplasty  - hx TBI 3/2020 s/p L craniectomy, s/p PEG (removed), s/p trach (decannulated)-incisions healing well.  - Cont.  comprehensive rehab program of PT/OT/SLP - 3 hours a day, 5 days a week. P&O as needed   - Off keppra-completed 7 day ppx course,    Pain  - Tylenol PRN  - Avoid sedating medications that may interfere with cognitive recovery    GI / Bowel  --sTAT fleet enema given with successful BM  --changed PRN bisacodyl to 10mg as per pt. request  - senna 2 tab qhs  -  prune juice   - Miralax Daily  - GI ppx: famotidine BID     / Bladder  -continent  -  PVR-low-    Skin / Pressure injury  - Skin assessment on admission performed [  ] :   - Monitor Incisions:    - Turn q2 hours in bed while awake, air mattress  - nursing to monitor skin qShift  - soft heel protectors  - skin barrier cream PRN    Diet/Dysphagia:  - Diet Consistency: Regular  - Aspiration Precautions  - SLP consult for swallow function evaluation and treatment  - Nutrition consult    DVT prophylaxis:   - lovenox  - SCDs    IDT 6/29--  SW: resides with mother, and brother in [facility] 5h/d x 5d/week  OT: Sup. e; Min A groom; Tot A LBD; Mod A UBD;  Max A shower.  Goal Mod I e/g and Min A transf  PT: Mod A transf and Amb. 10ft Left KAFO. no stairs.  Goal Mod I transf and ambulation 150ft with Sup.  Speech: Reg/thins diet; decreased mastication, Tangential, decreased Attention and comprehension, Dysarthric.  Will order Vital Stim. in Therapy  ELOS: 7/20 home      Outpatient Follow-up:  Nely Conroy)  [facility] Neurosurgery  General  [address] [place] [address], [address]  [place] [address]  Phone: [phone]  Fax: [phone]  Follow Up Time: Routine        ---------------   TURNER HERNANDEZ is a 30M with PMH of TBI s/p left frontoparietal craniectomy (3/2020) presented to [facility] on 6/18/21 for scheduled left cranioplasty, with right Hemiparesis, Spasticity, dysmetria, gait and aDL impairment.       L cranioplasty  - hx TBI 3/2020 s/p L craniectomy, s/p PEG (removed), s/p trach (decannulated)-incisions healing well.  - Cont.  comprehensive rehab program of PT/OT/SLP - 3 hours a day, 5 days a week. P&O as needed   - Off keppra-completed 7 day ppx course,  --K-taping for right shoulder ordered in OT    Pain  - Tylenol PRN  - Avoid sedating medications that may interfere with cognitive recovery    GI / Bowel  --sTAT fleet enema given with successful BM  --changed PRN bisacodyl to 10mg as per pt. request  - senna 2 tab qhs  -  prune juice   - Miralax Daily  - GI ppx: famotidine BID     / Bladder  -continent  -  PVR-low-    Skin / Pressure injury  - Skin assessment on admission performed [  ] :   - Monitor Incisions:    - Turn q2 hours in bed while awake, air mattress  - nursing to monitor skin qShift  - soft heel protectors  - skin barrier cream PRN    Diet/Dysphagia:  - Diet Consistency: Regular  - Aspiration Precautions  - SLP consult for swallow function evaluation and treatment  - Nutrition consult    DVT prophylaxis:   - lovenox  - SCDs    IDT 6/29--  SW: resides with mother, and brother in [facility] 5h/d x 5d/week  OT: Sup. e; Min A groom; Tot A LBD; Mod A UBD;  Max A shower.  Goal Mod I e/g and Min A transf  PT: Mod A transf and Amb. 10ft Left KAFO. no stairs.  Goal Mod I transf and ambulation 150ft with Sup.  Speech: Reg/thins diet; decreased mastication, Tangential, decreased Attention and comprehension, Dysarthric.  Will order Vital Stim. in Therapy  ELOS: 7/20 home      Outpatient Follow-up:  Nely Conroy)  [facility] Neurosurgery  General  [address] [place] [address], [address]  [place] [address]  Phone: [phone]  Fax: [phone]  Follow Up Time: Routine        ---------------

## 2024-08-30 NOTE — Clinical Note
24 ml of contrast were injected throughout the case. 5 mL of contrast was the total wasted during the case. 29 mL was the total amount used during the case.

## 2024-08-30 NOTE — SUBJECTIVE & OBJECTIVE
Past Medical History:   Diagnosis Date    Allergy     Anxiety     Depression     Diabetes mellitus, type 2     Hyperlipidemia     Hypertension     Personal history of colonic polyps     Pre-diabetes        Past Surgical History:   Procedure Laterality Date    APPENDECTOMY      BACK SURGERY      BREAST SURGERY      CHOLECYSTECTOMY      COLONOSCOPY      COLONOSCOPY N/A 03/13/2024    Procedure: COLONOSCOPY;  Surgeon: Isabel Noriega MD;  Location: [facility];  Service: Endoscopy;  Laterality: N/A;  ppm    GALLBLADDER SURGERY      HERNIA REPAIR      umbilical    lymphnode remove      SHOULDER SURGERY Left     RTR    TOTAL REDUCTION MAMMOPLASTY Bilateral 2001    TUBAL LIGATION         Review of patient's allergies indicates:   Allergen Reactions    Bactrim [sulfamethoxazole-trimethoprim]     Codeine     Levaquin [levofloxacin]     Pcn [penicillins]        No current facility-administered medications on file prior to encounter.     Current Outpatient Medications on File Prior to Encounter   Medication Sig    ALPRAZolam (XANAX) 0.5 MG tablet Take 1 tablet by mouth twice daily as needed for anxiety    insulin glargine, TOUJEO, (TOUJEO SOLOSTAR U-300 INSULIN) 300 unit/mL (1.5 mL) InPn pen INJECT 70 UNITS SUBCUTANEOUSLY ONCE DAILY (Patient taking differently: Inject 70 Units into the skin once daily.)    buPROPion (WELLBUTRIN XL) 300 MG 24 hr tablet Take 1 tablet (300 mg total) by mouth once daily. (Patient not taking: Reported on 8/30/2024)    dapagliflozin propanediol (FARXIGA) 10 mg tablet Take 1 tablet (10 mg total) by mouth once daily. (Patient not taking: Reported on 8/30/2024)    fluconazole (DIFLUCAN) 200 MG Tab Take 1 tablet (200 mg total) by mouth once a week. for 28 doses (Patient not taking: Reported on 8/30/2024)    lisinopriL (PRINIVIL,ZESTRIL) 40 MG tablet Take 1 tablet (40 mg total) by mouth once daily. (Patient not taking: Reported on 8/30/2024)    metoprolol succinate (TOPROL-XL) 50 MG 24 hr tablet Take 1  "tablet (50 mg total) by mouth once daily. (Patient not taking: Reported on 8/30/2024)    pen needle, diabetic (BD JERI 2ND GEN PEN NEEDLE) 32 gauge x 5/32" Ndle USE AS DIRECTED    pioglitazone (ACTOS) 15 MG tablet Take 1 tablet (15 mg total) by mouth once daily. (Patient not taking: Reported on 8/30/2024)    rosuvastatin (CRESTOR) 40 MG Tab Take 1 tablet (40 mg total) by mouth once daily. (Patient not taking: Reported on 8/30/2024)    semaglutide (OZEMPIC) 0.25 mg or 0.5 mg (2 mg/3 mL) pen injector Inject 0.5 mg into the skin every 7 days. (Patient not taking: Reported on 8/30/2024)    TRUE METRIX GLUCOSE METER Misc     TRUE METRIX GLUCOSE TEST STRIP Strp U QAM    TRUEPLUS LANCETS 30 gauge Misc U QAM    [DISCONTINUED] buPROPion (WELLBUTRIN XL) 300 MG 24 hr tablet Take 1 tablet by mouth once daily (Patient not taking: Reported on 8/30/2024)    [DISCONTINUED] dulaglutide (TRULICITY) 0.75 mg/0.5 mL pen injector Inject 0.75 mg into the skin every 7 days. (Patient not taking: Reported on 8/14/2024)    [DISCONTINUED] ketoconazole (NIZORAL) 2 % shampoo Apply topically 3 (three) times a week.    [DISCONTINUED] latanoprost 0.005 % ophthalmic solution Place 1 drop into both eyes every evening.    [DISCONTINUED] lisinopriL (PRINIVIL,ZESTRIL) 40 MG tablet Take 1 tablet by mouth once daily (Patient not taking: Reported on 8/30/2024)    [DISCONTINUED] meclizine (ANTIVERT) 12.5 mg tablet Take 1 tablet (12.5 mg total) by mouth 3 (three) times daily as needed for Dizziness.    [DISCONTINUED] metoprolol succinate (TOPROL-XL) 50 MG 24 hr tablet Take 1 tablet by mouth once daily (Patient not taking: Reported on 8/30/2024)    [DISCONTINUED] rosuvastatin (CRESTOR) 10 MG tablet Take 1 tablet (10 mg total) by mouth once daily. (Patient not taking: Reported on 8/30/2024)     Family History       Problem Relation (Age of Onset)    Cancer Mother, Brother    Depression Mother    Heart disease Mother, Father    Hypertension Mother, Father    " Mental illness Mother    Stroke Mother, Father          Tobacco Use    Smoking status: Former     Passive exposure: Past    Smokeless tobacco: Never   Substance and Sexual Activity    Alcohol use: Yes     Comment: occasionally    Drug use: No    Sexual activity: Not Currently     Review of Systems   Constitutional:  Negative for chills and fever.   HENT:  Negative for congestion and sore throat.    Respiratory:  Positive for shortness of breath.    Cardiovascular:  Positive for chest pain.   Gastrointestinal:  Positive for nausea. Negative for abdominal pain, constipation, diarrhea and vomiting.   Genitourinary:  Negative for difficulty urinating.   Musculoskeletal:  Positive for back pain.   Neurological:  Negative for syncope.   Psychiatric/Behavioral:  Negative for confusion.      Objective:     Vital Signs (Most Recent):  Temp: 98 °F (36.7 °C) (08/30/24 1349)  Pulse: 103 (08/30/24 1600)  Resp: 17 (08/30/24 1600)  BP: (!) 153/93 (08/30/24 1600)  SpO2: 96 % (08/30/24 1600) Vital Signs (24h Range):  Temp:  [98 °F (36.7 °C)] 98 °F (36.7 °C)  Pulse:  [] 103  Resp:  [13-25] 17  SpO2:  [95 %-98 %] 96 %  BP: (152-217)/() 153/93     Weight: 86.2 kg (190 lb)  Body mass index is 29.76 kg/m².     Physical Exam  Vitals reviewed.   Constitutional:       General: She is not in acute distress.     Interventions: Nasal cannula in place.   HENT:      Head: Normocephalic and atraumatic.      Mouth/Throat:      Mouth: Mucous membranes are moist.   Eyes:      Conjunctiva/sclera: Conjunctivae normal.   Cardiovascular:      Rate and Rhythm: Regular rhythm. Tachycardia present.      Heart sounds: Murmur heard.   Pulmonary:      Effort: Pulmonary effort is normal. No respiratory distress.      Breath sounds: Normal breath sounds.   Abdominal:      General: Bowel sounds are normal.      Palpations: Abdomen is soft.      Tenderness: There is no abdominal tenderness.   Musculoskeletal:         General: Normal range of motion.  "     Cervical back: Normal range of motion.      Right lower leg: No edema.      Left lower leg: No edema.   Skin:     General: Skin is warm and dry.      Capillary Refill: Capillary refill takes less than 2 seconds.   Neurological:      Mental Status: She is alert and oriented to person, place, and time.   Psychiatric:         Mood and Affect: Mood normal.                Significant Labs: All pertinent labs within the past 24 hours have been reviewed.  A1C:   Recent Labs   Lab 04/04/24  0923 07/12/24  1147   HGBA1C 10.5* 10.0*     CBC:   Recent Labs   Lab 08/30/24  1420   WBC 9.58   HGB 13.9   HCT 42.6        CMP:   Recent Labs   Lab 08/30/24  1420      K 4.1      CO2 23   *   BUN 23   CREATININE 1.2   CALCIUM 9.2   PROT 7.0   ALBUMIN 3.8   BILITOT 0.4   ALKPHOS 62   AST 15   ALT 15   ANIONGAP 10     Cardiac Markers:   Recent Labs   Lab 08/30/24  1420   BNP 62     Coagulation:   Recent Labs   Lab 08/30/24  1422   INR 1.0   APTT 28.5     Lipid Panel: No results for input(s): "CHOL", "HDL", "LDLCALC", "TRIG", "CHOLHDL" in the last 48 hours.  Magnesium:   Recent Labs   Lab 08/30/24  1420   MG 1.6     Troponin:   Recent Labs   Lab 08/30/24  1420   TROPONINIHS 908.4*     TSH:   Recent Labs   Lab 07/12/24  1147   TSH 1.605     Urine Studies: No results for input(s): "COLORU", "APPEARANCEUA", "PHUR", "SPECGRAV", "PROTEINUA", "GLUCUA", "KETONESU", "BILIRUBINUA", "OCCULTUA", "NITRITE", "UROBILINOGEN", "LEUKOCYTESUR", "RBCUA", "WBCUA", "BACTERIA", "SQUAMEPITHEL", "HYALINECASTS" in the last 48 hours.    Invalid input(s): "WRIGHTSUR"    Significant Imaging: I have reviewed all pertinent imaging results/findings within the past 24 hours.  "

## 2024-08-30 NOTE — ED PROVIDER NOTES
"Encounter Date: 8/30/2024       History     Chief Complaint   Patient presents with    Chest Pain     Mid-sternal , "on/off for a few weeks", increased with activity. Seen by PCP today.      HPI    Debby Brown is a 74 y.o. female with a past medical history of poorly-controlled hypertension, type 2 diabetes, and hyperlipidemia that presents emergency department for evaluation of back pain that radiated to the front of her chest.  This has been ongoing for 3-4 months, but got significantly worse in the past 3-4 weeks.  Now becoming more frequent.  Pain is described as pressure and is exertional in nature.  Does not radiate to her arm, neck, or jaw.  She does endorse shortness of breath with mild nausea when she has this pain.  The pain improves with rest.  She was seen by her PCP earlier this morning and was not having chest pain at that time.  Following this visit, she went home and began experiencing the chest discomfort.  Rated it as 9 or 10 out of 10.  Presented to the ED and her pain was 3/10.  Denies numbness, weakness, vomiting, diarrhea, constipation, and abdominal pain.    Review of patient's allergies indicates:   Allergen Reactions    Bactrim [sulfamethoxazole-trimethoprim]     Codeine     Levaquin [levofloxacin]     Pcn [penicillins]      Past Medical History:   Diagnosis Date    Allergy     Anxiety     Depression     Diabetes mellitus, type 2     Hyperlipidemia     Hypertension     Personal history of colonic polyps     Pre-diabetes      Past Surgical History:   Procedure Laterality Date    APPENDECTOMY      BACK SURGERY      BREAST SURGERY      CHOLECYSTECTOMY      COLONOSCOPY      COLONOSCOPY N/A 03/13/2024    Procedure: COLONOSCOPY;  Surgeon: Isabel Noriega MD;  Location: [facility];  Service: Endoscopy;  Laterality: N/A;  ppm    GALLBLADDER SURGERY      HERNIA REPAIR      umbilical    lymphnode remove      SHOULDER SURGERY Left     RTR    TOTAL REDUCTION MAMMOPLASTY Bilateral 2001    TUBAL " LIGATION       Family History   Problem Relation Name Age of Onset    Mental illness Mother      Cancer Mother          female cancer?    Depression Mother      Heart disease Mother      Hypertension Mother      Stroke Mother      Heart disease Father      Hypertension Father      Stroke Father      Cancer Brother          bladder    Glaucoma Neg Hx      Macular degeneration Neg Hx       Social History     Tobacco Use    Smoking status: Former     Passive exposure: Past    Smokeless tobacco: Never   Substance Use Topics    Alcohol use: Yes     Comment: occasionally    Drug use: No     Review of Systems   Constitutional:  Negative for chills and fever.   HENT:  Negative for sore throat.    Eyes:  Negative for visual disturbance.   Respiratory:  Positive for shortness of breath.    Cardiovascular:  Positive for chest pain.   Gastrointestinal:  Negative for abdominal pain, constipation, diarrhea, nausea and vomiting.   Genitourinary:  Negative for dysuria, frequency and hematuria.   Musculoskeletal:  Negative for back pain.   Skin:  Negative for rash.   Neurological:  Negative for weakness.   Hematological:  Does not bruise/bleed easily.       Physical Exam     Initial Vitals [08/30/24 1349]   BP Pulse Resp Temp SpO2   (!) 202/117 110 20 98 °F (36.7 °C) 96 %      MAP       --         Physical Exam    Nursing note and vitals reviewed.  Constitutional: She appears well-developed and well-nourished.   HENT:   Head: Normocephalic and atraumatic.   Eyes: EOM are normal. Pupils are equal, round, and reactive to light.   Neck:   Normal range of motion.  Cardiovascular:  Normal rate and regular rhythm.           Murmur heard.  Pulmonary/Chest: Breath sounds normal. No respiratory distress. She has no wheezes. She has no rhonchi. She has no rales.   Abdominal: Abdomen is soft. She exhibits no distension. There is no abdominal tenderness. There is no rebound.   Musculoskeletal:         General: Normal range of motion.       Cervical back: Normal range of motion.     Neurological: She is alert and oriented to person, place, and time. She has normal strength. No cranial nerve deficit or sensory deficit. GCS score is 15. GCS eye subscore is 4. GCS verbal subscore is 5. GCS motor subscore is 6.   Skin: Capillary refill takes less than 2 seconds. No rash noted.   Psychiatric: She has a normal mood and affect. Thought content normal.         ED Course   Critical Care    Date/Time: 8/30/2024 5:18 PM    Performed by: David Lomeli MD  Authorized by: Tushar Diop MD  Direct patient critical care time: 10 minutes  Ordering / reviewing critical care time: 10 minutes  Documentation critical care time: 10 minutes  Consulting other physicians critical care time: 10 minutes  Total critical care time (exclusive of procedural time) : 40 minutes  Critical care was necessary to treat or prevent imminent or life-threatening deterioration of the following conditions: cardiac failure.  Critical care was time spent personally by me on the following activities: re-evaluation of patient's condition, pulse oximetry, ordering and review of radiographic studies, ordering and review of laboratory studies, ordering and performing treatments and interventions, obtaining history from patient or surrogate, examination of patient, evaluation of patient's response to treatment, discussions with consultants, development of treatment plan with patient or surrogate and interpretation of cardiac output measurements.        Labs Reviewed   COMPREHENSIVE METABOLIC PANEL - Abnormal       Result Value    Sodium 137      Potassium 4.1      Chloride 104      CO2 23      Glucose 337 (*)     BUN 23      Creatinine 1.2      Calcium 9.2      Total Protein 7.0      Albumin 3.8      Total Bilirubin 0.4      Alkaline Phosphatase 62      AST 15      ALT 15      eGFR 47.8 (*)     Anion Gap 10     TROPONIN I HIGH SENSITIVITY - Abnormal    Troponin I High Sensitivity 908.4 (*)     MAGNESIUM    Magnesium 1.6     CBC W/ AUTO DIFFERENTIAL    WBC 9.58      RBC 4.79      Hemoglobin 13.9      Hematocrit 42.6      MCV 89      MCH 29.0      MCHC 32.6      RDW 14.1      Platelets 295      MPV 10.4      Immature Granulocytes 0.2      Gran # (ANC) 5.0      Immature Grans (Abs) 0.02      Lymph # 3.5      Mono # 0.7      Eos # 0.3      Baso # 0.08      nRBC 0      Gran % 52.4      Lymph % 36.2      Mono % 7.1      Eosinophil % 3.3      Basophil % 0.8      Differential Method Automated     B-TYPE NATRIURETIC PEPTIDE    BNP 62     APTT   PROTIME-INR   TROPONIN I HIGH SENSITIVITY   ISTAT CREATININE    POC Creatinine 1.2      Sample VENOUS          ECG Results              EKG 12-lead (In process)        Collection Time Result Time QRS Duration [facility] QTC Calculation    08/30/24 13:46:21 08/30/24 14:49:36 90 443                     In process by Interface, Lab In [facility] (08/30/24 14:49:40)                   Narrative:    Test Reason : R07.9,    Vent. Rate : 110 BPM     Atrial Rate : 110 BPM     P-R Int : 204 ms          QRS Dur : 090 ms      QT Int : 328 ms       P-R-T Axes : 062 -18 083 degrees     QTc Int : 443 ms    Sinus tachycardia  Otherwise normal ECG  When compared with ECG of 30-AUG-2024 10:06,  No significant change was found    Referred By: [email or web address]   SELF           Confirmed By:                       In process by Interface, Lab In [facility] (08/30/24 14:49:26)                   Narrative:    Test Reason : R07.9,    Vent. Rate : 110 BPM     Atrial Rate : 110 BPM     P-R Int : 204 ms          QRS Dur : 090 ms      QT Int : 328 ms       P-R-T Axes : 062 -18 083 degrees     QTc Int : 443 ms    Sinus tachycardia  Otherwise normal ECG  When compared with ECG of 30-AUG-2024 10:06,  No significant change was found    Referred By: [email or web address]   SELF           Confirmed By:                                   Imaging Results              CTA Chest Abdomen Non Coronary (Final result)  Result time 08/30/24  15:28:22      Final result by Felipe Walden MD (08/30/24 15:28:22)                   Impression:      1.  Mild cardiomegaly with scattered coronary artery calcifications (predominantly in the LAD and circumflex distribution).    2.  Calcified plaques in the thoracoabdominal aorta and iliacs with no aneurysm as above.    3.  Chronic anterior superior endplate compression fracture deformities at L1 and L2, similar to the previous exam.    4.  Numerous additional, and incidental findings as noted above.      Electronically signed by: Felipe Walden  Date:    08/30/2024  Time:    15:28               Narrative:      CMS MANDATED QUALITY DATA - CT RADIATION - 436    All CT scans at this facility utilize dose modulation, iterative reconstruction, and/or weight based dosing when appropriate to reduce radiation dose to as low as reasonably achievable.    CLINICAL HISTORY:  ([id number])73 y/o  (1950) F    Aortic dissection suspected;    TECHNIQUE:  (A#[id number], exam time 8/30/2024 15:19)    CTA CHEST ABDOMEN NON CORONARY (XPD) [id number]    Axial CT images of the chest and abdomen were obtained from the thoracic inlet to the proximal thigh.    Additional coronal and sagittal reformatted images are available for review.  3D post processing was performed by the radiologist.    COMPARISON:  Radiograph of the chest from the same day.    FINDINGS:  CT Chest:    Visualized neck: normal    Lungs: There is dependent atelectasis in the bilateral lower lobes.  The lungs are otherwise clear.    Airway: The trachea and central bronchial tree appear normal.    Pleura: There is no pleural effusion. There is no pneumothorax.    Cardiovascular: The heart is top-normal in size with scattered coronary arterial calcifications most notably in the proximal LAD and mid RCA distribution.  There are calcifications along the aortic and mitral valve and along the undersurface of the aortic arch.    Mediastinum: There is no supraclavicular   axillary  mediastinal  or hilar lymphadenopathy.    Soft tissues: The peripheral soft tissues appear normal.    Musculoskeletal: There are moderate degenerative changes of the thoracic spine.  There are no suspicious osseous lesions.    Esophagus: normal    CTA aorta:    The aorta is normal in course and caliber with no aneurysm, dissection or penetrating ulcer identified.    Celiac axis: Patent without stenosis.    SMA: Patent.    Right Renal artery: Small calcified plaque at the origin causing less than 20% stenosis.  There is an incidental accessory right renal artery which arises slightly more anterior and inferior to the main renal artery appearing to supply the inferior 3rd of the right kidney.    Left Renal artery: Patent.    Common iliacs: Small calcified pleural plaques are seen in the right common iliac causing less than 20% stenosis.    External iliac arteries: The visualized external iliacs are patent.    Internal iliacs: Calcified plaques are seen in the internal iliacs bilaterally causing less than 30% stenosis on the right and less than 10% stenosis on the left.    CT Abdomen:    Liver: The liver is normal in size and imaging appearance.    Gallbladder: The gallbladder is surgically absent.    Biliary Tree: No intra or extrahepatic ductal dilation.    Spleen: Normal.    Pancreas: The pancreas is normal.    Adrenal Glands: Normal    Kidneys: The kidneys are normal in imaging appearance without hydronephrosis or hydroureter.    Lymph nodes: No abdominal lymphadenopathy is seen.    Intraperitoneal structures: There is no ascites.    Bowel: Moderate sigmoid diverticulosis and descending colonic diverticulosis without focal diverticulitis.    Abdominal wall: Supraumbilical ventral abdominal wall hernia containing omentum/fat.  There is a moderate size fat containing umbilical hernia as well.    Musculoskeletal: Moderate to severe degenerative disc height loss and facet arthropathy is seen throughout the  lumbar spine.  There is an age-indeterminate anterior superior endplate compression fracture deformity a L2, with approximately 25% height loss of the anterior common no retropulsion.  There is a similar anterior superior endplate compression fracture deformity at L1, with 20% height loss of the anterior column, with no retropulsion.  This appears to have been present on studies dating back to 09/26/2023.  There is grade 1 anterolisthesis of L4 on L5 (3-4 mm).                                       X-Ray Chest AP Portable (Final result)  Result time 08/30/24 14:33:40      Final result by Ubaldo Dumont MD (08/30/24 14:33:40)                   Impression:      No evidence of active cardiopulmonary disease.      Electronically signed by: Ubaldo Dumont  Date:    08/30/2024  Time:    14:33               Narrative:    EXAMINATION:  XR CHEST AP PORTABLE    CLINICAL HISTORY:  Chest Pain;    FINDINGS:  Portable chest radiograph at 14:17 hours compared to prior exams shows the cardiomediastinal silhouette and pulmonary vasculature are within normal limits.    The lungs are normally expanded, with no consolidation, large pleural effusion, or evidence of pulmonary edema. No pneumothorax. There are no significant osseous abnormalities.                                       Medications   heparin 25,000 units in dextrose 5% 250 mL (100 units/mL) infusion LOW INTENSITY nomogram - [facility] (12 Units/kg/hr × 71.4 kg (Adjusted) Intravenous Restarted 8/30/24 1744)   heparin 25,000 units in dextrose 5% (100 units/ml) IV bolus from bag LOW INTENSITY nomogram - [facility] (has no administration in time range)   heparin 25,000 units in dextrose 5% (100 units/ml) IV bolus from bag LOW INTENSITY nomogram - [facility] (has no administration in time range)   0.9%  NaCl infusion (has no administration in time range)   aspirin chewable tablet 81 mg (has no administration in time range)   nitroGLYCERIN SL tablet 0.4 mg (has no administration in time range)    atorvastatin tablet 80 mg (has no administration in time range)   acetaminophen tablet 650 mg (has no administration in time range)   nitroGLYCERIN 2% TD oint ointment 1 inch (has no administration in time range)   ondansetron injection 4 mg (has no administration in time range)   senna-docusate 8.6-50 mg per tablet 1 tablet (has no administration in time range)   dextrose 50% injection 12.5 g (has no administration in time range)   glucagon (human recombinant) injection 1 mg (has no administration in time range)   insulin aspart U-100 pen 0-5 Units (has no administration in time range)   lisinopriL tablet 40 mg (has no administration in time range)   metoprolol succinate (TOPROL-XL) 24 hr tablet 50 mg (has no administration in time range)   ondansetron injection (4 mg Intravenous Given 8/30/24 1704)   fentaNYL 50 mcg/mL injection (25 mcg Intravenous Given 8/30/24 1712)   midazolam (VERSED) 1 mg/mL injection (1 mg Intravenous Given 8/30/24 1718)   LIDOcaine (PF) 10 mg/ml (1%) injection (5 mLs Subcutaneous Given 8/30/24 1709)   nitroGLYCERIN in 5 % dextrose 50 mg/250 mL (200 mcg/mL) infusion (10 mcg/min Intravenous New Bag 8/30/24 1737)   iodixanoL (VISIPAQUE 320) injection (24 mLs Intra-arterial Given 8/30/24 1746)   nitroGLYCERIN SL tablet 0.4 mg (0.4 mg Sublingual Given 8/30/24 1527)   aspirin tablet 325 mg (325 mg Oral Given 8/30/24 1501)   iohexoL (OMNIPAQUE 350) injection 100 mL (100 mLs Intravenous Given 8/30/24 1511)   heparin 25,000 units in dextrose 5% (100 units/ml) IV bolus from bag LOW INTENSITY nomogram - [facility] (3,990 Units Intravenous Bolus from Bag 8/30/24 1627)   nitroGLYCERIN 2% TD oint ointment 1 inch (1 inch Topical (Top) Given 8/30/24 1622)     Medical Decision Making  Debby Brown is a 74 y.o. female with a past medical history of poorly-controlled hypertension, type 2 diabetes, and hyperlipidemia that presents emergency department for evaluation of back pain that radiated to the front of her  chest.  Vitals notable for hypertension with a blood pressure of 202/117.  Heart rate of 110.  Exam with systolic murmur heard.  Lungs are clear.  No lower extremity edema.  Normal neuro exam.  Normal pulses.  Presentation consistent with ACS.  There is concern for aortic dissection given the story and vital signs.  Evaluating also for pneumonia, pneumothorax, and musculoskeletal chest pain.    Amount and/or Complexity of Data Reviewed  Labs: ordered.     Details: Troponin elevated to 908.  BNP within normal limits.  CMP and CBC are unremarkable.  Coags unremarkable.  Radiology: ordered.     Details: CT angio negative for aortic dissection.  ECG/medicine tests: ordered and independent interpretation performed.     Details: Normal sinus rhythm with a T-wave inversion in lead aVL which is new.  Repeat EKG from this morning continues to show T-wave inversion.  Discussion of management or test interpretation with external provider(s): Patient with new T-wave inversion and elevated troponin.  Story concerning for ACS.  Received multiple doses of nitro with significant improvement of symptoms.  Discussed with cardiology who evaluated the patient.  Admitted to Hospital Medicine.  Started on heparin and received aspirin.    Risk  OTC drugs.  Prescription drug management.  Decision regarding hospitalization.                                      Clinical Impression:  Final diagnoses:  [R07.9] Chest pain  [I21.4] NSTEMI (non-ST elevated myocardial infarction) (Primary)          ED Disposition Condition    Admit Stable                David Lomeli MD  08/30/24 3164

## 2024-08-30 NOTE — ASSESSMENT & PLAN NOTE
Serial troponins x 3  Echo  EKG PRN  NTG paste  ASA  Heparin gtt  Tele monitoring  Consult cardiology, NPO

## 2024-08-30 NOTE — PHARMACY MED REC
"              .        Admission Medication History     The home medication history was taken by Milka Alberts.    You may go to "Admission" then "Reconcile Home Medications" tabs to review and/or act upon these items.     The home medication list has been updated by the Pharmacy department.   Please read ALL comments highlighted in yellow.   Please address this information as you see fit.    Feel free to contact us if you have any questions or require assistance.      \  Medications listed below were obtained from: Patient/family and Analytic software- [employer]  No current facility-administered medications on file prior to encounter.     Current Outpatient Medications on File Prior to Encounter   Medication Sig Dispense Refill    ALPRAZolam (XANAX) 0.5 MG tablet Take 1 tablet by mouth twice daily as needed for anxiety 30 tablet 0    insulin glargine, TOUJEO, (TOUJEO SOLOSTAR U-300 INSULIN) 300 unit/mL (1.5 mL) InPn pen INJECT 70 UNITS SUBCUTANEOUSLY ONCE DAILY (Patient taking differently: Inject 70 Units into the skin once daily.) 22.5 mL 1    buPROPion (WELLBUTRIN XL) 300 MG 24 hr tablet Take 1 tablet (300 mg total) by mouth once daily. (Patient not taking: Reported on 8/30/2024) 90 tablet 3    dapagliflozin propanediol (FARXIGA) 10 mg tablet Take 1 tablet (10 mg total) by mouth once daily. (Patient not taking: Reported on 8/30/2024) 90 tablet 1    fluconazole (DIFLUCAN) 200 MG Tab Take 1 tablet (200 mg total) by mouth once a week. for 28 doses (Patient not taking: Reported on 8/30/2024) 28 tablet 0    lisinopriL (PRINIVIL,ZESTRIL) 40 MG tablet Take 1 tablet (40 mg total) by mouth once daily. (Patient not taking: Reported on 8/30/2024) 90 tablet 3    metoprolol succinate (TOPROL-XL) 50 MG 24 hr tablet Take 1 tablet (50 mg total) by mouth once daily. (Patient not taking: Reported on 8/30/2024) 90 tablet 3    pen needle, diabetic (BD JERI 2ND GEN PEN NEEDLE) 32 gauge x 5/32" Ndle USE AS DIRECTED 100 each 5    " pioglitazone (ACTOS) 15 MG tablet Take 1 tablet (15 mg total) by mouth once daily. (Patient not taking: Reported on 8/30/2024) 90 tablet 3    rosuvastatin (CRESTOR) 40 MG Tab Take 1 tablet (40 mg total) by mouth once daily. (Patient not taking: Reported on 8/30/2024) 90 tablet 3    semaglutide (OZEMPIC) 0.25 mg or 0.5 mg (2 mg/3 mL) pen injector Inject 0.5 mg into the skin every 7 days. (Patient not taking: Reported on 8/30/2024) 3 mL 5    TRUE METRIX GLUCOSE METER Misc       TRUE METRIX GLUCOSE TEST STRIP Strp U  each 5    TRUEPLUS LANCETS 30 gauge Misc U  each 5    [DISCONTINUED] buPROPion (WELLBUTRIN XL) 300 MG 24 hr tablet Take 1 tablet by mouth once daily (Patient not taking: Reported on 8/30/2024) 90 tablet 3    [DISCONTINUED] dulaglutide (TRULICITY) 0.75 mg/0.5 mL pen injector Inject 0.75 mg into the skin every 7 days. (Patient not taking: Reported on 8/14/2024) 2 pen 3    [DISCONTINUED] ketoconazole (NIZORAL) 2 % shampoo Apply topically 3 (three) times a week. 120 mL 5    [DISCONTINUED] latanoprost 0.005 % ophthalmic solution Place 1 drop into both eyes every evening.      [DISCONTINUED] lisinopriL (PRINIVIL,ZESTRIL) 40 MG tablet Take 1 tablet by mouth once daily (Patient not taking: Reported on 8/30/2024) 90 tablet 3    [DISCONTINUED] meclizine (ANTIVERT) 12.5 mg tablet Take 1 tablet (12.5 mg total) by mouth 3 (three) times daily as needed for Dizziness. 15 tablet 0    [DISCONTINUED] metoprolol succinate (TOPROL-XL) 50 MG 24 hr tablet Take 1 tablet by mouth once daily (Patient not taking: Reported on 8/30/2024) 90 tablet 3    [DISCONTINUED] rosuvastatin (CRESTOR) 10 MG tablet Take 1 tablet (10 mg total) by mouth once daily. (Patient not taking: Reported on 8/30/2024) 90 tablet 3       Potential issues to be addressed PRIOR TO DISCHARGE  Patient reported not taking the following medications: (Wellbutrin, Farxiga, Diflucan, Lisinopril, Metoprolol, Actos, Crestor & Ozempic). These medications  remain on the home medication list. Please address accordingly.     Milka Alberts  EXT [id number]

## 2024-08-30 NOTE — ASSESSMENT & PLAN NOTE
Creatine stable for now. CMP reviewed- noted Estimated Creatinine Clearance: 47.1 mL/min (based on SCr of 1.2 mg/dL). according to latest data. Based on current GFR, CKD stage is stage 3 - GFR 30-59.  Monitor UOP and serial CMP and adjust therapy as needed. Renally dose meds. Avoid nephrotoxic medications and procedures.

## 2024-08-30 NOTE — HPI
74-year-old female presented to ED for eval of chest pain. pMHx HTN, DM 2, HLD.  Patient reported she has been having back pain that radiates to the front of her chest, stated chest pain his pressure-like and worsens with exertion, relieved by rest.  Patient reported associated shortness of breath and nausea.  Reported these symptoms have been present intermittently for the last several months but have worsened significantly in the last few weeks with more frequent episodes.  Patient was evaluated by her PCP earlier today, she stated she had been out of most of her home meds, including BP meds and oral diabetic agent, for 3-4 weeks, reported to PCP the only medication she was currently taking is her Toujeo.  In ED today, SBP greater than 200 on arrival, improved with nitroglycerin.  Initial troponin 908.  Noted with hyperglycemia, serum glucose 337.  EKG with T-wave changes.  Chest x-ray impression without acute abnormality.  CTA chest abdomen impression with mild cardiomegaly with scattered coronary artery calcifications (predominantly in the LAD and circumflex distribution); calcified plaques in the thoracoabdominal aorta and iliacs with no aneurysm.  ED discussed case with Cardiology, heparin drip initiated.  Admit to hospital medicine for further eval.

## 2024-08-30 NOTE — ASSESSMENT & PLAN NOTE
"Patient's FSGs are uncontrolled due to hyperglycemia on current medication regimen.  Last A1c reviewed-   Lab Results   Component Value Date    LABA1C 9.2 (H) 07/03/2018    HGBA1C 10.0 (H) 07/12/2024     Most recent fingerstick glucose reviewed- No results for input(s): "POCTGLUCOSE" in the last 24 hours.  Current correctional scale  Low  Maintain anti-hyperglycemic dose as follows-   Antihyperglycemics (From admission, onward)      Start     Stop Route Frequency Ordered    08/30/24 1745  insulin aspart U-100 pen 0-5 Units         -- SubQ Every 6 hours PRN 08/30/24 1646          Hold Oral hypoglycemics while patient is in the hospital.  Holding home toujeo while NPO  A1c  Consult diabetes educator and dietitian  "

## 2024-08-30 NOTE — H&P
"  Replaced by [facility]  History & Physical    Patient Name: Debby Brown  MRN: [id number]  Patient Class: OP- Observation  Admission Date: 8/30/2024  Attending Physician: Tushar Diop MD   Primary Care Provider: Shawna Costello MD         Patient information was obtained from patient, past medical records, and ER records.     Subjective:     Principal Problem:NSTEMI (non-ST elevated myocardial infarction)    Chief Complaint:   Chief Complaint   Patient presents with    Chest Pain     Mid-sternal , "on/off for a few weeks", increased with activity. Seen by PCP today.         HPI: 74-year-old female presented to ED for eval of chest pain. pMHx HTN, DM 2, HLD.  Patient reported she has been having back pain that radiates to the front of her chest, stated chest pain his pressure-like and worsens with exertion, relieved by rest.  Patient reported associated shortness of breath and nausea.  Reported these symptoms have been present intermittently for the last several months but have worsened significantly in the last few weeks with more frequent episodes.  Patient was evaluated by her PCP earlier today, she stated she had been out of most of her home meds, including BP meds and oral diabetic agent, for 3-4 weeks, reported to PCP the only medication she was currently taking is her Toujeo.  In ED today, SBP greater than 200 on arrival, improved with nitroglycerin.  Initial troponin 908.  Noted with hyperglycemia, serum glucose 337.  EKG with T-wave changes.  Chest x-ray impression without acute abnormality.  CTA chest abdomen impression with mild cardiomegaly with scattered coronary artery calcifications (predominantly in the LAD and circumflex distribution); calcified plaques in the thoracoabdominal aorta and iliacs with no aneurysm.  ED discussed case with Cardiology, heparin drip initiated, loaded with ASA.  Admit to hospital medicine for further eval.    Past Medical History:   Diagnosis Date    " Allergy     Anxiety     Depression     Diabetes mellitus, type 2     Hyperlipidemia     Hypertension     Personal history of colonic polyps     Pre-diabetes        Past Surgical History:   Procedure Laterality Date    APPENDECTOMY      BACK SURGERY      BREAST SURGERY      CHOLECYSTECTOMY      COLONOSCOPY      COLONOSCOPY N/A 03/13/2024    Procedure: COLONOSCOPY;  Surgeon: Isabel Noriega MD;  Location: [facility];  Service: Endoscopy;  Laterality: N/A;  ppm    GALLBLADDER SURGERY      HERNIA REPAIR      umbilical    lymphnode remove      SHOULDER SURGERY Left     RTR    TOTAL REDUCTION MAMMOPLASTY Bilateral 2001    TUBAL LIGATION         Review of patient's allergies indicates:   Allergen Reactions    Bactrim [sulfamethoxazole-trimethoprim]     Codeine     Levaquin [levofloxacin]     Pcn [penicillins]        No current facility-administered medications on file prior to encounter.     Current Outpatient Medications on File Prior to Encounter   Medication Sig    ALPRAZolam (XANAX) 0.5 MG tablet Take 1 tablet by mouth twice daily as needed for anxiety    insulin glargine, TOUJEO, (TOUJEO SOLOSTAR U-300 INSULIN) 300 unit/mL (1.5 mL) InPn pen INJECT 70 UNITS SUBCUTANEOUSLY ONCE DAILY (Patient taking differently: Inject 70 Units into the skin once daily.)    buPROPion (WELLBUTRIN XL) 300 MG 24 hr tablet Take 1 tablet (300 mg total) by mouth once daily. (Patient not taking: Reported on 8/30/2024)    dapagliflozin propanediol (FARXIGA) 10 mg tablet Take 1 tablet (10 mg total) by mouth once daily. (Patient not taking: Reported on 8/30/2024)    fluconazole (DIFLUCAN) 200 MG Tab Take 1 tablet (200 mg total) by mouth once a week. for 28 doses (Patient not taking: Reported on 8/30/2024)    lisinopriL (PRINIVIL,ZESTRIL) 40 MG tablet Take 1 tablet (40 mg total) by mouth once daily. (Patient not taking: Reported on 8/30/2024)    metoprolol succinate (TOPROL-XL) 50 MG 24 hr tablet Take 1 tablet (50 mg total) by mouth once daily.  "(Patient not taking: Reported on 8/30/2024)    pen needle, diabetic (BD JERI 2ND GEN PEN NEEDLE) 32 gauge x 5/32" Ndle USE AS DIRECTED    pioglitazone (ACTOS) 15 MG tablet Take 1 tablet (15 mg total) by mouth once daily. (Patient not taking: Reported on 8/30/2024)    rosuvastatin (CRESTOR) 40 MG Tab Take 1 tablet (40 mg total) by mouth once daily. (Patient not taking: Reported on 8/30/2024)    semaglutide (OZEMPIC) 0.25 mg or 0.5 mg (2 mg/3 mL) pen injector Inject 0.5 mg into the skin every 7 days. (Patient not taking: Reported on 8/30/2024)    TRUE METRIX GLUCOSE METER Misc     TRUE METRIX GLUCOSE TEST STRIP Strp U QAM    TRUEPLUS LANCETS 30 gauge Misc U QAM    [DISCONTINUED] buPROPion (WELLBUTRIN XL) 300 MG 24 hr tablet Take 1 tablet by mouth once daily (Patient not taking: Reported on 8/30/2024)    [DISCONTINUED] dulaglutide (TRULICITY) 0.75 mg/0.5 mL pen injector Inject 0.75 mg into the skin every 7 days. (Patient not taking: Reported on 8/14/2024)    [DISCONTINUED] ketoconazole (NIZORAL) 2 % shampoo Apply topically 3 (three) times a week.    [DISCONTINUED] latanoprost 0.005 % ophthalmic solution Place 1 drop into both eyes every evening.    [DISCONTINUED] lisinopriL (PRINIVIL,ZESTRIL) 40 MG tablet Take 1 tablet by mouth once daily (Patient not taking: Reported on 8/30/2024)    [DISCONTINUED] meclizine (ANTIVERT) 12.5 mg tablet Take 1 tablet (12.5 mg total) by mouth 3 (three) times daily as needed for Dizziness.    [DISCONTINUED] metoprolol succinate (TOPROL-XL) 50 MG 24 hr tablet Take 1 tablet by mouth once daily (Patient not taking: Reported on 8/30/2024)    [DISCONTINUED] rosuvastatin (CRESTOR) 10 MG tablet Take 1 tablet (10 mg total) by mouth once daily. (Patient not taking: Reported on 8/30/2024)     Family History       Problem Relation (Age of Onset)    Cancer Mother, Brother    Depression Mother    Heart disease Mother, Father    Hypertension Mother, Father    Mental illness Mother    Stroke Mother, " Father          Tobacco Use    Smoking status: Former     Passive exposure: Past    Smokeless tobacco: Never   Substance and Sexual Activity    Alcohol use: Yes     Comment: occasionally    Drug use: No    Sexual activity: Not Currently     Review of Systems   Constitutional:  Negative for chills and fever.   HENT:  Negative for congestion and sore throat.    Respiratory:  Positive for shortness of breath.    Cardiovascular:  Positive for chest pain.   Gastrointestinal:  Positive for nausea. Negative for abdominal pain, constipation, diarrhea and vomiting.   Genitourinary:  Negative for difficulty urinating.   Musculoskeletal:  Positive for back pain.   Neurological:  Negative for syncope.   Psychiatric/Behavioral:  Negative for confusion.      Objective:     Vital Signs (Most Recent):  Temp: 98 °F (36.7 °C) (08/30/24 1349)  Pulse: 103 (08/30/24 1600)  Resp: 17 (08/30/24 1600)  BP: (!) 153/93 (08/30/24 1600)  SpO2: 96 % (08/30/24 1600) Vital Signs (24h Range):  Temp:  [98 °F (36.7 °C)] 98 °F (36.7 °C)  Pulse:  [] 103  Resp:  [13-25] 17  SpO2:  [95 %-98 %] 96 %  BP: (152-217)/() 153/93     Weight: 86.2 kg (190 lb)  Body mass index is 29.76 kg/m².     Physical Exam  Vitals reviewed.   Constitutional:       General: She is not in acute distress.     Interventions: Nasal cannula in place.   HENT:      Head: Normocephalic and atraumatic.      Mouth/Throat:      Mouth: Mucous membranes are moist.   Eyes:      Conjunctiva/sclera: Conjunctivae normal.   Cardiovascular:      Rate and Rhythm: Regular rhythm. Tachycardia present.      Heart sounds: Murmur heard.   Pulmonary:      Effort: Pulmonary effort is normal. No respiratory distress.      Breath sounds: Normal breath sounds.   Abdominal:      General: Bowel sounds are normal.      Palpations: Abdomen is soft.      Tenderness: There is no abdominal tenderness.   Musculoskeletal:         General: Normal range of motion.      Cervical back: Normal range of  "motion.      Right lower leg: No edema.      Left lower leg: No edema.   Skin:     General: Skin is warm and dry.      Capillary Refill: Capillary refill takes less than 2 seconds.   Neurological:      Mental Status: She is alert and oriented to person, place, and time.   Psychiatric:         Mood and Affect: Mood normal.                Significant Labs: All pertinent labs within the past 24 hours have been reviewed.  A1C:   Recent Labs   Lab 04/04/24  0923 07/12/24  1147   HGBA1C 10.5* 10.0*     CBC:   Recent Labs   Lab 08/30/24  1420   WBC 9.58   HGB 13.9   HCT 42.6        CMP:   Recent Labs   Lab 08/30/24  1420      K 4.1      CO2 23   *   BUN 23   CREATININE 1.2   CALCIUM 9.2   PROT 7.0   ALBUMIN 3.8   BILITOT 0.4   ALKPHOS 62   AST 15   ALT 15   ANIONGAP 10     Cardiac Markers:   Recent Labs   Lab 08/30/24  1420   BNP 62     Coagulation:   Recent Labs   Lab 08/30/24  1422   INR 1.0   APTT 28.5     Lipid Panel: No results for input(s): "CHOL", "HDL", "LDLCALC", "TRIG", "CHOLHDL" in the last 48 hours.  Magnesium:   Recent Labs   Lab 08/30/24  1420   MG 1.6     Troponin:   Recent Labs   Lab 08/30/24  1420   TROPONINIHS 908.4*     TSH:   Recent Labs   Lab 07/12/24  1147   TSH 1.605     Urine Studies: No results for input(s): "COLORU", "APPEARANCEUA", "PHUR", "SPECGRAV", "PROTEINUA", "GLUCUA", "KETONESU", "BILIRUBINUA", "OCCULTUA", "NITRITE", "UROBILINOGEN", "LEUKOCYTESUR", "RBCUA", "WBCUA", "BACTERIA", "SQUAMEPITHEL", "HYALINECASTS" in the last 48 hours.    Invalid input(s): "WRIGHTSUR"    Significant Imaging: I have reviewed all pertinent imaging results/findings within the past 24 hours.  Assessment/Plan:     * NSTEMI (non-ST elevated myocardial infarction)  Serial troponins x 3  Echo  EKG PRN  NTG paste  ASA  Heparin gtt  Tele monitoring  Consult cardiology, NPO      Diabetes  Patient's FSGs are uncontrolled due to hyperglycemia on current medication regimen.  Last A1c reviewed-   Lab " "Results   Component Value Date    LABA1C 9.2 (H) 07/03/2018    HGBA1C 10.0 (H) 07/12/2024     Most recent fingerstick glucose reviewed- No results for input(s): "POCTGLUCOSE" in the last 24 hours.  Current correctional scale  Low  Maintain anti-hyperglycemic dose as follows-   Antihyperglycemics (From admission, onward)      Start     Stop Route Frequency Ordered    08/30/24 1745  insulin aspart U-100 pen 0-5 Units         -- SubQ Every 6 hours PRN 08/30/24 1646          Hold Oral hypoglycemics while patient is in the hospital.  Holding home toujeo while NPO  A1c  Consult diabetes educator and dietitian    Stage 3a chronic kidney disease  Creatine stable for now. CMP reviewed- noted Estimated Creatinine Clearance: 47.1 mL/min (based on SCr of 1.2 mg/dL). according to latest data. Based on current GFR, CKD stage is stage 3 - GFR 30-59.  Monitor UOP and serial CMP and adjust therapy as needed. Renally dose meds. Avoid nephrotoxic medications and procedures.    Hyperlipidemia  Atorvastatin  Lipid panel        VTE Risk Mitigation (From admission, onward)           Ordered     Reason for No Pharmacological VTE Prophylaxis  Once        Question:  Reasons:  Answer:  IV Heparin w/in 24 hrs. Pre or Post-Op    08/30/24 1646     IP VTE HIGH RISK PATIENT  Once         08/30/24 1646     Place sequential compression device  Until discontinued         08/30/24 1646     heparin 25,000 units in dextrose 5% (100 units/ml) IV bolus from bag LOW INTENSITY nomogram - [facility]  As needed (PRN)        Question:  Heparin Infusion Adjustment (DO NOT MODIFY ANSWER)  Answer:  \\[email or web address]\epic\Images\Pharmacy\HeparinInfusions\heparin LOW INTENSITY nomogram for [facility] [id number]    08/30/24 1541     heparin 25,000 units in dextrose 5% (100 units/ml) IV bolus from bag LOW INTENSITY nomogram - [facility]  As needed (PRN)        Question:  Heparin Infusion Adjustment (DO NOT MODIFY ANSWER)  Answer:  \\[email or web address]\epic\Images\Pharmacy\HeparinInfusions\heparin " LOW INTENSITY nomogram for [facility] [id number].pdf    08/30/24 1541     heparin 25,000 units in dextrose 5% 250 mL (100 units/mL) infusion LOW INTENSITY nomogram - [facility]  Continuous        Question:  Begin at (units/kg/hr)  Answer:  12    08/30/24 1541                         On 08/30/2024, patient should be placed in hospital observation services under my care in collaboration with Dr. Diop.           Carmen Morales, NP  Department of Hospital Medicine  [facility]

## 2024-08-30 NOTE — BRIEF OP NOTE
[facility]  Brief Operative Note  Cardiology    SUMMARY     Surgery Date: 8/30/2024     Surgeons and Role:     * Ashleigh Chambers MD - Primary    Assisting Surgeon: None    Pre-op Diagnosis:  NSTEMI (non-ST elevated myocardial infarction) [I21.4]    Post-op Diagnosis: Post-Op Diagnosis Codes:     * NSTEMI (non-ST elevated myocardial infarction) [I21.4]    Procedure Performed: Coronary angiography via the right common femoral access site.     Procedure(s) (LRB):  ANGIOGRAM, CORONARY ARTERY (N/A)    Technical Procedures Used: Coronary angiography via the right common femoral access site.     Operative Findings: Severe multivessel CAD with thrombus in the distal RCA - plan for heparin gtt, nitro gtt and CT surgery consult to determine candidacy for surgical revascularization.     Estimated Blood Loss:  10 cc        Specimens:   Specimen (24h ago, onward)      None

## 2024-08-31 ENCOUNTER — CLINICAL SUPPORT (OUTPATIENT)
Dept: CARDIOLOGY | Facility: HOSPITAL | Age: 74
End: 2024-08-31
Attending: STUDENT IN AN ORGANIZED HEALTH CARE EDUCATION/TRAINING PROGRAM
Payer: MEDICARE

## 2024-08-31 VITALS — HEIGHT: 67 IN | BODY MASS INDEX: 30.92 KG/M2 | WEIGHT: 197 LBS

## 2024-08-31 LAB
ALBUMIN SERPL BCP-MCNC: 3.5 G/DL (ref 3.5–5.2)
ALP SERPL-CCNC: 55 U/L (ref 55–135)
ALT SERPL W/O P-5'-P-CCNC: 13 U/L (ref 10–44)
ANION GAP SERPL CALC-SCNC: 8 MMOL/L (ref 8–16)
AORTIC ROOT ANNULUS: 3.4 CM
AORTIC VALVE CUSP SEPERATION: 1.6 CM
APICAL FOUR CHAMBER EJECTION FRACTION: 52 %
APICAL TWO CHAMBER EJECTION FRACTION: 55 %
APTT PPP: 40.7 SEC (ref 21–32)
APTT PPP: 48.5 SEC (ref 21–32)
APTT PPP: 48.5 SEC (ref 21–32)
APTT PPP: 53.8 SEC (ref 21–32)
APTT PPP: <21 SEC (ref 21–32)
AST SERPL-CCNC: 20 U/L (ref 10–40)
AV INDEX (PROSTH): 0.38
AV MEAN GRADIENT: 12 MMHG
AV PEAK GRADIENT: 21 MMHG
AV VALVE AREA BY VELOCITY RATIO: 1.2 CM²
AV VALVE AREA: 1.18 CM²
AV VELOCITY RATIO: 0.38
BASOPHILS # BLD AUTO: 0.07 K/UL (ref 0–0.2)
BASOPHILS # BLD AUTO: 0.09 K/UL (ref 0–0.2)
BASOPHILS # BLD AUTO: 0.09 K/UL (ref 0–0.2)
BASOPHILS NFR BLD: 0.7 % (ref 0–1.9)
BASOPHILS NFR BLD: 0.9 % (ref 0–1.9)
BASOPHILS NFR BLD: 0.9 % (ref 0–1.9)
BILIRUB SERPL-MCNC: 0.4 MG/DL (ref 0.1–1)
BSA FOR ECHO PROCEDURE: 2.06 M2
BUN SERPL-MCNC: 17 MG/DL (ref 8–23)
CALCIUM SERPL-MCNC: 9.1 MG/DL (ref 8.7–10.5)
CHLORIDE SERPL-SCNC: 107 MMOL/L (ref 95–110)
CO2 SERPL-SCNC: 25 MMOL/L (ref 23–29)
CREAT SERPL-MCNC: 1 MG/DL (ref 0.5–1.4)
CV ECHO LV RWT: 0.75 CM
DIFFERENTIAL METHOD BLD: ABNORMAL
DIFFERENTIAL METHOD BLD: ABNORMAL
DIFFERENTIAL METHOD BLD: NORMAL
DOP CALC AO PEAK VEL: 2.28 M/S
DOP CALC AO VTI: 38.9 CM
DOP CALC LVOT AREA: 3.1 CM2
DOP CALC LVOT DIAMETER: 2 CM
DOP CALC LVOT PEAK VEL: 0.87 M/S
DOP CALC LVOT STROKE VOLUME: 45.84 CM3
DOP CALC MV VTI: 19.4 CM
DOP CALCLVOT PEAK VEL VTI: 14.6 CM
E WAVE DECELERATION TIME: 153 MSEC
E/A RATIO: 0.52
E/E' RATIO: 10.14 M/S
ECHO LV POSTERIOR WALL: 1.2 CM (ref 0.6–1.1)
EOSINOPHIL # BLD AUTO: 0.3 K/UL (ref 0–0.5)
EOSINOPHIL # BLD AUTO: 0.4 K/UL (ref 0–0.5)
EOSINOPHIL # BLD AUTO: 0.4 K/UL (ref 0–0.5)
EOSINOPHIL NFR BLD: 3.5 % (ref 0–8)
EOSINOPHIL NFR BLD: 3.7 % (ref 0–8)
EOSINOPHIL NFR BLD: 3.7 % (ref 0–8)
ERYTHROCYTE [DISTWIDTH] IN BLOOD BY AUTOMATED COUNT: 14.3 % (ref 11.5–14.5)
ERYTHROCYTE [DISTWIDTH] IN BLOOD BY AUTOMATED COUNT: 14.4 % (ref 11.5–14.5)
ERYTHROCYTE [DISTWIDTH] IN BLOOD BY AUTOMATED COUNT: 14.4 % (ref 11.5–14.5)
EST. GFR  (NO RACE VARIABLE): 59.5 ML/MIN/1.73 M^2
ESTIMATED AVG GLUCOSE: 246 MG/DL (ref 68–131)
FRACTIONAL SHORTENING: 34 % (ref 28–44)
GLUCOSE SERPL-MCNC: 125 MG/DL (ref 70–110)
GLUCOSE SERPL-MCNC: 168 MG/DL (ref 70–110)
GLUCOSE SERPL-MCNC: 171 MG/DL (ref 70–110)
GLUCOSE SERPL-MCNC: 186 MG/DL (ref 70–110)
HBA1C MFR BLD: 10.2 % (ref 4.5–6.2)
HCT VFR BLD AUTO: 38.8 % (ref 37–48.5)
HCT VFR BLD AUTO: 40.1 % (ref 37–48.5)
HCT VFR BLD AUTO: 40.1 % (ref 37–48.5)
HGB BLD-MCNC: 12.8 G/DL (ref 12–16)
IMM GRANULOCYTES # BLD AUTO: 0.03 K/UL (ref 0–0.04)
IMM GRANULOCYTES # BLD AUTO: 0.04 K/UL (ref 0–0.04)
IMM GRANULOCYTES # BLD AUTO: 0.04 K/UL (ref 0–0.04)
IMM GRANULOCYTES NFR BLD AUTO: 0.3 % (ref 0–0.5)
IMM GRANULOCYTES NFR BLD AUTO: 0.4 % (ref 0–0.5)
IMM GRANULOCYTES NFR BLD AUTO: 0.4 % (ref 0–0.5)
INTERVENTRICULAR SEPTUM: 1.1 CM (ref 0.6–1.1)
LA MAJOR: 4.5 CM
LA MINOR: 3.1 CM
LA WIDTH: 3 CM
LEFT ATRIUM AREA SYSTOLIC (APICAL 2 CHAMBER): 12.9 CM2
LEFT ATRIUM AREA SYSTOLIC (APICAL 4 CHAMBER): 14.1 CM2
LEFT ATRIUM SIZE: 3.4 CM
LEFT ATRIUM VOLUME INDEX MOD: 16.4 ML/M2
LEFT ATRIUM VOLUME INDEX: 15.8 ML/M2
LEFT ATRIUM VOLUME MOD: 33 CM3
LEFT ATRIUM VOLUME: 31.83 CM3
LEFT INTERNAL DIMENSION IN SYSTOLE: 2.1 CM (ref 2.1–4)
LEFT VENTRICLE DIASTOLIC VOLUME INDEX: 20.4 ML/M2
LEFT VENTRICLE DIASTOLIC VOLUME: 41 ML
LEFT VENTRICLE END DIASTOLIC VOLUME APICAL 2 CHAMBER: 48.9 ML
LEFT VENTRICLE END DIASTOLIC VOLUME APICAL 4 CHAMBER: 54.5 ML
LEFT VENTRICLE END SYSTOLIC VOLUME APICAL 2 CHAMBER: 30.4 ML
LEFT VENTRICLE END SYSTOLIC VOLUME APICAL 4 CHAMBER: 34.1 ML
LEFT VENTRICLE MASS INDEX: 56 G/M2
LEFT VENTRICLE SYSTOLIC VOLUME INDEX: 7.2 ML/M2
LEFT VENTRICLE SYSTOLIC VOLUME: 14.4 ML
LEFT VENTRICULAR INTERNAL DIMENSION IN DIASTOLE: 3.2 CM (ref 3.5–6)
LEFT VENTRICULAR MASS: 111.76 G
LV LATERAL E/E' RATIO: 7.89 M/S
LV SEPTAL E/E' RATIO: 14.2 M/S
LVED V (TEICH): 41 ML
LVES V (TEICH): 14.4 ML
LVOT MG: 1 MMHG
LVOT MV: 0.53 CM/S
LYMPHOCYTES # BLD AUTO: 3.9 K/UL (ref 1–4.8)
LYMPHOCYTES # BLD AUTO: 4.5 K/UL (ref 1–4.8)
LYMPHOCYTES # BLD AUTO: 4.5 K/UL (ref 1–4.8)
LYMPHOCYTES NFR BLD: 40.7 % (ref 18–48)
LYMPHOCYTES NFR BLD: 43.1 % (ref 18–48)
LYMPHOCYTES NFR BLD: 43.1 % (ref 18–48)
MAGNESIUM SERPL-MCNC: 1.8 MG/DL (ref 1.6–2.6)
MCH RBC QN AUTO: 27.8 PG (ref 27–31)
MCH RBC QN AUTO: 27.8 PG (ref 27–31)
MCH RBC QN AUTO: 29.1 PG (ref 27–31)
MCHC RBC AUTO-ENTMCNC: 31.9 G/DL (ref 32–36)
MCHC RBC AUTO-ENTMCNC: 31.9 G/DL (ref 32–36)
MCHC RBC AUTO-ENTMCNC: 33 G/DL (ref 32–36)
MCV RBC AUTO: 87 FL (ref 82–98)
MCV RBC AUTO: 87 FL (ref 82–98)
MCV RBC AUTO: 88 FL (ref 82–98)
MONOCYTES # BLD AUTO: 0.7 K/UL (ref 0.3–1)
MONOCYTES # BLD AUTO: 0.8 K/UL (ref 0.3–1)
MONOCYTES # BLD AUTO: 0.8 K/UL (ref 0.3–1)
MONOCYTES NFR BLD: 7.2 % (ref 4–15)
MONOCYTES NFR BLD: 7.7 % (ref 4–15)
MONOCYTES NFR BLD: 7.7 % (ref 4–15)
MV MEAN GRADIENT: 3 MMHG
MV PEAK A VEL: 1.37 M/S
MV PEAK E VEL: 0.71 M/S
MV PEAK GRADIENT: 7 MMHG
MV STENOSIS PRESSURE HALF TIME: 45 MS
MV VALVE AREA BY CONTINUITY EQUATION: 2.36 CM2
MV VALVE AREA P 1/2 METHOD: 4.89 CM2
NEUTROPHILS # BLD AUTO: 4.5 K/UL (ref 1.8–7.7)
NEUTROPHILS # BLD AUTO: 4.6 K/UL (ref 1.8–7.7)
NEUTROPHILS # BLD AUTO: 4.6 K/UL (ref 1.8–7.7)
NEUTROPHILS NFR BLD: 44.2 % (ref 38–73)
NEUTROPHILS NFR BLD: 44.2 % (ref 38–73)
NEUTROPHILS NFR BLD: 47.6 % (ref 38–73)
NRBC BLD-RTO: 0 /100 WBC
OHS LV EJECTION FRACTION SIMPSONS BIPLANE MOD: 53 %
PISA TR MAX VEL: 2.42 M/S
PLATELET # BLD AUTO: 286 K/UL (ref 150–450)
PLATELET # BLD AUTO: 287 K/UL (ref 150–450)
PLATELET # BLD AUTO: 287 K/UL (ref 150–450)
PMV BLD AUTO: 10.6 FL (ref 9.2–12.9)
POTASSIUM SERPL-SCNC: 3.7 MMOL/L (ref 3.5–5.1)
PROT SERPL-MCNC: 6.7 G/DL (ref 6–8.4)
PV MV: 0.83 M/S
PV PEAK GRADIENT: 5 MMHG
PV PEAK VELOCITY: 1.17 M/S
RA MAJOR: 3.8 CM
RA PRESSURE ESTIMATED: 3 MMHG
RA WIDTH: 2.9 CM
RBC # BLD AUTO: 4.4 M/UL (ref 4–5.4)
RBC # BLD AUTO: 4.6 M/UL (ref 4–5.4)
RBC # BLD AUTO: 4.6 M/UL (ref 4–5.4)
RIGHT ATRIUM VOLUME AREA LENGTH APICAL 4 CHAMBER: 25.9 ML
RIGHT VENTRICLE DIASTOLIC BASEL DIMENSION: 2.7 CM
RIGHT VENTRICLE DIASTOLIC LENGTH: 5.9 CM
RIGHT VENTRICLE DIASTOLIC MID DIMENSION: 1.9 CM
RIGHT VENTRICULAR LENGTH IN DIASTOLE (APICAL 4-CHAMBER VIEW): 5.9 CM
RV MID DIAMA: 1.9 CM
RV TB RVSP: 5 MMHG
RV TISSUE DOPPLER FREE WALL SYSTOLIC VELOCITY 1 (APICAL 4 CHAMBER VIEW): 10 CM/S
SODIUM SERPL-SCNC: 140 MMOL/L (ref 136–145)
T4 FREE SERPL-MCNC: 0.88 NG/DL (ref 0.71–1.51)
TDI LATERAL: 0.09 M/S
TDI SEPTAL: 0.05 M/S
TDI: 0.07 M/S
TR MAX PG: 23 MMHG
TRICUSPID ANNULAR PLANE SYSTOLIC EXCURSION: 2.39 CM
TROPONIN I SERPL HS-MCNC: 3912 PG/ML (ref 0–14.9)
TROPONIN I SERPL HS-MCNC: 4745 PG/ML (ref 0–14.9)
TSH SERPL DL<=0.005 MIU/L-ACNC: 3.59 UIU/ML (ref 0.34–5.6)
TV REST PULMONARY ARTERY PRESSURE: 26 MMHG
WBC # BLD AUTO: 10.39 K/UL (ref 3.9–12.7)
WBC # BLD AUTO: 10.39 K/UL (ref 3.9–12.7)
WBC # BLD AUTO: 9.53 K/UL (ref 3.9–12.7)
Z-SCORE OF LEFT VENTRICULAR DIMENSION IN END DIASTOLE: -6.1
Z-SCORE OF LEFT VENTRICULAR DIMENSION IN END SYSTOLE: -4.29

## 2024-08-31 PROCEDURE — 85730 THROMBOPLASTIN TIME PARTIAL: CPT | Mod: 91 | Performed by: FAMILY MEDICINE

## 2024-08-31 PROCEDURE — 25000003 PHARM REV CODE 250

## 2024-08-31 PROCEDURE — 80053 COMPREHEN METABOLIC PANEL: CPT

## 2024-08-31 PROCEDURE — 63600175 PHARM REV CODE 636 W HCPCS

## 2024-08-31 PROCEDURE — 93306 TTE W/DOPPLER COMPLETE: CPT | Mod: 26,,, | Performed by: INTERNAL MEDICINE

## 2024-08-31 PROCEDURE — 93005 ELECTROCARDIOGRAM TRACING: CPT | Performed by: GENERAL PRACTICE

## 2024-08-31 PROCEDURE — 84439 ASSAY OF FREE THYROXINE: CPT

## 2024-08-31 PROCEDURE — 25000003 PHARM REV CODE 250: Performed by: FAMILY MEDICINE

## 2024-08-31 PROCEDURE — 84443 ASSAY THYROID STIM HORMONE: CPT

## 2024-08-31 PROCEDURE — 85730 THROMBOPLASTIN TIME PARTIAL: CPT | Mod: 91 | Performed by: STUDENT IN AN ORGANIZED HEALTH CARE EDUCATION/TRAINING PROGRAM

## 2024-08-31 PROCEDURE — 85730 THROMBOPLASTIN TIME PARTIAL: CPT | Performed by: INTERNAL MEDICINE

## 2024-08-31 PROCEDURE — 99900031 HC PATIENT EDUCATION (STAT)

## 2024-08-31 PROCEDURE — 85730 THROMBOPLASTIN TIME PARTIAL: CPT | Mod: 91 | Performed by: INTERNAL MEDICINE

## 2024-08-31 PROCEDURE — 84484 ASSAY OF TROPONIN QUANT: CPT | Mod: 91

## 2024-08-31 PROCEDURE — 93306 TTE W/DOPPLER COMPLETE: CPT

## 2024-08-31 PROCEDURE — 94761 N-INVAS EAR/PLS OXIMETRY MLT: CPT

## 2024-08-31 PROCEDURE — 25000003 PHARM REV CODE 250: Performed by: INTERNAL MEDICINE

## 2024-08-31 PROCEDURE — 84484 ASSAY OF TROPONIN QUANT: CPT

## 2024-08-31 PROCEDURE — 85025 COMPLETE CBC W/AUTO DIFF WBC: CPT | Performed by: STUDENT IN AN ORGANIZED HEALTH CARE EDUCATION/TRAINING PROGRAM

## 2024-08-31 PROCEDURE — 83735 ASSAY OF MAGNESIUM: CPT

## 2024-08-31 PROCEDURE — 36415 COLL VENOUS BLD VENIPUNCTURE: CPT | Performed by: INTERNAL MEDICINE

## 2024-08-31 PROCEDURE — 93010 ELECTROCARDIOGRAM REPORT: CPT | Mod: ,,, | Performed by: GENERAL PRACTICE

## 2024-08-31 PROCEDURE — 85025 COMPLETE CBC W/AUTO DIFF WBC: CPT | Mod: 91 | Performed by: INTERNAL MEDICINE

## 2024-08-31 PROCEDURE — 12000002 HC ACUTE/MED SURGE SEMI-PRIVATE ROOM

## 2024-08-31 PROCEDURE — 36415 COLL VENOUS BLD VENIPUNCTURE: CPT | Performed by: STUDENT IN AN ORGANIZED HEALTH CARE EDUCATION/TRAINING PROGRAM

## 2024-08-31 PROCEDURE — 99223 1ST HOSP IP/OBS HIGH 75: CPT | Mod: ,,, | Performed by: THORACIC SURGERY (CARDIOTHORACIC VASCULAR SURGERY)

## 2024-08-31 RX ORDER — MUPIROCIN 20 MG/G
OINTMENT TOPICAL 2 TIMES DAILY
Status: COMPLETED | OUTPATIENT
Start: 2024-08-31 | End: 2024-09-05

## 2024-08-31 RX ORDER — INSULIN ASPART 100 [IU]/ML
0-10 INJECTION, SOLUTION INTRAVENOUS; SUBCUTANEOUS EVERY 6 HOURS PRN
Status: DISCONTINUED | OUTPATIENT
Start: 2024-08-31 | End: 2024-09-06

## 2024-08-31 RX ADMIN — Medication 800 MG: at 11:08

## 2024-08-31 RX ADMIN — Medication 800 MG: at 05:08

## 2024-08-31 RX ADMIN — MUPIROCIN 1 G: 20 OINTMENT TOPICAL at 08:08

## 2024-08-31 RX ADMIN — HEPARIN SODIUM 15 UNITS/KG/HR: 10000 INJECTION, SOLUTION INTRAVENOUS at 06:08

## 2024-08-31 RX ADMIN — ACETAMINOPHEN 650 MG: 325 TABLET ORAL at 11:08

## 2024-08-31 RX ADMIN — ASPIRIN 81 MG 81 MG: 81 TABLET ORAL at 11:08

## 2024-08-31 RX ADMIN — LISINOPRIL 40 MG: 20 TABLET ORAL at 11:08

## 2024-08-31 RX ADMIN — POTASSIUM BICARBONATE 50 MEQ: 977.5 TABLET, EFFERVESCENT ORAL at 05:08

## 2024-08-31 RX ADMIN — ONDANSETRON 4 MG: 2 INJECTION INTRAMUSCULAR; INTRAVENOUS at 08:08

## 2024-08-31 RX ADMIN — METOPROLOL SUCCINATE 50 MG: 50 TABLET, FILM COATED, EXTENDED RELEASE ORAL at 11:08

## 2024-08-31 RX ADMIN — ATORVASTATIN CALCIUM 80 MG: 40 TABLET, FILM COATED ORAL at 11:08

## 2024-08-31 RX ADMIN — HEPARIN SODIUM 15 UNITS/KG/HR: 10000 INJECTION, SOLUTION INTRAVENOUS at 05:08

## 2024-08-31 NOTE — ASSESSMENT & PLAN NOTE
Creatine stable for now. CMP reviewed- noted Estimated Creatinine Clearance: 57.7 mL/min (based on SCr of 1 mg/dL). according to latest data. Based on current GFR, CKD stage is stage 3 - GFR 30-59.  Monitor UOP and serial CMP and adjust therapy as needed. Renally dose meds. Avoid nephrotoxic medications and procedures.

## 2024-08-31 NOTE — PLAN OF CARE
"  Assessment completed at bedside with patient, and all information on FaceSheet confirmed, including demographics, PCP, pharmacy and insurance.  Pt has not addressed advance directives, and reports  is her POA / NOK.  She currently lives alone with her dog "Chhaya", but her family is there to assist. Her PCP is Dr. Shawna Costello MD, and preferred pharmacy is [facility] ([facility]  She denies any HH/HD/Blood Thinners. For transportation to appointments, she usually herself. However, her daughter can assist her with transportation  For transportation at discharge, her daughter Gisselle will provide transportation to and from appointments.  Plan for discharge is to return home.  Case Management to continue to follow for discharge planning needs.            [facility]  Initial Discharge Assessment       Primary Care Provider: Shawna Costello MD    Admission Diagnosis: NSTEMI (non-ST elevated myocardial infarction) [I21.4]  Chest pain [R07.9]    Admission Date: 8/30/2024  Expected Discharge Date:     Transition of Care Barriers: (P) None    Payor: MEDICARE / Plan: MEDICARE PART A & B / Product Type: Government /     Extended Emergency Contact Information  Primary Emergency Contact: Gisselle Ramirez   [facility]  Home Phone: [phone]  Relation: Daughter    Discharge Plan A: (P) Home  Discharge Plan B: (P) Home      [email or web address] [place] - [address]  [address] [address]  Phone: [phone] Fax: [phone]    [id number] [place] [facility] [address]  [address]  [address] [facility] [address]  Phone: [phone] Fax: [phone]      Initial Assessment (most recent)       Adult Discharge Assessment - 08/31/24 1115          Discharge Assessment    Assessment Type Discharge Planning Assessment (P)      Confirmed/corrected address, phone number and insurance Yes (P)      Confirmed Demographics " Correct on Facesheet (P)      Source of Information patient (P)      When was your last doctors appointment? 08/30/24 (P)      Does patient/caregiver understand observation status Yes (P)      Communicated MARTHA with patient/caregiver Yes (P)      People in Home alone (P)    and her dog    Facility Arrived From: home (P)      Do you expect to return to your current living situation? Yes (P)      Do you have help at home or someone to help you manage your care at home? Yes (P)      Who are your caregiver(s) and their phone number(s)? Gisselle Ramirez [phone] ([email or web address]) (P)      Prior to hospitilization cognitive status: Alert/Oriented (P)      Current cognitive status: Alert/Oriented (P)      Walking or Climbing Stairs Difficulty no (P)      Dressing/Bathing Difficulty no (P)      Home Accessibility not wheelchair accessible (P)      Home Layout Able to live on 1st floor (P)      Equipment Currently Used at Home none (P)      Patient currently being followed by outpatient case management? No (P)      Do you currently have service(s) that help you manage your care at home? No (P)      Do you take prescription medications? Yes (P)      Do you have prescription coverage? Yes (P)      Coverage MEDICARE - MEDICARE PART A & B - (P)      Do you have any problems affording any of your prescribed medications? Yes (P)      If yes, what medications? Diabetic medications (P)      Is the patient taking medications as prescribed? yes (P)      Who is going to help you get home at discharge? Her daughter Gisselle Ramirez (P)      How do you get to doctors appointments? car, drives self (P)      Are you on dialysis? No (P)      Do you take coumadin? No (P)      Discharge Plan A Home (P)      Discharge Plan B Home (P)      DME Needed Upon Discharge  none (P)      Discharge Plan discussed with: Patient (P)      Transition of Care Barriers None (P)         Physical Activity    On average, how many days per week do you engage in moderate  to strenuous exercise (like a brisk walk)? 0 days (P)      On average, how many minutes do you engage in exercise at this level? 0 min (P)         Financial Resource Strain    How hard is it for you to pay for the very basics like food, housing, medical care, and heating? Not hard at all (P)         Housing Stability    In the last 12 months, was there a time when you were not able to pay the mortgage or rent on time? No (P)      At any time in the past 12 months, were you homeless or living in a shelter (including now)? No (P)         Transportation Needs    Has the lack of transportation kept you from medical appointments, meetings, work or from getting things needed for daily living? No (P)         Food Insecurity    Within the past 12 months, you worried that your food would run out before you got the money to buy more. Never true (P)      Within the past 12 months, the food you bought just didn't last and you didn't have money to get more. Never true (P)         Stress    Do you feel stress - tense, restless, nervous, or anxious, or unable to sleep at night because your mind is troubled all the time - these days? To some extent (P)         Social Isolation    How often do you feel lonely or isolated from those around you?  Sometimes (P)         Alcohol Use    Q1: How often do you have a drink containing alcohol? 2-3 times a week (P)      Q2: How many drinks containing alcohol do you have on a typical day when you are drinking? 1 or 2 (P)      Q3: How often do you have six or more drinks on one occasion? Never (P)         Chattering Pixelsities    In the past 12 months has the electric, gas, oil, or water company threatened to shut off services in your home? No (P)         Health Literacy    How often do you need to have someone help you when you read instructions, pamphlets, or other written material from your doctor or pharmacy? Never (P)

## 2024-08-31 NOTE — PROGRESS NOTES
[facility]  Department of Cardiology  Progress Note    PATIENT NAME: Debby Brown  MRN: [id number]  TODAY'S DATE: 08/31/2024  ADMIT DATE: 8/30/2024    SUBJECTIVE     PRINCIPLE PROBLEM: NSTEMI (non-ST elevated myocardial infarction)    INTERVAL HISTORY:    8/31/2024  Patient resting comfortably in bed.  No chest pain no EKG changes  Coronary angiogram pictures were reviewed  Case discussed with Dr. Stinson  Results discussed with the patient    Review of patient's allergies indicates:   Allergen Reactions    Bactrim [sulfamethoxazole-trimethoprim]     Codeine     Levaquin [levofloxacin]     Pcn [penicillins]        REVIEW OF SYSTEMS  CARDIOVASCULAR: No recent chest pain, palpitations, arm, neck, or jaw pain  RESPIRATORY: No recent fever, cough chills, SOB or congestion  : No blood in the urine  GI: No Nausea, vomiting, constipation, diarrhea, blood, or reflux.  MUSCULOSKELETAL: No myalgias  NEURO: No lightheadedness or dizziness  EYES: No Double vision, blurry, vision or headache     OBJECTIVE     VITAL SIGNS (Most Recent)  Temp: 97.8 °F (36.6 °C) (08/31/24 0701)  Pulse: 103 (08/31/24 0745)  Resp: 18 (08/31/24 0745)  BP: 134/76 (08/31/24 0732)  SpO2: 96 % (08/31/24 0745)    VENTILATION STATUS  Resp: 18 (08/31/24 0745)  SpO2: 96 % (08/31/24 0745)           I & O (Last 24H):  Intake/Output Summary (Last 24 hours) at 8/31/2024 0913  Last data filed at 8/31/2024 0601  Gross per 24 hour   Intake 220 ml   Output 310 ml   Net -90 ml       WEIGHTS  Wt Readings from Last 3 Encounters:   08/30/24 1938 89.8 kg (197 lb 15.6 oz)   08/30/24 1349 86.2 kg (190 lb)   08/30/24 1007 89.1 kg (196 lb 6.9 oz)   08/14/24 1041 87.8 kg (193 lb 9 oz)       PHYSICAL EXAM  CONSTITUTIONAL: Well built, well nourished in no apparent distress  NECK: no carotid bruit, no JVD  LUNGS: CTA  CHEST WALL: no tenderness  HEART: regular rate and rhythm, S1, S2 normal, no murmur, click, rub or gallop   ABDOMEN: soft, non-tender; bowel sounds  normal; no masses,  no organomegaly  EXTREMITIES: Extremities normal, no edema  NEURO: AAO X 3    SCHEDULED MEDS:   aspirin  81 mg Oral Daily    atorvastatin  80 mg Oral Daily    lisinopriL  40 mg Oral Daily    metoprolol succinate  50 mg Oral Daily    nitroGLYCERIN 2% TD oint  1 inch Topical (Top) Q6H       CONTINUOUS INFUSIONS:   heparin (porcine) in D5W  0-40 Units/kg/hr (Adjusted) Intravenous Continuous 8.6 mL/hr at 08/30/24 2259 12 Units/kg/hr at 08/30/24 2259    nitroGLYCERIN in 5 % dextrose    Continuous PRN 3 mL/hr at 08/30/24 1737 10 mcg/min at 08/30/24 1737    nitroGLYCERIN  0-400 mcg/min Intravenous Continuous           PRN MEDS:  Current Facility-Administered Medications:     acetaminophen, 650 mg, Oral, Q6H PRN    dextrose 50%, 12.5 g, Intravenous, PRN    glucagon (human recombinant), 1 mg, Intramuscular, PRN    heparin (PORCINE), 56 Units/kg (Adjusted), Intravenous, PRN    heparin (PORCINE), 30 Units/kg (Adjusted), Intravenous, PRN    insulin aspart U-100, 0-5 Units, Subcutaneous, Q6H PRN    magnesium oxide, 800 mg, Oral, PRN    magnesium oxide, 800 mg, Oral, PRN    nitroGLYCERIN in 5 % dextrose, , , Continuous PRN    nitroGLYCERIN, 0.4 mg, Sublingual, Q5 Min PRN    ondansetron, 4 mg, Intravenous, Q6H PRN    potassium bicarbonate, 35 mEq, Oral, PRN    potassium bicarbonate, 50 mEq, Oral, PRN    potassium bicarbonate, 60 mEq, Oral, PRN    potassium, sodium phosphates, 2 packet, Oral, PRN    potassium, sodium phosphates, 2 packet, Oral, PRN    potassium, sodium phosphates, 2 packet, Oral, PRN    senna-docusate 8.6-50 mg, 1 tablet, Oral, Daily PRN    LABS AND DIAGNOSTICS     CBC LAST 3 DAYS  Recent Labs   Lab 08/30/24 2004 08/31/24  0044 08/31/24  0247   WBC 9.20 9.53 10.39  10.39   RBC 4.41 4.40 4.60  4.60   HGB 12.5 12.8 12.8  12.8   HCT 39.1 38.8 40.1  40.1   MCV 89 88 87  87   MCH 28.3 29.1 27.8  27.8   MCHC 32.0 33.0 31.9*  31.9*   RDW 14.1 14.3 14.4  14.4    286 287  287   MPV 10.9  "10.6 10.6  10.6   GRAN 52.4  4.8 47.6  4.5 44.2  44.2  4.6  4.6   LYMPH 37.2  3.4 40.7  3.9 43.1  43.1  4.5  4.5   MONO 7.2  0.7 7.2  0.7 7.7  7.7  0.8  0.8   BASO 0.07 0.07 0.09  0.09   NRBC 0 0 0  0       COAGULATION LAST 3 DAYS  Recent Labs   Lab 08/30/24  1422 08/30/24 2004 08/31/24  0044 08/31/24  0247   INR 1.0 1.0  --   --    APTT 28.5 47.2* 53.8* 48.5*  48.5*       CHEMISTRY LAST 3 DAYS  Recent Labs   Lab 08/30/24  1420 08/31/24  0247    140   K 4.1 3.7    107   CO2 23 25   ANIONGAP 10 8   BUN 23 17   CREATININE 1.2 1.0   * 125*   CALCIUM 9.2 9.1   MG 1.6 1.8   ALBUMIN 3.8 3.5   PROT 7.0 6.7   ALKPHOS 62 55   ALT 15 13   AST 15 20   BILITOT 0.4 0.4       CARDIAC PROFILE LAST 3 DAYS  Recent Labs   Lab 08/30/24  1420   BNP 62       ENDOCRINE LAST 3 DAYS  Recent Labs   Lab 08/31/24  0247   TSH 3.587       LAST ARTERIAL BLOOD GAS  ABG  No results for input(s): "PH", "PO2", "PCO2", "HCO3", "BE" in the last 168 hours.    LAST 7 DAYS MICROBIOLOGY   Microbiology Results (last 7 days)       ** No results found for the last 168 hours. **            MOST RECENT IMAGING  CTA Chest Abdomen Non Coronary  Narrative: CMS MANDATED QUALITY DATA - CT RADIATION - 436    All CT scans at this facility utilize dose modulation, iterative reconstruction, and/or weight based dosing when appropriate to reduce radiation dose to as low as reasonably achievable.    CLINICAL HISTORY:  ([id number])75 y/o  (1950) F    Aortic dissection suspected;    TECHNIQUE:  (A#[id number], exam time 8/30/2024 15:19)    CTA CHEST ABDOMEN NON CORONARY (XPD) [id number]    Axial CT images of the chest and abdomen were obtained from the thoracic inlet to the proximal thigh.    Additional coronal and sagittal reformatted images are available for review.  3D post processing was performed by the radiologist.    COMPARISON:  Radiograph of the chest from the same day.    FINDINGS:  CT Chest:    Visualized neck: " normal    Lungs: There is dependent atelectasis in the bilateral lower lobes.  The lungs are otherwise clear.    Airway: The trachea and central bronchial tree appear normal.    Pleura: There is no pleural effusion. There is no pneumothorax.    Cardiovascular: The heart is top-normal in size with scattered coronary arterial calcifications most notably in the proximal LAD and mid RCA distribution.  There are calcifications along the aortic and mitral valve and along the undersurface of the aortic arch.    Mediastinum: There is no supraclavicular  axillary  mediastinal  or hilar lymphadenopathy.    Soft tissues: The peripheral soft tissues appear normal.    Musculoskeletal: There are moderate degenerative changes of the thoracic spine.  There are no suspicious osseous lesions.    Esophagus: normal    CTA aorta:    The aorta is normal in course and caliber with no aneurysm, dissection or penetrating ulcer identified.    Celiac axis: Patent without stenosis.    SMA: Patent.    Right Renal artery: Small calcified plaque at the origin causing less than 20% stenosis.  There is an incidental accessory right renal artery which arises slightly more anterior and inferior to the main renal artery appearing to supply the inferior 3rd of the right kidney.    Left Renal artery: Patent.    Common iliacs: Small calcified pleural plaques are seen in the right common iliac causing less than 20% stenosis.    External iliac arteries: The visualized external iliacs are patent.    Internal iliacs: Calcified plaques are seen in the internal iliacs bilaterally causing less than 30% stenosis on the right and less than 10% stenosis on the left.    CT Abdomen:    Liver: The liver is normal in size and imaging appearance.    Gallbladder: The gallbladder is surgically absent.    Biliary Tree: No intra or extrahepatic ductal dilation.    Spleen: Normal.    Pancreas: The pancreas is normal.    Adrenal Glands: Normal    Kidneys: The kidneys are  normal in imaging appearance without hydronephrosis or hydroureter.    Lymph nodes: No abdominal lymphadenopathy is seen.    Intraperitoneal structures: There is no ascites.    Bowel: Moderate sigmoid diverticulosis and descending colonic diverticulosis without focal diverticulitis.    Abdominal wall: Supraumbilical ventral abdominal wall hernia containing omentum/fat.  There is a moderate size fat containing umbilical hernia as well.    Musculoskeletal: Moderate to severe degenerative disc height loss and facet arthropathy is seen throughout the lumbar spine.  There is an age-indeterminate anterior superior endplate compression fracture deformity a L2, with approximately 25% height loss of the anterior common no retropulsion.  There is a similar anterior superior endplate compression fracture deformity at L1, with 20% height loss of the anterior column, with no retropulsion.  This appears to have been present on studies dating back to 09/26/2023.  There is grade 1 anterolisthesis of L4 on L5 (3-4 mm).  Impression: 1.  Mild cardiomegaly with scattered coronary artery calcifications (predominantly in the LAD and circumflex distribution).    2.  Calcified plaques in the thoracoabdominal aorta and iliacs with no aneurysm as above.    3.  Chronic anterior superior endplate compression fracture deformities at L1 and L2, similar to the previous exam.    4.  Numerous additional, and incidental findings as noted above.    Electronically signed by: Felipe Walden  Date:    08/30/2024  Time:    15:28  X-Ray Chest AP Portable  Narrative: EXAMINATION:  XR CHEST AP PORTABLE    CLINICAL HISTORY:  Chest Pain;    FINDINGS:  Portable chest radiograph at 14:17 hours compared to prior exams shows the cardiomediastinal silhouette and pulmonary vasculature are within normal limits.    The lungs are normally expanded, with no consolidation, large pleural effusion, or evidence of pulmonary edema. No pneumothorax. There are no significant  osseous abnormalities.  Impression: No evidence of active cardiopulmonary disease.    Electronically signed by: Ubaldo Dumont  Date:    08/30/2024  Time:    14:33      [facility]  Results for orders placed during the hospital encounter of 09/06/22    Echo    Interpretation Summary  · The left ventricle is normal in size with concentric remodeling and normal systolic function.  · The estimated ejection fraction is 65%.  · Normal left ventricular diastolic function.  · Mild mitral regurgitation.  · Mild left atrial enlargement.  · Normal right ventricular size with normal right ventricular systolic function.  · Normal central venous pressure (3 mmHg).  · Trivial pericardial effusion.  · Aortic valve calcification. Mean gradient 9 mmhg      CURRENT/PREVIOUS VISIT EKG  Results for orders placed or performed during the hospital encounter of 08/30/24   EKG 12-lead    Collection Time: 08/30/24  1:46 PM   Result Value Ref Range    QRS Duration 90 ms    [facility] QTC Calculation 443 ms    Narrative    Test Reason : R07.9,    Vent. Rate : 110 BPM     Atrial Rate : 110 BPM     P-R Int : 204 ms          QRS Dur : 090 ms      QT Int : 328 ms       P-R-T Axes : 062 -18 083 degrees     QTc Int : 443 ms    Sinus tachycardia  Otherwise normal ECG  When compared with ECG of 30-AUG-2024 10:06,  No significant change was found    Referred By: [email or web address]   SELF           Confirmed By:        ASSESSMENT/PLAN:     Active Hospital Problems    Diagnosis    *NSTEMI (non-ST elevated myocardial infarction)    Diabetes    Stage 3a chronic kidney disease    Hyperlipidemia       ASSESSMENT & PLAN:   Non ST-elevation MI  Diffuse triple-vessel coronary disease  Diabetes  Hyperlipidemia  Abdominal obesity  Mild-to-moderate aortic stenosis mean gradient 12 mmHg  RECOMMENDATIONS:  Discuss options with the patient  She does have diffuse triple-vessel coronary disease without good targets but we still recommended coronary bypass evaluation by   Luis Meier MD  [facility]  Department of Cardiology  Date of Service: 08/31/2024  9:13 AM

## 2024-08-31 NOTE — CONSULTS
"[facility]  Adult Nutrition   Consult Note (Nutrition Education)     SUMMARY     Recommendations  1.) Advance diet as tolerated to cardiac, diabetic diet restrictions; 2000 kcals. Texture per SLP.   2.) If consuming <50% of meals, RD to add Glucerna BID to provide 360 kcals, 20gm protein.   3.) Uncontrolled DM: provided diet education.   Recommend outpatient diabetes education.   Suggest insulin management while admitted.    Goals:   1.) Diet to advance within 72hr.   2.) Pt to consume/tolerate >75% of diet by RD follow up.  Nutrition Goal Status: new    Nutrition Diagnosis PES Statement: Food- and nutrition-related knowledge deficit related to lack of prior education as evidenced by HbA1C 10.2%  Inadequate oral intake related to insufficient means to consume energy as evidenced by diet order of NPO.      Dietitian Rounds Brief  Consult receive for NSTEMI. Pt admitted with chest pain. Pt currently NPO. She reports not following a diet at home and eats anything she wants. She admits to receiving diet education in the past but "I don't have time for that". She reports fair appetite. No chew/swallowing issues. C/o some nausea, no other GI distress. LBM 8/30. Skin: intact per chart. Wt reviewed. UBW 81.5 kg (2021) reflecting wt maintainence x3 years. NFPE completed with no s/s of wasting. No malnutrition suspected. Provided heart healthy, consistent carbohydrate diet handouts with RD contact information.    Nutrition Related Social Determinants of Health: SDOH: None Identified    Malnutrition Assessment    Orbital Region (Subcutaneous Fat Loss): well nourished  Upper Arm Region (Subcutaneous Fat Loss): well nourished  Thoracic and Lumbar Region: well nourished   Muslim Region (Muscle Loss): well nourished  Clavicle Bone Region (Muscle Loss): well nourished  Clavicle and Acromion Bone Region (Muscle Loss): well nourished  Scapular Bone Region (Muscle Loss): well nourished  Dorsal Hand (Muscle Loss): well " "nourished  Patellar Region (Muscle Loss): well nourished  Anterior Thigh Region (Muscle Loss): well nourished  Posterior Calf Region (Muscle Loss): well nourished     Diet order:   NPO    Evaluation of Received Nutrient/Fluid Intake  Energy Calories Required: not meeting needs  Protein Required: not meeting needs  Fluid Required: meeting needs  Tolerance: other (see comments) (NPO)     % Intake of Estimated Energy Needs: 0%  % Meal Intake: NPO      Intake/Output Summary (Last 24 hours) at 2024 1020  Last data filed at 2024 0601  Gross per 24 hour   Intake 220 ml   Output 310 ml   Net -90 ml        Anthropometrics  Temp: 97.8 °F (36.6 °C)  Height Method: Stated  Height: 5' 7" (170.2 cm)  Height (inches): 67 in  Weight Method: Bed Scale  Weight: 89.8 kg (197 lb 15.6 oz)  Weight (lb): 197.98 lb  Ideal Body Weight (IBW), Female: 135 lb  % Ideal Body Weight, Female (lb): 146.65 %  BMI (Calculated): 31  BMI Grade: 30 - 34.9- obesity - grade I  Usual Body Weight (UBW), k.5 kg ()  % Usual Body Weight: 110.41       Estimated/Assessed Needs  Weight Used For Calorie Calculations: 89.8 kg (197 lb 15.6 oz)  Energy Calorie Requirements (kcal): 5474-9090  Energy Need Method: Kcal/kg (20-25)  Protein Requirements:  (1.5-2.0)  Weight Used For Protein Calculations: 61 kg (134 lb 7.7 oz) (IBW d/t obesity + ICU setting)  Fluid Requirements (mL): 3511-8150     RDA Method (mL): 1796  CHO Requirement: 202-269gm CHO per day    Reason for Assessment  Reason For Assessment: consult, identified at risk by screening criteria  Diagnosis: cardiac disease, diabetes diagnosis/complications  Relevant Medical History: HTN, DM 2, HLD  Nutrition Discharge Planning: Pending hospital course    Nutrition/Diet History  Patient Reported Diet/Restrictions/Preferences: general  Spiritual, Cultural Beliefs, Episcopal Practices, Values that Affect Care: no  Food Allergies: NKFA  Factors Affecting Nutritional Intake: NPO    Nutrition " Risk Screen  Nutrition Risk Screen: no indicators present     MST Score: 0  Have you recently lost weight without trying?: No  Weight loss score: 0  Have you been eating poorly because of a decreased appetite?: No  Appetite score: 0       Weight History:  Wt Readings from Last 10 Encounters:   08/30/24 89.8 kg (197 lb 15.6 oz)   08/31/24 89.4 kg (197 lb)   08/30/24 89.1 kg (196 lb 6.9 oz)   08/14/24 87.8 kg (193 lb 9 oz)   07/12/24 87 kg (191 lb 12.8 oz)   05/16/24 84.5 kg (186 lb 4.6 oz)   05/13/24 86 kg (189 lb 11.3 oz)   04/05/24 86.1 kg (189 lb 13.1 oz)   03/13/24 87.1 kg (192 lb)   02/19/24 86.8 kg (191 lb 5.8 oz)        Lab/Procedures/Meds: Pertinent Labs/Meds Reviewed    Medications:Pertinent Medications Reviewed  Scheduled Meds:   aspirin  81 mg Oral Daily    atorvastatin  80 mg Oral Daily    lisinopriL  40 mg Oral Daily    metoprolol succinate  50 mg Oral Daily    nitroGLYCERIN 2% TD oint  1 inch Topical (Top) Q6H     Continuous Infusions:   heparin (porcine) in D5W  0-40 Units/kg/hr (Adjusted) Intravenous Continuous 8.6 mL/hr at 08/30/24 2259 12 Units/kg/hr at 08/30/24 2259    nitroGLYCERIN in 5 % dextrose    Continuous PRN 3 mL/hr at 08/30/24 1737 10 mcg/min at 08/30/24 1737    nitroGLYCERIN  0-400 mcg/min Intravenous Continuous         PRN Meds:.  Current Facility-Administered Medications:     acetaminophen, 650 mg, Oral, Q6H PRN    dextrose 50%, 12.5 g, Intravenous, PRN    glucagon (human recombinant), 1 mg, Intramuscular, PRN    heparin (PORCINE), 56 Units/kg (Adjusted), Intravenous, PRN    heparin (PORCINE), 30 Units/kg (Adjusted), Intravenous, PRN    insulin aspart U-100, 0-10 Units, Subcutaneous, Q6H PRN    magnesium oxide, 800 mg, Oral, PRN    magnesium oxide, 800 mg, Oral, PRN    nitroGLYCERIN in 5 % dextrose, , , Continuous PRN    nitroGLYCERIN, 0.4 mg, Sublingual, Q5 Min PRN    ondansetron, 4 mg, Intravenous, Q6H PRN    potassium bicarbonate, 35 mEq, Oral, PRN    potassium bicarbonate, 50 mEq,  Oral, PRN    potassium bicarbonate, 60 mEq, Oral, PRN    potassium, sodium phosphates, 2 packet, Oral, PRN    potassium, sodium phosphates, 2 packet, Oral, PRN    potassium, sodium phosphates, 2 packet, Oral, PRN    senna-docusate 8.6-50 mg, 1 tablet, Oral, Daily PRN    Labs: Pertinent Labs Reviewed  Clinical Chemistry:  Recent Labs   Lab 08/30/24  1420 08/31/24  0247    140   K 4.1 3.7    107   CO2 23 25   * 125*   BUN 23 17   CREATININE 1.2 1.0   CALCIUM 9.2 9.1   PROT 7.0 6.7   ALBUMIN 3.8 3.5   BILITOT 0.4 0.4   ALKPHOS 62 55   AST 15 20   ALT 15 13   ANIONGAP 10 8   MG 1.6 1.8     CBC:   Recent Labs   Lab 08/31/24  0247   WBC 10.39  10.39   RBC 4.60  4.60   HGB 12.8  12.8   HCT 40.1  40.1     287   MCV 87  87   MCH 27.8  27.8   MCHC 31.9*  31.9*     Lipid Panel:  Recent Labs   Lab 08/30/24 2004   CHOL 164   HDL 35*   LDLCALC 92.2   TRIG 184*   CHOLHDL 21.3     Cardiac Profile:  Recent Labs   Lab 08/30/24  1420   BNP 62     Diabetes:  Recent Labs   Lab 08/30/24 2004   HGBA1C 10.2*     Thyroid & Parathyroid:  Recent Labs   Lab 08/31/24  0247   TSH 3.587   FREET4 0.88       Monitor and Evaluation  Food and Nutrient Intake: energy intake, food and beverage intake  Food and Nutrient Adminstration: diet order  Knowledge/Beliefs/Attitudes: food and nutrition knowledge/skill  Physical Activity and Function: nutrition-related ADLs and IADLs  Anthropometric Measurements: weight, weight change  Biochemical Data, Medical Tests and Procedures: electrolyte and renal panel, gastrointestinal profile, glucose/endocrine profile  Nutrition-Focused Physical Findings: overall appearance     Nutrition Risk  Level of Risk/Frequency of Follow-up:  (2x/week)     Nutrition Follow-Up  RD Follow-up?: Yes    Elif Ricketts RD 08/31/2024 10:23 AM

## 2024-08-31 NOTE — PLAN OF CARE
Problem: Oral Intake Inadequate  Goal: Improved Oral Intake  Outcome: Progressing  Intervention: Promote and Optimize Oral Intake  Flowsheets (Taken 8/31/2024 1023)  Oral Nutrition Promotion:   nutrition counseling provided   other (see comments)  Nutrition Interventions: other (see comments)     Problem: Skin Injury Risk Increased  Goal: Skin Health and Integrity  Intervention: Promote and Optimize Oral Intake  Flowsheets (Taken 8/31/2024 1023)  Oral Nutrition Promotion:   nutrition counseling provided   other (see comments)  Nutrition Interventions: other (see comments)     Recommendations  1.) Advance diet as tolerated to cardiac, diabetic diet restrictions; 2000 kcals. Texture per SLP.   2.) If consuming <50% of meals, RD to add Glucerna BID to provide 360 kcals, 20gm protein.   3.) Uncontrolled DM: provided diet education.   Recommend outpatient diabetes education.   Suggest insulin management while admitted.     Goals:   1.) Diet to advance within 72hr.   2.) Pt to consume/tolerate >75% of diet by RD follow up.  Nutrition Goal Status: new

## 2024-08-31 NOTE — PLAN OF CARE
Problem: Acute Coronary Syndrome  Goal: Optimal Adaptation to Illness  Outcome: Progressing  Goal: Absence of Cardiac-Related Pain  Outcome: Progressing  Goal: Normalized Cardiac Rhythm  Outcome: Progressing  Goal: Effective Cardiac Pump Function  Outcome: Progressing  Goal: Adequate Tissue Perfusion  Outcome: Progressing     Problem: Cardiac Catheterization (Diagnostic/Interventional)  Goal: Absence of Bleeding  Outcome: Progressing  Goal: Absence of Contrast-Induced Injury  Outcome: Progressing  Goal: Stable Heart Rate and Rhythm  Outcome: Progressing  Goal: Absence of Embolism Signs and Symptoms  Outcome: Progressing  Goal: Anesthesia/Sedation Recovery  Outcome: Progressing  Goal: Optimal Pain Control and Function  Outcome: Progressing  Goal: Absence of Vascular Access Complication  Outcome: Progressing     Problem: Adult Inpatient Plan of Care  Goal: Optimal Comfort and Wellbeing  Outcome: Progressing

## 2024-08-31 NOTE — SUBJECTIVE & OBJECTIVE
Interval History:  Patient said she no longer has chest pain.  She is very hungry    Review of Systems   All other systems reviewed and are negative.    Objective:     Vital Signs (Most Recent):  Temp: 97.8 °F (36.6 °C) (08/31/24 0701)  Pulse: 85 (08/31/24 1415)  Resp: (!) 26 (08/31/24 1415)  BP: 120/65 (08/31/24 1330)  SpO2: 98 % (08/31/24 1415) Vital Signs (24h Range):  Temp:  [97.7 °F (36.5 °C)-98.4 °F (36.9 °C)] 97.8 °F (36.6 °C)  Pulse:  [] 85  Resp:  [11-26] 26  SpO2:  [89 %-99 %] 98 %  BP: (104-156)/(58-93) 120/65     Weight: 89.8 kg (197 lb 15.6 oz)  Body mass index is 31.01 kg/m².    Intake/Output Summary (Last 24 hours) at 8/31/2024 1621  Last data filed at 8/31/2024 0601  Gross per 24 hour   Intake 220 ml   Output 310 ml   Net -90 ml         Physical Exam  Cardiovascular:      Rate and Rhythm: Normal rate.      Pulses: Normal pulses.   Pulmonary:      Effort: Pulmonary effort is normal.   Neurological:      Mental Status: She is alert and oriented to person, place, and time.             Significant Labs: All pertinent labs within the past 24 hours have been reviewed.    Significant Imaging: I have reviewed all pertinent imaging results/findings within the past 24 hours.

## 2024-08-31 NOTE — ASSESSMENT & PLAN NOTE
Status post angiogram on 08/30 showed severe multivessel CAD with thrombus in the distal RCA.    Currently on heparin drip.  Troponin trended down  Cardio and Cardiothoracic surgery are following patient

## 2024-08-31 NOTE — HOSPITAL COURSE
Patient with medical history of diabetes, hypertension, hyperlipidemia came in with chest pain and was admitted for NSTEMI.  Status post angiogram on 08/30 showed severe multivessel CAD with thrombus in the distal RCA.  Patient was placed on heparin drip.  Cardiology and Cardiothoracic surgery were following patient.  Troponins were trended. Plan for CABG on 9/6/24 by Dr. Hung.  A1c 10.2 , so we made adjustments to glargine and SSI as needed.  Patient was treated with antibiotic therapy for urinary tract infection.  Final urine culture test confirmed E coli (ESBL species) for which patient received intravenous antibiotic therapy.  Patient was placed on contact isolation protocol.  Patient underwent the CABG as planned, and it went uneventfully. Worked with  PT/OT. Pacer wires removed. Chest tubes removed 9/10/24. The patient was discharged home in stable condition.

## 2024-08-31 NOTE — NURSING
Nurses Note -- 4 Eyes      8/30/2024   9:40 PM      Skin assessed during: Admit      [x] No Altered Skin Integrity Present    [x]Prevention Measures Documented      [] Yes- Altered Skin Integrity Present or Discovered   [] LDA Added if Not in Epic (Describe Wound)   [] New Altered Skin Integrity was Present on Admit and Documented in LDA   [] Wound Image Taken    Wound Care Consulted? No    Attending Nurse:  GABBI Morrison    Second RN/Staff Member:   Sanna CHAO RN

## 2024-08-31 NOTE — PROGRESS NOTES
[facility] Medicine  Progress Note    Patient Name: Debby Brown  MRN: [id number]  Patient Class: IP- Inpatient   Admission Date: 8/30/2024  Length of Stay: 0 days  Attending Physician: Callie Curry DO  Primary Care Provider: Shawna Costello MD        Subjective:     Principal Problem:NSTEMI (non-ST elevated myocardial infarction)        HPI:  74-year-old female presented to ED for eval of chest pain. pMHx HTN, DM 2, HLD.  Patient reported she has been having back pain that radiates to the front of her chest, stated chest pain his pressure-like and worsens with exertion, relieved by rest.  Patient reported associated shortness of breath and nausea.  Reported these symptoms have been present intermittently for the last several months but have worsened significantly in the last few weeks with more frequent episodes.  Patient was evaluated by her PCP earlier today, she stated she had been out of most of her home meds, including BP meds and oral diabetic agent, for 3-4 weeks, reported to PCP the only medication she was currently taking is her Toujeo.  In ED today, SBP greater than 200 on arrival, improved with nitroglycerin.  Initial troponin 908.  Noted with hyperglycemia, serum glucose 337.  EKG with T-wave changes.  Chest x-ray impression without acute abnormality.  CTA chest abdomen impression with mild cardiomegaly with scattered coronary artery calcifications (predominantly in the LAD and circumflex distribution); calcified plaques in the thoracoabdominal aorta and iliacs with no aneurysm.  ED discussed case with Cardiology, heparin drip initiated.  Admit to hospital medicine for further eval.    Overview/Hospital Course:  Patient with medical history of diabetes, hypertension, hyperlipidemia came in with chest pain and was admitted for NSTEMI.  Status post angiogram on 08/30 showed severe multivessel CAD with thrombus in the distal RCA.  Patient was placed on heparin drip.  Cardiology  and Cardiothoracic surgery are following patient.  Troponin were trended.    Interval History:  Patient said she no longer has chest pain.  She is very hungry    Review of Systems   All other systems reviewed and are negative.    Objective:     Vital Signs (Most Recent):  Temp: 97.8 °F (36.6 °C) (08/31/24 0701)  Pulse: 85 (08/31/24 1415)  Resp: (!) 26 (08/31/24 1415)  BP: 120/65 (08/31/24 1330)  SpO2: 98 % (08/31/24 1415) Vital Signs (24h Range):  Temp:  [97.7 °F (36.5 °C)-98.4 °F (36.9 °C)] 97.8 °F (36.6 °C)  Pulse:  [] 85  Resp:  [11-26] 26  SpO2:  [89 %-99 %] 98 %  BP: (104-156)/(58-93) 120/65     Weight: 89.8 kg (197 lb 15.6 oz)  Body mass index is 31.01 kg/m².    Intake/Output Summary (Last 24 hours) at 8/31/2024 1621  Last data filed at 8/31/2024 0601  Gross per 24 hour   Intake 220 ml   Output 310 ml   Net -90 ml         Physical Exam  Cardiovascular:      Rate and Rhythm: Normal rate.      Pulses: Normal pulses.   Pulmonary:      Effort: Pulmonary effort is normal.   Neurological:      Mental Status: She is alert and oriented to person, place, and time.             Significant Labs: All pertinent labs within the past 24 hours have been reviewed.    Significant Imaging: I have reviewed all pertinent imaging results/findings within the past 24 hours.    Assessment/Plan:      * NSTEMI (non-ST elevated myocardial infarction)  Status post angiogram on 08/30 showed severe multivessel CAD with thrombus in the distal RCA.    Currently on heparin drip.  Troponin trended down  Cardio and Cardiothoracic surgery are following patient    Diabetes  SSI    Stage 3a chronic kidney disease  Creatine stable for now. CMP reviewed- noted Estimated Creatinine Clearance: 57.7 mL/min (based on SCr of 1 mg/dL). according to latest data. Based on current GFR, CKD stage is stage 3 - GFR 30-59.  Monitor UOP and serial CMP and adjust therapy as needed. Renally dose meds. Avoid nephrotoxic medications and  procedures.    Hyperlipidemia  Atorvastatin  Lipid panel        VTE Risk Mitigation (From admission, onward)           Ordered     heparin 25,000 units in dextrose 5% (100 units/ml) IV bolus from bag LOW INTENSITY nomogram - [facility]  As needed (PRN)        Question:  Heparin Infusion Adjustment (DO NOT MODIFY ANSWER)  Answer:  [email or web address]\epic\Images\Pharmacy\HeparinInfusions\heparin LOW INTENSITY nomogram for [facility] [id number]    08/30/24 1829     heparin 25,000 units in dextrose 5% (100 units/ml) IV bolus from bag LOW INTENSITY nomogram - [facility]  As needed (PRN)        Question:  Heparin Infusion Adjustment (DO NOT MODIFY ANSWER)  Answer:  [email or web address]\epic\Images\Pharmacy\HeparinInfusions\heparin LOW INTENSITY nomogram for [facility] [id number]    08/30/24 1829     heparin 25,000 units in dextrose 5% 250 mL (100 units/mL) infusion LOW INTENSITY nomogram - [facility]  Continuous        Question:  Begin at (units/kg/hr)  Answer:  12    08/30/24 1829     Reason for No Pharmacological VTE Prophylaxis  Once        Question:  Reasons:  Answer:  IV Heparin w/in 24 hrs. Pre or Post-Op    08/30/24 1646     IP VTE HIGH RISK PATIENT  Once         08/30/24 1646     Place sequential compression device  Until discontinued         08/30/24 1646                    Discharge Planning   MARTHA:      Code Status: Full Code   Is the patient medically ready for discharge?:     Reason for patient still in hospital (select all that apply): Patient trending condition  Discharge Plan A: Home            Critical care time spent on the evaluation and treatment of severe organ dysfunction, review of pertinent labs and imaging studies, discussions with consulting providers and discussions with patient/family: 32 minutes.      Callie Curry DO  Department of Hospital Medicine   [facility]

## 2024-08-31 NOTE — CONSULTS
DATE OF CONSULTATION: 8/31/2024     CONSULT REQUESTED BY:  Dr. Ashleigh Chambers     REASON FOR CONSULTATION:  Unstable/crescendo/class IV angina, NSTEMI, critical, diffuse triple-vessel coronary artery disease     HPI:   The patient is a 74-year-old obese  female with uncontrolled hypertension, and uncontrolled type 2 diabetes.    The patient presented to the hospital emergency department 08/30/2024 with a several month history of exertional angina associated with dyspnea and nausea.  The angina always resolved with rest.  The discomfort has been progressive in intensity and frequency over the last couple of weeks.    The patient had a particularly severe and intense episode of chest discomfort which prompted her visit to the emergency department yesterday.    By the time she arrived in the emergency department, the pain had nearly completely resolved.  She was treated with aspirin and heparin.  There were no acute EKG changes but the troponin I was elevated and rising.    Echocardiography this morning demonstrates a preserved ejection fraction (60%).  There is normal right ventricular function.  The patient demonstrates moderate aortic valve stenosis.  The transvalvular velocities are 228.  The mean gradient is 12.  There is mild TR and the pulmonary artery pressures are estimated at 26.    The patient underwent urgent left heart catheterization with coronary angiography the evening of 08/30/2024.    Coronary angiography demonstrates critical and very diffuse coronary artery disease.  The branch coronary arteries are very small in caliber.    The patient has uncontrolled type 2 diabetes.  Her current A1c is 10.4.  At home, she is prescribed Farxiga, Toujeo, Actos, Ozempic, and Trulicity.    She has a history of uncontrolled hypertension.  Her blood pressure has been fairly well controlled since her hospital admission.    The patient is seen in the ICU this afternoon.  She has multiple family members at her  bedside.  She is on heparin and nitroglycerin drips.  She is currently chest pain-free.  She has been chest pain-free since her hospital admission.  She denies symptoms of congestive heart failure other than the exertional dyspnea.    The patient is a poor historian.  She is noncompliant with medical instructions/prescriptions.  She is quite forgetful.     Data Review:  In preparation for this consultation, I have reviewed her entire [facility] medical record.  I have personally reviewed and interpreted the recent echocardiogram and coronary angiogram images.  I had a brief discussion about this patient with Dr. Meier.      Past Medical History:   Diagnosis Date    Allergy     Anxiety     Depression     Diabetes mellitus, type 2     Hyperlipidemia     Hypertension     Personal history of colonic polyps     Pre-diabetes         CMH:  Hypertension-uncontrolled, hyperlipidemia, type 2 diabetes-uncontrolled, diagnosed 3 years ago (? ), chronic kidney disease-stage IIIA, obesity-BMI 31      Past Surgical History:   Procedure Laterality Date    APPENDECTOMY      BACK SURGERY      BREAST SURGERY      CHOLECYSTECTOMY      COLONOSCOPY      COLONOSCOPY N/A 03/13/2024    Procedure: COLONOSCOPY;  Surgeon: Isabel Noriega MD;  Location: [facility] ENDO;  Service: Endoscopy;  Laterality: N/A;  ppm    CORONARY ANGIOGRAPHY N/A 8/30/2024    Procedure: ANGIOGRAM, CORONARY ARTERY;  Surgeon: Ashleigh Chambers MD;  Location: [facility] CATH/EP LAB;  Service: Cardiology;  Laterality: N/A;    GALLBLADDER SURGERY      HERNIA REPAIR      umbilical    lymphnode remove      SHOULDER SURGERY Left     RTR    TOTAL REDUCTION MAMMOPLASTY Bilateral 2001    TUBAL LIGATION          PSH:  Appendectomy, lumbar disc surgery-age 41, bilateral breast reductions-early 1990s, cholecystectomy, umbilical herniorrhaphy, left rotator cuff repair, bilateral tubal ligation     Social History     Socioeconomic History    Marital status:     Number of children: 2    Occupational History    Occupation: COMPTRALLER   Tobacco Use    Smoking status: Former     Passive exposure: Past    Smokeless tobacco: Never   Substance and Sexual Activity    Alcohol use: Yes     Comment: occasionally    Drug use: No    Sexual activity: Not Currently     Social Determinants of Health     Financial Resource Strain: Low Risk  (8/31/2024)    Overall Financial Resource Strain (CARDIA)     Difficulty of Paying Living Expenses: Not hard at all   Food Insecurity: No Food Insecurity (8/31/2024)    Hunger Vital Sign     Worried About Running Out of Food in the Last Year: Never true     Ran Out of Food in the Last Year: Never true   Transportation Needs: No Transportation Needs (8/31/2024)    TRANSPORTATION NEEDS     Transportation : No   Physical Activity: Inactive (8/31/2024)    Exercise Vital Sign     Days of Exercise per Week: 0 days     Minutes of Exercise per Session: 0 min   Stress: Stress Concern Present (8/31/2024)    [employer] - Occupational Stress Questionnaire     Feeling of Stress : To some extent   Housing Stability: Low Risk  (8/31/2024)    Housing Stability Vital Sign     Unable to Pay for Housing in the Last Year: No     Homeless in the Last Year: No        SH:  The patient has a less than 10 pack-year history of tobacco abuse.  She quit smoking cigarettes at the age of 21.  She consumes 1 or 2 alcoholic beverages per week.  She is  and lives alone.  She has 2 adult children both of whom are in good health.  She lives an independent and active lifestyle although her activity level has decreased significantly over the last several months.  She does not exercise regularly.  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  The patient usually receives regular, yearly, dental care.  She is currently overdue but denies any loose or painful teeth.  She does not receive antibiotic dental prophylaxis.    Family History   Problem Relation Name Age of  Onset    Mental illness Mother      Cancer Mother          female cancer?    Depression Mother      Heart disease Mother      Hypertension Mother      Stroke Mother      Heart disease Father      Hypertension Father      Stroke Father      Cancer Brother          bladder    Glaucoma Neg Hx      Macular degeneration Neg Hx         FH:  Her father is  after a stroke at the age of 68       Allergies:  No known drug allergies  Sensitivities:  Bactrim-unknown reaction, penicillin-unknown reaction age 16, Levaquin-unknown reaction, codeine-pruritus-Vicodin and Norco are okay      Prior to Admission Meds (Last known outpatient meds at time of note signature)   Prior to Admission medications    Medication Sig Start Date End Date Taking? Authorizing Provider   ALPRAZolam (XANAX) 0.5 MG tablet Take 1 tablet by mouth twice daily as needed for anxiety 24  Yes Blane Mancini III, MD   insulin glargine, TOUJEO, (TOUJEO SOLOSTAR U-300 INSULIN) 300 unit/mL (1.5 mL) InPn pen INJECT 70 UNITS SUBCUTANEOUSLY ONCE DAILY  Patient taking differently: Inject 70 Units into the skin once daily. 24  Yes Blane Mancini III, MD   buPROPion (WELLBUTRIN XL) 300 MG 24 hr tablet Take 1 tablet (300 mg total) by mouth once daily.  Patient not taking: Reported on 2024   Shawna Costello MD   dapagliflozin propanediol (FARXIGA) 10 mg tablet Take 1 tablet (10 mg total) by mouth once daily.  Patient not taking: Reported on 2024   Shawna Costello MD   fluconazole (DIFLUCAN) 200 MG Tab Take 1 tablet (200 mg total) by mouth once a week. for 28 doses  Patient not taking: Reported on 2024  Angelo Ellington DPM   lisinopriL (PRINIVIL,ZESTRIL) 40 MG tablet Take 1 tablet (40 mg total) by mouth once daily.  Patient not taking: Reported on 2024   Shawna Costello MD   metoprolol succinate (TOPROL-XL) 50 MG 24 hr tablet Take 1 tablet (50 mg total) by mouth once daily.  Patient not taking:  "Reported on 8/30/2024 8/30/24   Shawna Costello MD   pen needle, diabetic (BD JERI 2ND GEN PEN NEEDLE) 32 gauge x 5/32" Ndle USE AS DIRECTED 8/1/23   Blane Mancini III, MD   pioglitazone (ACTOS) 15 MG tablet Take 1 tablet (15 mg total) by mouth once daily.  Patient not taking: Reported on 8/30/2024 8/30/24 8/30/25  Shawna Costello MD   rosuvastatin (CRESTOR) 40 MG Tab Take 1 tablet (40 mg total) by mouth once daily.  Patient not taking: Reported on 8/30/2024 8/30/24 8/30/25  Shawna Costello MD   semaglutide (OZEMPIC) 0.25 mg or 0.5 mg (2 mg/3 mL) pen injector Inject 0.5 mg into the skin every 7 days.  Patient not taking: Reported on 8/30/2024 8/30/24   Shawna Costello MD   TRUE METRIX GLUCOSE METER [facility]  8/13/20   Provider, Historical   TRUE METRIX GLUCOSE TEST STRIP Strp U QAM 7/7/23   Blane Mancini III, MD   TRUEPLUS LANCETS 30 gauge [facility] U QA 7/7/23   Blane Mancini III, MD        Review of Systems:   Constitutional: no fever, no chills, no appetite change, no unexpected weight change   Dermatological: no jaundice, no rash, no nodules, no ulcers, no pruritis   HEENT: no vision change, no hearing change, no nasal discharge, no sore throat   Neck: no unusual stiffness, no swollen glands, no goiter   Respiratory: (+) dyspnea, no cough, no hemoptysis, no wheezing,  Cardiovascular: (+) chest pain, no palpitations, no edema   Gastrointestinal: no abdominal discomfort   Musculoskeletal: no new joint pain, no joint swelling, no myalgia   : no dysuria, no frequency, no gross hematuria   HEME: no prolonged bleeding, no excessive bruising, no blood clots, no adenopathy   Endocrine: no excessive thirst, no unusual intolerance of heat or cold   Neurological: no confusion, no seizures, no syncope, no falls   Psychological: (+) anxiety, no depression     Objective:   Vitals:    08/31/24 1415   BP:    Pulse: 85   Resp: (!) 26   Temp:        Physical Exam:   Constitutional: Alert, appears stated age, cooperative and no " distress.  Obese body habitus, no obvious deformities, good attention to grooming.   Head: Normocephalic, without obvious abnormality, atraumatic   Eyes: Conjunctivae/corneas clear. PERRL, EOM's intact. No exudate.  Nose: Nares normal. Septum midline. Mucosa normal. No drainage or sinus tenderness.   Throat: Lips, mucosa, and tongue normal; fair dentition   Neck: No adenopathy, (+)carotid bruit, no JVD, supple, symmetrical, trachea midline, and thyroid not enlarged, symmetric, no tenderness/mass/nodules.   Back: Symmetric, no curvature ROM normal No CVA tenderness.   Lungs: Clear to auscultation bilaterally and good air exchange. No tachypnea. No use of accessory muscles.   Heart: Regular rate and rhythm, S1, S2 normal, systolic murmur, no click, rub or gallop. PMI nondisplaced.   Abdomen: Soft, non-tender, bowel sounds normal; No hepatosplenomegaly. No hernias   Aorta: no evidence of aneurysm   Musculoskeletal: No evidence of kyphosis or scoliosis. Normal muscle strength and tone. No evidence of limb atrophy or abnormal movements.   Extremities: Normal, atraumatic, no clubbing, cyanosis, or edema. There are no lower extremity venous varicosities.    Pulses: 2+ and symmetric in both radial and femoral regions.   Skin: Skin color, texture, turgor normal.  No rashes or lesions.  Lymph nodes: Cervical, supraclavicular, and axillary nodes normal   Neurologic/psychiatric: Cranial nerves 2-12 are grossly intact No obvious abnormality. Oriented to person, place, and time. No depression, (+) mild anxiety, no agitation.     Assessment:  Patient Active Problem List    Diagnosis Date Noted    Stage 3b chronic kidney disease 08/30/2024    Diabetes 08/30/2024    NSTEMI (non-ST elevated myocardial infarction) 08/30/2024    Calcification of aorta 05/14/2024    Anterolisthesis of lumbar spine 09/06/2023    Compression fracture of L1 lumbar vertebra 09/06/2023    Compression fracture of L2 lumbar vertebra 09/06/2023    Panic attacks  07/07/2023    Low back pain 05/12/2023    Stage 3a chronic kidney disease 04/06/2023    SOB (shortness of breath) 09/06/2022    Benign familial tremor 08/24/2022    Hyperuricemia 07/28/2022    Macroalbuminuric diabetic nephropathy 07/16/2022    COVID-19 long hauler manifesting chronic concentration deficit 12/19/2021    Balance problem 12/19/2021    Memory loss 12/19/2021    Pulmonary nodule 03/31/2021    Type 2 diabetes mellitus with microalbuminuria, with long-term current use of insulin 01/19/2021    LVH (left ventricular hypertrophy) 12/09/2020    Aortic valve stenosis 12/09/2020    BMI 29.0-29.9,adult 08/01/2019    Cataracts, bilateral 06/19/2019    Benign essential tremor 11/29/2018    BMI 33.0-33.9,adult 11/29/2018    Insomnia 07/05/2018    Depression, recurrent     S/P left rotator cuff repair 03/15/2018    Anxiety 03/15/2018    Attention deficit disorder 03/15/2018    Hypertension associated with diabetes 03/15/2018    Hyperlipidemia 03/15/2018          Plan:  The patient has a several month history of typical crescendo/unstable exertional angina.  She presented after a particularly intense episode of discomfort.  She ruled in for an NSTEMI.    Coronary angiography confirms critical and very diffuse triple-vessel coronary artery disease.  The target coronary arteries are very small in caliber.    Echocardiography (doppler) also demonstrates moderate aortic valve stenosis.  The images suggest significant calcification of the leaflets with restricted motion.  Review of the recent CTA is not helpful-the IV contrast is all in the arterial phase making valve calcium undetectable.  It seems like there is, at least, moderate aortic valve stenosis.    The best treatment option for this patient is urgent surgical coronary revascularization +/-aortic valve replacement.      I had a long (80 minute) discussion with the patient and multiple family members this afternoon.  I reviewed the surgical indications and the  sub optimal medical therapeutic alternative treatments with them.    I also discussed, in detail, the potential surgical risks and the expected benefits and outcome of urgent coronary artery bypass grafting with or without an aortic valve replacement.    I calculated and discussed the 30-day STS risk for morbidity or mortality with them.    Procedure Type: CABG + AVR   Perioperative Outcome Estimate %   Operative Mortality 2.61%   Morbidity & Mortality 11.5%   Stroke 3.72%   Renal Failure 2.1%   Reoperation 3.78%   Prolonged Ventilation 7.24%   Deep Sternal Wound Infection 0.327%   Long Hospital Stay (>14 days) 7.1%   Short Hospital Stay (<6 days)* 28.7%     The patient and her family seem to understand and wish to proceed as advised.    The patient can be scheduled for the upcoming week as the operating room schedule allows.  Should she become unstable or develop unrelieved mobile angina, we can proceed more urgently.    Thank you, Dr. Chambers, for allowing us to participate in the care of this patient.

## 2024-08-31 NOTE — CARE UPDATE
08/30/24 2013   Patient Assessment/Suction   Level of Consciousness (AVPU) alert   Respiratory Effort Unlabored   Expansion/Accessory Muscles/Retractions expansion symmetric   All Lung Fields Breath Sounds coarse   Rhythm/Pattern, Respiratory depth regular;pattern regular   Cough Frequency infrequent   Cough Type good;nonproductive   PRE-TX-O2   Device (Oxygen Therapy) nasal cannula   $ Is the patient on Low Flow Oxygen? Yes   Flow (L/min) (Oxygen Therapy) 4   SpO2 96 %   Pulse Oximetry Type Continuous   $ Pulse Oximetry - Multiple Charge Pulse Oximetry - Multiple   Pulse 88   Resp 16   Education   $ Education Oxygen;15 min

## 2024-09-01 LAB
ALBUMIN SERPL BCP-MCNC: 3.4 G/DL (ref 3.5–5.2)
ALP SERPL-CCNC: 53 U/L (ref 55–135)
ALT SERPL W/O P-5'-P-CCNC: 11 U/L (ref 10–44)
ANION GAP SERPL CALC-SCNC: 7 MMOL/L (ref 8–16)
ANION GAP SERPL CALC-SCNC: 8 MMOL/L (ref 8–16)
APTT PPP: 33.6 SEC (ref 21–32)
APTT PPP: 33.6 SEC (ref 21–32)
APTT PPP: 39.6 SEC (ref 21–32)
APTT PPP: 47.8 SEC (ref 21–32)
APTT PPP: 85.3 SEC (ref 21–32)
AST SERPL-CCNC: 13 U/L (ref 10–40)
BACTERIA #/AREA URNS HPF: ABNORMAL /HPF
BASOPHILS # BLD AUTO: 0.09 K/UL (ref 0–0.2)
BASOPHILS # BLD AUTO: 0.09 K/UL (ref 0–0.2)
BASOPHILS NFR BLD: 0.9 % (ref 0–1.9)
BASOPHILS NFR BLD: 0.9 % (ref 0–1.9)
BILIRUB SERPL-MCNC: 0.5 MG/DL (ref 0.1–1)
BILIRUB UR QL STRIP: NEGATIVE
BNP SERPL-MCNC: 56 PG/ML (ref 0–99)
BUN SERPL-MCNC: 18 MG/DL (ref 8–23)
BUN SERPL-MCNC: 22 MG/DL (ref 8–23)
CALCIUM SERPL-MCNC: 8.8 MG/DL (ref 8.7–10.5)
CALCIUM SERPL-MCNC: 9 MG/DL (ref 8.7–10.5)
CHLORIDE SERPL-SCNC: 103 MMOL/L (ref 95–110)
CHLORIDE SERPL-SCNC: 105 MMOL/L (ref 95–110)
CLARITY UR: ABNORMAL
CO2 SERPL-SCNC: 26 MMOL/L (ref 23–29)
CO2 SERPL-SCNC: 27 MMOL/L (ref 23–29)
COLOR UR: YELLOW
CREAT SERPL-MCNC: 1.1 MG/DL (ref 0.5–1.4)
CREAT SERPL-MCNC: 1.2 MG/DL (ref 0.5–1.4)
DIFFERENTIAL METHOD BLD: ABNORMAL
DIFFERENTIAL METHOD BLD: ABNORMAL
EOSINOPHIL # BLD AUTO: 0.4 K/UL (ref 0–0.5)
EOSINOPHIL # BLD AUTO: 0.4 K/UL (ref 0–0.5)
EOSINOPHIL NFR BLD: 4 % (ref 0–8)
EOSINOPHIL NFR BLD: 4 % (ref 0–8)
ERYTHROCYTE [DISTWIDTH] IN BLOOD BY AUTOMATED COUNT: 14.4 % (ref 11.5–14.5)
ERYTHROCYTE [DISTWIDTH] IN BLOOD BY AUTOMATED COUNT: 14.4 % (ref 11.5–14.5)
EST. GFR  (NO RACE VARIABLE): 47.8 ML/MIN/1.73 M^2
EST. GFR  (NO RACE VARIABLE): 53.1 ML/MIN/1.73 M^2
GLUCOSE SERPL-MCNC: 142 MG/DL (ref 70–110)
GLUCOSE SERPL-MCNC: 188 MG/DL (ref 70–110)
GLUCOSE SERPL-MCNC: 221 MG/DL (ref 70–110)
GLUCOSE SERPL-MCNC: 222 MG/DL (ref 70–110)
GLUCOSE UR QL STRIP: NEGATIVE
HCT VFR BLD AUTO: 38.6 % (ref 37–48.5)
HCT VFR BLD AUTO: 38.6 % (ref 37–48.5)
HGB BLD-MCNC: 12.2 G/DL (ref 12–16)
HGB BLD-MCNC: 12.2 G/DL (ref 12–16)
HGB UR QL STRIP: NEGATIVE
IMM GRANULOCYTES # BLD AUTO: 0.04 K/UL (ref 0–0.04)
IMM GRANULOCYTES # BLD AUTO: 0.04 K/UL (ref 0–0.04)
IMM GRANULOCYTES NFR BLD AUTO: 0.4 % (ref 0–0.5)
IMM GRANULOCYTES NFR BLD AUTO: 0.4 % (ref 0–0.5)
KETONES UR QL STRIP: NEGATIVE
LEUKOCYTE ESTERASE UR QL STRIP: ABNORMAL
LYMPHOCYTES # BLD AUTO: 4.1 K/UL (ref 1–4.8)
LYMPHOCYTES # BLD AUTO: 4.1 K/UL (ref 1–4.8)
LYMPHOCYTES NFR BLD: 41.6 % (ref 18–48)
LYMPHOCYTES NFR BLD: 41.6 % (ref 18–48)
MAGNESIUM SERPL-MCNC: 2 MG/DL (ref 1.6–2.6)
MCH RBC QN AUTO: 27.9 PG (ref 27–31)
MCH RBC QN AUTO: 27.9 PG (ref 27–31)
MCHC RBC AUTO-ENTMCNC: 31.6 G/DL (ref 32–36)
MCHC RBC AUTO-ENTMCNC: 31.6 G/DL (ref 32–36)
MCV RBC AUTO: 88 FL (ref 82–98)
MCV RBC AUTO: 88 FL (ref 82–98)
MICROSCOPIC COMMENT: ABNORMAL
MONOCYTES # BLD AUTO: 0.8 K/UL (ref 0.3–1)
MONOCYTES # BLD AUTO: 0.8 K/UL (ref 0.3–1)
MONOCYTES NFR BLD: 8 % (ref 4–15)
MONOCYTES NFR BLD: 8 % (ref 4–15)
NEUTROPHILS # BLD AUTO: 4.5 K/UL (ref 1.8–7.7)
NEUTROPHILS # BLD AUTO: 4.5 K/UL (ref 1.8–7.7)
NEUTROPHILS NFR BLD: 45.1 % (ref 38–73)
NEUTROPHILS NFR BLD: 45.1 % (ref 38–73)
NITRITE UR QL STRIP: NEGATIVE
NRBC BLD-RTO: 0 /100 WBC
NRBC BLD-RTO: 0 /100 WBC
OHS QRS DURATION: 88 MS
OHS QRS DURATION: 90 MS
OHS QRS DURATION: 90 MS
OHS QTC CALCULATION: 425 MS
OHS QTC CALCULATION: 440 MS
OHS QTC CALCULATION: 443 MS
PH UR STRIP: 6 [PH] (ref 5–8)
PLATELET # BLD AUTO: 287 K/UL (ref 150–450)
PLATELET # BLD AUTO: 287 K/UL (ref 150–450)
PMV BLD AUTO: 10.6 FL (ref 9.2–12.9)
PMV BLD AUTO: 10.6 FL (ref 9.2–12.9)
POTASSIUM SERPL-SCNC: 4.1 MMOL/L (ref 3.5–5.1)
POTASSIUM SERPL-SCNC: 4.2 MMOL/L (ref 3.5–5.1)
PROT SERPL-MCNC: 6.4 G/DL (ref 6–8.4)
PROT UR QL STRIP: NEGATIVE
RBC # BLD AUTO: 4.38 M/UL (ref 4–5.4)
RBC # BLD AUTO: 4.38 M/UL (ref 4–5.4)
RBC #/AREA URNS HPF: 2 /HPF (ref 0–4)
SODIUM SERPL-SCNC: 137 MMOL/L (ref 136–145)
SODIUM SERPL-SCNC: 139 MMOL/L (ref 136–145)
SP GR UR STRIP: 1.02 (ref 1–1.03)
SQUAMOUS #/AREA URNS HPF: 7 /HPF
TSH SERPL DL<=0.005 MIU/L-ACNC: 2.56 UIU/ML (ref 0.34–5.6)
URN SPEC COLLECT METH UR: ABNORMAL
UROBILINOGEN UR STRIP-ACNC: NEGATIVE EU/DL
WBC # BLD AUTO: 9.94 K/UL (ref 3.9–12.7)
WBC # BLD AUTO: 9.94 K/UL (ref 3.9–12.7)
WBC #/AREA URNS HPF: 92 /HPF (ref 0–5)
WBC CLUMPS URNS QL MICRO: ABNORMAL

## 2024-09-01 PROCEDURE — 63600175 PHARM REV CODE 636 W HCPCS: Performed by: FAMILY MEDICINE

## 2024-09-01 PROCEDURE — 85730 THROMBOPLASTIN TIME PARTIAL: CPT | Mod: 91 | Performed by: STUDENT IN AN ORGANIZED HEALTH CARE EDUCATION/TRAINING PROGRAM

## 2024-09-01 PROCEDURE — 36415 COLL VENOUS BLD VENIPUNCTURE: CPT | Performed by: FAMILY MEDICINE

## 2024-09-01 PROCEDURE — 99900031 HC PATIENT EDUCATION (STAT)

## 2024-09-01 PROCEDURE — 25000003 PHARM REV CODE 250: Performed by: STUDENT IN AN ORGANIZED HEALTH CARE EDUCATION/TRAINING PROGRAM

## 2024-09-01 PROCEDURE — 85730 THROMBOPLASTIN TIME PARTIAL: CPT | Performed by: FAMILY MEDICINE

## 2024-09-01 PROCEDURE — 93005 ELECTROCARDIOGRAM TRACING: CPT | Performed by: GENERAL PRACTICE

## 2024-09-01 PROCEDURE — 87186 SC STD MICRODIL/AGAR DIL: CPT

## 2024-09-01 PROCEDURE — 94761 N-INVAS EAR/PLS OXIMETRY MLT: CPT

## 2024-09-01 PROCEDURE — 20000000 HC ICU ROOM

## 2024-09-01 PROCEDURE — 93010 ELECTROCARDIOGRAM REPORT: CPT | Mod: ,,, | Performed by: GENERAL PRACTICE

## 2024-09-01 PROCEDURE — 83880 ASSAY OF NATRIURETIC PEPTIDE: CPT | Performed by: THORACIC SURGERY (CARDIOTHORACIC VASCULAR SURGERY)

## 2024-09-01 PROCEDURE — 80048 BASIC METABOLIC PNL TOTAL CA: CPT | Performed by: FAMILY MEDICINE

## 2024-09-01 PROCEDURE — 85025 COMPLETE CBC W/AUTO DIFF WBC: CPT | Performed by: STUDENT IN AN ORGANIZED HEALTH CARE EDUCATION/TRAINING PROGRAM

## 2024-09-01 PROCEDURE — 83735 ASSAY OF MAGNESIUM: CPT

## 2024-09-01 PROCEDURE — 82962 GLUCOSE BLOOD TEST: CPT

## 2024-09-01 PROCEDURE — 85730 THROMBOPLASTIN TIME PARTIAL: CPT | Mod: 91 | Performed by: FAMILY MEDICINE

## 2024-09-01 PROCEDURE — 87086 URINE CULTURE/COLONY COUNT: CPT

## 2024-09-01 PROCEDURE — 84443 ASSAY THYROID STIM HORMONE: CPT | Performed by: THORACIC SURGERY (CARDIOTHORACIC VASCULAR SURGERY)

## 2024-09-01 PROCEDURE — 25000003 PHARM REV CODE 250

## 2024-09-01 PROCEDURE — 36415 COLL VENOUS BLD VENIPUNCTURE: CPT | Performed by: STUDENT IN AN ORGANIZED HEALTH CARE EDUCATION/TRAINING PROGRAM

## 2024-09-01 PROCEDURE — 81001 URINALYSIS AUTO W/SCOPE: CPT

## 2024-09-01 PROCEDURE — 80053 COMPREHEN METABOLIC PANEL: CPT

## 2024-09-01 PROCEDURE — 99231 SBSQ HOSP IP/OBS SF/LOW 25: CPT | Mod: ,,, | Performed by: THORACIC SURGERY (CARDIOTHORACIC VASCULAR SURGERY)

## 2024-09-01 PROCEDURE — 12000002 HC ACUTE/MED SURGE SEMI-PRIVATE ROOM

## 2024-09-01 PROCEDURE — 25000003 PHARM REV CODE 250: Performed by: FAMILY MEDICINE

## 2024-09-01 RX ORDER — INSULIN GLARGINE 100 [IU]/ML
10 INJECTION, SOLUTION SUBCUTANEOUS DAILY
Status: DISCONTINUED | OUTPATIENT
Start: 2024-09-01 | End: 2024-09-06

## 2024-09-01 RX ORDER — LORAZEPAM 0.5 MG/1
0.5 TABLET ORAL EVERY 6 HOURS PRN
Status: DISCONTINUED | OUTPATIENT
Start: 2024-09-01 | End: 2024-09-12 | Stop reason: HOSPADM

## 2024-09-01 RX ADMIN — INSULIN GLARGINE 10 UNITS: 100 INJECTION, SOLUTION SUBCUTANEOUS at 06:09

## 2024-09-01 RX ADMIN — MUPIROCIN 1 G: 20 OINTMENT TOPICAL at 08:09

## 2024-09-01 RX ADMIN — INSULIN ASPART 1 UNITS: 100 INJECTION, SOLUTION INTRAVENOUS; SUBCUTANEOUS at 08:09

## 2024-09-01 RX ADMIN — INSULIN ASPART 2 UNITS: 100 INJECTION, SOLUTION INTRAVENOUS; SUBCUTANEOUS at 12:09

## 2024-09-01 RX ADMIN — ASPIRIN 81 MG 81 MG: 81 TABLET ORAL at 09:09

## 2024-09-01 RX ADMIN — LORAZEPAM 0.5 MG: 0.5 TABLET ORAL at 07:09

## 2024-09-01 RX ADMIN — ACETAMINOPHEN 650 MG: 325 TABLET ORAL at 07:09

## 2024-09-01 RX ADMIN — ATORVASTATIN CALCIUM 80 MG: 40 TABLET, FILM COATED ORAL at 09:09

## 2024-09-01 RX ADMIN — MUPIROCIN 1 G: 20 OINTMENT TOPICAL at 09:09

## 2024-09-01 NOTE — PROGRESS NOTES
[facility]  Department of Cardiology  Progress Note    PATIENT NAME: Debby Brown  MRN: [id number]  TODAY'S DATE: 09/01/2024  ADMIT DATE: 8/30/2024    SUBJECTIVE     PRINCIPLE PROBLEM: NSTEMI (non-ST elevated myocardial infarction)    INTERVAL HISTORY:    9/1/2024  Patient continues to do well no shortness of breath or chest pain resting comfortably in ICU    Review of patient's allergies indicates:   Allergen Reactions    Bactrim [sulfamethoxazole-trimethoprim]     Codeine     Levaquin [levofloxacin]     Pcn [penicillins]        REVIEW OF SYSTEMS  CARDIOVASCULAR: No recent chest pain, palpitations, arm, neck, or jaw pain  RESPIRATORY: No recent fever, cough chills, SOB or congestion  : No blood in the urine  GI: No Nausea, vomiting, constipation, diarrhea, blood, or reflux.  MUSCULOSKELETAL: No myalgias  NEURO: No lightheadedness or dizziness  EYES: No Double vision, blurry, vision or headache     OBJECTIVE     VITAL SIGNS (Most Recent)  Temp: 98.5 °F (36.9 °C) (09/01/24 0800)  Pulse: 64 (09/01/24 0900)  Resp: 16 (09/01/24 0900)  BP: (!) 83/52 (09/01/24 0900)  SpO2: (!) 90 % (09/01/24 0900)    VENTILATION STATUS  Resp: 16 (09/01/24 0900)  SpO2: (!) 90 % (09/01/24 0900)           I & O (Last 24H):  Intake/Output Summary (Last 24 hours) at 9/1/2024 0957  Last data filed at 9/1/2024 0601  Gross per 24 hour   Intake 903.84 ml   Output 300 ml   Net 603.84 ml       WEIGHTS  Wt Readings from Last 3 Encounters:   09/01/24 0353 88.8 kg (195 lb 12.3 oz)   08/30/24 1938 89.8 kg (197 lb 15.6 oz)   08/30/24 1349 86.2 kg (190 lb)   08/31/24 0920 89.4 kg (197 lb)   08/30/24 1007 89.1 kg (196 lb 6.9 oz)       PHYSICAL EXAM  CONSTITUTIONAL: Well built, well nourished in no apparent distress  NECK: no carotid bruit, no JVD  LUNGS: CTA  CHEST WALL: no tenderness  HEART: regular rate and rhythm, S1, S2 normal, no murmur, click, rub or gallop   ABDOMEN: soft, non-tender; bowel sounds normal; no masses,  no  organomegaly  EXTREMITIES: Extremities normal, no edema  NEURO: AAO X 3    SCHEDULED MEDS:   aspirin  81 mg Oral Daily    atorvastatin  80 mg Oral Daily    lisinopriL  40 mg Oral Daily    metoprolol succinate  50 mg Oral Daily    mupirocin   Nasal BID       CONTINUOUS INFUSIONS:   heparin (porcine) in D5W  0-40 Units/kg/hr (Adjusted) Intravenous Continuous 8.6 mL/hr at 09/01/24 0220 12 Units/kg/hr at 09/01/24 0220    nitroGLYCERIN in 5 % dextrose    Continuous PRN 3 mL/hr at 09/01/24 0601 Rate Verify at 09/01/24 0601    nitroGLYCERIN  0-400 mcg/min Intravenous Continuous 2.3 mL/hr at 09/01/24 0840 7.5 mcg/min at 09/01/24 0840       PRN MEDS:  Current Facility-Administered Medications:     acetaminophen, 650 mg, Oral, Q6H PRN    dextrose 50%, 12.5 g, Intravenous, PRN    glucagon (human recombinant), 1 mg, Intramuscular, PRN    heparin (PORCINE), 56 Units/kg (Adjusted), Intravenous, PRN    heparin (PORCINE), 30 Units/kg (Adjusted), Intravenous, PRN    insulin aspart U-100, 0-10 Units, Subcutaneous, Q6H PRN    magnesium oxide, 800 mg, Oral, PRN    magnesium oxide, 800 mg, Oral, PRN    nitroGLYCERIN in 5 % dextrose, , , Continuous PRN    nitroGLYCERIN, 0.4 mg, Sublingual, Q5 Min PRN    ondansetron, 4 mg, Intravenous, Q6H PRN    potassium bicarbonate, 35 mEq, Oral, PRN    potassium bicarbonate, 50 mEq, Oral, PRN    potassium bicarbonate, 60 mEq, Oral, PRN    potassium, sodium phosphates, 2 packet, Oral, PRN    potassium, sodium phosphates, 2 packet, Oral, PRN    potassium, sodium phosphates, 2 packet, Oral, PRN    senna-docusate 8.6-50 mg, 1 tablet, Oral, Daily PRN    LABS AND DIAGNOSTICS     CBC LAST 3 DAYS  Recent Labs   Lab 08/31/24  0044 08/31/24  0247 09/01/24  0411   WBC 9.53 10.39  10.39 9.94  9.94   RBC 4.40 4.60  4.60 4.38  4.38   HGB 12.8 12.8  12.8 12.2  12.2   HCT 38.8 40.1  40.1 38.6  38.6   MCV 88 87  87 88  88   MCH 29.1 27.8  27.8 27.9  27.9   MCHC 33.0 31.9*  31.9* 31.6*  31.6*   RDW 14.3  "14.4  14.4 14.4  14.4    287  287 287  287   MPV 10.6 10.6  10.6 10.6  10.6   GRAN 47.6  4.5 44.2  44.2  4.6  4.6 45.1  45.1  4.5  4.5   LYMPH 40.7  3.9 43.1  43.1  4.5  4.5 41.6  41.6  4.1  4.1   MONO 7.2  0.7 7.7  7.7  0.8  0.8 8.0  8.0  0.8  0.8   BASO 0.07 0.09  0.09 0.09  0.09   NRBC 0 0  0 0  0       COAGULATION LAST 3 DAYS  Recent Labs   Lab 08/30/24  1422 08/30/24 2004 08/31/24  0044 08/31/24  1527 09/01/24  0011 09/01/24  0411   INR 1.0 1.0  --   --   --   --    APTT 28.5 47.2*   < > <21.0 85.3* 33.6*  33.6*    < > = values in this interval not displayed.       CHEMISTRY LAST 3 DAYS  Recent Labs   Lab 08/30/24 1420 08/31/24 0247 09/01/24  0411    140 139   K 4.1 3.7 4.1    107 105   CO2 23 25 27   ANIONGAP 10 8 7*   BUN 23 17 18   CREATININE 1.2 1.0 1.1   * 125* 142*   CALCIUM 9.2 9.1 9.0   MG 1.6 1.8 2.0   ALBUMIN 3.8 3.5 3.4*   PROT 7.0 6.7 6.4   ALKPHOS 62 55 53*   ALT 15 13 11   AST 15 20 13   BILITOT 0.4 0.4 0.5       CARDIAC PROFILE LAST 3 DAYS  Recent Labs   Lab 08/30/24  1420 09/01/24  0411   BNP 62 56       ENDOCRINE LAST 3 DAYS  Recent Labs   Lab 08/31/24 0247 09/01/24  0411   TSH 3.587 2.556       LAST ARTERIAL BLOOD GAS  ABG  No results for input(s): "PH", "PO2", "PCO2", "HCO3", "BE" in the last 168 hours.    LAST 7 DAYS MICROBIOLOGY   Microbiology Results (last 7 days)       Procedure Component Value Units Date/Time    Urine culture [[id number]] Collected: 09/01/24 0305    Order Status: No result Specimen: Urine Updated: 09/01/24 0337            MOST RECENT IMAGING  CT Chest Without Contrast  Narrative: EXAMINATION:  CT CHEST WITHOUT CONTRAST    CLINICAL HISTORY:  Thoracic aorta disease, pre-op planning;    TECHNIQUE:  Axial imaging through the chest was performed without IV contrast.    FINDINGS:  Comparison the prior exams including CTA 08/30/2024. Scattered calcified plaque involves normal-caliber thoracic aorta, with the " ascending aorta measuring 37 mm maximum diameter, and the descending aorta tapering appropriately.  There are aortic valve calcifications suspected.    The central pulmonary arteries are normal in caliber, with the heart normal in size, and no pericardial effusion.  There are extensive 3 vessel coronary arterial calcifications, with no enlarged mediastinal or hilar lymph nodes.  No mediastinal mass or fluid collection.    The lungs are normally expanded, with mild linear subsegmental atelectasis in the lower lungs.  The central airways are patent, with no pleural effusion, evidence of pulmonary edema, or pneumothorax.    Images of the upper abdomen show cholecystectomy clips, and are otherwise unremarkable.  There are no acute fractures or destructive osseous lesions.  Impression: 1. Aortic valve calcifications, with normal caliber thoracic aorta.  2. Extensive 3 vessel coronary arterial calcifications.    Electronically signed by: Ubaldo Dumont  Date:    09/01/2024  Time:    08:34  US Carotid Bilateral  Narrative: EXAMINATION:  US CAROTID BILATERAL    CLINICAL HISTORY:  Bilateral bruit, pre-op CABG;    TECHNIQUE:  Grayscale, color and spectral Doppler analysis was performed. Criteria for stenosis is based upon the Society of Radiologists in Ultrasound Consensus Conference, 2003 (Radiology, November 2003, 229, 340-346). This is in accordance with NASCET criteria.    FINDINGS:  No prior studies for comparison. Grayscale images show diffuse hypoechoic carotid intimal hyperplasia, with mild to moderate heterogeneously echoic plaque of the carotid bulbs and ICA origins.    Peak systolic velocity in the proximal right ICA is 82 cm/sec, and in the distal right .8 cm/sec, with ICA/CCA ratio of 1.6.    Peak systolic velocity in the proximal left ICA is 59.5 cm/sec, and in the distal left ICA 67.4 cm/sec, with ICA/CCA ratio of 1.1.    The vertebral arteries are patent, with normal waveforms, and normal antegrade flow  bilaterally.  Impression: 1. Elevated peak systolic velocities in the right ICA, suggesting 50-69% stenosis.  2. No findings of left ICA stenosis.  3. Patent vertebral arteries with normal antegrade flow.    Electronically signed by: Ubaldo Dumont  Date:    09/01/2024  Time:    07:53      [place]  Results for orders placed during the hospital encounter of 08/30/24    Echo    Interpretation Summary    Left Ventricle: The left ventricle is normal in size. Mildly increased wall thickness. There is mild concentric hypertrophy. There is normal systolic function with a visually estimated ejection fraction of 60 - 65%. There is diastolic dysfunction.    Right Ventricle: Normal right ventricular cavity size. Wall thickness is normal. Systolic function is normal.    Aortic Valve: The aortic valve is a trileaflet valve. There is moderate aortic valve sclerosis. Moderately restricted motion. There is moderate stenosis. Aortic valve area by VTI is 1.18 cm². Aortic valve peak velocity is 2.28 m/s. Mean gradient is 12 mmHg. The dimensionless index is 0.38.    Mitral Valve: There is mild bileaflet sclerosis.    Tricuspid Valve: There is mild regurgitation with a centrally directed jet.    Pulmonary Artery: The estimated pulmonary artery systolic pressure is 26 mmHg.    IVC/SVC: Normal venous pressure at 3 mmHg.      CURRENT/PREVIOUS VISIT EKG  Results for orders placed or performed during the hospital encounter of 08/30/24   EKG 12-lead    Collection Time: 09/01/24  6:51 AM   Result Value Ref Range    QRS Duration 90 ms    [facility] QTC Calculation 440 ms    Narrative    Test Reason : I21.4,    Vent. Rate : 068 BPM     Atrial Rate : 068 BPM     P-R Int : 246 ms          QRS Dur : 090 ms      QT Int : 414 ms       P-R-T Axes : 068 -19 068 degrees     QTc Int : 440 ms    Sinus rhythm with 1st degree A-V block  Nonspecific T wave abnormality  Abnormal ECG  When compared with ECG of 31-AUG-2024 06:43,  Borderline criteria for Anterior  infarct are no longer Present  Nonspecific T wave abnormality has replaced inverted T waves in Lateral  leads    Referred By: [email or web address]   SELF           Confirmed By:        ASSESSMENT/PLAN:     Active Hospital Problems    Diagnosis    *NSTEMI (non-ST elevated myocardial infarction)    Diabetes    Stage 3a chronic kidney disease    Hyperlipidemia       ASSESSMENT & PLAN:   Non ST-elevation MI multivessel coronary disease      RECOMMENDATIONS:  Pending surgery evaluation Dr. Radha Meier MD  [facility]  Department of Cardiology  Date of Service: 09/01/2024  9:57 AM

## 2024-09-01 NOTE — PROGRESS NOTES
[facility]  Progress Note    Patient Name: Debby Brown  MRN: [id number]  Patient Class: IP- Inpatient   Admission Date: 8/30/2024  Length of Stay: 1 days  Attending Physician: Callie Curry DO  Primary Care Provider: Shawna Costello MD        Subjective:     Principal Problem:NSTEMI (non-ST elevated myocardial infarction)        HPI:  74-year-old female presented to ED for eval of chest pain. pMHx HTN, DM 2, HLD.  Patient reported she has been having back pain that radiates to the front of her chest, stated chest pain his pressure-like and worsens with exertion, relieved by rest.  Patient reported associated shortness of breath and nausea.  Reported these symptoms have been present intermittently for the last several months but have worsened significantly in the last few weeks with more frequent episodes.  Patient was evaluated by her PCP earlier today, she stated she had been out of most of her home meds, including BP meds and oral diabetic agent, for 3-4 weeks, reported to PCP the only medication she was currently taking is her Toujeo.  In ED today, SBP greater than 200 on arrival, improved with nitroglycerin.  Initial troponin 908.  Noted with hyperglycemia, serum glucose 337.  EKG with T-wave changes.  Chest x-ray impression without acute abnormality.  CTA chest abdomen impression with mild cardiomegaly with scattered coronary artery calcifications (predominantly in the LAD and circumflex distribution); calcified plaques in the thoracoabdominal aorta and iliacs with no aneurysm.  ED discussed case with Cardiology, heparin drip initiated.  Admit to hospital medicine for further eval.    Overview/Hospital Course:  Patient with medical history of diabetes, hypertension, hyperlipidemia came in with chest pain and was admitted for NSTEMI.  Status post angiogram on 08/30 showed severe multivessel CAD with thrombus in the distal RCA.  Patient was placed on heparin drip.  Cardiology  and Cardiothoracic surgery are following patient.  Troponin were trended. Plan for CABG on 9/3 by dr. Hung. A1c 10.2 , make adjustment on glargine and SSI as needed    Interval History:  Patient said she no longer has chest pain.  She is very hungry    Review of Systems   All other systems reviewed and are negative.    Objective:     Vital Signs (Most Recent):  Temp: 98.2 °F (36.8 °C) (09/01/24 1200)  Pulse: 67 (09/01/24 1200)  Resp: (!) 24 (09/01/24 1200)  BP: 101/60 (09/01/24 1200)  SpO2: 98 % (09/01/24 1200) Vital Signs (24h Range):  Temp:  [98.2 °F (36.8 °C)-98.6 °F (37 °C)] 98.2 °F (36.8 °C)  Pulse:  [64-83] 67  Resp:  [14-30] 24  SpO2:  [83 %-100 %] 98 %  BP: ()/(51-76) 101/60     Weight: 88.8 kg (195 lb 12.3 oz)  Body mass index is 30.66 kg/m².    Intake/Output Summary (Last 24 hours) at 9/1/2024 1635  Last data filed at 9/1/2024 1241  Gross per 24 hour   Intake 1353.84 ml   Output 500 ml   Net 853.84 ml         Physical Exam  Cardiovascular:      Rate and Rhythm: Normal rate.      Pulses: Normal pulses.   Pulmonary:      Effort: Pulmonary effort is normal.   Neurological:      Mental Status: She is alert and oriented to person, place, and time.             Significant Labs: All pertinent labs within the past 24 hours have been reviewed.    Significant Imaging: I have reviewed all pertinent imaging results/findings within the past 24 hours.    Assessment/Plan:      * NSTEMI (non-ST elevated myocardial infarction)  Status post angiogram on 08/30 showed severe multivessel CAD with thrombus in the distal RCA.    Currently on heparin drip.  Troponin trended down  Plan for CABG on 9/3 by dr. Hung.   Cardio and Cardiothoracic surgery are following patient    Diabetes  A1c 10.2  SSI and glargine  Can make adjustment as needed.    Stage 3a chronic kidney disease  Creatine stable for now. CMP reviewed- noted Estimated Creatinine Clearance: 57.7 mL/min (based on SCr of 1 mg/dL). according to latest data.  Based on current GFR, CKD stage is stage 3 - GFR 30-59.  Monitor UOP and serial CMP and adjust therapy as needed. Renally dose meds. Avoid nephrotoxic medications and procedures.    Hyperlipidemia  Atorvastatin  Lipid panel        VTE Risk Mitigation (From admission, onward)           Ordered     heparin 25,000 units in dextrose 5% (100 units/ml) IV bolus from bag LOW INTENSITY nomogram - [facility]  As needed (PRN)        Question:  Heparin Infusion Adjustment (DO NOT MODIFY ANSWER)  Answer:  [email or web address]\Images\Pharmacy\HeparinInfusions\heparin LOW INTENSITY nomogram for [facility] [id number]    08/30/24 1829     heparin 25,000 units in dextrose 5% (100 units/ml) IV bolus from bag LOW INTENSITY nomogram - [facility]  As needed (PRN)        Question:  Heparin Infusion Adjustment (DO NOT MODIFY ANSWER)  Answer:  [email or web address]\[email or web address]\Images\Pharmacy\HeparinInfusions\heparin LOW INTENSITY nomogram for [facility] [id number]    08/30/24 1829     heparin 25,000 units in dextrose 5% 250 mL (100 units/mL) infusion LOW INTENSITY nomogram - [facility]  Continuous        Question:  Begin at (units/kg/hr)  Answer:  12    08/30/24 1829     Reason for No Pharmacological VTE Prophylaxis  Once        Question:  Reasons:  Answer:  IV Heparin w/in 24 hrs. Pre or Post-Op    08/30/24 1646     IP VTE HIGH RISK PATIENT  Once         08/30/24 1646     Place sequential compression device  Until discontinued         08/30/24 1646                    Discharge Planning   MARTHA:      Code Status: Full Code   Is the patient medically ready for discharge?:     Reason for patient still in hospital (select all that apply): Patient trending condition  Discharge Plan A: Home            Critical care time spent on the evaluation and treatment of severe organ dysfunction, review of pertinent labs and imaging studies, discussions with consulting providers and discussions with patient/family: 32 minutes.      Callie Curry DO  Department of Hospital Medicine   [facility]  [facility]

## 2024-09-01 NOTE — NURSING
Patient stable throughout shift. No complaints of chest pain. Troponin trending down.  Dr. Stinson with CT surgery rounded and plan is for patient to go for CABG early next week some time. Continue with heprin and nitro at this time.      Patient has been up to bedside chair and to restroom multiple times today.    Appears in good spirits and has visited with family and friends at the bedside for most of the shift.

## 2024-09-01 NOTE — PLAN OF CARE
09/01/24 0706   Patient Assessment/Suction   Level of Consciousness (AVPU) alert   Respiratory Effort Normal;Unlabored   Expansion/Accessory Muscles/Retractions no retractions;no use of accessory muscles   PRE-TX-O2   Device (Oxygen Therapy) room air   SpO2 97 %   Pulse Oximetry Type Continuous   $ Pulse Oximetry - Multiple Charge Pulse Oximetry - Multiple   Pulse 64   Resp 15   Education   $ Education 15 min

## 2024-09-01 NOTE — PLAN OF CARE
Problem: Acute Coronary Syndrome  Goal: Optimal Adaptation to Illness  Outcome: Progressing  Goal: Absence of Cardiac-Related Pain  Outcome: Progressing  Goal: Normalized Cardiac Rhythm  Outcome: Progressing  Goal: Effective Cardiac Pump Function  Outcome: Progressing  Goal: Adequate Tissue Perfusion  Outcome: Progressing     Problem: Cardiac Catheterization (Diagnostic/Interventional)  Goal: Absence of Bleeding  Outcome: Progressing  Goal: Absence of Contrast-Induced Injury  Outcome: Progressing  Goal: Stable Heart Rate and Rhythm  Outcome: Progressing  Goal: Absence of Embolism Signs and Symptoms  Outcome: Progressing  Goal: Anesthesia/Sedation Recovery  Outcome: Progressing  Goal: Optimal Pain Control and Function  Outcome: Progressing  Goal: Absence of Vascular Access Complication  Outcome: Progressing     Problem: Adult Inpatient Plan of Care  Goal: Plan of Care Review  Outcome: Progressing  Goal: Patient-Specific Goal (Individualized)  Outcome: Progressing  Goal: Absence of Hospital-Acquired Illness or Injury  Outcome: Progressing  Goal: Optimal Comfort and Wellbeing  Outcome: Progressing  Goal: Readiness for Transition of Care  Outcome: Progressing     Problem: Diabetes Comorbidity  Goal: Blood Glucose Level Within Targeted Range  Outcome: Progressing     Problem: Wound  Goal: Optimal Coping  Outcome: Progressing  Goal: Optimal Functional Ability  Outcome: Progressing  Goal: Absence of Infection Signs and Symptoms  Outcome: Progressing  Goal: Improved Oral Intake  Outcome: Progressing  Goal: Optimal Pain Control and Function  Outcome: Progressing  Goal: Skin Health and Integrity  Outcome: Progressing  Goal: Optimal Wound Healing  Outcome: Progressing     Problem: Fall Injury Risk  Goal: Absence of Fall and Fall-Related Injury  Outcome: Progressing     Problem: Skin Injury Risk Increased  Goal: Skin Health and Integrity  Outcome: Progressing     Problem: Oral Intake Inadequate  Goal: Improved Oral  Intake  Outcome: Progressing

## 2024-09-01 NOTE — PLAN OF CARE
Problem: Acute Coronary Syndrome  Goal: Optimal Adaptation to Illness  Outcome: Progressing  Goal: Absence of Cardiac-Related Pain  Outcome: Progressing  Goal: Normalized Cardiac Rhythm  Outcome: Progressing  Goal: Effective Cardiac Pump Function  Outcome: Progressing  Goal: Adequate Tissue Perfusion  Outcome: Progressing     Problem: Adult Inpatient Plan of Care  Goal: Optimal Comfort and Wellbeing  Outcome: Progressing     Problem: Wound  Goal: Optimal Functional Ability  Outcome: Progressing     Problem: Fall Injury Risk  Goal: Absence of Fall and Fall-Related Injury  Outcome: Progressing      As pt states he is taking.

## 2024-09-01 NOTE — PT/OT/SLP PROGRESS
Physical Therapy      Patient Name:  Debby Brown   MRN:  [id number]    Patient not seen today secondary to Nurse/ SU hold, Other (Comment) (pt able to mobilize with RN in room safely, needs rest prior to surgery, HOLD.). Will follow-up post-op  CABG.

## 2024-09-01 NOTE — ASSESSMENT & PLAN NOTE
Status post angiogram on 08/30 showed severe multivessel CAD with thrombus in the distal RCA.    Currently on heparin drip.  Troponin trended down  Plan for CABG on 9/3 by dr. Hung.   Cardio and Cardiothoracic surgery are following patient

## 2024-09-01 NOTE — PT/OT/SLP PROGRESS
Occupational Therapy      Patient Name:  Debby Yousifote   MRN:  [id number]    Patient not seen today secondary to  (Therapist unavailable.). Will follow-up tomorrow.    9/1/2024

## 2024-09-01 NOTE — CARE UPDATE
08/31/24 2026   Patient Assessment/Suction   Level of Consciousness (AVPU) alert   Respiratory Effort Unlabored   Expansion/Accessory Muscles/Retractions expansion symmetric   All Lung Fields Breath Sounds clear   Rhythm/Pattern, Respiratory depth regular;pattern regular   Cough Frequency infrequent   Cough Type fair;nonproductive   PRE-TX-O2   Device (Oxygen Therapy) room air   SpO2 96 %   Pulse Oximetry Type Continuous   $ Pulse Oximetry - Multiple Charge Pulse Oximetry - Multiple   Pulse 76   Resp (!) 22   Education   $ Education Oxygen;15 min

## 2024-09-01 NOTE — PROGRESS NOTES
CARDIAC SURGERY    CT chest yesterday with significant aortic valve calcification    No angina overnight    Not moving around much-I've encouraged ambulation    Fair glucose control on insulin sliding scale    Relatively hypotensive-may need to stop ACE (-)    Afebrile, NSR 60-70, sBP 100, on room air, fair glucose control    Lungs clear  RR&R, Murmur unchanged  Abdomen soft, active BS  No LE edema or pain    Lab work, carotid and CXT/Chest CT mgkvinri-59-89% BRIDGET    Encourage ambulation  Hold ACE (-) for hypotension and to avoid postoperative vasoplegia  Continue heparin and NTG for now

## 2024-09-01 NOTE — SUBJECTIVE & OBJECTIVE
Interval History:  Patient said she no longer has chest pain.  She is very hungry    Review of Systems   All other systems reviewed and are negative.    Objective:     Vital Signs (Most Recent):  Temp: 98.2 °F (36.8 °C) (09/01/24 1200)  Pulse: 67 (09/01/24 1200)  Resp: (!) 24 (09/01/24 1200)  BP: 101/60 (09/01/24 1200)  SpO2: 98 % (09/01/24 1200) Vital Signs (24h Range):  Temp:  [98.2 °F (36.8 °C)-98.6 °F (37 °C)] 98.2 °F (36.8 °C)  Pulse:  [64-83] 67  Resp:  [14-30] 24  SpO2:  [83 %-100 %] 98 %  BP: ()/(51-76) 101/60     Weight: 88.8 kg (195 lb 12.3 oz)  Body mass index is 30.66 kg/m².    Intake/Output Summary (Last 24 hours) at 9/1/2024 1635  Last data filed at 9/1/2024 1241  Gross per 24 hour   Intake 1353.84 ml   Output 500 ml   Net 853.84 ml         Physical Exam  Cardiovascular:      Rate and Rhythm: Normal rate.      Pulses: Normal pulses.   Pulmonary:      Effort: Pulmonary effort is normal.   Neurological:      Mental Status: She is alert and oriented to person, place, and time.             Significant Labs: All pertinent labs within the past 24 hours have been reviewed.    Significant Imaging: I have reviewed all pertinent imaging results/findings within the past 24 hours.

## 2024-09-01 NOTE — PLAN OF CARE
Nitro gtt and Heparin gtt continued. Denies chest pain.     Problem: Acute Coronary Syndrome  Goal: Absence of Cardiac-Related Pain  Outcome: Progressing     Problem: Acute Coronary Syndrome  Goal: Normalized Cardiac Rhythm  Outcome: Progressing

## 2024-09-02 PROBLEM — N39.0 URINARY TRACT INFECTION WITHOUT HEMATURIA: Status: ACTIVE | Noted: 2024-09-02

## 2024-09-02 LAB
ALBUMIN SERPL BCP-MCNC: 3.4 G/DL (ref 3.5–5.2)
ALP SERPL-CCNC: 54 U/L (ref 55–135)
ALT SERPL W/O P-5'-P-CCNC: 12 U/L (ref 10–44)
ANION GAP SERPL CALC-SCNC: 6 MMOL/L (ref 8–16)
APTT PPP: 48.8 SEC (ref 21–32)
APTT PPP: 48.8 SEC (ref 21–32)
AST SERPL-CCNC: 13 U/L (ref 10–40)
BASOPHILS # BLD AUTO: 0.07 K/UL (ref 0–0.2)
BASOPHILS # BLD AUTO: 0.07 K/UL (ref 0–0.2)
BASOPHILS NFR BLD: 0.7 % (ref 0–1.9)
BASOPHILS NFR BLD: 0.7 % (ref 0–1.9)
BILIRUB SERPL-MCNC: 0.5 MG/DL (ref 0.1–1)
BUN SERPL-MCNC: 23 MG/DL (ref 8–23)
CALCIUM SERPL-MCNC: 9 MG/DL (ref 8.7–10.5)
CHLORIDE SERPL-SCNC: 104 MMOL/L (ref 95–110)
CO2 SERPL-SCNC: 27 MMOL/L (ref 23–29)
CREAT SERPL-MCNC: 1.2 MG/DL (ref 0.5–1.4)
DIFFERENTIAL METHOD BLD: NORMAL
DIFFERENTIAL METHOD BLD: NORMAL
EOSINOPHIL # BLD AUTO: 0.4 K/UL (ref 0–0.5)
EOSINOPHIL # BLD AUTO: 0.4 K/UL (ref 0–0.5)
EOSINOPHIL NFR BLD: 4.5 % (ref 0–8)
EOSINOPHIL NFR BLD: 4.5 % (ref 0–8)
ERYTHROCYTE [DISTWIDTH] IN BLOOD BY AUTOMATED COUNT: 14.2 % (ref 11.5–14.5)
ERYTHROCYTE [DISTWIDTH] IN BLOOD BY AUTOMATED COUNT: 14.2 % (ref 11.5–14.5)
EST. GFR  (NO RACE VARIABLE): 47.8 ML/MIN/1.73 M^2
GLUCOSE SERPL-MCNC: 187 MG/DL (ref 70–110)
GLUCOSE SERPL-MCNC: 216 MG/DL (ref 70–110)
GLUCOSE SERPL-MCNC: 219 MG/DL (ref 70–110)
GLUCOSE SERPL-MCNC: 243 MG/DL (ref 70–110)
HCT VFR BLD AUTO: 38 % (ref 37–48.5)
HCT VFR BLD AUTO: 38 % (ref 37–48.5)
HGB BLD-MCNC: 12.2 G/DL (ref 12–16)
HGB BLD-MCNC: 12.2 G/DL (ref 12–16)
IMM GRANULOCYTES # BLD AUTO: 0.03 K/UL (ref 0–0.04)
IMM GRANULOCYTES # BLD AUTO: 0.03 K/UL (ref 0–0.04)
IMM GRANULOCYTES NFR BLD AUTO: 0.3 % (ref 0–0.5)
IMM GRANULOCYTES NFR BLD AUTO: 0.3 % (ref 0–0.5)
LYMPHOCYTES # BLD AUTO: 3.7 K/UL (ref 1–4.8)
LYMPHOCYTES # BLD AUTO: 3.7 K/UL (ref 1–4.8)
LYMPHOCYTES NFR BLD: 39.3 % (ref 18–48)
LYMPHOCYTES NFR BLD: 39.3 % (ref 18–48)
MAGNESIUM SERPL-MCNC: 2 MG/DL (ref 1.6–2.6)
MCH RBC QN AUTO: 28.8 PG (ref 27–31)
MCH RBC QN AUTO: 28.8 PG (ref 27–31)
MCHC RBC AUTO-ENTMCNC: 32.1 G/DL (ref 32–36)
MCHC RBC AUTO-ENTMCNC: 32.1 G/DL (ref 32–36)
MCV RBC AUTO: 90 FL (ref 82–98)
MCV RBC AUTO: 90 FL (ref 82–98)
MONOCYTES # BLD AUTO: 0.8 K/UL (ref 0.3–1)
MONOCYTES # BLD AUTO: 0.8 K/UL (ref 0.3–1)
MONOCYTES NFR BLD: 8.7 % (ref 4–15)
MONOCYTES NFR BLD: 8.7 % (ref 4–15)
NEUTROPHILS # BLD AUTO: 4.4 K/UL (ref 1.8–7.7)
NEUTROPHILS # BLD AUTO: 4.4 K/UL (ref 1.8–7.7)
NEUTROPHILS NFR BLD: 46.5 % (ref 38–73)
NEUTROPHILS NFR BLD: 46.5 % (ref 38–73)
NRBC BLD-RTO: 0 /100 WBC
NRBC BLD-RTO: 0 /100 WBC
PLATELET # BLD AUTO: 262 K/UL (ref 150–450)
PLATELET # BLD AUTO: 262 K/UL (ref 150–450)
PMV BLD AUTO: 10.3 FL (ref 9.2–12.9)
PMV BLD AUTO: 10.3 FL (ref 9.2–12.9)
POTASSIUM SERPL-SCNC: 4.2 MMOL/L (ref 3.5–5.1)
PROT SERPL-MCNC: 6.5 G/DL (ref 6–8.4)
RBC # BLD AUTO: 4.23 M/UL (ref 4–5.4)
RBC # BLD AUTO: 4.23 M/UL (ref 4–5.4)
SODIUM SERPL-SCNC: 137 MMOL/L (ref 136–145)
TROPONIN I SERPL HS-MCNC: 1031.2 PG/ML (ref 0–14.9)
WBC # BLD AUTO: 9.35 K/UL (ref 3.9–12.7)
WBC # BLD AUTO: 9.35 K/UL (ref 3.9–12.7)

## 2024-09-02 PROCEDURE — 25000003 PHARM REV CODE 250: Performed by: INTERNAL MEDICINE

## 2024-09-02 PROCEDURE — 83735 ASSAY OF MAGNESIUM: CPT | Performed by: STUDENT IN AN ORGANIZED HEALTH CARE EDUCATION/TRAINING PROGRAM

## 2024-09-02 PROCEDURE — 80053 COMPREHEN METABOLIC PANEL: CPT | Performed by: STUDENT IN AN ORGANIZED HEALTH CARE EDUCATION/TRAINING PROGRAM

## 2024-09-02 PROCEDURE — 25000003 PHARM REV CODE 250: Performed by: STUDENT IN AN ORGANIZED HEALTH CARE EDUCATION/TRAINING PROGRAM

## 2024-09-02 PROCEDURE — 84484 ASSAY OF TROPONIN QUANT: CPT | Performed by: STUDENT IN AN ORGANIZED HEALTH CARE EDUCATION/TRAINING PROGRAM

## 2024-09-02 PROCEDURE — 63600175 PHARM REV CODE 636 W HCPCS

## 2024-09-02 PROCEDURE — 99231 SBSQ HOSP IP/OBS SF/LOW 25: CPT | Mod: ,,, | Performed by: THORACIC SURGERY (CARDIOTHORACIC VASCULAR SURGERY)

## 2024-09-02 PROCEDURE — 93005 ELECTROCARDIOGRAM TRACING: CPT | Performed by: GENERAL PRACTICE

## 2024-09-02 PROCEDURE — 82962 GLUCOSE BLOOD TEST: CPT

## 2024-09-02 PROCEDURE — 94761 N-INVAS EAR/PLS OXIMETRY MLT: CPT

## 2024-09-02 PROCEDURE — 85025 COMPLETE CBC W/AUTO DIFF WBC: CPT | Performed by: STUDENT IN AN ORGANIZED HEALTH CARE EDUCATION/TRAINING PROGRAM

## 2024-09-02 PROCEDURE — 25000003 PHARM REV CODE 250: Performed by: FAMILY MEDICINE

## 2024-09-02 PROCEDURE — 93005 ELECTROCARDIOGRAM TRACING: CPT | Performed by: INTERNAL MEDICINE

## 2024-09-02 PROCEDURE — 93010 ELECTROCARDIOGRAM REPORT: CPT | Mod: ,,, | Performed by: INTERNAL MEDICINE

## 2024-09-02 PROCEDURE — 12000002 HC ACUTE/MED SURGE SEMI-PRIVATE ROOM

## 2024-09-02 PROCEDURE — 25000003 PHARM REV CODE 250

## 2024-09-02 PROCEDURE — 36415 COLL VENOUS BLD VENIPUNCTURE: CPT

## 2024-09-02 PROCEDURE — 85730 THROMBOPLASTIN TIME PARTIAL: CPT | Performed by: STUDENT IN AN ORGANIZED HEALTH CARE EDUCATION/TRAINING PROGRAM

## 2024-09-02 PROCEDURE — 36415 COLL VENOUS BLD VENIPUNCTURE: CPT | Performed by: STUDENT IN AN ORGANIZED HEALTH CARE EDUCATION/TRAINING PROGRAM

## 2024-09-02 PROCEDURE — 20000000 HC ICU ROOM

## 2024-09-02 PROCEDURE — 99900031 HC PATIENT EDUCATION (STAT)

## 2024-09-02 PROCEDURE — 93010 ELECTROCARDIOGRAM REPORT: CPT | Mod: ,,, | Performed by: GENERAL PRACTICE

## 2024-09-02 RX ORDER — DIPHENHYDRAMINE HCL 25 MG
25 CAPSULE ORAL EVERY 6 HOURS PRN
Status: DISCONTINUED | OUTPATIENT
Start: 2024-09-02 | End: 2024-09-06

## 2024-09-02 RX ORDER — PANTOPRAZOLE SODIUM 40 MG/1
40 TABLET, DELAYED RELEASE ORAL DAILY
Status: DISCONTINUED | OUTPATIENT
Start: 2024-09-02 | End: 2024-09-12 | Stop reason: HOSPADM

## 2024-09-02 RX ORDER — DOXYCYCLINE 100 MG/1
100 CAPSULE ORAL EVERY 12 HOURS
Status: DISCONTINUED | OUTPATIENT
Start: 2024-09-02 | End: 2024-09-03

## 2024-09-02 RX ADMIN — MUPIROCIN 1 G: 20 OINTMENT TOPICAL at 08:09

## 2024-09-02 RX ADMIN — ATORVASTATIN CALCIUM 80 MG: 40 TABLET, FILM COATED ORAL at 08:09

## 2024-09-02 RX ADMIN — PANTOPRAZOLE SODIUM 40 MG: 40 TABLET, DELAYED RELEASE ORAL at 10:09

## 2024-09-02 RX ADMIN — HEPARIN SODIUM 12 UNITS/KG/HR: 10000 INJECTION, SOLUTION INTRAVENOUS at 01:09

## 2024-09-02 RX ADMIN — INSULIN ASPART 2 UNITS: 100 INJECTION, SOLUTION INTRAVENOUS; SUBCUTANEOUS at 05:09

## 2024-09-02 RX ADMIN — NITROGLYCERIN 5 MCG/MIN: 20 INJECTION INTRAVENOUS at 05:09

## 2024-09-02 RX ADMIN — INSULIN ASPART 1 UNITS: 100 INJECTION, SOLUTION INTRAVENOUS; SUBCUTANEOUS at 09:09

## 2024-09-02 RX ADMIN — INSULIN GLARGINE 10 UNITS: 100 INJECTION, SOLUTION SUBCUTANEOUS at 08:09

## 2024-09-02 RX ADMIN — DOXYCYCLINE HYCLATE 100 MG: 100 CAPSULE ORAL at 10:09

## 2024-09-02 RX ADMIN — ASPIRIN 81 MG 81 MG: 81 TABLET ORAL at 08:09

## 2024-09-02 RX ADMIN — INSULIN ASPART 2 UNITS: 100 INJECTION, SOLUTION INTRAVENOUS; SUBCUTANEOUS at 12:09

## 2024-09-02 RX ADMIN — METOPROLOL SUCCINATE 50 MG: 50 TABLET, FILM COATED, EXTENDED RELEASE ORAL at 08:09

## 2024-09-02 RX ADMIN — DOXYCYCLINE HYCLATE 100 MG: 100 CAPSULE ORAL at 08:09

## 2024-09-02 RX ADMIN — LORAZEPAM 0.5 MG: 0.5 TABLET ORAL at 08:09

## 2024-09-02 RX ADMIN — DIPHENHYDRAMINE HYDROCHLORIDE 25 MG: 25 CAPSULE ORAL at 06:09

## 2024-09-02 NOTE — CARE UPDATE
09/02/24 0826   Patient Assessment/Suction   Level of Consciousness (AVPU) alert   Respiratory Effort Normal;Unlabored   Expansion/Accessory Muscles/Retractions expansion symmetric   All Lung Fields Breath Sounds clear   Rhythm/Pattern, Respiratory unlabored   Cough Frequency no cough   PRE-TX-O2   Device (Oxygen Therapy) room air   SpO2 (!) 94 %   Pulse Oximetry Type Continuous   $ Pulse Oximetry - Multiple Charge Pulse Oximetry - Multiple   Pulse 76   Resp (!) 21   Education   $ Education Other (see comment);15 min  (sats)

## 2024-09-02 NOTE — PLAN OF CARE
Problem: Acute Coronary Syndrome  Goal: Absence of Cardiac-Related Pain  Outcome: Progressing     Problem: Acute Coronary Syndrome  Goal: Effective Cardiac Pump Function  Outcome: Progressing

## 2024-09-02 NOTE — PROGRESS NOTES
[facility]  Progress Note    Patient Name: Debby Brown  MRN: [id number]  Patient Class: IP- Inpatient   Admission Date: 8/30/2024  Length of Stay: 2 days  Attending Physician: Callie Curry DO  Primary Care Provider: Shawna Costello MD        Subjective:     Principal Problem:NSTEMI (non-ST elevated myocardial infarction)        HPI:  74-year-old female presented to ED for eval of chest pain. pMHx HTN, DM 2, HLD.  Patient reported she has been having back pain that radiates to the front of her chest, stated chest pain his pressure-like and worsens with exertion, relieved by rest.  Patient reported associated shortness of breath and nausea.  Reported these symptoms have been present intermittently for the last several months but have worsened significantly in the last few weeks with more frequent episodes.  Patient was evaluated by her PCP earlier today, she stated she had been out of most of her home meds, including BP meds and oral diabetic agent, for 3-4 weeks, reported to PCP the only medication she was currently taking is her Toujeo.  In ED today, SBP greater than 200 on arrival, improved with nitroglycerin.  Initial troponin 908.  Noted with hyperglycemia, serum glucose 337.  EKG with T-wave changes.  Chest x-ray impression without acute abnormality.  CTA chest abdomen impression with mild cardiomegaly with scattered coronary artery calcifications (predominantly in the LAD and circumflex distribution); calcified plaques in the thoracoabdominal aorta and iliacs with no aneurysm.  ED discussed case with Cardiology, heparin drip initiated.  Admit to hospital medicine for further eval.    Overview/Hospital Course:  Patient with medical history of diabetes, hypertension, hyperlipidemia came in with chest pain and was admitted for NSTEMI.  Status post angiogram on 08/30 showed severe multivessel CAD with thrombus in the distal RCA.  Patient was placed on heparin drip.  Cardiology  and Cardiothoracic surgery are following patient.  Troponin were trended. Plan for CABG on 9/3 by dr. Hung. A1c 10.2 , make adjustment on glargine and SSI as needed    Interval History:  Pleasant female who earlier had substernal chest pain which resolved with increase in intravenous nitroglycerin infusion.  Patient awaiting Cardiothoracic surgeon evaluation for coronary artery bypass graft surgery for multivessel disease.  Patient denies any shortness of breath or any abdominal symptoms.  Currently on heparin and nitroglycerin infusions.    Review of Systems   All other systems reviewed and are negative.    Objective:     Vital Signs (Most Recent):  Temp: 98.2 °F (36.8 °C) (09/02/24 0701)  Pulse: 79 (09/02/24 0805)  Resp: 17 (09/02/24 0701)  BP: 107/74 (09/02/24 0805)  SpO2: 97 % (09/02/24 0701) Vital Signs (24h Range):  Temp:  [97.7 °F (36.5 °C)-98.5 °F (36.9 °C)] 98.2 °F (36.8 °C)  Pulse:  [64-79] 79  Resp:  [15-31] 17  SpO2:  [93 %-100 %] 97 %  BP: ()/(50-77) 107/74     Weight: 88.8 kg (195 lb 12.3 oz)  Body mass index is 30.66 kg/m².    Intake/Output Summary (Last 24 hours) at 9/2/2024 0916  Last data filed at 9/2/2024 0601  Gross per 24 hour   Intake 1028.16 ml   Output 1450 ml   Net -421.84 ml         Physical Exam  Cardiovascular:      Rate and Rhythm: Normal rate.      Pulses: Normal pulses.      Comments: Systolic ejection murmur at left upper sternal border  Pulmonary:      Effort: Pulmonary effort is normal.   Neurological:      Mental Status: She is alert and oriented to person, place, and time.             Significant Labs:  Lab Results   Component Value Date    WBC 9.35 09/02/2024    WBC 9.35 09/02/2024    HGB 12.2 09/02/2024    HGB 12.2 09/02/2024    HCT 38.0 09/02/2024    HCT 38.0 09/02/2024    MCV 90 09/02/2024    MCV 90 09/02/2024     09/02/2024     09/02/2024       CMP  Sodium   Date Value Ref Range Status   09/02/2024 137 136 - 145 mmol/L Final     Potassium   Date Value  Ref Range Status   09/02/2024 4.2 3.5 - 5.1 mmol/L Final     Chloride   Date Value Ref Range Status   09/02/2024 104 95 - 110 mmol/L Final     CO2   Date Value Ref Range Status   09/02/2024 27 23 - 29 mmol/L Final     Glucose   Date Value Ref Range Status   09/02/2024 187 (H) 70 - 110 mg/dL Final     BUN   Date Value Ref Range Status   09/02/2024 23 8 - 23 mg/dL Final     Creatinine   Date Value Ref Range Status   09/02/2024 1.2 0.5 - 1.4 mg/dL Final     Calcium   Date Value Ref Range Status   09/02/2024 9.0 8.7 - 10.5 mg/dL Final     Total Protein   Date Value Ref Range Status   09/02/2024 6.5 6.0 - 8.4 g/dL Final     Albumin   Date Value Ref Range Status   09/02/2024 3.4 (L) 3.5 - 5.2 g/dL Final     Total Bilirubin   Date Value Ref Range Status   09/02/2024 0.5 0.1 - 1.0 mg/dL Final     Comment:     For infants and newborns, interpretation of results should be based  on gestational age, weight and in agreement with clinical  observations.    Premature Infant recommended reference ranges:  Up to 24 hours.............<8.0 mg/dL  Up to 48 hours............<12.0 mg/dL  3-5 days..................<15.0 mg/dL  6-29 days.................<15.0 mg/dL       Alkaline Phosphatase   Date Value Ref Range Status   09/02/2024 54 (L) 55 - 135 U/L Final     AST   Date Value Ref Range Status   09/02/2024 13 10 - 40 U/L Final     ALT   Date Value Ref Range Status   09/02/2024 12 10 - 44 U/L Final     Anion Gap   Date Value Ref Range Status   09/02/2024 6 (L) 8 - 16 mmol/L Final     eGFR   Date Value Ref Range Status   09/02/2024 47.8 (A) >60 mL/min/1.73 m^2 Final     Microbiology Results (last 7 days)       Procedure Component Value Units Date/Time    Urine culture [[id number]]  (Abnormal) Collected: 09/01/24 0305    Order Status: Completed Specimen: Urine Updated: 09/02/24 0735     Urine Culture, Routine GRAM NEGATIVE TONNY, NON-LACTOSE   >100,000 cfu/ml  Identification and susceptibility pending      Narrative:      Specimen  Source->Urine          Significant Imaging:  ECHO:    Left Ventricle: The left ventricle is normal in size. Mildly increased wall thickness. There is mild concentric hypertrophy. There is normal systolic function with a visually estimated ejection fraction of 60 - 65%. There is diastolic dysfunction.    Right Ventricle: Normal right ventricular cavity size. Wall thickness is normal. Systolic function is normal.    Aortic Valve: The aortic valve is a trileaflet valve. There is moderate aortic valve sclerosis. Moderately restricted motion. There is moderate stenosis. Aortic valve area by VTI is 1.18 cm². Aortic valve peak velocity is 2.28 m/s. Mean gradient is 12 mmHg. The dimensionless index is 0.38.    Mitral Valve: There is mild bileaflet sclerosis.    Tricuspid Valve: There is mild regurgitation with a centrally directed jet.    Pulmonary Artery: The estimated pulmonary artery systolic pressure is 26 mmHg.    IVC/SVC: Normal venous pressure at 3 mmHg.    LHC:    74 year old diabetic female with poorly controlled DM Type II (A1C 10), HTN, and hyperlipidemia presenting with ACS.  Coronary angiogram revealed severe multivessel CAD including severely diseased LAD and LCx.  The patient has a large RCA with diffuse disease and evidence of thrombus in the partially recannulated RPVL.  Plan for evaluation to determine candidacy for surgical revascularization (possible endarterectomy of the mid/distal LAD to allow this diabetic patient to get a LIMA graft and revascularization of RCA).    Considered placing IABP; however, patient hemodynamically stable and chest pain free upon completion of procedure. In addition, her iliac vessel revealed tortousity and did not appear ideal for IABP placement.  Plan for heparin gtt, nitro gtt, and evaluation to determine candidacy for surgical revascularization.    The estimated blood loss was <50 mL.    CTA chest abdomen:  1.  Mild cardiomegaly with scattered coronary artery  calcifications (predominantly in the LAD and circumflex distribution).  2.  Calcified plaques in the thoracoabdominal aorta and iliacs with no aneurysm as above.  3.  Chronic anterior superior endplate compression fracture deformities at L1 and L2, similar to the previous exam.  4.  Numerous additional, and incidental findings as noted above.    US Carotids:  1. Elevated peak systolic velocities in the right ICA, suggesting 50-69% stenosis.  2. No findings of left ICA stenosis.  3. Patent vertebral arteries with normal antegrade flow.    CT Chest without contrast:  1. Aortic valve calcifications, with normal caliber thoracic aorta.  2. Extensive 3 vessel coronary arterial calcifications.    Assessment/Plan:      * NSTEMI (non-ST elevated myocardial infarction)  Status post angiogram on 08/30 showed severe multivessel CAD with thrombus in the distal RCA.    Currently on heparin drip.  Troponin trended down  Plan for CABG on 9/3 by dr. Hung.   Cardio and Cardiothoracic surgery are following patient    Urinary tract infection without hematuria  Follow urine C/S- preliminary cx growing GNR.  On PO Doxycyline.       Diabetes  A1c 10.2  SSI and glargine  Can make adjustment as needed.    Stage 3a chronic kidney disease  Creatine stable for now. CMP reviewed- noted Estimated Creatinine Clearance: 57.7 mL/min (based on SCr of 1 mg/dL). according to latest data. Based on current GFR, CKD stage is stage 3 - GFR 30-59.  Monitor UOP and serial CMP and adjust therapy as needed. Renally dose meds. Avoid nephrotoxic medications and procedures.    Hyperlipidemia  Atorvastatin  Lipid panel        VTE Risk Mitigation (From admission, onward)           Ordered     heparin 25,000 units in dextrose 5% (100 units/ml) IV bolus from bag LOW INTENSITY nomogram - [facility]  As needed (PRN)        Question:  Heparin Infusion Adjustment (DO NOT MODIFY ANSWER)  Answer:  [email or web address]\epic\Images\Pharmacy\HeparinInfusions\heparin LOW INTENSITY  nomogram for [facility] [id number]    08/30/24 1829     heparin 25,000 units in dextrose 5% (100 units/ml) IV bolus from bag LOW INTENSITY nomogram - [facility]  As needed (PRN)        Question:  Heparin Infusion Adjustment (DO NOT MODIFY ANSWER)  Answer:  [email or web address]\epic\Images\Pharmacy\HeparinInfusions\heparin LOW INTENSITY nomogram for [facility] [id number]    08/30/24 1829     heparin 25,000 units in dextrose 5% 250 mL (100 units/mL) infusion LOW INTENSITY nomogram - [facility]  Continuous        Question:  Begin at (units/kg/hr)  Answer:  12    08/30/24 1829     Reason for No Pharmacological VTE Prophylaxis  Once        Question:  Reasons:  Answer:  IV Heparin w/in 24 hrs. Pre or Post-Op    08/30/24 1646     IP VTE HIGH RISK PATIENT  Once         08/30/24 1646     Place sequential compression device  Until discontinued         08/30/24 1646                    Discharge Planning   MARTHA:  TBD    Code Status: Full Code   Is the patient medically ready for discharge?:     Reason for patient still in hospital (select all that apply): Patient trending condition and Consult recommendations  Discharge Plan A: Home            Critical care time spent on the evaluation and treatment of severe organ dysfunction, review of pertinent labs and imaging studies, discussions with consulting providers and discussions with patient/family: 35 minutes.      Anthony Becker MD  Department of Hospital Medicine   Pending sale to [facility]

## 2024-09-02 NOTE — PT/OT/SLP PROGRESS
Occupational Therapy      Patient Name:  Debby Brown   MRN:  [id number]    Patient not seen today secondary to Nurse/ SU hold (per pt nurse, pt has been ambulating with her in hallway. She deferred OT eval due to pt having surgery tomorrow and OT will likley be needed more after sx.). Will follow-up 9/3/2024.    9/2/2024

## 2024-09-02 NOTE — CARE UPDATE
09/01/24 1956   Patient Assessment/Suction   Level of Consciousness (AVPU) alert   Respiratory Effort Unlabored   Expansion/Accessory Muscles/Retractions expansion symmetric   All Lung Fields Breath Sounds clear   Rhythm/Pattern, Respiratory depth regular;pattern regular   Cough Frequency infrequent   Cough Type good;nonproductive   PRE-TX-O2   Device (Oxygen Therapy) room air   SpO2 96 %   Pulse Oximetry Type Continuous   $ Pulse Oximetry - Multiple Charge Pulse Oximetry - Multiple   Pulse 72   Resp (!) 24   Education   $ Education Oxygen;15 min

## 2024-09-02 NOTE — PLAN OF CARE
Problem: Acute Coronary Syndrome  Goal: Optimal Adaptation to Illness  Outcome: Progressing  Goal: Absence of Cardiac-Related Pain  Outcome: Progressing  Goal: Normalized Cardiac Rhythm  Outcome: Progressing  Goal: Effective Cardiac Pump Function  Outcome: Progressing  Goal: Adequate Tissue Perfusion  Outcome: Progressing     Problem: Diabetes Comorbidity  Goal: Blood Glucose Level Within Targeted Range  Outcome: Progressing     Problem: Fall Injury Risk  Goal: Absence of Fall and Fall-Related Injury  Outcome: Progressing

## 2024-09-02 NOTE — PROGRESS NOTES
[facility]  Department of Cardiology  Progress Note    PATIENT NAME: Debby rBown  MRN: [id number]  TODAY'S DATE: 09/02/2024  ADMIT DATE: 8/30/2024    SUBJECTIVE     PRINCIPLE PROBLEM: NSTEMI (non-ST elevated myocardial infarction)    INTERVAL HISTORY:    9/2/2024  PATIENT WITH A NON ST-ELEVATION MI CURRENTLY IN ICU ON NITRO DRIP WAITING CARDIOVASCULAR SURGERY EVALUATION BY DR. SORIANO  SHE HAD CHEST PAIN TODAY FOR ABOUT 3 MINUTES OTHERWISE IS DOING WELL    Review of patient's allergies indicates:   Allergen Reactions    Bactrim [sulfamethoxazole-trimethoprim]     Codeine     Levaquin [levofloxacin]     Pcn [penicillins]        REVIEW OF SYSTEMS  CARDIOVASCULAR: No recent chest pain, palpitations, arm, neck, or jaw pain  RESPIRATORY: No recent fever, cough chills, SOB or congestion  : No blood in the urine  GI: No Nausea, vomiting, constipation, diarrhea, blood, or reflux.  MUSCULOSKELETAL: No myalgias  NEURO: No lightheadedness or dizziness  EYES: No Double vision, blurry, vision or headache     OBJECTIVE     VITAL SIGNS (Most Recent)  Temp: 98.3 °F (36.8 °C) (09/02/24 1101)  Pulse: 72 (09/02/24 1200)  Resp: (!) 27 (09/02/24 1200)  BP: 126/62 (09/02/24 1200)  SpO2: 97 % (09/02/24 1200)    VENTILATION STATUS  Resp: (!) 27 (09/02/24 1200)  SpO2: 97 % (09/02/24 1200)           I & O (Last 24H):  Intake/Output Summary (Last 24 hours) at 9/2/2024 1428  Last data filed at 9/2/2024 0601  Gross per 24 hour   Intake 728.16 ml   Output 1250 ml   Net -521.84 ml       WEIGHTS  Wt Readings from Last 3 Encounters:   09/01/24 0353 88.8 kg (195 lb 12.3 oz)   08/30/24 1938 89.8 kg (197 lb 15.6 oz)   08/30/24 1349 86.2 kg (190 lb)   08/31/24 0920 89.4 kg (197 lb)   08/30/24 1007 89.1 kg (196 lb 6.9 oz)       PHYSICAL EXAM  CONSTITUTIONAL: Well built, well nourished in no apparent distress  NECK: no carotid bruit, no JVD  LUNGS: CTA  CHEST WALL: no tenderness  HEART: regular rate and rhythm, S1, S2 normal, no  murmur, click, rub or gallop   ABDOMEN: soft, non-tender; bowel sounds normal; no masses,  no organomegaly  EXTREMITIES: Extremities normal, no edema  NEURO: AAO X 3    SCHEDULED MEDS:   aspirin  81 mg Oral Daily    atorvastatin  80 mg Oral Daily    doxycycline  100 mg Oral Q12H    insulin glargine U-100  10 Units Subcutaneous Daily    metoprolol succinate  50 mg Oral Daily    mupirocin   Nasal BID    pantoprazole  40 mg Oral Daily       CONTINUOUS INFUSIONS:   heparin (porcine) in D5W  0-40 Units/kg/hr (Adjusted) Intravenous Continuous 8.6 mL/hr at 09/02/24 0100 12 Units/kg/hr at 09/02/24 0100    nitroGLYCERIN in 5 % dextrose    Continuous PRN 3 mL/hr at 09/01/24 0601 Rate Verify at 09/01/24 0601    nitroGLYCERIN  0-400 mcg/min Intravenous Continuous 2.3 mL/hr at 09/02/24 0900 7.5 mcg/min at 09/02/24 0900       PRN MEDS:  Current Facility-Administered Medications:     acetaminophen, 650 mg, Oral, Q6H PRN    dextrose 50%, 12.5 g, Intravenous, PRN    glucagon (human recombinant), 1 mg, Intramuscular, PRN    heparin (PORCINE), 56 Units/kg (Adjusted), Intravenous, PRN    heparin (PORCINE), 30 Units/kg (Adjusted), Intravenous, PRN    insulin aspart U-100, 0-10 Units, Subcutaneous, Q6H PRN    LORazepam, 0.5 mg, Oral, Q6H PRN    magnesium oxide, 800 mg, Oral, PRN    magnesium oxide, 800 mg, Oral, PRN    nitroGLYCERIN in 5 % dextrose, , , Continuous PRN    nitroGLYCERIN, 0.4 mg, Sublingual, Q5 Min PRN    ondansetron, 4 mg, Intravenous, Q6H PRN    potassium bicarbonate, 35 mEq, Oral, PRN    potassium bicarbonate, 50 mEq, Oral, PRN    potassium bicarbonate, 60 mEq, Oral, PRN    potassium, sodium phosphates, 2 packet, Oral, PRN    potassium, sodium phosphates, 2 packet, Oral, PRN    potassium, sodium phosphates, 2 packet, Oral, PRN    senna-docusate 8.6-50 mg, 1 tablet, Oral, Daily PRN    LABS AND DIAGNOSTICS     CBC LAST 3 DAYS  Recent Labs   Lab 08/31/24  0247 09/01/24  0411 09/02/24  0319   WBC 10.39  10.39 9.94  9.94  "9.35  9.35   RBC 4.60  4.60 4.38  4.38 4.23  4.23   HGB 12.8  12.8 12.2  12.2 12.2  12.2   HCT 40.1  40.1 38.6  38.6 38.0  38.0   MCV 87  87 88  88 90  90   MCH 27.8  27.8 27.9  27.9 28.8  28.8   MCHC 31.9*  31.9* 31.6*  31.6* 32.1  32.1   RDW 14.4  14.4 14.4  14.4 14.2  14.2     287 287  287 262  262   MPV 10.6  10.6 10.6  10.6 10.3  10.3   GRAN 44.2  44.2  4.6  4.6 45.1  45.1  4.5  4.5 46.5  46.5  4.4  4.4   LYMPH 43.1  43.1  4.5  4.5 41.6  41.6  4.1  4.1 39.3  39.3  3.7  3.7   MONO 7.7  7.7  0.8  0.8 8.0  8.0  0.8  0.8 8.7  8.7  0.8  0.8   BASO 0.09  0.09 0.09  0.09 0.07  0.07   NRBC 0  0 0  0 0  0       COAGULATION LAST 3 DAYS  Recent Labs   Lab 08/30/24  1422 08/30/24 2004 08/31/24  0044 09/01/24  1022 09/01/24 1732 09/02/24 0319   INR 1.0 1.0  --   --   --   --    APTT 28.5 47.2*   < > 47.8* 39.6* 48.8*  48.8*    < > = values in this interval not displayed.       CHEMISTRY LAST 3 DAYS  Recent Labs   Lab 08/31/24  0247 09/01/24  0411 09/01/24 1732 09/02/24  0319    139 137 137   K 3.7 4.1 4.2 4.2    105 103 104   CO2 25 27 26 27   ANIONGAP 8 7* 8 6*   BUN 17 18 22 23   CREATININE 1.0 1.1 1.2 1.2   * 142* 188* 187*   CALCIUM 9.1 9.0 8.8 9.0   MG 1.8 2.0  --  2.0   ALBUMIN 3.5 3.4*  --  3.4*   PROT 6.7 6.4  --  6.5   ALKPHOS 55 53*  --  54*   ALT 13 11  --  12   AST 20 13  --  13   BILITOT 0.4 0.5  --  0.5       CARDIAC PROFILE LAST 3 DAYS  Recent Labs   Lab 08/30/24  1420 09/01/24  0411   BNP 62 56       ENDOCRINE LAST 3 DAYS  Recent Labs   Lab 08/31/24  0247 09/01/24  0411   TSH 3.587 2.556       LAST ARTERIAL BLOOD GAS  ABG  No results for input(s): "PH", "PO2", "PCO2", "HCO3", "BE" in the last 168 hours.    LAST 7 DAYS MICROBIOLOGY   Microbiology Results (last 7 days)       Procedure Component Value Units Date/Time    Urine culture [[id number]]  (Abnormal) Collected: 09/01/24 0305    Order Status: Completed Specimen: " Urine Updated: 09/02/24 0735     Urine Culture, Routine GRAM NEGATIVE TONNY, NON-LACTOSE   >100,000 cfu/ml  Identification and susceptibility pending      Narrative:      Specimen Source->Urine            MOST RECENT IMAGING  CT Chest Without Contrast  Narrative: EXAMINATION:  CT CHEST WITHOUT CONTRAST    CLINICAL HISTORY:  Thoracic aorta disease, pre-op planning;    TECHNIQUE:  Axial imaging through the chest was performed without IV contrast.    FINDINGS:  Comparison the prior exams including CTA 08/30/2024. Scattered calcified plaque involves normal-caliber thoracic aorta, with the ascending aorta measuring 37 mm maximum diameter, and the descending aorta tapering appropriately.  There are aortic valve calcifications suspected.    The central pulmonary arteries are normal in caliber, with the heart normal in size, and no pericardial effusion.  There are extensive 3 vessel coronary arterial calcifications, with no enlarged mediastinal or hilar lymph nodes.  No mediastinal mass or fluid collection.    The lungs are normally expanded, with mild linear subsegmental atelectasis in the lower lungs.  The central airways are patent, with no pleural effusion, evidence of pulmonary edema, or pneumothorax.    Images of the upper abdomen show cholecystectomy clips, and are otherwise unremarkable.  There are no acute fractures or destructive osseous lesions.  Impression: 1. Aortic valve calcifications, with normal caliber thoracic aorta.  2. Extensive 3 vessel coronary arterial calcifications.    Electronically signed by: Ubaldo Dumont  Date:    09/01/2024  Time:    08:34  US Carotid Bilateral  Narrative: EXAMINATION:  US CAROTID BILATERAL    CLINICAL HISTORY:  Bilateral bruit, pre-op CABG;    TECHNIQUE:  Grayscale, color and spectral Doppler analysis was performed. Criteria for stenosis is based upon the Society of Radiologists in Ultrasound Consensus Conference, 2003 (Radiology, November 2003, 229, 340-346). This is in  accordance with NASCET criteria.    FINDINGS:  No prior studies for comparison. Grayscale images show diffuse hypoechoic carotid intimal hyperplasia, with mild to moderate heterogeneously echoic plaque of the carotid bulbs and ICA origins.    Peak systolic velocity in the proximal right ICA is 82 cm/sec, and in the distal right .8 cm/sec, with ICA/CCA ratio of 1.6.    Peak systolic velocity in the proximal left ICA is 59.5 cm/sec, and in the distal left ICA 67.4 cm/sec, with ICA/CCA ratio of 1.1.    The vertebral arteries are patent, with normal waveforms, and normal antegrade flow bilaterally.  Impression: 1. Elevated peak systolic velocities in the right ICA, suggesting 50-69% stenosis.  2. No findings of left ICA stenosis.  3. Patent vertebral arteries with normal antegrade flow.    Electronically signed by: Ubaldo Dumont  Date:    09/01/2024  Time:    07:53      [email or web address]  Results for orders placed during the hospital encounter of 08/30/24    Echo    Interpretation Summary    Left Ventricle: The left ventricle is normal in size. Mildly increased wall thickness. There is mild concentric hypertrophy. There is normal systolic function with a visually estimated ejection fraction of 60 - 65%. There is diastolic dysfunction.    Right Ventricle: Normal right ventricular cavity size. Wall thickness is normal. Systolic function is normal.    Aortic Valve: The aortic valve is a trileaflet valve. There is moderate aortic valve sclerosis. Moderately restricted motion. There is moderate stenosis. Aortic valve area by VTI is 1.18 cm². Aortic valve peak velocity is 2.28 m/s. Mean gradient is 12 mmHg. The dimensionless index is 0.38.    Mitral Valve: There is mild bileaflet sclerosis.    Tricuspid Valve: There is mild regurgitation with a centrally directed jet.    Pulmonary Artery: The estimated pulmonary artery systolic pressure is 26 mmHg.    IVC/SVC: Normal venous pressure at 3 mmHg.      CURRENT/PREVIOUS VISIT  EKG  Results for orders placed or performed during the hospital encounter of 08/30/24   EKG 12-lead    Collection Time: 09/02/24  9:13 AM   Result Value Ref Range    QRS Duration 96 ms    [facility] QTC Calculation 428 ms    Narrative    Test Reason : R07.9,    Vent. Rate : 075 BPM     Atrial Rate : 075 BPM     P-R Int : 232 ms          QRS Dur : 096 ms      QT Int : 384 ms       P-R-T Axes : 053 -10 087 degrees     QTc Int : 428 ms    Sinus rhythm with 1st degree A-V block  Minimal voltage criteria for LVH, may be normal variant ( Chad product )  Borderline Abnormal ECG  When compared with ECG of 02-SEP-2024 07:20,  No significant change was found    Referred By: [email or web address]   SELF           Confirmed By:        ASSESSMENT/PLAN:     Active Hospital Problems    Diagnosis    *NSTEMI (non-ST elevated myocardial infarction)    Urinary tract infection without hematuria    Diabetes    Stage 3a chronic kidney disease    Hyperlipidemia       ASSESSMENT & PLAN:   NON ST-ELEVATION MI MULTIVESSEL DIABETIC CORONARY DISEASE  DIABETES  CKD  HYPERLIPIDEMIA  RECOMMENDATIONS:  CONTINUE CURRENT TREATMENT        Primitivo Meier MD  [facility]  Department of Cardiology  Date of Service: 09/02/2024  2:28 PM

## 2024-09-02 NOTE — PROGRESS NOTES
CARDIAC SURGERY    No new complaints    No angina    BP is much better with lower Lisinopril    Afebrile, NSR 70s, sBP 120s, on room air, adequate urine volume    Lungs clear  RR&R, murmur unchanged  Abdomen benign  No LE edema or pain    Lab work and CXR reviewed    Urgent CABG in the future

## 2024-09-02 NOTE — SUBJECTIVE & OBJECTIVE
Interval History:  Pleasant female who earlier had substernal chest pain which resolved with increase in intravenous nitroglycerin infusion.  Patient awaiting Cardiothoracic surgeon evaluation for coronary artery bypass graft surgery for multivessel disease.  Patient denies any shortness of breath or any abdominal symptoms.  Currently on heparin and nitroglycerin infusions.    Review of Systems   All other systems reviewed and are negative.    Objective:     Vital Signs (Most Recent):  Temp: 98.2 °F (36.8 °C) (09/02/24 0701)  Pulse: 79 (09/02/24 0805)  Resp: 17 (09/02/24 0701)  BP: 107/74 (09/02/24 0805)  SpO2: 97 % (09/02/24 0701) Vital Signs (24h Range):  Temp:  [97.7 °F (36.5 °C)-98.5 °F (36.9 °C)] 98.2 °F (36.8 °C)  Pulse:  [64-79] 79  Resp:  [15-31] 17  SpO2:  [93 %-100 %] 97 %  BP: ()/(50-77) 107/74     Weight: 88.8 kg (195 lb 12.3 oz)  Body mass index is 30.66 kg/m².    Intake/Output Summary (Last 24 hours) at 9/2/2024 0916  Last data filed at 9/2/2024 0601  Gross per 24 hour   Intake 1028.16 ml   Output 1450 ml   Net -421.84 ml         Physical Exam  Cardiovascular:      Rate and Rhythm: Normal rate.      Pulses: Normal pulses.      Comments: Systolic ejection murmur at left upper sternal border  Pulmonary:      Effort: Pulmonary effort is normal.   Neurological:      Mental Status: She is alert and oriented to person, place, and time.             Significant Labs:  Lab Results   Component Value Date    WBC 9.35 09/02/2024    WBC 9.35 09/02/2024    HGB 12.2 09/02/2024    HGB 12.2 09/02/2024    HCT 38.0 09/02/2024    HCT 38.0 09/02/2024    MCV 90 09/02/2024    MCV 90 09/02/2024     09/02/2024     09/02/2024       CMP  Sodium   Date Value Ref Range Status   09/02/2024 137 136 - 145 mmol/L Final     Potassium   Date Value Ref Range Status   09/02/2024 4.2 3.5 - 5.1 mmol/L Final     Chloride   Date Value Ref Range Status   09/02/2024 104 95 - 110 mmol/L Final     CO2   Date Value Ref Range  Status   09/02/2024 27 23 - 29 mmol/L Final     Glucose   Date Value Ref Range Status   09/02/2024 187 (H) 70 - 110 mg/dL Final     BUN   Date Value Ref Range Status   09/02/2024 23 8 - 23 mg/dL Final     Creatinine   Date Value Ref Range Status   09/02/2024 1.2 0.5 - 1.4 mg/dL Final     Calcium   Date Value Ref Range Status   09/02/2024 9.0 8.7 - 10.5 mg/dL Final     Total Protein   Date Value Ref Range Status   09/02/2024 6.5 6.0 - 8.4 g/dL Final     Albumin   Date Value Ref Range Status   09/02/2024 3.4 (L) 3.5 - 5.2 g/dL Final     Total Bilirubin   Date Value Ref Range Status   09/02/2024 0.5 0.1 - 1.0 mg/dL Final     Comment:     For infants and newborns, interpretation of results should be based  on gestational age, weight and in agreement with clinical  observations.    Premature Infant recommended reference ranges:  Up to 24 hours.............<8.0 mg/dL  Up to 48 hours............<12.0 mg/dL  3-5 days..................<15.0 mg/dL  6-29 days.................<15.0 mg/dL       Alkaline Phosphatase   Date Value Ref Range Status   09/02/2024 54 (L) 55 - 135 U/L Final     AST   Date Value Ref Range Status   09/02/2024 13 10 - 40 U/L Final     ALT   Date Value Ref Range Status   09/02/2024 12 10 - 44 U/L Final     Anion Gap   Date Value Ref Range Status   09/02/2024 6 (L) 8 - 16 mmol/L Final     eGFR   Date Value Ref Range Status   09/02/2024 47.8 (A) >60 mL/min/1.73 m^2 Final     Microbiology Results (last 7 days)       Procedure Component Value Units Date/Time    Urine culture [[id number]]  (Abnormal) Collected: 09/01/24 0305    Order Status: Completed Specimen: Urine Updated: 09/02/24 0735     Urine Culture, Routine GRAM NEGATIVE TONNY, NON-LACTOSE   >100,000 cfu/ml  Identification and susceptibility pending      Narrative:      Specimen Source->Urine          Significant Imaging:  ECHO:    Left Ventricle: The left ventricle is normal in size. Mildly increased wall thickness. There is mild concentric  hypertrophy. There is normal systolic function with a visually estimated ejection fraction of 60 - 65%. There is diastolic dysfunction.    Right Ventricle: Normal right ventricular cavity size. Wall thickness is normal. Systolic function is normal.    Aortic Valve: The aortic valve is a trileaflet valve. There is moderate aortic valve sclerosis. Moderately restricted motion. There is moderate stenosis. Aortic valve area by VTI is 1.18 cm². Aortic valve peak velocity is 2.28 m/s. Mean gradient is 12 mmHg. The dimensionless index is 0.38.    Mitral Valve: There is mild bileaflet sclerosis.    Tricuspid Valve: There is mild regurgitation with a centrally directed jet.    Pulmonary Artery: The estimated pulmonary artery systolic pressure is 26 mmHg.    IVC/SVC: Normal venous pressure at 3 mmHg.    LHC:    74 year old diabetic female with poorly controlled DM Type II (A1C 10), HTN, and hyperlipidemia presenting with ACS.  Coronary angiogram revealed severe multivessel CAD including severely diseased LAD and LCx.  The patient has a large RCA with diffuse disease and evidence of thrombus in the partially recannulated RPVL.  Plan for evaluation to determine candidacy for surgical revascularization (possible endarterectomy of the mid/distal LAD to allow this diabetic patient to get a LIMA graft and revascularization of RCA).    Considered placing IABP; however, patient hemodynamically stable and chest pain free upon completion of procedure. In addition, her iliac vessel revealed tortousity and did not appear ideal for IABP placement.  Plan for heparin gtt, nitro gtt, and evaluation to determine candidacy for surgical revascularization.    The estimated blood loss was <50 mL.    CTA chest abdomen:  1.  Mild cardiomegaly with scattered coronary artery calcifications (predominantly in the LAD and circumflex distribution).  2.  Calcified plaques in the thoracoabdominal aorta and iliacs with no aneurysm as above.  3.  Chronic  anterior superior endplate compression fracture deformities at L1 and L2, similar to the previous exam.  4.  Numerous additional, and incidental findings as noted above.    US Carotids:  1. Elevated peak systolic velocities in the right ICA, suggesting 50-69% stenosis.  2. No findings of left ICA stenosis.  3. Patent vertebral arteries with normal antegrade flow.    CT Chest without contrast:  1. Aortic valve calcifications, with normal caliber thoracic aorta.  2. Extensive 3 vessel coronary arterial calcifications.

## 2024-09-03 LAB
ALBUMIN SERPL BCP-MCNC: 3.5 G/DL (ref 3.5–5.2)
ALP SERPL-CCNC: 56 U/L (ref 55–135)
ALT SERPL W/O P-5'-P-CCNC: 21 U/L (ref 10–44)
ANION GAP SERPL CALC-SCNC: 8 MMOL/L (ref 8–16)
APTT PPP: 44.4 SEC (ref 21–32)
APTT PPP: 44.4 SEC (ref 21–32)
AST SERPL-CCNC: 24 U/L (ref 10–40)
BACTERIA UR CULT: ABNORMAL
BASOPHILS # BLD AUTO: 0.08 K/UL (ref 0–0.2)
BASOPHILS # BLD AUTO: 0.08 K/UL (ref 0–0.2)
BASOPHILS NFR BLD: 0.8 % (ref 0–1.9)
BASOPHILS NFR BLD: 0.8 % (ref 0–1.9)
BILIRUB SERPL-MCNC: 0.5 MG/DL (ref 0.1–1)
BUN SERPL-MCNC: 21 MG/DL (ref 8–23)
CALCIUM SERPL-MCNC: 9.1 MG/DL (ref 8.7–10.5)
CHLORIDE SERPL-SCNC: 104 MMOL/L (ref 95–110)
CO2 SERPL-SCNC: 26 MMOL/L (ref 23–29)
CREAT SERPL-MCNC: 1.1 MG/DL (ref 0.5–1.4)
DIFFERENTIAL METHOD BLD: ABNORMAL
DIFFERENTIAL METHOD BLD: ABNORMAL
EOSINOPHIL # BLD AUTO: 0.4 K/UL (ref 0–0.5)
EOSINOPHIL # BLD AUTO: 0.4 K/UL (ref 0–0.5)
EOSINOPHIL NFR BLD: 4.3 % (ref 0–8)
EOSINOPHIL NFR BLD: 4.3 % (ref 0–8)
ERYTHROCYTE [DISTWIDTH] IN BLOOD BY AUTOMATED COUNT: 13.9 % (ref 11.5–14.5)
ERYTHROCYTE [DISTWIDTH] IN BLOOD BY AUTOMATED COUNT: 13.9 % (ref 11.5–14.5)
EST. GFR  (NO RACE VARIABLE): 53.1 ML/MIN/1.73 M^2
GLUCOSE SERPL-MCNC: 197 MG/DL (ref 70–110)
GLUCOSE SERPL-MCNC: 214 MG/DL (ref 70–110)
GLUCOSE SERPL-MCNC: 249 MG/DL (ref 70–110)
GLUCOSE SERPL-MCNC: 250 MG/DL (ref 70–110)
GLUCOSE SERPL-MCNC: 269 MG/DL (ref 70–110)
HCT VFR BLD AUTO: 38.7 % (ref 37–48.5)
HCT VFR BLD AUTO: 38.7 % (ref 37–48.5)
HGB BLD-MCNC: 12.3 G/DL (ref 12–16)
HGB BLD-MCNC: 12.3 G/DL (ref 12–16)
IMM GRANULOCYTES # BLD AUTO: 0.02 K/UL (ref 0–0.04)
IMM GRANULOCYTES # BLD AUTO: 0.02 K/UL (ref 0–0.04)
IMM GRANULOCYTES NFR BLD AUTO: 0.2 % (ref 0–0.5)
IMM GRANULOCYTES NFR BLD AUTO: 0.2 % (ref 0–0.5)
LYMPHOCYTES # BLD AUTO: 3.7 K/UL (ref 1–4.8)
LYMPHOCYTES # BLD AUTO: 3.7 K/UL (ref 1–4.8)
LYMPHOCYTES NFR BLD: 38 % (ref 18–48)
LYMPHOCYTES NFR BLD: 38 % (ref 18–48)
MCH RBC QN AUTO: 28.1 PG (ref 27–31)
MCH RBC QN AUTO: 28.1 PG (ref 27–31)
MCHC RBC AUTO-ENTMCNC: 31.8 G/DL (ref 32–36)
MCHC RBC AUTO-ENTMCNC: 31.8 G/DL (ref 32–36)
MCV RBC AUTO: 89 FL (ref 82–98)
MCV RBC AUTO: 89 FL (ref 82–98)
MONOCYTES # BLD AUTO: 0.8 K/UL (ref 0.3–1)
MONOCYTES # BLD AUTO: 0.8 K/UL (ref 0.3–1)
MONOCYTES NFR BLD: 7.7 % (ref 4–15)
MONOCYTES NFR BLD: 7.7 % (ref 4–15)
NEUTROPHILS # BLD AUTO: 4.8 K/UL (ref 1.8–7.7)
NEUTROPHILS # BLD AUTO: 4.8 K/UL (ref 1.8–7.7)
NEUTROPHILS NFR BLD: 49 % (ref 38–73)
NEUTROPHILS NFR BLD: 49 % (ref 38–73)
NRBC BLD-RTO: 0 /100 WBC
NRBC BLD-RTO: 0 /100 WBC
OHS QRS DURATION: 96 MS
OHS QTC CALCULATION: 416 MS
PLATELET # BLD AUTO: 290 K/UL (ref 150–450)
PLATELET # BLD AUTO: 290 K/UL (ref 150–450)
PMV BLD AUTO: 10.3 FL (ref 9.2–12.9)
PMV BLD AUTO: 10.3 FL (ref 9.2–12.9)
POTASSIUM SERPL-SCNC: 4.3 MMOL/L (ref 3.5–5.1)
PROT SERPL-MCNC: 6.4 G/DL (ref 6–8.4)
RBC # BLD AUTO: 4.37 M/UL (ref 4–5.4)
RBC # BLD AUTO: 4.37 M/UL (ref 4–5.4)
SODIUM SERPL-SCNC: 138 MMOL/L (ref 136–145)
WBC # BLD AUTO: 9.73 K/UL (ref 3.9–12.7)
WBC # BLD AUTO: 9.73 K/UL (ref 3.9–12.7)

## 2024-09-03 PROCEDURE — 85730 THROMBOPLASTIN TIME PARTIAL: CPT | Performed by: STUDENT IN AN ORGANIZED HEALTH CARE EDUCATION/TRAINING PROGRAM

## 2024-09-03 PROCEDURE — 27000207 HC ISOLATION

## 2024-09-03 PROCEDURE — 82962 GLUCOSE BLOOD TEST: CPT

## 2024-09-03 PROCEDURE — 63600175 PHARM REV CODE 636 W HCPCS

## 2024-09-03 PROCEDURE — 25000003 PHARM REV CODE 250

## 2024-09-03 PROCEDURE — 99233 SBSQ HOSP IP/OBS HIGH 50: CPT | Mod: ,,, | Performed by: INTERNAL MEDICINE

## 2024-09-03 PROCEDURE — 80053 COMPREHEN METABOLIC PANEL: CPT

## 2024-09-03 PROCEDURE — 63600175 PHARM REV CODE 636 W HCPCS: Performed by: INTERNAL MEDICINE

## 2024-09-03 PROCEDURE — 99900031 HC PATIENT EDUCATION (STAT)

## 2024-09-03 PROCEDURE — 36415 COLL VENOUS BLD VENIPUNCTURE: CPT | Performed by: STUDENT IN AN ORGANIZED HEALTH CARE EDUCATION/TRAINING PROGRAM

## 2024-09-03 PROCEDURE — 25000003 PHARM REV CODE 250: Performed by: INTERNAL MEDICINE

## 2024-09-03 PROCEDURE — 85025 COMPLETE CBC W/AUTO DIFF WBC: CPT | Performed by: STUDENT IN AN ORGANIZED HEALTH CARE EDUCATION/TRAINING PROGRAM

## 2024-09-03 PROCEDURE — 25000003 PHARM REV CODE 250: Performed by: STUDENT IN AN ORGANIZED HEALTH CARE EDUCATION/TRAINING PROGRAM

## 2024-09-03 PROCEDURE — 25000003 PHARM REV CODE 250: Performed by: FAMILY MEDICINE

## 2024-09-03 PROCEDURE — 94761 N-INVAS EAR/PLS OXIMETRY MLT: CPT

## 2024-09-03 PROCEDURE — 12000002 HC ACUTE/MED SURGE SEMI-PRIVATE ROOM

## 2024-09-03 RX ORDER — POLYETHYLENE GLYCOL 3350 17 G/17G
17 POWDER, FOR SOLUTION ORAL DAILY
Status: DISCONTINUED | OUTPATIENT
Start: 2024-09-03 | End: 2024-09-12 | Stop reason: HOSPADM

## 2024-09-03 RX ORDER — DOCUSATE SODIUM 100 MG/1
100 CAPSULE, LIQUID FILLED ORAL DAILY
Status: DISCONTINUED | OUTPATIENT
Start: 2024-09-03 | End: 2024-09-06

## 2024-09-03 RX ORDER — HYDROCODONE BITARTRATE AND ACETAMINOPHEN 500; 5 MG/1; MG/1
TABLET ORAL
Status: DISCONTINUED | OUTPATIENT
Start: 2024-09-06 | End: 2024-09-06

## 2024-09-03 RX ORDER — MUPIROCIN 20 MG/G
OINTMENT TOPICAL
Status: DISCONTINUED | OUTPATIENT
Start: 2024-09-06 | End: 2024-09-06

## 2024-09-03 RX ADMIN — PANTOPRAZOLE SODIUM 40 MG: 40 TABLET, DELAYED RELEASE ORAL at 05:09

## 2024-09-03 RX ADMIN — INSULIN ASPART 3 UNITS: 100 INJECTION, SOLUTION INTRAVENOUS; SUBCUTANEOUS at 11:09

## 2024-09-03 RX ADMIN — INSULIN ASPART 2 UNITS: 100 INJECTION, SOLUTION INTRAVENOUS; SUBCUTANEOUS at 04:09

## 2024-09-03 RX ADMIN — MEROPENEM 1 G: 1 INJECTION, POWDER, FOR SOLUTION INTRAVENOUS at 11:09

## 2024-09-03 RX ADMIN — ASPIRIN 81 MG 81 MG: 81 TABLET ORAL at 08:09

## 2024-09-03 RX ADMIN — METOPROLOL SUCCINATE 50 MG: 50 TABLET, FILM COATED, EXTENDED RELEASE ORAL at 08:09

## 2024-09-03 RX ADMIN — INSULIN GLARGINE 10 UNITS: 100 INJECTION, SOLUTION SUBCUTANEOUS at 08:09

## 2024-09-03 RX ADMIN — MUPIROCIN 1 G: 20 OINTMENT TOPICAL at 08:09

## 2024-09-03 RX ADMIN — HEPARIN SODIUM 12 UNITS/KG/HR: 10000 INJECTION, SOLUTION INTRAVENOUS at 05:09

## 2024-09-03 RX ADMIN — LORAZEPAM 0.5 MG: 0.5 TABLET ORAL at 03:09

## 2024-09-03 RX ADMIN — INSULIN ASPART 1 UNITS: 100 INJECTION, SOLUTION INTRAVENOUS; SUBCUTANEOUS at 09:09

## 2024-09-03 RX ADMIN — MEROPENEM 1 G: 1 INJECTION, POWDER, FOR SOLUTION INTRAVENOUS at 09:09

## 2024-09-03 RX ADMIN — DIPHENHYDRAMINE HYDROCHLORIDE 25 MG: 25 CAPSULE ORAL at 09:09

## 2024-09-03 RX ADMIN — MUPIROCIN 1 G: 20 OINTMENT TOPICAL at 09:09

## 2024-09-03 RX ADMIN — ATORVASTATIN CALCIUM 80 MG: 40 TABLET, FILM COATED ORAL at 08:09

## 2024-09-03 RX ADMIN — DOXYCYCLINE HYCLATE 100 MG: 100 CAPSULE ORAL at 08:09

## 2024-09-03 NOTE — PROGRESS NOTES
[facility]  Department of Cardiology  Progress Note    PATIENT NAME: Debby Brown  MRN: [id number]  TODAY'S DATE: 09/03/2024  ADMIT DATE: 8/30/2024    SUBJECTIVE     PRINCIPLE PROBLEM: NSTEMI (non-ST elevated myocardial infarction)    INTERVAL HISTORY:    9/3/2024    Patient for evaluation from CT surgery today.  Stable and doing well presently.    9/2/24    PATIENT WITH A NON ST-ELEVATION MI CURRENTLY IN ICU ON NITRO DRIP WAITING CARDIOVASCULAR SURGERY EVALUATION BY DR. SORIANO  SHE HAD CHEST PAIN TODAY FOR ABOUT 3 MINUTES OTHERWISE IS DOING WELL    Review of patient's allergies indicates:   Allergen Reactions    Bactrim [sulfamethoxazole-trimethoprim]     Codeine     Levaquin [levofloxacin]     Pcn [penicillins]        REVIEW OF SYSTEMS  CARDIOVASCULAR: No recent chest pain, palpitations, arm, neck, or jaw pain  RESPIRATORY: No recent fever, cough chills, SOB or congestion  : No blood in the urine  GI: No Nausea, vomiting, constipation, diarrhea, blood, or reflux.  MUSCULOSKELETAL: No myalgias  NEURO: No lightheadedness or dizziness  EYES: No Double vision, blurry, vision or headache     OBJECTIVE     VITAL SIGNS (Most Recent)  Temp: 98.2 °F (36.8 °C) (09/03/24 0715)  Pulse: 69 (09/03/24 1000)  Resp: (!) 24 (09/03/24 1000)  BP: 130/76 (09/03/24 0900)  SpO2: 95 % (09/03/24 1000)    VENTILATION STATUS  Resp: (!) 24 (09/03/24 1000)  SpO2: 95 % (09/03/24 1000)           I & O (Last 24H):  Intake/Output Summary (Last 24 hours) at 9/3/2024 1302  Last data filed at 9/3/2024 0745  Gross per 24 hour   Intake 1235.2 ml   Output 1600 ml   Net -364.8 ml       WEIGHTS  Wt Readings from Last 3 Encounters:   09/03/24 0400 89.9 kg (198 lb 3.1 oz)   09/01/24 0353 88.8 kg (195 lb 12.3 oz)   08/30/24 1938 89.8 kg (197 lb 15.6 oz)   08/30/24 1349 86.2 kg (190 lb)   08/31/24 0920 89.4 kg (197 lb)   08/30/24 1007 89.1 kg (196 lb 6.9 oz)       PHYSICAL EXAM  CONSTITUTIONAL: Well built, well nourished in no apparent  distress  NECK: no carotid bruit, no JVD  LUNGS: CTA  CHEST WALL: no tenderness  HEART: regular rate and rhythm, S1, S2 normal, no murmur, click, rub or gallop   ABDOMEN: soft, non-tender; bowel sounds normal; no masses,  no organomegaly  EXTREMITIES: Extremities normal, no edema  NEURO: AAO X 3    SCHEDULED MEDS:   aspirin  81 mg Oral Daily    atorvastatin  80 mg Oral Daily    docusate sodium  100 mg Oral Daily    insulin glargine U-100  10 Units Subcutaneous Daily    meropenem IV (PEDS and ADULTS)  1 g Intravenous Q12H    metoprolol succinate  50 mg Oral Daily    mupirocin   Nasal BID    pantoprazole  40 mg Oral Daily    polyethylene glycol  17 g Oral Daily       CONTINUOUS INFUSIONS:   heparin (porcine) in D5W  0-40 Units/kg/hr (Adjusted) Intravenous Continuous 8.6 mL/hr at 09/03/24 0516 12 Units/kg/hr at 09/03/24 0516    nitroGLYCERIN in 5 % dextrose    Continuous PRN 3 mL/hr at 09/01/24 0601 Rate Verify at 09/01/24 0601    nitroGLYCERIN  0-400 mcg/min Intravenous Continuous 1.5 mL/hr at 09/02/24 1700 5 mcg/min at 09/02/24 1700       PRN MEDS:  Current Facility-Administered Medications:     acetaminophen, 650 mg, Oral, Q6H PRN    dextrose 50%, 12.5 g, Intravenous, PRN    diphenhydrAMINE, 25 mg, Oral, Q6H PRN    glucagon (human recombinant), 1 mg, Intramuscular, PRN    heparin (PORCINE), 56 Units/kg (Adjusted), Intravenous, PRN    heparin (PORCINE), 30 Units/kg (Adjusted), Intravenous, PRN    insulin aspart U-100, 0-10 Units, Subcutaneous, Q6H PRN    LORazepam, 0.5 mg, Oral, Q6H PRN    magnesium oxide, 800 mg, Oral, PRN    magnesium oxide, 800 mg, Oral, PRN    nitroGLYCERIN in 5 % dextrose, , , Continuous PRN    nitroGLYCERIN, 0.4 mg, Sublingual, Q5 Min PRN    ondansetron, 4 mg, Intravenous, Q6H PRN    potassium bicarbonate, 35 mEq, Oral, PRN    potassium bicarbonate, 50 mEq, Oral, PRN    potassium bicarbonate, 60 mEq, Oral, PRN    potassium, sodium phosphates, 2 packet, Oral, PRN    potassium, sodium phosphates,  2 packet, Oral, PRN    potassium, sodium phosphates, 2 packet, Oral, PRN    senna-docusate 8.6-50 mg, 1 tablet, Oral, Daily PRN    LABS AND DIAGNOSTICS     CBC LAST 3 DAYS  Recent Labs   Lab 09/01/24 0411 09/02/24 0319 09/03/24  0306   WBC 9.94  9.94 9.35  9.35 9.73  9.73   RBC 4.38  4.38 4.23  4.23 4.37  4.37   HGB 12.2  12.2 12.2  12.2 12.3  12.3   HCT 38.6  38.6 38.0  38.0 38.7  38.7   MCV 88  88 90  90 89  89   MCH 27.9  27.9 28.8  28.8 28.1  28.1   MCHC 31.6*  31.6* 32.1  32.1 31.8*  31.8*   RDW 14.4  14.4 14.2  14.2 13.9  13.9     287 262  262 290  290   MPV 10.6  10.6 10.3  10.3 10.3  10.3   GRAN 45.1  45.1  4.5  4.5 46.5  46.5  4.4  4.4 49.0  49.0  4.8  4.8   LYMPH 41.6  41.6  4.1  4.1 39.3  39.3  3.7  3.7 38.0  38.0  3.7  3.7   MONO 8.0  8.0  0.8  0.8 8.7  8.7  0.8  0.8 7.7  7.7  0.8  0.8   BASO 0.09  0.09 0.07  0.07 0.08  0.08   NRBC 0  0 0  0 0  0       COAGULATION LAST 3 DAYS  Recent Labs   Lab 08/30/24  1422 08/30/24  2004 08/31/24 0044 09/01/24  1732 09/02/24 0319 09/03/24 0306   INR 1.0 1.0  --   --   --   --    APTT 28.5 47.2*   < > 39.6* 48.8*  48.8* 44.4*  44.4*    < > = values in this interval not displayed.       CHEMISTRY LAST 3 DAYS  Recent Labs   Lab 08/31/24  0247 09/01/24 0411 09/01/24  1732 09/02/24  0319 09/03/24  0306    139 137 137 138   K 3.7 4.1 4.2 4.2 4.3    105 103 104 104   CO2 25 27 26 27 26   ANIONGAP 8 7* 8 6* 8   BUN 17 18 22 23 21   CREATININE 1.0 1.1 1.2 1.2 1.1   * 142* 188* 187* 214*   CALCIUM 9.1 9.0 8.8 9.0 9.1   MG 1.8 2.0  --  2.0  --    ALBUMIN 3.5 3.4*  --  3.4* 3.5   PROT 6.7 6.4  --  6.5 6.4   ALKPHOS 55 53*  --  54* 56   ALT 13 11  --  12 21   AST 20 13  --  13 24   BILITOT 0.4 0.5  --  0.5 0.5       CARDIAC PROFILE LAST 3 DAYS  Recent Labs   Lab 08/30/24  1420 09/01/24  0411   BNP 62 56       ENDOCRINE LAST 3 DAYS  Recent Labs   Lab 08/31/24  0247 09/01/24  0411   TSH  "3.587 2.556       LAST ARTERIAL BLOOD GAS  ABG  No results for input(s): "PH", "PO2", "PCO2", "HCO3", "BE" in the last 168 hours.    LAST 7 DAYS MICROBIOLOGY   Microbiology Results (last 7 days)       Procedure Component Value Units Date/Time    Urine culture [[id number]]  (Abnormal)  (Susceptibility) Collected: 09/01/24 0305    Order Status: Completed Specimen: Urine Updated: 09/03/24 0702     Urine Culture, Routine ESCHERICHIA COLI ESBL  >100,000 cfu/ml  Results called to and read back by Sandra Ray RN on [facility] 09/03/2024    07:01 ncb      Narrative:      Specimen Source->Urine            MOST RECENT IMAGING  CT Chest Without Contrast  Narrative: EXAMINATION:  CT CHEST WITHOUT CONTRAST    CLINICAL HISTORY:  Thoracic aorta disease, pre-op planning;    TECHNIQUE:  Axial imaging through the chest was performed without IV contrast.    FINDINGS:  Comparison the prior exams including CTA 08/30/2024. Scattered calcified plaque involves normal-caliber thoracic aorta, with the ascending aorta measuring 37 mm maximum diameter, and the descending aorta tapering appropriately.  There are aortic valve calcifications suspected.    The central pulmonary arteries are normal in caliber, with the heart normal in size, and no pericardial effusion.  There are extensive 3 vessel coronary arterial calcifications, with no enlarged mediastinal or hilar lymph nodes.  No mediastinal mass or fluid collection.    The lungs are normally expanded, with mild linear subsegmental atelectasis in the lower lungs.  The central airways are patent, with no pleural effusion, evidence of pulmonary edema, or pneumothorax.    Images of the upper abdomen show cholecystectomy clips, and are otherwise unremarkable.  There are no acute fractures or destructive osseous lesions.  Impression: 1. Aortic valve calcifications, with normal caliber thoracic aorta.  2. Extensive 3 vessel coronary arterial calcifications.    Electronically signed by: Ubaldo " Julia  Date:    09/01/2024  Time:    08:34  US Carotid Bilateral  Narrative: EXAMINATION:  US CAROTID BILATERAL    CLINICAL HISTORY:  Bilateral bruit, pre-op CABG;    TECHNIQUE:  Grayscale, color and spectral Doppler analysis was performed. Criteria for stenosis is based upon the Society of Radiologists in Ultrasound Consensus Conference, 2003 (Radiology, November 2003, 229, 340-346). This is in accordance with NASCET criteria.    FINDINGS:  No prior studies for comparison. Grayscale images show diffuse hypoechoic carotid intimal hyperplasia, with mild to moderate heterogeneously echoic plaque of the carotid bulbs and ICA origins.    Peak systolic velocity in the proximal right ICA is 82 cm/sec, and in the distal right .8 cm/sec, with ICA/CCA ratio of 1.6.    Peak systolic velocity in the proximal left ICA is 59.5 cm/sec, and in the distal left ICA 67.4 cm/sec, with ICA/CCA ratio of 1.1.    The vertebral arteries are patent, with normal waveforms, and normal antegrade flow bilaterally.  Impression: 1. Elevated peak systolic velocities in the right ICA, suggesting 50-69% stenosis.  2. No findings of left ICA stenosis.  3. Patent vertebral arteries with normal antegrade flow.    Electronically signed by: Ubaldo Dumont  Date:    09/01/2024  Time:    07:53      [facility]  Results for orders placed during the hospital encounter of 08/30/24    Echo    Interpretation Summary    Left Ventricle: The left ventricle is normal in size. Mildly increased wall thickness. There is mild concentric hypertrophy. There is normal systolic function with a visually estimated ejection fraction of 60 - 65%. There is diastolic dysfunction.    Right Ventricle: Normal right ventricular cavity size. Wall thickness is normal. Systolic function is normal.    Aortic Valve: The aortic valve is a trileaflet valve. There is moderate aortic valve sclerosis. Moderately restricted motion. There is moderate stenosis. Aortic valve area by VTI is 1.18  cm². Aortic valve peak velocity is 2.28 m/s. Mean gradient is 12 mmHg. The dimensionless index is 0.38.    Mitral Valve: There is mild bileaflet sclerosis.    Tricuspid Valve: There is mild regurgitation with a centrally directed jet.    Pulmonary Artery: The estimated pulmonary artery systolic pressure is 26 mmHg.    IVC/SVC: Normal venous pressure at 3 mmHg.      CURRENT/PREVIOUS VISIT EKG  Results for orders placed or performed during the hospital encounter of 08/30/24   EKG 12-lead    Collection Time: 09/02/24  9:13 AM   Result Value Ref Range    QRS Duration 96 ms    [facility] QTC Calculation 428 ms    Narrative    Test Reason : R07.9,    Vent. Rate : 075 BPM     Atrial Rate : 075 BPM     P-R Int : 232 ms          QRS Dur : 096 ms      QT Int : 384 ms       P-R-T Axes : 053 -10 087 degrees     QTc Int : 428 ms    Sinus rhythm with 1st degree A-V block  Minimal voltage criteria for LVH, may be normal variant ( [place] product )  Borderline Abnormal ECG  When compared with ECG of 02-SEP-2024 07:20,  No significant change was found    Referred By: [email or web address]   SELF           Confirmed By:        ASSESSMENT/PLAN:     Active Hospital Problems    Diagnosis    *NSTEMI (non-ST elevated myocardial infarction)    Urinary tract infection without hematuria    Diabetes    Stage 3a chronic kidney disease    Hyperlipidemia       ASSESSMENT & PLAN:   NON ST-ELEVATION MI MULTIVESSEL DIABETIC CORONARY DISEASE  DIABETES  CKD  HYPERLIPIDEMIA  RECOMMENDATIONS:      Continue statin and aspirin.  Continue metoprolol succinate 50 mg p.o. daily.  Continue heparin and nitro drips.  Appreciate CT surgery input.    Aliya Briscoe NP  [facility]  Department of Cardiology  Date of Service: 09/03/2024  1:04 PM

## 2024-09-03 NOTE — PLAN OF CARE
Problem: Acute Coronary Syndrome  Goal: Optimal Adaptation to Illness  Outcome: Progressing  Goal: Absence of Cardiac-Related Pain  Outcome: Progressing  Goal: Normalized Cardiac Rhythm  Outcome: Progressing  Goal: Effective Cardiac Pump Function  Outcome: Progressing  Goal: Adequate Tissue Perfusion  Outcome: Progressing     Problem: Cardiac Catheterization (Diagnostic/Interventional)  Goal: Absence of Bleeding  Outcome: Progressing  Goal: Absence of Contrast-Induced Injury  Outcome: Progressing  Goal: Stable Heart Rate and Rhythm  Outcome: Progressing  Goal: Absence of Embolism Signs and Symptoms  Outcome: Progressing  Goal: Anesthesia/Sedation Recovery  Outcome: Progressing  Goal: Optimal Pain Control and Function  Outcome: Progressing  Goal: Absence of Vascular Access Complication  Outcome: Progressing     Problem: Adult Inpatient Plan of Care  Goal: Plan of Care Review  Outcome: Progressing  Goal: Patient-Specific Goal (Individualized)  Outcome: Progressing  Goal: Absence of Hospital-Acquired Illness or Injury  Outcome: Progressing  Goal: Optimal Comfort and Wellbeing  Outcome: Progressing  Goal: Readiness for Transition of Care  Outcome: Progressing     Problem: Diabetes Comorbidity  Goal: Blood Glucose Level Within Targeted Range  Outcome: Progressing     Problem: Wound  Goal: Optimal Coping  Outcome: Progressing  Goal: Optimal Functional Ability  Outcome: Progressing  Goal: Absence of Infection Signs and Symptoms  Outcome: Progressing  Goal: Improved Oral Intake  Outcome: Progressing  Goal: Optimal Pain Control and Function  Outcome: Progressing  Goal: Skin Health and Integrity  Outcome: Progressing  Goal: Optimal Wound Healing  Outcome: Progressing     Problem: Fall Injury Risk  Goal: Absence of Fall and Fall-Related Injury  Outcome: Progressing     Problem: Skin Injury Risk Increased  Goal: Skin Health and Integrity  Outcome: Progressing     Problem: Oral Intake Inadequate  Goal: Improved Oral  Intake  Outcome: Progressing     Problem: Infection  Goal: Absence of Infection Signs and Symptoms  Outcome: Progressing

## 2024-09-03 NOTE — TREATMENT PLAN
Will plan for CABG on Friday, 9/6 at 11:00am. No plan for AVR at that time - can have TAVR in the future if necessary.   Risks, benefits, and alternatives of CABG were discussed in detail with the patient. Questions were answered. She is agreeable to proceed. D/C heparin 4 hours prior to surgery.

## 2024-09-03 NOTE — ASSESSMENT & PLAN NOTE
Final urine cultures grew E coli (ESBL species).  DC oral antibiotic and start patient on intravenous Merrem twice daily for 7 days.  Observe contact isolation.

## 2024-09-03 NOTE — CARE UPDATE
09/03/24 0746   Patient Assessment/Suction   Level of Consciousness (AVPU) alert   Respiratory Effort Normal;Unlabored   Expansion/Accessory Muscles/Retractions expansion symmetric   All Lung Fields Breath Sounds clear   Rhythm/Pattern, Respiratory unlabored   Cough Frequency no cough   PRE-TX-O2   Device (Oxygen Therapy) room air   SpO2 97 %   Pulse Oximetry Type Continuous   $ Pulse Oximetry - Multiple Charge Pulse Oximetry - Multiple   Pulse 67   Resp 18   Education   $ Education Other (see comment);15 min  (sats)

## 2024-09-03 NOTE — PROGRESS NOTES
[facility] Medicine  Progress Note    Patient Name: Debby Brown  MRN: [id number]  Patient Class: IP- Inpatient   Admission Date: 8/30/2024  Length of Stay: 3 days  Attending Physician: Anthony Becker MD  Primary Care Provider: Shawna Costello MD        Subjective:     Principal Problem:NSTEMI (non-ST elevated myocardial infarction)        HPI:  74-year-old female presented to ED for eval of chest pain. pMHx HTN, DM 2, HLD.  Patient reported she has been having back pain that radiates to the front of her chest, stated chest pain his pressure-like and worsens with exertion, relieved by rest.  Patient reported associated shortness of breath and nausea.  Reported these symptoms have been present intermittently for the last several months but have worsened significantly in the last few weeks with more frequent episodes.  Patient was evaluated by her PCP earlier today, she stated she had been out of most of her home meds, including BP meds and oral diabetic agent, for 3-4 weeks, reported to PCP the only medication she was currently taking is her Toujeo.  In ED today, SBP greater than 200 on arrival, improved with nitroglycerin.  Initial troponin 908.  Noted with hyperglycemia, serum glucose 337.  EKG with T-wave changes.  Chest x-ray impression without acute abnormality.  CTA chest abdomen impression with mild cardiomegaly with scattered coronary artery calcifications (predominantly in the LAD and circumflex distribution); calcified plaques in the thoracoabdominal aorta and iliacs with no aneurysm.  ED discussed case with Cardiology, heparin drip initiated.  Admit to hospital medicine for further eval.    Overview/Hospital Course:  Patient with medical history of diabetes, hypertension, hyperlipidemia came in with chest pain and was admitted for NSTEMI.  Status post angiogram on 08/30 showed severe multivessel CAD with thrombus in the distal RCA.  Patient was placed on heparin drip.  Cardiology and  Cardiothoracic surgery are following patient.  Troponin were trended. Plan for CABG on 9/3 by dr. Hung. A1c 10.2 , make adjustment on glargine and SSI as needed    Interval History:  Patient is seen and examined.  No new events noted overnight.  Patient is currently NPO of tentatively for possible coronary artery bypass graft surgery today.  Awaiting Cardiothoracic surgeon evaluation.  Patient denies any further chest pain.  Patient remains on intravenous heparin and nitroglycerin infusions.  Urine cultures grew E coli ESBL species.  Patient is on contact isolation.     Review of Systems   All other systems reviewed and are negative.    Objective:     Vital Signs (Most Recent):  Temp: 98.2 °F (36.8 °C) (09/03/24 0715)  Pulse: 68 (09/03/24 0858)  Resp: 19 (09/03/24 0800)  BP: 107/72 (09/03/24 0858)  SpO2: 97 % (09/03/24 0800) Vital Signs (24h Range):  Temp:  [97.6 °F (36.4 °C)-98.6 °F (37 °C)] 98.2 °F (36.8 °C)  Pulse:  [60-74] 68  Resp:  [10-27] 19  SpO2:  [91 %-98 %] 97 %  BP: ()/(51-82) 107/72     Weight: 89.9 kg (198 lb 3.1 oz)  Body mass index is 31.04 kg/m².    Intake/Output Summary (Last 24 hours) at 9/3/2024 0913  Last data filed at 9/3/2024 0601  Gross per 24 hour   Intake 1235.2 ml   Output 1400 ml   Net -164.8 ml         Physical Exam  Cardiovascular:      Rate and Rhythm: Normal rate.      Pulses: Normal pulses.      Comments: Systolic ejection murmur at left upper sternal border  Pulmonary:      Effort: Pulmonary effort is normal.   Neurological:      Mental Status: She is alert and oriented to person, place, and time.             Significant Labs:  Lab Results   Component Value Date    WBC 9.73 09/03/2024    WBC 9.73 09/03/2024    HGB 12.3 09/03/2024    HGB 12.3 09/03/2024    HCT 38.7 09/03/2024    HCT 38.7 09/03/2024    MCV 89 09/03/2024    MCV 89 09/03/2024     09/03/2024     09/03/2024       CMP  Sodium   Date Value Ref Range Status   09/03/2024 138 136 - 145 mmol/L Final      Potassium   Date Value Ref Range Status   09/03/2024 4.3 3.5 - 5.1 mmol/L Final     Chloride   Date Value Ref Range Status   09/03/2024 104 95 - 110 mmol/L Final     CO2   Date Value Ref Range Status   09/03/2024 26 23 - 29 mmol/L Final     Glucose   Date Value Ref Range Status   09/03/2024 214 (H) 70 - 110 mg/dL Final     BUN   Date Value Ref Range Status   09/03/2024 21 8 - 23 mg/dL Final     Creatinine   Date Value Ref Range Status   09/03/2024 1.1 0.5 - 1.4 mg/dL Final     Calcium   Date Value Ref Range Status   09/03/2024 9.1 8.7 - 10.5 mg/dL Final     Total Protein   Date Value Ref Range Status   09/03/2024 6.4 6.0 - 8.4 g/dL Final     Albumin   Date Value Ref Range Status   09/03/2024 3.5 3.5 - 5.2 g/dL Final     Total Bilirubin   Date Value Ref Range Status   09/03/2024 0.5 0.1 - 1.0 mg/dL Final     Comment:     For infants and newborns, interpretation of results should be based  on gestational age, weight and in agreement with clinical  observations.    Premature Infant recommended reference ranges:  Up to 24 hours.............<8.0 mg/dL  Up to 48 hours............<12.0 mg/dL  3-5 days..................<15.0 mg/dL  6-29 days.................<15.0 mg/dL       Alkaline Phosphatase   Date Value Ref Range Status   09/03/2024 56 55 - 135 U/L Final     AST   Date Value Ref Range Status   09/03/2024 24 10 - 40 U/L Final     ALT   Date Value Ref Range Status   09/03/2024 21 10 - 44 U/L Final     Anion Gap   Date Value Ref Range Status   09/03/2024 8 8 - 16 mmol/L Final     eGFR   Date Value Ref Range Status   09/03/2024 53.1 (A) >60 mL/min/1.73 m^2 Final     Microbiology Results (last 7 days)       Procedure Component Value Units Date/Time    Urine culture [[id number]]  (Abnormal)  (Susceptibility) Collected: 09/01/24 0305    Order Status: Completed Specimen: Urine Updated: 09/03/24 0702     Urine Culture, Routine ESCHERICHIA COLI ESBL  >100,000 cfu/ml  Results called to and read back by Sandra Ray RN on  2BICU 09/03/2024    07:01 ncb      Narrative:      Specimen Source->Urine          Significant Imaging:  ECHO:    Left Ventricle: The left ventricle is normal in size. Mildly increased wall thickness. There is mild concentric hypertrophy. There is normal systolic function with a visually estimated ejection fraction of 60 - 65%. There is diastolic dysfunction.    Right Ventricle: Normal right ventricular cavity size. Wall thickness is normal. Systolic function is normal.    Aortic Valve: The aortic valve is a trileaflet valve. There is moderate aortic valve sclerosis. Moderately restricted motion. There is moderate stenosis. Aortic valve area by VTI is 1.18 cm². Aortic valve peak velocity is 2.28 m/s. Mean gradient is 12 mmHg. The dimensionless index is 0.38.    Mitral Valve: There is mild bileaflet sclerosis.    Tricuspid Valve: There is mild regurgitation with a centrally directed jet.    Pulmonary Artery: The estimated pulmonary artery systolic pressure is 26 mmHg.    IVC/SVC: Normal venous pressure at 3 mmHg.    LHC:    74 year old diabetic female with poorly controlled DM Type II (A1C 10), HTN, and hyperlipidemia presenting with ACS.  Coronary angiogram revealed severe multivessel CAD including severely diseased LAD and LCx.  The patient has a large RCA with diffuse disease and evidence of thrombus in the partially recannulated RPVL.  Plan for evaluation to determine candidacy for surgical revascularization (possible endarterectomy of the mid/distal LAD to allow this diabetic patient to get a LIMA graft and revascularization of RCA).    Considered placing IABP; however, patient hemodynamically stable and chest pain free upon completion of procedure. In addition, her iliac vessel revealed tortousity and did not appear ideal for IABP placement.  Plan for heparin gtt, nitro gtt, and evaluation to determine candidacy for surgical revascularization.    The estimated blood loss was <50 mL.    CTA chest abdomen:  1.   Mild cardiomegaly with scattered coronary artery calcifications (predominantly in the LAD and circumflex distribution).  2.  Calcified plaques in the thoracoabdominal aorta and iliacs with no aneurysm as above.  3.  Chronic anterior superior endplate compression fracture deformities at L1 and L2, similar to the previous exam.  4.  Numerous additional, and incidental findings as noted above.    US Carotids:  1. Elevated peak systolic velocities in the right ICA, suggesting 50-69% stenosis.  2. No findings of left ICA stenosis.  3. Patent vertebral arteries with normal antegrade flow.    CT Chest without contrast:  1. Aortic valve calcifications, with normal caliber thoracic aorta.  2. Extensive 3 vessel coronary arterial calcifications.    Assessment/Plan:      * NSTEMI (non-ST elevated myocardial infarction)  Status post angiogram on 08/30 showed severe multivessel CAD with thrombus in the distal RCA.    Currently on heparin drip.  Troponin trended down  Cardio and Cardiothoracic surgery are following patient, patient needs coronary artery bypass graft surgery.    Urinary tract infection without hematuria  Final urine cultures grew E coli (ESBL species).  DC oral antibiotic and start patient on intravenous Merrem twice daily for 7 days.  Observe contact isolation.      Diabetes  A1c 10.2  SSI and glargine  Can make adjustment as needed.    Stage 3a chronic kidney disease  Creatine stable for now. CMP reviewed- noted Estimated Creatinine Clearance: 57.7 mL/min (based on SCr of 1 mg/dL). according to latest data. Based on current GFR, CKD stage is stage 3 - GFR 30-59.  Monitor UOP and serial CMP and adjust therapy as needed. Renally dose meds. Avoid nephrotoxic medications and procedures.    Hyperlipidemia  Atorvastatin  Lipid panel      Discussed with  regarding discharge planning.  Patient to receive MiraLax for constipation complaint.  VTE Risk Mitigation (From admission, onward)           Ordered      heparin 25,000 units in dextrose 5% (100 units/ml) IV bolus from bag LOW INTENSITY nomogram - [facility]  As needed (PRN)        Question:  Heparin Infusion Adjustment (DO NOT MODIFY ANSWER)  Answer:  [email or web address]\epic\Images\Pharmacy\HeparinInfusions\heparin LOW INTENSITY nomogram for [facility] [id number]    08/30/24 1829     heparin 25,000 units in dextrose 5% (100 units/ml) IV bolus from bag LOW INTENSITY nomogram - [facility]  As needed (PRN)        Question:  Heparin Infusion Adjustment (DO NOT MODIFY ANSWER)  Answer:  [email or web address]\epic\Images\Pharmacy\HeparinInfusions\heparin LOW INTENSITY nomogram for [facility] [id number]    08/30/24 1829     heparin 25,000 units in dextrose 5% 250 mL (100 units/mL) infusion LOW INTENSITY nomogram - [facility]  Continuous        Question:  Begin at (units/kg/hr)  Answer:  12    08/30/24 1829     Reason for No Pharmacological VTE Prophylaxis  Once        Question:  Reasons:  Answer:  IV Heparin w/in 24 hrs. Pre or Post-Op    08/30/24 1646     IP VTE HIGH RISK PATIENT  Once         08/30/24 1646     Place sequential compression device  Until discontinued         08/30/24 1646                    Discharge Planning   MARTHA:  TBD    Code Status: Full Code   Is the patient medically ready for discharge?:     Reason for patient still in hospital (select all that apply): Patient trending condition and Consult recommendations  Discharge Plan A: Home            Critical care time spent on the evaluation and treatment of severe organ dysfunction, review of pertinent labs and imaging studies, discussions with consulting providers and discussions with patient/family: 35 minutes.      Anthony Becker MD  Department of Hospital Medicine   [facility]

## 2024-09-03 NOTE — NURSING
Patient stable throughout the shift. Remains on Heprin and Nitro drips.  PTT therapeutic and is being drawn daily.      Patient spoke with Dr. Garcia and Ana Maria Houston with CT surgery regarding need for CABG.  Patient agreeable and procedure scheduled for Friday at 1100. Patient did become anxious about upcoming procedure but was much better after receiving PRN anxiety meds.      Patient ambulated multiple times to the bathroom and sat in the bedside chair for meals.     Patient currently resting in bed, call light and personal items in reach, bed alarm activated and has no complaints at this time. Report given to Lucio Muniz RN.

## 2024-09-03 NOTE — SUBJECTIVE & OBJECTIVE
Interval History:  Patient is seen and examined.  No new events noted overnight.  Patient is currently NPO of tentatively for possible coronary artery bypass graft surgery today.  Awaiting Cardiothoracic surgeon evaluation.  Patient denies any further chest pain.  Patient remains on intravenous heparin and nitroglycerin infusions.  Urine cultures grew E coli ESBL species.  Patient is on contact isolation.     Review of Systems   All other systems reviewed and are negative.    Objective:     Vital Signs (Most Recent):  Temp: 98.2 °F (36.8 °C) (09/03/24 0715)  Pulse: 68 (09/03/24 0858)  Resp: 19 (09/03/24 0800)  BP: 107/72 (09/03/24 0858)  SpO2: 97 % (09/03/24 0800) Vital Signs (24h Range):  Temp:  [97.6 °F (36.4 °C)-98.6 °F (37 °C)] 98.2 °F (36.8 °C)  Pulse:  [60-74] 68  Resp:  [10-27] 19  SpO2:  [91 %-98 %] 97 %  BP: ()/(51-82) 107/72     Weight: 89.9 kg (198 lb 3.1 oz)  Body mass index is 31.04 kg/m².    Intake/Output Summary (Last 24 hours) at 9/3/2024 0913  Last data filed at 9/3/2024 0601  Gross per 24 hour   Intake 1235.2 ml   Output 1400 ml   Net -164.8 ml         Physical Exam  Cardiovascular:      Rate and Rhythm: Normal rate.      Pulses: Normal pulses.      Comments: Systolic ejection murmur at left upper sternal border  Pulmonary:      Effort: Pulmonary effort is normal.   Neurological:      Mental Status: She is alert and oriented to person, place, and time.             Significant Labs:  Lab Results   Component Value Date    WBC 9.73 09/03/2024    WBC 9.73 09/03/2024    HGB 12.3 09/03/2024    HGB 12.3 09/03/2024    HCT 38.7 09/03/2024    HCT 38.7 09/03/2024    MCV 89 09/03/2024    MCV 89 09/03/2024     09/03/2024     09/03/2024       CMP  Sodium   Date Value Ref Range Status   09/03/2024 138 136 - 145 mmol/L Final     Potassium   Date Value Ref Range Status   09/03/2024 4.3 3.5 - 5.1 mmol/L Final     Chloride   Date Value Ref Range Status   09/03/2024 104 95 - 110 mmol/L Final      CO2   Date Value Ref Range Status   09/03/2024 26 23 - 29 mmol/L Final     Glucose   Date Value Ref Range Status   09/03/2024 214 (H) 70 - 110 mg/dL Final     BUN   Date Value Ref Range Status   09/03/2024 21 8 - 23 mg/dL Final     Creatinine   Date Value Ref Range Status   09/03/2024 1.1 0.5 - 1.4 mg/dL Final     Calcium   Date Value Ref Range Status   09/03/2024 9.1 8.7 - 10.5 mg/dL Final     Total Protein   Date Value Ref Range Status   09/03/2024 6.4 6.0 - 8.4 g/dL Final     Albumin   Date Value Ref Range Status   09/03/2024 3.5 3.5 - 5.2 g/dL Final     Total Bilirubin   Date Value Ref Range Status   09/03/2024 0.5 0.1 - 1.0 mg/dL Final     Comment:     For infants and newborns, interpretation of results should be based  on gestational age, weight and in agreement with clinical  observations.    Premature Infant recommended reference ranges:  Up to 24 hours.............<8.0 mg/dL  Up to 48 hours............<12.0 mg/dL  3-5 days..................<15.0 mg/dL  6-29 days.................<15.0 mg/dL       Alkaline Phosphatase   Date Value Ref Range Status   09/03/2024 56 55 - 135 U/L Final     AST   Date Value Ref Range Status   09/03/2024 24 10 - 40 U/L Final     ALT   Date Value Ref Range Status   09/03/2024 21 10 - 44 U/L Final     Anion Gap   Date Value Ref Range Status   09/03/2024 8 8 - 16 mmol/L Final     eGFR   Date Value Ref Range Status   09/03/2024 53.1 (A) >60 mL/min/1.73 m^2 Final     Microbiology Results (last 7 days)       Procedure Component Value Units Date/Time    Urine culture [[id number]]  (Abnormal)  (Susceptibility) Collected: 09/01/24 0305    Order Status: Completed Specimen: Urine Updated: 09/03/24 0702     Urine Culture, Routine ESCHERICHIA COLI ESBL  >100,000 cfu/ml  Results called to and read back by Sandra Ray RN on 2B[facility] 09/03/2024    07:01 ncb      Narrative:      Specimen Source->Urine          Significant Imaging:  ECHO:    Left Ventricle: The left ventricle is normal in size.  Mildly increased wall thickness. There is mild concentric hypertrophy. There is normal systolic function with a visually estimated ejection fraction of 60 - 65%. There is diastolic dysfunction.    Right Ventricle: Normal right ventricular cavity size. Wall thickness is normal. Systolic function is normal.    Aortic Valve: The aortic valve is a trileaflet valve. There is moderate aortic valve sclerosis. Moderately restricted motion. There is moderate stenosis. Aortic valve area by VTI is 1.18 cm². Aortic valve peak velocity is 2.28 m/s. Mean gradient is 12 mmHg. The dimensionless index is 0.38.    Mitral Valve: There is mild bileaflet sclerosis.    Tricuspid Valve: There is mild regurgitation with a centrally directed jet.    Pulmonary Artery: The estimated pulmonary artery systolic pressure is 26 mmHg.    IVC/SVC: Normal venous pressure at 3 mmHg.    LHC:    74 year old diabetic female with poorly controlled DM Type II (A1C 10), HTN, and hyperlipidemia presenting with ACS.  Coronary angiogram revealed severe multivessel CAD including severely diseased LAD and LCx.  The patient has a large RCA with diffuse disease and evidence of thrombus in the partially recannulated RPVL.  Plan for evaluation to determine candidacy for surgical revascularization (possible endarterectomy of the mid/distal LAD to allow this diabetic patient to get a LIMA graft and revascularization of RCA).    Considered placing IABP; however, patient hemodynamically stable and chest pain free upon completion of procedure. In addition, her iliac vessel revealed tortousity and did not appear ideal for IABP placement.  Plan for heparin gtt, nitro gtt, and evaluation to determine candidacy for surgical revascularization.    The estimated blood loss was <50 mL.    CTA chest abdomen:  1.  Mild cardiomegaly with scattered coronary artery calcifications (predominantly in the LAD and circumflex distribution).  2.  Calcified plaques in the thoracoabdominal  aorta and iliacs with no aneurysm as above.  3.  Chronic anterior superior endplate compression fracture deformities at L1 and L2, similar to the previous exam.  4.  Numerous additional, and incidental findings as noted above.    US Carotids:  1. Elevated peak systolic velocities in the right ICA, suggesting 50-69% stenosis.  2. No findings of left ICA stenosis.  3. Patent vertebral arteries with normal antegrade flow.    CT Chest without contrast:  1. Aortic valve calcifications, with normal caliber thoracic aorta.  2. Extensive 3 vessel coronary arterial calcifications.   Keo Moctezuma

## 2024-09-03 NOTE — CARE UPDATE
09/02/24 2029   Patient Assessment/Suction   Level of Consciousness (AVPU) alert   Respiratory Effort Unlabored   Expansion/Accessory Muscles/Retractions expansion symmetric   All Lung Fields Breath Sounds coarse   Rhythm/Pattern, Respiratory depth regular;pattern regular   Cough Frequency infrequent   Cough Type good;nonproductive   PRE-TX-O2   Device (Oxygen Therapy) room air   SpO2 95 %   Pulse Oximetry Type Continuous   $ Pulse Oximetry - Multiple Charge Pulse Oximetry - Multiple   Pulse 70   Resp 17   Education   $ Education Oxygen;15 min

## 2024-09-03 NOTE — PLAN OF CARE
Problem: Acute Coronary Syndrome  Goal: Optimal Adaptation to Illness  Outcome: Progressing  Goal: Absence of Cardiac-Related Pain  Outcome: Progressing  Goal: Normalized Cardiac Rhythm  Outcome: Progressing  Goal: Effective Cardiac Pump Function  Outcome: Progressing  Goal: Adequate Tissue Perfusion  Outcome: Progressing      How Severe Are Your Warts?: moderate Is This A New Presentation, Or A Follow-Up?: Warts Treatment Number (Optional): 1

## 2024-09-03 NOTE — ASSESSMENT & PLAN NOTE
Status post angiogram on 08/30 showed severe multivessel CAD with thrombus in the distal RCA.    Currently on heparin drip.  Troponin trended down  Cardio and Cardiothoracic surgery are following patient, patient needs coronary artery bypass graft surgery.

## 2024-09-04 LAB
ALBUMIN SERPL BCP-MCNC: 3.5 G/DL (ref 3.5–5.2)
ALP SERPL-CCNC: 55 U/L (ref 55–135)
ALT SERPL W/O P-5'-P-CCNC: 34 U/L (ref 10–44)
ANION GAP SERPL CALC-SCNC: 9 MMOL/L (ref 8–16)
APTT PPP: 38.1 SEC (ref 21–32)
APTT PPP: 57.5 SEC (ref 21–32)
APTT PPP: 78.6 SEC (ref 21–32)
APTT PPP: 78.6 SEC (ref 21–32)
AST SERPL-CCNC: 32 U/L (ref 10–40)
BASOPHILS # BLD AUTO: 0.1 K/UL (ref 0–0.2)
BASOPHILS # BLD AUTO: 0.1 K/UL (ref 0–0.2)
BASOPHILS NFR BLD: 0.9 % (ref 0–1.9)
BASOPHILS NFR BLD: 0.9 % (ref 0–1.9)
BILIRUB SERPL-MCNC: 0.5 MG/DL (ref 0.1–1)
BUN SERPL-MCNC: 21 MG/DL (ref 8–23)
CALCIUM SERPL-MCNC: 9 MG/DL (ref 8.7–10.5)
CHLORIDE SERPL-SCNC: 104 MMOL/L (ref 95–110)
CO2 SERPL-SCNC: 24 MMOL/L (ref 23–29)
CREAT SERPL-MCNC: 1 MG/DL (ref 0.5–1.4)
DIFFERENTIAL METHOD BLD: ABNORMAL
DIFFERENTIAL METHOD BLD: ABNORMAL
EOSINOPHIL # BLD AUTO: 0.5 K/UL (ref 0–0.5)
EOSINOPHIL # BLD AUTO: 0.5 K/UL (ref 0–0.5)
EOSINOPHIL NFR BLD: 4.2 % (ref 0–8)
EOSINOPHIL NFR BLD: 4.2 % (ref 0–8)
ERYTHROCYTE [DISTWIDTH] IN BLOOD BY AUTOMATED COUNT: 14 % (ref 11.5–14.5)
ERYTHROCYTE [DISTWIDTH] IN BLOOD BY AUTOMATED COUNT: 14 % (ref 11.5–14.5)
EST. GFR  (NO RACE VARIABLE): 59.5 ML/MIN/1.73 M^2
GLUCOSE SERPL-MCNC: 215 MG/DL (ref 70–110)
HCT VFR BLD AUTO: 36.9 % (ref 37–48.5)
HCT VFR BLD AUTO: 36.9 % (ref 37–48.5)
HGB BLD-MCNC: 12.2 G/DL (ref 12–16)
HGB BLD-MCNC: 12.2 G/DL (ref 12–16)
IMM GRANULOCYTES # BLD AUTO: 0.05 K/UL (ref 0–0.04)
IMM GRANULOCYTES # BLD AUTO: 0.05 K/UL (ref 0–0.04)
IMM GRANULOCYTES NFR BLD AUTO: 0.5 % (ref 0–0.5)
IMM GRANULOCYTES NFR BLD AUTO: 0.5 % (ref 0–0.5)
LYMPHOCYTES # BLD AUTO: 4.1 K/UL (ref 1–4.8)
LYMPHOCYTES # BLD AUTO: 4.1 K/UL (ref 1–4.8)
LYMPHOCYTES NFR BLD: 37.8 % (ref 18–48)
LYMPHOCYTES NFR BLD: 37.8 % (ref 18–48)
MCH RBC QN AUTO: 29.2 PG (ref 27–31)
MCH RBC QN AUTO: 29.2 PG (ref 27–31)
MCHC RBC AUTO-ENTMCNC: 33.1 G/DL (ref 32–36)
MCHC RBC AUTO-ENTMCNC: 33.1 G/DL (ref 32–36)
MCV RBC AUTO: 88 FL (ref 82–98)
MCV RBC AUTO: 88 FL (ref 82–98)
MONOCYTES # BLD AUTO: 1 K/UL (ref 0.3–1)
MONOCYTES # BLD AUTO: 1 K/UL (ref 0.3–1)
MONOCYTES NFR BLD: 8.9 % (ref 4–15)
MONOCYTES NFR BLD: 8.9 % (ref 4–15)
NEUTROPHILS # BLD AUTO: 5.1 K/UL (ref 1.8–7.7)
NEUTROPHILS # BLD AUTO: 5.1 K/UL (ref 1.8–7.7)
NEUTROPHILS NFR BLD: 47.7 % (ref 38–73)
NEUTROPHILS NFR BLD: 47.7 % (ref 38–73)
NRBC BLD-RTO: 0 /100 WBC
NRBC BLD-RTO: 0 /100 WBC
PLATELET # BLD AUTO: 288 K/UL (ref 150–450)
PLATELET # BLD AUTO: 288 K/UL (ref 150–450)
PMV BLD AUTO: 10.9 FL (ref 9.2–12.9)
PMV BLD AUTO: 10.9 FL (ref 9.2–12.9)
POCT GLUCOSE: 210 MG/DL (ref 70–110)
POCT GLUCOSE: 245 MG/DL (ref 70–110)
POCT GLUCOSE: 253 MG/DL (ref 70–110)
POTASSIUM SERPL-SCNC: 3.9 MMOL/L (ref 3.5–5.1)
PROT SERPL-MCNC: 6.5 G/DL (ref 6–8.4)
RBC # BLD AUTO: 4.18 M/UL (ref 4–5.4)
RBC # BLD AUTO: 4.18 M/UL (ref 4–5.4)
SODIUM SERPL-SCNC: 137 MMOL/L (ref 136–145)
WBC # BLD AUTO: 10.78 K/UL (ref 3.9–12.7)
WBC # BLD AUTO: 10.78 K/UL (ref 3.9–12.7)

## 2024-09-04 PROCEDURE — 63600175 PHARM REV CODE 636 W HCPCS

## 2024-09-04 PROCEDURE — 85025 COMPLETE CBC W/AUTO DIFF WBC: CPT | Performed by: STUDENT IN AN ORGANIZED HEALTH CARE EDUCATION/TRAINING PROGRAM

## 2024-09-04 PROCEDURE — 25000003 PHARM REV CODE 250: Performed by: INTERNAL MEDICINE

## 2024-09-04 PROCEDURE — 25000003 PHARM REV CODE 250: Performed by: FAMILY MEDICINE

## 2024-09-04 PROCEDURE — 94761 N-INVAS EAR/PLS OXIMETRY MLT: CPT

## 2024-09-04 PROCEDURE — 25000003 PHARM REV CODE 250

## 2024-09-04 PROCEDURE — 12000002 HC ACUTE/MED SURGE SEMI-PRIVATE ROOM

## 2024-09-04 PROCEDURE — 36415 COLL VENOUS BLD VENIPUNCTURE: CPT | Performed by: STUDENT IN AN ORGANIZED HEALTH CARE EDUCATION/TRAINING PROGRAM

## 2024-09-04 PROCEDURE — 99233 SBSQ HOSP IP/OBS HIGH 50: CPT | Mod: ,,, | Performed by: INTERNAL MEDICINE

## 2024-09-04 PROCEDURE — 27000207 HC ISOLATION

## 2024-09-04 PROCEDURE — 85730 THROMBOPLASTIN TIME PARTIAL: CPT | Performed by: STUDENT IN AN ORGANIZED HEALTH CARE EDUCATION/TRAINING PROGRAM

## 2024-09-04 PROCEDURE — 36415 COLL VENOUS BLD VENIPUNCTURE: CPT | Performed by: INTERNAL MEDICINE

## 2024-09-04 PROCEDURE — 80053 COMPREHEN METABOLIC PANEL: CPT

## 2024-09-04 PROCEDURE — 25000003 PHARM REV CODE 250: Performed by: STUDENT IN AN ORGANIZED HEALTH CARE EDUCATION/TRAINING PROGRAM

## 2024-09-04 PROCEDURE — 85730 THROMBOPLASTIN TIME PARTIAL: CPT | Mod: 91 | Performed by: INTERNAL MEDICINE

## 2024-09-04 PROCEDURE — 63600175 PHARM REV CODE 636 W HCPCS: Performed by: INTERNAL MEDICINE

## 2024-09-04 RX ADMIN — INSULIN ASPART 2 UNITS: 100 INJECTION, SOLUTION INTRAVENOUS; SUBCUTANEOUS at 12:09

## 2024-09-04 RX ADMIN — INSULIN GLARGINE 10 UNITS: 100 INJECTION, SOLUTION SUBCUTANEOUS at 08:09

## 2024-09-04 RX ADMIN — LORAZEPAM 0.5 MG: 0.5 TABLET ORAL at 06:09

## 2024-09-04 RX ADMIN — PANTOPRAZOLE SODIUM 40 MG: 40 TABLET, DELAYED RELEASE ORAL at 06:09

## 2024-09-04 RX ADMIN — ATORVASTATIN CALCIUM 80 MG: 40 TABLET, FILM COATED ORAL at 08:09

## 2024-09-04 RX ADMIN — METOPROLOL SUCCINATE 50 MG: 50 TABLET, FILM COATED, EXTENDED RELEASE ORAL at 08:09

## 2024-09-04 RX ADMIN — ASPIRIN 81 MG 81 MG: 81 TABLET ORAL at 08:09

## 2024-09-04 RX ADMIN — INSULIN ASPART 1 UNITS: 100 INJECTION, SOLUTION INTRAVENOUS; SUBCUTANEOUS at 10:09

## 2024-09-04 RX ADMIN — INSULIN ASPART 3 UNITS: 100 INJECTION, SOLUTION INTRAVENOUS; SUBCUTANEOUS at 04:09

## 2024-09-04 RX ADMIN — HEPARIN SODIUM 11 UNITS/KG/HR: 10000 INJECTION, SOLUTION INTRAVENOUS at 02:09

## 2024-09-04 RX ADMIN — MEROPENEM 1 G: 1 INJECTION, POWDER, FOR SOLUTION INTRAVENOUS at 09:09

## 2024-09-04 RX ADMIN — DIPHENHYDRAMINE HYDROCHLORIDE 25 MG: 25 CAPSULE ORAL at 04:09

## 2024-09-04 RX ADMIN — MEROPENEM 1 G: 1 INJECTION, POWDER, FOR SOLUTION INTRAVENOUS at 10:09

## 2024-09-04 RX ADMIN — INSULIN ASPART 2 UNITS: 100 INJECTION, SOLUTION INTRAVENOUS; SUBCUTANEOUS at 08:09

## 2024-09-04 RX ADMIN — MUPIROCIN 1 G: 20 OINTMENT TOPICAL at 08:09

## 2024-09-04 RX ADMIN — DIPHENHYDRAMINE HYDROCHLORIDE 25 MG: 25 CAPSULE ORAL at 09:09

## 2024-09-04 RX ADMIN — MUPIROCIN 1 G: 20 OINTMENT TOPICAL at 09:09

## 2024-09-04 NOTE — PLAN OF CARE
Plan of care reviewed with Patient. Patient is oriented to Person, Place, Time . Patient maintains oxygen saturations above 92% while on room air. Cardiac monitoring maintained. Patient independently voids spontaneously. Safety precautions maintained. Patient remains free from injury. Turned/repositioned independently.  Call light and personal items within reach. Bed in lowest position and locked. Glucose monitoring maintained. Pain controlled with prn medication.  Vital signs stable.  Afebrile.      Current infusions: Heparin (this AM ptt 78.6. per protocal, place drip on hold at 0446 for 1 hour, restart drip at 0546 @ 9 unit/kg/hr (decrease by 3 unit/kg/hr), recheck 6 hours from restarting at 1146.)  Nitro at 5 (patient chest pain free this shift.)    New orders this shift: N/A.    Nurses Note -- 4 Eyes      9/4/2024   5:10 AM      Skin assessed during: Daily Assessment      [x] No Altered Skin Integrity Present    [x]Prevention Measures Documented      [] Yes- Altered Skin Integrity Present or Discovered   [] LDA Added if Not in Epic (Describe Wound)   [] New Altered Skin Integrity was Present on Admit and Documented in LDA   [] Wound Image Taken    Wound Care Consulted? No    Attending Nurse:  Blake Ledbetter RN/Staff Member:   GABBI Marcos               Problem: Acute Coronary Syndrome  Goal: Optimal Adaptation to Illness  Outcome: Progressing  Goal: Absence of Cardiac-Related Pain  Outcome: Progressing  Goal: Normalized Cardiac Rhythm  Outcome: Progressing  Goal: Effective Cardiac Pump Function  Outcome: Progressing  Goal: Adequate Tissue Perfusion  Outcome: Progressing     Problem: Cardiac Catheterization (Diagnostic/Interventional)  Goal: Absence of Bleeding  Outcome: Progressing  Goal: Absence of Contrast-Induced Injury  Outcome: Progressing  Goal: Stable Heart Rate and Rhythm  Outcome: Progressing  Goal: Absence of Embolism Signs and Symptoms  Outcome: Progressing  Goal: Anesthesia/Sedation  Recovery  Outcome: Progressing  Goal: Optimal Pain Control and Function  Outcome: Progressing  Goal: Absence of Vascular Access Complication  Outcome: Progressing     Problem: Adult Inpatient Plan of Care  Goal: Plan of Care Review  Outcome: Progressing  Goal: Patient-Specific Goal (Individualized)  Outcome: Progressing  Goal: Absence of Hospital-Acquired Illness or Injury  Outcome: Progressing  Goal: Optimal Comfort and Wellbeing  Outcome: Progressing  Goal: Readiness for Transition of Care  Outcome: Progressing     Problem: Diabetes Comorbidity  Goal: Blood Glucose Level Within Targeted Range  Outcome: Progressing     Problem: Wound  Goal: Optimal Coping  Outcome: Progressing  Goal: Optimal Functional Ability  Outcome: Progressing  Goal: Absence of Infection Signs and Symptoms  Outcome: Progressing  Goal: Improved Oral Intake  Outcome: Progressing  Goal: Optimal Pain Control and Function  Outcome: Progressing  Goal: Skin Health and Integrity  Outcome: Progressing  Goal: Optimal Wound Healing  Outcome: Progressing     Problem: Fall Injury Risk  Goal: Absence of Fall and Fall-Related Injury  Outcome: Progressing     Problem: Skin Injury Risk Increased  Goal: Skin Health and Integrity  Outcome: Progressing     Problem: Oral Intake Inadequate  Goal: Improved Oral Intake  Outcome: Progressing     Problem: Infection  Goal: Absence of Infection Signs and Symptoms  Outcome: Progressing

## 2024-09-04 NOTE — ASSESSMENT & PLAN NOTE
Status post angiogram on 08/30 showed severe multivessel CAD with thrombus in the distal RCA.    Currently on heparin drip.  Troponin trended down  Cardio and Cardiothoracic surgery are following patient, patient is scheduled for coronary artery bypass graft surgery on Friday.

## 2024-09-04 NOTE — CARE UPDATE
09/03/24 2155   Patient Assessment/Suction   Level of Consciousness (AVPU) alert   Respiratory Effort Normal;Unlabored   Expansion/Accessory Muscles/Retractions no use of accessory muscles;no retractions;expansion symmetric   All Lung Fields Breath Sounds clear;equal bilaterally   Rhythm/Pattern, Respiratory unlabored;pattern regular   Cough Frequency no cough   PRE-TX-O2   Device (Oxygen Therapy) room air   SpO2 97 %   Pulse Oximetry Type Continuous   Pulse 69   Resp (!) 31

## 2024-09-04 NOTE — PROGRESS NOTES
[facility]  Department of Cardiology  Progress Note    PATIENT NAME: Debby Brown  MRN: [id number]  TODAY'S DATE: 09/04/2024  ADMIT DATE: 8/30/2024    SUBJECTIVE     PRINCIPLE PROBLEM: NSTEMI (non-ST elevated myocardial infarction)    INTERVAL HISTORY:    9/4/2024    Patient seen resting in bed with no acute distress noted.  Denies any chest discomfort shortness of breath.  She is stable on telemetry at this time.    9/3/24    Patient for evaluation from CT surgery today.  Stable and doing well presently.    9/2/24    PATIENT WITH A NON ST-ELEVATION MI CURRENTLY IN ICU ON NITRO DRIP WAITING CARDIOVASCULAR SURGERY EVALUATION BY DR. SORIANO  SHE HAD CHEST PAIN TODAY FOR ABOUT 3 MINUTES OTHERWISE IS DOING WELL    Review of patient's allergies indicates:   Allergen Reactions    Bactrim [sulfamethoxazole-trimethoprim]     Codeine     Levaquin [levofloxacin]     Pcn [penicillins]        REVIEW OF SYSTEMS  CARDIOVASCULAR: No recent chest pain, palpitations, arm, neck, or jaw pain  RESPIRATORY: No recent fever, cough chills, SOB or congestion  : No blood in the urine  GI: No Nausea, vomiting, constipation, diarrhea, blood, or reflux.  MUSCULOSKELETAL: No myalgias  NEURO: No lightheadedness or dizziness  EYES: No Double vision, blurry, vision or headache     OBJECTIVE     VITAL SIGNS (Most Recent)  Temp: 98.3 °F (36.8 °C) (09/04/24 0730)  Pulse: 69 (09/04/24 1200)  Resp: 16 (09/04/24 1200)  BP: 139/85 (09/04/24 1200)  SpO2: 97 % (09/04/24 1200)    VENTILATION STATUS  Resp: 16 (09/04/24 1200)  SpO2: 97 % (09/04/24 1200)           I & O (Last 24H):  Intake/Output Summary (Last 24 hours) at 9/4/2024 1240  Last data filed at 9/4/2024 0752  Gross per 24 hour   Intake 1070.82 ml   Output 1050 ml   Net 20.82 ml       WEIGHTS  Wt Readings from Last 3 Encounters:   09/04/24 0400 85 kg (187 lb 6.3 oz)   09/03/24 0400 89.9 kg (198 lb 3.1 oz)   09/01/24 0353 88.8 kg (195 lb 12.3 oz)   08/30/24 1938 89.8 kg (197 lb 15.6  oz)   08/30/24 1349 86.2 kg (190 lb)   08/31/24 0920 89.4 kg (197 lb)   08/30/24 1007 89.1 kg (196 lb 6.9 oz)       PHYSICAL EXAM  CONSTITUTIONAL: Well built, well nourished in no apparent distress  NECK: no carotid bruit, no JVD  LUNGS: CTA  CHEST WALL: no tenderness  HEART: regular rate and rhythm, S1, S2 normal, no murmur, click, rub or gallop   ABDOMEN: soft, non-tender; bowel sounds normal; no masses,  no organomegaly  EXTREMITIES: Extremities normal, no edema  NEURO: AAO X 3    SCHEDULED MEDS:   aspirin  81 mg Oral Daily    atorvastatin  80 mg Oral Daily    docusate sodium  100 mg Oral Daily    insulin glargine U-100  10 Units Subcutaneous Daily    meropenem IV (PEDS and ADULTS)  1 g Intravenous Q12H    metoprolol succinate  50 mg Oral Daily    mupirocin   Nasal BID    pantoprazole  40 mg Oral Daily    polyethylene glycol  17 g Oral Daily       CONTINUOUS INFUSIONS:   heparin (porcine) in D5W  0-40 Units/kg/hr (Adjusted) Intravenous Continuous 6.4 mL/hr at 09/04/24 0546 9 Units/kg/hr at 09/04/24 0546    nitroGLYCERIN in 5 % dextrose    Continuous PRN 3 mL/hr at 09/01/24 0601 Rate Verify at 09/01/24 0601       PRN MEDS:  Current Facility-Administered Medications:     [START ON 9/6/2024] 0.9%  NaCl infusion (for blood administration), , Intravenous, Q24H PRN    acetaminophen, 650 mg, Oral, Q6H PRN    dextrose 50%, 12.5 g, Intravenous, PRN    diphenhydrAMINE, 25 mg, Oral, Q6H PRN    glucagon (human recombinant), 1 mg, Intramuscular, PRN    heparin (PORCINE), 56 Units/kg (Adjusted), Intravenous, PRN    heparin (PORCINE), 30 Units/kg (Adjusted), Intravenous, PRN    insulin aspart U-100, 0-10 Units, Subcutaneous, Q6H PRN    LORazepam, 0.5 mg, Oral, Q6H PRN    magnesium oxide, 800 mg, Oral, PRN    magnesium oxide, 800 mg, Oral, PRN    [START ON 9/6/2024] mupirocin, , Nasal, On Call Procedure    nitroGLYCERIN in 5 % dextrose, , , Continuous PRN    nitroGLYCERIN, 0.4 mg, Sublingual, Q5 Min PRN    ondansetron, 4 mg,  Intravenous, Q6H PRN    potassium bicarbonate, 35 mEq, Oral, PRN    potassium bicarbonate, 50 mEq, Oral, PRN    potassium bicarbonate, 60 mEq, Oral, PRN    potassium, sodium phosphates, 2 packet, Oral, PRN    potassium, sodium phosphates, 2 packet, Oral, PRN    potassium, sodium phosphates, 2 packet, Oral, PRN    senna-docusate 8.6-50 mg, 1 tablet, Oral, Daily PRN    [START ON 9/6/2024] vancomycin (VANCOCIN) IV (PEDS and ADULTS), 20 mg/kg, Intravenous, On Call Procedure    LABS AND DIAGNOSTICS     CBC LAST 3 DAYS  Recent Labs   Lab 09/02/24  0319 09/03/24  0306 09/04/24  0231   WBC 9.35  9.35 9.73  9.73 10.78  10.78   RBC 4.23  4.23 4.37  4.37 4.18  4.18   HGB 12.2  12.2 12.3  12.3 12.2  12.2   HCT 38.0  38.0 38.7  38.7 36.9*  36.9*   MCV 90  90 89  89 88  88   MCH 28.8  28.8 28.1  28.1 29.2  29.2   MCHC 32.1  32.1 31.8*  31.8* 33.1  33.1   RDW 14.2  14.2 13.9  13.9 14.0  14.0     262 290  290 288  288   MPV 10.3  10.3 10.3  10.3 10.9  10.9   GRAN 46.5  46.5  4.4  4.4 49.0  49.0  4.8  4.8 47.7  47.7  5.1  5.1   LYMPH 39.3  39.3  3.7  3.7 38.0  38.0  3.7  3.7 37.8  37.8  4.1  4.1   MONO 8.7  8.7  0.8  0.8 7.7  7.7  0.8  0.8 8.9  8.9  1.0  1.0   BASO 0.07  0.07 0.08  0.08 0.10  0.10   NRBC 0  0 0  0 0  0       COAGULATION LAST 3 DAYS  Recent Labs   Lab 08/30/24  1422 08/30/24 2004 08/31/24  0044 09/02/24  0319 09/03/24  0306 09/04/24  0231   INR 1.0 1.0  --   --   --   --    APTT 28.5 47.2*   < > 48.8*  48.8* 44.4*  44.4* 78.6*  78.6*    < > = values in this interval not displayed.       CHEMISTRY LAST 3 DAYS  Recent Labs   Lab 08/31/24  0247 09/01/24  0411 09/01/24  1732 09/02/24  0319 09/03/24  0306 09/04/24  0231    139   < > 137 138 137   K 3.7 4.1   < > 4.2 4.3 3.9    105   < > 104 104 104   CO2 25 27   < > 27 26 24   ANIONGAP 8 7*   < > 6* 8 9   BUN 17 18   < > 23 21 21   CREATININE 1.0 1.1   < > 1.2 1.1 1.0   * 142*   <  "> 187* 214* 215*   CALCIUM 9.1 9.0   < > 9.0 9.1 9.0   MG 1.8 2.0  --  2.0  --   --    ALBUMIN 3.5 3.4*  --  3.4* 3.5 3.5   PROT 6.7 6.4  --  6.5 6.4 6.5   ALKPHOS 55 53*  --  54* 56 55   ALT 13 11  --  12 21 34   AST 20 13  --  13 24 32   BILITOT 0.4 0.5  --  0.5 0.5 0.5    < > = values in this interval not displayed.       CARDIAC PROFILE LAST 3 DAYS  Recent Labs   Lab 08/30/24  1420 09/01/24  0411   BNP 62 56       ENDOCRINE LAST 3 DAYS  Recent Labs   Lab 08/31/24  0247 09/01/24  0411   TSH 3.587 2.556       LAST ARTERIAL BLOOD GAS  ABG  No results for input(s): "PH", "PO2", "PCO2", "HCO3", "BE" in the last 168 hours.    LAST 7 DAYS MICROBIOLOGY   Microbiology Results (last 7 days)       Procedure Component Value Units Date/Time    Urine culture [[id number]]  (Abnormal)  (Susceptibility) Collected: 09/01/24 0305    Order Status: Completed Specimen: Urine Updated: 09/03/24 0702     Urine Culture, Routine ESCHERICHIA COLI ESBL  >100,000 cfu/ml  Results called to and read back by Sandra Ray RN on [facility] 09/03/2024    07:01 ncb      Narrative:      Specimen Source->Urine            MOST RECENT IMAGING  CT Chest Without Contrast  Narrative: EXAMINATION:  CT CHEST WITHOUT CONTRAST    CLINICAL HISTORY:  Thoracic aorta disease, pre-op planning;    TECHNIQUE:  Axial imaging through the chest was performed without IV contrast.    FINDINGS:  Comparison the prior exams including CTA 08/30/2024. Scattered calcified plaque involves normal-caliber thoracic aorta, with the ascending aorta measuring 37 mm maximum diameter, and the descending aorta tapering appropriately.  There are aortic valve calcifications suspected.    The central pulmonary arteries are normal in caliber, with the heart normal in size, and no pericardial effusion.  There are extensive 3 vessel coronary arterial calcifications, with no enlarged mediastinal or hilar lymph nodes.  No mediastinal mass or fluid collection.    The lungs are normally expanded, with " mild linear subsegmental atelectasis in the lower lungs.  The central airways are patent, with no pleural effusion, evidence of pulmonary edema, or pneumothorax.    Images of the upper abdomen show cholecystectomy clips, and are otherwise unremarkable.  There are no acute fractures or destructive osseous lesions.  Impression: 1. Aortic valve calcifications, with normal caliber thoracic aorta.  2. Extensive 3 vessel coronary arterial calcifications.    Electronically signed by: Ubaldo Dumont  Date:    09/01/2024  Time:    08:34  US Carotid Bilateral  Narrative: EXAMINATION:  US CAROTID BILATERAL    CLINICAL HISTORY:  Bilateral bruit, pre-op CABG;    TECHNIQUE:  Grayscale, color and spectral Doppler analysis was performed. Criteria for stenosis is based upon the Society of Radiologists in Ultrasound Consensus Conference, 2003 (Radiology, November 2003, 229, 340-346). This is in accordance with NASCET criteria.    FINDINGS:  No prior studies for comparison. Grayscale images show diffuse hypoechoic carotid intimal hyperplasia, with mild to moderate heterogeneously echoic plaque of the carotid bulbs and ICA origins.    Peak systolic velocity in the proximal right ICA is 82 cm/sec, and in the distal right .8 cm/sec, with ICA/CCA ratio of 1.6.    Peak systolic velocity in the proximal left ICA is 59.5 cm/sec, and in the distal left ICA 67.4 cm/sec, with ICA/CCA ratio of 1.1.    The vertebral arteries are patent, with normal waveforms, and normal antegrade flow bilaterally.  Impression: 1. Elevated peak systolic velocities in the right ICA, suggesting 50-69% stenosis.  2. No findings of left ICA stenosis.  3. Patent vertebral arteries with normal antegrade flow.    Electronically signed by: Ubaldo Dumont  Date:    09/01/2024  Time:    07:53      [facility]  Results for orders placed during the hospital encounter of 08/30/24    Echo    Interpretation Summary    Left Ventricle: The left ventricle is normal in size. Mildly  increased wall thickness. There is mild concentric hypertrophy. There is normal systolic function with a visually estimated ejection fraction of 60 - 65%. There is diastolic dysfunction.    Right Ventricle: Normal right ventricular cavity size. Wall thickness is normal. Systolic function is normal.    Aortic Valve: The aortic valve is a trileaflet valve. There is moderate aortic valve sclerosis. Moderately restricted motion. There is moderate stenosis. Aortic valve area by VTI is 1.18 cm². Aortic valve peak velocity is 2.28 m/s. Mean gradient is 12 mmHg. The dimensionless index is 0.38.    Mitral Valve: There is mild bileaflet sclerosis.    Tricuspid Valve: There is mild regurgitation with a centrally directed jet.    Pulmonary Artery: The estimated pulmonary artery systolic pressure is 26 mmHg.    IVC/SVC: Normal venous pressure at 3 mmHg.      CURRENT/PREVIOUS VISIT EKG  Results for orders placed or performed during the hospital encounter of 08/30/24   EKG 12-lead    Collection Time: 09/02/24  9:13 AM   Result Value Ref Range    QRS Duration 96 ms    [facility] QTC Calculation 428 ms    Narrative    Test Reason : R07.9,    Vent. Rate : 075 BPM     Atrial Rate : 075 BPM     P-R Int : 232 ms          QRS Dur : 096 ms      QT Int : 384 ms       P-R-T Axes : 053 -10 087 degrees     QTc Int : 428 ms    Sinus rhythm with 1st degree A-V block  Minimal voltage criteria for LVH, may be normal variant ( Chad product )  Borderline Abnormal ECG  When compared with ECG of 02-SEP-2024 07:20,  No significant change was found    Referred By: [email or web address]   SELF           Confirmed By:        ASSESSMENT/PLAN:     Active Hospital Problems    Diagnosis    *NSTEMI (non-ST elevated myocardial infarction)    Urinary tract infection without hematuria    Diabetes    Stage 3a chronic kidney disease    Hyperlipidemia       ASSESSMENT & PLAN:   NON ST-ELEVATION MI MULTIVESSEL DIABETIC CORONARY  DISEASE  DIABETES  CKD  HYPERLIPIDEMIA  RECOMMENDATIONS:    Can hold nitro drip for now and see how the patient does.  Continue statin and aspirin.  Continue metoprolol succinate 50 mg p.o. daily.  Continue heparin drip.  Surgery planned for Friday.    Aliya Briscoe NP  [facility]  Department of Cardiology  Date of Service: 09/04/2024  1:04 PM

## 2024-09-04 NOTE — PLAN OF CARE
CM present during rounding.  CABG planned 9/6/24. Pt lives alone and CM discussed with her when she is discharged if she would have assistance. Pt states that she plans to go to her daughter's home when she is discharged so that she will have someone to help her.  CM will continue to follow.       09/04/24 3442   Discharge Reassessment   Assessment Type Discharge Planning Reassessment   Did the patient's condition or plan change since previous assessment? Yes   Discharge Plan discussed with: Patient   Communicated MARTHA with patient/caregiver Yes   Discharge Plan A Home with family   Discharge Plan B Home Health   DME Needed Upon Discharge  none  (Pending course of treatment)   Transition of Care Barriers None   Why the patient remains in the hospital Requires continued medical care   Post-Acute Status   Discharge Delays None known at this time

## 2024-09-04 NOTE — PROGRESS NOTES
[facility] Medicine  Progress Note    Patient Name: Debby Brown  MRN: [id number]  Patient Class: IP- Inpatient   Admission Date: 8/30/2024  Length of Stay: 4 days  Attending Physician: Anthony Becker MD  Primary Care Provider: Shawna Costello MD        Subjective:     Principal Problem:NSTEMI (non-ST elevated myocardial infarction)        HPI:  74-year-old female presented to ED for eval of chest pain. pMHx HTN, DM 2, HLD.  Patient reported she has been having back pain that radiates to the front of her chest, stated chest pain his pressure-like and worsens with exertion, relieved by rest.  Patient reported associated shortness of breath and nausea.  Reported these symptoms have been present intermittently for the last several months but have worsened significantly in the last few weeks with more frequent episodes.  Patient was evaluated by her PCP earlier today, she stated she had been out of most of her home meds, including BP meds and oral diabetic agent, for 3-4 weeks, reported to PCP the only medication she was currently taking is her Toujeo.  In ED today, SBP greater than 200 on arrival, improved with nitroglycerin.  Initial troponin 908.  Noted with hyperglycemia, serum glucose 337.  EKG with T-wave changes.  Chest x-ray impression without acute abnormality.  CTA chest abdomen impression with mild cardiomegaly with scattered coronary artery calcifications (predominantly in the LAD and circumflex distribution); calcified plaques in the thoracoabdominal aorta and iliacs with no aneurysm.  ED discussed case with Cardiology, heparin drip initiated.  Admit to hospital medicine for further eval.    Overview/Hospital Course:  Patient with medical history of diabetes, hypertension, hyperlipidemia came in with chest pain and was admitted for NSTEMI.  Status post angiogram on 08/30 showed severe multivessel CAD with thrombus in the distal RCA.  Patient was placed on heparin drip.  Cardiology and  Cardiothoracic surgery are following patient.  Troponin were trended. Plan for CABG on 9/3 by dr. Hung. A1c 10.2 , make adjustment on glargine and SSI as needed.  Patient was treated with antibiotic therapy for urinary tract infection.  Final urine culture test confirmed E coli (ESBL species) for which patient received intravenous antibiotic therapy.  Patient was placed on contact isolation protocol.    Interval History:  Patient is seen and examined.  No new events noted overnight.  Patient is scheduled for coronary artery bypass graft surgery on Friday.   Patient denies any further chest pain.  Patient remains on intravenous heparin and nitroglycerin infusions.  Urine cultures grew E coli ESBL species.  Patient is on contact isolation.     Review of Systems   All other systems reviewed and are negative.    Objective:     Vital Signs (Most Recent):  Temp: 98.3 °F (36.8 °C) (09/04/24 0730)  Pulse: 64 (09/04/24 0700)  Resp: 18 (09/04/24 0700)  BP: 124/70 (09/04/24 0700)  SpO2: 98 % (09/04/24 0700) Vital Signs (24h Range):  Temp:  [97.9 °F (36.6 °C)-98.4 °F (36.9 °C)] 98.3 °F (36.8 °C)  Pulse:  [63-77] 64  Resp:  [13-38] 18  SpO2:  [89 %-99 %] 98 %  BP: (107-148)/(58-87) 124/70     Weight: 85 kg (187 lb 6.3 oz)  Body mass index is 29.35 kg/m².    Intake/Output Summary (Last 24 hours) at 9/4/2024 0812  Last data filed at 9/4/2024 0446  Gross per 24 hour   Intake 830.82 ml   Output 1050 ml   Net -219.18 ml         Physical Exam  Cardiovascular:      Rate and Rhythm: Normal rate.      Pulses: Normal pulses.      Comments: Systolic ejection murmur at left upper sternal border  Pulmonary:      Effort: Pulmonary effort is normal.   Neurological:      Mental Status: She is alert and oriented to person, place, and time.             Significant Labs:  Lab Results   Component Value Date    WBC 10.78 09/04/2024    WBC 10.78 09/04/2024    HGB 12.2 09/04/2024    HGB 12.2 09/04/2024    HCT 36.9 (L) 09/04/2024    HCT 36.9 (L)  09/04/2024    MCV 88 09/04/2024    MCV 88 09/04/2024     09/04/2024     09/04/2024       CMP  Sodium   Date Value Ref Range Status   09/04/2024 137 136 - 145 mmol/L Final     Potassium   Date Value Ref Range Status   09/04/2024 3.9 3.5 - 5.1 mmol/L Final     Chloride   Date Value Ref Range Status   09/04/2024 104 95 - 110 mmol/L Final     CO2   Date Value Ref Range Status   09/04/2024 24 23 - 29 mmol/L Final     Glucose   Date Value Ref Range Status   09/04/2024 215 (H) 70 - 110 mg/dL Final     BUN   Date Value Ref Range Status   09/04/2024 21 8 - 23 mg/dL Final     Creatinine   Date Value Ref Range Status   09/04/2024 1.0 0.5 - 1.4 mg/dL Final     Calcium   Date Value Ref Range Status   09/04/2024 9.0 8.7 - 10.5 mg/dL Final     Total Protein   Date Value Ref Range Status   09/04/2024 6.5 6.0 - 8.4 g/dL Final     Albumin   Date Value Ref Range Status   09/04/2024 3.5 3.5 - 5.2 g/dL Final     Total Bilirubin   Date Value Ref Range Status   09/04/2024 0.5 0.1 - 1.0 mg/dL Final     Comment:     For infants and newborns, interpretation of results should be based  on gestational age, weight and in agreement with clinical  observations.    Premature Infant recommended reference ranges:  Up to 24 hours.............<8.0 mg/dL  Up to 48 hours............<12.0 mg/dL  3-5 days..................<15.0 mg/dL  6-29 days.................<15.0 mg/dL       Alkaline Phosphatase   Date Value Ref Range Status   09/04/2024 55 55 - 135 U/L Final     AST   Date Value Ref Range Status   09/04/2024 32 10 - 40 U/L Final     ALT   Date Value Ref Range Status   09/04/2024 34 10 - 44 U/L Final     Anion Gap   Date Value Ref Range Status   09/04/2024 9 8 - 16 mmol/L Final     eGFR   Date Value Ref Range Status   09/04/2024 59.5 (A) >60 mL/min/1.73 m^2 Final     Microbiology Results (last 7 days)       Procedure Component Value Units Date/Time    Urine culture [[id number]]  (Abnormal)  (Susceptibility) Collected: 09/01/24 0301     Order Status: Completed Specimen: Urine Updated: 09/03/24 0702     Urine Culture, Routine ESCHERICHIA COLI ESBL  >100,000 cfu/ml  Results called to and read back by Sandra Ray RN on [facility] 09/03/2024    07:01 ncb      Narrative:      Specimen Source->Urine          Significant Imaging:  ECHO:    Left Ventricle: The left ventricle is normal in size. Mildly increased wall thickness. There is mild concentric hypertrophy. There is normal systolic function with a visually estimated ejection fraction of 60 - 65%. There is diastolic dysfunction.    Right Ventricle: Normal right ventricular cavity size. Wall thickness is normal. Systolic function is normal.    Aortic Valve: The aortic valve is a trileaflet valve. There is moderate aortic valve sclerosis. Moderately restricted motion. There is moderate stenosis. Aortic valve area by VTI is 1.18 cm². Aortic valve peak velocity is 2.28 m/s. Mean gradient is 12 mmHg. The dimensionless index is 0.38.    Mitral Valve: There is mild bileaflet sclerosis.    Tricuspid Valve: There is mild regurgitation with a centrally directed jet.    Pulmonary Artery: The estimated pulmonary artery systolic pressure is 26 mmHg.    IVC/SVC: Normal venous pressure at 3 mmHg.    LHC:    74 year old diabetic female with poorly controlled DM Type II (A1C 10), HTN, and hyperlipidemia presenting with ACS.  Coronary angiogram revealed severe multivessel CAD including severely diseased LAD and LCx.  The patient has a large RCA with diffuse disease and evidence of thrombus in the partially recannulated RPVL.  Plan for evaluation to determine candidacy for surgical revascularization (possible endarterectomy of the mid/distal LAD to allow this diabetic patient to get a LIMA graft and revascularization of RCA).    Considered placing IABP; however, patient hemodynamically stable and chest pain free upon completion of procedure. In addition, her iliac vessel revealed tortousity and did not appear ideal for IABP  placement.  Plan for heparin gtt, nitro gtt, and evaluation to determine candidacy for surgical revascularization.    The estimated blood loss was <50 mL.    CTA chest abdomen:  1.  Mild cardiomegaly with scattered coronary artery calcifications (predominantly in the LAD and circumflex distribution).  2.  Calcified plaques in the thoracoabdominal aorta and iliacs with no aneurysm as above.  3.  Chronic anterior superior endplate compression fracture deformities at L1 and L2, similar to the previous exam.  4.  Numerous additional, and incidental findings as noted above.    US Carotids:  1. Elevated peak systolic velocities in the right ICA, suggesting 50-69% stenosis.  2. No findings of left ICA stenosis.  3. Patent vertebral arteries with normal antegrade flow.    CT Chest without contrast:  1. Aortic valve calcifications, with normal caliber thoracic aorta.  2. Extensive 3 vessel coronary arterial calcifications.    Assessment/Plan:      * NSTEMI (non-ST elevated myocardial infarction)  Status post angiogram on 08/30 showed severe multivessel CAD with thrombus in the distal RCA.    Currently on heparin drip.  Troponin trended down  Cardio and Cardiothoracic surgery are following patient, patient is scheduled for coronary artery bypass graft surgery on Friday.    Urinary tract infection without hematuria  Final urine cultures grew E coli (ESBL species).  DC oral antibiotic and start patient on intravenous Merrem twice daily for 7 days.  Observe contact isolation.      Diabetes  A1c 10.2  SSI and glargine  Can make adjustment as needed.    Stage 3a chronic kidney disease  Creatine stable for now. CMP reviewed- noted Estimated Creatinine Clearance: 57.7 mL/min (based on SCr of 1 mg/dL). according to latest data. Based on current GFR, CKD stage is stage 3 - GFR 30-59.  Monitor UOP and serial CMP and adjust therapy as needed. Renally dose meds. Avoid nephrotoxic medications and  procedures.    Hyperlipidemia  Atorvastatin  Lipid panel      Discussed with  regarding discharge planning.  VTE Risk Mitigation (From admission, onward)           Ordered     heparin 25,000 units in dextrose 5% (100 units/ml) IV bolus from bag LOW INTENSITY nomogram - [facility]  As needed (PRN)        Question:  Heparin Infusion Adjustment (DO NOT MODIFY ANSWER)  Answer:  [email or web address]\Images\Pharmacy\HeparinInfusions\heparin LOW INTENSITY nomogram for [facility] [id number]    08/30/24 1829     heparin 25,000 units in dextrose 5% (100 units/ml) IV bolus from bag LOW INTENSITY nomogram - [facility]  As needed (PRN)        Question:  Heparin Infusion Adjustment (DO NOT MODIFY ANSWER)  Answer:  [email or web address]\epic\Images\Pharmacy\HeparinInfusions\heparin LOW INTENSITY nomogram for [facility] [id number]    08/30/24 1829     heparin 25,000 units in dextrose 5% 250 mL (100 units/mL) infusion LOW INTENSITY nomogram - [facility]  Continuous        Question:  Begin at (units/kg/hr)  Answer:  12    08/30/24 1829     Reason for No Pharmacological VTE Prophylaxis  Once        Question:  Reasons:  Answer:  IV Heparin w/in 24 hrs. Pre or Post-Op    08/30/24 1646     IP VTE HIGH RISK PATIENT  Once         08/30/24 1646     Place sequential compression device  Until discontinued         08/30/24 1646                    Discharge Planning   MARTHA: 9/9/2024     Code Status: Full Code   Is the patient medically ready for discharge?:     Reason for patient still in hospital (select all that apply): Patient trending condition and Consult recommendations  Discharge Plan A: Home            Critical care time spent on the evaluation and treatment of severe organ dysfunction, review of pertinent labs and imaging studies, discussions with consulting providers and discussions with patient/family: 35 minutes.      Anthony Becker MD  Department of Hospital Medicine   [facility]

## 2024-09-04 NOTE — SUBJECTIVE & OBJECTIVE
Interval History:  Patient is seen and examined.  No new events noted overnight.  Patient is scheduled for coronary artery bypass graft surgery on Friday.   Patient denies any further chest pain.  Patient remains on intravenous heparin and nitroglycerin infusions.  Urine cultures grew E coli ESBL species.  Patient is on contact isolation.     Review of Systems   All other systems reviewed and are negative.    Objective:     Vital Signs (Most Recent):  Temp: 98.3 °F (36.8 °C) (09/04/24 0730)  Pulse: 64 (09/04/24 0700)  Resp: 18 (09/04/24 0700)  BP: 124/70 (09/04/24 0700)  SpO2: 98 % (09/04/24 0700) Vital Signs (24h Range):  Temp:  [97.9 °F (36.6 °C)-98.4 °F (36.9 °C)] 98.3 °F (36.8 °C)  Pulse:  [63-77] 64  Resp:  [13-38] 18  SpO2:  [89 %-99 %] 98 %  BP: (107-148)/(58-87) 124/70     Weight: 85 kg (187 lb 6.3 oz)  Body mass index is 29.35 kg/m².    Intake/Output Summary (Last 24 hours) at 9/4/2024 0812  Last data filed at 9/4/2024 0446  Gross per 24 hour   Intake 830.82 ml   Output 1050 ml   Net -219.18 ml         Physical Exam  Cardiovascular:      Rate and Rhythm: Normal rate.      Pulses: Normal pulses.      Comments: Systolic ejection murmur at left upper sternal border  Pulmonary:      Effort: Pulmonary effort is normal.   Neurological:      Mental Status: She is alert and oriented to person, place, and time.             Significant Labs:  Lab Results   Component Value Date    WBC 10.78 09/04/2024    WBC 10.78 09/04/2024    HGB 12.2 09/04/2024    HGB 12.2 09/04/2024    HCT 36.9 (L) 09/04/2024    HCT 36.9 (L) 09/04/2024    MCV 88 09/04/2024    MCV 88 09/04/2024     09/04/2024     09/04/2024       CMP  Sodium   Date Value Ref Range Status   09/04/2024 137 136 - 145 mmol/L Final     Potassium   Date Value Ref Range Status   09/04/2024 3.9 3.5 - 5.1 mmol/L Final     Chloride   Date Value Ref Range Status   09/04/2024 104 95 - 110 mmol/L Final     CO2   Date Value Ref Range Status   09/04/2024 24 23 - 29  mmol/L Final     Glucose   Date Value Ref Range Status   09/04/2024 215 (H) 70 - 110 mg/dL Final     BUN   Date Value Ref Range Status   09/04/2024 21 8 - 23 mg/dL Final     Creatinine   Date Value Ref Range Status   09/04/2024 1.0 0.5 - 1.4 mg/dL Final     Calcium   Date Value Ref Range Status   09/04/2024 9.0 8.7 - 10.5 mg/dL Final     Total Protein   Date Value Ref Range Status   09/04/2024 6.5 6.0 - 8.4 g/dL Final     Albumin   Date Value Ref Range Status   09/04/2024 3.5 3.5 - 5.2 g/dL Final     Total Bilirubin   Date Value Ref Range Status   09/04/2024 0.5 0.1 - 1.0 mg/dL Final     Comment:     For infants and newborns, interpretation of results should be based  on gestational age, weight and in agreement with clinical  observations.    Premature Infant recommended reference ranges:  Up to 24 hours.............<8.0 mg/dL  Up to 48 hours............<12.0 mg/dL  3-5 days..................<15.0 mg/dL  6-29 days.................<15.0 mg/dL       Alkaline Phosphatase   Date Value Ref Range Status   09/04/2024 55 55 - 135 U/L Final     AST   Date Value Ref Range Status   09/04/2024 32 10 - 40 U/L Final     ALT   Date Value Ref Range Status   09/04/2024 34 10 - 44 U/L Final     Anion Gap   Date Value Ref Range Status   09/04/2024 9 8 - 16 mmol/L Final     eGFR   Date Value Ref Range Status   09/04/2024 59.5 (A) >60 mL/min/1.73 m^2 Final     Microbiology Results (last 7 days)       Procedure Component Value Units Date/Time    Urine culture [[id number]]  (Abnormal)  (Susceptibility) Collected: 09/01/24 0305    Order Status: Completed Specimen: Urine Updated: 09/03/24 0702     Urine Culture, Routine ESCHERICHIA COLI ESBL  >100,000 cfu/ml  Results called to and read back by Sandra Ray RN on [facility] 09/03/2024    07:01 ncb      Narrative:      Specimen Source->Urine          Significant Imaging:  ECHO:    Left Ventricle: The left ventricle is normal in size. Mildly increased wall thickness. There is mild concentric  hypertrophy. There is normal systolic function with a visually estimated ejection fraction of 60 - 65%. There is diastolic dysfunction.    Right Ventricle: Normal right ventricular cavity size. Wall thickness is normal. Systolic function is normal.    Aortic Valve: The aortic valve is a trileaflet valve. There is moderate aortic valve sclerosis. Moderately restricted motion. There is moderate stenosis. Aortic valve area by VTI is 1.18 cm². Aortic valve peak velocity is 2.28 m/s. Mean gradient is 12 mmHg. The dimensionless index is 0.38.    Mitral Valve: There is mild bileaflet sclerosis.    Tricuspid Valve: There is mild regurgitation with a centrally directed jet.    Pulmonary Artery: The estimated pulmonary artery systolic pressure is 26 mmHg.    IVC/SVC: Normal venous pressure at 3 mmHg.    LHC:    74 year old diabetic female with poorly controlled DM Type II (A1C 10), HTN, and hyperlipidemia presenting with ACS.  Coronary angiogram revealed severe multivessel CAD including severely diseased LAD and LCx.  The patient has a large RCA with diffuse disease and evidence of thrombus in the partially recannulated RPVL.  Plan for evaluation to determine candidacy for surgical revascularization (possible endarterectomy of the mid/distal LAD to allow this diabetic patient to get a LIMA graft and revascularization of RCA).    Considered placing IABP; however, patient hemodynamically stable and chest pain free upon completion of procedure. In addition, her iliac vessel revealed tortousity and did not appear ideal for IABP placement.  Plan for heparin gtt, nitro gtt, and evaluation to determine candidacy for surgical revascularization.    The estimated blood loss was <50 mL.    CTA chest abdomen:  1.  Mild cardiomegaly with scattered coronary artery calcifications (predominantly in the LAD and circumflex distribution).  2.  Calcified plaques in the thoracoabdominal aorta and iliacs with no aneurysm as above.  3.  Chronic  anterior superior endplate compression fracture deformities at L1 and L2, similar to the previous exam.  4.  Numerous additional, and incidental findings as noted above.    US Carotids:  1. Elevated peak systolic velocities in the right ICA, suggesting 50-69% stenosis.  2. No findings of left ICA stenosis.  3. Patent vertebral arteries with normal antegrade flow.    CT Chest without contrast:  1. Aortic valve calcifications, with normal caliber thoracic aorta.  2. Extensive 3 vessel coronary arterial calcifications.

## 2024-09-04 NOTE — CARE UPDATE
09/04/24 0820   Patient Assessment/Suction   Level of Consciousness (AVPU) alert   Respiratory Effort Normal;Unlabored   Expansion/Accessory Muscles/Retractions no use of accessory muscles   PRE-TX-O2   Device (Oxygen Therapy) room air   SpO2 96 %   Pulse Oximetry Type Continuous   $ Pulse Oximetry - Multiple Charge Pulse Oximetry - Multiple   Pulse 61   Resp 16

## 2024-09-05 ENCOUNTER — ANESTHESIA EVENT (OUTPATIENT)
Dept: SURGERY | Facility: HOSPITAL | Age: 74
End: 2024-09-05
Payer: MEDICARE

## 2024-09-05 PROBLEM — B96.29 UTI DUE TO EXTENDED-SPECTRUM BETA LACTAMASE (ESBL) PRODUCING ESCHERICHIA COLI: Status: ACTIVE | Noted: 2024-09-02

## 2024-09-05 PROBLEM — Z16.12 UTI DUE TO EXTENDED-SPECTRUM BETA LACTAMASE (ESBL) PRODUCING ESCHERICHIA COLI: Status: ACTIVE | Noted: 2024-09-02

## 2024-09-05 LAB
ABO + RH BLD: NORMAL
ALBUMIN SERPL BCP-MCNC: 3.5 G/DL (ref 3.5–5.2)
ALBUMIN SERPL BCP-MCNC: 3.6 G/DL (ref 3.5–5.2)
ALP SERPL-CCNC: 56 U/L (ref 55–135)
ALP SERPL-CCNC: 59 U/L (ref 55–135)
ALT SERPL W/O P-5'-P-CCNC: 44 U/L (ref 10–44)
ALT SERPL W/O P-5'-P-CCNC: 44 U/L (ref 10–44)
ANION GAP SERPL CALC-SCNC: 9 MMOL/L (ref 8–16)
ANION GAP SERPL CALC-SCNC: 9 MMOL/L (ref 8–16)
APTT PPP: 35.9 SEC (ref 21–32)
APTT PPP: 45.2 SEC (ref 21–32)
APTT PPP: 75.4 SEC (ref 21–32)
AST SERPL-CCNC: 36 U/L (ref 10–40)
AST SERPL-CCNC: 36 U/L (ref 10–40)
BASOPHILS # BLD AUTO: 0.06 K/UL (ref 0–0.2)
BASOPHILS # BLD AUTO: 0.06 K/UL (ref 0–0.2)
BASOPHILS # BLD AUTO: 0.07 K/UL (ref 0–0.2)
BASOPHILS NFR BLD: 0.6 % (ref 0–1.9)
BASOPHILS NFR BLD: 0.6 % (ref 0–1.9)
BASOPHILS NFR BLD: 0.7 % (ref 0–1.9)
BILIRUB SERPL-MCNC: 0.5 MG/DL (ref 0.1–1)
BILIRUB SERPL-MCNC: 0.5 MG/DL (ref 0.1–1)
BLD GP AB SCN CELLS X3 SERPL QL: NORMAL
BUN SERPL-MCNC: 18 MG/DL (ref 8–23)
BUN SERPL-MCNC: 19 MG/DL (ref 8–23)
CALCIUM SERPL-MCNC: 9 MG/DL (ref 8.7–10.5)
CALCIUM SERPL-MCNC: 9.2 MG/DL (ref 8.7–10.5)
CHLORIDE SERPL-SCNC: 104 MMOL/L (ref 95–110)
CHLORIDE SERPL-SCNC: 104 MMOL/L (ref 95–110)
CO2 SERPL-SCNC: 25 MMOL/L (ref 23–29)
CO2 SERPL-SCNC: 25 MMOL/L (ref 23–29)
CREAT SERPL-MCNC: 1.2 MG/DL (ref 0.5–1.4)
CREAT SERPL-MCNC: 1.2 MG/DL (ref 0.5–1.4)
DIFFERENTIAL METHOD BLD: NORMAL
EOSINOPHIL # BLD AUTO: 0.4 K/UL (ref 0–0.5)
EOSINOPHIL NFR BLD: 3.8 % (ref 0–8)
EOSINOPHIL NFR BLD: 4.1 % (ref 0–8)
EOSINOPHIL NFR BLD: 4.1 % (ref 0–8)
ERYTHROCYTE [DISTWIDTH] IN BLOOD BY AUTOMATED COUNT: 14 % (ref 11.5–14.5)
EST. GFR  (NO RACE VARIABLE): 47.8 ML/MIN/1.73 M^2
EST. GFR  (NO RACE VARIABLE): 47.8 ML/MIN/1.73 M^2
GLUCOSE SERPL-MCNC: 223 MG/DL (ref 70–110)
GLUCOSE SERPL-MCNC: 226 MG/DL (ref 70–110)
HCT VFR BLD AUTO: 38.6 % (ref 37–48.5)
HCT VFR BLD AUTO: 38.7 % (ref 37–48.5)
HCT VFR BLD AUTO: 38.7 % (ref 37–48.5)
HGB BLD-MCNC: 12.6 G/DL (ref 12–16)
HGB BLD-MCNC: 12.6 G/DL (ref 12–16)
HGB BLD-MCNC: 12.8 G/DL (ref 12–16)
IMM GRANULOCYTES # BLD AUTO: 0.04 K/UL (ref 0–0.04)
IMM GRANULOCYTES NFR BLD AUTO: 0.4 % (ref 0–0.5)
LYMPHOCYTES # BLD AUTO: 4 K/UL (ref 1–4.8)
LYMPHOCYTES NFR BLD: 38.6 % (ref 18–48)
LYMPHOCYTES NFR BLD: 38.7 % (ref 18–48)
LYMPHOCYTES NFR BLD: 38.7 % (ref 18–48)
MCH RBC QN AUTO: 28.4 PG (ref 27–31)
MCH RBC QN AUTO: 28.4 PG (ref 27–31)
MCH RBC QN AUTO: 29 PG (ref 27–31)
MCHC RBC AUTO-ENTMCNC: 32.6 G/DL (ref 32–36)
MCHC RBC AUTO-ENTMCNC: 32.6 G/DL (ref 32–36)
MCHC RBC AUTO-ENTMCNC: 33.2 G/DL (ref 32–36)
MCV RBC AUTO: 87 FL (ref 82–98)
MCV RBC AUTO: 87 FL (ref 82–98)
MCV RBC AUTO: 88 FL (ref 82–98)
MONOCYTES # BLD AUTO: 0.8 K/UL (ref 0.3–1)
MONOCYTES NFR BLD: 7.8 % (ref 4–15)
MONOCYTES NFR BLD: 7.8 % (ref 4–15)
MONOCYTES NFR BLD: 8.2 % (ref 4–15)
NEUTROPHILS # BLD AUTO: 4.9 K/UL (ref 1.8–7.7)
NEUTROPHILS NFR BLD: 48.3 % (ref 38–73)
NEUTROPHILS NFR BLD: 48.4 % (ref 38–73)
NEUTROPHILS NFR BLD: 48.4 % (ref 38–73)
NRBC BLD-RTO: 0 /100 WBC
PLATELET # BLD AUTO: 305 K/UL (ref 150–450)
PLATELET # BLD AUTO: 305 K/UL (ref 150–450)
PLATELET # BLD AUTO: 307 K/UL (ref 150–450)
PMV BLD AUTO: 10.4 FL (ref 9.2–12.9)
PMV BLD AUTO: 10.4 FL (ref 9.2–12.9)
PMV BLD AUTO: 10.6 FL (ref 9.2–12.9)
POCT GLUCOSE: 207 MG/DL (ref 70–110)
POCT GLUCOSE: 228 MG/DL (ref 70–110)
POCT GLUCOSE: 234 MG/DL (ref 70–110)
POTASSIUM SERPL-SCNC: 3.8 MMOL/L (ref 3.5–5.1)
POTASSIUM SERPL-SCNC: 3.9 MMOL/L (ref 3.5–5.1)
PROT SERPL-MCNC: 6.6 G/DL (ref 6–8.4)
PROT SERPL-MCNC: 6.8 G/DL (ref 6–8.4)
RBC # BLD AUTO: 4.41 M/UL (ref 4–5.4)
RBC # BLD AUTO: 4.43 M/UL (ref 4–5.4)
RBC # BLD AUTO: 4.43 M/UL (ref 4–5.4)
SODIUM SERPL-SCNC: 138 MMOL/L (ref 136–145)
SODIUM SERPL-SCNC: 138 MMOL/L (ref 136–145)
SPECIMEN OUTDATE: NORMAL
WBC # BLD AUTO: 10.2 K/UL (ref 3.9–12.7)
WBC # BLD AUTO: 10.2 K/UL (ref 3.9–12.7)
WBC # BLD AUTO: 10.22 K/UL (ref 3.9–12.7)

## 2024-09-05 PROCEDURE — 63600175 PHARM REV CODE 636 W HCPCS: Performed by: INTERNAL MEDICINE

## 2024-09-05 PROCEDURE — 25000003 PHARM REV CODE 250: Performed by: INTERNAL MEDICINE

## 2024-09-05 PROCEDURE — 86900 BLOOD TYPING SEROLOGIC ABO: CPT | Performed by: PHYSICIAN ASSISTANT

## 2024-09-05 PROCEDURE — 25000003 PHARM REV CODE 250: Performed by: FAMILY MEDICINE

## 2024-09-05 PROCEDURE — 27000207 HC ISOLATION

## 2024-09-05 PROCEDURE — 99900031 HC PATIENT EDUCATION (STAT)

## 2024-09-05 PROCEDURE — 80053 COMPREHEN METABOLIC PANEL: CPT | Performed by: PHYSICIAN ASSISTANT

## 2024-09-05 PROCEDURE — 85730 THROMBOPLASTIN TIME PARTIAL: CPT | Performed by: PHYSICIAN ASSISTANT

## 2024-09-05 PROCEDURE — 36415 COLL VENOUS BLD VENIPUNCTURE: CPT | Performed by: INTERNAL MEDICINE

## 2024-09-05 PROCEDURE — 94799 UNLISTED PULMONARY SVC/PX: CPT

## 2024-09-05 PROCEDURE — 12000002 HC ACUTE/MED SURGE SEMI-PRIVATE ROOM

## 2024-09-05 PROCEDURE — 99233 SBSQ HOSP IP/OBS HIGH 50: CPT | Mod: ,,, | Performed by: INTERNAL MEDICINE

## 2024-09-05 PROCEDURE — 85025 COMPLETE CBC W/AUTO DIFF WBC: CPT | Performed by: STUDENT IN AN ORGANIZED HEALTH CARE EDUCATION/TRAINING PROGRAM

## 2024-09-05 PROCEDURE — 36415 COLL VENOUS BLD VENIPUNCTURE: CPT | Performed by: HOSPITALIST

## 2024-09-05 PROCEDURE — 63600175 PHARM REV CODE 636 W HCPCS

## 2024-09-05 PROCEDURE — 85730 THROMBOPLASTIN TIME PARTIAL: CPT | Mod: 91 | Performed by: INTERNAL MEDICINE

## 2024-09-05 PROCEDURE — 85730 THROMBOPLASTIN TIME PARTIAL: CPT | Mod: 91 | Performed by: HOSPITALIST

## 2024-09-05 PROCEDURE — 36415 COLL VENOUS BLD VENIPUNCTURE: CPT | Performed by: PHYSICIAN ASSISTANT

## 2024-09-05 PROCEDURE — 85025 COMPLETE CBC W/AUTO DIFF WBC: CPT | Mod: 91 | Performed by: PHYSICIAN ASSISTANT

## 2024-09-05 PROCEDURE — 80053 COMPREHEN METABOLIC PANEL: CPT | Mod: 91

## 2024-09-05 PROCEDURE — 25000003 PHARM REV CODE 250: Performed by: STUDENT IN AN ORGANIZED HEALTH CARE EDUCATION/TRAINING PROGRAM

## 2024-09-05 PROCEDURE — 94761 N-INVAS EAR/PLS OXIMETRY MLT: CPT

## 2024-09-05 PROCEDURE — 25000003 PHARM REV CODE 250

## 2024-09-05 PROCEDURE — 36415 COLL VENOUS BLD VENIPUNCTURE: CPT | Performed by: STUDENT IN AN ORGANIZED HEALTH CARE EDUCATION/TRAINING PROGRAM

## 2024-09-05 RX ORDER — CALCIUM CHLORIDE, MAGNESIUM CHLORIDE, POTASSIUM CHLORIDE AND SODIUM CHLORIDE 17.6; 325.3; 119.3; 643 MG/100ML; MG/100ML; MG/100ML; MG/100ML
SOLUTION INTRA-ARTERIAL ONCE
Status: DISCONTINUED | OUTPATIENT
Start: 2024-09-06 | End: 2024-09-06

## 2024-09-05 RX ADMIN — HEPARIN SODIUM 11 UNITS/KG/HR: 10000 INJECTION, SOLUTION INTRAVENOUS at 09:09

## 2024-09-05 RX ADMIN — INSULIN ASPART 2 UNITS: 100 INJECTION, SOLUTION INTRAVENOUS; SUBCUTANEOUS at 11:09

## 2024-09-05 RX ADMIN — DOCUSATE SODIUM 100 MG: 100 CAPSULE, LIQUID FILLED ORAL at 09:09

## 2024-09-05 RX ADMIN — INSULIN GLARGINE 10 UNITS: 100 INJECTION, SOLUTION SUBCUTANEOUS at 10:09

## 2024-09-05 RX ADMIN — DIPHENHYDRAMINE HYDROCHLORIDE 25 MG: 25 CAPSULE ORAL at 08:09

## 2024-09-05 RX ADMIN — POTASSIUM BICARBONATE 50 MEQ: 977.5 TABLET, EFFERVESCENT ORAL at 11:09

## 2024-09-05 RX ADMIN — INSULIN ASPART 2 UNITS: 100 INJECTION, SOLUTION INTRAVENOUS; SUBCUTANEOUS at 04:09

## 2024-09-05 RX ADMIN — LORAZEPAM 0.5 MG: 0.5 TABLET ORAL at 09:09

## 2024-09-05 RX ADMIN — MEROPENEM 1 G: 1 INJECTION, POWDER, FOR SOLUTION INTRAVENOUS at 11:09

## 2024-09-05 RX ADMIN — ACETAMINOPHEN 650 MG: 325 TABLET ORAL at 02:09

## 2024-09-05 RX ADMIN — DIPHENHYDRAMINE HYDROCHLORIDE 25 MG: 25 CAPSULE ORAL at 05:09

## 2024-09-05 RX ADMIN — ASPIRIN 81 MG 81 MG: 81 TABLET ORAL at 09:09

## 2024-09-05 RX ADMIN — MEROPENEM 1 G: 1 INJECTION, POWDER, FOR SOLUTION INTRAVENOUS at 10:09

## 2024-09-05 RX ADMIN — LORAZEPAM 0.5 MG: 0.5 TABLET ORAL at 10:09

## 2024-09-05 RX ADMIN — METOPROLOL SUCCINATE 50 MG: 50 TABLET, FILM COATED, EXTENDED RELEASE ORAL at 09:09

## 2024-09-05 RX ADMIN — ATORVASTATIN CALCIUM 80 MG: 40 TABLET, FILM COATED ORAL at 09:09

## 2024-09-05 RX ADMIN — PANTOPRAZOLE SODIUM 40 MG: 40 TABLET, DELAYED RELEASE ORAL at 05:09

## 2024-09-05 RX ADMIN — MUPIROCIN 1 G: 20 OINTMENT TOPICAL at 09:09

## 2024-09-05 NOTE — PROGRESS NOTES
[facility]  Department of Cardiology  Progress Note    PATIENT NAME: Debby Brown  MRN: [id number]  TODAY'S DATE: 09/05/2024  ADMIT DATE: 8/30/2024    SUBJECTIVE     PRINCIPLE PROBLEM: NSTEMI (non-ST elevated myocardial infarction)    INTERVAL HISTORY:    9/5/2024    Patient seen resting in bed comfortably.  She is stable on telemetry and feeling well overall.  No complaints of any chest discomfort or shortness of breath.    9/4/24    Patient seen resting in bed with no acute distress noted.  Denies any chest discomfort shortness of breath.  She is stable on telemetry at this time.    9/3/24    Patient for evaluation from CT surgery today.  Stable and doing well presently.    9/2/24    PATIENT WITH A NON ST-ELEVATION MI CURRENTLY IN ICU ON NITRO DRIP WAITING CARDIOVASCULAR SURGERY EVALUATION BY DR. SORIANO  SHE HAD CHEST PAIN TODAY FOR ABOUT 3 MINUTES OTHERWISE IS DOING WELL    Review of patient's allergies indicates:   Allergen Reactions    Bactrim [sulfamethoxazole-trimethoprim]     Codeine     Levaquin [levofloxacin]     Pcn [penicillins]        REVIEW OF SYSTEMS  CARDIOVASCULAR: No recent chest pain, palpitations, arm, neck, or jaw pain  RESPIRATORY: No recent fever, cough chills, SOB or congestion  : No blood in the urine  GI: No Nausea, vomiting, constipation, diarrhea, blood, or reflux.  MUSCULOSKELETAL: No myalgias  NEURO: No lightheadedness or dizziness  EYES: No Double vision, blurry, vision or headache     OBJECTIVE     VITAL SIGNS (Most Recent)  Temp: 98.1 °F (36.7 °C) (09/05/24 0800)  Pulse: 71 (09/05/24 1100)  Resp: 18 (09/05/24 1100)  BP: 139/69 (09/05/24 1100)  SpO2: 100 % (09/05/24 1100)    VENTILATION STATUS  Resp: 18 (09/05/24 1100)  SpO2: 100 % (09/05/24 1100)           I & O (Last 24H):  Intake/Output Summary (Last 24 hours) at 9/5/2024 1255  Last data filed at 9/5/2024 0800  Gross per 24 hour   Intake 690 ml   Output 550 ml   Net 140 ml       WEIGHTS  Wt Readings from Last 3  Encounters:   09/05/24 0400 85.2 kg (187 lb 13.3 oz)   09/04/24 0400 85 kg (187 lb 6.3 oz)   09/03/24 0400 89.9 kg (198 lb 3.1 oz)   09/01/24 0353 88.8 kg (195 lb 12.3 oz)   08/30/24 1938 89.8 kg (197 lb 15.6 oz)   08/30/24 1349 86.2 kg (190 lb)   08/31/24 0920 89.4 kg (197 lb)   08/30/24 1007 89.1 kg (196 lb 6.9 oz)       PHYSICAL EXAM  CONSTITUTIONAL: Well built, well nourished in no apparent distress  NECK: no carotid bruit, no JVD  LUNGS: CTA  CHEST WALL: no tenderness  HEART: regular rate and rhythm, S1, S2 normal, no murmur, click, rub or gallop   ABDOMEN: soft, non-tender; bowel sounds normal; no masses,  no organomegaly  EXTREMITIES: Extremities normal, no edema  NEURO: AAO X 3    SCHEDULED MEDS:   aspirin  81 mg Oral Daily    atorvastatin  80 mg Oral Daily    docusate sodium  100 mg Oral Daily    insulin glargine U-100  10 Units Subcutaneous Daily    meropenem IV (PEDS and ADULTS)  1 g Intravenous Q12H    metoprolol succinate  50 mg Oral Daily    pantoprazole  40 mg Oral Daily    polyethylene glycol  17 g Oral Daily       CONTINUOUS INFUSIONS:   heparin (porcine) in D5W  0-40 Units/kg/hr (Adjusted) Intravenous Continuous 6.4 mL/hr at 09/05/24 0229 9 Units/kg/hr at 09/05/24 0229    nitroGLYCERIN in 5 % dextrose    Continuous PRN 3 mL/hr at 09/01/24 0601 Rate Verify at 09/01/24 0601       PRN MEDS:  Current Facility-Administered Medications:     [START ON 9/6/2024] 0.9%  NaCl infusion (for blood administration), , Intravenous, Q24H PRN    acetaminophen, 650 mg, Oral, Q6H PRN    dextrose 50%, 12.5 g, Intravenous, PRN    diphenhydrAMINE, 25 mg, Oral, Q6H PRN    glucagon (human recombinant), 1 mg, Intramuscular, PRN    heparin (PORCINE), 56 Units/kg (Adjusted), Intravenous, PRN    heparin (PORCINE), 30 Units/kg (Adjusted), Intravenous, PRN    insulin aspart U-100, 0-10 Units, Subcutaneous, Q6H PRN    LORazepam, 0.5 mg, Oral, Q6H PRN    magnesium oxide, 800 mg, Oral, PRN    magnesium oxide, 800 mg, Oral, PRN     [START ON 9/6/2024] mupirocin, , Nasal, On Call Procedure    nitroGLYCERIN in 5 % dextrose, , , Continuous PRN    nitroGLYCERIN, 0.4 mg, Sublingual, Q5 Min PRN    ondansetron, 4 mg, Intravenous, Q6H PRN    potassium bicarbonate, 35 mEq, Oral, PRN    potassium bicarbonate, 50 mEq, Oral, PRN    potassium bicarbonate, 60 mEq, Oral, PRN    potassium, sodium phosphates, 2 packet, Oral, PRN    potassium, sodium phosphates, 2 packet, Oral, PRN    potassium, sodium phosphates, 2 packet, Oral, PRN    senna-docusate 8.6-50 mg, 1 tablet, Oral, Daily PRN    [START ON 9/6/2024] vancomycin (VANCOCIN) IV (PEDS and ADULTS), 20 mg/kg, Intravenous, On Call Procedure    LABS AND DIAGNOSTICS     CBC LAST 3 DAYS  Recent Labs   Lab 09/04/24  0231 09/05/24  0040 09/05/24  0250   WBC 10.78  10.78 10.22 10.20  10.20   RBC 4.18  4.18 4.41 4.43  4.43   HGB 12.2  12.2 12.8 12.6  12.6   HCT 36.9*  36.9* 38.6 38.7  38.7   MCV 88  88 88 87  87   MCH 29.2  29.2 29.0 28.4  28.4   MCHC 33.1  33.1 33.2 32.6  32.6   RDW 14.0  14.0 14.0 14.0  14.0     288 307 305  305   MPV 10.9  10.9 10.6 10.4  10.4   GRAN 47.7  47.7  5.1  5.1 48.3  4.9 48.4  48.4  4.9  4.9   LYMPH 37.8  37.8  4.1  4.1 38.6  4.0 38.7  38.7  4.0  4.0   MONO 8.9  8.9  1.0  1.0 8.2  0.8 7.8  7.8  0.8  0.8   BASO 0.10  0.10 0.07 0.06  0.06   NRBC 0  0 0 0  0       COAGULATION LAST 3 DAYS  Recent Labs   Lab 08/30/24  1422 08/30/24 2004 08/31/24  0044 09/04/24 2014 09/05/24  0040 09/05/24  0836   INR 1.0 1.0  --   --   --   --    APTT 28.5 47.2*   < > 57.5* 75.4* 45.2*    < > = values in this interval not displayed.       CHEMISTRY LAST 3 DAYS  Recent Labs   Lab 08/31/24  0247 09/01/24  0411 09/01/24  1732 09/02/24  0319 09/03/24  0306 09/04/24  0231 09/05/24  0040 09/05/24  0250    139   < > 137   < > 137 138 138   K 3.7 4.1   < > 4.2   < > 3.9 3.9 3.8    105   < > 104   < > 104 104 104   CO2 25 27   < > 27   < > 24 25 25  "  ANIONGAP 8 7*   < > 6*   < > 9 9 9   BUN 17 18   < > 23   < > 21 18 19   CREATININE 1.0 1.1   < > 1.2   < > 1.0 1.2 1.2   * 142*   < > 187*   < > 215* 226* 223*   CALCIUM 9.1 9.0   < > 9.0   < > 9.0 9.2 9.0   MG 1.8 2.0  --  2.0  --   --   --   --    ALBUMIN 3.5 3.4*  --  3.4*   < > 3.5 3.5 3.6   PROT 6.7 6.4  --  6.5   < > 6.5 6.8 6.6   ALKPHOS 55 53*  --  54*   < > 55 59 56   ALT 13 11  --  12   < > 34 44 44   AST 20 13  --  13   < > 32 36 36   BILITOT 0.4 0.5  --  0.5   < > 0.5 0.5 0.5    < > = values in this interval not displayed.       CARDIAC PROFILE LAST 3 DAYS  Recent Labs   Lab 08/30/24  1420 09/01/24  0411   BNP 62 56       ENDOCRINE LAST 3 DAYS  Recent Labs   Lab 08/31/24  0247 09/01/24  0411   TSH 3.587 2.556       LAST ARTERIAL BLOOD GAS  ABG  No results for input(s): "PH", "PO2", "PCO2", "HCO3", "BE" in the last 168 hours.    LAST 7 DAYS MICROBIOLOGY   Microbiology Results (last 7 days)       Procedure Component Value Units Date/Time    Urine culture [[id number]]  (Abnormal)  (Susceptibility) Collected: 09/01/24 0305    Order Status: Completed Specimen: Urine Updated: 09/03/24 0702     Urine Culture, Routine ESCHERICHIA COLI ESBL  >100,000 cfu/ml  Results called to and read back by Sandra Ray RN on [facility] 09/03/2024    07:01 ncb      Narrative:      Specimen Source->Urine            MOST RECENT IMAGING  X-Ray Chest AP Portable  Narrative: EXAMINATION:  XR CHEST AP PORTABLE    CLINICAL HISTORY:  Pre op CABG; Non-ST elevation (NSTEMI) myocardial infarction    FINDINGS:  Portable chest at 04:34 is compared to 08/30/2024 shows normal cardiomediastinal silhouette.    Lungs are clear. Pulmonary vasculature is normal. No acute osseous abnormality.  Impression: No acute cardiopulmonary abnormality.    Electronically signed by: Elisha Pantoja  Date:    09/05/2024  Time:    07:35      [facility]  Results for orders placed during the hospital encounter of 08/30/24    Echo    Interpretation Summary    " Left Ventricle: The left ventricle is normal in size. Mildly increased wall thickness. There is mild concentric hypertrophy. There is normal systolic function with a visually estimated ejection fraction of 60 - 65%. There is diastolic dysfunction.    Right Ventricle: Normal right ventricular cavity size. Wall thickness is normal. Systolic function is normal.    Aortic Valve: The aortic valve is a trileaflet valve. There is moderate aortic valve sclerosis. Moderately restricted motion. There is moderate stenosis. Aortic valve area by VTI is 1.18 cm². Aortic valve peak velocity is 2.28 m/s. Mean gradient is 12 mmHg. The dimensionless index is 0.38.    Mitral Valve: There is mild bileaflet sclerosis.    Tricuspid Valve: There is mild regurgitation with a centrally directed jet.    Pulmonary Artery: The estimated pulmonary artery systolic pressure is 26 mmHg.    IVC/SVC: Normal venous pressure at 3 mmHg.      CURRENT/PREVIOUS VISIT EKG  Results for orders placed or performed during the hospital encounter of 08/30/24   EKG 12-lead    Collection Time: 09/02/24  9:13 AM   Result Value Ref Range    QRS Duration 96 ms    [facility] QTC Calculation 428 ms    Narrative    Test Reason : R07.9,    Vent. Rate : 075 BPM     Atrial Rate : 075 BPM     P-R Int : 232 ms          QRS Dur : 096 ms      QT Int : 384 ms       P-R-T Axes : 053 -10 087 degrees     QTc Int : 428 ms    Sinus rhythm with 1st degree A-V block  Minimal voltage criteria for LVH, may be normal variant ( [place] product )  Borderline Abnormal ECG  When compared with ECG of 02-SEP-2024 07:20,  No significant change was found    Referred By: [email or web address]   SELF           Confirmed By:        ASSESSMENT/PLAN:     Active Hospital Problems    Diagnosis    *NSTEMI (non-ST elevated myocardial infarction)    Urinary tract infection without hematuria    Diabetes    Stage 3a chronic kidney disease    Hyperlipidemia       ASSESSMENT & PLAN:   NON ST-ELEVATION MI MULTIVESSEL DIABETIC  CORONARY DISEASE  DIABETES  CKD  HYPERLIPIDEMIA  RECOMMENDATIONS:    Continue statin and aspirin.  Continue metoprolol succinate 50 mg p.o. daily.  Continue heparin drip.  Hold prior to CABG which is planned for tomorrow.      Aliya Bricsoe NP  [facility]  Department of Cardiology  Date of Service: 09/05/2024  1:04 PM

## 2024-09-05 NOTE — PLAN OF CARE
POC reviewed. VSS, afebrile. No chest pain or SOB. Ambulates room independently. Heparin gtt infusing. PRN 30u/kg bolus and rate increased by 2u for aptt 35.9. Aptt redraw scheduled for 2300. AUOP via BSC. Anxious about CABG tomorrow; prn ativan utilized. Tylenol x1 for HA. Tolerating diet. Comfort promoted, safety maintained.

## 2024-09-05 NOTE — PROGRESS NOTES
[facility] hospital  Adult Nutrition   Progress Note (Follow-Up)    SUMMARY     Recommendations  Recommendation/Intervention:   1.) Continue DM diet.   2.) If consuming <50% of meals, RD to add Glucerna BID to provide 360 kcals, 20gm protein.   3.) Uncontrolled DM: provided diet education 8/31/24. Recommend outpatient diabetes education.   4. RD following.  Goals: Pt to maintain po intake/tolerance >75% of diet by RD follow up.  Nutrition Goal Status: progressing towards goal    Nutrition Diagnosis PES Statement: Food- and nutrition-related knowledge deficit related to lack of prior education as evidenced by HbA1C 10.2%    Dietitian Rounds Brief  RD follow up. Pt has good appetite and po intake. She is pending CABG tomorrow.     Nutrition Related Social Determinants of Health: SDOH: None Identified  Food Insecurity: No Food Insecurity (8/31/2024)    Hunger Vital Sign     Worried About Running Out of Food in the Last Year: Never true     Ran Out of Food in the Last Year: Never true      Malnutrition Assessment   8/31/2024  Orbital Region (Subcutaneous Fat Loss): well nourished  Upper Arm Region (Subcutaneous Fat Loss): well nourished  Thoracic and Lumbar Region: well nourished   [place] Region (Muscle Loss): well nourished  Clavicle Bone Region (Muscle Loss): well nourished  Clavicle and Acromion Bone Region (Muscle Loss): well nourished  Scapular Bone Region (Muscle Loss): well nourished  Dorsal Hand (Muscle Loss): well nourished  Patellar Region (Muscle Loss): well nourished  Anterior Thigh Region (Muscle Loss): well nourished  Posterior Calf Region (Muscle Loss): well nourished       Diet order:   Current Diet Order: DM- 2000 kcal      Evaluation of Received Nutrient/Fluid Intake  Energy Calories Required: meeting needs  Protein Required: meeting needs  Fluid Required: meeting needs      % Intake of Estimated Energy Needs: 75 - 100 %  % Meal Intake: 75 - 100 %      Intake/Output Summary (Last 24 hours) at  "2024 1445  Last data filed at 2024 0800  Gross per 24 hour   Intake 690 ml   Output 550 ml   Net 140 ml        Anthropometrics  Temp: 98.1 °F (36.7 °C)  Height Method: Stated  Height: 5' 7" (170.2 cm)  Height (inches): 67 in  Weight Method: Bed Scale  Weight: 85.2 kg (187 lb 13.3 oz)  Weight (lb): 187.83 lb  Ideal Body Weight (IBW), Female: 135 lb  % Ideal Body Weight, Female (lb): 146.65 %  BMI (Calculated): 29.4  BMI Grade: 25 - 29.9 - overweight  Usual Body Weight (UBW), k.5 kg ()  % Usual Body Weight: 110.41       Estimated/Assessed Needs  Weight Used For Calorie Calculations: 89.8 kg (197 lb 15.6 oz)  Energy Calorie Requirements (kcal): 1698-1124  Energy Need Method: Kcal/kg (20-25)  Protein Requirements:  (1.5-2.0)  Weight Used For Protein Calculations: 61 kg (134 lb 7.7 oz) (IBW d/t obesity + ICU setting)  Fluid Requirements (mL): 0174-4980  Estimated Fluid Requirement Method: RDA Method  RDA Method (mL):   CHO Requirement: 200-250 gm (40-50% of 2000 kcal)    Reason for Assessment  Reason For Assessment: RD follow-up  Diagnosis: cardiac disease, diabetes diagnosis/complications  Relevant Medical History: HTN, DM 2, HLD  Interdisciplinary Rounds: attended  General Information Comments: Consult receive for NSTEMI. Pt admitted with chest pain. Pt currently NPO. She reports not following a diet at home and eats anything she wants. She admits to receiving diet education in the past but "I don't have time for that". She reports fair appetite. No chew/swallowing issues. C/o some nausea, no other GI distress. LBM 8. Skin: intact per chart. Wt reviewed. UBW 81.5 kg () reflecting wt maintainence x3 years. NFPE completed with no s/s of wasting. No malnutrition suspected. Provided heart healthy, consistent carbohydrate diet handouts with RD contact information.  Nutrition Discharge Planning: DM cardiac diet    Nutrition/Diet History  Patient Reported Diet/Restrictions/Preferences: " general  Spiritual, Cultural Beliefs, Anglican Practices, Values that Affect Care: no  Food Allergies: NKFA  Factors Affecting Nutritional Intake: None identified at this time    Nutrition Risk Screen  Nutrition Risk Screen: no indicators present     MST Score: 0  Have you recently lost weight without trying?: No  Weight loss score: 0  Have you been eating poorly because of a decreased appetite?: No  Appetite score: 0       Weight History:  Wt Readings from Last 10 Encounters:   09/05/24 85.2 kg (187 lb 13.3 oz)   08/31/24 89.4 kg (197 lb)   08/30/24 89.1 kg (196 lb 6.9 oz)   08/14/24 87.8 kg (193 lb 9 oz)   07/12/24 87 kg (191 lb 12.8 oz)   05/16/24 84.5 kg (186 lb 4.6 oz)   05/13/24 86 kg (189 lb 11.3 oz)   04/05/24 86.1 kg (189 lb 13.1 oz)   03/13/24 87.1 kg (192 lb)   02/19/24 86.8 kg (191 lb 5.8 oz)        Lab/Procedures/Meds: Pertinent Labs/Meds Reviewed    Medications:Pertinent Medications Reviewed  Scheduled Meds:   aspirin  81 mg Oral Daily    atorvastatin  80 mg Oral Daily    docusate sodium  100 mg Oral Daily    insulin glargine U-100  10 Units Subcutaneous Daily    meropenem IV (PEDS and ADULTS)  1 g Intravenous Q12H    metoprolol succinate  50 mg Oral Daily    pantoprazole  40 mg Oral Daily    polyethylene glycol  17 g Oral Daily     Continuous Infusions:   heparin (porcine) in D5W  0-40 Units/kg/hr (Adjusted) Intravenous Continuous 6.4 mL/hr at 09/05/24 0229 9 Units/kg/hr at 09/05/24 0229    nitroGLYCERIN in 5 % dextrose    Continuous PRN 3 mL/hr at 09/01/24 0601 Rate Verify at 09/01/24 0601       Labs: Pertinent Labs Reviewed  Clinical Chemistry:  Recent Labs   Lab 09/02/24  0319 09/03/24  0306 09/04/24  0231 09/05/24  0040 09/05/24  0250      < > 137 138 138   K 4.2   < > 3.9 3.9 3.8      < > 104 104 104   CO2 27   < > 24 25 25   *   < > 215* 226* 223*   BUN 23   < > 21 18 19   CREATININE 1.2   < > 1.0 1.2 1.2   CALCIUM 9.0   < > 9.0 9.2 9.0   PROT 6.5   < > 6.5 6.8 6.6   ALBUMIN  3.4*   < > 3.5 3.5 3.6   BILITOT 0.5   < > 0.5 0.5 0.5   ALKPHOS 54*   < > 55 59 56   AST 13   < > 32 36 36   ALT 12   < > 34 44 44   ANIONGAP 6*   < > 9 9 9   MG 2.0  --   --   --   --     < > = values in this interval not displayed.     CBC:   Recent Labs   Lab 09/05/24  0250   WBC 10.20  10.20   RBC 4.43  4.43   HGB 12.6  12.6   HCT 38.7  38.7     305   MCV 87  87   MCH 28.4  28.4   MCHC 32.6  32.6       Monitor and Evaluation  Food and Nutrient Intake: energy intake, food and beverage intake  Food and Nutrient Adminstration: diet order  Knowledge/Beliefs/Attitudes: food and nutrition knowledge/skill  Physical Activity and Function: nutrition-related ADLs and IADLs  Anthropometric Measurements: weight, weight change  Biochemical Data, Medical Tests and Procedures: electrolyte and renal panel, gastrointestinal profile, glucose/endocrine profile  Nutrition-Focused Physical Findings: overall appearance     Nutrition Risk  Level of Risk/Frequency of Follow-up:  (weekly)     Nutrition Follow-Up  RD Follow-up?: Yes

## 2024-09-05 NOTE — CARE UPDATE
09/04/24 2013   Patient Assessment/Suction   Level of Consciousness (AVPU) alert   Respiratory Effort Normal;Unlabored   Expansion/Accessory Muscles/Retractions no use of accessory muscles;expansion symmetric;no retractions   All Lung Fields Breath Sounds clear;equal bilaterally   Rhythm/Pattern, Respiratory no shortness of breath reported;unlabored   Cough Frequency no cough   PRE-TX-O2   Device (Oxygen Therapy) room air   SpO2 98 %   Pulse Oximetry Type Continuous   Pulse 65   Resp (!) 21

## 2024-09-05 NOTE — PLAN OF CARE
Patient continues on heparin drip at 11units/kg/hr. Plan is for surgery Friday. She has no complaints of chest pain and appears comfortable throughout my shift. Updated patient on her plan of care and safety precautions with current medications.

## 2024-09-05 NOTE — ANESTHESIA PREPROCEDURE EVALUATION
09/05/2024  Debby Brown is a 74 y.o., female.      Patient Active Problem List   Diagnosis    S/P left rotator cuff repair    Anxiety    Attention deficit disorder    Hypertension associated with diabetes    Hyperlipidemia    Depression, recurrent    Insomnia    Benign essential tremor    BMI 33.0-33.9,adult    Cataracts, bilateral    BMI 29.0-29.9,adult    LVH (left ventricular hypertrophy)    Aortic valve stenosis    Type 2 diabetes mellitus with microalbuminuria, with long-term current use of insulin    Pulmonary nodule    COVID-19 long hauler manifesting chronic concentration deficit    Balance problem    Memory loss    Macroalbuminuric diabetic nephropathy    Hyperuricemia    Benign familial tremor    SOB (shortness of breath)    Stage 3a chronic kidney disease    Low back pain    Panic attacks    Anterolisthesis of lumbar spine    Compression fracture of L1 lumbar vertebra    Compression fracture of L2 lumbar vertebra    Calcification of aorta    Stage 3b chronic kidney disease    Diabetes    NSTEMI (non-ST elevated myocardial infarction)    Urinary tract infection without hematuria       Past Surgical History:   Procedure Laterality Date    APPENDECTOMY      BACK SURGERY      BREAST SURGERY      CHOLECYSTECTOMY      COLONOSCOPY      COLONOSCOPY N/A 03/13/2024    Procedure: COLONOSCOPY;  Surgeon: Isabel Noriega MD;  Location: [facility];  Service: Endoscopy;  Laterality: N/A;  ppm    CORONARY ANGIOGRAPHY N/A 8/30/2024    Procedure: ANGIOGRAM, CORONARY ARTERY;  Surgeon: Ashleigh Chambers MD;  Location: [facility] CATH/EP LAB;  Service: Cardiology;  Laterality: N/A;    GALLBLADDER SURGERY      HERNIA REPAIR      umbilical    lymphnode remove      SHOULDER SURGERY Left     RTR    TOTAL REDUCTION MAMMOPLASTY Bilateral 2001    TUBAL LIGATION          Tobacco Use:  The patient  reports that she has quit  smoking. She has been exposed to tobacco smoke. She has never used smokeless tobacco.     Results for orders placed or performed during the hospital encounter of 08/30/24   EKG 12-lead    Collection Time: 09/02/24  9:13 AM   Result Value Ref Range    QRS Duration 96 ms    [facility] QTC Calculation 428 ms    Narrative    Test Reason : R07.9,    Vent. Rate : 075 BPM     Atrial Rate : 075 BPM     P-R Int : 232 ms          QRS Dur : 096 ms      QT Int : 384 ms       P-R-T Axes : 053 -10 087 degrees     QTc Int : 428 ms    Sinus rhythm with 1st degree A-V block  Minimal voltage criteria for LVH, may be normal variant ( [place] product )  Borderline Abnormal ECG  When compared with ECG of 02-SEP-2024 07:20,  No significant change was found    Referred By: [email or web address]   SELF           Confirmed By:         Imaging Results              CTA Chest Abdomen Non Coronary (Final result)  Result time 08/30/24 15:28:22      Final result by Felipe Walden MD (08/30/24 15:28:22)                   Impression:      1.  Mild cardiomegaly with scattered coronary artery calcifications (predominantly in the LAD and circumflex distribution).    2.  Calcified plaques in the thoracoabdominal aorta and iliacs with no aneurysm as above.    3.  Chronic anterior superior endplate compression fracture deformities at L1 and L2, similar to the previous exam.    4.  Numerous additional, and incidental findings as noted above.      Electronically signed by: Felipe Walden  Date:    08/30/2024  Time:    15:28               Narrative:      CMS MANDATED QUALITY DATA - CT RADIATION - 436    All CT scans at this facility utilize dose modulation, iterative reconstruction, and/or weight based dosing when appropriate to reduce radiation dose to as low as reasonably achievable.    CLINICAL HISTORY:  ([id number])73 y/o  (1950) F    Aortic dissection suspected;    TECHNIQUE:  (A#[id number], exam time 8/30/2024 15:19)    CTA CHEST ABDOMEN NON CORONARY (XPD)  [id number]    Axial CT images of the chest and abdomen were obtained from the thoracic inlet to the proximal thigh.    Additional coronal and sagittal reformatted images are available for review.  3D post processing was performed by the radiologist.    COMPARISON:  Radiograph of the chest from the same day.    FINDINGS:  CT Chest:    Visualized neck: normal    Lungs: There is dependent atelectasis in the bilateral lower lobes.  The lungs are otherwise clear.    Airway: The trachea and central bronchial tree appear normal.    Pleura: There is no pleural effusion. There is no pneumothorax.    Cardiovascular: The heart is top-normal in size with scattered coronary arterial calcifications most notably in the proximal LAD and mid RCA distribution.  There are calcifications along the aortic and mitral valve and along the undersurface of the aortic arch.    Mediastinum: There is no supraclavicular  axillary  mediastinal  or hilar lymphadenopathy.    Soft tissues: The peripheral soft tissues appear normal.    Musculoskeletal: There are moderate degenerative changes of the thoracic spine.  There are no suspicious osseous lesions.    Esophagus: normal    CTA aorta:    The aorta is normal in course and caliber with no aneurysm, dissection or penetrating ulcer identified.    Celiac axis: Patent without stenosis.    SMA: Patent.    Right Renal artery: Small calcified plaque at the origin causing less than 20% stenosis.  There is an incidental accessory right renal artery which arises slightly more anterior and inferior to the main renal artery appearing to supply the inferior 3rd of the right kidney.    Left Renal artery: Patent.    Common iliacs: Small calcified pleural plaques are seen in the right common iliac causing less than 20% stenosis.    External iliac arteries: The visualized external iliacs are patent.    Internal iliacs: Calcified plaques are seen in the internal iliacs bilaterally causing less than 30% stenosis on the  right and less than 10% stenosis on the left.    CT Abdomen:    Liver: The liver is normal in size and imaging appearance.    Gallbladder: The gallbladder is surgically absent.    Biliary Tree: No intra or extrahepatic ductal dilation.    Spleen: Normal.    Pancreas: The pancreas is normal.    Adrenal Glands: Normal    Kidneys: The kidneys are normal in imaging appearance without hydronephrosis or hydroureter.    Lymph nodes: No abdominal lymphadenopathy is seen.    Intraperitoneal structures: There is no ascites.    Bowel: Moderate sigmoid diverticulosis and descending colonic diverticulosis without focal diverticulitis.    Abdominal wall: Supraumbilical ventral abdominal wall hernia containing omentum/fat.  There is a moderate size fat containing umbilical hernia as well.    Musculoskeletal: Moderate to severe degenerative disc height loss and facet arthropathy is seen throughout the lumbar spine.  There is an age-indeterminate anterior superior endplate compression fracture deformity a L2, with approximately 25% height loss of the anterior common no retropulsion.  There is a similar anterior superior endplate compression fracture deformity at L1, with 20% height loss of the anterior column, with no retropulsion.  This appears to have been present on studies dating back to 09/26/2023.  There is grade 1 anterolisthesis of L4 on L5 (3-4 mm).                                       X-Ray Chest AP Portable (Final result)  Result time 08/30/24 14:33:40      Final result by Ubaldo Dumont MD (08/30/24 14:33:40)                   Impression:      No evidence of active cardiopulmonary disease.      Electronically signed by: Ubaldo Dumont  Date:    08/30/2024  Time:    14:33               Narrative:    EXAMINATION:  XR CHEST AP PORTABLE    CLINICAL HISTORY:  Chest Pain;    FINDINGS:  Portable chest radiograph at 14:17 hours compared to prior exams shows the cardiomediastinal silhouette and pulmonary vasculature are within  normal limits.    The lungs are normally expanded, with no consolidation, large pleural effusion, or evidence of pulmonary edema. No pneumothorax. There are no significant osseous abnormalities.                                       Lab Results   Component Value Date    WBC 10.20 09/05/2024    WBC 10.20 09/05/2024    HGB 12.6 09/05/2024    HGB 12.6 09/05/2024    HCT 38.7 09/05/2024    HCT 38.7 09/05/2024    MCV 87 09/05/2024    MCV 87 09/05/2024     09/05/2024     09/05/2024     BMP  Lab Results   Component Value Date     09/05/2024    K 3.8 09/05/2024     09/05/2024    CO2 25 09/05/2024    BUN 19 09/05/2024    CREATININE 1.2 09/05/2024    CALCIUM 9.0 09/05/2024    ANIONGAP 9 09/05/2024     (H) 09/05/2024     (H) 09/05/2024     (H) 09/04/2024       Results for orders placed during the hospital encounter of 08/30/24    Echo    Interpretation Summary    Left Ventricle: The left ventricle is normal in size. Mildly increased wall thickness. There is mild concentric hypertrophy. There is normal systolic function with a visually estimated ejection fraction of 60 - 65%. There is diastolic dysfunction.    Right Ventricle: Normal right ventricular cavity size. Wall thickness is normal. Systolic function is normal.    Aortic Valve: The aortic valve is a trileaflet valve. There is moderate aortic valve sclerosis. Moderately restricted motion. There is moderate stenosis. Aortic valve area by VTI is 1.18 cm². Aortic valve peak velocity is 2.28 m/s. Mean gradient is 12 mmHg. The dimensionless index is 0.38.    Mitral Valve: There is mild bileaflet sclerosis.    Tricuspid Valve: There is mild regurgitation with a centrally directed jet.    Pulmonary Artery: The estimated pulmonary artery systolic pressure is 26 mmHg.    IVC/SVC: Normal venous pressure at 3 mmHg.              Pre-op Assessment    I have reviewed the Patient Summary Reports.     I have reviewed the Nursing Notes. I have  reviewed the NPO Status.   I have reviewed the Medications.     Review of Systems  Anesthesia Hx:  No problems with previous Anesthesia             Denies Family Hx of Anesthesia complications.    Denies Personal Hx of Anesthesia complications.                    Social:  Former Smoker       Hematology/Oncology:  Hematology Normal   Oncology Normal                                   EENT/Dental:  EENT/Dental Normal           Cardiovascular:     Hypertension  Past MI CAD           hyperlipidemia   ECG has been reviewed.                          Pulmonary:  Pulmonary Normal                       Renal/:  Chronic Renal Disease, CKD                Hepatic/GI:  Hepatic/GI Normal                 Musculoskeletal:  Arthritis               Neurological:  Neurology Normal                                      Endocrine:  Diabetes           Psych:   anxiety                 Physical Exam  General: Well nourished, Cooperative, Alert and Oriented    Airway:  Mallampati: II   Mouth Opening: Normal  TM Distance: Normal  Tongue: Normal  Neck ROM: Normal ROM    Dental:  Intact    Chest/Lungs:  Clear to auscultation    Heart:  Rate: Normal  Rhythm: Regular Rhythm  Sounds: Normal        Anesthesia Plan  Type of Anesthesia, risks & benefits discussed:    Anesthesia Type: Gen ETT  Intra-op Monitoring Plan: Standard ASA Monitors, Central Line, Art Line, PA and CO  Post Op Pain Control Plan: multimodal analgesia  Induction:  IV  Airway Plan: Video  Informed Consent: Informed consent signed with the Patient and all parties understand the risks and agree with anesthesia plan.  All questions answered.   ASA Score: 4    Ready For Surgery From Anesthesia Perspective.     .

## 2024-09-05 NOTE — PLAN OF CARE
Plan of care reviewed with Patient. Patient is oriented to Person, Place, Time . Patient maintains oxygen saturations above 92% while on room air. Cardiac monitoring maintained. Patient independently voids spontaneously. Safety precautions maintained. Patient remains free from injury. Turned/repositioned independently.  Call light and personal items within reach. Bed in lowest position and locked. Glucose monitoring maintained. Pain controlled with prn medication.  Vital signs stable.  Afebrile.      Current infusions: Heparin- see MAR    New orders this shift: N/A.     Nurses Note -- 4 Eyes      9/4/2024   7:08 PM      Skin assessed during: Daily Assessment      [x] No Altered Skin Integrity Present    [x]Prevention Measures Documented      [] Yes- Altered Skin Integrity Present or Discovered   [] LDA Added if Not in Epic (Describe Wound)   [] New Altered Skin Integrity was Present on Admit and Documented in LDA   [] Wound Image Taken    Wound Care Consulted? No    Attending Nurse:  Blkae Ledbetter RN/Staff Member:   GABBI Ramirez          Problem: Acute Coronary Syndrome  Goal: Optimal Adaptation to Illness  Outcome: Progressing  Goal: Absence of Cardiac-Related Pain  Outcome: Progressing  Goal: Normalized Cardiac Rhythm  Outcome: Progressing  Goal: Effective Cardiac Pump Function  Outcome: Progressing  Goal: Adequate Tissue Perfusion  Outcome: Progressing     Problem: Cardiac Catheterization (Diagnostic/Interventional)  Goal: Absence of Bleeding  Outcome: Progressing  Goal: Absence of Contrast-Induced Injury  Outcome: Progressing  Goal: Stable Heart Rate and Rhythm  Outcome: Progressing  Goal: Absence of Embolism Signs and Symptoms  Outcome: Progressing  Goal: Anesthesia/Sedation Recovery  Outcome: Progressing  Goal: Optimal Pain Control and Function  Outcome: Progressing  Goal: Absence of Vascular Access Complication  Outcome: Progressing     Problem: Adult Inpatient Plan of Care  Goal: Plan of Care  Review  Outcome: Progressing  Goal: Patient-Specific Goal (Individualized)  Outcome: Progressing  Goal: Absence of Hospital-Acquired Illness or Injury  Outcome: Progressing  Goal: Optimal Comfort and Wellbeing  Outcome: Progressing  Goal: Readiness for Transition of Care  Outcome: Progressing     Problem: Diabetes Comorbidity  Goal: Blood Glucose Level Within Targeted Range  Outcome: Progressing     Problem: Wound  Goal: Optimal Coping  Outcome: Progressing  Goal: Optimal Functional Ability  Outcome: Progressing  Goal: Absence of Infection Signs and Symptoms  Outcome: Progressing  Goal: Improved Oral Intake  Outcome: Progressing  Goal: Optimal Pain Control and Function  Outcome: Progressing  Goal: Skin Health and Integrity  Outcome: Progressing  Goal: Optimal Wound Healing  Outcome: Progressing     Problem: Fall Injury Risk  Goal: Absence of Fall and Fall-Related Injury  Outcome: Progressing     Problem: Skin Injury Risk Increased  Goal: Skin Health and Integrity  Outcome: Progressing     Problem: Oral Intake Inadequate  Goal: Improved Oral Intake  Outcome: Progressing     Problem: Infection  Goal: Absence of Infection Signs and Symptoms  Outcome: Progressing

## 2024-09-06 ENCOUNTER — ANESTHESIA (OUTPATIENT)
Dept: SURGERY | Facility: HOSPITAL | Age: 74
End: 2024-09-06
Payer: MEDICARE

## 2024-09-06 LAB
ALBUMIN SERPL BCP-MCNC: 3 G/DL (ref 3.5–5.2)
ALBUMIN SERPL BCP-MCNC: 3.5 G/DL (ref 3.5–5.2)
ALLENS TEST: ABNORMAL
ALP SERPL-CCNC: 30 U/L (ref 55–135)
ALP SERPL-CCNC: 57 U/L (ref 55–135)
ALT SERPL W/O P-5'-P-CCNC: 39 U/L (ref 10–44)
ALT SERPL W/O P-5'-P-CCNC: 63 U/L (ref 10–44)
ANION GAP SERPL CALC-SCNC: 6 MMOL/L (ref 8–16)
ANION GAP SERPL CALC-SCNC: 8 MMOL/L (ref 8–16)
APTT PPP: 34.5 SEC (ref 21–32)
APTT PPP: 34.5 SEC (ref 21–32)
APTT PPP: 77.2 SEC (ref 21–32)
AST SERPL-CCNC: 51 U/L (ref 10–40)
AST SERPL-CCNC: 53 U/L (ref 10–40)
BASOPHILS # BLD AUTO: 0.07 K/UL (ref 0–0.2)
BASOPHILS # BLD AUTO: 0.07 K/UL (ref 0–0.2)
BASOPHILS NFR BLD: 0.8 % (ref 0–1.9)
BASOPHILS NFR BLD: 0.8 % (ref 0–1.9)
BILIRUB SERPL-MCNC: 0.6 MG/DL (ref 0.1–1)
BILIRUB SERPL-MCNC: 0.7 MG/DL (ref 0.1–1)
BLD PROD TYP BPU: NORMAL
BLOOD UNIT EXPIRATION DATE: NORMAL
BLOOD UNIT TYPE CODE: 1700
BLOOD UNIT TYPE: NORMAL
BUN SERPL-MCNC: 16 MG/DL (ref 8–23)
BUN SERPL-MCNC: 17 MG/DL (ref 8–23)
BUN SERPL-MCNC: 17 MG/DL (ref 8–23)
BUN SERPL-MCNC: 19 MG/DL (ref 8–23)
CALCIUM SERPL-MCNC: 7.6 MG/DL (ref 8.7–10.5)
CALCIUM SERPL-MCNC: 7.8 MG/DL (ref 8.7–10.5)
CALCIUM SERPL-MCNC: 7.9 MG/DL (ref 8.7–10.5)
CALCIUM SERPL-MCNC: 9.5 MG/DL (ref 8.7–10.5)
CHLORIDE SERPL-SCNC: 104 MMOL/L (ref 95–110)
CHLORIDE SERPL-SCNC: 113 MMOL/L (ref 95–110)
CHLORIDE SERPL-SCNC: 113 MMOL/L (ref 95–110)
CHLORIDE SERPL-SCNC: 116 MMOL/L (ref 95–110)
CO2 SERPL-SCNC: 21 MMOL/L (ref 23–29)
CO2 SERPL-SCNC: 25 MMOL/L (ref 23–29)
CO2 SERPL-SCNC: 25 MMOL/L (ref 23–29)
CO2 SERPL-SCNC: 27 MMOL/L (ref 23–29)
CODING SYSTEM: NORMAL
CREAT SERPL-MCNC: 0.9 MG/DL (ref 0.5–1.4)
CREAT SERPL-MCNC: 0.9 MG/DL (ref 0.5–1.4)
CREAT SERPL-MCNC: 1 MG/DL (ref 0.5–1.4)
CREAT SERPL-MCNC: 1.1 MG/DL (ref 0.5–1.4)
CROSSMATCH INTERPRETATION: NORMAL
DELSYS: ABNORMAL
DIFFERENTIAL METHOD BLD: ABNORMAL
DIFFERENTIAL METHOD BLD: ABNORMAL
DISPENSE STATUS: NORMAL
EOSINOPHIL # BLD AUTO: 0.4 K/UL (ref 0–0.5)
EOSINOPHIL # BLD AUTO: 0.4 K/UL (ref 0–0.5)
EOSINOPHIL NFR BLD: 4.1 % (ref 0–8)
EOSINOPHIL NFR BLD: 4.1 % (ref 0–8)
ERYTHROCYTE [DISTWIDTH] IN BLOOD BY AUTOMATED COUNT: 14 % (ref 11.5–14.5)
ERYTHROCYTE [DISTWIDTH] IN BLOOD BY AUTOMATED COUNT: 14.2 % (ref 11.5–14.5)
ERYTHROCYTE [DISTWIDTH] IN BLOOD BY AUTOMATED COUNT: 14.2 % (ref 11.5–14.5)
ERYTHROCYTE [SEDIMENTATION RATE] IN BLOOD BY WESTERGREN METHOD: 12 MM/H
ERYTHROCYTE [SEDIMENTATION RATE] IN BLOOD BY WESTERGREN METHOD: 18 MM/H
ERYTHROCYTE [SEDIMENTATION RATE] IN BLOOD BY WESTERGREN METHOD: 20 MM/H
EST. GFR  (NO RACE VARIABLE): 53.1 ML/MIN/1.73 M^2
EST. GFR  (NO RACE VARIABLE): 59.5 ML/MIN/1.73 M^2
EST. GFR  (NO RACE VARIABLE): >60 ML/MIN/1.73 M^2
EST. GFR  (NO RACE VARIABLE): >60 ML/MIN/1.73 M^2
FIO2: 100
FIO2: 35
FIO2: 45
FIO2: 70
FLOW: 2
FLOW: 2
GLUCOSE SERPL-MCNC: 143 MG/DL (ref 70–110)
GLUCOSE SERPL-MCNC: 147 MG/DL (ref 70–110)
GLUCOSE SERPL-MCNC: 148 MG/DL (ref 70–110)
GLUCOSE SERPL-MCNC: 152 MG/DL (ref 70–110)
GLUCOSE SERPL-MCNC: 156 MG/DL (ref 70–110)
GLUCOSE SERPL-MCNC: 156 MG/DL (ref 70–110)
GLUCOSE SERPL-MCNC: 162 MG/DL (ref 70–110)
GLUCOSE SERPL-MCNC: 163 MG/DL (ref 70–110)
GLUCOSE SERPL-MCNC: 164 MG/DL (ref 70–110)
GLUCOSE SERPL-MCNC: 164 MG/DL (ref 70–110)
GLUCOSE SERPL-MCNC: 165 MG/DL (ref 70–110)
GLUCOSE SERPL-MCNC: 178 MG/DL (ref 70–110)
GLUCOSE SERPL-MCNC: 208 MG/DL (ref 70–110)
GLUCOSE SERPL-MCNC: 211 MG/DL (ref 70–110)
GLUCOSE SERPL-MCNC: 228 MG/DL (ref 70–110)
HCO3 UR-SCNC: 18.8 MMOL/L (ref 24–28)
HCO3 UR-SCNC: 19.8 MMOL/L (ref 24–28)
HCO3 UR-SCNC: 20.1 MMOL/L (ref 24–28)
HCO3 UR-SCNC: 20.8 MMOL/L (ref 24–28)
HCO3 UR-SCNC: 21.2 MMOL/L (ref 24–28)
HCO3 UR-SCNC: 21.3 MMOL/L (ref 24–28)
HCO3 UR-SCNC: 21.5 MMOL/L (ref 24–28)
HCO3 UR-SCNC: 21.8 MMOL/L (ref 24–28)
HCO3 UR-SCNC: 22.6 MMOL/L (ref 24–28)
HCO3 UR-SCNC: 24.5 MMOL/L (ref 24–28)
HCO3 UR-SCNC: 24.7 MMOL/L (ref 24–28)
HCO3 UR-SCNC: 25.9 MMOL/L (ref 24–28)
HCO3 UR-SCNC: 26.9 MMOL/L (ref 24–28)
HCT VFR BLD AUTO: 23 % (ref 37–48.5)
HCT VFR BLD AUTO: 27.7 % (ref 37–48.5)
HCT VFR BLD AUTO: 28.6 % (ref 37–48.5)
HCT VFR BLD AUTO: 39.8 % (ref 37–48.5)
HCT VFR BLD AUTO: 39.8 % (ref 37–48.5)
HCT VFR BLD CALC: 21 %PCV (ref 36–54)
HCT VFR BLD CALC: 21 %PCV (ref 36–54)
HCT VFR BLD CALC: 22 %PCV (ref 36–54)
HCT VFR BLD CALC: 23 %PCV (ref 36–54)
HCT VFR BLD CALC: 23 %PCV (ref 36–54)
HCT VFR BLD CALC: 24 %PCV (ref 36–54)
HCT VFR BLD CALC: 25 %PCV (ref 36–54)
HCT VFR BLD CALC: 26 %PCV (ref 36–54)
HCT VFR BLD CALC: 29 %PCV (ref 36–54)
HCT VFR BLD CALC: 33 %PCV (ref 36–54)
HCT VFR BLD CALC: 34 %PCV (ref 36–54)
HGB BLD-MCNC: 12.8 G/DL (ref 12–16)
HGB BLD-MCNC: 12.8 G/DL (ref 12–16)
HGB BLD-MCNC: 7.7 G/DL (ref 12–16)
HGB BLD-MCNC: 9.1 G/DL (ref 12–16)
HGB BLD-MCNC: 9.3 G/DL (ref 12–16)
IMM GRANULOCYTES # BLD AUTO: 0.05 K/UL (ref 0–0.04)
IMM GRANULOCYTES # BLD AUTO: 0.05 K/UL (ref 0–0.04)
IMM GRANULOCYTES NFR BLD AUTO: 0.5 % (ref 0–0.5)
IMM GRANULOCYTES NFR BLD AUTO: 0.5 % (ref 0–0.5)
LYMPHOCYTES # BLD AUTO: 3.7 K/UL (ref 1–4.8)
LYMPHOCYTES # BLD AUTO: 3.7 K/UL (ref 1–4.8)
LYMPHOCYTES NFR BLD: 39.8 % (ref 18–48)
LYMPHOCYTES NFR BLD: 39.8 % (ref 18–48)
MAGNESIUM SERPL-MCNC: 1.9 MG/DL (ref 1.6–2.6)
MAGNESIUM SERPL-MCNC: 2 MG/DL (ref 1.6–2.6)
MAGNESIUM SERPL-MCNC: 2.2 MG/DL (ref 1.6–2.6)
MCH RBC QN AUTO: 28.1 PG (ref 27–31)
MCH RBC QN AUTO: 28.1 PG (ref 27–31)
MCH RBC QN AUTO: 28.8 PG (ref 27–31)
MCH RBC QN AUTO: 28.8 PG (ref 27–31)
MCH RBC QN AUTO: 29.5 PG (ref 27–31)
MCHC RBC AUTO-ENTMCNC: 32.2 G/DL (ref 32–36)
MCHC RBC AUTO-ENTMCNC: 32.2 G/DL (ref 32–36)
MCHC RBC AUTO-ENTMCNC: 32.5 G/DL (ref 32–36)
MCHC RBC AUTO-ENTMCNC: 32.9 G/DL (ref 32–36)
MCHC RBC AUTO-ENTMCNC: 33.5 G/DL (ref 32–36)
MCV RBC AUTO: 88 FL (ref 82–98)
MCV RBC AUTO: 89 FL (ref 82–98)
MODE: ABNORMAL
MONOCYTES # BLD AUTO: 0.8 K/UL (ref 0.3–1)
MONOCYTES # BLD AUTO: 0.8 K/UL (ref 0.3–1)
MONOCYTES NFR BLD: 8.7 % (ref 4–15)
MONOCYTES NFR BLD: 8.7 % (ref 4–15)
NEUTROPHILS # BLD AUTO: 4.3 K/UL (ref 1.8–7.7)
NEUTROPHILS # BLD AUTO: 4.3 K/UL (ref 1.8–7.7)
NEUTROPHILS NFR BLD: 46.1 % (ref 38–73)
NEUTROPHILS NFR BLD: 46.1 % (ref 38–73)
NRBC BLD-RTO: 0 /100 WBC
NRBC BLD-RTO: 0 /100 WBC
NUM UNITS TRANS PACKED RBC: NORMAL
PCO2 BLDA: 28 MMHG (ref 35–45)
PCO2 BLDA: 32.5 MMHG (ref 35–45)
PCO2 BLDA: 33 MMHG (ref 35–45)
PCO2 BLDA: 33.2 MMHG (ref 35–45)
PCO2 BLDA: 34 MMHG (ref 35–45)
PCO2 BLDA: 35.3 MMHG (ref 35–45)
PCO2 BLDA: 35.9 MMHG (ref 35–45)
PCO2 BLDA: 37 MMHG (ref 35–45)
PCO2 BLDA: 41.6 MMHG (ref 35–45)
PCO2 BLDA: 43.7 MMHG (ref 35–45)
PCO2 BLDA: 46 MMHG (ref 35–45)
PCO2 BLDA: 50.3 MMHG (ref 35–45)
PCO2 BLDA: 53.2 MMHG (ref 35–45)
PEEP: 5
PH SMN: 7.29 [PH] (ref 7.35–7.45)
PH SMN: 7.3 [PH] (ref 7.35–7.45)
PH SMN: 7.31 [PH] (ref 7.35–7.45)
PH SMN: 7.33 [PH] (ref 7.35–7.45)
PH SMN: 7.36 [PH] (ref 7.35–7.45)
PH SMN: 7.36 [PH] (ref 7.35–7.45)
PH SMN: 7.37 [PH] (ref 7.35–7.45)
PH SMN: 7.37 [PH] (ref 7.35–7.45)
PH SMN: 7.38 [PH] (ref 7.35–7.45)
PH SMN: 7.41 [PH] (ref 7.35–7.45)
PH SMN: 7.43 [PH] (ref 7.35–7.45)
PH SMN: 7.44 [PH] (ref 7.35–7.45)
PH SMN: 7.49 [PH] (ref 7.35–7.45)
PHOSPHATE SERPL-MCNC: 2.4 MG/DL (ref 2.7–4.5)
PLATELET # BLD AUTO: 142 K/UL (ref 150–450)
PLATELET # BLD AUTO: 164 K/UL (ref 150–450)
PLATELET # BLD AUTO: 177 K/UL (ref 150–450)
PLATELET # BLD AUTO: 311 K/UL (ref 150–450)
PLATELET # BLD AUTO: 311 K/UL (ref 150–450)
PLATELET BLD QL SMEAR: NORMAL
PMV BLD AUTO: 10.6 FL (ref 9.2–12.9)
PMV BLD AUTO: 10.6 FL (ref 9.2–12.9)
PMV BLD AUTO: 10.8 FL (ref 9.2–12.9)
PMV BLD AUTO: 11 FL (ref 9.2–12.9)
PMV BLD AUTO: 11.2 FL (ref 9.2–12.9)
PO2 BLDA: 131 MMHG (ref 80–100)
PO2 BLDA: 161 MMHG (ref 80–100)
PO2 BLDA: 212 MMHG (ref 80–100)
PO2 BLDA: 254 MMHG (ref 80–100)
PO2 BLDA: 363 MMHG (ref 80–100)
PO2 BLDA: 378 MMHG (ref 80–100)
PO2 BLDA: 426 MMHG (ref 80–100)
PO2 BLDA: 438 MMHG (ref 80–100)
PO2 BLDA: 79 MMHG (ref 80–100)
PO2 BLDA: 81 MMHG (ref 80–100)
PO2 BLDA: 82 MMHG (ref 80–100)
PO2 BLDA: 96 MMHG (ref 80–100)
PO2 BLDA: 97 MMHG (ref 80–100)
POC ACTIVATED CLOTTING TIME K: 134 SEC (ref 74–137)
POC ACTIVATED CLOTTING TIME K: 140 SEC (ref 74–137)
POC ACTIVATED CLOTTING TIME K: 464 SEC (ref 74–137)
POC ACTIVATED CLOTTING TIME K: 782 SEC (ref 74–137)
POC ACTIVATED CLOTTING TIME K: 843 SEC (ref 74–137)
POC BE: -1 MMOL/L
POC BE: -1 MMOL/L
POC BE: -3 MMOL/L
POC BE: -3 MMOL/L
POC BE: -4 MMOL/L
POC BE: -5 MMOL/L
POC BE: -6 MMOL/L
POC BE: 1 MMOL/L
POC BE: 1 MMOL/L
POC IONIZED CALCIUM: 0.97 MMOL/L (ref 1.06–1.42)
POC IONIZED CALCIUM: 1 MMOL/L (ref 1.06–1.42)
POC IONIZED CALCIUM: 1.11 MMOL/L (ref 1.06–1.42)
POC IONIZED CALCIUM: 1.13 MMOL/L (ref 1.06–1.42)
POC IONIZED CALCIUM: 1.13 MMOL/L (ref 1.06–1.42)
POC IONIZED CALCIUM: 1.14 MMOL/L (ref 1.06–1.42)
POC IONIZED CALCIUM: 1.15 MMOL/L (ref 1.06–1.42)
POC IONIZED CALCIUM: 1.15 MMOL/L (ref 1.06–1.42)
POC IONIZED CALCIUM: 1.16 MMOL/L (ref 1.06–1.42)
POC IONIZED CALCIUM: 1.16 MMOL/L (ref 1.06–1.42)
POC IONIZED CALCIUM: 1.17 MMOL/L (ref 1.06–1.42)
POC IONIZED CALCIUM: 1.18 MMOL/L (ref 1.06–1.42)
POC IONIZED CALCIUM: 1.18 MMOL/L (ref 1.06–1.42)
POC SATURATED O2: 100 % (ref 95–100)
POC SATURATED O2: 94 % (ref 95–100)
POC SATURATED O2: 95 % (ref 95–100)
POC SATURATED O2: 95 % (ref 95–100)
POC SATURATED O2: 96 % (ref 95–100)
POC SATURATED O2: 97 % (ref 95–100)
POC SATURATED O2: 99 % (ref 95–100)
POC TCO2: 20 MMOL/L (ref 23–27)
POC TCO2: 21 MMOL/L (ref 23–27)
POC TCO2: 21 MMOL/L (ref 23–27)
POC TCO2: 22 MMOL/L (ref 23–27)
POC TCO2: 23 MMOL/L (ref 23–27)
POC TCO2: 23 MMOL/L (ref 23–27)
POC TCO2: 24 MMOL/L (ref 23–27)
POC TCO2: 26 MMOL/L (ref 23–27)
POC TCO2: 26 MMOL/L (ref 23–27)
POC TCO2: 27 MMOL/L (ref 23–27)
POC TCO2: 28 MMOL/L (ref 23–27)
POCT GLUCOSE: 112 MG/DL (ref 70–110)
POCT GLUCOSE: 128 MG/DL (ref 70–110)
POCT GLUCOSE: 163 MG/DL (ref 70–110)
POTASSIUM BLD-SCNC: 3.4 MMOL/L (ref 3.5–5.1)
POTASSIUM BLD-SCNC: 3.6 MMOL/L (ref 3.5–5.1)
POTASSIUM BLD-SCNC: 3.8 MMOL/L (ref 3.5–5.1)
POTASSIUM BLD-SCNC: 3.9 MMOL/L (ref 3.5–5.1)
POTASSIUM BLD-SCNC: 3.9 MMOL/L (ref 3.5–5.1)
POTASSIUM BLD-SCNC: 4 MMOL/L (ref 3.5–5.1)
POTASSIUM BLD-SCNC: 4.2 MMOL/L (ref 3.5–5.1)
POTASSIUM BLD-SCNC: 4.2 MMOL/L (ref 3.5–5.1)
POTASSIUM BLD-SCNC: 4.4 MMOL/L (ref 3.5–5.1)
POTASSIUM BLD-SCNC: 4.6 MMOL/L (ref 3.5–5.1)
POTASSIUM BLD-SCNC: 4.7 MMOL/L (ref 3.5–5.1)
POTASSIUM SERPL-SCNC: 3.6 MMOL/L (ref 3.5–5.1)
POTASSIUM SERPL-SCNC: 3.6 MMOL/L (ref 3.5–5.1)
POTASSIUM SERPL-SCNC: 3.9 MMOL/L (ref 3.5–5.1)
POTASSIUM SERPL-SCNC: 4.1 MMOL/L (ref 3.5–5.1)
POTASSIUM SERPL-SCNC: 4.4 MMOL/L (ref 3.5–5.1)
PROT SERPL-MCNC: 4.6 G/DL (ref 6–8.4)
PROT SERPL-MCNC: 6.5 G/DL (ref 6–8.4)
PS: 10
RBC # BLD AUTO: 2.61 M/UL (ref 4–5.4)
RBC # BLD AUTO: 3.16 M/UL (ref 4–5.4)
RBC # BLD AUTO: 3.23 M/UL (ref 4–5.4)
RBC # BLD AUTO: 4.55 M/UL (ref 4–5.4)
RBC # BLD AUTO: 4.55 M/UL (ref 4–5.4)
SAMPLE: ABNORMAL
SAMPLE: NORMAL
SITE: ABNORMAL
SODIUM BLD-SCNC: 137 MMOL/L (ref 136–145)
SODIUM BLD-SCNC: 139 MMOL/L (ref 136–145)
SODIUM BLD-SCNC: 139 MMOL/L (ref 136–145)
SODIUM BLD-SCNC: 140 MMOL/L (ref 136–145)
SODIUM BLD-SCNC: 143 MMOL/L (ref 136–145)
SODIUM BLD-SCNC: 143 MMOL/L (ref 136–145)
SODIUM BLD-SCNC: 144 MMOL/L (ref 136–145)
SODIUM BLD-SCNC: 144 MMOL/L (ref 136–145)
SODIUM BLD-SCNC: 146 MMOL/L (ref 136–145)
SODIUM BLD-SCNC: 147 MMOL/L (ref 136–145)
SODIUM BLD-SCNC: 147 MMOL/L (ref 136–145)
SODIUM BLD-SCNC: 148 MMOL/L (ref 136–145)
SODIUM BLD-SCNC: 148 MMOL/L (ref 136–145)
SODIUM SERPL-SCNC: 139 MMOL/L (ref 136–145)
SODIUM SERPL-SCNC: 142 MMOL/L (ref 136–145)
SODIUM SERPL-SCNC: 146 MMOL/L (ref 136–145)
SODIUM SERPL-SCNC: 147 MMOL/L (ref 136–145)
SP02: 97
VT: 500
VT: 500
VT: 550
WBC # BLD AUTO: 10.37 K/UL (ref 3.9–12.7)
WBC # BLD AUTO: 13.45 K/UL (ref 3.9–12.7)
WBC # BLD AUTO: 16.19 K/UL (ref 3.9–12.7)
WBC # BLD AUTO: 9.31 K/UL (ref 3.9–12.7)
WBC # BLD AUTO: 9.31 K/UL (ref 3.9–12.7)

## 2024-09-06 PROCEDURE — 93005 ELECTROCARDIOGRAM TRACING: CPT | Performed by: INTERNAL MEDICINE

## 2024-09-06 PROCEDURE — 82330 ASSAY OF CALCIUM: CPT

## 2024-09-06 PROCEDURE — 86920 COMPATIBILITY TEST SPIN: CPT | Performed by: PHYSICIAN ASSISTANT

## 2024-09-06 PROCEDURE — 85027 COMPLETE CBC AUTOMATED: CPT | Mod: 91 | Performed by: THORACIC SURGERY (CARDIOTHORACIC VASCULAR SURGERY)

## 2024-09-06 PROCEDURE — 83735 ASSAY OF MAGNESIUM: CPT | Mod: 91 | Performed by: HOSPITALIST

## 2024-09-06 PROCEDURE — 06BQ4ZZ EXCISION OF LEFT SAPHENOUS VEIN, PERCUTANEOUS ENDOSCOPIC APPROACH: ICD-10-PCS | Performed by: THORACIC SURGERY (CARDIOTHORACIC VASCULAR SURGERY)

## 2024-09-06 PROCEDURE — 63600175 PHARM REV CODE 636 W HCPCS: Performed by: NURSE ANESTHETIST, CERTIFIED REGISTERED

## 2024-09-06 PROCEDURE — 85027 COMPLETE CBC AUTOMATED: CPT | Performed by: HOSPITALIST

## 2024-09-06 PROCEDURE — 25000003 PHARM REV CODE 250

## 2024-09-06 PROCEDURE — P9045 ALBUMIN (HUMAN), 5%, 250 ML: HCPCS | Mod: JZ,JG | Performed by: THORACIC SURGERY (CARDIOTHORACIC VASCULAR SURGERY)

## 2024-09-06 PROCEDURE — 63600175 PHARM REV CODE 636 W HCPCS: Performed by: THORACIC SURGERY (CARDIOTHORACIC VASCULAR SURGERY)

## 2024-09-06 PROCEDURE — 37000009 HC ANESTHESIA EA ADD 15 MINS: Performed by: THORACIC SURGERY (CARDIOTHORACIC VASCULAR SURGERY)

## 2024-09-06 PROCEDURE — 84132 ASSAY OF SERUM POTASSIUM: CPT

## 2024-09-06 PROCEDURE — P9016 RBC LEUKOCYTES REDUCED: HCPCS | Performed by: HOSPITALIST

## 2024-09-06 PROCEDURE — 83735 ASSAY OF MAGNESIUM: CPT | Mod: 91 | Performed by: THORACIC SURGERY (CARDIOTHORACIC VASCULAR SURGERY)

## 2024-09-06 PROCEDURE — 99900035 HC TECH TIME PER 15 MIN (STAT)

## 2024-09-06 PROCEDURE — 021209W BYPASS CORONARY ARTERY, THREE ARTERIES FROM AORTA WITH AUTOLOGOUS VENOUS TISSUE, OPEN APPROACH: ICD-10-PCS | Performed by: THORACIC SURGERY (CARDIOTHORACIC VASCULAR SURGERY)

## 2024-09-06 PROCEDURE — 85014 HEMATOCRIT: CPT

## 2024-09-06 PROCEDURE — 80053 COMPREHEN METABOLIC PANEL: CPT | Mod: 91 | Performed by: THORACIC SURGERY (CARDIOTHORACIC VASCULAR SURGERY)

## 2024-09-06 PROCEDURE — 80048 BASIC METABOLIC PNL TOTAL CA: CPT | Performed by: HOSPITALIST

## 2024-09-06 PROCEDURE — 02100Z9 BYPASS CORONARY ARTERY, ONE ARTERY FROM LEFT INTERNAL MAMMARY, OPEN APPROACH: ICD-10-PCS | Performed by: THORACIC SURGERY (CARDIOTHORACIC VASCULAR SURGERY)

## 2024-09-06 PROCEDURE — 02100AW BYPASS CORONARY ARTERY, ONE ARTERY FROM AORTA WITH AUTOLOGOUS ARTERIAL TISSUE, OPEN APPROACH: ICD-10-PCS | Performed by: THORACIC SURGERY (CARDIOTHORACIC VASCULAR SURGERY)

## 2024-09-06 PROCEDURE — C1768 GRAFT, VASCULAR: HCPCS | Performed by: THORACIC SURGERY (CARDIOTHORACIC VASCULAR SURGERY)

## 2024-09-06 PROCEDURE — 63600175 PHARM REV CODE 636 W HCPCS

## 2024-09-06 PROCEDURE — 25000242 PHARM REV CODE 250 ALT 637 W/ HCPCS: Performed by: PHYSICIAN ASSISTANT

## 2024-09-06 PROCEDURE — 63600175 PHARM REV CODE 636 W HCPCS: Mod: JZ,JG | Performed by: THORACIC SURGERY (CARDIOTHORACIC VASCULAR SURGERY)

## 2024-09-06 PROCEDURE — 37799 UNLISTED PX VASCULAR SURGERY: CPT

## 2024-09-06 PROCEDURE — 99900017 HC EXTUBATION W/PARAMETERS (STAT)

## 2024-09-06 PROCEDURE — 93010 ELECTROCARDIOGRAM REPORT: CPT | Mod: ,,, | Performed by: INTERNAL MEDICINE

## 2024-09-06 PROCEDURE — 36430 TRANSFUSION BLD/BLD COMPNT: CPT

## 2024-09-06 PROCEDURE — 94799 UNLISTED PULMONARY SVC/PX: CPT

## 2024-09-06 PROCEDURE — 27201423 OPTIME MED/SURG SUP & DEVICES STERILE SUPPLY: Performed by: THORACIC SURGERY (CARDIOTHORACIC VASCULAR SURGERY)

## 2024-09-06 PROCEDURE — 25000003 PHARM REV CODE 250: Performed by: THORACIC SURGERY (CARDIOTHORACIC VASCULAR SURGERY)

## 2024-09-06 PROCEDURE — 76937 US GUIDE VASCULAR ACCESS: CPT | Performed by: STUDENT IN AN ORGANIZED HEALTH CARE EDUCATION/TRAINING PROGRAM

## 2024-09-06 PROCEDURE — 27100171 HC OXYGEN HIGH FLOW UP TO 24 HOURS

## 2024-09-06 PROCEDURE — 94761 N-INVAS EAR/PLS OXIMETRY MLT: CPT | Mod: XB

## 2024-09-06 PROCEDURE — 36415 COLL VENOUS BLD VENIPUNCTURE: CPT | Performed by: STUDENT IN AN ORGANIZED HEALTH CARE EDUCATION/TRAINING PROGRAM

## 2024-09-06 PROCEDURE — S0017 INJECTION, AMINOCAPROIC ACID: HCPCS

## 2024-09-06 PROCEDURE — 84100 ASSAY OF PHOSPHORUS: CPT | Performed by: THORACIC SURGERY (CARDIOTHORACIC VASCULAR SURGERY)

## 2024-09-06 PROCEDURE — 84295 ASSAY OF SERUM SODIUM: CPT

## 2024-09-06 PROCEDURE — 94640 AIRWAY INHALATION TREATMENT: CPT

## 2024-09-06 PROCEDURE — 36000713 HC OR TIME LEV V EA ADD 15 MIN: Performed by: THORACIC SURGERY (CARDIOTHORACIC VASCULAR SURGERY)

## 2024-09-06 PROCEDURE — 5A1221Z PERFORMANCE OF CARDIAC OUTPUT, CONTINUOUS: ICD-10-PCS | Performed by: THORACIC SURGERY (CARDIOTHORACIC VASCULAR SURGERY)

## 2024-09-06 PROCEDURE — 85025 COMPLETE CBC W/AUTO DIFF WBC: CPT | Performed by: STUDENT IN AN ORGANIZED HEALTH CARE EDUCATION/TRAINING PROGRAM

## 2024-09-06 PROCEDURE — 36000712 HC OR TIME LEV V 1ST 15 MIN: Performed by: THORACIC SURGERY (CARDIOTHORACIC VASCULAR SURGERY)

## 2024-09-06 PROCEDURE — 86920 COMPATIBILITY TEST SPIN: CPT | Performed by: HOSPITALIST

## 2024-09-06 PROCEDURE — C1894 INTRO/SHEATH, NON-LASER: HCPCS | Performed by: THORACIC SURGERY (CARDIOTHORACIC VASCULAR SURGERY)

## 2024-09-06 PROCEDURE — 94002 VENT MGMT INPAT INIT DAY: CPT

## 2024-09-06 PROCEDURE — 80053 COMPREHEN METABOLIC PANEL: CPT

## 2024-09-06 PROCEDURE — 37000008 HC ANESTHESIA 1ST 15 MINUTES: Performed by: THORACIC SURGERY (CARDIOTHORACIC VASCULAR SURGERY)

## 2024-09-06 PROCEDURE — 99900031 HC PATIENT EDUCATION (STAT)

## 2024-09-06 PROCEDURE — 82803 BLOOD GASES ANY COMBINATION: CPT

## 2024-09-06 PROCEDURE — 85730 THROMBOPLASTIN TIME PARTIAL: CPT | Performed by: STUDENT IN AN ORGANIZED HEALTH CARE EDUCATION/TRAINING PROGRAM

## 2024-09-06 PROCEDURE — 20000000 HC ICU ROOM

## 2024-09-06 PROCEDURE — C1729 CATH, DRAINAGE: HCPCS | Performed by: THORACIC SURGERY (CARDIOTHORACIC VASCULAR SURGERY)

## 2024-09-06 PROCEDURE — 03BC0ZZ EXCISION OF LEFT RADIAL ARTERY, OPEN APPROACH: ICD-10-PCS | Performed by: THORACIC SURGERY (CARDIOTHORACIC VASCULAR SURGERY)

## 2024-09-06 RX ORDER — SODIUM BICARBONATE 1 MEQ/ML
100 SYRINGE (ML) INTRAVENOUS ONCE
Status: COMPLETED | OUTPATIENT
Start: 2024-09-06 | End: 2024-09-06

## 2024-09-06 RX ORDER — AMINOCAPROIC ACID 250 MG/ML
INJECTION, SOLUTION INTRAVENOUS
Status: DISCONTINUED | OUTPATIENT
Start: 2024-09-06 | End: 2024-09-06

## 2024-09-06 RX ORDER — FENTANYL CITRATE 50 UG/ML
100 INJECTION, SOLUTION INTRAMUSCULAR; INTRAVENOUS ONCE
Status: COMPLETED | OUTPATIENT
Start: 2024-09-06 | End: 2024-09-06

## 2024-09-06 RX ORDER — MORPHINE SULFATE 4 MG/ML
2 INJECTION, SOLUTION INTRAMUSCULAR; INTRAVENOUS
Status: DISCONTINUED | OUTPATIENT
Start: 2024-09-06 | End: 2024-09-12 | Stop reason: HOSPADM

## 2024-09-06 RX ORDER — SODIUM CHLORIDE 450 MG/100ML
INJECTION, SOLUTION INTRAVENOUS CONTINUOUS
Status: DISCONTINUED | OUTPATIENT
Start: 2024-09-06 | End: 2024-09-11

## 2024-09-06 RX ORDER — CALCIUM GLUCONATE 20 MG/ML
1 INJECTION, SOLUTION INTRAVENOUS
Status: DISCONTINUED | OUTPATIENT
Start: 2024-09-06 | End: 2024-09-12 | Stop reason: HOSPADM

## 2024-09-06 RX ORDER — VECURONIUM BROMIDE 1 MG/ML
INJECTION, POWDER, LYOPHILIZED, FOR SOLUTION INTRAVENOUS
Status: DISCONTINUED | OUTPATIENT
Start: 2024-09-06 | End: 2024-09-06

## 2024-09-06 RX ORDER — CEFAZOLIN SODIUM 1 G/3ML
INJECTION, POWDER, FOR SOLUTION INTRAMUSCULAR; INTRAVENOUS
Status: DISCONTINUED | OUTPATIENT
Start: 2024-09-06 | End: 2024-09-06

## 2024-09-06 RX ORDER — INDOMETHACIN 25 MG/1
50 CAPSULE ORAL ONCE
Status: COMPLETED | OUTPATIENT
Start: 2024-09-06 | End: 2024-09-06

## 2024-09-06 RX ORDER — CALCIUM GLUCONATE 20 MG/ML
3 INJECTION, SOLUTION INTRAVENOUS
Status: DISCONTINUED | OUTPATIENT
Start: 2024-09-06 | End: 2024-09-12 | Stop reason: HOSPADM

## 2024-09-06 RX ORDER — ALBUMIN HUMAN 50 G/1000ML
25 SOLUTION INTRAVENOUS
Status: COMPLETED | OUTPATIENT
Start: 2024-09-06 | End: 2024-09-06

## 2024-09-06 RX ORDER — SODIUM BICARBONATE 1 MEQ/ML
50 SYRINGE (ML) INTRAVENOUS ONCE
Status: COMPLETED | OUTPATIENT
Start: 2024-09-06 | End: 2024-09-06

## 2024-09-06 RX ORDER — POTASSIUM CHLORIDE 29.8 MG/ML
40 INJECTION INTRAVENOUS
Status: DISCONTINUED | OUTPATIENT
Start: 2024-09-06 | End: 2024-09-12 | Stop reason: HOSPADM

## 2024-09-06 RX ORDER — CEFAZOLIN SODIUM 2 G/50ML
2 SOLUTION INTRAVENOUS
Status: COMPLETED | OUTPATIENT
Start: 2024-09-06 | End: 2024-09-07

## 2024-09-06 RX ORDER — POTASSIUM CHLORIDE 14.9 MG/ML
20 INJECTION INTRAVENOUS
Status: DISCONTINUED | OUTPATIENT
Start: 2024-09-06 | End: 2024-09-12 | Stop reason: HOSPADM

## 2024-09-06 RX ORDER — PROTAMINE SULFATE 10 MG/ML
INJECTION, SOLUTION INTRAVENOUS
Status: DISCONTINUED | OUTPATIENT
Start: 2024-09-06 | End: 2024-09-06

## 2024-09-06 RX ORDER — MIDAZOLAM HYDROCHLORIDE 1 MG/ML
INJECTION INTRAMUSCULAR; INTRAVENOUS
Status: DISCONTINUED | OUTPATIENT
Start: 2024-09-06 | End: 2024-09-06

## 2024-09-06 RX ORDER — MAGNESIUM SULFATE HEPTAHYDRATE 40 MG/ML
4 INJECTION, SOLUTION INTRAVENOUS
Status: DISCONTINUED | OUTPATIENT
Start: 2024-09-06 | End: 2024-09-12 | Stop reason: HOSPADM

## 2024-09-06 RX ORDER — FENTANYL CITRATE 50 UG/ML
INJECTION, SOLUTION INTRAMUSCULAR; INTRAVENOUS
Status: COMPLETED
Start: 2024-09-06 | End: 2024-09-06

## 2024-09-06 RX ORDER — IPRATROPIUM BROMIDE AND ALBUTEROL SULFATE 2.5; .5 MG/3ML; MG/3ML
3 SOLUTION RESPIRATORY (INHALATION) EVERY 6 HOURS
Status: DISCONTINUED | OUTPATIENT
Start: 2024-09-06 | End: 2024-09-09

## 2024-09-06 RX ORDER — ONDANSETRON HYDROCHLORIDE 2 MG/ML
4 INJECTION, SOLUTION INTRAVENOUS EVERY 6 HOURS PRN
Status: DISCONTINUED | OUTPATIENT
Start: 2024-09-06 | End: 2024-09-12 | Stop reason: HOSPADM

## 2024-09-06 RX ORDER — MAGNESIUM SULFATE HEPTAHYDRATE 40 MG/ML
2 INJECTION, SOLUTION INTRAVENOUS
Status: DISCONTINUED | OUTPATIENT
Start: 2024-09-06 | End: 2024-09-12 | Stop reason: HOSPADM

## 2024-09-06 RX ORDER — DOCUSATE SODIUM 100 MG/1
100 CAPSULE, LIQUID FILLED ORAL 2 TIMES DAILY
Status: DISCONTINUED | OUTPATIENT
Start: 2024-09-07 | End: 2024-09-12 | Stop reason: HOSPADM

## 2024-09-06 RX ORDER — LIDOCAINE HYDROCHLORIDE 20 MG/ML
INJECTION INTRAVENOUS
Status: DISCONTINUED | OUTPATIENT
Start: 2024-09-06 | End: 2024-09-06

## 2024-09-06 RX ORDER — HYDROCODONE BITARTRATE AND ACETAMINOPHEN 500; 5 MG/1; MG/1
TABLET ORAL
Status: DISCONTINUED | OUTPATIENT
Start: 2024-09-06 | End: 2024-09-12 | Stop reason: HOSPADM

## 2024-09-06 RX ORDER — ALBUMIN HUMAN 50 G/1000ML
25 SOLUTION INTRAVENOUS
Status: DISCONTINUED | OUTPATIENT
Start: 2024-09-06 | End: 2024-09-12 | Stop reason: HOSPADM

## 2024-09-06 RX ORDER — SODIUM BICARBONATE 1 MEQ/ML
SYRINGE (ML) INTRAVENOUS
Status: COMPLETED
Start: 2024-09-06 | End: 2024-09-06

## 2024-09-06 RX ORDER — SODIUM CHLORIDE 9 MG/ML
INJECTION, SOLUTION INTRAVENOUS CONTINUOUS PRN
Status: DISCONTINUED | OUTPATIENT
Start: 2024-09-06 | End: 2024-09-06

## 2024-09-06 RX ORDER — MIDAZOLAM HYDROCHLORIDE 2 MG/2ML
INJECTION, SOLUTION INTRAMUSCULAR; INTRAVENOUS
Status: COMPLETED
Start: 2024-09-06 | End: 2024-09-06

## 2024-09-06 RX ORDER — PHENYLEPHRINE HYDROCHLORIDE 10 MG/ML
INJECTION INTRAVENOUS
Status: DISCONTINUED | OUTPATIENT
Start: 2024-09-06 | End: 2024-09-06

## 2024-09-06 RX ORDER — ROCURONIUM BROMIDE 10 MG/ML
INJECTION, SOLUTION INTRAVENOUS
Status: DISCONTINUED | OUTPATIENT
Start: 2024-09-06 | End: 2024-09-06

## 2024-09-06 RX ORDER — CALCIUM GLUCONATE 20 MG/ML
2 INJECTION, SOLUTION INTRAVENOUS
Status: DISCONTINUED | OUTPATIENT
Start: 2024-09-06 | End: 2024-09-12 | Stop reason: HOSPADM

## 2024-09-06 RX ORDER — HEPARIN SODIUM 10000 [USP'U]/ML
INJECTION, SOLUTION INTRAVENOUS; SUBCUTANEOUS
Status: DISCONTINUED | OUTPATIENT
Start: 2024-09-06 | End: 2024-09-06 | Stop reason: HOSPADM

## 2024-09-06 RX ORDER — FENTANYL CITRATE 50 UG/ML
INJECTION, SOLUTION INTRAMUSCULAR; INTRAVENOUS
Status: DISCONTINUED | OUTPATIENT
Start: 2024-09-06 | End: 2024-09-06

## 2024-09-06 RX ORDER — ETOMIDATE 2 MG/ML
INJECTION INTRAVENOUS
Status: DISCONTINUED | OUTPATIENT
Start: 2024-09-06 | End: 2024-09-06

## 2024-09-06 RX ORDER — MIDAZOLAM HYDROCHLORIDE 2 MG/2ML
2 INJECTION, SOLUTION INTRAMUSCULAR; INTRAVENOUS ONCE
Status: COMPLETED | OUTPATIENT
Start: 2024-09-06 | End: 2024-09-06

## 2024-09-06 RX ORDER — VANCOMYCIN HYDROCHLORIDE 500 MG/10ML
INJECTION, POWDER, LYOPHILIZED, FOR SOLUTION INTRAVENOUS
Status: DISCONTINUED | OUTPATIENT
Start: 2024-09-06 | End: 2024-09-06 | Stop reason: HOSPADM

## 2024-09-06 RX ORDER — MIDAZOLAM HYDROCHLORIDE 2 MG/2ML
1 INJECTION, SOLUTION INTRAMUSCULAR; INTRAVENOUS
Status: ACTIVE | OUTPATIENT
Start: 2024-09-06 | End: 2024-09-07

## 2024-09-06 RX ORDER — HYDROCODONE BITARTRATE AND ACETAMINOPHEN 5; 325 MG/1; MG/1
1 TABLET ORAL EVERY 4 HOURS PRN
Status: DISCONTINUED | OUTPATIENT
Start: 2024-09-06 | End: 2024-09-12 | Stop reason: HOSPADM

## 2024-09-06 RX ORDER — MORPHINE SULFATE 4 MG/ML
4 INJECTION, SOLUTION INTRAMUSCULAR; INTRAVENOUS
Status: DISCONTINUED | OUTPATIENT
Start: 2024-09-06 | End: 2024-09-12 | Stop reason: HOSPADM

## 2024-09-06 RX ORDER — MUPIROCIN 20 MG/G
OINTMENT TOPICAL 2 TIMES DAILY
Status: DISCONTINUED | OUTPATIENT
Start: 2024-09-06 | End: 2024-09-07

## 2024-09-06 RX ORDER — SODIUM BICARBONATE 1 MEQ/ML
SYRINGE (ML) INTRAVENOUS
Status: DISPENSED
Start: 2024-09-06 | End: 2024-09-06

## 2024-09-06 RX ADMIN — VECURONIUM BROMIDE 4 MG: 1 INJECTION, POWDER, LYOPHILIZED, FOR SOLUTION INTRAVENOUS at 09:09

## 2024-09-06 RX ADMIN — FENTANYL CITRATE 250 MCG: 50 INJECTION, SOLUTION INTRAMUSCULAR; INTRAVENOUS at 08:09

## 2024-09-06 RX ADMIN — SODIUM CHLORIDE: 0.9 INJECTION, SOLUTION INTRAVENOUS at 09:09

## 2024-09-06 RX ADMIN — SODIUM BICARBONATE 50 MEQ: 84 INJECTION INTRAVENOUS at 03:09

## 2024-09-06 RX ADMIN — PHENYLEPHRINE HYDROCHLORIDE 200 MCG: 10 INJECTION INTRAVENOUS at 07:09

## 2024-09-06 RX ADMIN — MIDAZOLAM HYDROCHLORIDE 2 MG: 1 INJECTION, SOLUTION INTRAMUSCULAR; INTRAVENOUS at 06:09

## 2024-09-06 RX ADMIN — VECURONIUM BROMIDE 3 MG: 1 INJECTION, POWDER, LYOPHILIZED, FOR SOLUTION INTRAVENOUS at 08:09

## 2024-09-06 RX ADMIN — CEFAZOLIN 2 G: 330 INJECTION, POWDER, FOR SOLUTION INTRAMUSCULAR; INTRAVENOUS at 07:09

## 2024-09-06 RX ADMIN — MUPIROCIN 1 G: 20 OINTMENT TOPICAL at 09:09

## 2024-09-06 RX ADMIN — FENTANYL CITRATE 150 MCG: 50 INJECTION, SOLUTION INTRAMUSCULAR; INTRAVENOUS at 07:09

## 2024-09-06 RX ADMIN — SODIUM BICARBONATE 100 MEQ: 84 INJECTION INTRAVENOUS at 04:09

## 2024-09-06 RX ADMIN — PROTAMINE SULFATE 350 MG: 10 INJECTION, SOLUTION INTRAVENOUS at 10:09

## 2024-09-06 RX ADMIN — ALBUMIN (HUMAN) 12.5 G: 12.5 INJECTION, SOLUTION INTRAVENOUS at 11:09

## 2024-09-06 RX ADMIN — LIDOCAINE HYDROCHLORIDE 100 MG: 20 INJECTION, SOLUTION INTRAVENOUS at 07:09

## 2024-09-06 RX ADMIN — CEFAZOLIN SODIUM 2 G: 2 SOLUTION INTRAVENOUS at 11:09

## 2024-09-06 RX ADMIN — SUGAMMADEX 170 MG: 100 INJECTION, SOLUTION INTRAVENOUS at 02:09

## 2024-09-06 RX ADMIN — ETOMIDATE 12 MG: 2 INJECTION, SOLUTION INTRAVENOUS at 07:09

## 2024-09-06 RX ADMIN — HEPARIN SODIUM: 1000 INJECTION, SOLUTION INTRAVENOUS; SUBCUTANEOUS at 07:09

## 2024-09-06 RX ADMIN — AMINOCAPROIC ACID 5 G: 250 INJECTION, SOLUTION INTRAVENOUS at 11:09

## 2024-09-06 RX ADMIN — MEROPENEM 1 G: 1 INJECTION, POWDER, FOR SOLUTION INTRAVENOUS at 02:09

## 2024-09-06 RX ADMIN — MIDAZOLAM HYDROCHLORIDE 2 MG: 1 INJECTION, SOLUTION INTRAMUSCULAR; INTRAVENOUS at 07:09

## 2024-09-06 RX ADMIN — EPINEPHRINE 0.02 MCG/KG/MIN: 1 INJECTION INTRAMUSCULAR; INTRAVENOUS; SUBCUTANEOUS at 10:09

## 2024-09-06 RX ADMIN — IPRATROPIUM BROMIDE AND ALBUTEROL SULFATE 3 ML: 2.5; .5 SOLUTION RESPIRATORY (INHALATION) at 01:09

## 2024-09-06 RX ADMIN — FENTANYL CITRATE 100 MCG: 50 INJECTION, SOLUTION INTRAMUSCULAR; INTRAVENOUS at 06:09

## 2024-09-06 RX ADMIN — SODIUM PHOSPHATE, MONOBASIC, MONOHYDRATE AND SODIUM PHOSPHATE, DIBASIC, ANHYDROUS 15 MMOL: 142; 276 INJECTION, SOLUTION INTRAVENOUS at 02:09

## 2024-09-06 RX ADMIN — MIDAZOLAM HYDROCHLORIDE 2 MG: 1 INJECTION, SOLUTION INTRAMUSCULAR; INTRAVENOUS at 10:09

## 2024-09-06 RX ADMIN — Medication 100 MEQ: at 04:09

## 2024-09-06 RX ADMIN — MIDAZOLAM HYDROCHLORIDE 2 MG: 1 INJECTION, SOLUTION INTRAMUSCULAR; INTRAVENOUS at 09:09

## 2024-09-06 RX ADMIN — VECURONIUM BROMIDE 2 MG: 1 INJECTION, POWDER, LYOPHILIZED, FOR SOLUTION INTRAVENOUS at 10:09

## 2024-09-06 RX ADMIN — FIBRINOGEN HUMAN, HUMAN THROMBIN: 90; 500 SOLUTION TOPICAL at 07:09

## 2024-09-06 RX ADMIN — SODIUM CHLORIDE 3 UNITS/HR: 9 INJECTION, SOLUTION INTRAVENOUS at 07:09

## 2024-09-06 RX ADMIN — MIDAZOLAM HYDROCHLORIDE 2 MG: 1 INJECTION, SOLUTION INTRAMUSCULAR; INTRAVENOUS at 08:09

## 2024-09-06 RX ADMIN — POTASSIUM CHLORIDE 20 MEQ: 14.9 INJECTION, SOLUTION INTRAVENOUS at 12:09

## 2024-09-06 RX ADMIN — SODIUM BICARBONATE 50 MEQ: 84 INJECTION INTRAVENOUS at 02:09

## 2024-09-06 RX ADMIN — ALBUMIN (HUMAN) 12.5 G: 12.5 INJECTION, SOLUTION INTRAVENOUS at 10:09

## 2024-09-06 RX ADMIN — FENTANYL CITRATE 100 MCG: 50 INJECTION, SOLUTION INTRAMUSCULAR; INTRAVENOUS at 07:09

## 2024-09-06 RX ADMIN — FENTANYL CITRATE 250 MCG: 50 INJECTION, SOLUTION INTRAMUSCULAR; INTRAVENOUS at 10:09

## 2024-09-06 RX ADMIN — MORPHINE SULFATE 2 MG: 4 INJECTION, SOLUTION INTRAMUSCULAR; INTRAVENOUS at 03:09

## 2024-09-06 RX ADMIN — FENTANYL CITRATE 100 MCG: 50 INJECTION, SOLUTION INTRAMUSCULAR; INTRAVENOUS at 11:09

## 2024-09-06 RX ADMIN — SODIUM BICARBONATE: 84 INJECTION, SOLUTION INTRAVENOUS at 07:09

## 2024-09-06 RX ADMIN — CEFAZOLIN SODIUM 2 G: 2 SOLUTION INTRAVENOUS at 03:09

## 2024-09-06 RX ADMIN — GENTAMICIN SULFATE 80 MG: 40 INJECTION, SOLUTION INTRAMUSCULAR; INTRAVENOUS at 07:09

## 2024-09-06 RX ADMIN — MIDAZOLAM HYDROCHLORIDE 2 MG: 2 INJECTION, SOLUTION INTRAMUSCULAR; INTRAVENOUS at 06:09

## 2024-09-06 RX ADMIN — AMINOCAPROIC ACID 5 G: 250 INJECTION, SOLUTION INTRAVENOUS at 07:09

## 2024-09-06 RX ADMIN — ROCURONIUM BROMIDE 50 MG: 10 INJECTION, SOLUTION INTRAVENOUS at 07:09

## 2024-09-06 RX ADMIN — SODIUM CHLORIDE: 0.9 INJECTION, SOLUTION INTRAVENOUS at 07:09

## 2024-09-06 RX ADMIN — ALBUMIN (HUMAN) 25 G: 12.5 SOLUTION INTRAVENOUS at 12:09

## 2024-09-06 RX ADMIN — FENTANYL CITRATE 500 MCG: 50 INJECTION, SOLUTION INTRAMUSCULAR; INTRAVENOUS at 09:09

## 2024-09-06 RX ADMIN — VECURONIUM BROMIDE 4 MG: 1 INJECTION, POWDER, LYOPHILIZED, FOR SOLUTION INTRAVENOUS at 07:09

## 2024-09-06 RX ADMIN — IPRATROPIUM BROMIDE AND ALBUTEROL SULFATE 3 ML: 2.5; .5 SOLUTION RESPIRATORY (INHALATION) at 07:09

## 2024-09-06 RX ADMIN — PHENYLEPHRINE HYDROCHLORIDE 0.5 MCG/KG/MIN: 10 INJECTION INTRAVENOUS at 07:09

## 2024-09-06 RX ADMIN — SODIUM CHLORIDE: 0.45 INJECTION, SOLUTION INTRAVENOUS at 12:09

## 2024-09-06 NOTE — ANESTHESIA PROCEDURE NOTES
Intubation    Date/Time: 9/6/2024 7:18 AM    Performed by: Norma Hayes CRNA  Authorized by: Jefry Dewitt MD    Intubation:     Induction:  Intravenous    Intubated:  Postinduction    Mask Ventilation:  Easy mask    Attempts:  1    Attempted By:  CRNA    Method of Intubation:  Video laryngoscopy    Blade:  Hendricks 3    Laryngeal View Grade: Grade I - full view of cords      Difficult Airway Encountered?: No      Complications:  None    Airway Device:  Oral endotracheal tube    Airway Device Size:  7.5    Style/Cuff Inflation:  Cuffed (inflated to minimal occlusive pressure)    Tube secured:  22    Secured at:  The lips    Placement Verified By:  Capnometry    Complicating Factors:  None    Findings Post-Intubation:  BS equal bilateral and atraumatic/condition of teeth unchanged

## 2024-09-06 NOTE — PLAN OF CARE
S/P Cabg x 5. All pressors off. Albumin 500 cc given. Electrolytes covered. Insulin gtt infusing. HCT 23, 1 PRBC infusing. Pt drowsy. Femoral arterial line in place. Weaning vent.     Problem: Acute Coronary Syndrome  Goal: Normalized Cardiac Rhythm  Outcome: Progressing     Problem: Acute Coronary Syndrome  Goal: Adequate Tissue Perfusion  Outcome: Progressing     Problem: Cardiac Catheterization (Diagnostic/Interventional)  Goal: Optimal Pain Control and Function  Outcome: Progressing

## 2024-09-06 NOTE — ASSESSMENT & PLAN NOTE
Status post angiogram on 08/30 showed severe multivessel CAD with thrombus in the distal RCA.    Currently on heparin drip.  Cardio and Cardiothoracic surgery are following patient, patient is scheduled for coronary artery bypass graft surgery tomorrow.

## 2024-09-06 NOTE — ASSESSMENT & PLAN NOTE
Final urine cultures grew E coli (ESBL species).  Continue meropenem.  Observe contact isolation.

## 2024-09-06 NOTE — TRANSFER OF CARE
"Anesthesia Transfer of Care Note    Patient: Debby Brown    Procedure(s) Performed: Procedure(s) (LRB):  CORONARY ARTERY BYPASS GRAFT (CABG) (N/A)  SURGICAL PROCUREMENT,ARTERY,RADIAL,FOR CABG (Left)  HARVEST-VEIN-ENDOVASCULAR (Left)    Patient location: ICU    Anesthesia Type: general    Transport from OR: Transported from OR intubated on 100% O2 by AMBU with assisted ventilation. Continuous ECG monitoring in transport. Continuous SpO2 monitoring in transport. Continuos invasive BP monitoring in transport. Continuous CVP monitoring in transport. Continuous PA pressure monitoring in transport. Upon arrival to PACU/ICU, patient attached to ventilator and auscultated to confirm bilateral breath sounds and adequate TV    Post pain: adequate analgesia    Post assessment: no apparent anesthetic complications and tolerated procedure well    Post vital signs: stable    Level of consciousness: sedated    Nausea/Vomiting: no nausea/vomiting    Complications: none    Transfer of care protocol was followed      Last vitals: Visit Vitals  /82   Pulse 72   Temp 36.8 °C (98.2 °F) (Oral)   Resp 14   Ht 5' 7" (1.702 m)   Wt 85.2 kg (187 lb 13.3 oz)   SpO2 99%   BMI 29.42 kg/m²     "

## 2024-09-06 NOTE — OP NOTE
Radha dictating with date of service being the 6th of September 2024.    Preoperative diagnosis:  Severe atherosclerotic coronary artery disease status post myocardial infarction.    Postoperative diagnosis: Same.    Operation: Coronary artery bypass grafting x5 using left internal mammary artery to left anterior descending coronary artery, the left radial artery from the aorta to the ramus intermediate coronary artery, and separate segments of saphenous vein from the aorta to the posterior descending coronary artery, from the aorta to the posterolateral branch of the circumflex coronary artery and from the left internal mammary artery to the obtuse marginal coronary artery using a combination of antegrade and retrograde cardioplegia, mild systemic hypothermia, endoscopic vein harvest from the left leg, and insertion of right femoral arterial line with percutaneous Seldinger technique.    Operation in detail:   The patient was prepped and draped in usual sterile fashion.  The saphenous vein was harvested from the left leg with the Terumo endoscopic harvest system.  The side branches were initially controlled with electrocautery.  The vein was excised and retrogradely flushed and found to be satisfactory.  The vein branches were reinforced with hemoclips and silk ties.  The leg was closed in a single layer of the subcutaneous tissue with 2-0 Monocryl stitch and the skin was closed with running 3-0 subcuticular Monocryl stitch.    The right femoral arterial line was placed with percutaneous Seldinger technique.  The femoral artery was punctured and a J-wire advanced.  A dilator and sheath were placed over the wire into the artery.  The wire and dilator were removed leaving the sheath.  The sheath was secured to skin with interrupted silk suture.    The left radial artery was harvested through a longitudinal incision.  It side branches were controlled with hemoclips and low power electrocautery.  The pedicle was  then clamped distally and transected.  It was flushed with a nitroglycerin and Cardene solution.  The artery distally was oversewn with 5 0 Prolene suture.    The arm was partially closed from distal to proximal with running 2 0 Monocryl stitch in the subcutaneous tissue.  The pedicle was clamped at the antecubital fossa and excised.  The artery was flushed with a nitroglycerin Cardene solution.  The proximal radial artery was oversewn with 5 0 Prolene suture.    The arm was closed with a running 2 0 Monocryl stitch in the subcutaneous tissue and the skin was closed with running 3-0 subcuticular Monocryl stitch.    The chest was opened through median sternotomy and the left internal mammary artery was harvested with the cautery and hemoclips.  After heparinization the mammary artery was divided distally and found have satisfactory flow.    The pericardium was incised and retraction sutures placed.  The patient was cannulated using the aorta and right atrial appendage in two-stage cannula for venous drainage.    Antegrade and retrograde cardioplegia lines were placed into the aorta and right atrial free wall and into the coronary sinus respectively.    The patient was placed on cardiopulmonary bypass and cooled to systemic moderate hypothermia.  The cross-clamp was applied and heart arrested initially with antegrade cardioplegia and then intermittently with retrograde cardioplegia.    Once the heart was satisfactorily arrested the bypasses were then done.    Saphenous vein was sewn in end-to-side fashion to the posterior descending coronary artery with running 7 0 Prolene suture.  The graft was measured to length and cut and sewn proximally to the aorta in an end-to-side fashion with running 6 0 Prolene suture.    The vein was then next sewn in an end-to-side fashion to the posterolateral branch of the circumflex coronary artery with running 7 0 Prolene suture.  The graft was measured to length and cut and sewn  proximally to the aorta in an end-to-side fashion with running 6 0 Prolene suture.  The left radial artery was then sewn in end-to-side fashion to the ramus intermediate coronary artery with running 7 0 Prolene suture.  The graft was measured to length and cut and sewn proximally to the loo of the vein graft that proceeded to the distal circumflex system.  This was done in end-to-side fashion with running 7 0 Prolene suture.    The remaining vein was then sewn in end-to-side fashion to the obtuse marginal coronary artery with running 7 0 Prolene suture.    The left internal mammary artery was sewn to the left anterior descending coronary artery in an end-to-side fashion with running 7 0 Prolene suture.  Flow was checked and seemed to be satisfactory.  The pedicle was fixed to the epicardium with interrupted 6 0 Prolene suture.    The vein that went to the obtuse marginal coronary artery was measured to length and cut.  It was then sewn in end-to-side fashion to the left internal mammary artery a few cm proximal to its distal anastomosis to the LAD.  This was done in end-to-side fashion with running 7 0 Prolene suture.    The patient was rewarming during this time and the cross-clamp released.  The patient was weaned from cardiopulmonary bypass once satisfactory hemodynamics were maintained and rewarming complete.    Chest tubes were inserted into the left hemithorax and 2 into the anterior mediastinum through separate stab wounds and secured to skin with 0 silk suture.  Two pacing wires placed in the right ventricle and brought out through separate stab wounds and secured to skin with 2-0 silk suture.    Once the patient was doing well decannulation was performed and the sites secured with their respective pursestrings and oversewn with Prolene suture.    Closure of the sternum was done with interrupted stainless steel wire.  The presternal fascia and subcutaneous tissues were closed with running 0 and 2-0 Monocryl  stitch respectively.    The skin was closed running 3-0 subcuticular Monocryl stitch.    Overall patient tolerated the procedure well and there appeared to be no major obvious intraoperative complications.  Estimated blood loss was 500 cc.  The patient was brought to the ICU in satisfactory condition.

## 2024-09-06 NOTE — PROGRESS NOTES
[facility] Medicine  Progress Note    Patient Name: Debby Brown  MRN: [id number]  Patient Class: IP- Inpatient   Admission Date: 8/30/2024  Length of Stay: 5 days  Attending Physician: Nikos Haile MD  Primary Care Provider: Shawna Costello MD        Subjective:     Principal Problem:NSTEMI (non-ST elevated myocardial infarction)        HPI:  74-year-old female presented to ED for eval of chest pain. pMHx HTN, DM 2, HLD.  Patient reported she has been having back pain that radiates to the front of her chest, stated chest pain his pressure-like and worsens with exertion, relieved by rest.  Patient reported associated shortness of breath and nausea.  Reported these symptoms have been present intermittently for the last several months but have worsened significantly in the last few weeks with more frequent episodes.  Patient was evaluated by her PCP earlier today, she stated she had been out of most of her home meds, including BP meds and oral diabetic agent, for 3-4 weeks, reported to PCP the only medication she was currently taking is her Toujeo.  In ED today, SBP greater than 200 on arrival, improved with nitroglycerin.  Initial troponin 908.  Noted with hyperglycemia, serum glucose 337.  EKG with T-wave changes.  Chest x-ray impression without acute abnormality.  CTA chest abdomen impression with mild cardiomegaly with scattered coronary artery calcifications (predominantly in the LAD and circumflex distribution); calcified plaques in the thoracoabdominal aorta and iliacs with no aneurysm.  ED discussed case with Cardiology, heparin drip initiated.  Admit to hospital medicine for further eval.    Overview/Hospital Course:  Patient with medical history of diabetes, hypertension, hyperlipidemia came in with chest pain and was admitted for NSTEMI.  Status post angiogram on 08/30 showed severe multivessel CAD with thrombus in the distal RCA.  Patient was placed on heparin drip.   Cardiology and Cardiothoracic surgery are following patient.  Troponin were trended. Plan for CABG on 9/6/24 by Dr. Hung. A1c 10.2 , make adjustment on glargine and SSI as needed.  Patient was treated with antibiotic therapy for urinary tract infection.  Final urine culture test confirmed E coli (ESBL species) for which patient received intravenous antibiotic therapy.  Patient was placed on contact isolation protocol.    Interval History:  no chest pain.  No new complaints.  To have CABG tomorrow.    Review of Systems   Respiratory:  Negative for cough and shortness of breath.      Objective:     Vital Signs (Most Recent):  Temp: 98.2 °F (36.8 °C) (09/05/24 1200)  Pulse: 66 (09/05/24 1701)  Resp: 18 (09/05/24 1701)  BP: (!) 98/57 (09/05/24 1701)  SpO2: 100 % (09/05/24 1600) Vital Signs (24h Range):  Temp:  [97.9 °F (36.6 °C)-98.2 °F (36.8 °C)] 98.2 °F (36.8 °C)  Pulse:  [61-76] 66  Resp:  [12-30] 18  SpO2:  [90 %-100 %] 100 %  BP: ()/(55-88) 98/57     Weight: 85.2 kg (187 lb 13.3 oz)  Body mass index is 29.42 kg/m².    Intake/Output Summary (Last 24 hours) at 9/5/2024 1936  Last data filed at 9/5/2024 1800  Gross per 24 hour   Intake 1762.19 ml   Output 1525 ml   Net 237.19 ml         Physical Exam  Constitutional:       General: She is not in acute distress.     Appearance: She is not ill-appearing.   Eyes:      General:         Right eye: No discharge.         Left eye: No discharge.   Neck:      Vascular: No JVD.   Cardiovascular:      Rate and Rhythm: Normal rate and regular rhythm.   Pulmonary:      Effort: Pulmonary effort is normal.      Breath sounds: Normal breath sounds.   Abdominal:      General: Abdomen is flat. Bowel sounds are normal. There is no distension.      Palpations: Abdomen is soft.      Tenderness: There is no abdominal tenderness.   Musculoskeletal:      Right lower leg: No edema.      Left lower leg: No edema.   Skin:     General: Skin is warm and moist.      Findings: No rash.    Neurological:      Mental Status: She is alert and oriented to person, place, and time.   Psychiatric:         Attention and Perception: Attention normal.         Mood and Affect: Mood and affect normal.         Speech: Speech normal.             Significant Labs: All pertinent labs within the past 24 hours have been reviewed.    Significant Imaging: I have reviewed all pertinent imaging results/findings within the past 24 hours.    Assessment/Plan:      * NSTEMI (non-ST elevated myocardial infarction)  Status post angiogram on 08/30 showed severe multivessel CAD with thrombus in the distal RCA.    Currently on heparin drip.  Cardio and Cardiothoracic surgery are following patient, patient is scheduled for coronary artery bypass graft surgery tomorrow.    UTI due to extended-spectrum beta lactamase (ESBL) producing Escherichia coli  Final urine cultures grew E coli (ESBL species).  Continue meropenem.  Observe contact isolation.      Stage 3a chronic kidney disease  Creatine stable for now. CMP reviewed- noted Estimated Creatinine Clearance: 46.8 mL/min (based on SCr of 1.2 mg/dL). according to latest data. Based on current GFR, CKD stage is stage 3 - GFR 30-59.  Monitor UOP and serial CMP and adjust therapy as needed. Renally dose meds. Avoid nephrotoxic medications and procedures.    Type 2 diabetes mellitus with microalbuminuria, with long-term current use of insulin  Patient's FSGs are controlled on current medication regimen.  Last A1c reviewed-   Lab Results   Component Value Date    LABA1C 9.2 (H) 07/03/2018    HGBA1C 10.2 (H) 08/30/2024     Most recent fingerstick glucose reviewed-   Recent Labs   Lab 09/04/24  2157 09/05/24  1100 09/05/24  1651   POCTGLUCOSE 210* 234* 207*     Current correctional scale  Medium  Maintain anti-hyperglycemic dose as follows-   Antihyperglycemics (From admission, onward)      Start     Stop Route Frequency Ordered    09/06/24 0700  insulin regular in 0.9 % NaCl 100 unit/100  mL (1 unit/mL) 1 Units/mL in 100 mL infusion         -- IV On Call Procedure 09/05/24 1503    09/01/24 1645  insulin glargine U-100 (Lantus) pen 10 Units         -- SubQ Daily 09/01/24 1635    08/31/24 0946  insulin aspart U-100 pen 0-10 Units         -- SubQ Every 6 hours PRN 08/31/24 0946          Hold Oral hypoglycemics while patient is in the hospital.        Hyperlipidemia  Continue atorvastatin  Lipid panel reviewed.        VTE Risk Mitigation (From admission, onward)           Ordered     lactated ringers 1,000 mL with heparin (porcine) 6,000 Units irrigation  Once         09/05/24 1503     lactated ringers 1,000 mL with heparin (porcine) 6,000 Units irrigation  Once         09/05/24 1503     heparin 25,000 units in dextrose 5% (100 units/ml) IV bolus from bag LOW INTENSITY nomogram - [facility]  As needed (PRN)        Question:  Heparin Infusion Adjustment (DO NOT MODIFY ANSWER)  Answer:  \\[email or web address]\epic\Images\Pharmacy\HeparinInfusions\heparin LOW INTENSITY nomogram for [facility] [id number].pdf    08/30/24 1829     heparin 25,000 units in dextrose 5% (100 units/ml) IV bolus from bag LOW INTENSITY nomogram - [facility]  As needed (PRN)        Question:  Heparin Infusion Adjustment (DO NOT MODIFY ANSWER)  Answer:  \\[email or web address]\epic\Images\Pharmacy\HeparinInfusions\heparin LOW INTENSITY nomogram for [facility] [id number].pdf    08/30/24 1829     heparin 25,000 units in dextrose 5% 250 mL (100 units/mL) infusion LOW INTENSITY nomogram - [facility]  Continuous        Question:  Begin at (units/kg/hr)  Answer:  12    08/30/24 1829     Reason for No Pharmacological VTE Prophylaxis  Once        Question:  Reasons:  Answer:  IV Heparin w/in 24 hrs. Pre or Post-Op    08/30/24 1646     IP VTE HIGH RISK PATIENT  Once         08/30/24 1646     Place sequential compression device  Until discontinued         08/30/24 1646                    Discharge Planning   MARTHA: 9/11/2024     Code Status: Full Code   Is the patient medically ready for discharge?:      Reason for patient still in hospital (select all that apply): Patient trending condition and Treatment  Discharge Plan A: Home with family   Discharge Delays: None known at this time            Nikos Haile MD  Department of Hospital Medicine   [facility]

## 2024-09-06 NOTE — ANESTHESIA PROCEDURE NOTES
Arterial    Diagnosis: CAD    Patient location during procedure: ICU  Timeout: 9/6/2024 6:45 AM  Procedure end time: 9/6/2024 6:50 AM    Staffing  Authorizing Provider: Jefry Dewitt MD  Performing Provider: Jefry Dewitt MD    Staffing  Performed by: Jefry Dewitt MD  Authorized by: Jefry Dewitt MD    Anesthesiologist was present at the time of the procedure.    Preanesthetic Checklist  Completed: patient identified, IV checked, site marked, risks and benefits discussed, surgical consent, monitors and equipment checked, pre-op evaluation, timeout performed and anesthesia consent givenArterial  Skin Prep: chlorhexidine gluconate  Local Infiltration: lidocaine  Orientation: right  Location: radial    Catheter Size: 20 G  Catheter placement by Ultrasound guidance. Heme positive aspiration all ports.   Vessel Caliber: small, patent, compressibility normal  Needle advanced into vessel with real time Ultrasound guidance.  Sterile sheath used.Insertion Attempts: 1  Assessment  Dressing: secured with tape and tegaderm and sutured in place and taped  Patient: Tolerated well

## 2024-09-06 NOTE — ASSESSMENT & PLAN NOTE
Patient's FSGs are controlled on current medication regimen.  Last A1c reviewed-   Lab Results   Component Value Date    LABA1C 9.2 (H) 07/03/2018    HGBA1C 10.2 (H) 08/30/2024     Most recent fingerstick glucose reviewed-   Recent Labs   Lab 09/04/24  2157 09/05/24  1100 09/05/24  1651   POCTGLUCOSE 210* 234* 207*     Current correctional scale  Medium  Maintain anti-hyperglycemic dose as follows-   Antihyperglycemics (From admission, onward)      Start     Stop Route Frequency Ordered    09/06/24 0700  insulin regular in 0.9 % NaCl 100 unit/100 mL (1 unit/mL) 1 Units/mL in 100 mL infusion         -- IV On Call Procedure 09/05/24 1503    09/01/24 1645  insulin glargine U-100 (Lantus) pen 10 Units         -- SubQ Daily 09/01/24 1635    08/31/24 0946  insulin aspart U-100 pen 0-10 Units         -- SubQ Every 6 hours PRN 08/31/24 0946          Hold Oral hypoglycemics while patient is in the hospital.

## 2024-09-06 NOTE — SUBJECTIVE & OBJECTIVE
Interval History:  no chest pain.  No new complaints.  To have CABG tomorrow.    Review of Systems   Respiratory:  Negative for cough and shortness of breath.      Objective:     Vital Signs (Most Recent):  Temp: 98.2 °F (36.8 °C) (09/05/24 1200)  Pulse: 66 (09/05/24 1701)  Resp: 18 (09/05/24 1701)  BP: (!) 98/57 (09/05/24 1701)  SpO2: 100 % (09/05/24 1600) Vital Signs (24h Range):  Temp:  [97.9 °F (36.6 °C)-98.2 °F (36.8 °C)] 98.2 °F (36.8 °C)  Pulse:  [61-76] 66  Resp:  [12-30] 18  SpO2:  [90 %-100 %] 100 %  BP: ()/(55-88) 98/57     Weight: 85.2 kg (187 lb 13.3 oz)  Body mass index is 29.42 kg/m².    Intake/Output Summary (Last 24 hours) at 9/5/2024 1936  Last data filed at 9/5/2024 1800  Gross per 24 hour   Intake 1762.19 ml   Output 1525 ml   Net 237.19 ml         Physical Exam  Constitutional:       General: She is not in acute distress.     Appearance: She is not ill-appearing.   Eyes:      General:         Right eye: No discharge.         Left eye: No discharge.   Neck:      Vascular: No JVD.   Cardiovascular:      Rate and Rhythm: Normal rate and regular rhythm.   Pulmonary:      Effort: Pulmonary effort is normal.      Breath sounds: Normal breath sounds.   Abdominal:      General: Abdomen is flat. Bowel sounds are normal. There is no distension.      Palpations: Abdomen is soft.      Tenderness: There is no abdominal tenderness.   Musculoskeletal:      Right lower leg: No edema.      Left lower leg: No edema.   Skin:     General: Skin is warm and moist.      Findings: No rash.   Neurological:      Mental Status: She is alert and oriented to person, place, and time.   Psychiatric:         Attention and Perception: Attention normal.         Mood and Affect: Mood and affect normal.         Speech: Speech normal.             Significant Labs: All pertinent labs within the past 24 hours have been reviewed.    Significant Imaging: I have reviewed all pertinent imaging results/findings within the past 24  hours.

## 2024-09-06 NOTE — ANESTHESIA PROCEDURE NOTES
Central Line    Diagnosis: CAD  Patient location during procedure: ICU  Procedure Urgency: Routine  Procedure start time: 9/6/2024 6:10 AM  Timeout: 9/6/2024 6:10 AM  Procedure end time: 9/6/2024 6:45 AM      Staffing  Authorizing Provider: Jefry Dewitt MD  Performing Provider: Jefry Dewitt MD    Staffing  Performed: anesthesiologist   Anesthesiologist: Jefry Dewitt MD  Performed by: Jefry Dewitt MD  Authorized by: Jefry Dewitt MD    Anesthesiologist was present at the time of the procedure.  Preanesthetic Checklist  Completed: patient identified, IV checked, site marked, risks and benefits discussed, surgical consent, monitors and equipment checked, pre-op evaluation, timeout performed and anesthesia consent given  Indication   Indication: hemodynamic monitoring, vascular access, med administration     Anesthesia   local infiltration    Central Line   Skin Prep: skin prepped with ChloraPrep, skin prep agent completely dried prior to procedure  Sterile Barriers Followed: Yes    All five maximal barriers used- gloves, gown, cap, mask, and large sterile sheet    hand hygiene performed prior to central venous catheter insertion  Location: left internal jugular.   Catheter type: triple lumen  Catheter Size: 7 Fr  Inserted Catheter Length: 20 cm  Ultrasound: vascular probe with ultrasound   Vessel Caliber: small, patent, compressibility normal  Needle advanced into vessel with real time Ultrasound guidance.  Guidewire confirmed in vessel.  Image recorded and saved.  sterile gel and probe cover used in ultrasound-guided central venous catheter insertion  Manometry: none  Insertion Attempts: 2   Securement:line sutured, chlorhexidine patch, sterile dressing applied and blood return through all ports    Post-Procedure   X-Ray: placement verified by x-ray   Adverse Events:none      Guidewire Guidewire removed intact.

## 2024-09-06 NOTE — ASSESSMENT & PLAN NOTE
Creatine stable for now. CMP reviewed- noted Estimated Creatinine Clearance: 46.8 mL/min (based on SCr of 1.2 mg/dL). according to latest data. Based on current GFR, CKD stage is stage 3 - GFR 30-59.  Monitor UOP and serial CMP and adjust therapy as needed. Renally dose meds. Avoid nephrotoxic medications and procedures.

## 2024-09-07 LAB
ALBUMIN SERPL BCP-MCNC: 3.5 G/DL (ref 3.5–5.2)
ALP SERPL-CCNC: 30 U/L (ref 55–135)
ALT SERPL W/O P-5'-P-CCNC: 38 U/L (ref 10–44)
ANION GAP SERPL CALC-SCNC: 10 MMOL/L (ref 8–16)
AST SERPL-CCNC: 54 U/L (ref 10–40)
BILIRUB SERPL-MCNC: 0.6 MG/DL (ref 0.1–1)
BUN SERPL-MCNC: 17 MG/DL (ref 8–23)
CA-I BLDV-SCNC: 1.09 MMOL/L (ref 1.06–1.42)
CALCIUM SERPL-MCNC: 8 MG/DL (ref 8.7–10.5)
CHLORIDE SERPL-SCNC: 112 MMOL/L (ref 95–110)
CO2 SERPL-SCNC: 22 MMOL/L (ref 23–29)
CREAT SERPL-MCNC: 1 MG/DL (ref 0.5–1.4)
ERYTHROCYTE [DISTWIDTH] IN BLOOD BY AUTOMATED COUNT: 14.6 % (ref 11.5–14.5)
EST. GFR  (NO RACE VARIABLE): 59.5 ML/MIN/1.73 M^2
GLUCOSE SERPL-MCNC: 236 MG/DL (ref 70–110)
HCT VFR BLD AUTO: 27.6 % (ref 37–48.5)
HGB BLD-MCNC: 9.2 G/DL (ref 12–16)
MAGNESIUM SERPL-MCNC: 1.9 MG/DL (ref 1.6–2.6)
MCH RBC QN AUTO: 29.4 PG (ref 27–31)
MCHC RBC AUTO-ENTMCNC: 33.3 G/DL (ref 32–36)
MCV RBC AUTO: 88 FL (ref 82–98)
PHOSPHATE SERPL-MCNC: 4 MG/DL (ref 2.7–4.5)
PLATELET # BLD AUTO: 159 K/UL (ref 150–450)
PMV BLD AUTO: 10.8 FL (ref 9.2–12.9)
POCT GLUCOSE: 159 MG/DL (ref 70–110)
POCT GLUCOSE: 170 MG/DL (ref 70–110)
POCT GLUCOSE: 185 MG/DL (ref 70–110)
POCT GLUCOSE: 192 MG/DL (ref 70–110)
POCT GLUCOSE: 204 MG/DL (ref 70–110)
POCT GLUCOSE: 207 MG/DL (ref 70–110)
POCT GLUCOSE: 208 MG/DL (ref 70–110)
POCT GLUCOSE: 218 MG/DL (ref 70–110)
POCT GLUCOSE: 236 MG/DL (ref 70–110)
POCT GLUCOSE: 239 MG/DL (ref 70–110)
POCT GLUCOSE: 307 MG/DL (ref 70–110)
POTASSIUM SERPL-SCNC: 4.7 MMOL/L (ref 3.5–5.1)
PROT SERPL-MCNC: 5.2 G/DL (ref 6–8.4)
RBC # BLD AUTO: 3.13 M/UL (ref 4–5.4)
SODIUM SERPL-SCNC: 144 MMOL/L (ref 136–145)
WBC # BLD AUTO: 17.42 K/UL (ref 3.9–12.7)

## 2024-09-07 PROCEDURE — 27000221 HC OXYGEN, UP TO 24 HOURS

## 2024-09-07 PROCEDURE — 25000242 PHARM REV CODE 250 ALT 637 W/ HCPCS: Performed by: PHYSICIAN ASSISTANT

## 2024-09-07 PROCEDURE — 63600175 PHARM REV CODE 636 W HCPCS: Performed by: THORACIC SURGERY (CARDIOTHORACIC VASCULAR SURGERY)

## 2024-09-07 PROCEDURE — 82330 ASSAY OF CALCIUM: CPT | Performed by: THORACIC SURGERY (CARDIOTHORACIC VASCULAR SURGERY)

## 2024-09-07 PROCEDURE — 20000000 HC ICU ROOM

## 2024-09-07 PROCEDURE — 82962 GLUCOSE BLOOD TEST: CPT

## 2024-09-07 PROCEDURE — 25000003 PHARM REV CODE 250: Mod: JZ,JG | Performed by: THORACIC SURGERY (CARDIOTHORACIC VASCULAR SURGERY)

## 2024-09-07 PROCEDURE — 93010 ELECTROCARDIOGRAM REPORT: CPT | Mod: ,,, | Performed by: INTERNAL MEDICINE

## 2024-09-07 PROCEDURE — 85027 COMPLETE CBC AUTOMATED: CPT | Performed by: THORACIC SURGERY (CARDIOTHORACIC VASCULAR SURGERY)

## 2024-09-07 PROCEDURE — 94761 N-INVAS EAR/PLS OXIMETRY MLT: CPT

## 2024-09-07 PROCEDURE — 99223 1ST HOSP IP/OBS HIGH 75: CPT | Mod: ,,, | Performed by: INTERNAL MEDICINE

## 2024-09-07 PROCEDURE — 93005 ELECTROCARDIOGRAM TRACING: CPT | Performed by: INTERNAL MEDICINE

## 2024-09-07 PROCEDURE — 25000003 PHARM REV CODE 250: Performed by: HOSPITALIST

## 2024-09-07 PROCEDURE — 80053 COMPREHEN METABOLIC PANEL: CPT | Performed by: THORACIC SURGERY (CARDIOTHORACIC VASCULAR SURGERY)

## 2024-09-07 PROCEDURE — 84100 ASSAY OF PHOSPHORUS: CPT | Performed by: THORACIC SURGERY (CARDIOTHORACIC VASCULAR SURGERY)

## 2024-09-07 PROCEDURE — 99900031 HC PATIENT EDUCATION (STAT)

## 2024-09-07 PROCEDURE — 83735 ASSAY OF MAGNESIUM: CPT | Performed by: THORACIC SURGERY (CARDIOTHORACIC VASCULAR SURGERY)

## 2024-09-07 PROCEDURE — 25000003 PHARM REV CODE 250: Performed by: THORACIC SURGERY (CARDIOTHORACIC VASCULAR SURGERY)

## 2024-09-07 PROCEDURE — 94640 AIRWAY INHALATION TREATMENT: CPT

## 2024-09-07 PROCEDURE — 99900035 HC TECH TIME PER 15 MIN (STAT)

## 2024-09-07 PROCEDURE — 12000002 HC ACUTE/MED SURGE SEMI-PRIVATE ROOM

## 2024-09-07 PROCEDURE — 94799 UNLISTED PULMONARY SVC/PX: CPT

## 2024-09-07 RX ORDER — IBUPROFEN 200 MG
16 TABLET ORAL
Status: DISCONTINUED | OUTPATIENT
Start: 2024-09-07 | End: 2024-09-08

## 2024-09-07 RX ORDER — GLUCAGON 1 MG
1 KIT INJECTION
Status: DISCONTINUED | OUTPATIENT
Start: 2024-09-07 | End: 2024-09-08

## 2024-09-07 RX ORDER — MUPIROCIN 20 MG/G
OINTMENT TOPICAL 2 TIMES DAILY
Status: COMPLETED | OUTPATIENT
Start: 2024-09-07 | End: 2024-09-09

## 2024-09-07 RX ORDER — IBUPROFEN 200 MG
24 TABLET ORAL
Status: DISCONTINUED | OUTPATIENT
Start: 2024-09-07 | End: 2024-09-08

## 2024-09-07 RX ORDER — INSULIN ASPART 100 [IU]/ML
0-10 INJECTION, SOLUTION INTRAVENOUS; SUBCUTANEOUS
Status: DISCONTINUED | OUTPATIENT
Start: 2024-09-07 | End: 2024-09-08

## 2024-09-07 RX ORDER — INSULIN GLARGINE 100 [IU]/ML
15 INJECTION, SOLUTION SUBCUTANEOUS 2 TIMES DAILY
Status: DISCONTINUED | OUTPATIENT
Start: 2024-09-07 | End: 2024-09-08

## 2024-09-07 RX ORDER — INSULIN GLARGINE 100 [IU]/ML
10 INJECTION, SOLUTION SUBCUTANEOUS DAILY
Status: DISCONTINUED | OUTPATIENT
Start: 2024-09-07 | End: 2024-09-07

## 2024-09-07 RX ADMIN — INSULIN GLARGINE 15 UNITS: 100 INJECTION, SOLUTION SUBCUTANEOUS at 08:09

## 2024-09-07 RX ADMIN — MEROPENEM 1 G: 1 INJECTION, POWDER, FOR SOLUTION INTRAVENOUS at 02:09

## 2024-09-07 RX ADMIN — MUPIROCIN 1 G: 20 OINTMENT TOPICAL at 08:09

## 2024-09-07 RX ADMIN — HYDROCODONE BITARTRATE AND ACETAMINOPHEN 1 TABLET: 5; 325 TABLET ORAL at 04:09

## 2024-09-07 RX ADMIN — IPRATROPIUM BROMIDE AND ALBUTEROL SULFATE 3 ML: 2.5; .5 SOLUTION RESPIRATORY (INHALATION) at 04:09

## 2024-09-07 RX ADMIN — DOCUSATE SODIUM 100 MG: 100 CAPSULE, LIQUID FILLED ORAL at 09:09

## 2024-09-07 RX ADMIN — INSULIN HUMAN 7 UNITS/HR: 1 INJECTION, SOLUTION INTRAVENOUS at 08:09

## 2024-09-07 RX ADMIN — DOCUSATE SODIUM 100 MG: 100 CAPSULE, LIQUID FILLED ORAL at 08:09

## 2024-09-07 RX ADMIN — INSULIN GLARGINE 10 UNITS: 100 INJECTION, SOLUTION SUBCUTANEOUS at 09:09

## 2024-09-07 RX ADMIN — INSULIN HUMAN 3 UNITS/HR: 1 INJECTION, SOLUTION INTRAVENOUS at 04:09

## 2024-09-07 RX ADMIN — HYDROCODONE BITARTRATE AND ACETAMINOPHEN 1 TABLET: 5; 325 TABLET ORAL at 03:09

## 2024-09-07 RX ADMIN — MUPIROCIN 1 G: 20 OINTMENT TOPICAL at 11:09

## 2024-09-07 RX ADMIN — HYDROCODONE BITARTRATE AND ACETAMINOPHEN 1 TABLET: 5; 325 TABLET ORAL at 10:09

## 2024-09-07 RX ADMIN — CALCIUM GLUCONATE 1 G: 20 INJECTION, SOLUTION INTRAVENOUS at 04:09

## 2024-09-07 RX ADMIN — POLYETHYLENE GLYCOL 3350 17 G: 17 POWDER, FOR SOLUTION ORAL at 09:09

## 2024-09-07 RX ADMIN — CEFAZOLIN SODIUM 2 G: 2 SOLUTION INTRAVENOUS at 09:09

## 2024-09-07 RX ADMIN — ATORVASTATIN CALCIUM 80 MG: 40 TABLET, FILM COATED ORAL at 08:09

## 2024-09-07 RX ADMIN — MAGNESIUM SULFATE HEPTAHYDRATE 2 G: 40 INJECTION, SOLUTION INTRAVENOUS at 05:09

## 2024-09-07 RX ADMIN — INSULIN ASPART 4 UNITS: 100 INJECTION, SOLUTION INTRAVENOUS; SUBCUTANEOUS at 04:09

## 2024-09-07 RX ADMIN — IPRATROPIUM BROMIDE AND ALBUTEROL SULFATE 3 ML: 2.5; .5 SOLUTION RESPIRATORY (INHALATION) at 07:09

## 2024-09-07 RX ADMIN — ONDANSETRON 4 MG: 2 INJECTION INTRAMUSCULAR; INTRAVENOUS at 12:09

## 2024-09-07 RX ADMIN — PANTOPRAZOLE SODIUM 40 MG: 40 TABLET, DELAYED RELEASE ORAL at 05:09

## 2024-09-07 RX ADMIN — ASPIRIN 81 MG 81 MG: 81 TABLET ORAL at 09:09

## 2024-09-07 RX ADMIN — METOPROLOL SUCCINATE 50 MG: 50 TABLET, FILM COATED, EXTENDED RELEASE ORAL at 09:09

## 2024-09-07 RX ADMIN — INSULIN ASPART 8 UNITS: 100 INJECTION, SOLUTION INTRAVENOUS; SUBCUTANEOUS at 08:09

## 2024-09-07 RX ADMIN — INSULIN ASPART 2 UNITS: 100 INJECTION, SOLUTION INTRAVENOUS; SUBCUTANEOUS at 11:09

## 2024-09-07 RX ADMIN — IPRATROPIUM BROMIDE AND ALBUTEROL SULFATE 3 ML: 2.5; .5 SOLUTION RESPIRATORY (INHALATION) at 12:09

## 2024-09-07 NOTE — ASSESSMENT & PLAN NOTE
Creatine stable for now. CMP reviewed- noted Estimated Creatinine Clearance: 62.4 mL/min (based on SCr of 0.9 mg/dL). according to latest data. Based on current GFR, CKD stage is stage 3 - GFR 30-59.  Monitor UOP and serial CMP and adjust therapy as needed. Renally dose meds. Avoid nephrotoxic medications and procedures.

## 2024-09-07 NOTE — PROGRESS NOTES
This patient is status post coronary artery bypass grafting.  She has done well overnight.  She has no major complaints.    On exam vital signs are stable.  Surgical wounds are dressed.  Chest tube output is as expected.    Laboratory work is noted.  The patient was doing well status post coronary artery bypass grafting.  Supportive care is ongoing.  Activity will be increased as tolerated.

## 2024-09-07 NOTE — PLAN OF CARE
Problem: Acute Coronary Syndrome  Goal: Optimal Adaptation to Illness  Outcome: Progressing     Problem: Cardiac Catheterization (Diagnostic/Interventional)  Goal: Absence of Embolism Signs and Symptoms  Outcome: Progressing  Goal: Optimal Pain Control and Function  Outcome: Progressing     Problem: Adult Inpatient Plan of Care  Goal: Absence of Hospital-Acquired Illness or Injury  Outcome: Progressing  Goal: Optimal Comfort and Wellbeing  Outcome: Progressing     Problem: Diabetes Comorbidity  Goal: Blood Glucose Level Within Targeted Range  Outcome: Progressing     Problem: Wound  Goal: Optimal Coping  Outcome: Progressing  Goal: Absence of Infection Signs and Symptoms  Outcome: Progressing  Goal: Optimal Pain Control and Function  Outcome: Progressing  Goal: Skin Health and Integrity  Outcome: Progressing  Goal: Optimal Wound Healing  Outcome: Progressing     Problem: Fall Injury Risk  Goal: Absence of Fall and Fall-Related Injury  Outcome: Progressing     Problem: Skin Injury Risk Increased  Goal: Skin Health and Integrity  Outcome: Progressing     Problem: Oral Intake Inadequate  Goal: Improved Oral Intake  Outcome: Progressing     Problem: Infection  Goal: Absence of Infection Signs and Symptoms  Outcome: Progressing

## 2024-09-07 NOTE — PROGRESS NOTES
[facility] Medicine  Progress Note    Patient Name: Debby Brown  MRN: [id number]  Patient Class: IP- Inpatient   Admission Date: 8/30/2024  Length of Stay: 6 days  Attending Physician: Nikos Haile MD  Primary Care Provider: Shawna Costello MD        Subjective:     Principal Problem:NSTEMI (non-ST elevated myocardial infarction)        HPI:  74-year-old female presented to ED for eval of chest pain. pMHx HTN, DM 2, HLD.  Patient reported she has been having back pain that radiates to the front of her chest, stated chest pain his pressure-like and worsens with exertion, relieved by rest.  Patient reported associated shortness of breath and nausea.  Reported these symptoms have been present intermittently for the last several months but have worsened significantly in the last few weeks with more frequent episodes.  Patient was evaluated by her PCP earlier today, she stated she had been out of most of her home meds, including BP meds and oral diabetic agent, for 3-4 weeks, reported to PCP the only medication she was currently taking is her Toujeo.  In ED today, SBP greater than 200 on arrival, improved with nitroglycerin.  Initial troponin 908.  Noted with hyperglycemia, serum glucose 337.  EKG with T-wave changes.  Chest x-ray impression without acute abnormality.  CTA chest abdomen impression with mild cardiomegaly with scattered coronary artery calcifications (predominantly in the LAD and circumflex distribution); calcified plaques in the thoracoabdominal aorta and iliacs with no aneurysm.  ED discussed case with Cardiology, heparin drip initiated.  Admit to hospital medicine for further eval.    Overview/Hospital Course:  Patient with medical history of diabetes, hypertension, hyperlipidemia came in with chest pain and was admitted for NSTEMI.  Status post angiogram on 08/30 showed severe multivessel CAD with thrombus in the distal RCA.  Patient was placed on heparin drip.   Cardiology and Cardiothoracic surgery were following patient.  Troponins were trended. Plan for CABG on 9/6/24 by Dr. Hung.  A1c 10.2 , so we made adjustments to glargine and SSI as needed.  Patient was treated with antibiotic therapy for urinary tract infection.  Final urine culture test confirmed E coli (ESBL species) for which patient received intravenous antibiotic therapy.  Patient was placed on contact isolation protocol.  Patient underwent the CABG as planned, and it went uneventfully.    Interval History:  Underwent CABG today.    Review of Systems   Unable to perform ROS: Intubated     Objective:     Vital Signs (Most Recent):  Temp: 97.5 °F (36.4 °C) (09/06/24 1901)  Pulse: 78 (09/06/24 2000)  Resp: 13 (09/06/24 2000)  BP: (!) 95/58 (09/06/24 2000)  SpO2: 97 % (09/06/24 2000) Vital Signs (24h Range):  Temp:  [97.5 °F (36.4 °C)-98.2 °F (36.8 °C)] 97.5 °F (36.4 °C)  Pulse:  [59-83] 78  Resp:  [10-26] 13  SpO2:  [92 %-100 %] 97 %  BP: ()/(50-82) 95/58  Arterial Line BP: ()/(50-66) 117/66     Weight: 85.2 kg (187 lb 13.3 oz)  Body mass index is 29.42 kg/m².    Intake/Output Summary (Last 24 hours) at 9/6/2024 2013  Last data filed at 9/6/2024 2000  Gross per 24 hour   Intake 5296.31 ml   Output 1845 ml   Net 3451.31 ml         Physical Exam  Constitutional:       Interventions: She is intubated.   Eyes:      Conjunctiva/sclera:      Right eye: No exudate.     Left eye: No exudate.  Neck:      Vascular: No JVD.   Cardiovascular:      Rate and Rhythm: Normal rate and regular rhythm.   Pulmonary:      Effort: She is intubated.      Breath sounds: Normal breath sounds.   Abdominal:      General: Abdomen is flat. Bowel sounds are decreased. There is no distension.      Palpations: Abdomen is soft.   Skin:     General: Skin is warm.      Findings: No rash.             Significant Labs: All pertinent labs within the past 24 hours have been reviewed.    Significant Imaging: I have reviewed all pertinent  imaging results/findings within the past 24 hours.    Assessment/Plan:      * NSTEMI (non-ST elevated myocardial infarction)  Status post angiogram on 08/30 showed severe multivessel CAD with thrombus in the distal RCA.    Underwent CABG today.    UTI due to extended-spectrum beta lactamase (ESBL) producing Escherichia coli  Final urine cultures grew E coli (ESBL species).  Continue meropenem.  Observe contact isolation.      Stage 3a chronic kidney disease  Creatine stable for now. CMP reviewed- noted Estimated Creatinine Clearance: 62.4 mL/min (based on SCr of 0.9 mg/dL). according to latest data. Based on current GFR, CKD stage is stage 3 - GFR 30-59.  Monitor UOP and serial CMP and adjust therapy as needed. Renally dose meds. Avoid nephrotoxic medications and procedures.    Type 2 diabetes mellitus with microalbuminuria, with long-term current use of insulin  Patient's FSGs are controlled on current medication regimen.  Last A1c reviewed-   Lab Results   Component Value Date    LABA1C 9.2 (H) 07/03/2018    HGBA1C 10.2 (H) 08/30/2024     Most recent fingerstick glucose reviewed-   Recent Labs   Lab 09/05/24  2154 09/06/24  1901   POCTGLUCOSE 228* 163*       Patient will be on insulin drip post-op.    Antihyperglycemics (From admission, onward)      Start     Stop Route Frequency Ordered    09/06/24 1200  insulin regular in 0.9 % NaCl 100 unit/100 mL (1 unit/mL) infusion        Question:  Insulin rate changes (DO NOT MODIFY ANSWER)  Answer:  \\[email or web address]\epic\Images\Pharmacy\InsulinInfusions\CTS INSULIN [id number].pdf    -- IV Continuous 09/06/24 1049          Hold Oral hypoglycemics while patient is in the hospital.        Hyperlipidemia  Continue atorvastatin  Lipid panel reviewed.        VTE Risk Mitigation (From admission, onward)      None            Discharge Planning   MARTHA: 9/11/2024     Code Status: Full Code   Is the patient medically ready for discharge?:     Reason for patient still in hospital (select all  that apply): Patient trending condition and Treatment  Discharge Plan A: Home with family   Discharge Delays: None known at this time          Nikos aHile MD  Department of Hospital Medicine   [facility]

## 2024-09-07 NOTE — PLAN OF CARE
Post op day 1 CABGx5. Arterial lines removed. Mediastinal and pleural CT in place. Hemodynamically stable. NAEON. Plan of care reviewed with patient.     Problem: Acute Coronary Syndrome  Goal: Optimal Adaptation to Illness  Outcome: Progressing  Goal: Absence of Cardiac-Related Pain  Outcome: Progressing  Goal: Normalized Cardiac Rhythm  Outcome: Progressing  Goal: Effective Cardiac Pump Function  Outcome: Progressing  Goal: Adequate Tissue Perfusion  Outcome: Progressing     Problem: Cardiac Catheterization (Diagnostic/Interventional)  Goal: Absence of Bleeding  Outcome: Progressing  Goal: Absence of Contrast-Induced Injury  Outcome: Progressing  Goal: Stable Heart Rate and Rhythm  Outcome: Progressing  Goal: Absence of Embolism Signs and Symptoms  Outcome: Progressing  Goal: Anesthesia/Sedation Recovery  Outcome: Progressing  Goal: Optimal Pain Control and Function  Outcome: Progressing  Goal: Absence of Vascular Access Complication  Outcome: Progressing     Problem: Adult Inpatient Plan of Care  Goal: Plan of Care Review  Outcome: Progressing  Goal: Patient-Specific Goal (Individualized)  Outcome: Progressing  Goal: Absence of Hospital-Acquired Illness or Injury  Outcome: Progressing  Goal: Optimal Comfort and Wellbeing  Outcome: Progressing  Goal: Readiness for Transition of Care  Outcome: Progressing     Problem: Diabetes Comorbidity  Goal: Blood Glucose Level Within Targeted Range  Outcome: Progressing     Problem: Wound  Goal: Optimal Coping  Outcome: Progressing  Goal: Optimal Functional Ability  Outcome: Progressing  Goal: Absence of Infection Signs and Symptoms  Outcome: Progressing  Goal: Improved Oral Intake  Outcome: Progressing  Goal: Optimal Pain Control and Function  Outcome: Progressing  Goal: Skin Health and Integrity  Outcome: Progressing  Goal: Optimal Wound Healing  Outcome: Progressing     Problem: Fall Injury Risk  Goal: Absence of Fall and Fall-Related Injury  Outcome: Progressing      Problem: Skin Injury Risk Increased  Goal: Skin Health and Integrity  Outcome: Progressing     Problem: Oral Intake Inadequate  Goal: Improved Oral Intake  Outcome: Progressing     Problem: Infection  Goal: Absence of Infection Signs and Symptoms  Outcome: Progressing

## 2024-09-07 NOTE — RESPIRATORY THERAPY
09/06/24 1939   Patient Assessment/Suction   Level of Consciousness (AVPU) responds to voice   Respiratory Effort Normal;Unlabored   Expansion/Accessory Muscles/Retractions no use of accessory muscles   Rhythm/Pattern, Respiratory unlabored;pattern regular;depth regular;no shortness of breath reported   Skin Integrity   $ Wound Care Tech Time 15 min   Area Observed Left;Right;Behind ear;Cheek;Upper lip;Nares   Skin Appearance without discoloration   PRE-TX-O2   Device (Oxygen Therapy) nasal cannula   Flow (L/min) (Oxygen Therapy) 2   SpO2 95 %   Pulse Oximetry Type Continuous   $ Pulse Oximetry - Multiple Charge Pulse Oximetry - Multiple   Pulse 79   Resp 13   Education   $ Education 15 min;DME Oxygen;Other (see comment);Bronchodilator   Labs   $ Was an ABG obtained? Arterial Blood Draw from Existing Line;POCT - Blood gas;POCT - Calcium;POCT - Hematocrit;POCT - PH, Blood;POCT - Potassium;POCT - Sodium   $ Labs Tech Time 15 min

## 2024-09-07 NOTE — ASSESSMENT & PLAN NOTE
Status post angiogram on 08/30 showed severe multivessel CAD with thrombus in the distal RCA.    Underwent CABG today.

## 2024-09-07 NOTE — ASSESSMENT & PLAN NOTE
Patient's FSGs are controlled on current medication regimen.  Last A1c reviewed-   Lab Results   Component Value Date    LABA1C 9.2 (H) 07/03/2018    HGBA1C 10.2 (H) 08/30/2024     Most recent fingerstick glucose reviewed-   Recent Labs   Lab 09/05/24  2154 09/06/24  1901   POCTGLUCOSE 228* 163*       Patient will be on insulin drip post-op.    Antihyperglycemics (From admission, onward)      Start     Stop Route Frequency Ordered    09/06/24 1200  insulin regular in 0.9 % NaCl 100 unit/100 mL (1 unit/mL) infusion        Question:  Insulin rate changes (DO NOT MODIFY ANSWER)  Answer:  \\[email or web address]\[email or web address]\CTS [email or web address]    -- IV Continuous 09/06/24 1049          Hold Oral hypoglycemics while patient is in the hospital.

## 2024-09-07 NOTE — PROGRESS NOTES
[facility]  Department of Cardiology  Progress Note    PATIENT NAME: Debby Brown  MRN: [id number]  TODAY'S DATE: 09/07/2024  ADMIT DATE: 8/30/2024    SUBJECTIVE     PRINCIPLE PROBLEM: NSTEMI (non-ST elevated myocardial infarction)    INTERVAL HISTORY:    9/7/2024    Patient seen resting in bed with no acute distress.  She is status post CABG and feeling well overall.  She is stable on telemetry.    9/6/24    Patient seen resting in bed comfortably.  She is stable on telemetry and feeling well overall.  No complaints of any chest discomfort or shortness of breath.    9/4/24    Patient seen resting in bed with no acute distress noted.  Denies any chest discomfort shortness of breath.  She is stable on telemetry at this time.    9/3/24    Patient for evaluation from CT surgery today.  Stable and doing well presently.    9/2/24    PATIENT WITH A NON ST-ELEVATION MI CURRENTLY IN ICU ON NITRO DRIP WAITING CARDIOVASCULAR SURGERY EVALUATION BY DR. SORIANO  SHE HAD CHEST PAIN TODAY FOR ABOUT 3 MINUTES OTHERWISE IS DOING WELL    Review of patient's allergies indicates:   Allergen Reactions    Bactrim [sulfamethoxazole-trimethoprim]     Codeine     Levaquin [levofloxacin]     Pcn [penicillins]        REVIEW OF SYSTEMS  CARDIOVASCULAR: No recent chest pain, palpitations, arm, neck, or jaw pain  RESPIRATORY: No recent fever, cough chills, SOB or congestion  : No blood in the urine  GI: No Nausea, vomiting, constipation, diarrhea, blood, or reflux.  MUSCULOSKELETAL: No myalgias  NEURO: No lightheadedness or dizziness  EYES: No Double vision, blurry, vision or headache     OBJECTIVE     VITAL SIGNS (Most Recent)  Temp: 97.7 °F (36.5 °C) (09/07/24 0715)  Pulse: 81 (09/07/24 0945)  Resp: 15 (09/07/24 0945)  BP: 120/62 (09/07/24 0945)  SpO2: (!) 94 % (09/07/24 0945)    VENTILATION STATUS  Resp: 15 (09/07/24 0945)  SpO2: (!) 94 % (09/07/24 0945)  Vent Mode: Spont  Oxygen Concentration (%):  [35-70] 35  Resp Rate  Total:  [12 br/min-19 br/min] 19 br/min  Vt Set:  [500 mL-550 mL] 500 mL  PEEP/CPAP:  [5 cmH20] 5 cmH20  Pressure Support:  [10 cmH20] 10 cmH20  Mean Airway Pressure:  [7.2 qpH42-18 cmH20] 11 cmH20        I & O (Last 24H):  Intake/Output Summary (Last 24 hours) at 9/7/2024 1033  Last data filed at 9/7/2024 1015  Gross per 24 hour   Intake 4480.43 ml   Output 2197 ml   Net 2283.43 ml       WEIGHTS  Wt Readings from Last 3 Encounters:   09/05/24 0400 85.2 kg (187 lb 13.3 oz)   09/04/24 0400 85 kg (187 lb 6.3 oz)   09/03/24 0400 89.9 kg (198 lb 3.1 oz)   09/01/24 0353 88.8 kg (195 lb 12.3 oz)   08/30/24 1938 89.8 kg (197 lb 15.6 oz)   08/30/24 1349 86.2 kg (190 lb)   08/31/24 0920 89.4 kg (197 lb)   08/30/24 1007 89.1 kg (196 lb 6.9 oz)       PHYSICAL EXAM  CONSTITUTIONAL: Well built, well nourished in no apparent distress  NECK: no carotid bruit, no JVD  LUNGS: diminished, chest tubes in place   CHEST WALL: chest tubes in place; midsternal incision with dressing in place   HEART: regular rate and rhythm, S1, S2 normal, no murmur, click, rub or gallop   ABDOMEN: soft, non-tender; bowel sounds normal; no masses,  no organomegaly  EXTREMITIES: Extremities normal, no edema  NEURO: AAO X 3    SCHEDULED MEDS:   albuterol-ipratropium  3 mL Nebulization Q6H    aspirin  81 mg Oral Daily    atorvastatin  80 mg Oral Daily    docusate sodium  100 mg Oral BID    insulin glargine U-100  10 Units Subcutaneous Daily    meropenem IV (PEDS and ADULTS)  1 g Intravenous Q12H    metoprolol succinate  50 mg Oral Daily    mupirocin   Nasal BID    pantoprazole  40 mg Oral Daily    polyethylene glycol  17 g Oral Daily       CONTINUOUS INFUSIONS:   0.45% NaCl   Intravenous Continuous 50 mL/hr at 09/07/24 0954 Rate Verify at 09/07/24 0954    insulin regular 1 units/mL infusion orderable (CTS POST-OP)  0-52 Units/hr Intravenous Continuous 8.5 mL/hr at 09/07/24 0954 8.5 Units/hr at 09/07/24 0954       PRN MEDS:  Current Facility-Administered  Medications:     0.9%  NaCl infusion (for blood administration), , Intravenous, Q24H PRN    acetaminophen, 650 mg, Oral, Q6H PRN    albumin human 5%, 25 g, Intravenous, PRN    calcium gluconate IVPB, 1 g, Intravenous, PRN    calcium gluconate IVPB, 2 g, Intravenous, PRN    calcium gluconate IVPB, 3 g, Intravenous, PRN    dextrose 50%, 12.5 g, Intravenous, PRN    dextrose 50%, 12.5 g, Intravenous, PRN    dextrose 50%, 25 g, Intravenous, PRN    dextrose 50%, 25 g, Intravenous, PRN    glucagon (human recombinant), 1 mg, Intramuscular, PRN    glucose, 16 g, Oral, PRN    glucose, 24 g, Oral, PRN    HYDROcodone-acetaminophen, 1 tablet, Oral, Q4H PRN    insulin aspart U-100, 0-10 Units, Subcutaneous, QID (AC + HS) PRN    LORazepam, 0.5 mg, Oral, Q6H PRN    magnesium sulfate IVPB, 2 g, Intravenous, PRN    magnesium sulfate IVPB, 4 g, Intravenous, PRN    midazolam, 1 mg, Intravenous, Q1H PRN    morphine, 2 mg, Intravenous, Q1H PRN    morphine, 4 mg, Intravenous, PRN    ondansetron, 4 mg, Intravenous, Q6H PRN    potassium chloride in water, 20 mEq, Intravenous, PRN    potassium chloride in water, 20 mEq, Intravenous, PRN    potassium chloride in water, 40 mEq, Intravenous, PRN    sodium phosphate 15 mmol in D5W 250 mL IVPB, 15 mmol, Intravenous, PRN    sodium phosphate 20.01 mmol in D5W 250 mL IVPB, 20.01 mmol, Intravenous, PRN    sodium phosphate 30 mmol in D5W 250 mL IVPB, 30 mmol, Intravenous, PRN    LABS AND DIAGNOSTICS     CBC LAST 3 DAYS  Recent Labs   Lab 09/05/24  0040 09/05/24  0250 09/06/24  0338 09/06/24  0732 09/06/24  1524 09/06/24  1611 09/06/24  1939 09/06/24 2001 09/07/24  0336   WBC 10.22 10.20  10.20 9.31  9.31   < > 10.37  --   --  13.45* 17.42*   RBC 4.41 4.43  4.43 4.55  4.55   < > 2.61*  --   --  3.23* 3.13*   HGB 12.8 12.6  12.6 12.8  12.8   < > 7.7*  --   --  9.3* 9.2*   HCT 38.6 38.7  38.7 39.8  39.8   < > 23.0*   < > 29* 28.6* 27.6*   MCV 88 87  87 88  88   < > 88  --   --  89 88   MCH  29.0 28.4  28.4 28.1  28.1   < > 29.5  --   --  28.8 29.4   MCHC 33.2 32.6  32.6 32.2  32.2   < > 33.5  --   --  32.5 33.3   RDW 14.0 14.0  14.0 14.0  14.0   < > 14.2  --   --  14.2 14.6*    305  305 311  311   < > 142*  --   --  164 159   MPV 10.6 10.4  10.4 10.6  10.6   < > 10.8  --   --  11.2 10.8   GRAN 48.3  4.9 48.4  48.4  4.9  4.9 46.1  46.1  4.3  4.3  --   --   --   --   --   --    LYMPH 38.6  4.0 38.7  38.7  4.0  4.0 39.8  39.8  3.7  3.7  --   --   --   --   --   --    MONO 8.2  0.8 7.8  7.8  0.8  0.8 8.7  8.7  0.8  0.8  --   --   --   --   --   --    BASO 0.07 0.06  0.06 0.07  0.07  --   --   --   --   --   --    NRBC 0 0  0 0  0  --   --   --   --   --   --     < > = values in this interval not displayed.       COAGULATION LAST 3 DAYS  Recent Labs   Lab 09/05/24  1431 09/05/24  2335 09/06/24 0338   APTT 35.9* 77.2* 34.5*  34.5*       CHEMISTRY LAST 3 DAYS  Recent Labs   Lab 09/06/24  0338 09/06/24  0732 09/06/24  1152 09/06/24  1309 09/06/24  1524 09/06/24  1611 09/06/24  1656 09/06/24  1803 09/06/24  1939 09/06/24 2001 09/07/24  0336     --  142  --  147*  --   --   --   --  146* 144   K 4.4  --  3.6  3.6  --  3.9  --   --   --   --  4.1 4.7     --  113*  --  116*  --   --   --   --  113* 112*   CO2 27  --  21*  --  25  --   --   --   --  25 22*   ANIONGAP 8  --  8  --  6*  --   --   --   --  8 10   BUN 19  --  17  --  16  --   --   --   --  17 17   CREATININE 1.1  --  0.9  --  0.9  --   --   --   --  1.0 1.0   *  --  178*  --  147*  --   --   --   --  156* 236*   CALCIUM 9.5  --  7.9*  --  7.6*  --   --   --   --  7.8* 8.0*   PH  --    < >  --    < >  --    < > 7.335* 7.294* 7.357  --   --    MG  --   --  2.2  --  1.9  --   --   --   --  2.0 1.9   ALBUMIN 3.5  --  3.0*  --   --   --   --   --   --   --  3.5   PROT 6.5  --  4.6*  --   --   --   --   --   --   --  5.2*   ALKPHOS 57  --  30*  --   --   --   --   --   --   --  30*   ALT 63*   --  39  --   --   --   --   --   --   --  38   AST 53*  --  51*  --   --   --   --   --   --   --  54*   BILITOT 0.6  --  0.7  --   --   --   --   --   --   --  0.6    < > = values in this interval not displayed.       CARDIAC PROFILE LAST 3 DAYS  Recent Labs   Lab 09/01/24 0411   BNP 56       ENDOCRINE LAST 3 DAYS  Recent Labs   Lab 09/01/24 0411   TSH 2.556       LAST ARTERIAL BLOOD GAS  ABG  Recent Labs   Lab 09/06/24  1939   PH 7.357   PO2 79*   PCO2 43.7   HCO3 24.5   BE -1       LAST 7 DAYS MICROBIOLOGY   Microbiology Results (last 7 days)       Procedure Component Value Units Date/Time    Urine culture [[id number]]  (Abnormal)  (Susceptibility) Collected: 09/01/24 0305    Order Status: Completed Specimen: Urine Updated: 09/03/24 0702     Urine Culture, Routine ESCHERICHIA COLI ESBL  >100,000 cfu/ml  Results called to and read back by Sandra Ray RN on [facility] 09/03/2024    07:01 ncb      Narrative:      Specimen Source->Urine            MOST RECENT IMAGING  X-Ray Chest AP Portable  Narrative: EXAMINATION:  XR CHEST AP PORTABLE    CLINICAL HISTORY:  Post-op; Non-ST elevation (NSTEMI) myocardial infarction    TECHNIQUE:  Single frontal view of the chest was performed.    COMPARISON:  09/06/2024 at 12:16 hours    FINDINGS:  Postop changes of previous median sternotomy are noted with sternotomy wires and linear orientation.  Left-sided IJ catheter is noted with evidence of the distal tip projected over the expected region of the superior vena cava.  Right-sided thoracostomy tube has its tip projecting in the right apex.  There is evidence of a additional mediastinal drain that projects in the midline.  Left-sided thoracostomy tube likewise present with no definitive changes.  Consolidative opacity left lower lobe obscures the left hemidiaphragm related to atelectasis.  No evidence of pneumothorax.  No significant tracheal shift.  ET tube has been removed in the interval.  Impression: Endotracheal tube has been  removed in the interval.  Otherwise unremarkable evaluation of the chest.    Electronically signed by: Felipe Escalona MD  Date:    09/07/2024  Time:    07:14      [facility]  Results for orders placed during the hospital encounter of 08/30/24    Echo    Interpretation Summary    Left Ventricle: The left ventricle is normal in size. Mildly increased wall thickness. There is mild concentric hypertrophy. There is normal systolic function with a visually estimated ejection fraction of 60 - 65%. There is diastolic dysfunction.    Right Ventricle: Normal right ventricular cavity size. Wall thickness is normal. Systolic function is normal.    Aortic Valve: The aortic valve is a trileaflet valve. There is moderate aortic valve sclerosis. Moderately restricted motion. There is moderate stenosis. Aortic valve area by VTI is 1.18 cm². Aortic valve peak velocity is 2.28 m/s. Mean gradient is 12 mmHg. The dimensionless index is 0.38.    Mitral Valve: There is mild bileaflet sclerosis.    Tricuspid Valve: There is mild regurgitation with a centrally directed jet.    Pulmonary Artery: The estimated pulmonary artery systolic pressure is 26 mmHg.    IVC/SVC: Normal venous pressure at 3 mmHg.      CURRENT/PREVIOUS VISIT EKG  Results for orders placed or performed during the hospital encounter of 08/30/24   EKG 12-lead    Collection Time: 09/07/24  7:29 AM   Result Value Ref Range    QRS Duration 94 ms    [facility] QTC Calculation 447 ms    Narrative    Test Reason : I25.10,    Vent. Rate : 080 BPM     Atrial Rate : 080 BPM     P-R Int : 148 ms          QRS Dur : 094 ms      QT Int : 388 ms       P-R-T Axes : -02 -02 038 degrees     QTc Int : 447 ms    Normal sinus rhythm  Normal ECG  When compared with ECG of 06-SEP-2024 12:36,  ST elevation now present in Lateral leads    Referred By: [email or web address]   SELF           Confirmed By:        ASSESSMENT/PLAN:     Active Hospital Problems    Diagnosis    *NSTEMI (non-ST elevated myocardial infarction)     UTI due to extended-spectrum beta lactamase (ESBL) producing Escherichia coli    Stage 3a chronic kidney disease    Type 2 diabetes mellitus with microalbuminuria, with long-term current use of insulin    Hyperlipidemia       ASSESSMENT & PLAN:     NSTEMI/ MVCAD s/p CABG  DIABETES  CKD  HYPERLIPIDEMIA      RECOMMENDATIONS:    Continue routine postop care.  Strict I&O.  Encouraged IS.  Continue metoprolol succinate 50 mg p.o. daily.  Continue aspirin and statin.      Aliya Briscoe NP  [facility]  Department of Cardiology  Date of Service: 09/07/2024 10:37 AM

## 2024-09-07 NOTE — NURSING
Patient stable throughtout shift.  Urinary output has been marginal.  Up to chair twice, patient currently still in chair.

## 2024-09-07 NOTE — SUBJECTIVE & OBJECTIVE
Interval History:  Underwent CABG today.    Review of Systems   Unable to perform ROS: Intubated     Objective:     Vital Signs (Most Recent):  Temp: 97.5 °F (36.4 °C) (09/06/24 1901)  Pulse: 78 (09/06/24 2000)  Resp: 13 (09/06/24 2000)  BP: (!) 95/58 (09/06/24 2000)  SpO2: 97 % (09/06/24 2000) Vital Signs (24h Range):  Temp:  [97.5 °F (36.4 °C)-98.2 °F (36.8 °C)] 97.5 °F (36.4 °C)  Pulse:  [59-83] 78  Resp:  [10-26] 13  SpO2:  [92 %-100 %] 97 %  BP: ()/(50-82) 95/58  Arterial Line BP: ()/(50-66) 117/66     Weight: 85.2 kg (187 lb 13.3 oz)  Body mass index is 29.42 kg/m².    Intake/Output Summary (Last 24 hours) at 9/6/2024 2013  Last data filed at 9/6/2024 2000  Gross per 24 hour   Intake 5296.31 ml   Output 1845 ml   Net 3451.31 ml         Physical Exam  Constitutional:       Interventions: She is intubated.   Eyes:      Conjunctiva/sclera:      Right eye: No exudate.     Left eye: No exudate.  Neck:      Vascular: No JVD.   Cardiovascular:      Rate and Rhythm: Normal rate and regular rhythm.   Pulmonary:      Effort: She is intubated.      Breath sounds: Normal breath sounds.   Abdominal:      General: Abdomen is flat. Bowel sounds are decreased. There is no distension.      Palpations: Abdomen is soft.   Skin:     General: Skin is warm.      Findings: No rash.             Significant Labs: All pertinent labs within the past 24 hours have been reviewed.    Significant Imaging: I have reviewed all pertinent imaging results/findings within the past 24 hours.

## 2024-09-08 LAB
ALBUMIN SERPL BCP-MCNC: 3.4 G/DL (ref 3.5–5.2)
ALP SERPL-CCNC: 39 U/L (ref 55–135)
ALT SERPL W/O P-5'-P-CCNC: 24 U/L (ref 10–44)
ANION GAP SERPL CALC-SCNC: 8 MMOL/L (ref 8–16)
AST SERPL-CCNC: 28 U/L (ref 10–40)
BILIRUB SERPL-MCNC: 0.6 MG/DL (ref 0.1–1)
BUN SERPL-MCNC: 29 MG/DL (ref 8–23)
CALCIUM SERPL-MCNC: 8.3 MG/DL (ref 8.7–10.5)
CHLORIDE SERPL-SCNC: 103 MMOL/L (ref 95–110)
CO2 SERPL-SCNC: 25 MMOL/L (ref 23–29)
CREAT SERPL-MCNC: 1.3 MG/DL (ref 0.5–1.4)
ERYTHROCYTE [DISTWIDTH] IN BLOOD BY AUTOMATED COUNT: 15.2 % (ref 11.5–14.5)
EST. GFR  (NO RACE VARIABLE): 43.4 ML/MIN/1.73 M^2
GLUCOSE SERPL-MCNC: 282 MG/DL (ref 70–110)
HCT VFR BLD AUTO: 25.3 % (ref 37–48.5)
HGB BLD-MCNC: 8.4 G/DL (ref 12–16)
MAGNESIUM SERPL-MCNC: 2.3 MG/DL (ref 1.6–2.6)
MCH RBC QN AUTO: 29.7 PG (ref 27–31)
MCHC RBC AUTO-ENTMCNC: 33.2 G/DL (ref 32–36)
MCV RBC AUTO: 89 FL (ref 82–98)
OHS QRS DURATION: 90 MS
OHS QTC CALCULATION: 457 MS
PHOSPHATE SERPL-MCNC: 3.9 MG/DL (ref 2.7–4.5)
PLATELET # BLD AUTO: 182 K/UL (ref 150–450)
PMV BLD AUTO: 11.6 FL (ref 9.2–12.9)
POCT GLUCOSE: 179 MG/DL (ref 70–110)
POCT GLUCOSE: 269 MG/DL (ref 70–110)
POCT GLUCOSE: 278 MG/DL (ref 70–110)
POCT GLUCOSE: 347 MG/DL (ref 70–110)
POTASSIUM SERPL-SCNC: 4.6 MMOL/L (ref 3.5–5.1)
PROT SERPL-MCNC: 5.5 G/DL (ref 6–8.4)
RBC # BLD AUTO: 2.83 M/UL (ref 4–5.4)
SODIUM SERPL-SCNC: 136 MMOL/L (ref 136–145)
WBC # BLD AUTO: 20.6 K/UL (ref 3.9–12.7)

## 2024-09-08 PROCEDURE — 25000003 PHARM REV CODE 250: Performed by: NURSE PRACTITIONER

## 2024-09-08 PROCEDURE — 83735 ASSAY OF MAGNESIUM: CPT | Performed by: THORACIC SURGERY (CARDIOTHORACIC VASCULAR SURGERY)

## 2024-09-08 PROCEDURE — 99900031 HC PATIENT EDUCATION (STAT)

## 2024-09-08 PROCEDURE — 12000002 HC ACUTE/MED SURGE SEMI-PRIVATE ROOM

## 2024-09-08 PROCEDURE — 80053 COMPREHEN METABOLIC PANEL: CPT | Performed by: THORACIC SURGERY (CARDIOTHORACIC VASCULAR SURGERY)

## 2024-09-08 PROCEDURE — 94799 UNLISTED PULMONARY SVC/PX: CPT | Mod: XB

## 2024-09-08 PROCEDURE — 25000242 PHARM REV CODE 250 ALT 637 W/ HCPCS: Performed by: PHYSICIAN ASSISTANT

## 2024-09-08 PROCEDURE — 63600175 PHARM REV CODE 636 W HCPCS: Performed by: THORACIC SURGERY (CARDIOTHORACIC VASCULAR SURGERY)

## 2024-09-08 PROCEDURE — 25000003 PHARM REV CODE 250: Performed by: THORACIC SURGERY (CARDIOTHORACIC VASCULAR SURGERY)

## 2024-09-08 PROCEDURE — 82962 GLUCOSE BLOOD TEST: CPT

## 2024-09-08 PROCEDURE — 20000000 HC ICU ROOM

## 2024-09-08 PROCEDURE — 99223 1ST HOSP IP/OBS HIGH 75: CPT | Mod: ,,, | Performed by: INTERNAL MEDICINE

## 2024-09-08 PROCEDURE — 84100 ASSAY OF PHOSPHORUS: CPT | Performed by: THORACIC SURGERY (CARDIOTHORACIC VASCULAR SURGERY)

## 2024-09-08 PROCEDURE — 25000003 PHARM REV CODE 250: Performed by: HOSPITALIST

## 2024-09-08 PROCEDURE — 63600175 PHARM REV CODE 636 W HCPCS: Performed by: INTERNAL MEDICINE

## 2024-09-08 PROCEDURE — 99900035 HC TECH TIME PER 15 MIN (STAT)

## 2024-09-08 PROCEDURE — 85027 COMPLETE CBC AUTOMATED: CPT | Performed by: THORACIC SURGERY (CARDIOTHORACIC VASCULAR SURGERY)

## 2024-09-08 PROCEDURE — 63600175 PHARM REV CODE 636 W HCPCS: Performed by: HOSPITALIST

## 2024-09-08 PROCEDURE — 27000221 HC OXYGEN, UP TO 24 HOURS

## 2024-09-08 PROCEDURE — 94761 N-INVAS EAR/PLS OXIMETRY MLT: CPT

## 2024-09-08 PROCEDURE — 94640 AIRWAY INHALATION TREATMENT: CPT

## 2024-09-08 RX ORDER — IBUPROFEN 200 MG
24 TABLET ORAL
Status: DISCONTINUED | OUTPATIENT
Start: 2024-09-08 | End: 2024-09-12 | Stop reason: HOSPADM

## 2024-09-08 RX ORDER — AMIODARONE HYDROCHLORIDE 200 MG/1
200 TABLET ORAL 3 TIMES DAILY
Status: DISCONTINUED | OUTPATIENT
Start: 2024-09-08 | End: 2024-09-12 | Stop reason: HOSPADM

## 2024-09-08 RX ORDER — GLUCAGON 1 MG
1 KIT INJECTION
Status: DISCONTINUED | OUTPATIENT
Start: 2024-09-08 | End: 2024-09-12 | Stop reason: HOSPADM

## 2024-09-08 RX ORDER — IBUPROFEN 200 MG
16 TABLET ORAL
Status: DISCONTINUED | OUTPATIENT
Start: 2024-09-08 | End: 2024-09-12 | Stop reason: HOSPADM

## 2024-09-08 RX ORDER — INSULIN GLARGINE 100 [IU]/ML
20 INJECTION, SOLUTION SUBCUTANEOUS 2 TIMES DAILY
Status: DISCONTINUED | OUTPATIENT
Start: 2024-09-08 | End: 2024-09-12 | Stop reason: HOSPADM

## 2024-09-08 RX ORDER — INSULIN ASPART 100 [IU]/ML
0-15 INJECTION, SOLUTION INTRAVENOUS; SUBCUTANEOUS
Status: DISCONTINUED | OUTPATIENT
Start: 2024-09-08 | End: 2024-09-12 | Stop reason: HOSPADM

## 2024-09-08 RX ADMIN — MUPIROCIN 1 G: 20 OINTMENT TOPICAL at 08:09

## 2024-09-08 RX ADMIN — AMIODARONE HYDROCHLORIDE 200 MG: 200 TABLET ORAL at 01:09

## 2024-09-08 RX ADMIN — INSULIN ASPART 9 UNITS: 100 INJECTION, SOLUTION INTRAVENOUS; SUBCUTANEOUS at 05:09

## 2024-09-08 RX ADMIN — MEROPENEM 1 G: 1 INJECTION, POWDER, FOR SOLUTION INTRAVENOUS at 01:09

## 2024-09-08 RX ADMIN — MUPIROCIN 1 G: 20 OINTMENT TOPICAL at 07:09

## 2024-09-08 RX ADMIN — ASPIRIN 81 MG 81 MG: 81 TABLET ORAL at 07:09

## 2024-09-08 RX ADMIN — INSULIN GLARGINE 15 UNITS: 100 INJECTION, SOLUTION SUBCUTANEOUS at 07:09

## 2024-09-08 RX ADMIN — DOCUSATE SODIUM 100 MG: 100 CAPSULE, LIQUID FILLED ORAL at 08:09

## 2024-09-08 RX ADMIN — AMIODARONE HYDROCHLORIDE 150 MG: 1.5 INJECTION, SOLUTION INTRAVENOUS at 03:09

## 2024-09-08 RX ADMIN — IPRATROPIUM BROMIDE AND ALBUTEROL SULFATE 3 ML: 2.5; .5 SOLUTION RESPIRATORY (INHALATION) at 07:09

## 2024-09-08 RX ADMIN — IPRATROPIUM BROMIDE AND ALBUTEROL SULFATE 3 ML: 2.5; .5 SOLUTION RESPIRATORY (INHALATION) at 02:09

## 2024-09-08 RX ADMIN — INSULIN GLARGINE 20 UNITS: 100 INJECTION, SOLUTION SUBCUTANEOUS at 08:09

## 2024-09-08 RX ADMIN — DOCUSATE SODIUM 100 MG: 100 CAPSULE, LIQUID FILLED ORAL at 07:09

## 2024-09-08 RX ADMIN — IPRATROPIUM BROMIDE AND ALBUTEROL SULFATE 3 ML: 2.5; .5 SOLUTION RESPIRATORY (INHALATION) at 12:09

## 2024-09-08 RX ADMIN — AMIODARONE HYDROCHLORIDE 200 MG: 200 TABLET ORAL at 08:09

## 2024-09-08 RX ADMIN — METOPROLOL SUCCINATE 50 MG: 50 TABLET, FILM COATED, EXTENDED RELEASE ORAL at 07:09

## 2024-09-08 RX ADMIN — HYDROCODONE BITARTRATE AND ACETAMINOPHEN 1 TABLET: 5; 325 TABLET ORAL at 01:09

## 2024-09-08 RX ADMIN — IPRATROPIUM BROMIDE AND ALBUTEROL SULFATE 3 ML: 2.5; .5 SOLUTION RESPIRATORY (INHALATION) at 08:09

## 2024-09-08 RX ADMIN — POLYETHYLENE GLYCOL 3350 17 G: 17 POWDER, FOR SOLUTION ORAL at 07:09

## 2024-09-08 RX ADMIN — INSULIN ASPART 2 UNITS: 100 INJECTION, SOLUTION INTRAVENOUS; SUBCUTANEOUS at 08:09

## 2024-09-08 RX ADMIN — HYDROCODONE BITARTRATE AND ACETAMINOPHEN 1 TABLET: 5; 325 TABLET ORAL at 07:09

## 2024-09-08 RX ADMIN — ATORVASTATIN CALCIUM 80 MG: 40 TABLET, FILM COATED ORAL at 07:09

## 2024-09-08 RX ADMIN — AMIODARONE HYDROCHLORIDE 1 MG/MIN: 1.8 INJECTION, SOLUTION INTRAVENOUS at 10:09

## 2024-09-08 RX ADMIN — PANTOPRAZOLE SODIUM 40 MG: 40 TABLET, DELAYED RELEASE ORAL at 07:09

## 2024-09-08 RX ADMIN — INSULIN ASPART 8 UNITS: 100 INJECTION, SOLUTION INTRAVENOUS; SUBCUTANEOUS at 07:09

## 2024-09-08 RX ADMIN — AMIODARONE HYDROCHLORIDE 1 MG/MIN: 1.8 INJECTION, SOLUTION INTRAVENOUS at 03:09

## 2024-09-08 NOTE — PROGRESS NOTES
[facility] Medicine  Progress Note    Patient Name: Debby Brown  MRN: [id number]  Patient Class: IP- Inpatient   Admission Date: 8/30/2024  Length of Stay: 8 days  Attending Physician: Nereyda Alegria MD  Primary Care Provider: Shawna Costello MD        Subjective:     Principal Problem:NSTEMI (non-ST elevated myocardial infarction)        HPI:  74-year-old female presented to ED for eval of chest pain. pMHx HTN, DM 2, HLD.  Patient reported she has been having back pain that radiates to the front of her chest, stated chest pain his pressure-like and worsens with exertion, relieved by rest.  Patient reported associated shortness of breath and nausea.  Reported these symptoms have been present intermittently for the last several months but have worsened significantly in the last few weeks with more frequent episodes.  Patient was evaluated by her PCP earlier today, she stated she had been out of most of her home meds, including BP meds and oral diabetic agent, for 3-4 weeks, reported to PCP the only medication she was currently taking is her Toujeo.  In ED today, SBP greater than 200 on arrival, improved with nitroglycerin.  Initial troponin 908.  Noted with hyperglycemia, serum glucose 337.  EKG with T-wave changes.  Chest x-ray impression without acute abnormality.  CTA chest abdomen impression with mild cardiomegaly with scattered coronary artery calcifications (predominantly in the LAD and circumflex distribution); calcified plaques in the thoracoabdominal aorta and iliacs with no aneurysm.  ED discussed case with Cardiology, heparin drip initiated.  Admit to hospital medicine for further eval.    Overview/Hospital Course:  Patient with medical history of diabetes, hypertension, hyperlipidemia came in with chest pain and was admitted for NSTEMI.  Status post angiogram on 08/30 showed severe multivessel CAD with thrombus in the distal RCA.  Patient was placed on heparin drip.   Cardiology and Cardiothoracic surgery were following patient.  Troponins were trended. Plan for CABG on 9/6/24 by Dr. Hung.  A1c 10.2 , so we made adjustments to glargine and SSI as needed.  Patient was treated with antibiotic therapy for urinary tract infection.  Final urine culture test confirmed E coli (ESBL species) for which patient received intravenous antibiotic therapy.  Patient was placed on contact isolation protocol.  Patient underwent the CABG as planned, and it went uneventfully. Poor motivation for therapy. CT x2 in place.     Interval History:  Underwent CABG 9/6/24. CT x 2 in place, several complaints and poor motivation to gt out of bed.     Review of Systems   Constitutional:  Positive for appetite change and fatigue.   Respiratory: Negative.     Cardiovascular: Negative.    Gastrointestinal: Negative.    Genitourinary: Negative.    Musculoskeletal:  Positive for back pain and gait problem.   Psychiatric/Behavioral:  Positive for dysphoric mood.      Objective:     Vital Signs (Most Recent):  Temp: 98.4 °F (36.9 °C) (09/08/24 0301)  Pulse: 76 (09/08/24 0400)  Resp: 20 (09/08/24 0400)  BP: 101/60 (09/08/24 0400)  SpO2: 98 % (09/08/24 0400) Vital Signs (24h Range):  Temp:  [97.7 °F (36.5 °C)-99.1 °F (37.3 °C)] 98.4 °F (36.9 °C)  Pulse:  [] 76  Resp:  [14-31] 20  SpO2:  [89 %-100 %] 98 %  BP: ()/(51-70) 101/60     Weight: 85.2 kg (187 lb 13.3 oz)  Body mass index is 29.42 kg/m².    Intake/Output Summary (Last 24 hours) at 9/8/2024 0788  Last data filed at 9/8/2024 0457  Gross per 24 hour   Intake 1315.77 ml   Output 1285 ml   Net 30.77 ml         Physical Exam  Constitutional:       Comments: Sitting up in chair in no distress    Eyes:      Conjunctiva/sclera:      Right eye: No exudate.     Left eye: No exudate.  Neck:      Vascular: No JVD.   Cardiovascular:      Rate and Rhythm: Normal rate and regular rhythm.   Pulmonary:      Breath sounds: Normal breath sounds.      Comments:  Chest tubes x 2   Abdominal:      General: Abdomen is flat. Bowel sounds are decreased. There is no distension.      Palpations: Abdomen is soft.   Genitourinary:     Comments: Wellington present   Skin:     General: Skin is warm.      Findings: No rash.             Significant Labs: All pertinent labs within the past 24 hours have been reviewed.    Significant Imaging: I have reviewed all pertinent imaging results/findings within the past 24 hours.    Assessment/Plan:      * NSTEMI (non-ST elevated myocardial infarction)  Status post angiogram on 08/30 showed severe multivessel CAD with thrombus in the distal RCA.    Underwent CABG x 5 on 9/6/24    UTI due to extended-spectrum beta lactamase (ESBL) producing Escherichia coli  Final urine cultures grew E coli (ESBL species).  Continue meropenem.  Observe contact isolation.      Stage 3a chronic kidney disease  Creatine stable for now. CMP reviewed- noted Estimated Creatinine Clearance: 62.4 mL/min (based on SCr of 0.9 mg/dL). according to latest data. Based on current GFR, CKD stage is stage 3 - GFR 30-59.  Monitor UOP and serial CMP and adjust therapy as needed. Renally dose meds. Avoid nephrotoxic medications and procedures.    Type 2 diabetes mellitus with microalbuminuria, with long-term current use of insulin  Patient's FSGs are controlled on current medication regimen.  Last A1c reviewed-   Lab Results   Component Value Date    LABA1C 9.2 (H) 07/03/2018    HGBA1C 10.2 (H) 08/30/2024     Most recent fingerstick glucose reviewed-   Recent Labs   Lab 09/05/24  2154 09/06/24  1901   POCTGLUCOSE 228* 163*       Patient will be on insulin drip post-op.    Antihyperglycemics (From admission, onward)      Start     Stop Route Frequency Ordered    09/06/24 1200  insulin regular in 0.9 % NaCl 100 unit/100 mL (1 unit/mL) infusion        Question:  Insulin rate changes (DO NOT MODIFY ANSWER)  Answer:  \\[email or web address]\Images\Pharmacy\InsulinInfusions\CTS INSULIN [id number].pdf     -- IV Continuous 09/06/24 1049          Hold Oral hypoglycemics while patient is in the hospital.        Hyperlipidemia  Continue atorvastatin  Lipid panel reviewed.        VTE Risk Mitigation (From admission, onward)      None            Discharge Planning   MARTHA: 9/11/2024     Code Status: Full Code   Is the patient medically ready for discharge?:     Reason for patient still in hospital (select all that apply): Patient trending condition  Discharge Plan A: Home with family   Discharge Delays: None known at this time        Critical care time spent on the evaluation and treatment of severe organ dysfunction, review of pertinent labs and imaging studies, discussions with consulting providers and discussions with patient/family: 15 minutes.      Nereyda Alegria MD  Department of Hospital Medicine   [facility]

## 2024-09-08 NOTE — PLAN OF CARE
Patient ambulated 150 ft in hallway. Up to chair 5.5 hours. IS, deep breathing, and coughing encouraged. O2 weaned from 4 to 2 LPM NC. Pain control adequate. Updated on plan of care.     Problem: Acute Coronary Syndrome  Goal: Optimal Adaptation to Illness  Outcome: Progressing     Problem: Acute Coronary Syndrome  Goal: Effective Cardiac Pump Function  Outcome: Progressing     Problem: Acute Coronary Syndrome  Goal: Adequate Tissue Perfusion  Outcome: Progressing

## 2024-09-08 NOTE — SUBJECTIVE & OBJECTIVE
Interval History:  Underwent CABG 9/6/24. CT x 2 in place, several complaints and poor motivation to get out of bed.   Poor oral intake, suggest glucerna to supplement during the day   Increase basal/prandial dosing insulin     Review of Systems   Constitutional:  Positive for appetite change and fatigue.   Respiratory: Negative.     Cardiovascular: Negative.    Gastrointestinal: Negative.    Genitourinary: Negative.    Musculoskeletal:  Positive for back pain and gait problem.   Psychiatric/Behavioral:  Positive for dysphoric mood.      Objective:     Vital Signs (Most Recent):  Temp: 98.3 °F (36.8 °C) (09/08/24 0744)  Pulse: 78 (09/08/24 0812)  Resp: (!) 26 (09/08/24 0812)  BP: (!) 106/59 (09/08/24 0744)  SpO2: 96 % (09/08/24 0812) Vital Signs (24h Range):  Temp:  [98 °F (36.7 °C)-98.4 °F (36.9 °C)] 98.3 °F (36.8 °C)  Pulse:  [] 78  Resp:  [14-31] 26  SpO2:  [89 %-100 %] 96 %  BP: ()/(51-66) 106/59     Weight: 85.2 kg (187 lb 13.3 oz)  Body mass index is 29.42 kg/m².    Intake/Output Summary (Last 24 hours) at 9/8/2024 1216  Last data filed at 9/8/2024 0457  Gross per 24 hour   Intake 670.48 ml   Output 957 ml   Net -286.52 ml         Physical Exam  Constitutional:       Comments: Sitting up in chair in no distress    Eyes:      Conjunctiva/sclera:      Right eye: No exudate.     Left eye: No exudate.  Neck:      Vascular: No JVD.   Cardiovascular:      Rate and Rhythm: Normal rate and regular rhythm.   Pulmonary:      Breath sounds: Normal breath sounds.      Comments: Chest tubes x 2   Abdominal:      General: Abdomen is flat. Bowel sounds are decreased. There is no distension.      Palpations: Abdomen is soft.   Genitourinary:     Comments: Wellington present   Skin:     General: Skin is warm.      Findings: No rash.             Significant Labs: All pertinent labs within the past 24 hours have been reviewed.    Significant Imaging: I have reviewed all pertinent imaging results/findings within the past  24 hours.

## 2024-09-08 NOTE — SUBJECTIVE & OBJECTIVE
Interval History:  Underwent CABG 9/6/24. CT x 2 in place, several complaints and poor motivation to gt out of bed.     Review of Systems   Constitutional:  Positive for appetite change and fatigue.   Respiratory: Negative.     Cardiovascular: Negative.    Gastrointestinal: Negative.    Genitourinary: Negative.    Musculoskeletal:  Positive for back pain and gait problem.   Psychiatric/Behavioral:  Positive for dysphoric mood.      Objective:     Vital Signs (Most Recent):  Temp: 98.4 °F (36.9 °C) (09/08/24 0301)  Pulse: 76 (09/08/24 0400)  Resp: 20 (09/08/24 0400)  BP: 101/60 (09/08/24 0400)  SpO2: 98 % (09/08/24 0400) Vital Signs (24h Range):  Temp:  [97.7 °F (36.5 °C)-99.1 °F (37.3 °C)] 98.4 °F (36.9 °C)  Pulse:  [] 76  Resp:  [14-31] 20  SpO2:  [89 %-100 %] 98 %  BP: ()/(51-70) 101/60     Weight: 85.2 kg (187 lb 13.3 oz)  Body mass index is 29.42 kg/m².    Intake/Output Summary (Last 24 hours) at 9/8/2024 0713  Last data filed at 9/8/2024 0457  Gross per 24 hour   Intake 1315.77 ml   Output 1285 ml   Net 30.77 ml         Physical Exam  Constitutional:       Comments: Sitting up in chair in no distress    Eyes:      Conjunctiva/sclera:      Right eye: No exudate.     Left eye: No exudate.  Neck:      Vascular: No JVD.   Cardiovascular:      Rate and Rhythm: Normal rate and regular rhythm.   Pulmonary:      Breath sounds: Normal breath sounds.      Comments: Chest tubes x 2   Abdominal:      General: Abdomen is flat. Bowel sounds are decreased. There is no distension.      Palpations: Abdomen is soft.   Genitourinary:     Comments: Wellington present   Skin:     General: Skin is warm.      Findings: No rash.             Significant Labs: All pertinent labs within the past 24 hours have been reviewed.    Significant Imaging: I have reviewed all pertinent imaging results/findings within the past 24 hours.

## 2024-09-08 NOTE — ASSESSMENT & PLAN NOTE
Status post angiogram on 08/30 showed severe multivessel CAD with thrombus in the distal RCA.    Underwent CABG x 5 on 9/6/24

## 2024-09-08 NOTE — PROGRESS NOTES
[facility] Medicine  Progress Note    Patient Name: Debby Brown  MRN: [id number]  Patient Class: IP- Inpatient   Admission Date: 8/30/2024  Length of Stay: 8 days  Attending Physician: Nereyda Alergia MD  Primary Care Provider: Shawna Costello MD        Subjective:     Principal Problem:NSTEMI (non-ST elevated myocardial infarction)        HPI:  74-year-old female presented to ED for eval of chest pain. pMHx HTN, DM 2, HLD.  Patient reported she has been having back pain that radiates to the front of her chest, stated chest pain his pressure-like and worsens with exertion, relieved by rest.  Patient reported associated shortness of breath and nausea.  Reported these symptoms have been present intermittently for the last several months but have worsened significantly in the last few weeks with more frequent episodes.  Patient was evaluated by her PCP earlier today, she stated she had been out of most of her home meds, including BP meds and oral diabetic agent, for 3-4 weeks, reported to PCP the only medication she was currently taking is her Toujeo.  In ED today, SBP greater than 200 on arrival, improved with nitroglycerin.  Initial troponin 908.  Noted with hyperglycemia, serum glucose 337.  EKG with T-wave changes.  Chest x-ray impression without acute abnormality.  CTA chest abdomen impression with mild cardiomegaly with scattered coronary artery calcifications (predominantly in the LAD and circumflex distribution); calcified plaques in the thoracoabdominal aorta and iliacs with no aneurysm.  ED discussed case with Cardiology, heparin drip initiated.  Admit to hospital medicine for further eval.    Overview/Hospital Course:  Patient with medical history of diabetes, hypertension, hyperlipidemia came in with chest pain and was admitted for NSTEMI.  Status post angiogram on 08/30 showed severe multivessel CAD with thrombus in the distal RCA.  Patient was placed on heparin drip.   Cardiology and Cardiothoracic surgery were following patient.  Troponins were trended. Plan for CABG on 9/6/24 by Dr. Hung.  A1c 10.2 , so we made adjustments to glargine and SSI as needed.  Patient was treated with antibiotic therapy for urinary tract infection.  Final urine culture test confirmed E coli (ESBL species) for which patient received intravenous antibiotic therapy.  Patient was placed on contact isolation protocol.  Patient underwent the CABG as planned, and it went uneventfully. Poor motivation for therapy. CT x2 in place. Poor oral intake due to nausea.     Interval History:  Underwent CABG 9/6/24. CT x 2 in place, several complaints and poor motivation to get out of bed.   Poor oral intake, suggest glucerna to supplement during the day   Increase basal/prandial dosing insulin     Review of Systems   Constitutional:  Positive for appetite change and fatigue.   Respiratory: Negative.     Cardiovascular: Negative.    Gastrointestinal: Negative.    Genitourinary: Negative.    Musculoskeletal:  Positive for back pain and gait problem.   Psychiatric/Behavioral:  Positive for dysphoric mood.      Objective:     Vital Signs (Most Recent):  Temp: 98.3 °F (36.8 °C) (09/08/24 0744)  Pulse: 78 (09/08/24 0812)  Resp: (!) 26 (09/08/24 0812)  BP: (!) 106/59 (09/08/24 0744)  SpO2: 96 % (09/08/24 0812) Vital Signs (24h Range):  Temp:  [98 °F (36.7 °C)-98.4 °F (36.9 °C)] 98.3 °F (36.8 °C)  Pulse:  [] 78  Resp:  [14-31] 26  SpO2:  [89 %-100 %] 96 %  BP: ()/(51-66) 106/59     Weight: 85.2 kg (187 lb 13.3 oz)  Body mass index is 29.42 kg/m².    Intake/Output Summary (Last 24 hours) at 9/8/2024 1216  Last data filed at 9/8/2024 0457  Gross per 24 hour   Intake 670.48 ml   Output 957 ml   Net -286.52 ml         Physical Exam  Constitutional:       Comments: Sitting up in chair in no distress    Eyes:      Conjunctiva/sclera:      Right eye: No exudate.     Left eye: No exudate.  Neck:      Vascular: No JVD.    Cardiovascular:      Rate and Rhythm: Normal rate and regular rhythm.   Pulmonary:      Breath sounds: Normal breath sounds.      Comments: Chest tubes x 2   Abdominal:      General: Abdomen is flat. Bowel sounds are decreased. There is no distension.      Palpations: Abdomen is soft.   Genitourinary:     Comments: Wellington present   Skin:     General: Skin is warm.      Findings: No rash.             Significant Labs: All pertinent labs within the past 24 hours have been reviewed.    Significant Imaging: I have reviewed all pertinent imaging results/findings within the past 24 hours.    Assessment/Plan:      * NSTEMI (non-ST elevated myocardial infarction)  Status post angiogram on 08/30 showed severe multivessel CAD with thrombus in the distal RCA.    Underwent CABG x 5 on 9/6/24    UTI due to extended-spectrum beta lactamase (ESBL) producing Escherichia coli  Final urine cultures grew E coli (ESBL species).  Continue meropenem.  Observe contact isolation.      Stage 3a chronic kidney disease  Creatine stable for now. CMP reviewed- noted Estimated Creatinine Clearance: 62.4 mL/min (based on SCr of 0.9 mg/dL). according to latest data. Based on current GFR, CKD stage is stage 3 - GFR 30-59.  Monitor UOP and serial CMP and adjust therapy as needed. Renally dose meds. Avoid nephrotoxic medications and procedures.    Type 2 diabetes mellitus with microalbuminuria, with long-term current use of insulin  Patient's FSGs are controlled on current medication regimen.  Last A1c reviewed-   Lab Results   Component Value Date    LABA1C 9.2 (H) 07/03/2018    HGBA1C 10.2 (H) 08/30/2024     Most recent fingerstick glucose reviewed-   Recent Labs   Lab 09/05/24  2154 09/06/24  1901   POCTGLUCOSE 228* 163*       Patient will be on insulin drip post-op.    Antihyperglycemics (From admission, onward)      Start     Stop Route Frequency Ordered    09/06/24 1200  insulin regular in 0.9 % NaCl 100 unit/100 mL (1 unit/mL) infusion         Question:  Insulin rate changes (DO NOT MODIFY ANSWER)  Answer:  [email or web address]\Images\Pharmacy\InsulinInfusions\CTS INSULIN [id number].pdf    -- IV Continuous 09/06/24 1049          Hold Oral hypoglycemics while patient is in the hospital.        Hyperlipidemia  Continue atorvastatin  Lipid panel reviewed.        VTE Risk Mitigation (From admission, onward)      None            Discharge Planning   MARTHA: 9/11/2024     Code Status: Full Code   Is the patient medically ready for discharge?:     Reason for patient still in hospital (select all that apply): Patient trending condition  Discharge Plan A: Home with family   Discharge Delays: None known at this time        Critical care time spent on the evaluation and treatment of severe organ dysfunction, review of pertinent labs and imaging studies, discussions with consulting providers and discussions with patient/family: 15 minutes.      Nereyda Alegria MD  Department of Hospital Medicine   [facility]

## 2024-09-08 NOTE — PROGRESS NOTES
This patient is status post coronary artery bypass grafting.  She remained stable overnight.  She is in atrial fibrillation however.  Amiodarone has been started.    On exam vital signs are stable.  Surgical wounds are intact.  Chest tube output is diminishing.    Her Wellington catheter can be removed.  Activity will be increased as tolerated.

## 2024-09-08 NOTE — PROGRESS NOTES
[facility]  Department of Cardiology  Progress Note    PATIENT NAME: Debby Brown  MRN: [id number]  TODAY'S DATE: 09/08/2024  ADMIT DATE: 8/30/2024    SUBJECTIVE     PRINCIPLE PROBLEM: NSTEMI (non-ST elevated myocardial infarction)    INTERVAL HISTORY:    9/8/2024    Patient seen resting in bed with no acute distress noted.  She is back in sinus rhythm but did have some AFib overnight and was started on amiodarone.    9/7/24:  Patient seen resting in bed with no acute distress.  She is status post CABG and feeling well overall.  She is stable on telemetry.    9/6/24    Patient seen resting in bed comfortably.  She is stable on telemetry and feeling well overall.  No complaints of any chest discomfort or shortness of breath.    9/4/24    Patient seen resting in bed with no acute distress noted.  Denies any chest discomfort shortness of breath.  She is stable on telemetry at this time.    9/3/24    Patient for evaluation from CT surgery today.  Stable and doing well presently.    9/2/24    PATIENT WITH A NON ST-ELEVATION MI CURRENTLY IN ICU ON NITRO DRIP WAITING CARDIOVASCULAR SURGERY EVALUATION BY DR. SORIANO  SHE HAD CHEST PAIN TODAY FOR ABOUT 3 MINUTES OTHERWISE IS DOING WELL    Review of patient's allergies indicates:   Allergen Reactions    Bactrim [sulfamethoxazole-trimethoprim]     Codeine     Levaquin [levofloxacin]     Pcn [penicillins]        REVIEW OF SYSTEMS  CARDIOVASCULAR: No recent chest pain, palpitations, arm, neck, or jaw pain  RESPIRATORY: No recent fever, cough chills, SOB or congestion  : No blood in the urine  GI: No Nausea, vomiting, constipation, diarrhea, blood, or reflux.  MUSCULOSKELETAL: No myalgias  NEURO: No lightheadedness or dizziness  EYES: No Double vision, blurry, vision or headache     OBJECTIVE     VITAL SIGNS (Most Recent)  Temp: 98.3 °F (36.8 °C) (09/08/24 0744)  Pulse: 78 (09/08/24 0812)  Resp: (!) 26 (09/08/24 0812)  BP: (!) 106/59 (09/08/24 0744)  SpO2: 96 %  (09/08/24 0812)    VENTILATION STATUS  Resp: (!) 26 (09/08/24 0812)  SpO2: 96 % (09/08/24 0812)           I & O (Last 24H):  Intake/Output Summary (Last 24 hours) at 9/8/2024 1053  Last data filed at 9/8/2024 0457  Gross per 24 hour   Intake 845.48 ml   Output 1123 ml   Net -277.52 ml       WEIGHTS  Wt Readings from Last 3 Encounters:   09/05/24 0400 85.2 kg (187 lb 13.3 oz)   09/04/24 0400 85 kg (187 lb 6.3 oz)   09/03/24 0400 89.9 kg (198 lb 3.1 oz)   09/01/24 0353 88.8 kg (195 lb 12.3 oz)   08/30/24 1938 89.8 kg (197 lb 15.6 oz)   08/30/24 1349 86.2 kg (190 lb)   08/31/24 0920 89.4 kg (197 lb)   08/30/24 1007 89.1 kg (196 lb 6.9 oz)       PHYSICAL EXAM  CONSTITUTIONAL: Well built, well nourished in no apparent distress  NECK: no carotid bruit, no JVD  LUNGS: diminished, chest tubes in place   CHEST WALL: chest tubes in place; midsternal incision with dressing in place   HEART: regular rate and rhythm, S1, S2 normal, no murmur, click, rub or gallop   ABDOMEN: soft, non-tender; bowel sounds normal; no masses,  no organomegaly  EXTREMITIES: Extremities normal, no edema  NEURO: AAO X 3    SCHEDULED MEDS:   albuterol-ipratropium  3 mL Nebulization Q6H    amiodarone  200 mg Oral TID    aspirin  81 mg Oral Daily    atorvastatin  80 mg Oral Daily    docusate sodium  100 mg Oral BID    insulin glargine U-100  15 Units Subcutaneous BID    meropenem IV (PEDS and ADULTS)  1 g Intravenous Q12H    metoprolol succinate  50 mg Oral Daily    mupirocin   Nasal BID    pantoprazole  40 mg Oral Daily    polyethylene glycol  17 g Oral Daily       CONTINUOUS INFUSIONS:   0.45% NaCl   Intravenous Continuous   Stopped at 09/07/24 1015    amiodarone in dextrose 5%  1 mg/min Intravenous Continuous 33.3 mL/hr at 09/08/24 1004 1 mg/min at 09/08/24 1004       PRN MEDS:  Current Facility-Administered Medications:     0.9%  NaCl infusion (for blood administration), , Intravenous, Q24H PRN    acetaminophen, 650 mg, Oral, Q6H PRN    albumin human  5%, 25 g, Intravenous, PRN    calcium gluconate IVPB, 1 g, Intravenous, PRN    calcium gluconate IVPB, 2 g, Intravenous, PRN    calcium gluconate IVPB, 3 g, Intravenous, PRN    dextrose 50%, 12.5 g, Intravenous, PRN    dextrose 50%, 12.5 g, Intravenous, PRN    dextrose 50%, 25 g, Intravenous, PRN    dextrose 50%, 25 g, Intravenous, PRN    glucagon (human recombinant), 1 mg, Intramuscular, PRN    glucose, 16 g, Oral, PRN    glucose, 24 g, Oral, PRN    HYDROcodone-acetaminophen, 1 tablet, Oral, Q4H PRN    insulin aspart U-100, 0-10 Units, Subcutaneous, QID (AC + HS) PRN    LORazepam, 0.5 mg, Oral, Q6H PRN    magnesium sulfate IVPB, 2 g, Intravenous, PRN    magnesium sulfate IVPB, 4 g, Intravenous, PRN    morphine, 2 mg, Intravenous, Q1H PRN    morphine, 4 mg, Intravenous, PRN    ondansetron, 4 mg, Intravenous, Q6H PRN    potassium chloride in water, 20 mEq, Intravenous, PRN    potassium chloride in water, 20 mEq, Intravenous, PRN    potassium chloride in water, 40 mEq, Intravenous, PRN    sodium phosphate 15 mmol in D5W 250 mL IVPB, 15 mmol, Intravenous, PRN    sodium phosphate 20.01 mmol in D5W 250 mL IVPB, 20.01 mmol, Intravenous, PRN    sodium phosphate 30 mmol in D5W 250 mL IVPB, 30 mmol, Intravenous, PRN    LABS AND DIAGNOSTICS     CBC LAST 3 DAYS  Recent Labs   Lab 09/05/24  0040 09/05/24  0250 09/06/24  0338 09/06/24  0732 09/06/24 2001 09/07/24  0336 09/08/24  0310   WBC 10.22 10.20  10.20 9.31  9.31   < > 13.45* 17.42* 20.60*   RBC 4.41 4.43  4.43 4.55  4.55   < > 3.23* 3.13* 2.83*   HGB 12.8 12.6  12.6 12.8  12.8   < > 9.3* 9.2* 8.4*   HCT 38.6 38.7  38.7 39.8  39.8   < > 28.6* 27.6* 25.3*   MCV 88 87  87 88  88   < > 89 88 89   MCH 29.0 28.4  28.4 28.1  28.1   < > 28.8 29.4 29.7   MCHC 33.2 32.6  32.6 32.2  32.2   < > 32.5 33.3 33.2   RDW 14.0 14.0  14.0 14.0  14.0   < > 14.2 14.6* 15.2*    305  305 311  311   < > 164 159 182   MPV 10.6 10.4  10.4 10.6  10.6   < > 11.2 10.8 11.6  "  GRAN 48.3  4.9 48.4  48.4  4.9  4.9 46.1  46.1  4.3  4.3  --   --   --   --    LYMPH 38.6  4.0 38.7  38.7  4.0  4.0 39.8  39.8  3.7  3.7  --   --   --   --    MONO 8.2  0.8 7.8  7.8  0.8  0.8 8.7  8.7  0.8  0.8  --   --   --   --    BASO 0.07 0.06  0.06 0.07  0.07  --   --   --   --    NRBC 0 0  0 0  0  --   --   --   --     < > = values in this interval not displayed.       COAGULATION LAST 3 DAYS  Recent Labs   Lab 09/05/24  1431 09/05/24  2335 09/06/24  0338   APTT 35.9* 77.2* 34.5*  34.5*       CHEMISTRY LAST 3 DAYS  Recent Labs   Lab 09/06/24  1152 09/06/24  1309 09/06/24  1656 09/06/24  1803 09/06/24  1939 09/06/24  2001 09/07/24  0336 09/08/24  0310      < >  --   --   --  146* 144 136   K 3.6  3.6   < >  --   --   --  4.1 4.7 4.6   *   < >  --   --   --  113* 112* 103   CO2 21*   < >  --   --   --  25 22* 25   ANIONGAP 8   < >  --   --   --  8 10 8   BUN 17   < >  --   --   --  17 17 29*   CREATININE 0.9   < >  --   --   --  1.0 1.0 1.3   *   < >  --   --   --  156* 236* 282*   CALCIUM 7.9*   < >  --   --   --  7.8* 8.0* 8.3*   PH  --    < > 7.335* 7.294* 7.357  --   --   --    MG 2.2   < >  --   --   --  2.0 1.9 2.3   ALBUMIN 3.0*  --   --   --   --   --  3.5 3.4*   PROT 4.6*  --   --   --   --   --  5.2* 5.5*   ALKPHOS 30*  --   --   --   --   --  30* 39*   ALT 39  --   --   --   --   --  38 24   AST 51*  --   --   --   --   --  54* 28   BILITOT 0.7  --   --   --   --   --  0.6 0.6    < > = values in this interval not displayed.       CARDIAC PROFILE LAST 3 DAYS  No results for input(s): "BNP", "CPK", "CPKMB", "LDH", "TROPONINI" in the last 168 hours.      ENDOCRINE LAST 3 DAYS  No results for input(s): "TSH", "PROCAL" in the last 168 hours.      LAST ARTERIAL BLOOD GAS  ABG  Recent Labs   Lab 09/06/24 1939   PH 7.357   PO2 79*   PCO2 43.7   HCO3 24.5   BE -1       LAST 7 DAYS MICROBIOLOGY   Microbiology Results (last 7 days)       Procedure Component Value " Units Date/Time    Urine culture [[id number]]  (Abnormal)  (Susceptibility) Collected: 09/01/24 0305    Order Status: Completed Specimen: Urine Updated: 09/03/24 0702     Urine Culture, Routine ESCHERICHIA COLI ESBL  >100,000 cfu/ml  Results called to and read back by Sandra Ray RN on [facility] 09/03/2024    07:01 ncb      Narrative:      Specimen Source->Urine            MOST RECENT IMAGING  X-Ray Chest AP Portable  Narrative: EXAMINATION:  XR CHEST AP PORTABLE    CLINICAL HISTORY:  Post-op; Non-ST elevation (NSTEMI) myocardial infarction    TECHNIQUE:  Single frontal view of the chest was performed.    COMPARISON:  09/07/2024    FINDINGS:  Cardiopericardial silhouette appears prominent size exacerbated by the decreased inspiratory effort and decreased lung volumes.  Right-sided thoracostomy tube enters from an inferior approach projecting over the right mid chest without definitive pneumothorax.  Left-sided thoracostomy tube is likewise centers from an inferior approach in stable position.  Left-sided IJ catheter crosses midline projected over the superior vena cava.  Mild retrocardiac atelectasis.  Recent postop changes of coronary artery bypass surgery with mediastinal drains and overlying sternotomy sutures.  Ostial ring markers identified projected over the right hilum.  Impression: Postop changes of expected coronary artery bypass surgery.  No detrimental change from previous.    Electronically signed by: Felipe Escalona MD  Date:    09/08/2024  Time:    07:02      [facility]  Results for orders placed during the hospital encounter of 08/30/24    Echo    Interpretation Summary    Left Ventricle: The left ventricle is normal in size. Mildly increased wall thickness. There is mild concentric hypertrophy. There is normal systolic function with a visually estimated ejection fraction of 60 - 65%. There is diastolic dysfunction.    Right Ventricle: Normal right ventricular cavity size. Wall thickness is normal.  Systolic function is normal.    Aortic Valve: The aortic valve is a trileaflet valve. There is moderate aortic valve sclerosis. Moderately restricted motion. There is moderate stenosis. Aortic valve area by VTI is 1.18 cm². Aortic valve peak velocity is 2.28 m/s. Mean gradient is 12 mmHg. The dimensionless index is 0.38.    Mitral Valve: There is mild bileaflet sclerosis.    Tricuspid Valve: There is mild regurgitation with a centrally directed jet.    Pulmonary Artery: The estimated pulmonary artery systolic pressure is 26 mmHg.    IVC/SVC: Normal venous pressure at 3 mmHg.      CURRENT/PREVIOUS VISIT EKG  Results for orders placed or performed during the hospital encounter of 08/30/24   EKG 12-lead    Collection Time: 09/08/24  2:05 AM   Result Value Ref Range    QRS Duration 90 ms    [facility] QTC Calculation 457 ms    Narrative    Test Reason : I25.10,    Vent. Rate : 120 BPM     Atrial Rate : 000 BPM     P-R Int : 000 ms          QRS Dur : 090 ms      QT Int : 324 ms       P-R-T Axes : 000 -08 039 degrees     QTc Int : 457 ms    Atrial fibrillation with rapid ventricular response  Abnormal ECG  When compared with ECG of 07-SEP-2024 07:29,  Atrial fibrillation has replaced Sinus rhythm  Vent. rate has increased BY  40 BPM    Referred By: [email or web address]   SELF           Confirmed By:        ASSESSMENT/PLAN:     Active Hospital Problems    Diagnosis    *NSTEMI (non-ST elevated myocardial infarction)    UTI due to extended-spectrum beta lactamase (ESBL) producing Escherichia coli    Stage 3a chronic kidney disease    Type 2 diabetes mellitus with microalbuminuria, with long-term current use of insulin    Hyperlipidemia       ASSESSMENT & PLAN:     NSTEMI/ MVCAD s/p CABG  Postop AFib  DIABETES  CKD  HYPERLIPIDEMIA      RECOMMENDATIONS:    Change to amiodarone 200 mg p.o. t.i.d. x5 days followed by 200 mg p.o. b.i.d..  Continue metoprolol succinate 50 mg p.o. daily.  Continue strict I&O and encourage IS frequently.  Continue  statin and aspirin.    Aliya Briscoe NP  [facility]  Department of Cardiology  Date of Service: 09/08/2024 10:37 AM

## 2024-09-08 NOTE — RESPIRATORY THERAPY
09/07/24 1953   Patient Assessment/Suction   Level of Consciousness (AVPU) alert   Respiratory Effort Normal;Unlabored   Expansion/Accessory Muscles/Retractions no use of accessory muscles   All Lung Fields Breath Sounds Anterior:;clear   Rhythm/Pattern, Respiratory unlabored;pattern regular;depth regular;no shortness of breath reported   Skin Integrity   $ Wound Care Tech Time 15 min   Area Observed Left;Right;Behind ear;Cheek;Upper lip;Nares   Skin Appearance without discoloration   PRE-TX-O2   Device (Oxygen Therapy) nasal cannula   Flow (L/min) (Oxygen Therapy) 2   SpO2 95 %   Pulse Oximetry Type Continuous   $ Pulse Oximetry - Multiple Charge Pulse Oximetry - Multiple   Pulse 84   Resp 19   Aerosol Therapy   $ Aerosol Therapy Charges Aerosol Treatment   Daily Review of Necessity (SVN) completed   Respiratory Treatment Status (SVN) given   Treatment Route (SVN) mask;oxygen   Patient Position HOB elevated   Post Treatment Assessment (SVN) increased aeration   Signs of Intolerance (SVN) none   Breath Sounds Post-Respiratory Treatment   Throughout All Fields Post-Treatment aeration increased   Post-treatment Heart Rate (beats/min) 84   Post-treatment Resp Rate (breaths/min) 19   Education   $ Education 15 min;DME Oxygen;DME Nebulizer;Bronchodilator

## 2024-09-09 LAB
ABO + RH BLD: NORMAL
ALBUMIN SERPL BCP-MCNC: 3 G/DL (ref 3.5–5.2)
ALP SERPL-CCNC: 43 U/L (ref 55–135)
ALT SERPL W/O P-5'-P-CCNC: 17 U/L (ref 10–44)
ANION GAP SERPL CALC-SCNC: 5 MMOL/L (ref 8–16)
AST SERPL-CCNC: 22 U/L (ref 10–40)
BILIRUB SERPL-MCNC: 0.7 MG/DL (ref 0.1–1)
BLD GP AB SCN CELLS X3 SERPL QL: NORMAL
BLD PROD TYP BPU: NORMAL
BLOOD UNIT EXPIRATION DATE: NORMAL
BLOOD UNIT TYPE CODE: 7300
BLOOD UNIT TYPE: NORMAL
BUN SERPL-MCNC: 31 MG/DL (ref 8–23)
CALCIUM SERPL-MCNC: 7.9 MG/DL (ref 8.7–10.5)
CHLORIDE SERPL-SCNC: 103 MMOL/L (ref 95–110)
CO2 SERPL-SCNC: 27 MMOL/L (ref 23–29)
CODING SYSTEM: NORMAL
CREAT SERPL-MCNC: 1.2 MG/DL (ref 0.5–1.4)
CROSSMATCH INTERPRETATION: NORMAL
DISPENSE STATUS: NORMAL
ERYTHROCYTE [DISTWIDTH] IN BLOOD BY AUTOMATED COUNT: 15.2 % (ref 11.5–14.5)
EST. GFR  (NO RACE VARIABLE): 47.8 ML/MIN/1.73 M^2
GLUCOSE SERPL-MCNC: 188 MG/DL (ref 70–110)
HCT VFR BLD AUTO: 23.1 % (ref 37–48.5)
HGB BLD-MCNC: 7.5 G/DL (ref 12–16)
MAGNESIUM SERPL-MCNC: 2.3 MG/DL (ref 1.6–2.6)
MCH RBC QN AUTO: 29.1 PG (ref 27–31)
MCHC RBC AUTO-ENTMCNC: 32.5 G/DL (ref 32–36)
MCV RBC AUTO: 90 FL (ref 82–98)
NUM UNITS TRANS PACKED RBC: NORMAL
PHOSPHATE SERPL-MCNC: 2.5 MG/DL (ref 2.7–4.5)
PLATELET # BLD AUTO: 179 K/UL (ref 150–450)
PMV BLD AUTO: 11.5 FL (ref 9.2–12.9)
POCT GLUCOSE: 163 MG/DL (ref 70–110)
POCT GLUCOSE: 226 MG/DL (ref 70–110)
POCT GLUCOSE: 233 MG/DL (ref 70–110)
POTASSIUM SERPL-SCNC: 4.1 MMOL/L (ref 3.5–5.1)
PROT SERPL-MCNC: 5.2 G/DL (ref 6–8.4)
RBC # BLD AUTO: 2.58 M/UL (ref 4–5.4)
SODIUM SERPL-SCNC: 135 MMOL/L (ref 136–145)
SPECIMEN OUTDATE: NORMAL
WBC # BLD AUTO: 16.27 K/UL (ref 3.9–12.7)

## 2024-09-09 PROCEDURE — P9016 RBC LEUKOCYTES REDUCED: HCPCS | Performed by: THORACIC SURGERY (CARDIOTHORACIC VASCULAR SURGERY)

## 2024-09-09 PROCEDURE — 25000003 PHARM REV CODE 250: Performed by: THORACIC SURGERY (CARDIOTHORACIC VASCULAR SURGERY)

## 2024-09-09 PROCEDURE — 86920 COMPATIBILITY TEST SPIN: CPT | Performed by: THORACIC SURGERY (CARDIOTHORACIC VASCULAR SURGERY)

## 2024-09-09 PROCEDURE — 86850 RBC ANTIBODY SCREEN: CPT | Performed by: THORACIC SURGERY (CARDIOTHORACIC VASCULAR SURGERY)

## 2024-09-09 PROCEDURE — 99900031 HC PATIENT EDUCATION (STAT)

## 2024-09-09 PROCEDURE — 20000000 HC ICU ROOM

## 2024-09-09 PROCEDURE — 86901 BLOOD TYPING SEROLOGIC RH(D): CPT | Performed by: THORACIC SURGERY (CARDIOTHORACIC VASCULAR SURGERY)

## 2024-09-09 PROCEDURE — 82962 GLUCOSE BLOOD TEST: CPT

## 2024-09-09 PROCEDURE — 83735 ASSAY OF MAGNESIUM: CPT | Performed by: THORACIC SURGERY (CARDIOTHORACIC VASCULAR SURGERY)

## 2024-09-09 PROCEDURE — 99232 SBSQ HOSP IP/OBS MODERATE 35: CPT | Mod: ,,, | Performed by: INTERNAL MEDICINE

## 2024-09-09 PROCEDURE — 94640 AIRWAY INHALATION TREATMENT: CPT

## 2024-09-09 PROCEDURE — 25000242 PHARM REV CODE 250 ALT 637 W/ HCPCS: Performed by: PHYSICIAN ASSISTANT

## 2024-09-09 PROCEDURE — 80053 COMPREHEN METABOLIC PANEL: CPT | Performed by: THORACIC SURGERY (CARDIOTHORACIC VASCULAR SURGERY)

## 2024-09-09 PROCEDURE — 36430 TRANSFUSION BLD/BLD COMPNT: CPT

## 2024-09-09 PROCEDURE — 63600175 PHARM REV CODE 636 W HCPCS: Performed by: THORACIC SURGERY (CARDIOTHORACIC VASCULAR SURGERY)

## 2024-09-09 PROCEDURE — 94799 UNLISTED PULMONARY SVC/PX: CPT | Mod: XB

## 2024-09-09 PROCEDURE — 94761 N-INVAS EAR/PLS OXIMETRY MLT: CPT

## 2024-09-09 PROCEDURE — 25000003 PHARM REV CODE 250: Performed by: NURSE PRACTITIONER

## 2024-09-09 PROCEDURE — 99024 POSTOP FOLLOW-UP VISIT: CPT | Mod: POP,,, | Performed by: PHYSICIAN ASSISTANT

## 2024-09-09 PROCEDURE — 84100 ASSAY OF PHOSPHORUS: CPT | Performed by: THORACIC SURGERY (CARDIOTHORACIC VASCULAR SURGERY)

## 2024-09-09 PROCEDURE — 63600175 PHARM REV CODE 636 W HCPCS: Performed by: PHYSICIAN ASSISTANT

## 2024-09-09 PROCEDURE — 85027 COMPLETE CBC AUTOMATED: CPT | Performed by: THORACIC SURGERY (CARDIOTHORACIC VASCULAR SURGERY)

## 2024-09-09 PROCEDURE — 99900035 HC TECH TIME PER 15 MIN (STAT)

## 2024-09-09 PROCEDURE — 25000003 PHARM REV CODE 250: Performed by: HOSPITALIST

## 2024-09-09 RX ORDER — ENOXAPARIN SODIUM 100 MG/ML
40 INJECTION SUBCUTANEOUS EVERY 24 HOURS
Status: DISCONTINUED | OUTPATIENT
Start: 2024-09-09 | End: 2024-09-12 | Stop reason: HOSPADM

## 2024-09-09 RX ORDER — IPRATROPIUM BROMIDE AND ALBUTEROL SULFATE 2.5; .5 MG/3ML; MG/3ML
3 SOLUTION RESPIRATORY (INHALATION) EVERY 6 HOURS PRN
Status: DISCONTINUED | OUTPATIENT
Start: 2024-09-09 | End: 2024-09-12 | Stop reason: HOSPADM

## 2024-09-09 RX ADMIN — MEROPENEM 1 G: 1 INJECTION, POWDER, FOR SOLUTION INTRAVENOUS at 02:09

## 2024-09-09 RX ADMIN — POLYETHYLENE GLYCOL 3350 17 G: 17 POWDER, FOR SOLUTION ORAL at 08:09

## 2024-09-09 RX ADMIN — DOCUSATE SODIUM 100 MG: 100 CAPSULE, LIQUID FILLED ORAL at 08:09

## 2024-09-09 RX ADMIN — ATORVASTATIN CALCIUM 80 MG: 40 TABLET, FILM COATED ORAL at 08:09

## 2024-09-09 RX ADMIN — INSULIN GLARGINE 20 UNITS: 100 INJECTION, SOLUTION SUBCUTANEOUS at 08:09

## 2024-09-09 RX ADMIN — SODIUM PHOSPHATE, MONOBASIC, MONOHYDRATE AND SODIUM PHOSPHATE, DIBASIC, ANHYDROUS 15 MMOL: 142; 276 INJECTION, SOLUTION INTRAVENOUS at 08:09

## 2024-09-09 RX ADMIN — AMIODARONE HYDROCHLORIDE 200 MG: 200 TABLET ORAL at 08:09

## 2024-09-09 RX ADMIN — HYDROCODONE BITARTRATE AND ACETAMINOPHEN 1 TABLET: 5; 325 TABLET ORAL at 06:09

## 2024-09-09 RX ADMIN — AMIODARONE HYDROCHLORIDE 200 MG: 200 TABLET ORAL at 03:09

## 2024-09-09 RX ADMIN — INSULIN ASPART 6 UNITS: 100 INJECTION, SOLUTION INTRAVENOUS; SUBCUTANEOUS at 04:09

## 2024-09-09 RX ADMIN — MUPIROCIN 1 G: 20 OINTMENT TOPICAL at 08:09

## 2024-09-09 RX ADMIN — MEROPENEM 1 G: 1 INJECTION, POWDER, FOR SOLUTION INTRAVENOUS at 03:09

## 2024-09-09 RX ADMIN — INSULIN ASPART 2 UNITS: 100 INJECTION, SOLUTION INTRAVENOUS; SUBCUTANEOUS at 08:09

## 2024-09-09 RX ADMIN — INSULIN ASPART 9 UNITS: 100 INJECTION, SOLUTION INTRAVENOUS; SUBCUTANEOUS at 12:09

## 2024-09-09 RX ADMIN — IPRATROPIUM BROMIDE AND ALBUTEROL SULFATE 3 ML: 2.5; .5 SOLUTION RESPIRATORY (INHALATION) at 12:09

## 2024-09-09 RX ADMIN — INSULIN ASPART 3 UNITS: 100 INJECTION, SOLUTION INTRAVENOUS; SUBCUTANEOUS at 08:09

## 2024-09-09 RX ADMIN — PANTOPRAZOLE SODIUM 40 MG: 40 TABLET, DELAYED RELEASE ORAL at 06:09

## 2024-09-09 RX ADMIN — ENOXAPARIN SODIUM 40 MG: 40 INJECTION SUBCUTANEOUS at 04:09

## 2024-09-09 RX ADMIN — IPRATROPIUM BROMIDE AND ALBUTEROL SULFATE 3 ML: 2.5; .5 SOLUTION RESPIRATORY (INHALATION) at 07:09

## 2024-09-09 RX ADMIN — ASPIRIN 81 MG 81 MG: 81 TABLET ORAL at 08:09

## 2024-09-09 NOTE — PROGRESS NOTES
Critical access hospital Medicine  Progress Note    Patient Name: Debby Brown  MRN: [id number]  Patient Class: IP- Inpatient   Admission Date: 8/30/2024  Length of Stay: 9 days  Attending Physician: Nereyda Alegria MD  Primary Care Provider: Shawna Costello MD        Subjective:     Principal Problem:NSTEMI (non-ST elevated myocardial infarction)        HPI:  74-year-old female presented to ED for eval of chest pain. pMHx HTN, DM 2, HLD.  Patient reported she has been having back pain that radiates to the front of her chest, stated chest pain his pressure-like and worsens with exertion, relieved by rest.  Patient reported associated shortness of breath and nausea.  Reported these symptoms have been present intermittently for the last several months but have worsened significantly in the last few weeks with more frequent episodes.  Patient was evaluated by her PCP earlier today, she stated she had been out of most of her home meds, including BP meds and oral diabetic agent, for 3-4 weeks, reported to PCP the only medication she was currently taking is her Toujeo.  In ED today, SBP greater than 200 on arrival, improved with nitroglycerin.  Initial troponin 908.  Noted with hyperglycemia, serum glucose 337.  EKG with T-wave changes.  Chest x-ray impression without acute abnormality.  CTA chest abdomen impression with mild cardiomegaly with scattered coronary artery calcifications (predominantly in the LAD and circumflex distribution); calcified plaques in the thoracoabdominal aorta and iliacs with no aneurysm.  ED discussed case with Cardiology, heparin drip initiated.  Admit to hospital medicine for further eval.    Overview/Hospital Course:  Patient with medical history of diabetes, hypertension, hyperlipidemia came in with chest pain and was admitted for NSTEMI.  Status post angiogram on 08/30 showed severe multivessel CAD with thrombus in the distal RCA.  Patient was placed on heparin drip.   Cardiology and Cardiothoracic surgery were following patient.  Troponins were trended. Plan for CABG on 9/6/24 by Dr. Hung.  A1c 10.2 , so we made adjustments to glargine and SSI as needed.  Patient was treated with antibiotic therapy for urinary tract infection.  Final urine culture test confirmed E coli (ESBL species) for which patient received intravenous antibiotic therapy.  Patient was placed on contact isolation protocol.  Patient underwent the CABG as planned, and it went uneventfully. Poor motivation for therapy. CT x2 in place. Poor oral intake due to nausea.     Interval History:  Underwent CABG 9/6/24. CT x 2 in place, several complaints and poor motivation to get out of bed.   Poor oral intake, suggest glucerna to supplement during the day   Increase basal/prandial dosing insulin   Pacer wires removed     Review of Systems   Constitutional:  Positive for appetite change and fatigue.   Respiratory: Negative.     Cardiovascular: Negative.    Gastrointestinal: Negative.    Genitourinary: Negative.    Musculoskeletal:  Positive for back pain and gait problem.   Psychiatric/Behavioral:  Positive for dysphoric mood.      Objective:     Vital Signs (Most Recent):  Temp: 98.4 °F (36.9 °C) (09/09/24 1700)  Pulse: 86 (09/09/24 1700)  Resp: (!) 21 (09/09/24 1700)  BP: 124/62 (09/09/24 1700)  SpO2: 98 % (09/09/24 1700) Vital Signs (24h Range):  Temp:  [98.2 °F (36.8 °C)-98.5 °F (36.9 °C)] 98.4 °F (36.9 °C)  Pulse:  [65-95] 86  Resp:  [10-27] 21  SpO2:  [94 %-100 %] 98 %  BP: ()/(48-83) 124/62     Weight: 85.2 kg (187 lb 13.3 oz)  Body mass index is 29.42 kg/m².    Intake/Output Summary (Last 24 hours) at 9/9/2024 1726  Last data filed at 9/9/2024 1703  Gross per 24 hour   Intake 2358.33 ml   Output 1740 ml   Net 618.33 ml         Physical Exam  Constitutional:       Comments: Sitting up in chair in no distress    Eyes:      Conjunctiva/sclera:      Right eye: No exudate.     Left eye: No exudate.  Neck:       Vascular: No JVD.   Cardiovascular:      Rate and Rhythm: Normal rate and regular rhythm.   Pulmonary:      Breath sounds: Normal breath sounds.      Comments: Chest tubes x 2   Abdominal:      General: Abdomen is flat. Bowel sounds are decreased. There is no distension.      Palpations: Abdomen is soft.   Genitourinary:     Comments: [place] present   Skin:     General: Skin is warm.      Findings: No rash.             Significant Labs: All pertinent labs within the past 24 hours have been reviewed.    Significant Imaging: I have reviewed all pertinent imaging results/findings within the past 24 hours.    Assessment/Plan:      * NSTEMI (non-ST elevated myocardial infarction)  Status post angiogram on 08/30 showed severe multivessel CAD with thrombus in the distal RCA.    Underwent CABG x 5 on 9/6/24    UTI due to extended-spectrum beta lactamase (ESBL) producing Escherichia coli  Final urine cultures grew E coli (ESBL species).  Continue meropenem.  Observe contact isolation.      Stage 3a chronic kidney disease  Creatine stable for now. CMP reviewed- noted Estimated Creatinine Clearance: 62.4 mL/min (based on SCr of 0.9 mg/dL). according to latest data. Based on current GFR, CKD stage is stage 3 - GFR 30-59.  Monitor UOP and serial CMP and adjust therapy as needed. Renally dose meds. Avoid nephrotoxic medications and procedures.    Type 2 diabetes mellitus with microalbuminuria, with long-term current use of insulin  Patient's FSGs are controlled on current medication regimen.  Last A1c reviewed-   Lab Results   Component Value Date    LABA1C 9.2 (H) 07/03/2018    HGBA1C 10.2 (H) 08/30/2024     Most recent fingerstick glucose reviewed-   Recent Labs   Lab 09/05/24  2154 09/06/24  1901   POCTGLUCOSE 228* 163*       Patient will be on insulin drip post-op.    Antihyperglycemics (From admission, onward)      Start     Stop Route Frequency Ordered    09/06/24 1200  insulin regular in 0.9 % NaCl 100 unit/100 mL (1  unit/mL) infusion        Question:  Insulin rate changes (DO NOT MODIFY ANSWER)  Answer:  [email or web address]    -- IV Continuous 09/06/24 1049          Hold Oral hypoglycemics while patient is in the hospital.        Hyperlipidemia  Continue atorvastatin  Lipid panel reviewed.        VTE Risk Mitigation (From admission, onward)           Ordered     enoxaparin injection 40 mg  Every 24 hours         09/09/24 1531                    Discharge Planning   MARTHA: 9/11/2024     Code Status: Full Code   Is the patient medically ready for discharge?:     Reason for patient still in hospital (select all that apply): Patient trending condition  Discharge Plan A: Home with family   Discharge Delays: None known at this time        Critical care time spent on the evaluation and treatment of severe organ dysfunction, review of pertinent labs and imaging studies, discussions with consulting providers and discussions with patient/family: 15 minutes.      Nereyda Alegria MD  Department of Hospital Medicine   [facility]

## 2024-09-09 NOTE — PROGRESS NOTES
[facility]  Department of Cardiology  Progress Note    PATIENT NAME: Debby Brown  MRN: [id number]  TODAY'S DATE: 09/09/2024  ADMIT DATE: 8/30/2024    SUBJECTIVE     PRINCIPLE PROBLEM: NSTEMI (non-ST elevated myocardial infarction)    INTERVAL HISTORY:    9/9/2024      Ms. Brown is laying in bed in no acute distress. VSS. Just received one unit of PRBCs.      9/8/2024    Patient seen resting in bed with no acute distress noted.  She is back in sinus rhythm but did have some AFib overnight and was started on amiodarone.    9/7/24:  Patient seen resting in bed with no acute distress.  She is status post CABG and feeling well overall.  She is stable on telemetry.    9/6/24    Patient seen resting in bed comfortably.  She is stable on telemetry and feeling well overall.  No complaints of any chest discomfort or shortness of breath.    9/4/24    Patient seen resting in bed with no acute distress noted.  Denies any chest discomfort shortness of breath.  She is stable on telemetry at this time.    9/3/24    Patient for evaluation from CT surgery today.  Stable and doing well presently.    9/2/24    PATIENT WITH A NON ST-ELEVATION MI CURRENTLY IN ICU ON NITRO DRIP WAITING CARDIOVASCULAR SURGERY EVALUATION BY DR. SORIANO  SHE HAD CHEST PAIN TODAY FOR ABOUT 3 MINUTES OTHERWISE IS DOING WELL    Review of patient's allergies indicates:   Allergen Reactions    Bactrim [sulfamethoxazole-trimethoprim]     Codeine     Levaquin [levofloxacin]     Pcn [penicillins]        REVIEW OF SYSTEMS  CARDIOVASCULAR: No recent chest pain, palpitations, arm, neck, or jaw pain  RESPIRATORY: No recent fever, cough chills, SOB or congestion  : No blood in the urine  GI: No Nausea, vomiting, constipation, diarrhea, blood, or reflux.  MUSCULOSKELETAL: No myalgias  NEURO: No lightheadedness or dizziness  EYES: No Double vision, blurry, vision or headache     OBJECTIVE     VITAL SIGNS (Most Recent)  Temp: 98.5 °F (36.9 °C) (09/09/24  0813)  Pulse: 71 (09/09/24 0814)  Resp: 20 (09/09/24 0813)  BP: (!) 97/54 (09/09/24 0814)  SpO2: 98 % (09/09/24 0813)    VENTILATION STATUS  Resp: 20 (09/09/24 0813)  SpO2: 98 % (09/09/24 0813)           I & O (Last 24H):  Intake/Output Summary (Last 24 hours) at 9/9/2024 0903  Last data filed at 9/9/2024 0450  Gross per 24 hour   Intake 1429.15 ml   Output 1500 ml   Net -70.85 ml       WEIGHTS  Wt Readings from Last 3 Encounters:   09/05/24 0400 85.2 kg (187 lb 13.3 oz)   09/04/24 0400 85 kg (187 lb 6.3 oz)   09/03/24 0400 89.9 kg (198 lb 3.1 oz)   09/01/24 0353 88.8 kg (195 lb 12.3 oz)   08/30/24 1938 89.8 kg (197 lb 15.6 oz)   08/30/24 1349 86.2 kg (190 lb)   08/31/24 0920 89.4 kg (197 lb)   08/30/24 1007 89.1 kg (196 lb 6.9 oz)       PHYSICAL EXAM  CONSTITUTIONAL: Well built, well nourished in no apparent distress  NECK: no carotid bruit, no JVD  LUNGS: diminished, chest tubes in place   CHEST WALL: chest tubes in place; midsternal incision with dressing in place   HEART: regular rate and rhythm, S1, S2 normal, + murmur   ABDOMEN: soft, non-tender; bowel sounds normal; no masses,  no organomegaly  EXTREMITIES: Extremities normal, no edema  NEURO: AAO X 3    SCHEDULED MEDS:   albuterol-ipratropium  3 mL Nebulization Q6H    amiodarone  200 mg Oral TID    aspirin  81 mg Oral Daily    atorvastatin  80 mg Oral Daily    docusate sodium  100 mg Oral BID    insulin glargine U-100  20 Units Subcutaneous BID    meropenem IV (PEDS and ADULTS)  1 g Intravenous Q12H    metoprolol succinate  50 mg Oral Daily    pantoprazole  40 mg Oral Daily    polyethylene glycol  17 g Oral Daily       CONTINUOUS INFUSIONS:   0.45% NaCl   Intravenous Continuous   Stopped at 09/07/24 1015       PRN MEDS:  Current Facility-Administered Medications:     0.9%  NaCl infusion (for blood administration), , Intravenous, Q24H PRN    acetaminophen, 650 mg, Oral, Q6H PRN    albumin human 5%, 25 g, Intravenous, PRN    calcium gluconate IVPB, 1 g,  Intravenous, PRN    calcium gluconate IVPB, 2 g, Intravenous, PRN    calcium gluconate IVPB, 3 g, Intravenous, PRN    dextrose 50%, 12.5 g, Intravenous, PRN    dextrose 50%, 12.5 g, Intravenous, PRN    dextrose 50%, 25 g, Intravenous, PRN    dextrose 50%, 25 g, Intravenous, PRN    glucagon (human recombinant), 1 mg, Intramuscular, PRN    glucose, 16 g, Oral, PRN    glucose, 24 g, Oral, PRN    HYDROcodone-acetaminophen, 1 tablet, Oral, Q4H PRN    insulin aspart U-100, 0-15 Units, Subcutaneous, QID (AC + HS) PRN    LORazepam, 0.5 mg, Oral, Q6H PRN    magnesium sulfate IVPB, 2 g, Intravenous, PRN    magnesium sulfate IVPB, 4 g, Intravenous, PRN    morphine, 2 mg, Intravenous, Q1H PRN    morphine, 4 mg, Intravenous, PRN    ondansetron, 4 mg, Intravenous, Q6H PRN    potassium chloride in water, 20 mEq, Intravenous, PRN    potassium chloride in water, 20 mEq, Intravenous, PRN    potassium chloride in water, 40 mEq, Intravenous, PRN    sodium phosphate 15 mmol in D5W 250 mL IVPB, 15 mmol, Intravenous, PRN    sodium phosphate 20.01 mmol in D5W 250 mL IVPB, 20.01 mmol, Intravenous, PRN    sodium phosphate 30 mmol in D5W 250 mL IVPB, 30 mmol, Intravenous, PRN    LABS AND DIAGNOSTICS     CBC LAST 3 DAYS  Recent Labs   Lab 09/05/24  0040 09/05/24  0250 09/06/24  0338 09/06/24  0732 09/07/24  0336 09/08/24  0310 09/09/24  0446   WBC 10.22 10.20  10.20 9.31  9.31   < > 17.42* 20.60* 16.27*   RBC 4.41 4.43  4.43 4.55  4.55   < > 3.13* 2.83* 2.58*   HGB 12.8 12.6  12.6 12.8  12.8   < > 9.2* 8.4* 7.5*   HCT 38.6 38.7  38.7 39.8  39.8   < > 27.6* 25.3* 23.1*   MCV 88 87  87 88  88   < > 88 89 90   MCH 29.0 28.4  28.4 28.1  28.1   < > 29.4 29.7 29.1   MCHC 33.2 32.6  32.6 32.2  32.2   < > 33.3 33.2 32.5   RDW 14.0 14.0  14.0 14.0  14.0   < > 14.6* 15.2* 15.2*    305  305 311  311   < > 159 182 179   MPV 10.6 10.4  10.4 10.6  10.6   < > 10.8 11.6 11.5   GRAN 48.3  4.9 48.4  48.4  4.9  4.9 46.1  46.1   "4.3  4.3  --   --   --   --    LYMPH 38.6  4.0 38.7  38.7  4.0  4.0 39.8  39.8  3.7  3.7  --   --   --   --    MONO 8.2  0.8 7.8  7.8  0.8  0.8 8.7  8.7  0.8  0.8  --   --   --   --    BASO 0.07 0.06  0.06 0.07  0.07  --   --   --   --    NRBC 0 0  0 0  0  --   --   --   --     < > = values in this interval not displayed.       COAGULATION LAST 3 DAYS  Recent Labs   Lab 09/05/24  1431 09/05/24  2335 09/06/24  0338   APTT 35.9* 77.2* 34.5*  34.5*       CHEMISTRY LAST 3 DAYS  Recent Labs   Lab 09/06/24  1656 09/06/24  1803 09/06/24  1939 09/06/24 2001 09/07/24  0336 09/08/24  0310 09/09/24  0446   NA  --   --   --    < > 144 136 135*   K  --   --   --    < > 4.7 4.6 4.1   CL  --   --   --    < > 112* 103 103   CO2  --   --   --    < > 22* 25 27   ANIONGAP  --   --   --    < > 10 8 5*   BUN  --   --   --    < > 17 29* 31*   CREATININE  --   --   --    < > 1.0 1.3 1.2   GLU  --   --   --    < > 236* 282* 188*   CALCIUM  --   --   --    < > 8.0* 8.3* 7.9*   PH 7.335* 7.294* 7.357  --   --   --   --    MG  --   --   --    < > 1.9 2.3 2.3   ALBUMIN  --   --   --   --  3.5 3.4* 3.0*   PROT  --   --   --   --  5.2* 5.5* 5.2*   ALKPHOS  --   --   --   --  30* 39* 43*   ALT  --   --   --   --  38 24 17   AST  --   --   --   --  54* 28 22   BILITOT  --   --   --   --  0.6 0.6 0.7    < > = values in this interval not displayed.       CARDIAC PROFILE LAST 3 DAYS  No results for input(s): "BNP", "CPK", "CPKMB", "LDH", "TROPONINI" in the last 168 hours.      ENDOCRINE LAST 3 DAYS  No results for input(s): "TSH", "PROCAL" in the last 168 hours.      LAST ARTERIAL BLOOD GAS  ABG  Recent Labs   Lab 09/06/24  1939   PH 7.357   PO2 79*   PCO2 43.7   HCO3 24.5   BE -1       LAST 7 DAYS MICROBIOLOGY   Microbiology Results (last 7 days)       Procedure Component Value Units Date/Time    Urine culture [[id number]]  (Abnormal)  (Susceptibility) Collected: 09/01/24 0305    Order Status: Completed Specimen: Urine Updated: " 09/03/24 0702     Urine Culture, Routine ESCHERICHIA COLI ESBL  >100,000 cfu/ml  Results called to and read back by Sandra Ray RN on [facility] 09/03/2024    07:01 ncb      Narrative:      Specimen Source->Urine            MOST RECENT IMAGING  X-Ray Chest AP Portable  Narrative: EXAMINATION:  XR CHEST AP PORTABLE    CLINICAL HISTORY:  post op CABG;    FINDINGS:  Portable chest with comparison chest x-ray 09/08/2024.  Normal cardiomediastinal silhouette.Median sternotomy wires and left internal jugular central venous catheter again noted.  Mediastinal chest drains again noted.  Left perihilar bandlike opacity left lower lung linear opacities likely reflecting atelectasis are unchanged.  Right lung is clear.  No pneumothorax.  Pulmonary vasculature is normal. No acute osseous abnormality.  Impression: 1. Status post CABG.  2. Band like and linear opacities of the left lung reflecting atelectasis are unchanged.    Electronically signed by: Antoine Perez  Date:    09/09/2024  Time:    07:09      [facility]  Results for orders placed during the hospital encounter of 08/30/24    Echo    Interpretation Summary    Left Ventricle: The left ventricle is normal in size. Mildly increased wall thickness. There is mild concentric hypertrophy. There is normal systolic function with a visually estimated ejection fraction of 60 - 65%. There is diastolic dysfunction.    Right Ventricle: Normal right ventricular cavity size. Wall thickness is normal. Systolic function is normal.    Aortic Valve: The aortic valve is a trileaflet valve. There is moderate aortic valve sclerosis. Moderately restricted motion. There is moderate stenosis. Aortic valve area by VTI is 1.18 cm². Aortic valve peak velocity is 2.28 m/s. Mean gradient is 12 mmHg. The dimensionless index is 0.38.    Mitral Valve: There is mild bileaflet sclerosis.    Tricuspid Valve: There is mild regurgitation with a centrally directed jet.    Pulmonary Artery: The estimated  pulmonary artery systolic pressure is 26 mmHg.    IVC/SVC: Normal venous pressure at 3 mmHg.      CURRENT/PREVIOUS VISIT EKG  Results for orders placed or performed during the hospital encounter of 08/30/24   EKG 12-lead    Collection Time: 09/08/24  2:05 AM   Result Value Ref Range    QRS Duration 90 ms    [facility] QTC Calculation 457 ms    Narrative    Test Reason : I25.10,    Vent. Rate : 120 BPM     Atrial Rate : 000 BPM     P-R Int : 000 ms          QRS Dur : 090 ms      QT Int : 324 ms       P-R-T Axes : 000 -08 039 degrees     QTc Int : 457 ms    Atrial fibrillation with rapid ventricular response  Abnormal ECG  When compared with ECG of 07-SEP-2024 07:29,  Atrial fibrillation has replaced Sinus rhythm  Vent. rate has increased BY  40 BPM    Referred By: [facility]   SELF           Confirmed By:        ASSESSMENT/PLAN:     Active Hospital Problems    Diagnosis    *NSTEMI (non-ST elevated myocardial infarction)    UTI due to extended-spectrum beta lactamase (ESBL) producing Escherichia coli    Stage 3a chronic kidney disease    Type 2 diabetes mellitus with microalbuminuria, with long-term current use of insulin    Hyperlipidemia       ASSESSMENT & PLAN:     NSTEMI/ MVCAD s/p CABG  Postop Afib-currently in NSR  DIABETES  CKD  HYPERLIPIDEMIA      RECOMMENDATIONS:    Change to amiodarone 200 mg p.o. t.i.d. x 4 more days followed by 200 mg p.o. b.i.d..  Continue metoprolol succinate 50 mg p.o. daily.  If she goes back into AFIB she will need NOAC on DC  Continue statin and aspirin.  Consider adding plavix once Hg remains stable as she is NSTEMI  Post op expected blood loss anemia s/p transfusion    Elif Pinzon NP  [facility]  Department of Cardiology  Date of Service: 09/09/2024 10:37 AM

## 2024-09-09 NOTE — RESPIRATORY THERAPY
09/1950   Patient Assessment/Suction   Level of Consciousness (AVPU) alert   Respiratory Effort Normal;Unlabored   Expansion/Accessory Muscles/Retractions no use of accessory muscles   All Lung Fields Breath Sounds Anterior:;diminished   Rhythm/Pattern, Respiratory unlabored;pattern regular;depth regular;no shortness of breath reported   Skin Integrity   $ Wound Care Tech Time 15 min   Area Observed Left;Right;Behind ear;Cheek;Upper lip;Nares   Skin Appearance without discoloration   PRE-TX-O2   Device (Oxygen Therapy) nasal cannula   Flow (L/min) (Oxygen Therapy) 2   SpO2 96 %   Pulse Oximetry Type Continuous   $ Pulse Oximetry - Multiple Charge Pulse Oximetry - Multiple   Pulse 71   Resp 20   Aerosol Therapy   $ Aerosol Therapy Charges Aerosol Treatment   Daily Review of Necessity (SVN) completed   Respiratory Treatment Status (SVN) given   Treatment Route (SVN) mask;air   Patient Position HOB elevated   Post Treatment Assessment (SVN) increased aeration   Signs of Intolerance (SVN) none   Breath Sounds Post-Respiratory Treatment   Throughout All Fields Post-Treatment aeration increased   Post-treatment Heart Rate (beats/min) 71   Post-treatment Resp Rate (breaths/min) 20   Education   $ Education 15 min;DME Oxygen;Bronchodilator

## 2024-09-09 NOTE — PROGRESS NOTES
CVT SURGERY PROGRESS NOTE  Debby Brown  74 y.o.  1950    Patient's Chief Complaint:  NSTEMI (non-ST elevated myocardial infarction)    HPI:  The patient is a 74-year-old obese  female with uncontrolled hypertension, and uncontrolled type 2 diabetes.     The patient presented to the hospital emergency department 08/30/2024 with a several month history of exertional angina associated with dyspnea and nausea.  The angina always resolved with rest.  The discomfort has been progressive in intensity and frequency over the last couple of weeks.     The patient had a particularly severe and intense episode of chest discomfort which prompted her visit to the emergency department yesterday.     By the time she arrived in the emergency department, the pain had nearly completely resolved.  She was treated with aspirin and heparin.  There were no acute EKG changes but the troponin I was elevated and rising.     Echocardiography this morning demonstrates a preserved ejection fraction (60%).  There is normal right ventricular function.  The patient demonstrates moderate aortic valve stenosis.  The transvalvular velocities are 228.  The mean gradient is 12.  There is mild TR and the pulmonary artery pressures are estimated at 26.     The patient underwent urgent left heart catheterization with coronary angiography the evening of 08/30/2024.     Coronary angiography demonstrates critical and very diffuse coronary artery disease.  The branch coronary arteries are very small in caliber.     The patient has uncontrolled type 2 diabetes.  Her current A1c is 10.4.  At home, she is prescribed Farxiga, Toujeo, Actos, Ozempic, and Trulicity.     She has a history of uncontrolled hypertension.  Her blood pressure has been fairly well controlled since her hospital admission.     The patient is seen in the ICU this afternoon.  She has multiple family members at her bedside.  She is on heparin and nitroglycerin drips.  She is  "currently chest pain-free.  She has been chest pain-free since her hospital admission.  She denies symptoms of congestive heart failure other than the exertional dyspnea.     The patient is a poor historian.  She is noncompliant with medical instructions/prescriptions.  She is quite forgetful.    Last 24 hour interval:  -No events overnight  -BP soft  -BS elevated. Lantus adjusted yesterday.   -MS tube output last 24 hours - 280cc, to suction, no air leak  -Pleural Tube output last 24 hours - 120cc, to suction, no air leak  -Hgb 7.5 this AM - transfused 1 unit PRBCs  -Leukocytosis noted, afebrile  -Good UOP, renal function stable  -Patient seen this afternoon, sitting in BS chair in NAD on NC. Pain mostly controlled. Poor appetite. Using  IS some. Has not been very eager to get up and move.     Subjective:  Symptoms:  Stable.    Diet:  Poor intake.  No nausea or vomiting.    Activity level: Returning to normal.    Pain:  She complains of pain that is moderate.  She reports pain is improving.  Pain is partially controlled.                                                                   Objective:  General Appearance:  In no acute distress.    Vital signs: (most recent): Blood pressure 123/64, pulse 77, temperature 98.5 °F (36.9 °C), resp. rate 10, height 5' 7" (1.702 m), weight 85.2 kg (187 lb 13.3 oz), SpO2 100%.    Output: Producing urine.    HEENT: Normal HEENT exam.    Lungs:  Normal effort.  No rales (Bibasilar), wheezes or rhonchi.    Heart: Normal rate.  Regular rhythm.  No murmur, gallop or friction rub.   Chest: (Sternal incision with surgical dressing. Chest tubes in place, draining appropriately, to suction, no air leak.   )  Abdomen: Abdomen is soft and non-distended.  Bowel sounds are normal.   There is no abdominal tenderness.     Extremities: There is no local swelling.  (EVH sites dressed. )  Pulses: Distal pulses are intact.    Neurological: Patient is alert and oriented to person, place and time.  "   Skin:  Warm and dry.      Recent Vitals:  Vitals:    09/09/24 0900 09/09/24 1000 09/09/24 1130 09/09/24 1200   BP: (!) 107/57 (!) 107/48 (!) 115/55 96/64   Pulse: 68 68 95 90   Resp: 13 15 (!) 25 19   Temp:    98.5 °F (36.9 °C)   TempSrc:       SpO2: 100% 100% 100% 99%   Weight:       Height:           INPATIENT MEDS   0.45% NaCl   Intravenous Continuous   Stopped at 09/07/24 1015      albuterol-ipratropium  3 mL Nebulization Q6H    amiodarone  200 mg Oral TID    aspirin  81 mg Oral Daily    atorvastatin  80 mg Oral Daily    docusate sodium  100 mg Oral BID    insulin glargine U-100  20 Units Subcutaneous BID    meropenem IV (PEDS and ADULTS)  1 g Intravenous Q12H    metoprolol succinate  50 mg Oral Daily    pantoprazole  40 mg Oral Daily    polyethylene glycol  17 g Oral Daily       Current Facility-Administered Medications:     0.9%  NaCl infusion (for blood administration), , Intravenous, Q24H PRN    acetaminophen, 650 mg, Oral, Q6H PRN    albumin human 5%, 25 g, Intravenous, PRN    calcium gluconate IVPB, 1 g, Intravenous, PRN    calcium gluconate IVPB, 2 g, Intravenous, PRN    calcium gluconate IVPB, 3 g, Intravenous, PRN    dextrose 50%, 12.5 g, Intravenous, PRN    dextrose 50%, 12.5 g, Intravenous, PRN    dextrose 50%, 25 g, Intravenous, PRN    dextrose 50%, 25 g, Intravenous, PRN    glucagon (human recombinant), 1 mg, Intramuscular, PRN    glucose, 16 g, Oral, PRN    glucose, 24 g, Oral, PRN    HYDROcodone-acetaminophen, 1 tablet, Oral, Q4H PRN    insulin aspart U-100, 0-15 Units, Subcutaneous, QID (AC + HS) PRN    LORazepam, 0.5 mg, Oral, Q6H PRN    magnesium sulfate IVPB, 2 g, Intravenous, PRN    magnesium sulfate IVPB, 4 g, Intravenous, PRN    morphine, 2 mg, Intravenous, Q1H PRN    morphine, 4 mg, Intravenous, PRN    ondansetron, 4 mg, Intravenous, Q6H PRN    potassium chloride in water, 20 mEq, Intravenous, PRN    potassium chloride in water, 20 mEq, Intravenous, PRN    potassium chloride in water, 40  "mEq, Intravenous, PRN    sodium phosphate 15 mmol in D5W 250 mL IVPB, 15 mmol, Intravenous, PRN    sodium phosphate 20.01 mmol in D5W 250 mL IVPB, 20.01 mmol, Intravenous, PRN    sodium phosphate 30 mmol in D5W 250 mL IVPB, 30 mmol, Intravenous, PRN  HEMODYNAMICS      Recent O2 Therapy/Vent Settings: 2L NC    I/O last 24 hrs:  Intake/Output - Last 3 Shifts         09/07 0700  09/08 0659 09/08 0700  09/09 0659 09/09 0700  09/10 0659    P.O. 875 830 120    I.V. (mL/kg) 290.8 (3.4) 299.2 (3.5)     Blood       IV Piggyback 150 300     Total Intake(mL/kg) 1315.8 (15.4) 1429.2 (16.8) 120 (1.4)    Urine (mL/kg/hr) 796 (0.4) 1100 (0.5)     Chest Tube 489 400     Total Output 1285 1500     Net +30.8 -70.9 +120                 Recent Cardiac Rhythm: SR    Recent Pain Assessment: 0    CBC  Recent Labs   Lab 09/07/24  0336 09/08/24  0310 09/09/24  0446   WBC 17.42* 20.60* 16.27*   RBC 3.13* 2.83* 2.58*   HGB 9.2* 8.4* 7.5*    182 179   MCV 88 89 90   MCH 29.4 29.7 29.1   MCHC 33.3 33.2 32.5     BMP  Recent Labs   Lab 09/07/24  0336 09/08/24  0310 09/09/24  0446   CO2 22* 25 27   BUN 17 29* 31*   CREATININE 1.0 1.3 1.2   CALCIUM 8.0* 8.3* 7.9*     CARDIAC ENZYMES  No results for input(s): "TROPONINI", "CPK", "CKMB" in the last 168 hours.  BNP  No results for input(s): "BNP" in the last 168 hours.  PT/INR  INR   Date Value Ref Range Status   08/30/2024 1.0 0.8 - 1.2 Final     Comment:     Coumadin Therapy:  2.0 - 3.0 for INR for all indicators except mechanical heart valves  and antiphospholipid syndromes which should use 2.5 - 3.5.     08/30/2024 1.0 0.8 - 1.2 Final     Comment:     Coumadin Therapy:  2.0 - 3.0 for INR for all indicators except mechanical heart valves  and antiphospholipid syndromes which should use 2.5 - 3.5.     09/04/2021 1.1 0.8 - 1.2 Final     Comment:     Coumadin Therapy:  2.0 - 3.0 for INR for all indicators except mechanical heart valves  and antiphospholipid syndromes which should use 2.5 - " 3.5.       DIAGNOSTIC RESULTS:  X-Ray Chest AP Portable  Narrative: EXAMINATION:  XR CHEST AP PORTABLE    CLINICAL HISTORY:  post op CABG;    FINDINGS:  Portable chest with comparison chest x-ray 09/08/2024.  Normal cardiomediastinal silhouette.Median sternotomy wires and left internal jugular central venous catheter again noted.  Mediastinal chest drains again noted.  Left perihilar bandlike opacity left lower lung linear opacities likely reflecting atelectasis are unchanged.  Right lung is clear.  No pneumothorax.  Pulmonary vasculature is normal. No acute osseous abnormality.  Impression: 1. Status post CABG.  2. Band like and linear opacities of the left lung reflecting atelectasis are unchanged.    Electronically signed by: Antoine Perez  Date:    09/09/2024  Time:    07:09      ECG Results              EKG 12-lead (Final result)        Collection Time Result Time QRS Duration OHS QTC Calculation    08/30/24 13:46:21 09/01/24 17:52:28 90 443                     Final result by Interface, Lab In [facility] (09/01/24 17:52:38)                   Narrative:    Test Reason : R07.9,    Vent. Rate : 110 BPM     Atrial Rate : 110 BPM     P-R Int : 204 ms          QRS Dur : 090 ms      QT Int : 328 ms       P-R-T Axes : 062 -18 083 degrees     QTc Int : 443 ms    Sinus tachycardia  Otherwise normal ECG  When compared with ECG of 30-AUG-2024 10:06,  No significant change was found  Confirmed by Renea BROWNING, Primitivo TEE ([id number]) on 9/1/2024 5:52:25 PM    Referred By: [email or web address]   SELF           Confirmed By:Primitivo Meier MD                                     EKG 12-lead (Final result)  Result time 09/04/24 11:31:31      Final result by Unknown User (09/04/24 11:31:31)                                         EKG 12-lead (Final result)  Result time 09/04/24 12:41:13      Final result by Unknown User (09/04/24 12:41:13)                                        CURRENT CONSULTS:  IP CONSULT TO CARDIOLOGY  IP CONSULT TO REGISTERED  DIETITIAN/NUTRITIONIST  IP CONSULT TO SOCIAL WORK/CASE MANAGEMENT  IP CONSULT TO DIABETES EDUCATOR  IP CONSULT TO CARDIAC REHAB    ASSESSMENT/PLAN:    NSTEMI  Multivessel CAD  S/p CABG x 5 (LIMA to LAD, L radial to ramus, SVG to PDA, SVG to PLB, SVG from LUIS to OM) by Dr. Garcia on 9/6/11  -Hemodynamically stable  -Tolerating NC - wean as tolerated  -Mediastinal tube and pleural tube to remain until output decreases  -Pacer wires removed 9/9/24  -Surgical incisions with surgical dressing   -Continue ASA, BB, statin  -Encourage IS  -Increase activity  -Jarrod hose  -Bowel regimen - on docusate. + flatus, no BM yet.     Postoperative atrial fibrillation  -A-fib with RVR on 9/7 - now back in SR   -Not on full dose AC at this time, but will need it if she has recurrence per Cardiology  -Continue amiodarone and lopressor  -Keep Mag > 2 and K> 4    Acute post operative blood loss anemia  -Expected post op  -Received 407 cc Cell Saver  -Hgb 7.5 today - received 1 unit PRBCs  -Monitor    Thrombocytopenia  -Resolved    Leukocytosis  -Afebrile  -Likely reactive  -Monitor    IDDM  -Insulin gtt off 9/7  -BS elevated  -Lantus increased to 20u BID last night  -Continue HDSSI  -Goal < 180  -Last A1C 10.2%  -On Toujeo 70 u daily, Farxiga 10mg daily, Actos 15mg daily, Ozempic 0.5mg weekly, and Trulicity 0.75 mg weekly at home    HTN  -BP soft  -Goal < 140/90  -Monitor    HLD  -Continue statin    Stage III CKD  -Renal function stable, good UOP  -Avoid nephrotoxins and hypotension    UTI  -Final urine cultures grew E coli (ESBL species).   -Continue meropenem.     Hypomagnesemia  Hypokalemia  -Replaced per protocol  -Keep Mag > 2 and K > 4    Mild- moderate AS  -SONIDO 1.18 cm2, MG 12 mmHg, PV 2.28 m/s, EF = 60-65%  -Monitor      Case and plan of care discussed with MD      GI Prophylaxis:  PPI:proton pump inhibitor per orders  DVT Prophylaxis: Start Lovenox when pacing wires removed.   Anticoagulants   Medication Route Frequency        Ana Maria Crowell PA-C  Cardiovascular Thoracic Surgery  9/9/2024  12:57 PM

## 2024-09-09 NOTE — SUBJECTIVE & OBJECTIVE
Interval History:  Underwent CABG 9/6/24. CT x 2 in place, several complaints and poor motivation to get out of bed.   Poor oral intake, suggest glucerna to supplement during the day   Increase basal/prandial dosing insulin   Pacer wires removed     Review of Systems   Constitutional:  Positive for appetite change and fatigue.   Respiratory: Negative.     Cardiovascular: Negative.    Gastrointestinal: Negative.    Genitourinary: Negative.    Musculoskeletal:  Positive for back pain and gait problem.   Psychiatric/Behavioral:  Positive for dysphoric mood.      Objective:     Vital Signs (Most Recent):  Temp: 98.4 °F (36.9 °C) (09/09/24 1700)  Pulse: 86 (09/09/24 1700)  Resp: (!) 21 (09/09/24 1700)  BP: 124/62 (09/09/24 1700)  SpO2: 98 % (09/09/24 1700) Vital Signs (24h Range):  Temp:  [98.2 °F (36.8 °C)-98.5 °F (36.9 °C)] 98.4 °F (36.9 °C)  Pulse:  [65-95] 86  Resp:  [10-27] 21  SpO2:  [94 %-100 %] 98 %  BP: ()/(48-83) 124/62     Weight: 85.2 kg (187 lb 13.3 oz)  Body mass index is 29.42 kg/m².    Intake/Output Summary (Last 24 hours) at 9/9/2024 1726  Last data filed at 9/9/2024 1703  Gross per 24 hour   Intake 2358.33 ml   Output 1740 ml   Net 618.33 ml         Physical Exam  Constitutional:       Comments: Sitting up in chair in no distress    Eyes:      Conjunctiva/sclera:      Right eye: No exudate.     Left eye: No exudate.  Neck:      Vascular: No JVD.   Cardiovascular:      Rate and Rhythm: Normal rate and regular rhythm.   Pulmonary:      Breath sounds: Normal breath sounds.      Comments: Chest tubes x 2   Abdominal:      General: Abdomen is flat. Bowel sounds are decreased. There is no distension.      Palpations: Abdomen is soft.   Genitourinary:     Comments: [place] present   Skin:     General: Skin is warm.      Findings: No rash.             Significant Labs: All pertinent labs within the past 24 hours have been reviewed.    Significant Imaging: I have reviewed all pertinent imaging  results/findings within the past 24 hours.

## 2024-09-09 NOTE — ANESTHESIA POSTPROCEDURE EVALUATION
Anesthesia Post Evaluation    Patient: Debby Brown    Procedure(s) Performed: Procedure(s) (LRB):  CORONARY ARTERY BYPASS GRAFT (CABG) (N/A)  SURGICAL PROCUREMENT,ARTERY,RADIAL,FOR CABG (Left)  HARVEST-VEIN-ENDOVASCULAR (Left)    Final Anesthesia Type: general      Patient location during evaluation: ICU  Patient participation: No - Unable to Participate, Intubation  Level of consciousness: sedated  Post-procedure vital signs: reviewed and stable  Pain management: adequate  Airway patency: patent      Anesthetic complications: no      Cardiovascular status: hemodynamically stable  Respiratory status: ETT and ventilator  Hydration status: euvolemic  Follow-up not needed.            No case tracking events are documented in the log.      Pain/Deondre Score: Pain Rating Prior to Med Admin: 3 (9/8/2024  7:44 AM)  Pain Rating Post Med Admin: 1 (9/8/2024  8:44 AM)

## 2024-09-09 NOTE — NURSING
Patient assessment completed at this time. No needs or complaints. Patient voiced understanding of room and call light. Care ongoing.

## 2024-09-10 LAB
ANION GAP SERPL CALC-SCNC: 10 MMOL/L (ref 8–16)
BUN SERPL-MCNC: 28 MG/DL (ref 8–23)
CALCIUM SERPL-MCNC: 8.2 MG/DL (ref 8.7–10.5)
CHLORIDE SERPL-SCNC: 104 MMOL/L (ref 95–110)
CO2 SERPL-SCNC: 24 MMOL/L (ref 23–29)
CREAT SERPL-MCNC: 1.1 MG/DL (ref 0.5–1.4)
ERYTHROCYTE [DISTWIDTH] IN BLOOD BY AUTOMATED COUNT: 15.5 % (ref 11.5–14.5)
EST. GFR  (NO RACE VARIABLE): 53.1 ML/MIN/1.73 M^2
GLUCOSE SERPL-MCNC: 126 MG/DL (ref 70–110)
HCT VFR BLD AUTO: 28.2 % (ref 37–48.5)
HGB BLD-MCNC: 9.3 G/DL (ref 12–16)
MCH RBC QN AUTO: 29.4 PG (ref 27–31)
MCHC RBC AUTO-ENTMCNC: 33 G/DL (ref 32–36)
MCV RBC AUTO: 89 FL (ref 82–98)
PHOSPHATE SERPL-MCNC: 2.5 MG/DL (ref 2.7–4.5)
PLATELET # BLD AUTO: 248 K/UL (ref 150–450)
PLATELET BLD QL SMEAR: NORMAL
PMV BLD AUTO: 11 FL (ref 9.2–12.9)
POCT GLUCOSE: 124 MG/DL (ref 70–110)
POCT GLUCOSE: 144 MG/DL (ref 70–110)
POCT GLUCOSE: 166 MG/DL (ref 70–110)
POTASSIUM SERPL-SCNC: 4.1 MMOL/L (ref 3.5–5.1)
RBC # BLD AUTO: 3.16 M/UL (ref 4–5.4)
SODIUM SERPL-SCNC: 138 MMOL/L (ref 136–145)
WBC # BLD AUTO: 14.47 K/UL (ref 3.9–12.7)

## 2024-09-10 PROCEDURE — 25000003 PHARM REV CODE 250: Performed by: THORACIC SURGERY (CARDIOTHORACIC VASCULAR SURGERY)

## 2024-09-10 PROCEDURE — 27000207 HC ISOLATION

## 2024-09-10 PROCEDURE — 99232 SBSQ HOSP IP/OBS MODERATE 35: CPT | Mod: ,,, | Performed by: INTERNAL MEDICINE

## 2024-09-10 PROCEDURE — 80048 BASIC METABOLIC PNL TOTAL CA: CPT | Performed by: THORACIC SURGERY (CARDIOTHORACIC VASCULAR SURGERY)

## 2024-09-10 PROCEDURE — 84100 ASSAY OF PHOSPHORUS: CPT | Performed by: THORACIC SURGERY (CARDIOTHORACIC VASCULAR SURGERY)

## 2024-09-10 PROCEDURE — 25000003 PHARM REV CODE 250: Performed by: STUDENT IN AN ORGANIZED HEALTH CARE EDUCATION/TRAINING PROGRAM

## 2024-09-10 PROCEDURE — 94799 UNLISTED PULMONARY SVC/PX: CPT

## 2024-09-10 PROCEDURE — 82962 GLUCOSE BLOOD TEST: CPT

## 2024-09-10 PROCEDURE — 63600175 PHARM REV CODE 636 W HCPCS: Performed by: THORACIC SURGERY (CARDIOTHORACIC VASCULAR SURGERY)

## 2024-09-10 PROCEDURE — 25000003 PHARM REV CODE 250: Performed by: NURSE PRACTITIONER

## 2024-09-10 PROCEDURE — 85027 COMPLETE CBC AUTOMATED: CPT | Performed by: THORACIC SURGERY (CARDIOTHORACIC VASCULAR SURGERY)

## 2024-09-10 PROCEDURE — 99900031 HC PATIENT EDUCATION (STAT)

## 2024-09-10 PROCEDURE — 63600175 PHARM REV CODE 636 W HCPCS: Performed by: PHYSICIAN ASSISTANT

## 2024-09-10 PROCEDURE — 94761 N-INVAS EAR/PLS OXIMETRY MLT: CPT

## 2024-09-10 PROCEDURE — 20000000 HC ICU ROOM

## 2024-09-10 PROCEDURE — 99024 POSTOP FOLLOW-UP VISIT: CPT | Mod: POP,,, | Performed by: PHYSICIAN ASSISTANT

## 2024-09-10 PROCEDURE — 99900035 HC TECH TIME PER 15 MIN (STAT)

## 2024-09-10 RX ORDER — DIPHENHYDRAMINE HCL 25 MG
25 CAPSULE ORAL ONCE
Status: COMPLETED | OUTPATIENT
Start: 2024-09-10 | End: 2024-09-10

## 2024-09-10 RX ADMIN — ENOXAPARIN SODIUM 40 MG: 40 INJECTION SUBCUTANEOUS at 04:09

## 2024-09-10 RX ADMIN — AMIODARONE HYDROCHLORIDE 200 MG: 200 TABLET ORAL at 08:09

## 2024-09-10 RX ADMIN — ASPIRIN 81 MG 81 MG: 81 TABLET ORAL at 08:09

## 2024-09-10 RX ADMIN — MEROPENEM 1 G: 1 INJECTION, POWDER, FOR SOLUTION INTRAVENOUS at 02:09

## 2024-09-10 RX ADMIN — POLYETHYLENE GLYCOL 3350 17 G: 17 POWDER, FOR SOLUTION ORAL at 08:09

## 2024-09-10 RX ADMIN — INSULIN GLARGINE 20 UNITS: 100 INJECTION, SOLUTION SUBCUTANEOUS at 08:09

## 2024-09-10 RX ADMIN — DOCUSATE SODIUM 100 MG: 100 CAPSULE, LIQUID FILLED ORAL at 08:09

## 2024-09-10 RX ADMIN — METOPROLOL SUCCINATE 50 MG: 50 TABLET, FILM COATED, EXTENDED RELEASE ORAL at 08:09

## 2024-09-10 RX ADMIN — ATORVASTATIN CALCIUM 80 MG: 40 TABLET, FILM COATED ORAL at 08:09

## 2024-09-10 RX ADMIN — MORPHINE SULFATE 2 MG: 4 INJECTION, SOLUTION INTRAMUSCULAR; INTRAVENOUS at 04:09

## 2024-09-10 RX ADMIN — SODIUM PHOSPHATE, MONOBASIC, MONOHYDRATE AND SODIUM PHOSPHATE, DIBASIC, ANHYDROUS 15 MMOL: 142; 276 INJECTION, SOLUTION INTRAVENOUS at 05:09

## 2024-09-10 RX ADMIN — PANTOPRAZOLE SODIUM 40 MG: 40 TABLET, DELAYED RELEASE ORAL at 05:09

## 2024-09-10 RX ADMIN — HYDROCODONE BITARTRATE AND ACETAMINOPHEN 1 TABLET: 5; 325 TABLET ORAL at 11:09

## 2024-09-10 RX ADMIN — DIPHENHYDRAMINE HYDROCHLORIDE 25 MG: 25 CAPSULE ORAL at 12:09

## 2024-09-10 RX ADMIN — AMIODARONE HYDROCHLORIDE 200 MG: 200 TABLET ORAL at 03:09

## 2024-09-10 RX ADMIN — HYDROCODONE BITARTRATE AND ACETAMINOPHEN 1 TABLET: 5; 325 TABLET ORAL at 03:09

## 2024-09-10 NOTE — RESPIRATORY THERAPY
09/09/24 1950   Patient Assessment/Suction   Level of Consciousness (AVPU) alert   Respiratory Effort Normal;Unlabored   Expansion/Accessory Muscles/Retractions no use of accessory muscles   All Lung Fields Breath Sounds Anterior:;clear;diminished   Rhythm/Pattern, Respiratory unlabored;pattern regular;depth regular;no shortness of breath reported   Skin Integrity   $ Wound Care Tech Time 15 min   Area Observed Left;Right;Behind ear;Cheek;Upper lip;Nares   Skin Appearance without discoloration   PRE-TX-O2   Device (Oxygen Therapy) nasal cannula   Flow (L/min) (Oxygen Therapy) 3   SpO2 100 %   Pulse Oximetry Type Continuous   $ Pulse Oximetry - Multiple Charge Pulse Oximetry - Multiple   Pulse 92   Resp 20   Aerosol Therapy   $ Aerosol Therapy Charges PRN treatment not required   Daily Review of Necessity (SVN) completed   Respiratory Treatment Status (SVN) PRN treatment not required   Incentive Spirometer   $ Incentive Spirometer Charges done with encouragement   Incentive Spirometer Predicted Level (mL) 1500   Administration (IS) self-administered   Level Incentive Spirometer (mL) 1000   Patient Tolerance (IS) no adverse signs/symptoms present   Education   $ Education 15 min;Incentive Spirometry;DME Oxygen;Other (see comment)

## 2024-09-10 NOTE — PROGRESS NOTES
[facility]  Department of Cardiology  Progress Note    PATIENT NAME: Debby Brown  MRN: [id number]  TODAY'S DATE: 09/10/2024  ADMIT DATE: 8/30/2024    SUBJECTIVE     PRINCIPLE PROBLEM: NSTEMI (non-ST elevated myocardial infarction)    INTERVAL HISTORY:    9/10/2024    Doing well. Sitting up in the chair in no distress. Eating lunch. Hg 9.3.      9/9/2024  Ms. Brown is laying in bed in no acute distress. VSS. Just received one unit of PRBCs.      9/8/2024    Patient seen resting in bed with no acute distress noted.  She is back in sinus rhythm but did have some AFib overnight and was started on amiodarone.    9/7/24:  Patient seen resting in bed with no acute distress.  She is status post CABG and feeling well overall.  She is stable on telemetry.    9/6/24    Patient seen resting in bed comfortably.  She is stable on telemetry and feeling well overall.  No complaints of any chest discomfort or shortness of breath.    9/4/24    Patient seen resting in bed with no acute distress noted.  Denies any chest discomfort shortness of breath.  She is stable on telemetry at this time.    9/3/24    Patient for evaluation from CT surgery today.  Stable and doing well presently.    9/2/24    PATIENT WITH A NON ST-ELEVATION MI CURRENTLY IN ICU ON NITRO DRIP WAITING CARDIOVASCULAR SURGERY EVALUATION BY DR. SORIANO  SHE HAD CHEST PAIN TODAY FOR ABOUT 3 MINUTES OTHERWISE IS DOING WELL    Review of patient's allergies indicates:   Allergen Reactions    Bactrim [sulfamethoxazole-trimethoprim]     Codeine     Levaquin [levofloxacin]     Pcn [penicillins]        REVIEW OF SYSTEMS  CARDIOVASCULAR: No recent chest pain, palpitations, arm, neck, or jaw pain  RESPIRATORY: No recent fever, cough chills, SOB or congestion  : No blood in the urine  GI: No Nausea, vomiting, constipation, diarrhea, blood, or reflux.  MUSCULOSKELETAL: No myalgias  NEURO: No lightheadedness or dizziness  EYES: No Double vision, blurry, vision or  headache     OBJECTIVE     VITAL SIGNS (Most Recent)  Temp: 98.5 °F (36.9 °C) (09/10/24 0701)  Pulse: 64 (09/10/24 0901)  Resp: 20 (09/10/24 0901)  BP: (!) 104/59 (09/10/24 0801)  SpO2: 99 % (09/10/24 0901)    VENTILATION STATUS  Resp: 20 (09/10/24 0901)  SpO2: 99 % (09/10/24 0901)           I & O (Last 24H):  Intake/Output Summary (Last 24 hours) at 9/10/2024 1308  Last data filed at 9/10/2024 0437  Gross per 24 hour   Intake 1050 ml   Output 1320 ml   Net -270 ml       WEIGHTS  Wt Readings from Last 3 Encounters:   09/05/24 0400 85.2 kg (187 lb 13.3 oz)   09/04/24 0400 85 kg (187 lb 6.3 oz)   09/03/24 0400 89.9 kg (198 lb 3.1 oz)   09/01/24 0353 88.8 kg (195 lb 12.3 oz)   08/30/24 1938 89.8 kg (197 lb 15.6 oz)   08/30/24 1349 86.2 kg (190 lb)   08/31/24 0920 89.4 kg (197 lb)   08/30/24 1007 89.1 kg (196 lb 6.9 oz)       PHYSICAL EXAM  CONSTITUTIONAL: Well built, well nourished in no apparent distress  NECK: no carotid bruit, no JVD  LUNGS: diminished, chest tubes in place   CHEST WALL: chest tubes in place; midsternal incision with dressing in place   HEART: regular rate and rhythm, S1, S2 normal, + systolic murmur   ABDOMEN: soft, non-tender; bowel sounds normal; no masses,  no organomegaly  EXTREMITIES: Extremities normal, no edema  NEURO: AAO X 3    SCHEDULED MEDS:   amiodarone  200 mg Oral TID    aspirin  81 mg Oral Daily    atorvastatin  80 mg Oral Daily    docusate sodium  100 mg Oral BID    enoxparin  40 mg Subcutaneous Q24H (prophylaxis, 1700)    insulin glargine U-100  20 Units Subcutaneous BID    meropenem IV (PEDS and ADULTS)  1 g Intravenous Q12H    metoprolol succinate  50 mg Oral Daily    pantoprazole  40 mg Oral Daily    polyethylene glycol  17 g Oral Daily       CONTINUOUS INFUSIONS:   0.45% NaCl   Intravenous Continuous   Stopped at 09/07/24 1015       PRN MEDS:  Current Facility-Administered Medications:     0.9%  NaCl infusion (for blood administration), , Intravenous, Q24H PRN    acetaminophen,  650 mg, Oral, Q6H PRN    albumin human 5%, 25 g, Intravenous, PRN    albuterol-ipratropium, 3 mL, Nebulization, Q6H PRN    calcium gluconate IVPB, 1 g, Intravenous, PRN    calcium gluconate IVPB, 2 g, Intravenous, PRN    calcium gluconate IVPB, 3 g, Intravenous, PRN    dextrose 50%, 12.5 g, Intravenous, PRN    dextrose 50%, 12.5 g, Intravenous, PRN    dextrose 50%, 25 g, Intravenous, PRN    dextrose 50%, 25 g, Intravenous, PRN    glucagon (human recombinant), 1 mg, Intramuscular, PRN    glucose, 16 g, Oral, PRN    glucose, 24 g, Oral, PRN    HYDROcodone-acetaminophen, 1 tablet, Oral, Q4H PRN    insulin aspart U-100, 0-15 Units, Subcutaneous, QID (AC + HS) PRN    LORazepam, 0.5 mg, Oral, Q6H PRN    magnesium sulfate IVPB, 2 g, Intravenous, PRN    magnesium sulfate IVPB, 4 g, Intravenous, PRN    morphine, 2 mg, Intravenous, Q1H PRN    morphine, 4 mg, Intravenous, PRN    ondansetron, 4 mg, Intravenous, Q6H PRN    potassium chloride in water, 20 mEq, Intravenous, PRN    potassium chloride in water, 20 mEq, Intravenous, PRN    potassium chloride in water, 40 mEq, Intravenous, PRN    sodium phosphate 15 mmol in D5W 250 mL IVPB, 15 mmol, Intravenous, PRN    sodium phosphate 20.01 mmol in D5W 250 mL IVPB, 20.01 mmol, Intravenous, PRN    sodium phosphate 30 mmol in D5W 250 mL IVPB, 30 mmol, Intravenous, PRN    LABS AND DIAGNOSTICS     CBC LAST 3 DAYS  Recent Labs   Lab 09/05/24  0040 09/05/24  0250 09/06/24  0338 09/06/24  0732 09/08/24  0310 09/09/24  0446 09/10/24  0415   WBC 10.22 10.20  10.20 9.31  9.31   < > 20.60* 16.27* 14.47*   RBC 4.41 4.43  4.43 4.55  4.55   < > 2.83* 2.58* 3.16*   HGB 12.8 12.6  12.6 12.8  12.8   < > 8.4* 7.5* 9.3*   HCT 38.6 38.7  38.7 39.8  39.8   < > 25.3* 23.1* 28.2*   MCV 88 87  87 88  88   < > 89 90 89   MCH 29.0 28.4  28.4 28.1  28.1   < > 29.7 29.1 29.4   MCHC 33.2 32.6  32.6 32.2  32.2   < > 33.2 32.5 33.0   RDW 14.0 14.0  14.0 14.0  14.0   < > 15.2* 15.2* 15.5*     "305  305 311  311   < > 182 179 248   MPV 10.6 10.4  10.4 10.6  10.6   < > 11.6 11.5 11.0   GRAN 48.3  4.9 48.4  48.4  4.9  4.9 46.1  46.1  4.3  4.3  --   --   --   --    LYMPH 38.6  4.0 38.7  38.7  4.0  4.0 39.8  39.8  3.7  3.7  --   --   --   --    MONO 8.2  0.8 7.8  7.8  0.8  0.8 8.7  8.7  0.8  0.8  --   --   --   --    BASO 0.07 0.06  0.06 0.07  0.07  --   --   --   --    NRBC 0 0  0 0  0  --   --   --   --     < > = values in this interval not displayed.       COAGULATION LAST 3 DAYS  Recent Labs   Lab 09/05/24  1431 09/05/24  2335 09/06/24  0338   APTT 35.9* 77.2* 34.5*  34.5*       CHEMISTRY LAST 3 DAYS  Recent Labs   Lab 09/06/24  1656 09/06/24  1803 09/06/24  1939 09/06/24  2001 09/07/24  0336 09/08/24  0310 09/09/24  0446 09/10/24  0415   NA  --   --   --    < > 144 136 135* 138   K  --   --   --    < > 4.7 4.6 4.1 4.1   CL  --   --   --    < > 112* 103 103 104   CO2  --   --   --    < > 22* 25 27 24   ANIONGAP  --   --   --    < > 10 8 5* 10   BUN  --   --   --    < > 17 29* 31* 28*   CREATININE  --   --   --    < > 1.0 1.3 1.2 1.1   GLU  --   --   --    < > 236* 282* 188* 126*   CALCIUM  --   --   --    < > 8.0* 8.3* 7.9* 8.2*   PH 7.335* 7.294* 7.357  --   --   --   --   --    MG  --   --   --    < > 1.9 2.3 2.3  --    ALBUMIN  --   --   --   --  3.5 3.4* 3.0*  --    PROT  --   --   --   --  5.2* 5.5* 5.2*  --    ALKPHOS  --   --   --   --  30* 39* 43*  --    ALT  --   --   --   --  38 24 17  --    AST  --   --   --   --  54* 28 22  --    BILITOT  --   --   --   --  0.6 0.6 0.7  --     < > = values in this interval not displayed.       CARDIAC PROFILE LAST 3 DAYS  No results for input(s): "BNP", "CPK", "CPKMB", "LDH", "TROPONINI" in the last 168 hours.      ENDOCRINE LAST 3 DAYS  No results for input(s): "TSH", "PROCAL" in the last 168 hours.      LAST ARTERIAL BLOOD GAS  ABG  Recent Labs   Lab 09/06/24 1939   PH 7.357   PO2 79*   PCO2 43.7   HCO3 24.5   BE -1       LAST 7 " DAYS MICROBIOLOGY   Microbiology Results (last 7 days)       ** No results found for the last 168 hours. **            MOST RECENT IMAGING  X-Ray Chest AP Portable  Narrative: EXAMINATION:  XR CHEST AP PORTABLE    CLINICAL HISTORY:  post op cabg;    FINDINGS:  Portable chest at 04:15 is compared to 09/09/2024 shows mild cardiomegaly.  There are median sternotomy wires.  The left IJ catheter mediastinal drains and bilateral chest tubes are in stable position.    There is hypoinflation with left lower lobe atelectasis.  There are no confluent infiltrates pneumothorax or pleural effusions.  The visualized portion of the upper abdomen is unremarkable no acute osseous abnormality.  Impression: Status post recent CABG with hypoinflation in left lower lobe atelectasis    Mild cardiomegaly with support devices in satisfactory position    Electronically signed by: Elisha Pantoja  Date:    09/10/2024  Time:    07:31      [facility]  Results for orders placed during the hospital encounter of 08/30/24    Echo    Interpretation Summary    Left Ventricle: The left ventricle is normal in size. Mildly increased wall thickness. There is mild concentric hypertrophy. There is normal systolic function with a visually estimated ejection fraction of 60 - 65%. There is diastolic dysfunction.    Right Ventricle: Normal right ventricular cavity size. Wall thickness is normal. Systolic function is normal.    Aortic Valve: The aortic valve is a trileaflet valve. There is moderate aortic valve sclerosis. Moderately restricted motion. There is moderate stenosis. Aortic valve area by VTI is 1.18 cm². Aortic valve peak velocity is 2.28 m/s. Mean gradient is 12 mmHg. The dimensionless index is 0.38.    Mitral Valve: There is mild bileaflet sclerosis.    Tricuspid Valve: There is mild regurgitation with a centrally directed jet.    Pulmonary Artery: The estimated pulmonary artery systolic pressure is 26 mmHg.    IVC/SVC: Normal venous pressure at 3  mmHg.      CURRENT/PREVIOUS VISIT EKG  Results for orders placed or performed during the hospital encounter of 08/30/24   EKG 12-lead    Collection Time: 09/08/24  2:05 AM   Result Value Ref Range    QRS Duration 90 ms    [facility] QTC Calculation 457 ms    Narrative    Test Reason : I25.10,    Vent. Rate : 120 BPM     Atrial Rate : 000 BPM     P-R Int : 000 ms          QRS Dur : 090 ms      QT Int : 324 ms       P-R-T Axes : 000 -08 039 degrees     QTc Int : 457 ms    Atrial fibrillation with rapid ventricular response  Abnormal ECG  When compared with ECG of 07-SEP-2024 07:29,  Atrial fibrillation has replaced Sinus rhythm  Vent. rate has increased BY  40 BPM    Referred By: [email or web address]   SELF           Confirmed By:        ASSESSMENT/PLAN:     Active Hospital Problems    Diagnosis    *NSTEMI (non-ST elevated myocardial infarction)    UTI due to extended-spectrum beta lactamase (ESBL) producing Escherichia coli    Stage 3a chronic kidney disease    Type 2 diabetes mellitus with microalbuminuria, with long-term current use of insulin    Hyperlipidemia       ASSESSMENT & PLAN:     NSTEMI/ MVCAD s/p CABG POD 4  Postop Afib-currently in NSR  DIABETES  CKD  HYPERLIPIDEMIA  Post op expected blood loss anemia-stable      RECOMMENDATIONS:      VSS  Continue current regimen  Amiodarone 200 mg PO TID x 5 more days   Continue ASA, Statin  Continue Metoprolol  Consider adding Plavix when ok with surgery team  If goes back into AFIB she will need NOAC at DC        Jennifer Harvey, NP [facility]  Department of Cardiology  Date of Service: 09/10/2024 10:37 AM

## 2024-09-10 NOTE — SUBJECTIVE & OBJECTIVE
Interval History:  Underwent CABG 9/6/24. CT x 2 in place, several complaints and poor motivation to get out of bed.   Poor oral intake, suggest glucerna to supplement during the day   Increase basal/prandial dosing insulin   Pacer wires removed, chest tubes removed     Review of Systems   Constitutional:  Positive for appetite change and fatigue.   Respiratory: Negative.     Cardiovascular: Negative.    Gastrointestinal: Negative.    Genitourinary: Negative.    Musculoskeletal:  Positive for back pain and gait problem.   Psychiatric/Behavioral:  Positive for dysphoric mood.      Objective:     Vital Signs (Most Recent):  Temp: 98.2 °F (36.8 °C) (09/11/24 0338)  Pulse: 60 (09/11/24 0732)  Resp: 18 (09/11/24 0338)  BP: 102/68 (09/11/24 0338)  SpO2: 98 % (09/11/24 0338) Vital Signs (24h Range):  Temp:  [98.2 °F (36.8 °C)-98.6 °F (37 °C)] 98.2 °F (36.8 °C)  Pulse:  [60-72] 60  Resp:  [14-25] 18  SpO2:  [94 %-100 %] 98 %  BP: (100-139)/(51-68) 102/68     Weight: 85.2 kg (187 lb 13.3 oz)  Body mass index is 29.42 kg/m².    Intake/Output Summary (Last 24 hours) at 9/11/2024 0759  Last data filed at 9/10/2024 1712  Gross per 24 hour   Intake --   Output 170 ml   Net -170 ml         Physical Exam  Constitutional:       Comments: Sitting up in chair in no distress    Eyes:      Conjunctiva/sclera:      Right eye: No exudate.     Left eye: No exudate.  Neck:      Vascular: No JVD.   Cardiovascular:      Rate and Rhythm: Normal rate and regular rhythm.   Pulmonary:      Breath sounds: Normal breath sounds.   Abdominal:      General: Abdomen is flat. Bowel sounds are decreased. There is no distension.      Palpations: Abdomen is soft.   Skin:     General: Skin is warm.      Findings: No rash.             Significant Labs: All pertinent labs within the past 24 hours have been reviewed.    Significant Imaging: I have reviewed all pertinent imaging results/findings within the past 24 hours.

## 2024-09-10 NOTE — PLAN OF CARE
CM met with pt to discuss HH. Pt plans to discharge to daughter's home in [place] and will need to have her address for HH. Pt choice list given to pt who will  review with daughter this evening.  Probable need for RW as well. Pt still with chest tubes when seen this am. Will continue to follow.       09/10/24 6224   Post-Acute Status   Post-Acute Authorization Home Health

## 2024-09-10 NOTE — CARE UPDATE
Prn   09/10/24 0700   Patient Assessment/Suction   Level of Consciousness (AVPU) alert   Respiratory Effort Normal;Unlabored   Expansion/Accessory Muscles/Retractions no use of accessory muscles;expansion symmetric   All Lung Fields Breath Sounds diminished   Rhythm/Pattern, Respiratory unlabored;depth regular;no shortness of breath reported   Cough Frequency infrequent   Cough Type good   PRE-TX-O2   Device (Oxygen Therapy) room air   Pulse Oximetry Type Continuous   $ Pulse Oximetry - Multiple Charge Pulse Oximetry - Multiple   Aerosol Therapy   $ Aerosol Therapy Charges PRN treatment not required   Daily Review of Necessity (SVN) completed   Incentive Spirometer   $ Incentive Spirometer Charges done with encouragement   Incentive Spirometer Predicted Level (mL) 1500   Administration (IS) instruction provided, follow-up   Number of Repetitions (IS) 10   Level Incentive Spirometer (mL) 1100   Patient Tolerance (IS) no adverse signs/symptoms present      tx available

## 2024-09-10 NOTE — PLAN OF CARE
Problem: Acute Coronary Syndrome  Goal: Optimal Adaptation to Illness  Outcome: Progressing  Goal: Absence of Cardiac-Related Pain  Outcome: Progressing  Goal: Normalized Cardiac Rhythm  Outcome: Progressing  Goal: Effective Cardiac Pump Function  Outcome: Progressing     Problem: Cardiac Catheterization (Diagnostic/Interventional)  Goal: Absence of Contrast-Induced Injury  Outcome: Progressing  Goal: Stable Heart Rate and Rhythm  Outcome: Progressing  Goal: Absence of Embolism Signs and Symptoms  Outcome: Progressing  Goal: Anesthesia/Sedation Recovery  Outcome: Progressing  Goal: Optimal Pain Control and Function  Outcome: Progressing  Goal: Absence of Vascular Access Complication  Outcome: Progressing     Problem: Adult Inpatient Plan of Care  Goal: Patient-Specific Goal (Individualized)  Outcome: Progressing  Goal: Absence of Hospital-Acquired Illness or Injury  Outcome: Progressing  Goal: Optimal Comfort and Wellbeing  Outcome: Progressing  Goal: Readiness for Transition of Care  Outcome: Progressing     Problem: Wound  Goal: Optimal Coping  Outcome: Progressing  Goal: Optimal Functional Ability  Outcome: Progressing  Goal: Improved Oral Intake  Outcome: Progressing  Goal: Optimal Pain Control and Function  Outcome: Progressing  Goal: Skin Health and Integrity  Outcome: Progressing  Goal: Optimal Wound Healing  Outcome: Progressing     Problem: Skin Injury Risk Increased  Goal: Skin Health and Integrity  Outcome: Progressing     Problem: Oral Intake Inadequate  Goal: Improved Oral Intake  Outcome: Progressing     Problem: Infection  Goal: Absence of Infection Signs and Symptoms  Outcome: Progressing

## 2024-09-10 NOTE — PROGRESS NOTES
CVT SURGERY PROGRESS NOTE  Debby Brown  74 y.o.  1950    Patient's Chief Complaint:  NSTEMI (non-ST elevated myocardial infarction)    HPI:  The patient is a 74-year-old obese  female with uncontrolled hypertension, and uncontrolled type 2 diabetes.     The patient presented to the hospital emergency department 08/30/2024 with a several month history of exertional angina associated with dyspnea and nausea.  The angina always resolved with rest.  The discomfort has been progressive in intensity and frequency over the last couple of weeks.     The patient had a particularly severe and intense episode of chest discomfort which prompted her visit to the emergency department yesterday.     By the time she arrived in the emergency department, the pain had nearly completely resolved.  She was treated with aspirin and heparin.  There were no acute EKG changes but the troponin I was elevated and rising.     Echocardiography this morning demonstrates a preserved ejection fraction (60%).  There is normal right ventricular function.  The patient demonstrates moderate aortic valve stenosis.  The transvalvular velocities are 228.  The mean gradient is 12.  There is mild TR and the pulmonary artery pressures are estimated at 26.     The patient underwent urgent left heart catheterization with coronary angiography the evening of 08/30/2024.     Coronary angiography demonstrates critical and very diffuse coronary artery disease.  The branch coronary arteries are very small in caliber.     The patient has uncontrolled type 2 diabetes.  Her current A1c is 10.4.  At home, she is prescribed Farxiga, Toujeo, Actos, Ozempic, and Trulicity.     She has a history of uncontrolled hypertension.  Her blood pressure has been fairly well controlled since her hospital admission.     The patient is seen in the ICU this afternoon.  She has multiple family members at her bedside.  She is on heparin and nitroglycerin drips.  She is  "currently chest pain-free.  She has been chest pain-free since her hospital admission.  She denies symptoms of congestive heart failure other than the exertional dyspnea.     The patient is a poor historian.  She is noncompliant with medical instructions/prescriptions.  She is quite forgetful.    Last 24 hour interval:  -Had some more a-fib yesterday afternoon, currently in SR  -No events overnight  -BP improved  -BS improved.   -MS tube output last 24 hours - 180cc, to suction, no air leak  -Pleural Tube output last 24 hours - 100cc, to suction, no air leak  -Hgb improved s/p 1 unit PRBCs yesterday  -Leukocytosis downtrending, remains afebrile  -Good UOP, renal function stable  -Patient seen this afternoon, sitting in BS chair in NAD on RA. Pain well controlled. Poor appetite. Using  IS some. + flatus, no BM yet.     Subjective:  Symptoms:  Stable.    Diet:  Poor intake.  No nausea or vomiting.    Activity level: Returning to normal.    Pain:  She complains of pain that is moderate.  She reports pain is improving.  Pain is partially controlled.                                                                   Objective:  General Appearance:  In no acute distress.    Vital signs: (most recent): Blood pressure 139/63, pulse 70, temperature 98.5 °F (36.9 °C), temperature source Oral, resp. rate (!) 23, height 5' 7" (1.702 m), weight 85.2 kg (187 lb 13.3 oz), SpO2 96%.    Output: Producing urine.    HEENT: Normal HEENT exam.    Lungs:  Normal effort.  No rales (Bibasilar), wheezes or rhonchi.    Heart: Normal rate.  Regular rhythm.  No murmur, gallop or friction rub.   Chest: (Sternal incision with surgical dressing. Chest tubes in place, draining appropriately, to suction, no air leak.   )  Abdomen: Abdomen is soft and non-distended.  Bowel sounds are normal.   There is no abdominal tenderness.     Extremities: There is no local swelling.  (EVH sites dressed. )  Pulses: Distal pulses are intact.    Neurological: " Patient is alert and oriented to person, place and time.    Skin:  Warm and dry.      Recent Vitals:  Vitals:    09/10/24 0901 09/10/24 1515 09/10/24 1518 09/10/24 1601   BP:  139/63     BP Location:       Patient Position:       Pulse: 64 70  70   Resp: 20 16 (!) 25 (!) 23   Temp:       TempSrc:       SpO2: 99% 98%  96%   Weight:       Height:           INPATIENT MEDS   0.45% NaCl   Intravenous Continuous   Stopped at 09/07/24 1015      amiodarone  200 mg Oral TID    aspirin  81 mg Oral Daily    atorvastatin  80 mg Oral Daily    docusate sodium  100 mg Oral BID    enoxparin  40 mg Subcutaneous Q24H (prophylaxis, 1700)    insulin glargine U-100  20 Units Subcutaneous BID    meropenem IV (PEDS and ADULTS)  1 g Intravenous Q12H    metoprolol succinate  50 mg Oral Daily    pantoprazole  40 mg Oral Daily    polyethylene glycol  17 g Oral Daily       Current Facility-Administered Medications:     0.9%  NaCl infusion (for blood administration), , Intravenous, Q24H PRN    acetaminophen, 650 mg, Oral, Q6H PRN    albumin human 5%, 25 g, Intravenous, PRN    albuterol-ipratropium, 3 mL, Nebulization, Q6H PRN    calcium gluconate IVPB, 1 g, Intravenous, PRN    calcium gluconate IVPB, 2 g, Intravenous, PRN    calcium gluconate IVPB, 3 g, Intravenous, PRN    dextrose 50%, 12.5 g, Intravenous, PRN    dextrose 50%, 12.5 g, Intravenous, PRN    dextrose 50%, 25 g, Intravenous, PRN    dextrose 50%, 25 g, Intravenous, PRN    glucagon (human recombinant), 1 mg, Intramuscular, PRN    glucose, 16 g, Oral, PRN    glucose, 24 g, Oral, PRN    HYDROcodone-acetaminophen, 1 tablet, Oral, Q4H PRN    insulin aspart U-100, 0-15 Units, Subcutaneous, QID (AC + HS) PRN    LORazepam, 0.5 mg, Oral, Q6H PRN    magnesium sulfate IVPB, 2 g, Intravenous, PRN    magnesium sulfate IVPB, 4 g, Intravenous, PRN    morphine, 2 mg, Intravenous, Q1H PRN    morphine, 4 mg, Intravenous, PRN    ondansetron, 4 mg, Intravenous, Q6H PRN    potassium chloride in water,  "20 mEq, Intravenous, PRN    potassium chloride in water, 20 mEq, Intravenous, PRN    potassium chloride in water, 40 mEq, Intravenous, PRN    sodium phosphate 15 mmol in D5W 250 mL IVPB, 15 mmol, Intravenous, PRN    sodium phosphate 20.01 mmol in D5W 250 mL IVPB, 20.01 mmol, Intravenous, PRN    sodium phosphate 30 mmol in D5W 250 mL IVPB, 30 mmol, Intravenous, PRN  HEMODYNAMICS      Recent O2 Therapy/Vent Settings: RA    I/O last 24 hrs:  Intake/Output - Last 3 Shifts         09/08 0700  09/09 0659 09/09 0700  09/10 0659 09/10 0700  09/11 0659    P.O. 830 720     I.V. (mL/kg) 299.2 (3.5)      Blood  358.3     IV Piggyback 300 450     Total Intake(mL/kg) 1429.2 (16.8) 1528.3 (17.9)     Urine (mL/kg/hr) 1100 (0.5) 1040 (0.5)     Chest Tube 400 280     Total Output 1500 1320     Net -70.9 +208.3                  Recent Cardiac Rhythm: SR    Recent Pain Assessment: 0    CBC  Recent Labs   Lab 09/08/24 0310 09/09/24 0446 09/10/24  0415   WBC 20.60* 16.27* 14.47*   RBC 2.83* 2.58* 3.16*   HGB 8.4* 7.5* 9.3*    179 248   MCV 89 90 89   MCH 29.7 29.1 29.4   MCHC 33.2 32.5 33.0     BMP  Recent Labs   Lab 09/08/24 0310 09/09/24 0446 09/10/24  0415   CO2 25 27 24   BUN 29* 31* 28*   CREATININE 1.3 1.2 1.1   CALCIUM 8.3* 7.9* 8.2*     CARDIAC ENZYMES  No results for input(s): "TROPONINI", "CPK", "CKMB" in the last 168 hours.  BNP  No results for input(s): "BNP" in the last 168 hours.  PT/INR  INR   Date Value Ref Range Status   08/30/2024 1.0 0.8 - 1.2 Final     Comment:     Coumadin Therapy:  2.0 - 3.0 for INR for all indicators except mechanical heart valves  and antiphospholipid syndromes which should use 2.5 - 3.5.     08/30/2024 1.0 0.8 - 1.2 Final     Comment:     Coumadin Therapy:  2.0 - 3.0 for INR for all indicators except mechanical heart valves  and antiphospholipid syndromes which should use 2.5 - 3.5.     09/04/2021 1.1 0.8 - 1.2 Final     Comment:     Coumadin Therapy:  2.0 - 3.0 for INR for all " indicators except mechanical heart valves  and antiphospholipid syndromes which should use 2.5 - 3.5.       DIAGNOSTIC RESULTS:  X-Ray Chest AP Portable  Narrative: EXAMINATION:  XR CHEST AP PORTABLE    CLINICAL HISTORY:  post op cabg;    FINDINGS:  Portable chest at 04:15 is compared to 09/09/2024 shows mild cardiomegaly.  There are median sternotomy wires.  The left IJ catheter mediastinal drains and bilateral chest tubes are in stable position.    There is hypoinflation with left lower lobe atelectasis.  There are no confluent infiltrates pneumothorax or pleural effusions.  The visualized portion of the upper abdomen is unremarkable no acute osseous abnormality.  Impression: Status post recent CABG with hypoinflation in left lower lobe atelectasis    Mild cardiomegaly with support devices in satisfactory position    Electronically signed by: Elisha Pantoja  Date:    09/10/2024  Time:    07:31      ECG Results              EKG 12-lead (Final result)        Collection Time Result Time QRS Duration OHS QTC Calculation    08/30/24 13:46:21 09/01/24 17:52:28 90 443                     Final result by Interface, Lab In [facility] (09/01/24 17:52:38)                   Narrative:    Test Reason : R07.9,    Vent. Rate : 110 BPM     Atrial Rate : 110 BPM     P-R Int : 204 ms          QRS Dur : 090 ms      QT Int : 328 ms       P-R-T Axes : 062 -18 083 degrees     QTc Int : 443 ms    Sinus tachycardia  Otherwise normal ECG  When compared with ECG of 30-AUG-2024 10:06,  No significant change was found  Confirmed by Renea BROWNING, Primitivo TEE ([id number]) on 9/1/2024 5:52:25 PM    Referred By: [email or web address]   SELF           Confirmed By:Primitivo Meier MD                                     EKG 12-lead (Final result)  Result time 09/04/24 11:31:31      Final result by Unknown User (09/04/24 11:31:31)                                         EKG 12-lead (Final result)  Result time 09/04/24 12:41:13      Final result by Unknown User (09/04/24  12:41:13)                                        CURRENT CONSULTS:  IP CONSULT TO CARDIOLOGY  IP CONSULT TO REGISTERED DIETITIAN/NUTRITIONIST  IP CONSULT TO SOCIAL WORK/CASE MANAGEMENT  IP CONSULT TO DIABETES EDUCATOR  IP CONSULT TO CARDIAC REHAB    ASSESSMENT/PLAN:    NSTEMI  Multivessel CAD  S/p CABG x 5 (LIMA to LAD, L radial to ramus, SVG to PDA, SVG to PLB, SVG from LUIS to OM) by Dr. Garcia on 9/6/11  -Hemodynamically stable  -Tolerating RA  -Mediastinal tube and pleural tube removed 9/10/24  -Pacer wires removed 9/9/24  -Surgical incisions with surgical dressing   -Continue ASA, BB, statin  -Encourage IS  -Increase activity  -Jarrod hose  -Bowel regimen - on docusate. + flatus, no BM yet.     Postoperative atrial fibrillation  -A-fib with RVR on 9/7 - now back in SR   -Not on full dose AC at this time, but will need it if she has recurrence per Cardiology  -Continue amiodarone and lopressor  -Keep Mag > 2 and K> 4    Acute post operative blood loss anemia  -Expected post op  -Received 407 cc Cell Saver  -Received 1 unit PRBCs 9/9 with appropriate rise in H/H  -No indication for transfusion at this time.   -Monitor    Thrombocytopenia  -Resolved    Leukocytosis  -Afebrile  -Likely reactive  -Monitor    IDDM  -Insulin gtt off 9/7  -BS elevated  -Continue Lantus 20u BID   -Continue HDSSI  -Goal < 180  -Last A1C 10.2%  -On Toujeo 70 u daily, Farxiga 10mg daily, Actos 15mg daily, Ozempic 0.5mg weekly, and Trulicity 0.75 mg weekly at home    HTN  -BP controlled  -Goal < 140/90  -Monitor    HLD  -Continue statin    Stage III CKD  -Renal function stable, good UOP  -Avoid nephrotoxins and hypotension    UTI  -Final urine cultures grew E coli (ESBL species).   -Continue meropenem.     Hypomagnesemia  Hypokalemia  -Replaced per protocol  -Keep Mag > 2 and K > 4    Mild- moderate AS  -SONIDO 1.18 cm2, MG 12 mmHg, PV 2.28 m/s, EF = 60-65%  -Monitor      Case and plan of care discussed with MD      GI Prophylaxis:  PPI:proton  pump inhibitor per orders  DVT Prophylaxis:   Anticoagulants   Medication Route Frequency    enoxaparin injection 40 mg Subcutaneous Q24H (prophylaxis, 1700)       Ana Maria Crowell PA-C  Cardiovascular Thoracic Surgery  9/10/2024  12:57 PM

## 2024-09-11 LAB
ANION GAP SERPL CALC-SCNC: 9 MMOL/L (ref 8–16)
BUN SERPL-MCNC: 25 MG/DL (ref 8–23)
CALCIUM SERPL-MCNC: 7.9 MG/DL (ref 8.7–10.5)
CHLORIDE SERPL-SCNC: 105 MMOL/L (ref 95–110)
CO2 SERPL-SCNC: 25 MMOL/L (ref 23–29)
CREAT SERPL-MCNC: 1 MG/DL (ref 0.5–1.4)
ERYTHROCYTE [DISTWIDTH] IN BLOOD BY AUTOMATED COUNT: 15.5 % (ref 11.5–14.5)
EST. GFR  (NO RACE VARIABLE): 59.5 ML/MIN/1.73 M^2
GLUCOSE SERPL-MCNC: 108 MG/DL (ref 70–110)
HCT VFR BLD AUTO: 27.3 % (ref 37–48.5)
HGB BLD-MCNC: 8.8 G/DL (ref 12–16)
MCH RBC QN AUTO: 28.7 PG (ref 27–31)
MCHC RBC AUTO-ENTMCNC: 32.2 G/DL (ref 32–36)
MCV RBC AUTO: 89 FL (ref 82–98)
PHOSPHATE SERPL-MCNC: 2.3 MG/DL (ref 2.7–4.5)
PLATELET # BLD AUTO: 282 K/UL (ref 150–450)
PMV BLD AUTO: 11 FL (ref 9.2–12.9)
POCT GLUCOSE: 129 MG/DL (ref 70–110)
POCT GLUCOSE: 142 MG/DL (ref 70–110)
POCT GLUCOSE: 143 MG/DL (ref 70–110)
POTASSIUM SERPL-SCNC: 4 MMOL/L (ref 3.5–5.1)
RBC # BLD AUTO: 3.07 M/UL (ref 4–5.4)
SODIUM SERPL-SCNC: 139 MMOL/L (ref 136–145)
WBC # BLD AUTO: 12.53 K/UL (ref 3.9–12.7)

## 2024-09-11 PROCEDURE — 82962 GLUCOSE BLOOD TEST: CPT

## 2024-09-11 PROCEDURE — 99900035 HC TECH TIME PER 15 MIN (STAT)

## 2024-09-11 PROCEDURE — 25000003 PHARM REV CODE 250: Performed by: THORACIC SURGERY (CARDIOTHORACIC VASCULAR SURGERY)

## 2024-09-11 PROCEDURE — 80048 BASIC METABOLIC PNL TOTAL CA: CPT | Performed by: THORACIC SURGERY (CARDIOTHORACIC VASCULAR SURGERY)

## 2024-09-11 PROCEDURE — 21000000 HC CCU ICU ROOM CHARGE

## 2024-09-11 PROCEDURE — 27000207 HC ISOLATION

## 2024-09-11 PROCEDURE — 25000242 PHARM REV CODE 250 ALT 637 W/ HCPCS: Performed by: PHYSICIAN ASSISTANT

## 2024-09-11 PROCEDURE — 63600175 PHARM REV CODE 636 W HCPCS: Performed by: PHYSICIAN ASSISTANT

## 2024-09-11 PROCEDURE — 63600175 PHARM REV CODE 636 W HCPCS: Performed by: THORACIC SURGERY (CARDIOTHORACIC VASCULAR SURGERY)

## 2024-09-11 PROCEDURE — 99900031 HC PATIENT EDUCATION (STAT)

## 2024-09-11 PROCEDURE — 99024 POSTOP FOLLOW-UP VISIT: CPT | Mod: POP,,, | Performed by: PHYSICIAN ASSISTANT

## 2024-09-11 PROCEDURE — 36415 COLL VENOUS BLD VENIPUNCTURE: CPT | Performed by: THORACIC SURGERY (CARDIOTHORACIC VASCULAR SURGERY)

## 2024-09-11 PROCEDURE — 94640 AIRWAY INHALATION TREATMENT: CPT

## 2024-09-11 PROCEDURE — 85027 COMPLETE CBC AUTOMATED: CPT | Performed by: THORACIC SURGERY (CARDIOTHORACIC VASCULAR SURGERY)

## 2024-09-11 PROCEDURE — 25000003 PHARM REV CODE 250: Performed by: NURSE PRACTITIONER

## 2024-09-11 PROCEDURE — 84100 ASSAY OF PHOSPHORUS: CPT | Performed by: THORACIC SURGERY (CARDIOTHORACIC VASCULAR SURGERY)

## 2024-09-11 PROCEDURE — 94799 UNLISTED PULMONARY SVC/PX: CPT

## 2024-09-11 PROCEDURE — 94761 N-INVAS EAR/PLS OXIMETRY MLT: CPT

## 2024-09-11 RX ORDER — FUROSEMIDE 10 MG/ML
20 INJECTION INTRAMUSCULAR; INTRAVENOUS ONCE
Status: COMPLETED | OUTPATIENT
Start: 2024-09-11 | End: 2024-09-11

## 2024-09-11 RX ORDER — IPRATROPIUM BROMIDE AND ALBUTEROL SULFATE 2.5; .5 MG/3ML; MG/3ML
3 SOLUTION RESPIRATORY (INHALATION) EVERY 6 HOURS
Status: DISCONTINUED | OUTPATIENT
Start: 2024-09-11 | End: 2024-09-12 | Stop reason: HOSPADM

## 2024-09-11 RX ADMIN — METOPROLOL SUCCINATE 50 MG: 50 TABLET, FILM COATED, EXTENDED RELEASE ORAL at 08:09

## 2024-09-11 RX ADMIN — AMIODARONE HYDROCHLORIDE 200 MG: 200 TABLET ORAL at 08:09

## 2024-09-11 RX ADMIN — FUROSEMIDE 20 MG: 10 INJECTION, SOLUTION INTRAMUSCULAR; INTRAVENOUS at 12:09

## 2024-09-11 RX ADMIN — IPRATROPIUM BROMIDE AND ALBUTEROL SULFATE 3 ML: 2.5; .5 SOLUTION RESPIRATORY (INHALATION) at 08:09

## 2024-09-11 RX ADMIN — MEROPENEM 1 G: 1 INJECTION, POWDER, FOR SOLUTION INTRAVENOUS at 02:09

## 2024-09-11 RX ADMIN — ASPIRIN 81 MG 81 MG: 81 TABLET ORAL at 08:09

## 2024-09-11 RX ADMIN — DOCUSATE SODIUM 100 MG: 100 CAPSULE, LIQUID FILLED ORAL at 08:09

## 2024-09-11 RX ADMIN — INSULIN GLARGINE 20 UNITS: 100 INJECTION, SOLUTION SUBCUTANEOUS at 10:09

## 2024-09-11 RX ADMIN — ONDANSETRON 4 MG: 2 INJECTION INTRAMUSCULAR; INTRAVENOUS at 12:09

## 2024-09-11 RX ADMIN — IPRATROPIUM BROMIDE AND ALBUTEROL SULFATE 3 ML: 2.5; .5 SOLUTION RESPIRATORY (INHALATION) at 11:09

## 2024-09-11 RX ADMIN — LORAZEPAM 0.5 MG: 0.5 TABLET ORAL at 08:09

## 2024-09-11 RX ADMIN — PANTOPRAZOLE SODIUM 40 MG: 40 TABLET, DELAYED RELEASE ORAL at 05:09

## 2024-09-11 RX ADMIN — ATORVASTATIN CALCIUM 80 MG: 40 TABLET, FILM COATED ORAL at 08:09

## 2024-09-11 RX ADMIN — AMIODARONE HYDROCHLORIDE 200 MG: 200 TABLET ORAL at 02:09

## 2024-09-11 RX ADMIN — HYDROCODONE BITARTRATE AND ACETAMINOPHEN 1 TABLET: 5; 325 TABLET ORAL at 07:09

## 2024-09-11 RX ADMIN — POLYETHYLENE GLYCOL 3350 17 G: 17 POWDER, FOR SOLUTION ORAL at 08:09

## 2024-09-11 RX ADMIN — ENOXAPARIN SODIUM 40 MG: 40 INJECTION SUBCUTANEOUS at 04:09

## 2024-09-11 NOTE — NURSING
Nurses Note -- 4 Eyes      9/11/2024   3:06 PM      Skin assessed during: Transfer      [x] No Altered Skin Integrity Present    [x]Prevention Measures Documented      [] Yes- Altered Skin Integrity Present or Discovered   [] LDA Added if Not in Epic (Describe Wound)   [] New Altered Skin Integrity was Present on Admit and Documented in LDA   [] Wound Image Taken    Wound Care Consulted? No    Attending Nurse:  GABBI Quiroz    Second RN/Staff Member:  GABBI Kim

## 2024-09-11 NOTE — PLAN OF CARE
Request for RW sent to [facility] and approved by Vanessa, delivered by Joey MANDUJANO.  CM spoke with pt daughter via phone regarding HH and and referral sent to [facility]  [facility] with daughter's address ([address], [place] [address]) as pt is going to stay with daughter after discharge.       09/11/24 1323   Post-Acute Status   Post-Acute Authorization HME;Home Health   HME Status Set-up Complete/Auth obtained   Home Health Status Referrals Sent   Patient choice form signed by patient/caregiver List from [employer]

## 2024-09-11 NOTE — PROGRESS NOTES
CVT SURGERY PROGRESS NOTE  Debby Brown  74 y.o.  1950    Patient's Chief Complaint:  NSTEMI (non-ST elevated myocardial infarction)    HPI:  The patient is a 74-year-old obese  female with uncontrolled hypertension, and uncontrolled type 2 diabetes.     The patient presented to the hospital emergency department 08/30/2024 with a several month history of exertional angina associated with dyspnea and nausea.  The angina always resolved with rest.  The discomfort has been progressive in intensity and frequency over the last couple of weeks.     The patient had a particularly severe and intense episode of chest discomfort which prompted her visit to the emergency department yesterday.     By the time she arrived in the emergency department, the pain had nearly completely resolved.  She was treated with aspirin and heparin.  There were no acute EKG changes but the troponin I was elevated and rising.     Echocardiography this morning demonstrates a preserved ejection fraction (60%).  There is normal right ventricular function.  The patient demonstrates moderate aortic valve stenosis.  The transvalvular velocities are 228.  The mean gradient is 12.  There is mild TR and the pulmonary artery pressures are estimated at 26.     The patient underwent urgent left heart catheterization with coronary angiography the evening of 08/30/2024.     Coronary angiography demonstrates critical and very diffuse coronary artery disease.  The branch coronary arteries are very small in caliber.     The patient has uncontrolled type 2 diabetes.  Her current A1c is 10.4.  At home, she is prescribed Farxiga, Toujeo, Actos, Ozempic, and Trulicity.     She has a history of uncontrolled hypertension.  Her blood pressure has been fairly well controlled since her hospital admission.     The patient is seen in the ICU this afternoon.  She has multiple family members at her bedside.  She is on heparin and nitroglycerin drips.  She is  "currently chest pain-free.  She has been chest pain-free since her hospital admission.  She denies symptoms of congestive heart failure other than the exertional dyspnea.     The patient is a poor historian.  She is noncompliant with medical instructions/prescriptions.  She is quite forgetful.    Last 24 hour interval:  -No events overnight  -BP well controlled  -BS at goal  -Slight drop in H/H  -Leukocytosis resolved, remains afebrile  -Good UOP, renal function stable  -Patient seen this AM, sitting in BS chair in NAD on RA. Pain well controlled. Ambulating to restroom unassisted. + BM yesterday.     Subjective:  Symptoms:  Stable.    Diet:  Poor intake.  No nausea or vomiting.    Activity level: Returning to normal.    Pain:  She complains of pain that is moderate.  She reports pain is improving.  Pain is partially controlled.                                                                   Objective:  General Appearance:  In no acute distress.    Vital signs: (most recent): Blood pressure 123/67, pulse 68, temperature 97.8 °F (36.6 °C), temperature source Oral, resp. rate 18, height 5' 7" (1.702 m), weight 85.2 kg (187 lb 13.3 oz), SpO2 96%.    Output: Producing urine.    HEENT: Normal HEENT exam.    Lungs:  Normal effort.  There are rhonchi.  No rales (Bibasilar) or wheezes.    Heart: Normal rate.  Regular rhythm.  Positive for murmur (II/VI KASIA at RSB).  No gallop or friction rub.   Chest: (Sternal incision with Prevena dressing. CT sites dressed. )  Abdomen: Abdomen is soft and non-distended.  Bowel sounds are normal.   There is no abdominal tenderness.     Extremities: There is local swelling (1+ LLE).  (EVH sites dressed. )  Pulses: Distal pulses are intact.    Neurological: Patient is alert and oriented to person, place and time.    Skin:  Warm and dry.      Recent Vitals:  Vitals:    09/11/24 0338 09/11/24 0732 09/11/24 0800 09/11/24 1101   BP: 102/68  123/67    BP Location:   Right arm    Patient " Position:   Lying    Pulse: 62 60 61 68   Resp: 18  18    Temp: 98.2 °F (36.8 °C)  97.8 °F (36.6 °C)    TempSrc: Oral  Oral    SpO2: 98%  96%    Weight:       Height:           INPATIENT MEDS       amiodarone  200 mg Oral TID    aspirin  81 mg Oral Daily    atorvastatin  80 mg Oral Daily    docusate sodium  100 mg Oral BID    enoxparin  40 mg Subcutaneous Q24H (prophylaxis, 1700)    insulin glargine U-100  20 Units Subcutaneous BID    meropenem IV (PEDS and ADULTS)  1 g Intravenous Q12H    metoprolol succinate  50 mg Oral Daily    pantoprazole  40 mg Oral Daily    polyethylene glycol  17 g Oral Daily       Current Facility-Administered Medications:     0.9%  NaCl infusion (for blood administration), , Intravenous, Q24H PRN    acetaminophen, 650 mg, Oral, Q6H PRN    albumin human 5%, 25 g, Intravenous, PRN    albuterol-ipratropium, 3 mL, Nebulization, Q6H PRN    calcium gluconate IVPB, 1 g, Intravenous, PRN    calcium gluconate IVPB, 2 g, Intravenous, PRN    calcium gluconate IVPB, 3 g, Intravenous, PRN    dextrose 50%, 12.5 g, Intravenous, PRN    dextrose 50%, 12.5 g, Intravenous, PRN    dextrose 50%, 25 g, Intravenous, PRN    dextrose 50%, 25 g, Intravenous, PRN    glucagon (human recombinant), 1 mg, Intramuscular, PRN    glucose, 16 g, Oral, PRN    glucose, 24 g, Oral, PRN    HYDROcodone-acetaminophen, 1 tablet, Oral, Q4H PRN    insulin aspart U-100, 0-15 Units, Subcutaneous, QID (AC + HS) PRN    LORazepam, 0.5 mg, Oral, Q6H PRN    magnesium sulfate IVPB, 2 g, Intravenous, PRN    magnesium sulfate IVPB, 4 g, Intravenous, PRN    morphine, 2 mg, Intravenous, Q1H PRN    morphine, 4 mg, Intravenous, PRN    ondansetron, 4 mg, Intravenous, Q6H PRN    potassium chloride in water, 20 mEq, Intravenous, PRN    potassium chloride in water, 20 mEq, Intravenous, PRN    potassium chloride in water, 40 mEq, Intravenous, PRN    sodium phosphate 15 mmol in D5W 250 mL IVPB, 15 mmol, Intravenous, PRN    sodium phosphate 20.01 mmol  "in D5W 250 mL IVPB, 20.01 mmol, Intravenous, PRN    sodium phosphate 30 mmol in D5W 250 mL IVPB, 30 mmol, Intravenous, PRN  HEMODYNAMICS      Recent O2 Therapy/Vent Settings: RA    I/O last 24 hrs:  Intake/Output - Last 3 Shifts         09/09 0700  09/10 0659 09/10 0700 09/11 0659 09/11 0700 09/12 0659    P.O. 720      I.V. (mL/kg)       Blood 358.3      IV Piggyback 450      Total Intake(mL/kg) 1528.3 (17.9)      Urine (mL/kg/hr) 1040 (0.5) 0 (0)     Chest Tube 280 170     Total Output 1320 170     Net +208.3 -170            Urine Occurrence  4 x     Stool Occurrence  1 x           Recent Cardiac Rhythm: SR    Recent Pain Assessment: 0    CBC  Recent Labs   Lab 09/09/24  0446 09/10/24  0415 09/11/24  0319   WBC 16.27* 14.47* 12.53   RBC 2.58* 3.16* 3.07*   HGB 7.5* 9.3* 8.8*    248 282   MCV 90 89 89   MCH 29.1 29.4 28.7   MCHC 32.5 33.0 32.2     BMP  Recent Labs   Lab 09/08/24  0310 09/09/24  0446 09/10/24  0415   CO2 25 27 24   BUN 29* 31* 28*   CREATININE 1.3 1.2 1.1   CALCIUM 8.3* 7.9* 8.2*     CARDIAC ENZYMES  No results for input(s): "TROPONINI", "CPK", "CKMB" in the last 168 hours.  BNP  No results for input(s): "BNP" in the last 168 hours.  PT/INR  INR   Date Value Ref Range Status   08/30/2024 1.0 0.8 - 1.2 Final     Comment:     Coumadin Therapy:  2.0 - 3.0 for INR for all indicators except mechanical heart valves  and antiphospholipid syndromes which should use 2.5 - 3.5.     08/30/2024 1.0 0.8 - 1.2 Final     Comment:     Coumadin Therapy:  2.0 - 3.0 for INR for all indicators except mechanical heart valves  and antiphospholipid syndromes which should use 2.5 - 3.5.     09/04/2021 1.1 0.8 - 1.2 Final     Comment:     Coumadin Therapy:  2.0 - 3.0 for INR for all indicators except mechanical heart valves  and antiphospholipid syndromes which should use 2.5 - 3.5.       DIAGNOSTIC RESULTS:  X-Ray Chest AP Portable  Narrative: EXAMINATION:  XR CHEST AP PORTABLE    CLINICAL HISTORY:  post op " cabg;    FINDINGS:  Portable chest at 04:15 is compared to 09/09/2024 shows mild cardiomegaly.  There are median sternotomy wires.  The left IJ catheter mediastinal drains and bilateral chest tubes are in stable position.    There is hypoinflation with left lower lobe atelectasis.  There are no confluent infiltrates pneumothorax or pleural effusions.  The visualized portion of the upper abdomen is unremarkable no acute osseous abnormality.  Impression: Status post recent CABG with hypoinflation in left lower lobe atelectasis    Mild cardiomegaly with support devices in satisfactory position    Electronically signed by: Elisha Pantoja  Date:    09/10/2024  Time:    07:31      ECG Results              EKG 12-lead (Final result)        Collection Time Result Time QRS Duration OHS QTC Calculation    08/30/24 13:46:21 09/01/24 17:52:28 90 443                     Final result by Interface, Lab In [facility] (09/01/24 17:52:38)                   Narrative:    Test Reason : R07.9,    Vent. Rate : 110 BPM     Atrial Rate : 110 BPM     P-R Int : 204 ms          QRS Dur : 090 ms      QT Int : 328 ms       P-R-T Axes : 062 -18 083 degrees     QTc Int : 443 ms    Sinus tachycardia  Otherwise normal ECG  When compared with ECG of 30-AUG-2024 10:06,  No significant change was found  Confirmed by Renea BROWNING, Primitivo TEE ([id number]) on 9/1/2024 5:52:25 PM    Referred By: [email or web address]   SELF           Confirmed By:Primitivo Meier MD                                     EKG 12-lead (Final result)  Result time 09/04/24 11:31:31      Final result by Unknown User (09/04/24 11:31:31)                                         EKG 12-lead (Final result)  Result time 09/04/24 12:41:13      Final result by Unknown User (09/04/24 12:41:13)                                        CURRENT CONSULTS:  IP CONSULT TO CARDIOLOGY  IP CONSULT TO REGISTERED DIETITIAN/NUTRITIONIST  IP CONSULT TO SOCIAL WORK/CASE MANAGEMENT  IP CONSULT TO DIABETES EDUCATOR  IP CONSULT TO  CARDIAC REHAB    ASSESSMENT/PLAN:    NSTEMI  Multivessel CAD  S/p CABG x 5 (LIMA to LAD, L radial to ramus, SVG to PDA, SVG to PLB, SVG from LUIS to OM) by Dr. Garcia on 9/6/11  -Hemodynamically stable  -Tolerating RA  -Mediastinal tube and pleural tube removed 9/10/24  -Pacer wires removed 9/9/24  -Surgical incisions with Prevena dressing   -Continue ASA, BB, statin. Plavix recommended by Cardiology - will start.   -Encourage IS  -Increase activity  -Jarrod hose  -Bowel regimen - on docusate. + BM 9/10.     Postoperative atrial fibrillation  -A-fib with RVR on 9/7 - now back in SR   -Not on full dose AC at this time, but will need it if she has recurrence per Cardiology  -Continue amiodarone and lopressor  -Keep Mag > 2 and K> 4    Acute post operative blood loss anemia  -Expected post op  -Received 407 cc Cell Saver  -Received 1 unit PRBCs 9/9 with appropriate rise in H/H  -No indication for transfusion at this time.   -Monitor    Thrombocytopenia  -Resolved    Leukocytosis  -Afebrile  -Likely reactive  -Monitor    IDDM  -Insulin gtt off 9/7  -BS controlled  -Continue Lantus 20u BID   -Continue HDSSI  -Goal < 180  -Last A1C 10.2%  -On Toujeo 70 u daily, Farxiga 10mg daily, Actos 15mg daily, Ozempic 0.5mg weekly, and Trulicity 0.75 mg weekly at home    HTN  -BP controlled  -Goal < 140/90  -Monitor    HLD  -Continue statin    Stage III CKD  -Renal function stable, good UOP  -Avoid nephrotoxins and hypotension    UTI  -Final urine cultures grew E coli (ESBL species).   -Continue meropenem.     Hypomagnesemia  Hypokalemia  -Replaced per protocol  -Keep Mag > 2 and K > 4    Mild- moderate AS  -SONIDO 1.18 cm2, MG 12 mmHg, PV 2.28 m/s, EF = 60-65%  -Monitor      Case and plan of care discussed with MD. Anticipate d/c home tomorrow with Home Health.       GI Prophylaxis:  PPI:proton pump inhibitor per orders  DVT Prophylaxis:   Anticoagulants   Medication Route Frequency    enoxaparin injection 40 mg Subcutaneous Q24H  (prophylaxis, 1700)       Ana Maria Crowell PA-C  Cardiovascular Thoracic Surgery  9/11/2024  12:57 PM

## 2024-09-11 NOTE — PROGRESS NOTES
[facility]  Department of Cardiology  Progress Note    PATIENT NAME: Debby Brown  MRN: [id number]  TODAY'S DATE: 09/11/2024  ADMIT DATE: 8/30/2024    SUBJECTIVE     PRINCIPLE PROBLEM: NSTEMI (non-ST elevated myocardial infarction)    INTERVAL HISTORY:    9/11/2024  PATIENT IS RESTING COMFORTABLY IN BED ALL CHEST TUBES ARE OUT.  SHE IS READY FOR DISCHARGE BUT THERE WAS A HURRICANE IN THE AREA AND THE DISCHARGE WILL BE DELAYED SECONDARY TO THE HURRICANE  9/10/2024    Doing well. Sitting up in the chair in no distress. Eating lunch. Hg 9.3.      9/9/2024  Ms. Brown is laying in bed in no acute distress. VSS. Just received one unit of PRBCs.      9/8/2024    Patient seen resting in bed with no acute distress noted.  She is back in sinus rhythm but did have some AFib overnight and was started on amiodarone.    9/7/24:  Patient seen resting in bed with no acute distress.  She is status post CABG and feeling well overall.  She is stable on telemetry.    9/6/24    Patient seen resting in bed comfortably.  She is stable on telemetry and feeling well overall.  No complaints of any chest discomfort or shortness of breath.    9/4/24    Patient seen resting in bed with no acute distress noted.  Denies any chest discomfort shortness of breath.  She is stable on telemetry at this time.    9/3/24    Patient for evaluation from CT surgery today.  Stable and doing well presently.    9/2/24    PATIENT WITH A NON ST-ELEVATION MI CURRENTLY IN ICU ON NITRO DRIP WAITING CARDIOVASCULAR SURGERY EVALUATION BY DR. SORIANO  SHE HAD CHEST PAIN TODAY FOR ABOUT 3 MINUTES OTHERWISE IS DOING WELL    Review of patient's allergies indicates:   Allergen Reactions    Bactrim [sulfamethoxazole-trimethoprim]     Codeine     Levaquin [levofloxacin]     Pcn [penicillins]        REVIEW OF SYSTEMS  CARDIOVASCULAR: No recent chest pain, palpitations, arm, neck, or jaw pain  RESPIRATORY: No recent fever, cough chills, SOB or congestion  :  No blood in the urine  GI: No Nausea, vomiting, constipation, diarrhea, blood, or reflux.  MUSCULOSKELETAL: No myalgias  NEURO: No lightheadedness or dizziness  EYES: No Double vision, blurry, vision or headache     OBJECTIVE     VITAL SIGNS (Most Recent)  Temp: 97.8 °F (36.6 °C) (09/11/24 0800)  Pulse: 61 (09/11/24 0800)  Resp: 18 (09/11/24 0800)  BP: 123/67 (09/11/24 0800)  SpO2: 96 % (09/11/24 0800)    VENTILATION STATUS  Resp: 18 (09/11/24 0800)  SpO2: 96 % (09/11/24 0800)           I & O (Last 24H):  Intake/Output Summary (Last 24 hours) at 9/11/2024 0933  Last data filed at 9/10/2024 1712  Gross per 24 hour   Intake --   Output 170 ml   Net -170 ml       WEIGHTS  Wt Readings from Last 3 Encounters:   09/05/24 0400 85.2 kg (187 lb 13.3 oz)   09/04/24 0400 85 kg (187 lb 6.3 oz)   09/03/24 0400 89.9 kg (198 lb 3.1 oz)   09/01/24 0353 88.8 kg (195 lb 12.3 oz)   08/30/24 1938 89.8 kg (197 lb 15.6 oz)   08/30/24 1349 86.2 kg (190 lb)   08/31/24 0920 89.4 kg (197 lb)   08/30/24 1007 89.1 kg (196 lb 6.9 oz)       PHYSICAL EXAM  CONSTITUTIONAL: Well built, well nourished in no apparent distress  NECK: no carotid bruit, no JVD  LUNGS: diminished  CHEST WALL: chest tubes in place; midsternal incision with dressing in place   HEART: regular rate and rhythm, S1, S2 normal, + systolic murmur   ABDOMEN: soft, non-tender; bowel sounds normal; no masses,  no organomegaly  EXTREMITIES: Extremities normal, no edema  NEURO: AAO X 3    SCHEDULED MEDS:   amiodarone  200 mg Oral TID    aspirin  81 mg Oral Daily    atorvastatin  80 mg Oral Daily    docusate sodium  100 mg Oral BID    enoxparin  40 mg Subcutaneous Q24H (prophylaxis, 1700)    insulin glargine U-100  20 Units Subcutaneous BID    meropenem IV (PEDS and ADULTS)  1 g Intravenous Q12H    metoprolol succinate  50 mg Oral Daily    pantoprazole  40 mg Oral Daily    polyethylene glycol  17 g Oral Daily       CONTINUOUS INFUSIONS:        PRN MEDS:  Current Facility-Administered  Medications:     0.9%  NaCl infusion (for blood administration), , Intravenous, Q24H PRN    acetaminophen, 650 mg, Oral, Q6H PRN    albumin human 5%, 25 g, Intravenous, PRN    albuterol-ipratropium, 3 mL, Nebulization, Q6H PRN    calcium gluconate IVPB, 1 g, Intravenous, PRN    calcium gluconate IVPB, 2 g, Intravenous, PRN    calcium gluconate IVPB, 3 g, Intravenous, PRN    dextrose 50%, 12.5 g, Intravenous, PRN    dextrose 50%, 12.5 g, Intravenous, PRN    dextrose 50%, 25 g, Intravenous, PRN    dextrose 50%, 25 g, Intravenous, PRN    glucagon (human recombinant), 1 mg, Intramuscular, PRN    glucose, 16 g, Oral, PRN    glucose, 24 g, Oral, PRN    HYDROcodone-acetaminophen, 1 tablet, Oral, Q4H PRN    insulin aspart U-100, 0-15 Units, Subcutaneous, QID (AC + HS) PRN    LORazepam, 0.5 mg, Oral, Q6H PRN    magnesium sulfate IVPB, 2 g, Intravenous, PRN    magnesium sulfate IVPB, 4 g, Intravenous, PRN    morphine, 2 mg, Intravenous, Q1H PRN    morphine, 4 mg, Intravenous, PRN    ondansetron, 4 mg, Intravenous, Q6H PRN    potassium chloride in water, 20 mEq, Intravenous, PRN    potassium chloride in water, 20 mEq, Intravenous, PRN    potassium chloride in water, 40 mEq, Intravenous, PRN    sodium phosphate 15 mmol in D5W 250 mL IVPB, 15 mmol, Intravenous, PRN    sodium phosphate 20.01 mmol in D5W 250 mL IVPB, 20.01 mmol, Intravenous, PRN    sodium phosphate 30 mmol in D5W 250 mL IVPB, 30 mmol, Intravenous, PRN    LABS AND DIAGNOSTICS     CBC LAST 3 DAYS  Recent Labs   Lab 09/05/24  0040 09/05/24  0250 09/06/24  0338 09/06/24  0732 09/09/24  0446 09/10/24  0415 09/11/24  0319   WBC 10.22 10.20  10.20 9.31  9.31   < > 16.27* 14.47* 12.53   RBC 4.41 4.43  4.43 4.55  4.55   < > 2.58* 3.16* 3.07*   HGB 12.8 12.6  12.6 12.8  12.8   < > 7.5* 9.3* 8.8*   HCT 38.6 38.7  38.7 39.8  39.8   < > 23.1* 28.2* 27.3*   MCV 88 87  87 88  88   < > 90 89 89   MCH 29.0 28.4  28.4 28.1  28.1   < > 29.1 29.4 28.7   MCHC 33.2 32.6   "32.6 32.2  32.2   < > 32.5 33.0 32.2   RDW 14.0 14.0  14.0 14.0  14.0   < > 15.2* 15.5* 15.5*    305  305 311  311   < > 179 248 282   MPV 10.6 10.4  10.4 10.6  10.6   < > 11.5 11.0 11.0   GRAN 48.3  4.9 48.4  48.4  4.9  4.9 46.1  46.1  4.3  4.3  --   --   --   --    LYMPH 38.6  4.0 38.7  38.7  4.0  4.0 39.8  39.8  3.7  3.7  --   --   --   --    MONO 8.2  0.8 7.8  7.8  0.8  0.8 8.7  8.7  0.8  0.8  --   --   --   --    BASO 0.07 0.06  0.06 0.07  0.07  --   --   --   --    NRBC 0 0  0 0  0  --   --   --   --     < > = values in this interval not displayed.       COAGULATION LAST 3 DAYS  Recent Labs   Lab 09/05/24  1431 09/05/24  2335 09/06/24  0338   APTT 35.9* 77.2* 34.5*  34.5*       CHEMISTRY LAST 3 DAYS  Recent Labs   Lab 09/06/24  1656 09/06/24  1803 09/06/24  1939 09/06/24 2001 09/07/24  0336 09/08/24  0310 09/09/24  0446 09/10/24  0415   NA  --   --   --    < > 144 136 135* 138   K  --   --   --    < > 4.7 4.6 4.1 4.1   CL  --   --   --    < > 112* 103 103 104   CO2  --   --   --    < > 22* 25 27 24   ANIONGAP  --   --   --    < > 10 8 5* 10   BUN  --   --   --    < > 17 29* 31* 28*   CREATININE  --   --   --    < > 1.0 1.3 1.2 1.1   GLU  --   --   --    < > 236* 282* 188* 126*   CALCIUM  --   --   --    < > 8.0* 8.3* 7.9* 8.2*   PH 7.335* 7.294* 7.357  --   --   --   --   --    MG  --   --   --    < > 1.9 2.3 2.3  --    ALBUMIN  --   --   --   --  3.5 3.4* 3.0*  --    PROT  --   --   --   --  5.2* 5.5* 5.2*  --    ALKPHOS  --   --   --   --  30* 39* 43*  --    ALT  --   --   --   --  38 24 17  --    AST  --   --   --   --  54* 28 22  --    BILITOT  --   --   --   --  0.6 0.6 0.7  --     < > = values in this interval not displayed.       CARDIAC PROFILE LAST 3 DAYS  No results for input(s): "BNP", "CPK", "CPKMB", "LDH", "TROPONINI" in the last 168 hours.      ENDOCRINE LAST 3 DAYS  No results for input(s): "TSH", "PROCAL" in the last 168 hours.      LAST ARTERIAL BLOOD " GAS  ABG  Recent Labs   Lab 09/06/24 1939   PH 7.357   PO2 79*   PCO2 43.7   HCO3 24.5   BE -1       LAST 7 DAYS MICROBIOLOGY   Microbiology Results (last 7 days)       ** No results found for the last 168 hours. **            MOST RECENT IMAGING  X-Ray Chest AP Portable  Narrative: EXAMINATION:  XR CHEST AP PORTABLE    CLINICAL HISTORY:  post op cabg;    FINDINGS:  Portable chest at 04:15 is compared to 09/09/2024 shows mild cardiomegaly.  There are median sternotomy wires.  The left IJ catheter mediastinal drains and bilateral chest tubes are in stable position.    There is hypoinflation with left lower lobe atelectasis.  There are no confluent infiltrates pneumothorax or pleural effusions.  The visualized portion of the upper abdomen is unremarkable no acute osseous abnormality.  Impression: Status post recent CABG with hypoinflation in left lower lobe atelectasis    Mild cardiomegaly with support devices in satisfactory position    Electronically signed by: Elisha Pantoja  Date:    09/10/2024  Time:    07:31      [facility]  Results for orders placed during the hospital encounter of 08/30/24    Echo    Interpretation Summary    Left Ventricle: The left ventricle is normal in size. Mildly increased wall thickness. There is mild concentric hypertrophy. There is normal systolic function with a visually estimated ejection fraction of 60 - 65%. There is diastolic dysfunction.    Right Ventricle: Normal right ventricular cavity size. Wall thickness is normal. Systolic function is normal.    Aortic Valve: The aortic valve is a trileaflet valve. There is moderate aortic valve sclerosis. Moderately restricted motion. There is moderate stenosis. Aortic valve area by VTI is 1.18 cm². Aortic valve peak velocity is 2.28 m/s. Mean gradient is 12 mmHg. The dimensionless index is 0.38.    Mitral Valve: There is mild bileaflet sclerosis.    Tricuspid Valve: There is mild regurgitation with a centrally directed jet.    Pulmonary  Artery: The estimated pulmonary artery systolic pressure is 26 mmHg.    IVC/SVC: Normal venous pressure at 3 mmHg.      CURRENT/PREVIOUS VISIT EKG  Results for orders placed or performed during the hospital encounter of 08/30/24   EKG 12-lead    Collection Time: 09/08/24  2:05 AM   Result Value Ref Range    QRS Duration 90 ms    [facility] QTC Calculation 457 ms    Narrative    Test Reason : I25.10,    Vent. Rate : 120 BPM     Atrial Rate : 000 BPM     P-R Int : 000 ms          QRS Dur : 090 ms      QT Int : 324 ms       P-R-T Axes : 000 -08 039 degrees     QTc Int : 457 ms    Atrial fibrillation with rapid ventricular response  Abnormal ECG  When compared with ECG of 07-SEP-2024 07:29,  Atrial fibrillation has replaced Sinus rhythm  Vent. rate has increased BY  40 BPM    Referred By: [email or web address]   SELF           Confirmed By:        ASSESSMENT/PLAN:     Active Hospital Problems    Diagnosis    *NSTEMI (non-ST elevated myocardial infarction)    UTI due to extended-spectrum beta lactamase (ESBL) producing Escherichia coli    Stage 3a chronic kidney disease    Type 2 diabetes mellitus with microalbuminuria, with long-term current use of insulin    Hyperlipidemia       ASSESSMENT & PLAN:     NSTEMI/ MVCAD s/p CABG POD 4  Postop Afib-currently in NSR  DIABETES  CKD  HYPERLIPIDEMIA  Post op expected blood loss anemia-stable      RECOMMENDATIONS:      VSS  Continue current regimen  Amiodarone 200 mg PO TID x 4 more days   Continue ASA, Statin  Continue Metoprolol  Consider adding Plavix when ok with surgery team  If goes back into AFIB she will need NOAC at DC        Elif Pinzon NP  [facility]  Department of Cardiology  Date of Service: 09/11/2024 10:37 AM        Radha dictating with date of service being the 6th of September 2024.    Preoperative diagnosis:  Severe atherosclerotic coronary artery disease status post myocardial infarction.    Postoperative diagnosis: Same.    Operation: Coronary artery bypass  grafting x5 using left internal mammary artery to left anterior descending coronary artery, the left radial artery from the aorta to the ramus intermediate coronary artery, and separate segments of saphenous vein from the aorta to the posterior descending coronary artery, from the aorta to the posterolateral branch of the circumflex coronary artery and from the left internal mammary artery to the obtuse marginal coronary artery using a combination of antegrade and retrograde cardioplegia, mild systemic hypothermia, endoscopic vein harvest from the left leg, and insertion of right femoral arterial line with percutaneous Seldinger technique.    STATUS POST BYPASS AGREE WITH INCREASING MOBILITY OKAY TO DISCHARGE AGREE WITH THE RECOMMENDATIONS ABOVE

## 2024-09-11 NOTE — PROGRESS NOTES
[facility] Medicine  Progress Note    Patient Name: Debby Brown  MRN: [id number]  Patient Class: IP- Inpatient   Admission Date: 8/30/2024  Length of Stay: 11 days  Attending Physician: Nereyda Alegria MD  Primary Care Provider: Shawna Costello MD        Subjective:     Principal Problem:NSTEMI (non-ST elevated myocardial infarction)        HPI:  74-year-old female presented to ED for eval of chest pain. pMHx HTN, DM 2, HLD.  Patient reported she has been having back pain that radiates to the front of her chest, stated chest pain his pressure-like and worsens with exertion, relieved by rest.  Patient reported associated shortness of breath and nausea.  Reported these symptoms have been present intermittently for the last several months but have worsened significantly in the last few weeks with more frequent episodes.  Patient was evaluated by her PCP earlier today, she stated she had been out of most of her home meds, including BP meds and oral diabetic agent, for 3-4 weeks, reported to PCP the only medication she was currently taking is her Toujeo.  In ED today, SBP greater than 200 on arrival, improved with nitroglycerin.  Initial troponin 908.  Noted with hyperglycemia, serum glucose 337.  EKG with T-wave changes.  Chest x-ray impression without acute abnormality.  CTA chest abdomen impression with mild cardiomegaly with scattered coronary artery calcifications (predominantly in the LAD and circumflex distribution); calcified plaques in the thoracoabdominal aorta and iliacs with no aneurysm.  ED discussed case with Cardiology, heparin drip initiated.  Admit to hospital medicine for further eval.    Overview/Hospital Course:  Patient with medical history of diabetes, hypertension, hyperlipidemia came in with chest pain and was admitted for NSTEMI.  Status post angiogram on 08/30 showed severe multivessel CAD with thrombus in the distal RCA.  Patient was placed on heparin drip.   Cardiology and Cardiothoracic surgery were following patient.  Troponins were trended. Plan for CABG on 9/6/24 by Dr. Hung.  A1c 10.2 , so we made adjustments to glargine and SSI as needed.  Patient was treated with antibiotic therapy for urinary tract infection.  Final urine culture test confirmed E coli (ESBL species) for which patient received intravenous antibiotic therapy.  Patient was placed on contact isolation protocol.  Patient underwent the CABG as planned, and it went uneventfully. Poor motivation for therapy. CT x2 in place. Poor oral intake due to nausea. Doing better on incentive spirometer. Pacer wires removed. Chest tubes removed 9/10/24.    Interval History:  Underwent CABG 9/6/24. CT x 2 in place, several complaints and poor motivation to get out of bed.   Poor oral intake, suggest glucerna to supplement during the day   Increase basal/prandial dosing insulin   Pacer wires removed, chest tubes removed     Review of Systems   Constitutional:  Positive for appetite change and fatigue.   Respiratory: Negative.     Cardiovascular: Negative.    Gastrointestinal: Negative.    Genitourinary: Negative.    Musculoskeletal:  Positive for back pain and gait problem.   Psychiatric/Behavioral:  Positive for dysphoric mood.      Objective:     Vital Signs (Most Recent):  Temp: 98.2 °F (36.8 °C) (09/11/24 0338)  Pulse: 60 (09/11/24 0732)  Resp: 18 (09/11/24 0338)  BP: 102/68 (09/11/24 0338)  SpO2: 98 % (09/11/24 0338) Vital Signs (24h Range):  Temp:  [98.2 °F (36.8 °C)-98.6 °F (37 °C)] 98.2 °F (36.8 °C)  Pulse:  [60-72] 60  Resp:  [14-25] 18  SpO2:  [94 %-100 %] 98 %  BP: (100-139)/(51-68) 102/68     Weight: 85.2 kg (187 lb 13.3 oz)  Body mass index is 29.42 kg/m².    Intake/Output Summary (Last 24 hours) at 9/11/2024 6916  Last data filed at 9/10/2024 1712  Gross per 24 hour   Intake --   Output 170 ml   Net -170 ml         Physical Exam  Constitutional:       Comments: Sitting up in chair in no distress     Eyes:      Conjunctiva/sclera:      Right eye: No exudate.     Left eye: No exudate.  Neck:      Vascular: No JVD.   Cardiovascular:      Rate and Rhythm: Normal rate and regular rhythm.   Pulmonary:      Breath sounds: Normal breath sounds.   Abdominal:      General: Abdomen is flat. Bowel sounds are decreased. There is no distension.      Palpations: Abdomen is soft.   Skin:     General: Skin is warm.      Findings: No rash.             Significant Labs: All pertinent labs within the past 24 hours have been reviewed.    Significant Imaging: I have reviewed all pertinent imaging results/findings within the past 24 hours.    Assessment/Plan:      * NSTEMI (non-ST elevated myocardial infarction)  Status post angiogram on 08/30 showed severe multivessel CAD with thrombus in the distal RCA.    Underwent CABG x 5 on 9/6/24    UTI due to extended-spectrum beta lactamase (ESBL) producing Escherichia coli  Final urine cultures grew E coli (ESBL species).  Continue meropenem.  Observe contact isolation.      Stage 3a chronic kidney disease  Creatine stable for now. CMP reviewed- noted Estimated Creatinine Clearance: 62.4 mL/min (based on SCr of 0.9 mg/dL). according to latest data. Based on current GFR, CKD stage is stage 3 - GFR 30-59.  Monitor UOP and serial CMP and adjust therapy as needed. Renally dose meds. Avoid nephrotoxic medications and procedures.    Type 2 diabetes mellitus with microalbuminuria, with long-term current use of insulin  Patient's FSGs are controlled on current medication regimen.  Last A1c reviewed-   Lab Results   Component Value Date    LABA1C 9.2 (H) 07/03/2018    HGBA1C 10.2 (H) 08/30/2024     Most recent fingerstick glucose reviewed-   Recent Labs   Lab 09/05/24  2154 09/06/24  1901   POCTGLUCOSE 228* 163*       Patient will be on insulin drip post-op.    Antihyperglycemics (From admission, onward)      Start     Stop Route Frequency Ordered    09/06/24 1200  insulin regular in 0.9 % NaCl  100 unit/100 mL (1 unit/mL) infusion        Question:  Insulin rate changes (DO NOT MODIFY ANSWER)  Answer:  [email or web address]    -- IV Continuous 09/06/24 1049          Hold Oral hypoglycemics while patient is in the hospital.        Hyperlipidemia  Continue atorvastatin  Lipid panel reviewed.        VTE Risk Mitigation (From admission, onward)           Ordered     enoxaparin injection 40 mg  Every 24 hours         09/09/24 1531                    Discharge Planning   MARTHA: 9/12/2024     Code Status: Full Code   Is the patient medically ready for discharge?:     Reason for patient still in hospital (select all that apply): Patient trending condition  Discharge Plan A: Home with family   Discharge Delays: None known at this time   Home equipment  The mobility limitation cannot be sufficiently resolved by the use of a cane. Patient's functional mobility deficit can be sufficiently resolved with the use of a Rolling Walker. Patient's mobility limitation significantly impairs their ability to participate in one of more activities of daily living. The use of a Rolling Walker will significantly improve the patient's ability to participate in MRADLS and the patient will use it on regular basis in the home.    Critical care time spent on the evaluation and treatment of severe organ dysfunction, review of pertinent labs and imaging studies, discussions with consulting providers and discussions with patient/family: 20 minutes.      Nereyda Alegria MD  Department of Hospital Medicine   [facility]

## 2024-09-11 NOTE — PLAN OF CARE
No issues overnight. Plans for stepdown orders today. Patient declined CHG bath. Pain controlled with PRN pain medication given per MD order. Care ongoing.    Problem: Adult Inpatient Plan of Care  Goal: Plan of Care Review  Outcome: Progressing  Goal: Patient-Specific Goal (Individualized)  Outcome: Progressing  Goal: Absence of Hospital-Acquired Illness or Injury  Outcome: Progressing  Goal: Optimal Comfort and Wellbeing  Outcome: Progressing  Goal: Readiness for Transition of Care  Outcome: Progressing

## 2024-09-12 VITALS
HEIGHT: 67 IN | RESPIRATION RATE: 18 BRPM | TEMPERATURE: 99 F | DIASTOLIC BLOOD PRESSURE: 63 MMHG | OXYGEN SATURATION: 95 % | SYSTOLIC BLOOD PRESSURE: 128 MMHG | BODY MASS INDEX: 33.71 KG/M2 | WEIGHT: 214.75 LBS | HEART RATE: 109 BPM

## 2024-09-12 LAB
PHOSPHATE SERPL-MCNC: 3.5 MG/DL (ref 2.7–4.5)
POCT GLUCOSE: 121 MG/DL (ref 70–110)
POCT GLUCOSE: 167 MG/DL (ref 70–110)

## 2024-09-12 PROCEDURE — 82962 GLUCOSE BLOOD TEST: CPT

## 2024-09-12 PROCEDURE — 94640 AIRWAY INHALATION TREATMENT: CPT

## 2024-09-12 PROCEDURE — 99024 POSTOP FOLLOW-UP VISIT: CPT | Mod: POP,,, | Performed by: PHYSICIAN ASSISTANT

## 2024-09-12 PROCEDURE — 94761 N-INVAS EAR/PLS OXIMETRY MLT: CPT

## 2024-09-12 PROCEDURE — 25000003 PHARM REV CODE 250: Performed by: THORACIC SURGERY (CARDIOTHORACIC VASCULAR SURGERY)

## 2024-09-12 PROCEDURE — 99900031 HC PATIENT EDUCATION (STAT)

## 2024-09-12 PROCEDURE — 36415 COLL VENOUS BLD VENIPUNCTURE: CPT | Performed by: THORACIC SURGERY (CARDIOTHORACIC VASCULAR SURGERY)

## 2024-09-12 PROCEDURE — 63600175 PHARM REV CODE 636 W HCPCS: Performed by: HOSPITALIST

## 2024-09-12 PROCEDURE — 94799 UNLISTED PULMONARY SVC/PX: CPT

## 2024-09-12 PROCEDURE — 63600175 PHARM REV CODE 636 W HCPCS: Performed by: THORACIC SURGERY (CARDIOTHORACIC VASCULAR SURGERY)

## 2024-09-12 PROCEDURE — 25000003 PHARM REV CODE 250: Performed by: NURSE PRACTITIONER

## 2024-09-12 PROCEDURE — 84100 ASSAY OF PHOSPHORUS: CPT | Performed by: THORACIC SURGERY (CARDIOTHORACIC VASCULAR SURGERY)

## 2024-09-12 PROCEDURE — 25000242 PHARM REV CODE 250 ALT 637 W/ HCPCS: Performed by: PHYSICIAN ASSISTANT

## 2024-09-12 RX ORDER — NAPROXEN SODIUM 220 MG/1
81 TABLET, FILM COATED ORAL DAILY
Qty: 30 TABLET | Refills: 0 | Status: SHIPPED | OUTPATIENT
Start: 2024-09-13 | End: 2024-10-13

## 2024-09-12 RX ORDER — ATORVASTATIN CALCIUM 80 MG/1
80 TABLET, FILM COATED ORAL DAILY
Qty: 30 TABLET | Refills: 0 | Status: SHIPPED | OUTPATIENT
Start: 2024-09-13 | End: 2024-10-13

## 2024-09-12 RX ORDER — HYDROCODONE BITARTRATE AND ACETAMINOPHEN 5; 325 MG/1; MG/1
1 TABLET ORAL EVERY 4 HOURS PRN
Qty: 20 TABLET | Refills: 0 | Status: SHIPPED | OUTPATIENT
Start: 2024-09-12

## 2024-09-12 RX ORDER — AMIODARONE HYDROCHLORIDE 200 MG/1
200 TABLET ORAL 3 TIMES DAILY
Qty: 90 TABLET | Refills: 0 | Status: SHIPPED | OUTPATIENT
Start: 2024-09-12 | End: 2024-10-12

## 2024-09-12 RX ADMIN — ASPIRIN 81 MG 81 MG: 81 TABLET ORAL at 08:09

## 2024-09-12 RX ADMIN — IPRATROPIUM BROMIDE AND ALBUTEROL SULFATE 3 ML: 2.5; .5 SOLUTION RESPIRATORY (INHALATION) at 01:09

## 2024-09-12 RX ADMIN — PANTOPRAZOLE SODIUM 40 MG: 40 TABLET, DELAYED RELEASE ORAL at 05:09

## 2024-09-12 RX ADMIN — IPRATROPIUM BROMIDE AND ALBUTEROL SULFATE 3 ML: 2.5; .5 SOLUTION RESPIRATORY (INHALATION) at 07:09

## 2024-09-12 RX ADMIN — ATORVASTATIN CALCIUM 80 MG: 40 TABLET, FILM COATED ORAL at 08:09

## 2024-09-12 RX ADMIN — IPRATROPIUM BROMIDE AND ALBUTEROL SULFATE 3 ML: 2.5; .5 SOLUTION RESPIRATORY (INHALATION) at 12:09

## 2024-09-12 RX ADMIN — MEROPENEM 1 G: 1 INJECTION, POWDER, FOR SOLUTION INTRAVENOUS at 02:09

## 2024-09-12 RX ADMIN — INSULIN GLARGINE 20 UNITS: 100 INJECTION, SOLUTION SUBCUTANEOUS at 08:09

## 2024-09-12 RX ADMIN — METOPROLOL SUCCINATE 50 MG: 50 TABLET, FILM COATED, EXTENDED RELEASE ORAL at 08:09

## 2024-09-12 RX ADMIN — AMIODARONE HYDROCHLORIDE 200 MG: 200 TABLET ORAL at 08:09

## 2024-09-12 NOTE — CARE UPDATE
09/12/24 0751   Patient Assessment/Suction   Level of Consciousness (AVPU) alert   Respiratory Effort Normal;Unlabored   Expansion/Accessory Muscles/Retractions no use of accessory muscles   All Lung Fields Breath Sounds clear   Rhythm/Pattern, Respiratory unlabored   Cough Frequency no cough   PRE-TX-O2   Device (Oxygen Therapy) room air   SpO2 95 %   Pulse Oximetry Type Continuous   $ Pulse Oximetry - Multiple Charge Pulse Oximetry - Multiple   Pulse 76   Resp 16   Aerosol Therapy   $ Aerosol Therapy Charges Aerosol Treatment   Daily Review of Necessity (SVN) completed   Respiratory Treatment Status (SVN) given   Treatment Route (SVN) mask;oxygen   Patient Position HOB elevated   Post Treatment Assessment (SVN) breath sounds unchanged   Signs of Intolerance (SVN) none   Breath Sounds Post-Respiratory Treatment   Throughout All Fields Post-Treatment All Fields   Throughout All Fields Post-Treatment no change   Post-treatment Heart Rate (beats/min) 72   Post-treatment Resp Rate (breaths/min) 16   Incentive Spirometer   $ Incentive Spirometer Charges done with encouragement   Incentive Spirometer Predicted Level (mL) 1500   Administration (IS) instruction provided, follow-up   Number of Repetitions (IS) 8   Level Incentive Spirometer (mL) 1500   Patient Tolerance (IS) good;no adverse signs/symptoms present   Education   $ Education Bronchodilator;Incentive Spirometry;15 min

## 2024-09-12 NOTE — PROGRESS NOTES
[facility] Medicine  Progress Note    Patient Name: Debby Brown  MRN: [id number]  Patient Class: IP- Inpatient   Admission Date: 8/30/2024  Length of Stay: 11 days  Attending Physician: Nereyda Alegria MD  Primary Care Provider: Shawna Costello MD        Subjective:     Principal Problem:NSTEMI (non-ST elevated myocardial infarction)        HPI:  74-year-old female presented to ED for eval of chest pain. pMHx HTN, DM 2, HLD.  Patient reported she has been having back pain that radiates to the front of her chest, stated chest pain his pressure-like and worsens with exertion, relieved by rest.  Patient reported associated shortness of breath and nausea.  Reported these symptoms have been present intermittently for the last several months but have worsened significantly in the last few weeks with more frequent episodes.  Patient was evaluated by her PCP earlier today, she stated she had been out of most of her home meds, including BP meds and oral diabetic agent, for 3-4 weeks, reported to PCP the only medication she was currently taking is her Toujeo.  In ED today, SBP greater than 200 on arrival, improved with nitroglycerin.  Initial troponin 908.  Noted with hyperglycemia, serum glucose 337.  EKG with T-wave changes.  Chest x-ray impression without acute abnormality.  CTA chest abdomen impression with mild cardiomegaly with scattered coronary artery calcifications (predominantly in the LAD and circumflex distribution); calcified plaques in the thoracoabdominal aorta and iliacs with no aneurysm.  ED discussed case with Cardiology, heparin drip initiated.  Admit to hospital medicine for further eval.    Overview/Hospital Course:  Patient with medical history of diabetes, hypertension, hyperlipidemia came in with chest pain and was admitted for NSTEMI.  Status post angiogram on 08/30 showed severe multivessel CAD with thrombus in the distal RCA.  Patient was placed on heparin drip.   Cardiology and Cardiothoracic surgery were following patient.  Troponins were trended. Plan for CABG on 9/6/24 by Dr. Hung.  A1c 10.2 , so we made adjustments to glargine and SSI as needed.  Patient was treated with antibiotic therapy for urinary tract infection.  Final urine culture test confirmed E coli (ESBL species) for which patient received intravenous antibiotic therapy.  Patient was placed on contact isolation protocol.  Patient underwent the CABG as planned, and it went uneventfully. Poor motivation for therapy. Poor oral intake due to nausea. Doing better on incentive spirometer. Pacer wires removed. Chest tubes removed 9/10/24. Ok for stepdown unit.    Interval History:  Underwent CABG 9/6/24.   Patient offered a dc home today but she reported not feeling well. Felt more congested       Review of Systems   Constitutional:  Positive for appetite change and fatigue.   Respiratory: Negative.     Cardiovascular: Negative.    Gastrointestinal: Negative.    Genitourinary: Negative.    Musculoskeletal:  Positive for back pain and gait problem.   Psychiatric/Behavioral:  Positive for dysphoric mood.      Objective:     Vital Signs (Most Recent):  Temp: 98.3 °F (36.8 °C) (09/11/24 1915)  Pulse: 74 (09/11/24 2007)  Resp: 19 (09/11/24 2007)  BP: 127/60 (09/11/24 1906)  SpO2: 95 % (09/11/24 2007) Vital Signs (24h Range):  Temp:  [97.8 °F (36.6 °C)-98.7 °F (37.1 °C)] 98.3 °F (36.8 °C)  Pulse:  [60-77] 74  Resp:  [16-20] 19  SpO2:  [94 %-100 %] 95 %  BP: (102-131)/(59-68) 127/60     Weight: 85.2 kg (187 lb 13.3 oz)  Body mass index is 29.42 kg/m².    Intake/Output Summary (Last 24 hours) at 9/11/2024 2120  Last data filed at 9/11/2024 1859  Gross per 24 hour   Intake 1060 ml   Output --   Net 1060 ml         Physical Exam  Constitutional:       Comments: Sitting up in chair in no distress    Eyes:      Conjunctiva/sclera:      Right eye: No exudate.     Left eye: No exudate.  Neck:      Vascular: No JVD.    Cardiovascular:      Rate and Rhythm: Normal rate and regular rhythm.   Pulmonary:      Breath sounds: Rhonchi present.   Abdominal:      General: Abdomen is flat. Bowel sounds are decreased. There is no distension.      Palpations: Abdomen is soft.   Skin:     General: Skin is warm.      Findings: No rash.             Significant Labs: All pertinent labs within the past 24 hours have been reviewed.    Significant Imaging: I have reviewed all pertinent imaging results/findings within the past 24 hours.    Assessment/Plan:      * NSTEMI (non-ST elevated myocardial infarction)  Status post angiogram on 08/30 showed severe multivessel CAD with thrombus in the distal RCA.    Underwent CABG x 5 on 9/6/24    UTI due to extended-spectrum beta lactamase (ESBL) producing Escherichia coli  Final urine cultures grew E coli (ESBL species).  Continue meropenem.  Observe contact isolation.      Stage 3a chronic kidney disease  Creatine stable for now. CMP reviewed- noted Estimated Creatinine Clearance: 62.4 mL/min (based on SCr of 0.9 mg/dL). according to latest data. Based on current GFR, CKD stage is stage 3 - GFR 30-59.  Monitor UOP and serial CMP and adjust therapy as needed. Renally dose meds. Avoid nephrotoxic medications and procedures.    Type 2 diabetes mellitus with microalbuminuria, with long-term current use of insulin  Patient's FSGs are controlled on current medication regimen.  Last A1c reviewed-   Lab Results   Component Value Date    LABA1C 9.2 (H) 07/03/2018    HGBA1C 10.2 (H) 08/30/2024     Most recent fingerstick glucose reviewed-   Recent Labs   Lab 09/05/24  2154 09/06/24  1901   POCTGLUCOSE 228* 163*       Patient will be on insulin drip post-op.    Antihyperglycemics (From admission, onward)      Start     Stop Route Frequency Ordered    09/06/24 1200  insulin regular in 0.9 % NaCl 100 unit/100 mL (1 unit/mL) infusion        Question:  Insulin rate changes (DO NOT MODIFY ANSWER)  Answer:   [email or web address]    -- IV Continuous 09/06/24 1049          Hold Oral hypoglycemics while patient is in the hospital.        Hyperlipidemia  Continue atorvastatin  Lipid panel reviewed.        VTE Risk Mitigation (From admission, onward)           Ordered     enoxaparin injection 40 mg  Every 24 hours         09/09/24 1531                    Discharge Planning   MARTHA: 9/12/2024     Code Status: Full Code   Is the patient medically ready for discharge?:     Reason for patient still in hospital (select all that apply): Patient trending condition  Discharge Plan A: Home with family   Discharge Delays: None known at this time              Nereyda Alegria MD  Department of Hospital Medicine   Formerly [facility], [facility]

## 2024-09-12 NOTE — PROGRESS NOTES
"[facility]  Adult Nutrition   Progress Note (Follow-Up)    SUMMARY     Recommendations  Continue current diabetic diet as tolerated.    Goals:   Pt to continue to meet her EEN/EPN.    Nutrition Goal Status: goal met    Communication of RD Recs: other (comment)    Nutrition Diagnosis PES Statement: Adequate oral intake related to PO intake as evidenced by 100% PO intake.     Dietitian Rounds Brief  F/U Nutrition Note: Pt was just discharged but been eating 100% of her diabetic diet. No follow up.    Nutrition Related Social Determinants of Health: SDOH: Adequate food in home environment    Malnutrition Assessment        Mu-ism Region (Muscle Loss): well nourished  Clavicle Bone Region (Muscle Loss): well nourished  Clavicle and Acromion Bone Region (Muscle Loss): well nourished  Scapular Bone Region (Muscle Loss): well nourished  Dorsal Hand (Muscle Loss): well nourished  Patellar Region (Muscle Loss): well nourished  Anterior Thigh Region (Muscle Loss): well nourished  Posterior Calf Region (Muscle Loss): well nourished       Diet order:   Current Diet Order: 1500 kcal Diabetic      Evaluation of Received Nutrient/Fluid Intake  Energy Calories Required: meeting needs  Protein Required: meeting needs  Fluid Required: meeting needs  Tolerance: tolerating     % Intake of Estimated Energy Needs: 75 - 100 %  % Meal Intake: 75 - 100 %      Intake/Output Summary (Last 24 hours) at 2024 1125  Last data filed at 2024 1034  Gross per 24 hour   Intake 800 ml   Output --   Net 800 ml        Anthropometrics  Temp: 97.9 °F (36.6 °C)  Height Method: Stated  Height: 5' 7" (170.2 cm)  Height (inches): 67 in  Weight Method: Bed Scale  Weight: 97.4 kg (214 lb 11.7 oz)  Weight (lb): 214.73 lb  Ideal Body Weight (IBW), Female: 135 lb  % Ideal Body Weight, Female (lb): 146.65 %  BMI (Calculated): 33.6  BMI Grade: 30 - 34.9- obesity - grade I  Usual Body Weight (UBW), k.5 kg ()  % Usual Body Weight: " "110.41       Estimated/Assessed Needs  Weight Used For Calorie Calculations: 97.4 kg (214 lb 11.7 oz)  Energy Calorie Requirements (kcal): 9248-7181  Energy Need Method: Kcal/kg  Protein Requirements:  (1.5-2.0)  Weight Used For Protein Calculations: 61 kg (134 lb 7.7 oz) (IBW d/t obesity + ICU setting)  Fluid Requirements (mL): 4833-1818  Estimated Fluid Requirement Method: RDA Method  RDA Method (mL): 1796  CHO Requirement: 200-250 gm (40-50% of 2000 kcal)    Reason for Assessment  Reason For Assessment: RD follow-up  Diagnosis: cardiac disease, diabetes diagnosis/complications  Relevant Medical History: HTN, DM 2, HLD  Interdisciplinary Rounds: did not attend  General Information Comments: Consult receive for NSTEMI. Pt admitted with chest pain. Pt currently NPO. She reports not following a diet at home and eats anything she wants. She admits to receiving diet education in the past but "I don't have time for that". She reports fair appetite. No chew/swallowing issues. C/o some nausea, no other GI distress. LBM 8/30. Skin: intact per chart. Wt reviewed. UBW 81.5 kg (2021) reflecting wt maintainence x3 years. NFPE completed with no s/s of wasting. No malnutrition suspected. Provided heart healthy, consistent carbohydrate diet handouts with RD contact information.  Nutrition Discharge Planning: DM cardiac diet    Nutrition/Diet History  Patient Reported Diet/Restrictions/Preferences: general  Spiritual, Cultural Beliefs, [employer] Practices, Values that Affect Care: no  Food Allergies: NKFA  Factors Affecting Nutritional Intake: None identified at this time    Nutrition Risk Screen  Nutrition Risk Screen: no indicators present     MST Score: 0  Have you recently lost weight without trying?: No  Weight loss score: 0  Have you been eating poorly because of a decreased appetite?: No  Appetite score: 0       Weight History:  Wt Readings from Last 10 Encounters:   09/12/24 97.4 kg (214 lb 11.7 oz)   08/31/24 89.4 " kg (197 lb)   08/30/24 89.1 kg (196 lb 6.9 oz)   08/14/24 87.8 kg (193 lb 9 oz)   07/12/24 87 kg (191 lb 12.8 oz)   05/16/24 84.5 kg (186 lb 4.6 oz)   05/13/24 86 kg (189 lb 11.3 oz)   04/05/24 86.1 kg (189 lb 13.1 oz)   03/13/24 87.1 kg (192 lb)   02/19/24 86.8 kg (191 lb 5.8 oz)        Lab/Procedures/Meds: Pertinent Labs/Meds Reviewed    Medications:Pertinent Medications Reviewed  Scheduled Meds:   albuterol-ipratropium  3 mL Nebulization Q6H    amiodarone  200 mg Oral TID    aspirin  81 mg Oral Daily    atorvastatin  80 mg Oral Daily    docusate sodium  100 mg Oral BID    enoxparin  40 mg Subcutaneous Q24H (prophylaxis, 1700)    insulin glargine U-100  20 Units Subcutaneous BID    meropenem IV (PEDS and ADULTS)  1 g Intravenous Q12H    metoprolol succinate  50 mg Oral Daily    pantoprazole  40 mg Oral Daily    polyethylene glycol  17 g Oral Daily     Continuous Infusions:  PRN Meds:.  Current Facility-Administered Medications:     0.9%  NaCl infusion (for blood administration), , Intravenous, Q24H PRN    acetaminophen, 650 mg, Oral, Q6H PRN    albumin human 5%, 25 g, Intravenous, PRN    albuterol-ipratropium, 3 mL, Nebulization, Q6H PRN    calcium gluconate IVPB, 1 g, Intravenous, PRN    calcium gluconate IVPB, 2 g, Intravenous, PRN    calcium gluconate IVPB, 3 g, Intravenous, PRN    dextrose 50%, 12.5 g, Intravenous, PRN    dextrose 50%, 12.5 g, Intravenous, PRN    dextrose 50%, 25 g, Intravenous, PRN    dextrose 50%, 25 g, Intravenous, PRN    glucagon (human recombinant), 1 mg, Intramuscular, PRN    glucose, 16 g, Oral, PRN    glucose, 24 g, Oral, PRN    HYDROcodone-acetaminophen, 1 tablet, Oral, Q4H PRN    insulin aspart U-100, 0-15 Units, Subcutaneous, QID (AC + HS) PRN    LORazepam, 0.5 mg, Oral, Q6H PRN    magnesium sulfate IVPB, 2 g, Intravenous, PRN    magnesium sulfate IVPB, 4 g, Intravenous, PRN    morphine, 2 mg, Intravenous, Q1H PRN    morphine, 4 mg, Intravenous, PRN    ondansetron, 4 mg,  Intravenous, Q6H PRN    potassium chloride in water, 20 mEq, Intravenous, PRN    potassium chloride in water, 20 mEq, Intravenous, PRN    potassium chloride in water, 40 mEq, Intravenous, PRN    sodium phosphate 15 mmol in D5W 250 mL IVPB, 15 mmol, Intravenous, PRN    sodium phosphate 20.01 mmol in D5W 250 mL IVPB, 20.01 mmol, Intravenous, PRN    sodium phosphate 30 mmol in D5W 250 mL IVPB, 30 mmol, Intravenous, PRN    Labs: Pertinent Labs Reviewed  Clinical Chemistry:  Recent Labs   Lab 09/07/24  0336 09/08/24  0310 09/09/24  0446 09/10/24  0415 09/11/24 0319 09/12/24  0335    136 135* 138 139  --    K 4.7 4.6 4.1 4.1 4.0  --    * 103 103 104 105  --    CO2 22* 25 27 24 25  --    * 282* 188* 126* 108  --    BUN 17 29* 31* 28* 25*  --    CREATININE 1.0 1.3 1.2 1.1 1.0  --    CALCIUM 8.0* 8.3* 7.9* 8.2* 7.9*  --    PROT 5.2* 5.5* 5.2*  --   --   --    ALBUMIN 3.5 3.4* 3.0*  --   --   --    BILITOT 0.6 0.6 0.7  --   --   --    ALKPHOS 30* 39* 43*  --   --   --    AST 54* 28 22  --   --   --    ALT 38 24 17  --   --   --    ANIONGAP 10 8 5* 10 9  --    MG 1.9 2.3 2.3  --   --   --    PHOS 4.0 3.9 2.5* 2.5* 2.3* 3.5     CBC:   Recent Labs   Lab 09/11/24 0319   WBC 12.53   RBC 3.07*   HGB 8.8*   HCT 27.3*      MCV 89   MCH 28.7   MCHC 32.2     Diabetes:  Recent Labs   Lab 09/11/24  1713 09/11/24 2010 09/12/24  0818   POCTGLUCOSE 142* 129* 121*       Monitor and Evaluation  Food and Nutrient Intake: energy intake, food and beverage intake  Food and Nutrient Adminstration: diet order  Knowledge/Beliefs/Attitudes: food and nutrition knowledge/skill  Physical Activity and Function: nutrition-related ADLs and IADLs  Anthropometric Measurements: weight, weight change  Biochemical Data, Medical Tests and Procedures: electrolyte and renal panel, gastrointestinal profile, glucose/endocrine profile  Nutrition-Focused Physical Findings: overall appearance     Nutrition Risk  Level of Risk/Frequency of  Follow-up:  (weekly)     Nutrition Follow-Up  RD Follow-up?: Yes

## 2024-09-12 NOTE — PROGRESS NOTES
Formerly [facility], [facility]  Department of Cardiology  Progress Note    PATIENT NAME: Debby Brown  MRN: [id number]  TODAY'S DATE: 09/12/2024  ADMIT DATE: 8/30/2024    SUBJECTIVE     PRINCIPLE PROBLEM: NSTEMI (non-ST elevated myocardial infarction)    INTERVAL HISTORY:    9/12/2024  Hemodynamically stable.  Denies complaints. No events on tele. RA.  Ambulated in room this am. Appears euvolemic.    9/11/24  PATIENT IS RESTING COMFORTABLY IN BED ALL CHEST TUBES ARE OUT.  SHE IS READY FOR DISCHARGE BUT THERE WAS A HURRICANE IN THE AREA AND THE DISCHARGE WILL BE DELAYED SECONDARY TO THE HURRICANE  9/10/2024    Doing well. Sitting up in the chair in no distress. Eating lunch. Hg 9.3.      9/9/2024  Ms. Brown is laying in bed in no acute distress. VSS. Just received one unit of PRBCs.      9/8/2024    Patient seen resting in bed with no acute distress noted.  She is back in sinus rhythm but did have some AFib overnight and was started on amiodarone.    9/7/24:  Patient seen resting in bed with no acute distress.  She is status post CABG and feeling well overall.  She is stable on telemetry.    9/6/24    Patient seen resting in bed comfortably.  She is stable on telemetry and feeling well overall.  No complaints of any chest discomfort or shortness of breath.    9/4/24    Patient seen resting in bed with no acute distress noted.  Denies any chest discomfort shortness of breath.  She is stable on telemetry at this time.    9/3/24    Patient for evaluation from CT surgery today.  Stable and doing well presently.    9/2/24    PATIENT WITH A NON ST-ELEVATION MI CURRENTLY IN ICU ON NITRO DRIP WAITING CARDIOVASCULAR SURGERY EVALUATION BY DR. SORIANO  SHE HAD CHEST PAIN TODAY FOR ABOUT 3 MINUTES OTHERWISE IS DOING WELL    Review of patient's allergies indicates:   Allergen Reactions    Bactrim [sulfamethoxazole-trimethoprim]     Codeine     Levaquin [levofloxacin]     Pcn [penicillins]        REVIEW OF SYSTEMS  CARDIOVASCULAR: No  recent chest pain, palpitations, arm, neck, or jaw pain  RESPIRATORY: No recent fever, cough chills, SOB or congestion  : No blood in the urine  GI: No Nausea, vomiting, constipation, diarrhea, blood, or reflux.  MUSCULOSKELETAL: No myalgias  NEURO: No lightheadedness or dizziness  EYES: No Double vision, blurry, vision or headache     OBJECTIVE     VITAL SIGNS (Most Recent)  Temp: 97.9 °F (36.6 °C) (09/12/24 0704)  Pulse: 79 (09/12/24 1200)  Resp: 18 (09/12/24 1200)  BP: 128/63 (09/12/24 0800)  SpO2: 98 % (09/12/24 1200)    VENTILATION STATUS  Resp: 18 (09/12/24 1200)  SpO2: 98 % (09/12/24 1200)           I & O (Last 24H):  Intake/Output Summary (Last 24 hours) at 9/12/2024 1225  Last data filed at 9/12/2024 1034  Gross per 24 hour   Intake 800 ml   Output --   Net 800 ml       WEIGHTS  Wt Readings from Last 3 Encounters:   09/12/24 0400 97.4 kg (214 lb 11.7 oz)   09/05/24 0400 85.2 kg (187 lb 13.3 oz)   09/04/24 0400 85 kg (187 lb 6.3 oz)   09/03/24 0400 89.9 kg (198 lb 3.1 oz)   09/01/24 0353 88.8 kg (195 lb 12.3 oz)   08/30/24 1938 89.8 kg (197 lb 15.6 oz)   08/30/24 1349 86.2 kg (190 lb)   08/31/24 0920 89.4 kg (197 lb)   08/30/24 1007 89.1 kg (196 lb 6.9 oz)       PHYSICAL EXAM  CONSTITUTIONAL: Well built, well nourished in no apparent distress  NECK: no carotid bruit, no JVD  LUNGS: diminished  CHEST WALL:  midsternal incision with dressing in place   HEART: regular rate and rhythm, S1, S2 normal, + systolic murmur   ABDOMEN: soft, non-tender; bowel sounds normal  EXTREMITIES: Extremities normal, no edema  NEURO: AAO X 3    SCHEDULED MEDS:   albuterol-ipratropium  3 mL Nebulization Q6H    amiodarone  200 mg Oral TID    aspirin  81 mg Oral Daily    atorvastatin  80 mg Oral Daily    docusate sodium  100 mg Oral BID    enoxparin  40 mg Subcutaneous Q24H (prophylaxis, 1700)    insulin glargine U-100  20 Units Subcutaneous BID    meropenem IV (PEDS and ADULTS)  1 g Intravenous Q12H    metoprolol succinate  50 mg  Oral Daily    pantoprazole  40 mg Oral Daily    polyethylene glycol  17 g Oral Daily       CONTINUOUS INFUSIONS:        PRN MEDS:  Current Facility-Administered Medications:     0.9%  NaCl infusion (for blood administration), , Intravenous, Q24H PRN    acetaminophen, 650 mg, Oral, Q6H PRN    albumin human 5%, 25 g, Intravenous, PRN    albuterol-ipratropium, 3 mL, Nebulization, Q6H PRN    calcium gluconate IVPB, 1 g, Intravenous, PRN    calcium gluconate IVPB, 2 g, Intravenous, PRN    calcium gluconate IVPB, 3 g, Intravenous, PRN    dextrose 50%, 12.5 g, Intravenous, PRN    dextrose 50%, 12.5 g, Intravenous, PRN    dextrose 50%, 25 g, Intravenous, PRN    dextrose 50%, 25 g, Intravenous, PRN    glucagon (human recombinant), 1 mg, Intramuscular, PRN    glucose, 16 g, Oral, PRN    glucose, 24 g, Oral, PRN    HYDROcodone-acetaminophen, 1 tablet, Oral, Q4H PRN    insulin aspart U-100, 0-15 Units, Subcutaneous, QID (AC + HS) PRN    LORazepam, 0.5 mg, Oral, Q6H PRN    magnesium sulfate IVPB, 2 g, Intravenous, PRN    magnesium sulfate IVPB, 4 g, Intravenous, PRN    morphine, 2 mg, Intravenous, Q1H PRN    morphine, 4 mg, Intravenous, PRN    ondansetron, 4 mg, Intravenous, Q6H PRN    potassium chloride in water, 20 mEq, Intravenous, PRN    potassium chloride in water, 20 mEq, Intravenous, PRN    potassium chloride in water, 40 mEq, Intravenous, PRN    sodium phosphate 15 mmol in D5W 250 mL IVPB, 15 mmol, Intravenous, PRN    sodium phosphate 20.01 mmol in D5W 250 mL IVPB, 20.01 mmol, Intravenous, PRN    sodium phosphate 30 mmol in D5W 250 mL IVPB, 30 mmol, Intravenous, PRN    LABS AND DIAGNOSTICS     CBC LAST 3 DAYS  Recent Labs   Lab 09/06/24  0338 09/06/24  0732 09/09/24  0446 09/10/24  0415 09/11/24  0319   WBC 9.31  9.31   < > 16.27* 14.47* 12.53   RBC 4.55  4.55   < > 2.58* 3.16* 3.07*   HGB 12.8  12.8   < > 7.5* 9.3* 8.8*   HCT 39.8  39.8   < > 23.1* 28.2* 27.3*   MCV 88  88   < > 90 89 89   MCH 28.1  28.1   < >  "29.1 29.4 28.7   MCHC 32.2  32.2   < > 32.5 33.0 32.2   RDW 14.0  14.0   < > 15.2* 15.5* 15.5*     311   < > 179 248 282   MPV 10.6  10.6   < > 11.5 11.0 11.0   GRAN 46.1  46.1  4.3  4.3  --   --   --   --    LYMPH 39.8  39.8  3.7  3.7  --   --   --   --    MONO 8.7  8.7  0.8  0.8  --   --   --   --    BASO 0.07  0.07  --   --   --   --    NRBC 0  0  --   --   --   --     < > = values in this interval not displayed.       COAGULATION LAST 3 DAYS  Recent Labs   Lab 09/05/24  1431 09/05/24  2335 09/06/24  0338   APTT 35.9* 77.2* 34.5*  34.5*       CHEMISTRY LAST 3 DAYS  Recent Labs   Lab 09/06/24  1656 09/06/24  1803 09/06/24  1939 09/06/24 2001 09/07/24  0336 09/08/24  0310 09/09/24  0446 09/10/24  0415 09/11/24  0319   NA  --   --   --    < > 144 136 135* 138 139   K  --   --   --    < > 4.7 4.6 4.1 4.1 4.0   CL  --   --   --    < > 112* 103 103 104 105   CO2  --   --   --    < > 22* 25 27 24 25   ANIONGAP  --   --   --    < > 10 8 5* 10 9   BUN  --   --   --    < > 17 29* 31* 28* 25*   CREATININE  --   --   --    < > 1.0 1.3 1.2 1.1 1.0   GLU  --   --   --    < > 236* 282* 188* 126* 108   CALCIUM  --   --   --    < > 8.0* 8.3* 7.9* 8.2* 7.9*   PH 7.335* 7.294* 7.357  --   --   --   --   --   --    MG  --   --   --    < > 1.9 2.3 2.3  --   --    ALBUMIN  --   --   --   --  3.5 3.4* 3.0*  --   --    PROT  --   --   --   --  5.2* 5.5* 5.2*  --   --    ALKPHOS  --   --   --   --  30* 39* 43*  --   --    ALT  --   --   --   --  38 24 17  --   --    AST  --   --   --   --  54* 28 22  --   --    BILITOT  --   --   --   --  0.6 0.6 0.7  --   --     < > = values in this interval not displayed.       CARDIAC PROFILE LAST 3 DAYS  No results for input(s): "BNP", "CPK", "CPKMB", "LDH", "TROPONINI" in the last 168 hours.      ENDOCRINE LAST 3 DAYS  No results for input(s): "TSH", "PROCAL" in the last 168 hours.      LAST ARTERIAL BLOOD GAS  ABG  Recent Labs   Lab 09/06/24 1939   PH 7.357   PO2 79*   PCO2 " 43.7   HCO3 24.5   BE -1       LAST 7 DAYS MICROBIOLOGY   Microbiology Results (last 7 days)       ** No results found for the last 168 hours. **            MOST RECENT IMAGING  X-Ray Chest AP Portable  Narrative: EXAMINATION:  XR CHEST AP PORTABLE    CLINICAL HISTORY:  post op cabg;    FINDINGS:  Portable chest at 04:15 is compared to 09/09/2024 shows mild cardiomegaly.  There are median sternotomy wires.  The left IJ catheter mediastinal drains and bilateral chest tubes are in stable position.    There is hypoinflation with left lower lobe atelectasis.  There are no confluent infiltrates pneumothorax or pleural effusions.  The visualized portion of the upper abdomen is unremarkable no acute osseous abnormality.  Impression: Status post recent CABG with hypoinflation in left lower lobe atelectasis    Mild cardiomegaly with support devices in satisfactory position    Electronically signed by: Elisha Pantoja  Date:    09/10/2024  Time:    07:31      [facility]  Results for orders placed during the hospital encounter of 08/30/24    Echo    Interpretation Summary    Left Ventricle: The left ventricle is normal in size. Mildly increased wall thickness. There is mild concentric hypertrophy. There is normal systolic function with a visually estimated ejection fraction of 60 - 65%. There is diastolic dysfunction.    Right Ventricle: Normal right ventricular cavity size. Wall thickness is normal. Systolic function is normal.    Aortic Valve: The aortic valve is a trileaflet valve. There is moderate aortic valve sclerosis. Moderately restricted motion. There is moderate stenosis. Aortic valve area by VTI is 1.18 cm². Aortic valve peak velocity is 2.28 m/s. Mean gradient is 12 mmHg. The dimensionless index is 0.38.    Mitral Valve: There is mild bileaflet sclerosis.    Tricuspid Valve: There is mild regurgitation with a centrally directed jet.    Pulmonary Artery: The estimated pulmonary artery systolic pressure is 26 mmHg.     IVC/SVC: Normal venous pressure at 3 mmHg.      CURRENT/PREVIOUS VISIT EKG  Results for orders placed or performed during the hospital encounter of 08/30/24   EKG 12-lead    Collection Time: 09/08/24  2:05 AM   Result Value Ref Range    QRS Duration 90 ms    OHS QTC Calculation 457 ms    Narrative    Test Reason : I25.10,    Vent. Rate : 120 BPM     Atrial Rate : 000 BPM     P-R Int : 000 ms          QRS Dur : 090 ms      QT Int : 324 ms       P-R-T Axes : 000 -08 039 degrees     QTc Int : 457 ms    Atrial fibrillation with rapid ventricular response  Abnormal ECG  When compared with ECG of 07-SEP-2024 07:29,  Atrial fibrillation has replaced Sinus rhythm  Vent. rate has increased BY  40 BPM    Referred By: [email or web address]   SELF           Confirmed By:        ASSESSMENT/PLAN:     Active Hospital Problems    Diagnosis    *NSTEMI (non-ST elevated myocardial infarction)    UTI due to extended-spectrum beta lactamase (ESBL) producing Escherichia coli    Stage 3a chronic kidney disease    Type 2 diabetes mellitus with microalbuminuria, with long-term current use of insulin    Hyperlipidemia       ASSESSMENT & PLAN:     NSTEMI/ MVCAD s/p CABG POD 4  Postop Afib-currently in NSR  DIABETES  CKD  HYPERLIPIDEMIA  Post op expected blood loss anemia-stable      RECOMMENDATIONS:      VSS  Continue current regimen  Amiodarone 200 mg PO TID x 3 more days, then BID thereafter.  Continue ASA, Statin  Continue Metoprolol  Consider adding Plavix when ok with surgery team  No events on tele.   Ok for dc from cardiac standpoint.   Follow up OP in next 2 weeks.  Will need cardiac rehab OP.      Yuliya Esparza NP  [facility]  Department of Cardiology  Date of Service: 09/12/2024         Abhilasht dictating with date of service being the 6th of September 2024.    Preoperative diagnosis:  Severe atherosclerotic coronary artery disease status post myocardial infarction.    Postoperative diagnosis: Same.    Operation: Coronary artery  bypass grafting x5 using left internal mammary artery to left anterior descending coronary artery, the left radial artery from the aorta to the ramus intermediate coronary artery, and separate segments of saphenous vein from the aorta to the posterior descending coronary artery, from the aorta to the posterolateral branch of the circumflex coronary artery and from the left internal mammary artery to the obtuse marginal coronary artery using a combination of antegrade and retrograde cardioplegia, mild systemic hypothermia, endoscopic vein harvest from the left leg, and insertion of right femoral arterial line with percutaneous Seldinger technique.    STATUS POST BYPASS AGREE WITH INCREASING MOBILITY OKAY TO DISCHARGE AGREE WITH THE RECOMMENDATIONS ABOVE    Primitivo Meier MD  Cardiology  9/12/24

## 2024-09-12 NOTE — DISCHARGE SUMMARY
[facility]  Discharge Summary      Patient Name: Debby Brown  MRN: [id number]  CHAIM: [id number]  Patient Class: IP- Inpatient  Admission Date: 8/30/2024  Hospital Length of Stay: 12 days  Discharge Date and Time:  09/12/2024 10:02 AM  Attending Physician: Dionna Hall MD   Discharging Provider: Dionna Hall MD, MD  Primary Care Provider: Shawna Costello MD    Primary Care Team: Networked reference to record PCT     HPI:   74-year-old female presented to ED for eval of chest pain. pMHx HTN, DM 2, HLD.  Patient reported she has been having back pain that radiates to the front of her chest, stated chest pain his pressure-like and worsens with exertion, relieved by rest.  Patient reported associated shortness of breath and nausea.  Reported these symptoms have been present intermittently for the last several months but have worsened significantly in the last few weeks with more frequent episodes.  Patient was evaluated by her PCP earlier today, she stated she had been out of most of her home meds, including BP meds and oral diabetic agent, for 3-4 weeks, reported to PCP the only medication she was currently taking is her Toujeo.  In ED today, SBP greater than 200 on arrival, improved with nitroglycerin.  Initial troponin 908.  Noted with hyperglycemia, serum glucose 337.  EKG with T-wave changes.  Chest x-ray impression without acute abnormality.  CTA chest abdomen impression with mild cardiomegaly with scattered coronary artery calcifications (predominantly in the LAD and circumflex distribution); calcified plaques in the thoracoabdominal aorta and iliacs with no aneurysm.  ED discussed case with Cardiology, heparin drip initiated.  Admit to hospital medicine for further eval.    Procedure(s) (LRB):  CORONARY ARTERY BYPASS GRAFT (CABG) (N/A)  SURGICAL PROCUREMENT,ARTERY,RADIAL,FOR CABG (Left)  HARVEST-VEIN-ENDOVASCULAR (Left)      Hospital Course:   Patient with medical  history of diabetes, hypertension, hyperlipidemia came in with chest pain and was admitted for NSTEMI.  Status post angiogram on 08/30 showed severe multivessel CAD with thrombus in the distal RCA.  Patient was placed on heparin drip.  Cardiology and Cardiothoracic surgery were following patient.  Troponins were trended. Plan for CABG on 9/6/24 by Dr. Hung.  A1c 10.2 , so we made adjustments to glargine and SSI as needed.  Patient was treated with antibiotic therapy for urinary tract infection.  Final urine culture test confirmed E coli (ESBL species) for which patient received intravenous antibiotic therapy.  Patient was placed on contact isolation protocol.  Patient underwent the CABG as planned, and it went uneventfully. Worked with  PT/OT. Pacer wires removed. Chest tubes removed 9/10/24. The patient was discharged home in stable condition.     Goals of Care Treatment Preferences:  Code Status: Full Code      SDOH Screening:  The patient was screened for utility difficulties, food insecurity, transport difficulties, housing insecurity, and interpersonal safety and there were no concerns identified this admission.     Consults:   Consults (From admission, onward)          Status Ordering Provider     Inpatient consult to Cardiac Rehab  Once        Provider:  (Not yet assigned)    Acknowledged JAN SORIANO     Inpatient consult to Diabetes educator  Once        Provider:  (Not yet assigned)    Acknowledged JAN SORIANO     Inpatient consult to Registered Dietitian/Nutritionist  Once        Provider:  (Not yet assigned)    Completed [facility]     Inpatient consult to Social Work/Case Management  Once        Provider:  (Not yet assigned)    Completed [facility]     Inpatient consult to Cardiology  Once        Provider:  Ashleigh Chambers MD    Completed DELILAH LANTIGUA            Cardiac/Vascular  * NSTEMI (non-ST elevated myocardial infarction)  Status post angiogram on 08/30  "showed severe multivessel CAD with thrombus in the distal RCA.    Underwent CABG x 5 on 9/6/24  F/U outpatient with CT surgery and cardiology    Hyperlipidemia  Continue atorvastatin        Renal/  UTI due to extended-spectrum beta lactamase (ESBL) producing Escherichia coli  Completed course of merem      Stage 3a chronic kidney disease  F/U outpatient    Endocrine  Type 2 diabetes mellitus with microalbuminuria, with long-term current use of insulin  Home meds          Final Active Diagnoses:    Diagnosis Date Noted POA    PRINCIPAL PROBLEM:  NSTEMI (non-ST elevated myocardial infarction) [I21.4] 08/30/2024 Yes    UTI due to extended-spectrum beta lactamase (ESBL) producing Escherichia coli [N39.0, B96.29, Z16.12] 09/02/2024 Yes    Stage 3a chronic kidney disease [N18.31] 04/06/2023 Yes    Type 2 diabetes mellitus with microalbuminuria, with long-term current use of insulin [E11.29, R80.9, Z79.4] 01/19/2021 Not Applicable    Hyperlipidemia [E78.5] 03/15/2018 Yes      Problems Resolved During this Admission:       Discharged Condition: stable    Disposition: Home-Health Care c    Follow Up:    Patient Instructions:      WALKER FOR HOME USE     Order Specific Question Answer Comments   Type of Walker: Adult (5'4"-6'6")    With wheels? Yes    Height: 5' 7" (1.702 m)    Weight: 85.2 kg (187 lb 13.3 oz)    Length of need (1-99 months): 99    Does patient have medical equipment at home? none    Please check all that apply: Patient's condition impairs ambulation.      Ambulatory referral/consult to Cardiac Rehab   Standing Status: Future   Referral Priority: Routine Referral Type: Consultation   Referral Reason: Specialty Services Required   Requested Specialty: Cardiac Rehabilitation   Number of Visits Requested: 1     Ambulatory referral/consult to Home Health   Standing Status: Future   Referral Priority: Routine Referral Type: Home Health   Referral Reason: Specialty Services Required   Requested Specialty: Home " Health Services   Number of Visits Requested: 1     Ambulatory referral/consult to Cardiology   Standing Status: Future   Referral Priority: Routine Referral Type: Consultation   Referral Reason: Specialty Services Required   Referred to Provider: BENJAMIN GUILLEN Requested Specialty: Cardiology   Number of Visits Requested: 1     Diet diabetic     Diet Cardiac     No driving until:   Order Comments: Until cleared by surgery     HOME HEALTH ORDERS   Order Comments: PT/OT evaluate and treat  Nursing     Order Specific Question Answer Comments   What Home Health Agency is the patient currently using? Other/External      Activity as tolerated   Order Comments: As per surgery recommendations       Significant Diagnostic Studies: N/A    Pending Diagnostic Studies:       Procedure Component Value Units Date/Time    EKG 12-lead [[id number]]     Order Status: Sent Lab Status: No result     EKG 12-lead [[id number]]     Order Status: Sent Lab Status: No result     EKG 12-lead [[id number]] Collected: 09/08/24 0205    Order Status: Sent Lab Status: In process Updated: 09/08/24 0811     QRS Duration 90 ms      OHS QTC Calculation 457 ms     Narrative:      Test Reason : I25.10,    Vent. Rate : 120 BPM     Atrial Rate : 000 BPM     P-R Int : 000 ms          QRS Dur : 090 ms      QT Int : 324 ms       P-R-T Axes : 000 -08 039 degrees     QTc Int : 457 ms    Atrial fibrillation with rapid ventricular response  Abnormal ECG  When compared with ECG of 07-SEP-2024 07:29,  Atrial fibrillation has replaced Sinus rhythm  Vent. rate has increased BY  40 BPM    Referred By: NORTH   SELF           Confirmed By:     EKG 12-lead [[id number]] Collected: 09/07/24 0729    Order Status: Sent Lab Status: In process Updated: 09/07/24 0945     QRS Duration 94 ms      OHS QTC Calculation 447 ms     Narrative:      Test Reason : I25.10,    Vent. Rate : 080 BPM     Atrial Rate : 080 BPM     P-R Int : 148 ms          QRS Dur : 094 ms      QT Int :  "388 ms       P-R-T Axes : -02 -02 038 degrees     QTc Int : 447 ms    Normal sinus rhythm  Normal ECG  When compared with ECG of 06-SEP-2024 12:36,  ST elevation now present in Lateral leads    Referred By: NORTH   SELF           Confirmed By:            Medications:  Reconciled Home Medications:      Medication List        START taking these medications      amiodarone 200 MG Tab  Commonly known as: PACERONE  Take 1 tablet (200 mg total) by mouth 3 (three) times daily.     aspirin 81 MG Chew  Take 1 tablet (81 mg total) by mouth once daily.  Start taking on: September 13, 2024     atorvastatin 80 MG tablet  Commonly known as: LIPITOR  Take 1 tablet (80 mg total) by mouth once daily.  Start taking on: September 13, 2024     HYDROcodone-acetaminophen 5-325 mg per tablet  Commonly known as: NORCO  Take 1 tablet by mouth every 4 (four) hours as needed for Pain (prn post-op pain).            CHANGE how you take these medications      TOUJEO SOLOSTAR U-300 INSULIN 300 unit/mL (1.5 mL) Inpn pen  Generic drug: insulin glargine (TOUJEO)  INJECT 70 UNITS SUBCUTANEOUSLY ONCE DAILY  What changed: See the new instructions.            CONTINUE taking these medications      ALPRAZolam 0.5 MG tablet  Commonly known as: XANAX  Take 1 tablet by mouth twice daily as needed for anxiety     pen needle, diabetic 32 gauge x 5/32" Ndle  Commonly known as: BD JERI 2ND GEN PEN NEEDLE  USE AS DIRECTED     TRUE METRIX GLUCOSE METER Misc  Generic drug: blood-glucose meter     TRUE METRIX GLUCOSE TEST STRIP Strp  Generic drug: blood sugar diagnostic  U QAM     TRUEPLUS LANCETS 30 gauge Misc  Generic drug: lancets  U QAM            STOP taking these medications      buPROPion 300 MG 24 hr tablet  Commonly known as: WELLBUTRIN XL     dapagliflozin propanediol 10 mg tablet  Commonly known as: FARXIGA     fluconazole 200 MG Tab  Commonly known as: DIFLUCAN     lisinopriL 40 MG tablet  Commonly known as: PRINIVIL,ZESTRIL     OZEMPIC 0.25 mg or " 0.5 mg (2 mg/3 mL) pen injector  Generic drug: semaglutide     pioglitazone 15 MG tablet  Commonly known as: ACTOS     rosuvastatin 40 MG Tab  Commonly known as: CRESTOR            ASK your doctor about these medications      metoprolol succinate 50 MG 24 hr tablet  Commonly known as: TOPROL-XL  Take 1 tablet (50 mg total) by mouth once daily.              Indwelling Lines/Drains at time of discharge:   Lines/Drains/Airways       None                   Time spent on the discharge of patient: 35 minutes         Dionna Hall MD, MD  Department of Hospital Medicine  [facility]

## 2024-09-12 NOTE — PROGRESS NOTES
CVT SURGERY PROGRESS NOTE  Debby Brown  74 y.o.  1950    Patient's Chief Complaint:  NSTEMI (non-ST elevated myocardial infarction)    HPI:  The patient is a 74-year-old obese  female with uncontrolled hypertension, and uncontrolled type 2 diabetes.     The patient presented to the hospital emergency department 08/30/2024 with a several month history of exertional angina associated with dyspnea and nausea.  The angina always resolved with rest.  The discomfort has been progressive in intensity and frequency over the last couple of weeks.     The patient had a particularly severe and intense episode of chest discomfort which prompted her visit to the emergency department yesterday.     By the time she arrived in the emergency department, the pain had nearly completely resolved.  She was treated with aspirin and heparin.  There were no acute EKG changes but the troponin I was elevated and rising.     Echocardiography this morning demonstrates a preserved ejection fraction (60%).  There is normal right ventricular function.  The patient demonstrates moderate aortic valve stenosis.  The transvalvular velocities are 228.  The mean gradient is 12.  There is mild TR and the pulmonary artery pressures are estimated at 26.     The patient underwent urgent left heart catheterization with coronary angiography the evening of 08/30/2024.     Coronary angiography demonstrates critical and very diffuse coronary artery disease.  The branch coronary arteries are very small in caliber.     The patient has uncontrolled type 2 diabetes.  Her current A1c is 10.4.  At home, she is prescribed Farxiga, Toujeo, Actos, Ozempic, and Trulicity.     She has a history of uncontrolled hypertension.  Her blood pressure has been fairly well controlled since her hospital admission.     The patient is seen in the ICU this afternoon.  She has multiple family members at her bedside.  She is on heparin and nitroglycerin drips.  She is  "currently chest pain-free.  She has been chest pain-free since her hospital admission.  She denies symptoms of congestive heart failure other than the exertional dyspnea.     The patient is a poor historian.  She is noncompliant with medical instructions/prescriptions.  She is quite forgetful.    Last 24 hour interval:  -No events overnight. Stepped down to floor.   -BP well controlled  -BS at goal  -Patient seen this afternoon, sitting up in bed in NAD on RA. Pain well controlled. Ambulating to restroom unassisted. Eager to go home.     Subjective:  Symptoms:  Stable.    Diet:  Poor intake.  No nausea or vomiting.    Activity level: Returning to normal.    Pain:  She complains of pain that is moderate.  She reports pain is improving.  Pain is partially controlled.                                                                   Objective:  General Appearance:  In no acute distress.    Vital signs: (most recent): Blood pressure 128/63, pulse 76, temperature 97.9 °F (36.6 °C), temperature source Oral, resp. rate 16, height 5' 7" (1.702 m), weight 97.4 kg (214 lb 11.7 oz), SpO2 96%.    Output: Producing urine.    HEENT: Normal HEENT exam.    Lungs:  Normal effort.  There are rhonchi.  No rales (Bibasilar) or wheezes.    Heart: Normal rate.  Regular rhythm.  Positive for murmur (II/VI KASIA at RSB).  No gallop or friction rub.   Chest: (Sternal incision with Prevena dressing. CT sites dressed. )  Abdomen: Abdomen is soft and non-distended.  Bowel sounds are normal.   There is no abdominal tenderness.     Extremities: There is local swelling (1+ LLE).  (EVH sites dressed. )  Pulses: Distal pulses are intact.    Neurological: Patient is alert and oriented to person, place and time.    Skin:  Warm and dry.      Recent Vitals:  Vitals:    09/12/24 1200 09/12/24 1300 09/12/24 1301 09/12/24 1316   BP:       BP Location:       Patient Position:       Pulse: 79 76 75 76   Resp: 18 18 16 16   Temp:       TempSrc:       SpO2: 98% " (!) 94% 95% 96%   Weight:       Height:           INPATIENT MEDS       albuterol-ipratropium  3 mL Nebulization Q6H    amiodarone  200 mg Oral TID    aspirin  81 mg Oral Daily    atorvastatin  80 mg Oral Daily    docusate sodium  100 mg Oral BID    enoxparin  40 mg Subcutaneous Q24H (prophylaxis, 1700)    insulin glargine U-100  20 Units Subcutaneous BID    meropenem IV (PEDS and ADULTS)  1 g Intravenous Q12H    metoprolol succinate  50 mg Oral Daily    pantoprazole  40 mg Oral Daily    polyethylene glycol  17 g Oral Daily       Current Facility-Administered Medications:     0.9%  NaCl infusion (for blood administration), , Intravenous, Q24H PRN    acetaminophen, 650 mg, Oral, Q6H PRN    albumin human 5%, 25 g, Intravenous, PRN    albuterol-ipratropium, 3 mL, Nebulization, Q6H PRN    calcium gluconate IVPB, 1 g, Intravenous, PRN    calcium gluconate IVPB, 2 g, Intravenous, PRN    calcium gluconate IVPB, 3 g, Intravenous, PRN    dextrose 50%, 12.5 g, Intravenous, PRN    dextrose 50%, 12.5 g, Intravenous, PRN    dextrose 50%, 25 g, Intravenous, PRN    dextrose 50%, 25 g, Intravenous, PRN    glucagon (human recombinant), 1 mg, Intramuscular, PRN    glucose, 16 g, Oral, PRN    glucose, 24 g, Oral, PRN    HYDROcodone-acetaminophen, 1 tablet, Oral, Q4H PRN    insulin aspart U-100, 0-15 Units, Subcutaneous, QID (AC + HS) PRN    LORazepam, 0.5 mg, Oral, Q6H PRN    magnesium sulfate IVPB, 2 g, Intravenous, PRN    magnesium sulfate IVPB, 4 g, Intravenous, PRN    morphine, 2 mg, Intravenous, Q1H PRN    morphine, 4 mg, Intravenous, PRN    ondansetron, 4 mg, Intravenous, Q6H PRN    potassium chloride in water, 20 mEq, Intravenous, PRN    potassium chloride in water, 20 mEq, Intravenous, PRN    potassium chloride in water, 40 mEq, Intravenous, PRN    sodium phosphate 15 mmol in D5W 250 mL IVPB, 15 mmol, Intravenous, PRN    sodium phosphate 20.01 mmol in D5W 250 mL IVPB, 20.01 mmol, Intravenous, PRN    sodium phosphate 30 mmol in  "D5W 250 mL IVPB, 30 mmol, Intravenous, PRN  HEMODYNAMICS      Recent O2 Therapy/Vent Settings: RA    I/O last 24 hrs:  Intake/Output - Last 3 Shifts         09/10 0700  09/11 0659 09/11 0700  09/12 0659 09/12 0700  09/13 0659    P.O.  1080 240    Blood       IV Piggyback  200     Total Intake(mL/kg)  1280 (13.1) 240 (2.5)    Urine (mL/kg/hr) 0 (0)      Chest Tube 170      Total Output 170      Net -170 +1280 +240           Urine Occurrence 4 x 8 x     Stool Occurrence 1 x 3 x           Recent Cardiac Rhythm: SR    Recent Pain Assessment: 0    CBC  Recent Labs   Lab 09/09/24  0446 09/10/24  0415 09/11/24  0319   WBC 16.27* 14.47* 12.53   RBC 2.58* 3.16* 3.07*   HGB 7.5* 9.3* 8.8*    248 282   MCV 90 89 89   MCH 29.1 29.4 28.7   MCHC 32.5 33.0 32.2     BMP  Recent Labs   Lab 09/09/24  0446 09/10/24  0415 09/11/24  0319   CO2 27 24 25   BUN 31* 28* 25*   CREATININE 1.2 1.1 1.0   CALCIUM 7.9* 8.2* 7.9*     CARDIAC ENZYMES  No results for input(s): "TROPONINI", "CPK", "CKMB" in the last 168 hours.  BNP  No results for input(s): "BNP" in the last 168 hours.  PT/INR  INR   Date Value Ref Range Status   08/30/2024 1.0 0.8 - 1.2 Final     Comment:     Coumadin Therapy:  2.0 - 3.0 for INR for all indicators except mechanical heart valves  and antiphospholipid syndromes which should use 2.5 - 3.5.     08/30/2024 1.0 0.8 - 1.2 Final     Comment:     Coumadin Therapy:  2.0 - 3.0 for INR for all indicators except mechanical heart valves  and antiphospholipid syndromes which should use 2.5 - 3.5.     09/04/2021 1.1 0.8 - 1.2 Final     Comment:     Coumadin Therapy:  2.0 - 3.0 for INR for all indicators except mechanical heart valves  and antiphospholipid syndromes which should use 2.5 - 3.5.       DIAGNOSTIC RESULTS:  X-Ray Chest AP Portable  Narrative: EXAMINATION:  XR CHEST AP PORTABLE    CLINICAL HISTORY:  post op cabg;    FINDINGS:  Portable chest at 04:15 is compared to 09/09/2024 shows mild cardiomegaly.  There are " median sternotomy wires.  The left IJ catheter mediastinal drains and bilateral chest tubes are in stable position.    There is hypoinflation with left lower lobe atelectasis.  There are no confluent infiltrates pneumothorax or pleural effusions.  The visualized portion of the upper abdomen is unremarkable no acute osseous abnormality.  Impression: Status post recent CABG with hypoinflation in left lower lobe atelectasis    Mild cardiomegaly with support devices in satisfactory position    Electronically signed by: Elisha Pantoja  Date:    09/10/2024  Time:    07:31      ECG Results              EKG 12-lead (Final result)        Collection Time Result Time QRS Duration OHS QTC Calculation    08/30/24 13:46:21 09/01/24 17:52:28 90 443                     Final result by Interface, Lab In [facility] (09/01/24 17:52:38)                   Narrative:    Test Reason : R07.9,    Vent. Rate : 110 BPM     Atrial Rate : 110 BPM     P-R Int : 204 ms          QRS Dur : 090 ms      QT Int : 328 ms       P-R-T Axes : 062 -18 083 degrees     QTc Int : 443 ms    Sinus tachycardia  Otherwise normal ECG  When compared with ECG of 30-AUG-2024 10:06,  No significant change was found  Confirmed by Renea BROWNING, Primitivo TEE ([id number]) on 9/1/2024 5:52:25 PM    Referred By: [email or web address]   SELF           Confirmed By:Primitivo Meier MD                                     EKG 12-lead (Final result)  Result time 09/04/24 11:31:31      Final result by Unknown User (09/04/24 11:31:31)                                         EKG 12-lead (Final result)  Result time 09/04/24 12:41:13      Final result by Unknown User (09/04/24 12:41:13)                                        CURRENT CONSULTS:  IP CONSULT TO CARDIOLOGY  IP CONSULT TO REGISTERED DIETITIAN/NUTRITIONIST  IP CONSULT TO SOCIAL WORK/CASE MANAGEMENT  IP CONSULT TO DIABETES EDUCATOR  IP CONSULT TO CARDIAC REHAB    ASSESSMENT/PLAN:    NSTEMI  Multivessel CAD  S/p CABG x 5 (LIMA to LAD, L radial to  ramus, SVG to PDA, SVG to PLB, SVG from LUIS to OM) by Dr. Garcia on 9/6/11  -Hemodynamically stable  -Tolerating RA  -Mediastinal tube and pleural tube removed 9/10/24  -Pacer wires removed 9/9/24  -Surgical incisions with Prevena dressing   -Continue ASA, BB, statin. Plavix recommended by Cardiology - will start.   -Encourage IS  -Increase activity  -Jarrod hose  -Bowel regimen - on docusate. + BM 9/10.     Postoperative atrial fibrillation  -A-fib with RVR on 9/7 - now back in SR   -Not on full dose AC at this time, but will need it if she has recurrence per Cardiology  -Continue amiodarone and lopressor  -Keep Mag > 2 and K> 4    Acute post operative blood loss anemia  -Expected post op  -Received 407 cc Cell Saver  -Received 1 unit PRBCs 9/9 with appropriate rise in H/H  -No indication for transfusion at this time.   -Monitor    Thrombocytopenia  -Resolved    Leukocytosis  -Afebrile  -Likely reactive  -Monitor    IDDM  -Insulin gtt off 9/7  -BS controlled  -Continue Lantus 20u BID   -Continue HDSSI  -Goal < 180  -Last A1C 10.2%  -On Toujeo 70 u daily, Farxiga 10mg daily, Actos 15mg daily, Ozempic 0.5mg weekly, and Trulicity 0.75 mg weekly at home    HTN  -BP controlled  -Goal < 140/90  -Monitor    HLD  -Continue statin    Stage III CKD  -Renal function stable, good UOP  -Avoid nephrotoxins and hypotension    UTI  -Final urine cultures grew E coli (ESBL species).   -Continue meropenem.     Hypomagnesemia  Hypokalemia  -Replaced per protocol  -Keep Mag > 2 and K > 4    Mild- moderate AS  -SONIDO 1.18 cm2, MG 12 mmHg, PV 2.28 m/s, EF = 60-65%  -Monitor      Case and plan of care discussed with MD. BUTLER for d/c home from CVTS standpoint. Activity restrictions and incision care reviewed. Follow up in clinic as scheduled.       GI Prophylaxis:  PPI:proton pump inhibitor per orders  DVT Prophylaxis:   Anticoagulants   Medication Route Frequency    enoxaparin injection 40 mg Subcutaneous Q24H (prophylaxis, 1700)        Ana Maria Crowell PA-C  Cardiovascular Thoracic Surgery  9/12/2024  12:57 PM

## 2024-09-12 NOTE — ASSESSMENT & PLAN NOTE
Status post angiogram on 08/30 showed severe multivessel CAD with thrombus in the distal RCA.    Underwent CABG x 5 on 9/6/24  F/U outpatient with CT surgery and cardiology

## 2024-09-12 NOTE — NURSING
IV's removed. Tolerated well. Discharge instructions, follow up appointments, and meds reviewed with patient. Verbilized understanding. Patient brought down via wheelchair with all belongings to family transport. Safety maintained.

## 2024-09-12 NOTE — SUBJECTIVE & OBJECTIVE
Interval History:  Underwent CABG 9/6/24.   Patient offered a dc home today but she reported not feeling well. Felt more congested       Review of Systems   Constitutional:  Positive for appetite change and fatigue.   Respiratory: Negative.     Cardiovascular: Negative.    Gastrointestinal: Negative.    Genitourinary: Negative.    Musculoskeletal:  Positive for back pain and gait problem.   Psychiatric/Behavioral:  Positive for dysphoric mood.      Objective:     Vital Signs (Most Recent):  Temp: 98.3 °F (36.8 °C) (09/11/24 1915)  Pulse: 74 (09/11/24 2007)  Resp: 19 (09/11/24 2007)  BP: 127/60 (09/11/24 1906)  SpO2: 95 % (09/11/24 2007) Vital Signs (24h Range):  Temp:  [97.8 °F (36.6 °C)-98.7 °F (37.1 °C)] 98.3 °F (36.8 °C)  Pulse:  [60-77] 74  Resp:  [16-20] 19  SpO2:  [94 %-100 %] 95 %  BP: (102-131)/(59-68) 127/60     Weight: 85.2 kg (187 lb 13.3 oz)  Body mass index is 29.42 kg/m².    Intake/Output Summary (Last 24 hours) at 9/11/2024 2120  Last data filed at 9/11/2024 1859  Gross per 24 hour   Intake 1060 ml   Output --   Net 1060 ml         Physical Exam  Constitutional:       Comments: Sitting up in chair in no distress    Eyes:      Conjunctiva/sclera:      Right eye: No exudate.     Left eye: No exudate.  Neck:      Vascular: No JVD.   Cardiovascular:      Rate and Rhythm: Normal rate and regular rhythm.   Pulmonary:      Breath sounds: Rhonchi present.   Abdominal:      General: Abdomen is flat. Bowel sounds are decreased. There is no distension.      Palpations: Abdomen is soft.   Skin:     General: Skin is warm.      Findings: No rash.             Significant Labs: All pertinent labs within the past 24 hours have been reviewed.    Significant Imaging: I have reviewed all pertinent imaging results/findings within the past 24 hours.

## 2024-09-12 NOTE — PLAN OF CARE
Problem: Acute Coronary Syndrome  Goal: Optimal Adaptation to Illness  Outcome: Met  Goal: Absence of Cardiac-Related Pain  Outcome: Met  Goal: Normalized Cardiac Rhythm  Outcome: Met  Goal: Effective Cardiac Pump Function  Outcome: Met  Goal: Adequate Tissue Perfusion  Outcome: Met     Problem: Cardiac Catheterization (Diagnostic/Interventional)  Goal: Absence of Bleeding  Outcome: Met  Goal: Absence of Contrast-Induced Injury  Outcome: Met  Goal: Stable Heart Rate and Rhythm  Outcome: Met  Goal: Absence of Embolism Signs and Symptoms  Outcome: Met  Goal: Anesthesia/Sedation Recovery  Outcome: Met  Goal: Optimal Pain Control and Function  Outcome: Met  Goal: Absence of Vascular Access Complication  Outcome: Met     Problem: Adult Inpatient Plan of Care  Goal: Plan of Care Review  Outcome: Met  Goal: Patient-Specific Goal (Individualized)  Outcome: Met  Goal: Absence of Hospital-Acquired Illness or Injury  Outcome: Met  Goal: Optimal Comfort and Wellbeing  Outcome: Met  Goal: Readiness for Transition of Care  Outcome: Met     Problem: Diabetes Comorbidity  Goal: Blood Glucose Level Within Targeted Range  Outcome: Met     Problem: Wound  Goal: Optimal Coping  Outcome: Met  Goal: Optimal Functional Ability  Outcome: Met  Goal: Absence of Infection Signs and Symptoms  Outcome: Met  Goal: Improved Oral Intake  Outcome: Met  Goal: Optimal Pain Control and Function  Outcome: Met  Goal: Skin Health and Integrity  Outcome: Met  Goal: Optimal Wound Healing  Outcome: Met     Problem: Fall Injury Risk  Goal: Absence of Fall and Fall-Related Injury  Outcome: Met     Problem: Skin Injury Risk Increased  Goal: Skin Health and Integrity  Outcome: Met     Problem: Oral Intake Inadequate  Goal: Improved Oral Intake  Outcome: Met     Problem: Infection  Goal: Absence of Infection Signs and Symptoms  Outcome: Met

## 2024-09-12 NOTE — DISCHARGE INSTRUCTIONS
Sternal precautions for 6 weeks:  No lifting over 10 lbs  No pushing or pulling with arms    No tub bath, ok to shower. No dressing needed. Keep incision clean and dry. No ointments, creams, or lotions on or near incision.   Sutures from your chest tube sites will be removed at your follow up visit.     No driving until cleared at your follow up visit.     You may supplement your narcotic pain medication with acetaminophen (Tylenol), but do not take NSAIDs (Advil/ibuprofen, Aleve/naproxen). Keep in mind, there is 325mg acetaminophen in each of your narcotic tablets, and you want to limit your total daily dose of acetaminophen to no more than 3,000mg daily.

## 2024-09-12 NOTE — PLAN OF CARE
Patient to transfer to home health services with [facility] , verified SOC date is 9/13/2024 per Evelyn BOOTHE at  agency.     Rolling walker authed thru [facility] and delivered to patient's hospital room per Joey Pennington.    Patient to transport home via private vehicle with her daughter.       09/12/24 1044   Final Note   Assessment Type Final Discharge Note   Anticipated Discharge Disposition Home-Health   Hospital Resources/Appts/Education Provided   (Patient instructed to / will schedule post hospital follow up appointment with their PCP in 7 to 10 days from discharge.)   Post-Acute Status   Post-Acute Authorization Home Health;HME   HME Status Set-up Complete/Auth obtained   Home Health Status Set-up Complete/Auth obtained   Coverage Medicare A and B   Discharge Delays None known at this time

## 2024-09-12 NOTE — CARE UPDATE
09/11/24 2007   Patient Assessment/Suction   Level of Consciousness (AVPU) alert   Respiratory Effort Normal   Expansion/Accessory Muscles/Retractions no use of accessory muscles   All Lung Fields Breath Sounds clear   Rhythm/Pattern, Respiratory unlabored   Cough Frequency no cough   PRE-TX-O2   Device (Oxygen Therapy) room air   SpO2 95 %   Pulse Oximetry Type Continuous   $ Pulse Oximetry - Multiple Charge Pulse Oximetry - Multiple   Pulse 74   Resp 19   Aerosol Therapy   $ Aerosol Therapy Charges Aerosol Treatment   Daily Review of Necessity (SVN) completed   Respiratory Treatment Status (SVN) given   Treatment Route (SVN) mask   Patient Position HOB elevated   Post Treatment Assessment (SVN) breath sounds unchanged;vital signs unchanged   Signs of Intolerance (SVN) none   Education   $ Education Bronchodilator;15 min   Respiratory Evaluation   $ Care Plan Tech Time 15 min

## 2024-09-13 ENCOUNTER — PATIENT OUTREACH (OUTPATIENT)
Dept: ADMINISTRATIVE | Facility: CLINIC | Age: 74
End: 2024-09-13
Payer: MEDICARE

## 2024-09-13 ENCOUNTER — TELEPHONE (OUTPATIENT)
Dept: VASCULAR SURGERY | Facility: CLINIC | Age: 74
End: 2024-09-13
Payer: MEDICARE

## 2024-09-13 NOTE — PROGRESS NOTES
C3 nurse spoke with Debby Brown for a TCC post hospital discharge follow up call. The patient does not have a scheduled HOSFU appointment with Shawna Costello MD within 5-7 days post hospital discharge date 09/12/24. Declined Home NP and Declined C3 nurse to schedule HOSPFU visit in [facility]. Message routed to PCP for a HOSPFU with [facility] PCP within 5-7 days of d/c.     Instructed patient to call for HOSPFU. Stated understanding.

## 2024-09-13 NOTE — PHYSICIAN QUERY
"Please clarify the term "postoperative" afib as it relates to the CABG performed on 9/6/24 .  Condition occurred in the postoperative period (not a complication of surgery)        "

## 2024-09-13 NOTE — TELEPHONE ENCOUNTER
Spoke with patient   She is refusing to schedule a hospital follow up appt at this time.    She is staying at her daughter's house since her discharge from hospital.   Instructed to call back if she would like an appt or has any problems.   Verbalizes understanding

## 2024-09-13 NOTE — TELEPHONE ENCOUNTER
Placed a call to Ms Brown to advise that she is to take Metoprolol 50 mg by mouth daily. In conversation, Ms Brown verbalized that she is not sure which medications she is supposed to take. I reached out to the  at [facility] and advised of above. I gave an order for the nurse to do medication teaching at each visit and also to help her pack her medications in a weekly medication dispenser.

## 2024-09-16 LAB
OHS QRS DURATION: 96 MS
OHS QTC CALCULATION: 428 MS

## 2024-09-18 ENCOUNTER — OFFICE VISIT (OUTPATIENT)
Dept: CARDIOLOGY | Facility: CLINIC | Age: 74
End: 2024-09-18
Payer: MEDICARE

## 2024-09-18 VITALS
HEART RATE: 103 BPM | RESPIRATION RATE: 16 BRPM | DIASTOLIC BLOOD PRESSURE: 82 MMHG | SYSTOLIC BLOOD PRESSURE: 124 MMHG | HEIGHT: 67 IN | WEIGHT: 193 LBS | OXYGEN SATURATION: 97 % | BODY MASS INDEX: 30.29 KG/M2

## 2024-09-18 DIAGNOSIS — E78.2 MIXED HYPERLIPIDEMIA: ICD-10-CM

## 2024-09-18 DIAGNOSIS — I21.4 NSTEMI (NON-ST ELEVATED MYOCARDIAL INFARCTION): ICD-10-CM

## 2024-09-18 DIAGNOSIS — T14.8XXA HEMATOMA: ICD-10-CM

## 2024-09-18 DIAGNOSIS — I21.4 NSTEMI (NON-ST ELEVATION MYOCARDIAL INFARCTION): ICD-10-CM

## 2024-09-18 DIAGNOSIS — I15.2 HYPERTENSION ASSOCIATED WITH DIABETES: ICD-10-CM

## 2024-09-18 DIAGNOSIS — I48.0 PAROXYSMAL ATRIAL FIBRILLATION: Primary | ICD-10-CM

## 2024-09-18 DIAGNOSIS — F41.9 ANXIETY: ICD-10-CM

## 2024-09-18 DIAGNOSIS — E11.59 HYPERTENSION ASSOCIATED WITH DIABETES: ICD-10-CM

## 2024-09-18 PROBLEM — I48.91 A-FIB: Status: ACTIVE | Noted: 2024-09-18

## 2024-09-18 PROCEDURE — 99999 PR PBB SHADOW E&M-EST. PATIENT-LVL V: CPT | Mod: PBBFAC,,,

## 2024-09-18 PROCEDURE — 99215 OFFICE O/P EST HI 40 MIN: CPT | Mod: PBBFAC,PN

## 2024-09-18 RX ORDER — ALPRAZOLAM 0.5 MG/1
0.5 TABLET ORAL 2 TIMES DAILY
Qty: 30 TABLET | Refills: 1 | Status: SHIPPED | OUTPATIENT
Start: 2024-09-18

## 2024-09-18 RX ORDER — CLOPIDOGREL BISULFATE 75 MG/1
75 TABLET ORAL DAILY
Qty: 90 TABLET | Refills: 3 | Status: SHIPPED | OUTPATIENT
Start: 2024-09-18 | End: 2025-09-18

## 2024-09-18 RX ORDER — METOPROLOL SUCCINATE 50 MG/1
50 TABLET, EXTENDED RELEASE ORAL DAILY
Qty: 90 TABLET | Refills: 3 | Status: SHIPPED | OUTPATIENT
Start: 2024-09-18

## 2024-09-18 RX ORDER — INSULIN GLARGINE 300 [IU]/ML
70 INJECTION, SOLUTION SUBCUTANEOUS DAILY
Qty: 21 ML | Refills: 3 | Status: SHIPPED | OUTPATIENT
Start: 2024-09-18 | End: 2025-09-18

## 2024-09-18 NOTE — PROGRESS NOTES
Subjective:    Patient ID:  Debby Brown is a 74 y.o. female who presents for follow-up.   Chief Complaint   Patient presents with    Hospital Follow Up     Recent cabg  Lump in groin from angio       HPI:  Debby Brown is here for follow-up visit. Denies chest pain or shortness of breath. Denies recent fever cough chills or congestion. Denies blood in the urine or blood in the stool.  Denies myalgias. Denies orthopnea or peripheral edema. Denies nausea vomiting or dyspepsia. No recent arm neck or jaw pain. No lightheadedness or dizziness.     Recently discharged from the hospital status post CABG 9 6 2024 with Dr. Garcia.  See discharge summary below:    74-year-old female presented to ED for eval of chest pain. pMHx HTN, DM 2, HLD.  Patient reported she has been having back pain that radiates to the front of her chest, stated chest pain his pressure-like and worsens with exertion, relieved by rest.  Patient reported associated shortness of breath and nausea.  Reported these symptoms have been present intermittently for the last several months but have worsened significantly in the last few weeks with more frequent episodes.  Patient was evaluated by her PCP earlier today, she stated she had been out of most of her home meds, including BP meds and oral diabetic agent, for 3-4 weeks, reported to PCP the only medication she was currently taking is her Toujeo.  In ED today, SBP greater than 200 on arrival, improved with nitroglycerin.  Initial troponin 908.  Noted with hyperglycemia, serum glucose 337.  EKG with T-wave changes.  Chest x-ray impression without acute abnormality.  CTA chest abdomen impression with mild cardiomegaly with scattered coronary artery calcifications (predominantly in the LAD and circumflex distribution); calcified plaques in the thoracoabdominal aorta and iliacs with no aneurysm.  ED discussed case with Cardiology, heparin drip initiated.  Admit to hospital medicine for further  eval.     Procedure(s) (LRB):  CORONARY ARTERY BYPASS GRAFT (CABG) (N/A)  SURGICAL PROCUREMENT,ARTERY,RADIAL,FOR CABG (Left)  HARVEST-VEIN-ENDOVASCULAR (Left)       Hospital Course:   Patient with medical history of diabetes, hypertension, hyperlipidemia came in with chest pain and was admitted for NSTEMI.  Status post angiogram on 08/30 showed severe multivessel CAD with thrombus in the distal RCA.  Patient was placed on heparin drip.  Cardiology and Cardiothoracic surgery were following patient.  Troponins were trended. Plan for CABG on 9/6/24 by Dr. Hung.  A1c 10.2 , so we made adjustments to glargine and SSI as needed.  Patient was treated with antibiotic therapy for urinary tract infection.  Final urine culture test confirmed E coli (ESBL species) for which patient received intravenous antibiotic therapy.  Patient was placed on contact isolation protocol.  Patient underwent the CABG as planned, and it went uneventfully. Worked with  PT/OT. Pacer wires removed. Chest tubes removed 9/10/24. The patient was discharged home in stable condition.     Has not started cardiac rehab. States compliance with all medications. Does admit to some intermittent chronic back pain.    Review of patient's allergies indicates:   Allergen Reactions    Bactrim [sulfamethoxazole-trimethoprim]     Codeine     Levaquin [levofloxacin]     Pcn [penicillins]        Past Medical History:   Diagnosis Date    Allergy     Anxiety     Depression     Diabetes mellitus, type 2     Hyperlipidemia     Hypertension     Personal history of colonic polyps     Pre-diabetes      Past Surgical History:   Procedure Laterality Date    APPENDECTOMY      BACK SURGERY      BREAST SURGERY      CHOLECYSTECTOMY      COLONOSCOPY      COLONOSCOPY N/A 03/13/2024    Procedure: COLONOSCOPY;  Surgeon: Isabel Noriega MD;  Location: [facility];  Service: Endoscopy;  Laterality: N/A;  ppm    CORONARY ANGIOGRAPHY N/A 8/30/2024    Procedure: ANGIOGRAM, CORONARY  ARTERY;  Surgeon: Ashleigh Chambers MD;  Location: [facility] CATH/EP LAB;  Service: Cardiology;  Laterality: N/A;    CORONARY ARTERY BYPASS GRAFT (CABG) N/A 9/6/2024    Procedure: CORONARY ARTERY BYPASS GRAFT (CABG);  Surgeon: Ke Garcia MD;  Location: [facility] OR;  Service: Cardiothoracic;  Laterality: N/A;    ENDOSCOPIC HARVEST OF VEIN Left 9/6/2024    Procedure: HARVEST-VEIN-ENDOVASCULAR;  Surgeon: Ke Garcia MD;  Location: [facility] OR;  Service: Cardiothoracic;  Laterality: Left;    GALLBLADDER SURGERY      HERNIA REPAIR      umbilical    lymphnode remove      SHOULDER SURGERY Left     RTR    SURGICAL PROCUREMENT, ARTERY, RADIAL, FOR CABG Left 9/6/2024    Procedure: SURGICAL PROCUREMENT,ARTERY,RADIAL,FOR CABG;  Surgeon: Ke Garcia MD;  Location: [facility];  Service: Cardiothoracic;  Laterality: Left;    TOTAL REDUCTION MAMMOPLASTY Bilateral 2001    TUBAL LIGATION       Social History     Tobacco Use    Smoking status: Former     Passive exposure: Past    Smokeless tobacco: Never   Substance Use Topics    Alcohol use: Yes     Comment: occasionally    Drug use: No     Family History   Problem Relation Name Age of Onset    Mental illness Mother      Cancer Mother          female cancer?    Depression Mother      Heart disease Mother      Hypertension Mother      Stroke Mother      Heart disease Father      Hypertension Father      Stroke Father      Cancer Brother          bladder    Glaucoma Neg Hx      Macular degeneration Neg Hx          Review of Systems:   Constitution: Negative for diaphoresis and fever.   HEENT: Negative for nosebleeds.    Cardiovascular: Negative for chest pain       No dyspnea on exertion       No leg swelling        No palpitations  Respiratory: Negative for shortness of breath and wheezing.    Hematologic/Lymphatic: Negative for bleeding problem. Does not bruise/bleed easily.   Skin: Negative for color change and rash.   Musculoskeletal: Negative for falls and myalgias. +  back pain  Gastrointestinal: Negative for hematemesis and hematochezia.   Genitourinary: Negative for hematuria.   Neurological: Negative for dizziness and light-headedness.   Psychiatric/Behavioral: Negative for altered mental status and memory loss.          Objective:        Vitals:    09/18/24 1308   BP: 124/82   Pulse: 103   Resp: 16       Lab Results   Component Value Date    WBC 12.53 09/11/2024    HGB 8.8 (L) 09/11/2024    HCT 27.3 (L) 09/11/2024     09/11/2024    CHOL 164 08/30/2024    TRIG 184 (H) 08/30/2024    HDL 35 (L) 08/30/2024    ALT 17 09/09/2024    AST 22 09/09/2024     09/11/2024    K 4.0 09/11/2024     09/11/2024    CREATININE 1.0 09/11/2024    BUN 25 (H) 09/11/2024    CO2 25 09/11/2024    TSH 2.556 09/01/2024    INR 1.0 08/30/2024    HGBA1C 10.2 (H) 08/30/2024    MICROALBUR 0.9 01/09/2020        ECHOCARDIOGRAM RESULTS  Results for orders placed during the hospital encounter of 08/30/24    Echo    Interpretation Summary    Left Ventricle: The left ventricle is normal in size. Mildly increased wall thickness. There is mild concentric hypertrophy. There is normal systolic function with a visually estimated ejection fraction of 60 - 65%. There is diastolic dysfunction.    Right Ventricle: Normal right ventricular cavity size. Wall thickness is normal. Systolic function is normal.    Aortic Valve: The aortic valve is a trileaflet valve. There is moderate aortic valve sclerosis. Moderately restricted motion. There is moderate stenosis. Aortic valve area by VTI is 1.18 cm². Aortic valve peak velocity is 2.28 m/s. Mean gradient is 12 mmHg. The dimensionless index is 0.38.    Mitral Valve: There is mild bileaflet sclerosis.    Tricuspid Valve: There is mild regurgitation with a centrally directed jet.    Pulmonary Artery: The estimated pulmonary artery systolic pressure is 26 mmHg.    IVC/SVC: Normal venous pressure at 3 mmHg.        CURRENT/PREVIOUS VISIT EKG  Results for orders  placed or performed during the hospital encounter of 08/30/24   EKG 12-lead    Collection Time: 09/08/24  2:05 AM   Result Value Ref Range    QRS Duration 90 ms    [facility]HS QTC Calculation 457 ms    Narrative    Test Reason : I25.10,    Vent. Rate : 120 BPM     Atrial Rate : 000 BPM     P-R Int : 000 ms          QRS Dur : 090 ms      QT Int : 324 ms       P-R-T Axes : 000 -08 039 degrees     QTc Int : 457 ms    Atrial fibrillation with rapid ventricular response  Abnormal ECG  When compared with ECG of 07-SEP-2024 07:29,  Atrial fibrillation has replaced Sinus rhythm  Vent. rate has increased BY  40 BPM    Referred By: [email or web address]   SELF           Confirmed By:      No valid procedures specified.   Results for orders placed during the hospital encounter of 09/06/22    Nuclear Stress - Cardiology Interpreted    Interpretation Summary    Normal myocardial perfusion scan. There is no evidence of myocardial ischemia or infarction. Small inf lateral hypoperfusion with reperfusion does not make clinical significance. Clinical coorelation req.    The gated perfusion images showed an ejection fraction of 81% post stress. Normal ejection fraction is greater than 47%.[id number]    There is normal wall motion at rest and post stress.    LV cavity size is normal at rest.    The EKG portion of this study is negative for ischemia.    The patient reported no chest pain during the stress test.      Physical Exam:  CONSTITUTIONAL: No fever, no chills  HEENT: Normocephalic, atraumatic,pupils reactive to light                 NECK:  No JVD no carotid bruit  CVS: S1S2+, RRR, + murmurs, incision site clean dry intact open to air.  Sutures remain in place from chest tube sites and pacer wire sites.  Suture area under the left breast slightly red nontender  LUNGS: Clear  ABDOMEN: Soft, NT, BS+  EXTREMITIES: No cyanosis, edema  : No mckeon catheter  NEURO: AAO X 3  PSY: Normal affect      Medication List with Changes/Refills   New Medications     "CLOPIDOGREL (PLAVIX) 75 MG TABLET    Take 1 tablet (75 mg total) by mouth once daily.   Current Medications    AMIODARONE (PACERONE) 200 MG TAB    Take 1 tablet (200 mg total) by mouth 3 (three) times daily.    ASPIRIN 81 MG CHEW    Take 1 tablet (81 mg total) by mouth once daily.    ATORVASTATIN (LIPITOR) 80 MG TABLET    Take 1 tablet (80 mg total) by mouth once daily.    HYDROCODONE-ACETAMINOPHEN (NORCO) 5-325 MG PER TABLET    Take 1 tablet by mouth every 4 (four) hours as needed for Pain (prn post-op pain).    PEN NEEDLE, DIABETIC (BD JERI 2ND GEN PEN NEEDLE) 32 GAUGE X 5/32" NDLE    USE AS DIRECTED    TRUE METRIX GLUCOSE METER MISC        TRUE METRIX GLUCOSE TEST STRIP STRP    U QAM    TRUEPLUS LANCETS 30 GAUGE MISC    U QAM   Changed and/or Refilled Medications    Modified Medication Previous Medication    ALPRAZOLAM (XANAX) 0.5 MG TABLET ALPRAZolam (XANAX) 0.5 MG tablet       Take 1 tablet (0.5 mg total) by mouth 2 (two) times daily. as needed for anxiety.    Take 1 tablet by mouth twice daily as needed for anxiety    INSULIN GLARGINE, TOUJEO, (TOUJEO SOLOSTAR U-300 INSULIN) 300 UNIT/ML (1.5 ML) INPN PEN insulin glargine, TOUJEO, (TOUJEO SOLOSTAR U-300 INSULIN) 300 unit/mL (1.5 mL) InPn pen       Inject 70 Units into the skin once daily.    INJECT 70 UNITS SUBCUTANEOUSLY ONCE DAILY    METOPROLOL SUCCINATE (TOPROL-XL) 50 MG 24 HR TABLET metoprolol succinate (TOPROL-XL) 50 MG 24 hr tablet       Take 1 tablet (50 mg total) by mouth once daily.    Take 1 tablet (50 mg total) by mouth once daily.             Assessment:       1. Paroxysmal atrial fibrillation    2. NSTEMI (non-ST elevation myocardial infarction)    3. Anxiety    4. Mixed hyperlipidemia    5. Hypertension associated with diabetes    6. NSTEMI (non-ST elevated myocardial infarction)    7. Hematoma         Plan:     Problem List Items Addressed This Visit          Psychiatric    Anxiety    Current Assessment & Plan     Patient is on as needed Xanax.  " We will refill today.            Cardiac/Vascular    Hypertension associated with diabetes    Current Assessment & Plan     Blood pressure stable today in clinic at 124/82.  Continue current regimen.         Relevant Medications    insulin glargine, TOUJEO, (TOUJEO SOLOSTAR U-300 INSULIN) 300 unit/mL (1.5 mL) InPn pen    Hyperlipidemia    Current Assessment & Plan     Last lipid panel:     Latest Reference Range & Units Most Recent   Cholesterol Total 120 - 199 mg/dL 164  8/30/24 20:04   HDL 40 - 75 mg/dL 35 (L)  8/30/24 20:04   HDL/Cholesterol Ratio 20.0 - 50.0 % 21.3  8/30/24 20:04   Non HDL Chol. (LDL+VLDL) <130 mg/dL (calc) 274 (H)  1/9/20 11:11   Non-HDL Cholesterol mg/dL 129  8/30/24 20:04   Total Cholesterol/HDL Ratio 2.0 - 5.0  4.7  8/30/24 20:04   Triglycerides 30 - 150 mg/dL 184 (H)  8/30/24 20:04   LDL Cholesterol 63.0 - 159.0 mg/dL 92.2  8/30/24 20:04   (L): Data is abnormally low  (H): Data is abnormally high    Continue Lipitor 80 mg nightly.  Encouraged heart healthy low-fat low-cholesterol diet.  Exercise as tolerated.         NSTEMI (non-ST elevated myocardial infarction)    Current Assessment & Plan     Status post angiogram on 08/30 which showed severe multivessel CAD with thrombus in the distal RCA.  Underwent CABG x5 on 09/06/2024.  Currently doing well postop.  Continue on aspirin daily and add Plavix 75 mg daily.  Recheck CBC.  Continue Lipitor 80 mg nightly.  Continue beta blocker as prescribed.  Patient to start cardiac rehab soon.         A-fib - Primary    Current Assessment & Plan     Postop AFib.  Reduce amiodarone to twice daily for now.  Obtain outpatient Zio monitor to further assess.  If patient's AFib burden is high we will need to consider Eliquis.  We will hold off for now and just continue with aspirin and added Plavix.         Relevant Orders    IN OFFICE EKG 12-LEAD (to Muse)    Cardiac Monitor - 3-15 Day Adult (Cupid Only)    CBC Auto Differential       Orthopedic    Hematoma     Current Assessment & Plan     Hematoma noted on the right groin - CT site.  We will further assess with ultrasound.          Other Visit Diagnoses       NSTEMI (non-ST elevation myocardial infarction)        Relevant Orders    US Lower Extremity Arteries Right            Follow up in about 3 months (around 12/18/2024).

## 2024-09-18 NOTE — ASSESSMENT & PLAN NOTE
Last lipid panel:     Latest Reference Range & Units Most Recent   Cholesterol Total 120 - 199 mg/dL 164  8/30/24 20:04   HDL 40 - 75 mg/dL 35 (L)  8/30/24 20:04   HDL/Cholesterol Ratio 20.0 - 50.0 % 21.3  8/30/24 20:04   Non HDL Chol. (LDL+VLDL) <130 mg/dL (calc) 274 (H)  1/9/20 11:11   Non-HDL Cholesterol mg/dL 129  8/30/24 20:04   Total Cholesterol/HDL Ratio 2.0 - 5.0  4.7  8/30/24 20:04   Triglycerides 30 - 150 mg/dL 184 (H)  8/30/24 20:04   LDL Cholesterol 63.0 - 159.0 mg/dL 92.2  8/30/24 20:04   (L): Data is abnormally low  (H): Data is abnormally high    Continue Lipitor 80 mg nightly.  Encouraged heart healthy low-fat low-cholesterol diet.  Exercise as tolerated.

## 2024-09-18 NOTE — ASSESSMENT & PLAN NOTE
Status post angiogram on 08/30 which showed severe multivessel CAD with thrombus in the distal RCA.  Underwent CABG x5 on 09/06/2024.  Currently doing well postop.  Continue on aspirin daily and add Plavix 75 mg daily.  Recheck CBC.  Continue Lipitor 80 mg nightly.  Continue beta blocker as prescribed.  Patient to start cardiac rehab soon.

## 2024-09-18 NOTE — ASSESSMENT & PLAN NOTE
Postop AFib.  Reduce amiodarone to twice daily for now.  Obtain outpatient Zio monitor to further assess.  If patient's AFib burden is high we will need to consider Eliquis.  We will hold off for now and just continue with aspirin and added Plavix.

## 2024-09-19 ENCOUNTER — HOSPITAL ENCOUNTER (OUTPATIENT)
Dept: CARDIOLOGY | Facility: CLINIC | Age: 74
Discharge: HOME | End: 2024-09-19
Payer: MEDICARE

## 2024-09-19 ENCOUNTER — HOSPITAL ENCOUNTER (OUTPATIENT)
Dept: RADIOLOGY | Facility: HOSPITAL | Age: 74
Discharge: HOME | End: 2024-09-19
Payer: MEDICARE

## 2024-09-19 DIAGNOSIS — I21.4 NSTEMI (NON-ST ELEVATION MYOCARDIAL INFARCTION): ICD-10-CM

## 2024-09-19 DIAGNOSIS — I48.0 PAROXYSMAL ATRIAL FIBRILLATION: ICD-10-CM

## 2024-09-19 LAB
OHS QRS DURATION: 96 MS
OHS QRS DURATION: 96 MS
OHS QTC CALCULATION: 464 MS
OHS QTC CALCULATION: 464 MS

## 2024-09-19 PROCEDURE — 93926 LOWER EXTREMITY STUDY: CPT | Mod: TC,RT

## 2024-09-19 PROCEDURE — 93926 LOWER EXTREMITY STUDY: CPT | Mod: 26,RT,, | Performed by: RADIOLOGY

## 2024-09-20 LAB
OHS QRS DURATION: 94 MS
OHS QTC CALCULATION: 447 MS

## 2024-09-30 ENCOUNTER — OFFICE VISIT (OUTPATIENT)
Dept: FAMILY MEDICINE | Facility: CLINIC | Age: 74
End: 2024-09-30
Payer: MEDICARE

## 2024-09-30 ENCOUNTER — LAB VISIT (OUTPATIENT)
Dept: LAB | Facility: HOSPITAL | Age: 74
End: 2024-09-30
Payer: MEDICARE

## 2024-09-30 VITALS
RESPIRATION RATE: 16 BRPM | HEART RATE: 68 BPM | TEMPERATURE: 98 F | OXYGEN SATURATION: 97 % | HEIGHT: 67 IN | WEIGHT: 187.38 LBS | DIASTOLIC BLOOD PRESSURE: 70 MMHG | SYSTOLIC BLOOD PRESSURE: 120 MMHG | BODY MASS INDEX: 29.41 KG/M2

## 2024-09-30 DIAGNOSIS — I35.0 AORTIC VALVE STENOSIS, ETIOLOGY OF CARDIAC VALVE DISEASE UNSPECIFIED: ICD-10-CM

## 2024-09-30 DIAGNOSIS — E11.69 HYPERLIPIDEMIA ASSOCIATED WITH TYPE 2 DIABETES MELLITUS: ICD-10-CM

## 2024-09-30 DIAGNOSIS — N18.32 STAGE 3B CHRONIC KIDNEY DISEASE: ICD-10-CM

## 2024-09-30 DIAGNOSIS — I21.4 NSTEMI (NON-ST ELEVATED MYOCARDIAL INFARCTION): ICD-10-CM

## 2024-09-30 DIAGNOSIS — I48.0 PAROXYSMAL ATRIAL FIBRILLATION: ICD-10-CM

## 2024-09-30 DIAGNOSIS — I48.91 ATRIAL FIBRILLATION, UNSPECIFIED TYPE: ICD-10-CM

## 2024-09-30 DIAGNOSIS — E78.5 HYPERLIPIDEMIA ASSOCIATED WITH TYPE 2 DIABETES MELLITUS: ICD-10-CM

## 2024-09-30 DIAGNOSIS — M54.2 NECK PAIN: ICD-10-CM

## 2024-09-30 DIAGNOSIS — E11.65 UNCONTROLLED TYPE 2 DIABETES MELLITUS WITH HYPERGLYCEMIA: Primary | ICD-10-CM

## 2024-09-30 PROCEDURE — 99214 OFFICE O/P EST MOD 30 MIN: CPT | Mod: PBBFAC,PO

## 2024-09-30 PROCEDURE — 36415 COLL VENOUS BLD VENIPUNCTURE: CPT | Mod: PO

## 2024-09-30 PROCEDURE — 99999 PR PBB SHADOW E&M-EST. PATIENT-LVL IV: CPT | Mod: PBBFAC,,,

## 2024-09-30 PROCEDURE — 85025 COMPLETE CBC W/AUTO DIFF WBC: CPT

## 2024-09-30 NOTE — PROGRESS NOTES
Subjective:       Patient ID:  [id number]     Chief Complaint: Follow-up      Debby Hayes a 74 y.o. female who presents to the clinic for follow up.      Patient was seen at [facility] on 08/30/2024 for chest pain.  Patient was noted to be severely hypertensive with a troponin of 908.  Patient was ultimately diagnosed with NSTEMI and underwent CABG on 09/06/2024.  Patient has since followed up with Cardiology on 09/18/2024.  Per chart review it appears patient has been experiencing postop AFib..  Her cardiac regimen includes amiodarone 200 mg open (recently reduced from t.i.d. to b.i.d.), Plavix 75 mg daily,  Metoprolol 50 mg daily, atorvastatin 80 mg daily.  Today she reports overall she is doing well. However, she has been experiencing fatigue.    As for patient's Diabetes, Patient's most recent A1C was 10.2. Patient reports she is currently taking toujeo 70 units nightly. She never started Ozempic 2/2 cost.  Patient reports since surgery her fasting blood glucose has been ranging 120-160.  She reports that her sugars have been better controlled since surgery.  Patient denies polyuria, polydipsia, and abdominal pain.     Lastly, patient reports that she has been experiencing neck pain.  Neck pain is described as achy in nature and intermittent.  Patient denies associated numbness and tingling.  No other concerns today.    Past Medical History:   Diagnosis Date    Allergy     Anxiety     Depression     Diabetes mellitus, type 2     Hyperlipidemia     Hypertension     Personal history of colonic polyps     Pre-diabetes       Active Problem List with Overview Notes    Diagnosis Date Noted    A-fib 09/18/2024    Hematoma 09/18/2024    UTI due to extended-spectrum beta lactamase (ESBL) producing Escherichia coli 09/02/2024    Stage 3b chronic kidney disease 08/30/2024    NSTEMI (non-ST elevated myocardial infarction) 08/30/2024    Calcification of aorta 05/14/2024    Anterolisthesis of lumbar spine  09/06/2023    Compression fracture of L1 lumbar vertebra 09/06/2023    Compression fracture of L2 lumbar vertebra 09/06/2023    Panic attacks 07/07/2023    Low back pain 05/12/2023    Stage 3a chronic kidney disease 04/06/2023    SOB (shortness of breath) 09/06/2022    Benign familial tremor 08/24/2022    Hyperuricemia 07/28/2022    Macroalbuminuric diabetic nephropathy 07/16/2022    COVID-19 long hauler manifesting chronic concentration deficit 12/19/2021    Balance problem 12/19/2021    Memory loss 12/19/2021    Pulmonary nodule 03/31/2021    Type 2 diabetes mellitus with microalbuminuria, with long-term current use of insulin 01/19/2021    LVH (left ventricular hypertrophy) 12/09/2020    Aortic valve stenosis 12/09/2020    BMI 29.0-29.9,adult 08/01/2019    Cataracts, bilateral 06/19/2019    Benign essential tremor 11/29/2018    BMI 33.0-33.9,adult 11/29/2018    Insomnia 07/05/2018    Depression, recurrent     S/P left rotator cuff repair 03/15/2018    Anxiety 03/15/2018    Attention deficit disorder 03/15/2018    Hypertension associated with diabetes 03/15/2018    Hyperlipidemia 03/15/2018      Review of patient's allergies indicates:   Allergen Reactions    Bactrim [sulfamethoxazole-trimethoprim]     Codeine     Levaquin [levofloxacin]     Pcn [penicillins]          Current Outpatient Medications:     ALPRAZolam (XANAX) 0.5 MG tablet, Take 1 tablet (0.5 mg total) by mouth 2 (two) times daily. as needed for anxiety., Disp: 30 tablet, Rfl: 1    amiodarone (PACERONE) 200 MG Tab, Take 1 tablet (200 mg total) by mouth 3 (three) times daily., Disp: 90 tablet, Rfl: 0    aspirin 81 MG Chew, Take 1 tablet (81 mg total) by mouth once daily., Disp: 30 tablet, Rfl: 0    atorvastatin (LIPITOR) 80 MG tablet, Take 1 tablet (80 mg total) by mouth once daily., Disp: 30 tablet, Rfl: 0    clopidogreL (PLAVIX) 75 mg tablet, Take 1 tablet (75 mg total) by mouth once daily., Disp: 90 tablet, Rfl: 3    HYDROcodone-acetaminophen  "(NORCO) 5-325 mg per tablet, Take 1 tablet by mouth every 4 (four) hours as needed for Pain (prn post-op pain)., Disp: 20 tablet, Rfl: 0    insulin glargine, TOUJEO, (TOUJEO SOLOSTAR U-300 INSULIN) 300 unit/mL (1.5 mL) InPn pen, Inject 70 Units into the skin once daily., Disp: 21 mL, Rfl: 3    metoprolol succinate (TOPROL-XL) 50 MG 24 hr tablet, Take 1 tablet (50 mg total) by mouth once daily., Disp: 90 tablet, Rfl: 3    pen needle, diabetic (BD JERI 2ND GEN PEN NEEDLE) 32 gauge x 5/32" Ndle, USE AS DIRECTED, Disp: 100 each, Rfl: 5    TRUE METRIX GLUCOSE METER Misc, , Disp: , Rfl:     TRUE METRIX GLUCOSE TEST STRIP Strp, U QAM, Disp: 100 each, Rfl: 5    TRUEPLUS LANCETS 30 gauge Misc, U QAM, Disp: 100 each, Rfl: 5    Lab Results   Component Value Date    WBC 12.53 09/11/2024    HGB 8.8 (L) 09/11/2024    HCT 27.3 (L) 09/11/2024     09/11/2024    CHOL 164 08/30/2024    TRIG 184 (H) 08/30/2024    HDL 35 (L) 08/30/2024    ALT 17 09/09/2024    AST 22 09/09/2024     09/11/2024    K 4.0 09/11/2024     09/11/2024    CREATININE 1.0 09/11/2024    BUN 25 (H) 09/11/2024    CO2 25 09/11/2024    TSH 2.556 09/01/2024    INR 1.0 08/30/2024    HGBA1C 10.2 (H) 08/30/2024    MICROALBUR 0.9 01/09/2020       Review of Systems   Constitutional:  Positive for fatigue. Negative for fever.   HENT: Negative.  Negative for congestion, sneezing and sore throat.    Eyes: Negative.    Respiratory:  Negative for cough, shortness of breath and wheezing.    Cardiovascular:  Negative for chest pain, palpitations and leg swelling.   Gastrointestinal:  Negative for abdominal pain, nausea and vomiting.   Genitourinary: Negative.    Musculoskeletal:  Positive for neck pain. Negative for arthralgias.   Skin: Negative.  Negative for rash.   Neurological:  Negative for dizziness, weakness, light-headedness, numbness and headaches.   Hematological: Negative.    Psychiatric/Behavioral: Negative.     All other systems reviewed and are " negative.      Objective:      Physical Exam  Constitutional:       Appearance: Normal appearance.   HENT:      Head: Normocephalic and atraumatic.      Nose: Nose normal.   Eyes:      Extraocular Movements: Extraocular movements intact.   Cardiovascular:      Rate and Rhythm: Normal rate and regular rhythm.      Heart sounds: Murmur heard.   Pulmonary:      Effort: Pulmonary effort is normal.      Breath sounds: Normal breath sounds.   Musculoskeletal:         General: Normal range of motion.      Cervical back: Normal range of motion. No rigidity or tenderness.   Skin:     General: Skin is warm and dry.   Neurological:      General: No focal deficit present.      Mental Status: She is alert and oriented to person, place, and time.   Psychiatric:         Mood and Affect: Mood normal.         Assessment:       1. Uncontrolled type 2 diabetes mellitus with hyperglycemia    2. Stage 3b chronic kidney disease    3. Hyperlipidemia associated with type 2 diabetes mellitus    4. NSTEMI (non-ST elevated myocardial infarction)    5. Atrial fibrillation, unspecified type    6. Aortic valve stenosis, etiology of cardiac valve disease unspecified    7. Neck pain        Plan:       Debby was seen today for follow-up.    Diagnoses and all orders for this visit:    Uncontrolled type 2 diabetes mellitus with hyperglycemia  - Most recent A1C: 10.2 8/2024  - CMP : UTD 59.5 9/2024  - Micro: UTD 7/2024  - Continue current regimen   - discussed medication compliance   - yearly eye exam recommended  - LDL goal <70  - discussed importance of frequent foot exam     Stage 3b chronic kidney disease  - avoid nephrotoxic drugs     Hyperlipidemia associated with type 2 diabetes mellitus  - continue atorvastatin as prescribed  - low-fat diet and exercise    NSTEMI (non-ST elevated myocardial infarction)  - continue current regimen  - keep upcoming cardiology appointment    Atrial fibrillation, unspecified type  - continue current regimen  -  keep upcoming cardiology appointment    Aortic valve stenosis, etiology of cardiac valve disease unspecified  - continue current regimen  - keep upcoming cardiology appointment    Neck pain  - advised Voltaren gel  - neck exercises provided to patient   - ice/heat and gentle stretching   - OTC Tylenol PRN for pain  - follow-up in clinic if symptoms persist or do not improve        Keep upcoming appointment with specialist and with Dr. Costello    Portions of this note were dictated using voice recognition software and may contain dictation related errors in spelling / grammar / syntax not discovered on text review.     Radha Sharp PA-C

## 2024-10-01 LAB
BASOPHILS # BLD AUTO: 0.11 K/UL (ref 0–0.2)
BASOPHILS NFR BLD: 1.3 % (ref 0–1.9)
DIFFERENTIAL METHOD BLD: ABNORMAL
EOSINOPHIL # BLD AUTO: 0.9 K/UL (ref 0–0.5)
EOSINOPHIL NFR BLD: 10.4 % (ref 0–8)
ERYTHROCYTE [DISTWIDTH] IN BLOOD BY AUTOMATED COUNT: 15.3 % (ref 11.5–14.5)
HCT VFR BLD AUTO: 38.7 % (ref 37–48.5)
HGB BLD-MCNC: 11.9 G/DL (ref 12–16)
IMM GRANULOCYTES # BLD AUTO: 0.02 K/UL (ref 0–0.04)
IMM GRANULOCYTES NFR BLD AUTO: 0.2 % (ref 0–0.5)
LYMPHOCYTES # BLD AUTO: 2.7 K/UL (ref 1–4.8)
LYMPHOCYTES NFR BLD: 31.9 % (ref 18–48)
MCH RBC QN AUTO: 28.5 PG (ref 27–31)
MCHC RBC AUTO-ENTMCNC: 30.7 G/DL (ref 32–36)
MCV RBC AUTO: 93 FL (ref 82–98)
MONOCYTES # BLD AUTO: 0.6 K/UL (ref 0.3–1)
MONOCYTES NFR BLD: 7.5 % (ref 4–15)
NEUTROPHILS # BLD AUTO: 4.2 K/UL (ref 1.8–7.7)
NEUTROPHILS NFR BLD: 48.7 % (ref 38–73)
NRBC BLD-RTO: 0 /100 WBC
PLATELET # BLD AUTO: 402 K/UL (ref 150–450)
PMV BLD AUTO: 10.7 FL (ref 9.2–12.9)
RBC # BLD AUTO: 4.18 M/UL (ref 4–5.4)
WBC # BLD AUTO: 8.58 K/UL (ref 3.9–12.7)

## 2024-10-02 ENCOUNTER — OFFICE VISIT (OUTPATIENT)
Dept: VASCULAR SURGERY | Facility: CLINIC | Age: 74
End: 2024-10-02
Payer: MEDICARE

## 2024-10-02 VITALS — DIASTOLIC BLOOD PRESSURE: 90 MMHG | HEART RATE: 64 BPM | SYSTOLIC BLOOD PRESSURE: 153 MMHG

## 2024-10-02 DIAGNOSIS — Z95.1 S/P CABG (CORONARY ARTERY BYPASS GRAFT): Primary | ICD-10-CM

## 2024-10-02 PROCEDURE — 99999 PR PBB SHADOW E&M-EST. PATIENT-LVL III: CPT | Mod: PBBFAC,,, | Performed by: THORACIC SURGERY (CARDIOTHORACIC VASCULAR SURGERY)

## 2024-10-02 PROCEDURE — 99024 POSTOP FOLLOW-UP VISIT: CPT | Mod: POP,,, | Performed by: THORACIC SURGERY (CARDIOTHORACIC VASCULAR SURGERY)

## 2024-10-02 PROCEDURE — 99213 OFFICE O/P EST LOW 20 MIN: CPT | Mod: PBBFAC,PN | Performed by: THORACIC SURGERY (CARDIOTHORACIC VASCULAR SURGERY)

## 2024-10-02 NOTE — PROGRESS NOTES
This patient is status post coronary artery bypass grafting.  She comes back to the office today in follow-up.  She has done well without major complaints.    She does state that her energy has been diminished and she gets tired fairly easily.  Medicines are noted and part of the epic record.  Her problem list was reviewed.    On exam vital signs are stable.  Surgical wounds are intact.  Her chest tube stitches were removed.    I reviewed her medicines and told her to take amiodarone just once a day.  This can be discontinued after a week or 2.  She appears to be in a sinus rhythm.    She can see us on an as-needed basis.  I would recommend a visit with the cardiology service in the next couple of months for medical follow-up.

## 2024-10-03 ENCOUNTER — EXTERNAL HOME HEALTH (OUTPATIENT)
Dept: HOME HEALTH SERVICES | Facility: HOSPITAL | Age: 74
End: 2024-10-03
Payer: MEDICARE

## 2024-10-10 ENCOUNTER — OFFICE VISIT (OUTPATIENT)
Dept: ENDOCRINOLOGY | Facility: CLINIC | Age: 74
End: 2024-10-10
Payer: MEDICARE

## 2024-10-10 ENCOUNTER — TELEPHONE (OUTPATIENT)
Dept: PHARMACY | Facility: CLINIC | Age: 74
End: 2024-10-10
Payer: MEDICARE

## 2024-10-10 VITALS
WEIGHT: 188.06 LBS | HEART RATE: 77 BPM | SYSTOLIC BLOOD PRESSURE: 120 MMHG | OXYGEN SATURATION: 96 % | DIASTOLIC BLOOD PRESSURE: 84 MMHG | HEIGHT: 67 IN | BODY MASS INDEX: 29.52 KG/M2 | TEMPERATURE: 98 F

## 2024-10-10 DIAGNOSIS — E11.59 HYPERTENSION ASSOCIATED WITH TYPE 2 DIABETES MELLITUS: ICD-10-CM

## 2024-10-10 DIAGNOSIS — Z78.0 POSTMENOPAUSAL: ICD-10-CM

## 2024-10-10 DIAGNOSIS — E55.9 HYPOVITAMINOSIS D: ICD-10-CM

## 2024-10-10 DIAGNOSIS — E11.69 COMBINED HYPERLIPIDEMIA ASSOCIATED WITH TYPE 2 DIABETES MELLITUS: ICD-10-CM

## 2024-10-10 DIAGNOSIS — E11.65 UNCONTROLLED TYPE 2 DIABETES MELLITUS WITH HYPERGLYCEMIA: Primary | ICD-10-CM

## 2024-10-10 DIAGNOSIS — E78.2 COMBINED HYPERLIPIDEMIA ASSOCIATED WITH TYPE 2 DIABETES MELLITUS: ICD-10-CM

## 2024-10-10 DIAGNOSIS — I15.2 HYPERTENSION ASSOCIATED WITH TYPE 2 DIABETES MELLITUS: ICD-10-CM

## 2024-10-10 DIAGNOSIS — D22.9 ATYPICAL MOLE: ICD-10-CM

## 2024-10-10 PROCEDURE — 99215 OFFICE O/P EST HI 40 MIN: CPT | Mod: PBBFAC,PO | Performed by: PHYSICIAN ASSISTANT

## 2024-10-10 PROCEDURE — 99999 PR PBB SHADOW E&M-EST. PATIENT-LVL V: CPT | Mod: PBBFAC,,, | Performed by: PHYSICIAN ASSISTANT

## 2024-10-10 RX ORDER — SEMAGLUTIDE 0.68 MG/ML
0.25 INJECTION, SOLUTION SUBCUTANEOUS
Qty: 9 ML | Refills: 3 | Status: SHIPPED | OUTPATIENT
Start: 2024-10-10

## 2024-10-10 RX ORDER — ATORVASTATIN CALCIUM 80 MG/1
80 TABLET, FILM COATED ORAL DAILY
Qty: 90 TABLET | Refills: 3 | Status: SHIPPED | OUTPATIENT
Start: 2024-10-10

## 2024-10-10 RX ORDER — REPAGLINIDE 2 MG/1
2 TABLET ORAL
Qty: 270 TABLET | Refills: 3 | Status: SHIPPED | OUTPATIENT
Start: 2024-10-10 | End: 2025-10-10

## 2024-10-10 NOTE — PROGRESS NOTES
"CC: This 74 y.o. female presents for management of T2DM  and chronic conditions pending review including HTN, HLP    HPI: was diagnosed with T2DM ~6 yrs ago. New to endocrine.  Has never been hospitalized r/t DM.  Family hx of DM: m. renan  Fhx of thyroid disease: none  Denies missing doses of DM medication.   hypoglycemia at home  monitoring BG at home:  Fastin-230s    Recently had a CABG in  after having an MI.     Diet:   BF- skipped  LH-fish, pasta  DN-skipped  Snacks on cheetos. Avoids sugary beverages.     Exercise: none    CURRENT DM MEDS: Toujeo 70 units qd    Previous meds:  Metformin- GI upset  Farxiga-cost    Standards of Care:  Eye exam:  Dr. Jackson  Podiatry exam:  Dr. Ellington  DE:  CRICKET Junior    DEXA scan: 3/22 wnl  PMHx, PSHx: reviewed in epic.  Social Hx: no E/T use.    Wt Readings from Last 10 Encounters:   10/10/24 85.3 kg (188 lb 0.8 oz)   24 85 kg (187 lb 6.3 oz)   24 87.5 kg (193 lb)   24 97.4 kg (214 lb 11.7 oz)   24 89.4 kg (197 lb)   24 89.1 kg (196 lb 6.9 oz)   24 87.8 kg (193 lb 9 oz)   24 87 kg (191 lb 12.8 oz)   24 84.5 kg (186 lb 4.6 oz)   24 86 kg (189 lb 11.3 oz)      ROS:   Gen: Appetite good, no weight gain or loss, denies fatigue and weakness.  Skin: Skin is intact and heals well, no rashes, no hair changes  Eyes: Denies visual disturbances  Resp: no SOB or LOPEZ, no cough  Cardiac: No palpitations, chest pain, no edema   GI: No nausea or vomiting, diarrhea, constipation, or abdominal pain.  /GYN: No nocturia, burning or pain.   MS/Neuro: Denies numbness/ tingling in BLE; Gait steady, speech clear  Psych: Denies drug/ETOH abuse, no hx of depression.  Other systems: negative.    /84   Pulse 77   Temp 97.9 °F (36.6 °C) (Oral)   Ht 5' 7" (1.702 m)   Wt 85.3 kg (188 lb 0.8 oz)   SpO2 96%   BMI 29.45 kg/m²      PE:  GENERAL: Well developed, well nourished.  PSYCH: AAOx3, appropriate mood and affect, " pleasant expression, conversant, appears relaxed, well groomed.   EYES: Conjunctiva, corneas clear  NECK: Supple, trachea midline,no thyromegaly or nodules  CHEST: Resp even and unlabored, CTA bilateral.  CARDIAC: RRR, S1, S2 heard, no murmurs  VASCULAR: DP pulses +2/4 bilaterally, no edema.  NEURO: Gait steady  SKIN: Skin warm and dry no acanthosis nigracans.    Personally reviewed labs below:    Lab Visit on 09/30/2024   Component Date Value Ref Range Status    WBC 09/30/2024 8.58  3.90 - 12.70 K/uL Final    RBC 09/30/2024 4.18  4.00 - 5.40 M/uL Final    Hemoglobin 09/30/2024 11.9 (L)  12.0 - 16.0 g/dL Final    Hematocrit 09/30/2024 38.7  37.0 - 48.5 % Final    MCV 09/30/2024 93  82 - 98 fL Final    MCH 09/30/2024 28.5  27.0 - 31.0 pg Final    MCHC 09/30/2024 30.7 (L)  32.0 - 36.0 g/dL Final    RDW 09/30/2024 15.3 (H)  11.5 - 14.5 % Final    Platelets 09/30/2024 402  150 - 450 K/uL Final    MPV 09/30/2024 10.7  9.2 - 12.9 fL Final    Immature Granulocytes 09/30/2024 0.2  0.0 - 0.5 % Final    Gran # (ANC) 09/30/2024 4.2  1.8 - 7.7 K/uL Final    Immature Grans (Abs) 09/30/2024 0.02  0.00 - 0.04 K/uL Final    Comment: Mild elevation in immature granulocytes is non specific and   can be seen in a variety of conditions including stress response,   acute inflammation, trauma and pregnancy. Correlation with other   laboratory and clinical findings is essential.      Lymph # 09/30/2024 2.7  1.0 - 4.8 K/uL Final    Mono # 09/30/2024 0.6  0.3 - 1.0 K/uL Final    Eos # 09/30/2024 0.9 (H)  0.0 - 0.5 K/uL Final    Baso # 09/30/2024 0.11  0.00 - 0.20 K/uL Final    nRBC 09/30/2024 0  0 /100 WBC Final    Gran % 09/30/2024 48.7  38.0 - 73.0 % Final    Lymph % 09/30/2024 31.9  18.0 - 48.0 % Final    Mono % 09/30/2024 7.5  4.0 - 15.0 % Final    Eosinophil % 09/30/2024 10.4 (H)  0.0 - 8.0 % Final    Basophil % 09/30/2024 1.3  0.0 - 1.9 % Final    Differential Method 09/30/2024 Automated   Final   Hospital Outpatient Visit on  09/19/2024   Component Date Value Ref Range Status    Holter Hookup Date 09/19/2024 [id number]   Final    Holter Hookup Time 09/19/2024 [id number]   Final    Holter Study End Date 09/19/2024 [id number]   Final    Holter Study End Time 09/19/2024 [id number]   Final    Holter Scan Date 09/19/2024 [id number]   Final    Sinus min HR 09/19/2024 47  bpm Final    Sinus max hr 09/19/2024 181  bpm Final    Sinus avg hr 09/19/2024 73  bpm Final    Afib min HR 09/19/2024 86  bpm Final    Afib max hr 09/19/2024 181  bpm Final    Afib avg hr 09/19/2024 117  bpm Final    Event Monitor Day 09/19/2024 7   Final    Holter length hours 09/19/2024 23   Final    holter length minutes 09/19/2024 0   Final    holter length dec hours 09/19/2024 191.00   Final   Office Visit on 09/18/2024   Component Date Value Ref Range Status    QRS Duration 09/18/2024 96  ms Final    [facility] QTC Calculation 09/18/2024 464  ms Final   No results displayed because visit has over 200 results.            ASSESSMENT and PLAN:    1. Uncontrolled type 2 diabetes mellitus with hyperglycemia  Ambulatory referral/consult to Endocrinology    repaglinide (PRANDIN) 2 MG tablet    atorvastatin (LIPITOR) 80 MG tablet    Renal Function Panel    Hemoglobin A1C    Ambulatory referral/consult to Pharmacy Assistance      2. Hypertension associated with type 2 diabetes mellitus        3. Combined hyperlipidemia associated with type 2 diabetes mellitus        4. Postmenopausal  DXA Bone Density Axial Skeleton 1 or more sites      5. Hypovitaminosis D  Vitamin D      6. Atypical mole  Ambulatory referral/consult to Dermatology           T2DM with hyperglycemia  -A1c is elevated.     Medication Changes  Start prandin 2 mg with meals.    Start Ozempic 0.25 mg once weekly.     Continue Toujeo 70 units every morning.    Declines CGM.  Discussed DM, progression of disease, long term complications, tx options.   Discussed A1c and BG goals.   Reviewed  hypoglycemia, s/s and appropriate tx.    Instructed to monitor BG and bring meter/ log to every clinic visit.   - takes ASA, statin    HTN - controlled, continue meds as previously prescribed and monitor.     HLP -stable LDL , on statin therapy, LFTs WNL  Postmenopausal-DEXA scan.  Hypovitaminosis d-stable-check vitamin D   Aytypical mole-referral to derm.    Follow-Up   Dexa   Referral to derm  F/u in 3 mths w/ labs prior -A1c, rp, vd

## 2024-10-10 NOTE — PATIENT INSTRUCTIONS
Medication Changes  Start prandin 2 mg with meals.    Start Ozempic 0.25 mg once weekly.     Continue Toujeo 70 units every morning.

## 2024-10-10 NOTE — TELEPHONE ENCOUNTER
Debby Brown has been informed of the [employer] application process for Ozempic 0.25 mg and Ozempic 0.5mg and what's required to apply.  She will provide the following documents: Proof of household Income( such as social security statement, 1099 form, pension statement or 3 consecutive pay stubs, Copy of all Insurance cards( front and back), and Completed [facility] Authorization Forms        Follow-up will be made in 5 business days.     Marina Mishra  Pharmacy Patient Assistance Team

## 2024-10-11 ENCOUNTER — HOSPITAL ENCOUNTER (OUTPATIENT)
Dept: RADIOLOGY | Facility: CLINIC | Age: 74
Discharge: HOME | End: 2024-10-11
Attending: PHYSICIAN ASSISTANT
Payer: MEDICARE

## 2024-10-11 DIAGNOSIS — Z78.0 POSTMENOPAUSAL: ICD-10-CM

## 2024-10-11 PROCEDURE — 77080 DXA BONE DENSITY AXIAL: CPT | Mod: TC,PO

## 2024-10-11 PROCEDURE — 77080 DXA BONE DENSITY AXIAL: CPT | Mod: 26,,, | Performed by: RADIOLOGY

## 2024-10-22 ENCOUNTER — NURSE TRIAGE (OUTPATIENT)
Dept: ADMINISTRATIVE | Facility: CLINIC | Age: 74
End: 2024-10-22
Payer: MEDICARE

## 2024-10-22 ENCOUNTER — HOSPITAL ENCOUNTER (EMERGENCY)
Facility: HOSPITAL | Age: 74
Discharge: HOME | End: 2024-10-22
Attending: EMERGENCY MEDICINE
Payer: MEDICARE

## 2024-10-22 VITALS
SYSTOLIC BLOOD PRESSURE: 175 MMHG | HEART RATE: 73 BPM | RESPIRATION RATE: 16 BRPM | HEIGHT: 67 IN | TEMPERATURE: 99 F | WEIGHT: 188 LBS | OXYGEN SATURATION: 97 % | DIASTOLIC BLOOD PRESSURE: 80 MMHG | BODY MASS INDEX: 29.51 KG/M2

## 2024-10-22 DIAGNOSIS — R07.89 ATYPICAL CHEST PAIN: Primary | ICD-10-CM

## 2024-10-22 DIAGNOSIS — R07.9 CHEST PAIN: ICD-10-CM

## 2024-10-22 LAB
ALBUMIN SERPL BCP-MCNC: 3.8 G/DL (ref 3.5–5.2)
ALP SERPL-CCNC: 81 U/L (ref 55–135)
ALT SERPL W/O P-5'-P-CCNC: 12 U/L (ref 10–44)
ANION GAP SERPL CALC-SCNC: 8 MMOL/L (ref 8–16)
AST SERPL-CCNC: 10 U/L (ref 10–40)
BASOPHILS # BLD AUTO: 0.1 K/UL (ref 0–0.2)
BASOPHILS NFR BLD: 1.3 % (ref 0–1.9)
BILIRUB SERPL-MCNC: 0.5 MG/DL (ref 0.1–1)
BNP SERPL-MCNC: 142 PG/ML (ref 0–99)
BUN SERPL-MCNC: 21 MG/DL (ref 8–23)
CALCIUM SERPL-MCNC: 9.3 MG/DL (ref 8.7–10.5)
CHLORIDE SERPL-SCNC: 108 MMOL/L (ref 95–110)
CO2 SERPL-SCNC: 20 MMOL/L (ref 23–29)
CREAT SERPL-MCNC: 1 MG/DL (ref 0.5–1.4)
DIFFERENTIAL METHOD BLD: ABNORMAL
EOSINOPHIL # BLD AUTO: 0.5 K/UL (ref 0–0.5)
EOSINOPHIL NFR BLD: 5.9 % (ref 0–8)
ERYTHROCYTE [DISTWIDTH] IN BLOOD BY AUTOMATED COUNT: 15.3 % (ref 11.5–14.5)
EST. GFR  (NO RACE VARIABLE): 59.5 ML/MIN/1.73 M^2
GLUCOSE SERPL-MCNC: 349 MG/DL (ref 70–110)
HCT VFR BLD AUTO: 38.4 % (ref 37–48.5)
HGB BLD-MCNC: 12.2 G/DL (ref 12–16)
IMM GRANULOCYTES # BLD AUTO: 0.01 K/UL (ref 0–0.04)
IMM GRANULOCYTES NFR BLD AUTO: 0.1 % (ref 0–0.5)
LYMPHOCYTES # BLD AUTO: 3.3 K/UL (ref 1–4.8)
LYMPHOCYTES NFR BLD: 41.4 % (ref 18–48)
MAGNESIUM SERPL-MCNC: 1.6 MG/DL (ref 1.6–2.6)
MCH RBC QN AUTO: 28.8 PG (ref 27–31)
MCHC RBC AUTO-ENTMCNC: 31.8 G/DL (ref 32–36)
MCV RBC AUTO: 91 FL (ref 82–98)
MONOCYTES # BLD AUTO: 0.6 K/UL (ref 0.3–1)
MONOCYTES NFR BLD: 7.7 % (ref 4–15)
NEUTROPHILS # BLD AUTO: 3.5 K/UL (ref 1.8–7.7)
NEUTROPHILS NFR BLD: 43.6 % (ref 38–73)
NRBC BLD-RTO: 0 /100 WBC
PLATELET # BLD AUTO: 312 K/UL (ref 150–450)
PMV BLD AUTO: 10.8 FL (ref 9.2–12.9)
POTASSIUM SERPL-SCNC: 4.2 MMOL/L (ref 3.5–5.1)
PROT SERPL-MCNC: 6.8 G/DL (ref 6–8.4)
RBC # BLD AUTO: 4.23 M/UL (ref 4–5.4)
SODIUM SERPL-SCNC: 136 MMOL/L (ref 136–145)
TROPONIN I SERPL HS-MCNC: 47.7 PG/ML (ref 0–14.9)
TROPONIN I SERPL HS-MCNC: 48.7 PG/ML (ref 0–14.9)
TROPONIN I SERPL HS-MCNC: 49.9 PG/ML (ref 0–14.9)
WBC # BLD AUTO: 7.92 K/UL (ref 3.9–12.7)

## 2024-10-22 PROCEDURE — 80053 COMPREHEN METABOLIC PANEL: CPT | Performed by: EMERGENCY MEDICINE

## 2024-10-22 PROCEDURE — 83880 ASSAY OF NATRIURETIC PEPTIDE: CPT | Performed by: EMERGENCY MEDICINE

## 2024-10-22 PROCEDURE — 84484 ASSAY OF TROPONIN QUANT: CPT | Mod: 91 | Performed by: EMERGENCY MEDICINE

## 2024-10-22 PROCEDURE — 85025 COMPLETE CBC W/AUTO DIFF WBC: CPT | Performed by: EMERGENCY MEDICINE

## 2024-10-22 PROCEDURE — 83735 ASSAY OF MAGNESIUM: CPT | Performed by: EMERGENCY MEDICINE

## 2024-10-22 PROCEDURE — 99285 EMERGENCY DEPT VISIT HI MDM: CPT | Mod: 25

## 2024-10-22 NOTE — ED PROVIDER NOTES
Encounter Date: 10/22/2024       History     Chief Complaint   Patient presents with    Chest Pain     Chest pressure midsternal radiating to left breast.   CABG 9/6.  Fall on Saturday after standing from chair, fell to hands and knees, c/o chest pressure since, worse today.  Denies LOC.       74-year-old female with a past medical history of CAD, diabetes mellitus, hypertension, and hyperlipidemia presents for evaluation of chest pain.  The patient reports that she had a 5 vessel CABG surgery done on September 6th.  The patient reports that she was doing well but had a fall 3 days ago after standing up from a chair.  She reports that she fell down onto her hands and knees.  She denies any associated chest trauma, shortness of breath, head injury, loss of consciousness, neck pain, back pain, abdominal pain, or nausea/vomiting.  There are no alleviating or aggravating factors.      Review of patient's allergies indicates:   Allergen Reactions    Bactrim [sulfamethoxazole-trimethoprim]     Codeine     Levaquin [levofloxacin]     Pcn [penicillins]      Past Medical History:   Diagnosis Date    Allergy     Anxiety     Depression     Diabetes mellitus, type 2     Hyperlipidemia     Hypertension     Personal history of colonic polyps      Past Surgical History:   Procedure Laterality Date    APPENDECTOMY      BACK SURGERY      BREAST SURGERY      CHOLECYSTECTOMY      COLONOSCOPY      COLONOSCOPY N/A 03/13/2024    Procedure: COLONOSCOPY;  Surgeon: Isabel Noriega MD;  Location: [facility] ENDO;  Service: Endoscopy;  Laterality: N/A;  ppm    CORONARY ANGIOGRAPHY N/A 8/30/2024    Procedure: ANGIOGRAM, CORONARY ARTERY;  Surgeon: Ashleigh Chambers MD;  Location: [facility] CATH/EP LAB;  Service: Cardiology;  Laterality: N/A;    CORONARY ARTERY BYPASS GRAFT (CABG) N/A 9/6/2024    Procedure: CORONARY ARTERY BYPASS GRAFT (CABG);  Surgeon: Ke Garcia MD;  Location: [facility] OR;  Service: Cardiothoracic;  Laterality: N/A;     ENDOSCOPIC HARVEST OF VEIN Left 9/6/2024    Procedure: HARVEST-VEIN-ENDOVASCULAR;  Surgeon: Ke Garcia MD;  Location: [facility] OR;  Service: Cardiothoracic;  Laterality: Left;    GALLBLADDER SURGERY      HERNIA REPAIR      umbilical    lymphnode remove      SHOULDER SURGERY Left     RTR    SURGICAL PROCUREMENT, ARTERY, RADIAL, FOR CABG Left 9/6/2024    Procedure: SURGICAL PROCUREMENT,ARTERY,RADIAL,FOR CABG;  Surgeon: Ke Garcia MD;  Location: [facility] OR;  Service: Cardiothoracic;  Laterality: Left;    TOTAL REDUCTION MAMMOPLASTY Bilateral 2001    TUBAL LIGATION       Family History   Problem Relation Name Age of Onset    Mental illness Mother      Cancer Mother          female cancer?    Depression Mother      Heart disease Mother      Hypertension Mother      Stroke Mother      Heart disease Father      Hypertension Father      Stroke Father      Cancer Brother          bladder    Glaucoma Neg Hx      Macular degeneration Neg Hx       Social History     Tobacco Use    Smoking status: Former     Passive exposure: Past    Smokeless tobacco: Never   Substance Use Topics    Alcohol use: Not Currently     Comment: occasionally    Drug use: No     Review of Systems   Constitutional:  Negative for chills and fever.   HENT:  Negative for congestion.    Respiratory:  Negative for cough and shortness of breath.    Cardiovascular:  Positive for chest pain.   Gastrointestinal:  Negative for abdominal pain, nausea and vomiting.   Genitourinary:  Negative for dysuria.   Musculoskeletal:  Negative for gait problem.   Skin:  Negative for color change.   Neurological:  Negative for dizziness and numbness.   Psychiatric/Behavioral:  Negative for agitation.        Physical Exam     Initial Vitals [10/22/24 1340]   BP Pulse Resp Temp SpO2   (!) 174/78 79 18 99.1 °F (37.3 °C) 97 %      MAP       --         Physical Exam    Nursing note and vitals reviewed.  Constitutional: She appears well-developed and well-nourished.    HENT:   Head: Atraumatic.   Eyes: EOM are normal. Pupils are equal, round, and reactive to light.   Neck:   Normal range of motion.  Cardiovascular:  Normal rate and regular rhythm.           Murmur heard.  Pulmonary/Chest: Breath sounds normal.   Abdominal: Abdomen is soft. Bowel sounds are normal. She exhibits no distension. There is no abdominal tenderness. There is no rebound and no guarding.   Musculoskeletal:         General: Normal range of motion.      Right shoulder: Normal.      Left shoulder: Normal.      Cervical back: Normal range of motion.     Neurological: She is alert and oriented to person, place, and time.   Skin: Skin is warm and dry.   Psychiatric: She has a normal mood and affect.         ED Course   Procedures  Labs Reviewed   CBC W/ AUTO DIFFERENTIAL - Abnormal       Result Value    WBC 7.92      RBC 4.23      Hemoglobin 12.2      Hematocrit 38.4      MCV 91      MCH 28.8      MCHC 31.8 (*)     RDW 15.3 (*)     Platelets 312      MPV 10.8      Immature Granulocytes 0.1      Gran # (ANC) 3.5      Immature Grans (Abs) 0.01      Lymph # 3.3      Mono # 0.6      Eos # 0.5      Baso # 0.10      nRBC 0      Gran % 43.6      Lymph % 41.4      Mono % 7.7      Eosinophil % 5.9      Basophil % 1.3      Differential Method Automated     COMPREHENSIVE METABOLIC PANEL - Abnormal    Sodium 136      Potassium 4.2      Chloride 108      CO2 20 (*)     Glucose 349 (*)     BUN 21      Creatinine 1.0      Calcium 9.3      Total Protein 6.8      Albumin 3.8      Total Bilirubin 0.5      Alkaline Phosphatase 81      AST 10      ALT 12      eGFR 59.5 (*)     Anion Gap 8     TROPONIN I HIGH SENSITIVITY - Abnormal    Troponin I High Sensitivity 48.7 (*)    TROPONIN I HIGH SENSITIVITY - Abnormal    Troponin I High Sensitivity 49.9 (*)    B-TYPE NATRIURETIC PEPTIDE - Abnormal     (*)    TROPONIN I HIGH SENSITIVITY - Abnormal    Troponin I High Sensitivity 47.7 (*)    MAGNESIUM    Magnesium 1.6       EKG  Readings: (Independently Interpreted)   Initial Reading: No STEMI. Rhythm: Normal Sinus Rhythm. Heart Rate: 82. Ectopy: No Ectopy. Conduction: Normal and 1st Degree AV Block. ST Segments: Normal ST Segments. T Waves: Normal. Clinical Impression: Normal Sinus Rhythm       Imaging Results              X-Ray Chest PA And Lateral (Final result)  Result time 10/22/24 14:35:10      Final result by Elisha Pantoja MD (10/22/24 14:35:10)                   Impression:      Normal chest.      Electronically signed by: Elisha Pantoja  Date:    10/22/2024  Time:    14:35               Narrative:    EXAMINATION:  XR CHEST PA AND LATERAL    CLINICAL HISTORY:  Chest Pain;    FINDINGS:  PA and lateral chest 09/10/2024 shows normal cardiomediastinal silhouette. Prior median sternotomy.    Lungs are clear. Pulmonary vasculature is normal. No acute osseous abnormality.                                       Medications - No data to display  Medical Decision Making  74-year-old female presents for evaluation of chest pain.    Initial differential diagnosis included but not limited to acute MI, chest wall pain, and chest injury.    Amount and/or Complexity of Data Reviewed  Labs: ordered.  Radiology: ordered.  ECG/medicine tests: ordered.    Risk  Risk Details: The patient was emergently evaluated in the emergency department, her evaluation was significant for an elderly female with a normal lung exam noted.  The patient's EKG showed no acute abnormalities per my independent interpretation.  The patient's chest x-ray showed no acute abnormalities per Radiology.  The patient's labs were significant for mildly elevated troponins however there was no increased elevation and they are flat.  The patient may have a chest wall strain status post her near fall 3 days ago.  The patient is stable for discharge to home and does not require further care or workup at this time.  She is referred to primary care for follow-up.                                       Clinical Impression:  Final diagnoses:  [R07.9] Chest pain  [R07.89] Atypical chest pain (Primary)          ED Disposition Condition    Discharge Stable          ED Prescriptions    None       Follow-up Information       Follow up With Specialties Details Why Contact Info    Shawna Costello MD Family Medicine Schedule an appointment as soon as possible for a visit   [address] [facility] [address]  [phone]               Jon aButista MD  10/22/24 [id number]

## 2024-10-22 NOTE — TELEPHONE ENCOUNTER
JAG Hung      Heart Cabg in aug.  Went to a parade on 10/16 Fell onto ground from sitting in chair.  Chest is hurting,   not hurting now. Went down on both knees and hands.which are sore    Chest pressure rates at 1/10  in between breast.non radiating    Care dispose go to the ED.Patient VU  Going to ED now.in [place].     Reason for Disposition   Major surgery in the past month    Additional Information   Negative: SEVERE difficulty breathing (e.g., struggling for each breath, speaks in single words)   Negative: Difficult to awaken or acting confused (e.g., disoriented, slurred speech)   Negative: Shock suspected (e.g., cold/pale/clammy skin, too weak to stand, low BP, rapid pulse)   Negative: Passed out (e.g., fainted, lost consciousness, blacked out and was not responding)   Negative: Chest pain lasting longer than 5 minutes and over 44 years old   Negative: Chest pain lasting longer than 5 minutes, over 30 years old, and at least one cardiac risk factor (e.g., diabetes mellitus, high blood pressure, high cholesterol, obesity with BMI 30 or higher, smoker, or strong family history of heart disease)   Negative: Chest pain lasting longer than 5 minutes and history of heart disease (i.e., angina, heart attack, heart failure, bypass surgery, takes nitroglycerin)   Negative: Chest pain lasting longer than 5 minutes and pain is crushing, pressure-like, or heavy   Negative: Heart beating < 50 beats per minute OR > 140 beats per minute   Negative: Visible sweat on face or sweat dripping down face   Negative: Sounds like a life-threatening emergency to the triager   Negative: SEVERE chest pain   Negative: Pain also in shoulder(s) or arm(s) or jaw   Negative: Difficulty breathing   Negative: Cocaine use within last 3 days    Protocols used: Chest Pain-A-[place]

## 2024-10-25 ENCOUNTER — OFFICE VISIT (OUTPATIENT)
Dept: FAMILY MEDICINE | Facility: CLINIC | Age: 74
End: 2024-10-25
Payer: MEDICARE

## 2024-10-25 VITALS
OXYGEN SATURATION: 97 % | DIASTOLIC BLOOD PRESSURE: 74 MMHG | WEIGHT: 188.25 LBS | BODY MASS INDEX: 29.55 KG/M2 | SYSTOLIC BLOOD PRESSURE: 130 MMHG | TEMPERATURE: 98 F | RESPIRATION RATE: 16 BRPM | HEIGHT: 67 IN | HEART RATE: 83 BPM

## 2024-10-25 DIAGNOSIS — I21.4 NSTEMI (NON-ST ELEVATED MYOCARDIAL INFARCTION): ICD-10-CM

## 2024-10-25 DIAGNOSIS — Z91.81 HISTORY OF RECENT FALL: ICD-10-CM

## 2024-10-25 DIAGNOSIS — E11.65 UNCONTROLLED TYPE 2 DIABETES MELLITUS WITH HYPERGLYCEMIA: Primary | ICD-10-CM

## 2024-10-25 DIAGNOSIS — E78.5 HYPERLIPIDEMIA ASSOCIATED WITH TYPE 2 DIABETES MELLITUS: ICD-10-CM

## 2024-10-25 DIAGNOSIS — N18.32 STAGE 3B CHRONIC KIDNEY DISEASE: ICD-10-CM

## 2024-10-25 DIAGNOSIS — E11.69 HYPERLIPIDEMIA ASSOCIATED WITH TYPE 2 DIABETES MELLITUS: ICD-10-CM

## 2024-10-25 PROCEDURE — 99214 OFFICE O/P EST MOD 30 MIN: CPT | Mod: PBBFAC,PO

## 2024-10-25 PROCEDURE — 99999 PR PBB SHADOW E&M-EST. PATIENT-LVL IV: CPT | Mod: PBBFAC,,,

## 2024-10-25 NOTE — PROGRESS NOTES
Subjective:       Patient ID:  [id number]     Chief Complaint: Follow-up      History of Present Illness      Ms. Brown presents today for follow-up after an emergency room visit on 10/22/2024. She reports falling forward onto her hands and knees last Saturday while attempting to stand up from a lawn chair at a parade. She denies immediate pain or noticeable injury and was able to walk approximately one mile back to her car without difficulty. However, in the following days, she developed discomfort and felt something was different, prompting her to seek medical evaluation.     She has a history of NSTEMI in August. . She notes her current troponin levels are slightly elevated. She has an upcoming appt with cardiology. She denies current chest pain or shortness of breath.     Her most recent A1C was 10.2 a month ago. She reports a recent endocrinology visit with medication changes. She continues Toujeo 70 units and was prescribed Ozempic, which she has not started due to cost concerns.   She was also prescribed prandin 2mg with meals but has only taken it once. She reports a blood sugar reading of 108 today and believes there has been an overall decline in her levels. She denies any low blood sugar episodes.   No other concern  Past Medical History:   Diagnosis Date    Allergy     Anxiety     Depression     Diabetes mellitus, type 2     Hyperlipidemia     Hypertension     Personal history of colonic polyps       Active Problem List with Overview Notes    Diagnosis Date Noted    S/P CABG (coronary artery bypass graft) 10/02/2024    A-fib 09/18/2024    Hematoma 09/18/2024    UTI due to extended-spectrum beta lactamase (ESBL) producing Escherichia coli 09/02/2024    Stage 3b chronic kidney disease 08/30/2024    NSTEMI (non-ST elevated myocardial infarction) 08/30/2024    Calcification of aorta 05/14/2024    Anterolisthesis of lumbar spine 09/06/2023    Compression fracture of L1 lumbar vertebra 09/06/2023    Compression  fracture of L2 lumbar vertebra 09/06/2023    Panic attacks 07/07/2023    Low back pain 05/12/2023    Stage 3a chronic kidney disease 04/06/2023    SOB (shortness of breath) 09/06/2022    Benign familial tremor 08/24/2022    Hyperuricemia 07/28/2022    Macroalbuminuric diabetic nephropathy 07/16/2022    COVID-19 long hauler manifesting chronic concentration deficit 12/19/2021    Balance problem 12/19/2021    Memory loss 12/19/2021    Pulmonary nodule 03/31/2021    Type 2 diabetes mellitus with microalbuminuria, with long-term current use of insulin 01/19/2021    LVH (left ventricular hypertrophy) 12/09/2020    Aortic valve stenosis 12/09/2020    BMI 29.0-29.9,adult 08/01/2019    Cataracts, bilateral 06/19/2019    Benign essential tremor 11/29/2018    BMI 33.0-33.9,adult 11/29/2018    Insomnia 07/05/2018    Depression, recurrent     S/P left rotator cuff repair 03/15/2018    Anxiety 03/15/2018    Attention deficit disorder 03/15/2018    Hypertension associated with diabetes 03/15/2018    Hyperlipidemia 03/15/2018      Review of patient's allergies indicates:   Allergen Reactions    Bactrim [sulfamethoxazole-trimethoprim]     Codeine     Levaquin [levofloxacin]     Pcn [penicillins]          Current Outpatient Medications:     ALPRAZolam (XANAX) 0.5 MG tablet, Take 1 tablet (0.5 mg total) by mouth 2 (two) times daily. as needed for anxiety., Disp: 30 tablet, Rfl: 1    atorvastatin (LIPITOR) 80 MG tablet, Take 1 tablet (80 mg total) by mouth once daily., Disp: 90 tablet, Rfl: 3    clopidogreL (PLAVIX) 75 mg tablet, Take 1 tablet (75 mg total) by mouth once daily., Disp: 90 tablet, Rfl: 3    insulin glargine, TOUJEO, (TOUJEO SOLOSTAR U-300 INSULIN) 300 unit/mL (1.5 mL) InPn pen, Inject 70 Units into the skin once daily., Disp: 21 mL, Rfl: 3    metoprolol succinate (TOPROL-XL) 50 MG 24 hr tablet, Take 1 tablet (50 mg total) by mouth once daily., Disp: 90 tablet, Rfl: 3    pen needle, diabetic (BD JERI 2ND GEN PEN NEEDLE)  "32 gauge x 5/32" Ndle, USE AS DIRECTED, Disp: 100 each, Rfl: 5    repaglinide (PRANDIN) 2 MG tablet, Take 1 tablet (2 mg total) by mouth 3 (three) times daily before meals., Disp: 270 tablet, Rfl: 3    semaglutide (OZEMPIC) 0.25 mg or 0.5 mg (2 mg/3 mL) pen injector, Inject 0.25 mg into the skin every 7 days., Disp: 9 mL, Rfl: 3    TRUE METRIX GLUCOSE METER Misc, , Disp: , Rfl:     TRUE METRIX GLUCOSE TEST STRIP Strp, U QAM, Disp: 100 each, Rfl: 5    TRUEPLUS LANCETS 30 gauge Misc, U QAM, Disp: 100 each, Rfl: 5    amiodarone (PACERONE) 200 MG Tab, Take 1 tablet (200 mg total) by mouth 3 (three) times daily. (Patient not taking: Reported on 10/10/2024), Disp: 90 tablet, Rfl: 0    aspirin 81 MG Chew, Take 1 tablet (81 mg total) by mouth once daily., Disp: 30 tablet, Rfl: 0    HYDROcodone-acetaminophen (NORCO) 5-325 mg per tablet, Take 1 tablet by mouth every 4 (four) hours as needed for Pain (prn post-op pain). (Patient not taking: Reported on 10/25/2024), Disp: 20 tablet, Rfl: 0    Lab Results   Component Value Date    WBC 7.92 10/22/2024    HGB 12.2 10/22/2024    HCT 38.4 10/22/2024     10/22/2024    CHOL 164 08/30/2024    TRIG 184 (H) 08/30/2024    HDL 35 (L) 08/30/2024    ALT 12 10/22/2024    AST 10 10/22/2024     10/22/2024    K 4.2 10/22/2024     10/22/2024    CREATININE 1.0 10/22/2024    BUN 21 10/22/2024    CO2 20 (L) 10/22/2024    TSH 2.556 09/01/2024    INR 1.0 08/30/2024    HGBA1C 10.2 (H) 08/30/2024    MICROALBUR 0.9 01/09/2020       Review of Systems   Constitutional:  Negative for fatigue and fever.   HENT: Negative.  Negative for congestion, sneezing and sore throat.    Eyes: Negative.    Respiratory:  Negative for cough, shortness of breath and wheezing.    Cardiovascular:  Negative for chest pain, palpitations and leg swelling.   Gastrointestinal:  Negative for abdominal pain, nausea and vomiting.   Genitourinary: Negative.    Musculoskeletal: Negative.  Negative for arthralgias. "   Skin: Negative.  Negative for rash.   Neurological:  Negative for dizziness, weakness, light-headedness, numbness and headaches.   Hematological: Negative.    Psychiatric/Behavioral: Negative.     All other systems reviewed and are negative.      Objective:      Physical Exam  Constitutional:       Appearance: Normal appearance.   HENT:      Head: Normocephalic and atraumatic.      Nose: Nose normal.   Eyes:      Extraocular Movements: Extraocular movements intact.   Cardiovascular:      Rate and Rhythm: Normal rate and regular rhythm.   Pulmonary:      Effort: Pulmonary effort is normal.      Breath sounds: Normal breath sounds.   Musculoskeletal:         General: Normal range of motion.      Cervical back: Normal range of motion.   Skin:     General: Skin is warm and dry.   Neurological:      General: No focal deficit present.      Mental Status: She is alert and oriented to person, place, and time.   Psychiatric:         Mood and Affect: Mood normal.         Assessment:       1. Uncontrolled type 2 diabetes mellitus with hyperglycemia    2. Stage 3b chronic kidney disease    3. Hyperlipidemia associated with type 2 diabetes mellitus    4. NSTEMI (non-ST elevated myocardial infarction)    5. History of recent fall        Plan:       Debby was seen today for follow-up.    Diagnoses and all orders for this visit:    Uncontrolled type 2 diabetes mellitus with hyperglycemia  - Most recent A1C: 10.2 8/2024  - CMP : UTD 59.5 9/2024  - Micro: UTD 7/2024  - Continue current regimen   - discussed medication compliance   - yearly eye exam recommended  - LDL goal <70  - discussed importance of frequent foot exam     - MsRomeo Brown to eat regular meals to help control blood sugar.  - MsRomeo Brown to keep medication in a visible place (e.g., dining room table) as a reminder to take with meals.  - MsRomeo Brown to monitor for blood sugar below 70 when starting new medication regimen.      Stage 3b chronic kidney disease  - avoid  nephrotoxic drugs     Hyperlipidemia associated with type 2 diabetes mellitus  - continue atorvastatin as prescribed  - low-fat diet and exercise    NSTEMI (non-ST elevated myocardial infarction)  - continue current regimen  - keep upcoming cardiology appointment     History of recent fall  Asymptomatic today           - Follow up in a few months with Dr. Costello.  - Follow up with cardiology in December as scheduled.  - Follow up for cardiac rehab orientation as scheduled.        Future Appointments       Date Provider Specialty Appt Notes    12/18/2024 Brenda Graham NP Cardiology 3 mo follow up    1/31/2025  Lab lab    2/7/2025 ASH Iverson PA-C Endocrinology 3 month dm               Portions of this note were dictated using voice recognition software and may contain dictation related errors in spelling / grammar / syntax not discovered on text review.     Radha Sharp PA-C

## 2024-10-29 ENCOUNTER — CLINICAL SUPPORT (OUTPATIENT)
Dept: CARDIAC REHAB | Facility: HOSPITAL | Age: 74
End: 2024-10-29
Payer: MEDICARE

## 2024-10-29 DIAGNOSIS — Z95.1 S/P CABG (CORONARY ARTERY BYPASS GRAFT): Primary | ICD-10-CM

## 2024-10-29 PROCEDURE — 93797 PHYS/QHP OP CAR RHAB WO ECG: CPT

## 2024-11-05 ENCOUNTER — DOCUMENT SCAN (OUTPATIENT)
Dept: HOME HEALTH SERVICES | Facility: HOSPITAL | Age: 74
End: 2024-11-05
Payer: MEDICARE

## 2024-11-11 ENCOUNTER — DOCUMENT SCAN (OUTPATIENT)
Dept: HOME HEALTH SERVICES | Facility: HOSPITAL | Age: 74
End: 2024-11-11
Payer: MEDICARE

## 2024-11-11 ENCOUNTER — CLINICAL SUPPORT (OUTPATIENT)
Dept: CARDIAC REHAB | Facility: HOSPITAL | Age: 74
End: 2024-11-11
Payer: MEDICARE

## 2024-11-11 DIAGNOSIS — Z95.1 S/P CABG (CORONARY ARTERY BYPASS GRAFT): Primary | ICD-10-CM

## 2024-11-11 PROCEDURE — 93798 PHYS/QHP OP CAR RHAB W/ECG: CPT

## 2024-11-13 ENCOUNTER — CLINICAL SUPPORT (OUTPATIENT)
Dept: CARDIAC REHAB | Facility: HOSPITAL | Age: 74
End: 2024-11-13
Payer: MEDICARE

## 2024-11-13 DIAGNOSIS — Z95.1 S/P CABG (CORONARY ARTERY BYPASS GRAFT): Primary | ICD-10-CM

## 2024-11-13 PROCEDURE — 93798 PHYS/QHP OP CAR RHAB W/ECG: CPT

## 2024-11-18 ENCOUNTER — CLINICAL SUPPORT (OUTPATIENT)
Dept: CARDIAC REHAB | Facility: HOSPITAL | Age: 74
End: 2024-11-18
Payer: MEDICARE

## 2024-11-18 DIAGNOSIS — Z95.1 S/P CABG (CORONARY ARTERY BYPASS GRAFT): Primary | ICD-10-CM

## 2024-11-18 PROCEDURE — 93798 PHYS/QHP OP CAR RHAB W/ECG: CPT

## 2024-11-20 ENCOUNTER — CLINICAL SUPPORT (OUTPATIENT)
Dept: CARDIAC REHAB | Facility: HOSPITAL | Age: 74
End: 2024-11-20
Payer: MEDICARE

## 2024-11-20 DIAGNOSIS — Z95.1 S/P CABG (CORONARY ARTERY BYPASS GRAFT): Primary | ICD-10-CM

## 2024-11-20 PROCEDURE — 93798 PHYS/QHP OP CAR RHAB W/ECG: CPT

## 2024-12-18 ENCOUNTER — TELEPHONE (OUTPATIENT)
Dept: CARDIOLOGY | Facility: CLINIC | Age: 74
End: 2024-12-18
Payer: MEDICARE

## 2024-12-18 NOTE — TELEPHONE ENCOUNTER
Sw the patient about her question as to why was her schedule slot for today canceled patient was informed that our dept did not cancel her visit however the slot that she was previous given was taken she was offered a new slot for 01/02/25 @ 1:00 pm patient accepted the slot she wanted to know how was someone able to remove her w/o dept permission she was informed that I would bring this to my supervisor for any solutions

## 2024-12-27 NOTE — PLAN OF CARE
Patient up to chair x 7 hours. Ambulated 250 ft with walker. IS encouraged. Patient in better spirits today.     Problem: Acute Coronary Syndrome  Goal: Normalized Cardiac Rhythm  Outcome: Progressing     Problem: Acute Coronary Syndrome  Goal: Optimal Adaptation to Illness  Outcome: Progressing      Secondary to underlying malignancy.  Hopefully with responsive disease will ameliorate somewhat

## 2025-01-02 ENCOUNTER — OFFICE VISIT (OUTPATIENT)
Dept: CARDIOLOGY | Facility: CLINIC | Age: 75
End: 2025-01-02
Payer: MEDICARE

## 2025-01-02 VITALS
OXYGEN SATURATION: 98 % | WEIGHT: 193.69 LBS | HEART RATE: 114 BPM | BODY MASS INDEX: 30.4 KG/M2 | HEIGHT: 67 IN | SYSTOLIC BLOOD PRESSURE: 142 MMHG | DIASTOLIC BLOOD PRESSURE: 80 MMHG

## 2025-01-02 DIAGNOSIS — F41.9 ANXIETY: ICD-10-CM

## 2025-01-02 DIAGNOSIS — I35.0 AORTIC VALVE STENOSIS, ETIOLOGY OF CARDIAC VALVE DISEASE UNSPECIFIED: ICD-10-CM

## 2025-01-02 DIAGNOSIS — E11.65 UNCONTROLLED TYPE 2 DIABETES MELLITUS WITH HYPERGLYCEMIA: ICD-10-CM

## 2025-01-02 DIAGNOSIS — R80.9 TYPE 2 DIABETES MELLITUS WITH MICROALBUMINURIA, WITH LONG-TERM CURRENT USE OF INSULIN: ICD-10-CM

## 2025-01-02 DIAGNOSIS — I48.91 ATRIAL FIBRILLATION, UNSPECIFIED TYPE: ICD-10-CM

## 2025-01-02 DIAGNOSIS — E78.2 MIXED HYPERLIPIDEMIA: ICD-10-CM

## 2025-01-02 DIAGNOSIS — E66.01 MORBID OBESITY: ICD-10-CM

## 2025-01-02 DIAGNOSIS — I15.2 HYPERTENSION ASSOCIATED WITH DIABETES: ICD-10-CM

## 2025-01-02 DIAGNOSIS — Z79.4 TYPE 2 DIABETES MELLITUS WITH MICROALBUMINURIA, WITH LONG-TERM CURRENT USE OF INSULIN: ICD-10-CM

## 2025-01-02 DIAGNOSIS — R07.9 CHEST PAIN, UNSPECIFIED TYPE: Primary | ICD-10-CM

## 2025-01-02 DIAGNOSIS — F41.0 PANIC ATTACKS: ICD-10-CM

## 2025-01-02 DIAGNOSIS — I70.0 CALCIFICATION OF AORTA: ICD-10-CM

## 2025-01-02 DIAGNOSIS — E11.59 HYPERTENSION ASSOCIATED WITH DIABETES: ICD-10-CM

## 2025-01-02 DIAGNOSIS — E11.29 TYPE 2 DIABETES MELLITUS WITH MICROALBUMINURIA, WITH LONG-TERM CURRENT USE OF INSULIN: ICD-10-CM

## 2025-01-02 DIAGNOSIS — I25.118 ATHEROSCLEROTIC HEART DISEASE OF NATIVE CORONARY ARTERY WITH OTHER FORMS OF ANGINA PECTORIS: ICD-10-CM

## 2025-01-02 DIAGNOSIS — Z95.1 S/P CABG (CORONARY ARTERY BYPASS GRAFT): ICD-10-CM

## 2025-01-02 PROCEDURE — 99214 OFFICE O/P EST MOD 30 MIN: CPT | Mod: S$PBB,,,

## 2025-01-02 PROCEDURE — 99999 PR PBB SHADOW E&M-EST. PATIENT-LVL III: CPT | Mod: PBBFAC,,,

## 2025-01-02 PROCEDURE — 99213 OFFICE O/P EST LOW 20 MIN: CPT | Mod: PBBFAC,PN

## 2025-01-02 RX ORDER — ALPRAZOLAM 0.5 MG/1
0.5 TABLET ORAL 2 TIMES DAILY
Qty: 180 TABLET | Refills: 0 | Status: SHIPPED | OUTPATIENT
Start: 2025-01-02

## 2025-01-02 RX ORDER — ATORVASTATIN CALCIUM 40 MG/1
40 TABLET, FILM COATED ORAL DAILY
Qty: 90 TABLET | Refills: 3 | Status: SHIPPED | OUTPATIENT
Start: 2025-01-02

## 2025-01-02 RX ORDER — CLOPIDOGREL BISULFATE 75 MG/1
75 TABLET ORAL DAILY
Qty: 90 TABLET | Refills: 3 | Status: SHIPPED | OUTPATIENT
Start: 2025-01-02 | End: 2026-01-02

## 2025-01-02 RX ORDER — EPINEPHRINE 0.22MG
100 AEROSOL WITH ADAPTER (ML) INHALATION DAILY
Qty: 90 CAPSULE | Refills: 3 | Status: SHIPPED | OUTPATIENT
Start: 2025-01-02 | End: 2026-01-02

## 2025-01-02 RX ORDER — TIRZEPATIDE 2.5 MG/.5ML
2.5 INJECTION, SOLUTION SUBCUTANEOUS
Qty: 6 PEN | Refills: 1 | Status: SHIPPED | OUTPATIENT
Start: 2025-01-02

## 2025-01-02 RX ORDER — METOPROLOL SUCCINATE 50 MG/1
50 TABLET, EXTENDED RELEASE ORAL DAILY
Qty: 90 TABLET | Refills: 3 | Status: SHIPPED | OUTPATIENT
Start: 2025-01-02

## 2025-01-02 RX ORDER — ATORVASTATIN CALCIUM 80 MG/1
80 TABLET, FILM COATED ORAL DAILY
Qty: 90 TABLET | Refills: 3 | Status: SHIPPED | OUTPATIENT
Start: 2025-01-02 | End: 2025-01-02

## 2025-01-02 NOTE — PROGRESS NOTES
Subjective:    Patient ID:  Debby Brown is a 74 y.o. female who presents for follow-up.   Chief Complaint   Patient presents with    Coronary Artery Disease    Chest Pain     Not sure if from healing from cabg or not. Also states some numbness is chest       HPI:  Ms. Brown is a 74-year-old female with past medical history of diabetes, hyperlipidemia, hypertension and CAD status post CABG September 2024.  She denies any shortness of breath on exertion however she does complain of some chest pain her left breast around where her surgical scar is that is reproducible with palpation.  She is also complaining of feeling achy all over and cramps in her legs.  She has not taken her metoprolol in the past few days because she ran out.  Also complains of chronic back pain.  She is also inquiring about weight loss medications.    Review of patient's allergies indicates:   Allergen Reactions    Bactrim [sulfamethoxazole-trimethoprim]     Codeine     Levaquin [levofloxacin]     Pcn [penicillins]        Past Medical History:   Diagnosis Date    Allergy     Anxiety     Depression     Diabetes mellitus, type 2     Hyperlipidemia     Hypertension     Personal history of colonic polyps      Past Surgical History:   Procedure Laterality Date    APPENDECTOMY      BACK SURGERY      BREAST SURGERY      CHOLECYSTECTOMY      COLONOSCOPY      COLONOSCOPY N/A 03/13/2024    Procedure: COLONOSCOPY;  Surgeon: Isabel Noriega MD;  Location: [facility] ENDO;  Service: Endoscopy;  Laterality: N/A;  ppm    CORONARY ANGIOGRAPHY N/A 8/30/2024    Procedure: ANGIOGRAM, CORONARY ARTERY;  Surgeon: Ashleigh Chambers MD;  Location: [facility] CATH/EP LAB;  Service: Cardiology;  Laterality: N/A;    CORONARY ARTERY BYPASS GRAFT (CABG) N/A 9/6/2024    Procedure: CORONARY ARTERY BYPASS GRAFT (CABG);  Surgeon: Ke Garcia MD;  Location: [facility] OR;  Service: Cardiothoracic;  Laterality: N/A;    ENDOSCOPIC HARVEST OF VEIN Left 9/6/2024    Procedure:  HARVEST-VEIN-ENDOVASCULAR;  Surgeon: Ke Garcia MD;  Location: [facility] OR;  Service: Cardiothoracic;  Laterality: Left;    GALLBLADDER SURGERY      HERNIA REPAIR      umbilical    lymphnode remove      SHOULDER SURGERY Left     RTR    SURGICAL PROCUREMENT, ARTERY, RADIAL, FOR CABG Left 9/6/2024    Procedure: SURGICAL PROCUREMENT,ARTERY,RADIAL,FOR CABG;  Surgeon: Ke Garcia MD;  Location: [facility] OR;  Service: Cardiothoracic;  Laterality: Left;    TOTAL REDUCTION MAMMOPLASTY Bilateral 2001    TUBAL LIGATION       Social History     Tobacco Use    Smoking status: Former     Passive exposure: Past    Smokeless tobacco: Never   Substance Use Topics    Alcohol use: Not Currently     Comment: occasionally    Drug use: No     Family History   Problem Relation Name Age of Onset    Mental illness Mother      Cancer Mother          female cancer?    Depression Mother      Heart disease Mother      Hypertension Mother      Stroke Mother      Heart disease Father      Hypertension Father      Stroke Father      Cancer Brother          bladder    Glaucoma Neg Hx      Macular degeneration Neg Hx          Review of Systems:   Constitution: Negative for diaphoresis and fever.   HEENT: Negative for nosebleeds.    Cardiovascular: Occasional for chest pain       No dyspnea on exertion       No leg swelling        Very occasional palpitations  Respiratory: Negative for shortness of breath and wheezing.    Hematologic/Lymphatic: Negative for bleeding problem. Does not bruise/bleed easily.   Skin: Negative for color change and rash.   Musculoskeletal: Negative for falls and myalgias.   Gastrointestinal: Negative for hematemesis and hematochezia.   Genitourinary: Negative for hematuria.   Neurological: Negative for dizziness and light-headedness.   Psychiatric/Behavioral: Negative for altered mental status and memory loss.          Objective:        Vitals:    01/02/25 1318   BP: (!) 142/80   Pulse: (!) 114       Lab  Results   Component Value Date    WBC 7.92 10/22/2024    HGB 12.2 10/22/2024    HCT 38.4 10/22/2024     10/22/2024    CHOL 164 08/30/2024    TRIG 184 (H) 08/30/2024    HDL 35 (L) 08/30/2024    ALT 12 10/22/2024    AST 10 10/22/2024     10/22/2024    K 4.2 10/22/2024     10/22/2024    CREATININE 1.0 10/22/2024    BUN 21 10/22/2024    CO2 20 (L) 10/22/2024    TSH 2.556 09/01/2024    INR 1.0 08/30/2024    HGBA1C 10.2 (H) 08/30/2024    MICROALBUR 0.9 01/09/2020        ECHOCARDIOGRAM RESULTS  Results for orders placed during the hospital encounter of 08/30/24    Echo    Interpretation Summary    Left Ventricle: The left ventricle is normal in size. Mildly increased wall thickness. There is mild concentric hypertrophy. There is normal systolic function with a visually estimated ejection fraction of 60 - 65%. There is diastolic dysfunction.    Right Ventricle: Normal right ventricular cavity size. Wall thickness is normal. Systolic function is normal.    Aortic Valve: The aortic valve is a trileaflet valve. There is moderate aortic valve sclerosis. Moderately restricted motion. There is moderate stenosis. Aortic valve area by VTI is 1.18 cm². Aortic valve peak velocity is 2.28 m/s. Mean gradient is 12 mmHg. The dimensionless index is 0.38.    Mitral Valve: There is mild bileaflet sclerosis.    Tricuspid Valve: There is mild regurgitation with a centrally directed jet.    Pulmonary Artery: The estimated pulmonary artery systolic pressure is 26 mmHg.    IVC/SVC: Normal venous pressure at 3 mmHg.        CURRENT/PREVIOUS VISIT EKG  Results for orders placed or performed during the hospital encounter of 10/22/24   EKG 12-lead    Collection Time: 10/22/24 12:00 AM    Narrative    Ordered by an unspecified provider.     No valid procedures specified.   Results for orders placed during the hospital encounter of 09/06/22    Nuclear Stress - Cardiology Interpreted    Interpretation Summary    Normal myocardial  "perfusion scan. There is no evidence of myocardial ischemia or infarction. Small inf lateral hypoperfusion with reperfusion does not make clinical significance. Clinical coorelation req.    The gated perfusion images showed an ejection fraction of 81% post stress. Normal ejection fraction is greater than 47%.[id number]    There is normal wall motion at rest and post stress.    LV cavity size is normal at rest.    The EKG portion of this study is negative for ischemia.    The patient reported no chest pain during the stress test.      Physical Exam:  CONSTITUTIONAL: No fever, no chills  HEENT: Normocephalic, atraumatic,pupils reactive to light                 NECK:  No JVD no carotid bruit  CVS: S1S2+, RRR, no murmurs, midsternal incision healing nicely clean dry open to air  LUNGS: Clear  ABDOMEN: Soft, NT, BS+  EXTREMITIES: No cyanosis, edema  : No mckeon catheter  NEURO: AAO X 3  PSY: Normal affect      Medication List with Changes/Refills   New Medications    COENZYME Q10 100 MG CAPSULE    Take 1 capsule (100 mg total) by mouth once daily.    OMEGA-3 FATTY ACIDS-FISH -1,000 MG CAP    Take 1 capsule by mouth once daily.    TIRZEPATIDE (MOUNJARO) 2.5 MG/0.5 ML PNIJ    Inject 2.5 mg into the skin every 7 days.   Current Medications    ASPIRIN 81 MG CHEW    Take 1 tablet (81 mg total) by mouth once daily.    HYDROCODONE-ACETAMINOPHEN (NORCO) 5-325 MG PER TABLET    Take 1 tablet by mouth every 4 (four) hours as needed for Pain (prn post-op pain).    INSULIN GLARGINE, TOUJEO, (TOUJEO SOLOSTAR U-300 INSULIN) 300 UNIT/ML (1.5 ML) INPN PEN    Inject 70 Units into the skin once daily.    PEN NEEDLE, DIABETIC (BD JERI 2ND GEN PEN NEEDLE) 32 GAUGE X 5/32" NDLE    USE AS DIRECTED    REPAGLINIDE (PRANDIN) 2 MG TABLET    Take 1 tablet (2 mg total) by mouth 3 (three) times daily before meals.    TRUE METRIX GLUCOSE METER MISC        TRUE METRIX GLUCOSE TEST STRIP STRP    U QAM    TRUEPLUS LANCETS 30 GAUGE MISC    U QAM "   Changed and/or Refilled Medications    Modified Medication Previous Medication    ALPRAZOLAM (XANAX) 0.5 MG TABLET ALPRAZolam (XANAX) 0.5 MG tablet       Take 1 tablet (0.5 mg total) by mouth 2 (two) times daily. as needed for anxiety.    Take 1 tablet (0.5 mg total) by mouth 2 (two) times daily. as needed for anxiety.    ATORVASTATIN (LIPITOR) 40 MG TABLET atorvastatin (LIPITOR) 80 MG tablet       Take 1 tablet (40 mg total) by mouth once daily.    Take 1 tablet (80 mg total) by mouth once daily.    CLOPIDOGREL (PLAVIX) 75 MG TABLET clopidogreL (PLAVIX) 75 mg tablet       Take 1 tablet (75 mg total) by mouth once daily.    Take 1 tablet (75 mg total) by mouth once daily.    METOPROLOL SUCCINATE (TOPROL-XL) 50 MG 24 HR TABLET metoprolol succinate (TOPROL-XL) 50 MG 24 hr tablet       Take 1 tablet (50 mg total) by mouth once daily.    Take 1 tablet (50 mg total) by mouth once daily.   Discontinued Medications    AMIODARONE (PACERONE) 200 MG TAB    Take 1 tablet (200 mg total) by mouth 3 (three) times daily.    SEMAGLUTIDE (OZEMPIC) 0.25 MG OR 0.5 MG (2 MG/3 ML) PEN INJECTOR    Inject 0.25 mg into the skin every 7 days.             Assessment:       1. Chest pain, unspecified type    2. Panic attacks    3. Uncontrolled type 2 diabetes mellitus with hyperglycemia    4. Atherosclerotic heart disease of native coronary artery with other forms of angina pectoris    5. Atrial fibrillation, unspecified type    6. Calcification of aorta    7. Anxiety    8. Aortic valve stenosis, etiology of cardiac valve disease unspecified    9. Mixed hyperlipidemia    10. Hypertension associated with diabetes    11. S/P CABG (coronary artery bypass graft)    12. Morbid obesity    13. Type 2 diabetes mellitus with microalbuminuria, with long-term current use of insulin         Plan:     Problem List Items Addressed This Visit          Psychiatric    Anxiety    Current Assessment & Plan     She is currently on as needed Xanax.  Requesting  refill.         Panic attacks    Relevant Medications    ALPRAZolam (XANAX) 0.5 MG tablet       Cardiac/Vascular    Hypertension associated with diabetes    Current Assessment & Plan     Blood pressure mildly elevated today in clinic at 142/80 however patient has ran out of some of her medication.  Continue current regimen of metoprolol 50 mg daily, encouraged to keep BP log at home and bring back results.  Encouraged low-sodium diet.         Hyperlipidemia    Current Assessment & Plan     Last lipid panel:       Latest Reference Range & Units Most Recent   Cholesterol Total 120 - 199 mg/dL 164  8/30/24 20:04   HDL 40 - 75 mg/dL 35 (L)  8/30/24 20:04   HDL/Cholesterol Ratio 20.0 - 50.0 % 21.3  8/30/24 20:04   Non HDL Chol. (LDL+VLDL) <130 mg/dL (calc) 274 (H)  1/9/20 11:11   Non-HDL Cholesterol mg/dL 129  8/30/24 20:04   Total Cholesterol/HDL Ratio 2.0 - 5.0  4.7  8/30/24 20:04   Triglycerides 30 - 150 mg/dL 184 (H)  8/30/24 20:04   LDL Cholesterol 63.0 - 159.0 mg/dL 92.2  8/30/24 20:04   (L): Data is abnormally low  (H): Data is abnormally high     Due to body aches and cramping legs, we will reduce Lipitor to 40 mg nightly and add Co Q10 supplements.  Encouraged heart healthy low-fat low-cholesterol diet and exercise as tolerated.  Add Omega 3 fatty fish oil supplements.         Aortic valve stenosis    Current Assessment & Plan     Most recent echo 08/30/2024 showed moderate stenosis.  Stable, asymptomatic.  Continue to monitor         Calcification of aorta    A-fib    Current Assessment & Plan     Did have some postop AFib while in the hospital last fall.  Amiodarone has since been DC.  We did an outpatient Zio monitor to assess AFib burden which occurred 1 time.    Not currently on Eliquis, continue with aspirin and Plavix.         S/P CABG (coronary artery bypass graft)    Current Assessment & Plan     Overall doing well.  Surgical sites healing nicely.  Continue with aspirin, statin, Plavix and beta  blocker.         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  EKG was obtained, showed sinus tach with a rate of 109 however she has been out of her beta blocker for 4 days now.    The chest pain is reproducible with palpation, likely musculoskeletal related.         Relevant Orders    IN OFFICE EKG 12-LEAD (to Muse)       Endocrine    Type 2 diabetes mellitus with microalbuminuria, with long-term current use of insulin    Current Assessment & Plan     Last A1c   Latest Reference Range & Units 08/30/24 20:04   Hemoglobin A1C External 4.5 - 6.2 % 10.2 (H)   (H): Data is abnormally high    Start Mounjaro weekly.          Morbid obesity    Current Assessment & Plan     Body mass index is 30.33 kg/m². Morbid obesity complicates all aspects of disease management from diagnostic modalities to treatment. Weight loss encouraged and health benefits explained to patient.             Other Visit Diagnoses       Uncontrolled type 2 diabetes mellitus with hyperglycemia        Relevant Medications    atorvastatin (LIPITOR) 40 MG tablet            Follow up in about 3 months (around 4/2/2025).

## 2025-01-07 ENCOUNTER — TELEPHONE (OUTPATIENT)
Dept: FAMILY MEDICINE | Facility: CLINIC | Age: 75
End: 2025-01-07
Payer: MEDICARE

## 2025-01-07 ENCOUNTER — PATIENT OUTREACH (OUTPATIENT)
Dept: ADMINISTRATIVE | Facility: HOSPITAL | Age: 75
End: 2025-01-07
Payer: MEDICARE

## 2025-01-07 PROBLEM — E66.01 MORBID OBESITY: Status: ACTIVE | Noted: 2025-01-07

## 2025-01-07 PROBLEM — R07.9 CHEST PAIN: Status: ACTIVE | Noted: 2025-01-07

## 2025-01-07 NOTE — PROGRESS NOTES
Care Coordination Encounter Details:       MyChart Portal Status:         [x]  Reviewed MyChart Portal Status offered / enrolled if applicable        Additional Notes:     MyChart Outcomes: Pt is enrolled & active          Updates Requested / Reviewed:        Updated Care Coordination Note, Care Everywhere, , External Sources: [facility] and [facility], and Immunizations Reconciliation Completed or Queried: [place]         Health Maintenance Screening(s) Due:      Health Maintenance Topics Overdue:      VBHM Score: 1     Uncontrolled BP    Influenza Vaccine  Shingles/Zoster Vaccine  RSV Vaccine                  Health Maintenance Topic(s) Outreach Outcomes & Actions Taken:    Lab(s) - Outreach Outcomes & Actions Taken  : already scheduled    Breast Cancer Screening - Outreach Outcomes & Actions Taken  : patient informed due. Was not scheduled during this call.    Medication Adherence / Statins - Outreach Outcomes & Actions Taken  : patient reports taking medications as prescribed    Patient to send in a BP reading via portal as states have not checked BP recently.    Per patient's request, message to Dr. Costello staff regarding calling patient about patient having lab work added to her 1/31/25 lab appointment.    Chronic Disease Management:     Diabetes Measures        Lab Results   Component Value Date    LABA1C 9.2 (H) 07/03/2018    HGBA1C 10.2 (H) 08/30/2024           [x]  Reviewed chart for active Diabetes diagnosis     []  Scheduled necessary follow up appointments if needed               Hypertension Measures        BP Readings from Last 1 Encounters:   01/02/25 (!) 142/80           [x]  Reviewed chart for active Hypertension diagnosis     []  Reviewed & documented Home BP Cuff     []  Documented a Remote BP if needed & applicable     []  Scheduled necessary follow up appointments with Primary Care if needed            Provider Team Continuity:     Last PCP Visit Date: 8/30/2024          [x]  Reviewed  Primary Care Provider Visits, Annual Wellness Visit, and Future          Appointments to ensure appointments have been scheduled and/or           completed            Social Determinants of Health          [x]  Reviewed, completed, and/or updated the following sections:                  Food Insecurity, Transportation Needs, Financial Resource Strain,                 Tobacco Use        Additional Notes:    Reviewed within past 6 months         Care Management, Digital Medicine, and/or Education Referrals    OPCM Risk Score: 63.6         Next Steps - Referral Actions: No referral placed

## 2025-01-07 NOTE — ASSESSMENT & PLAN NOTE
Blood pressure mildly elevated today in clinic at 142/80 however patient has ran out of some of her medication.  Continue current regimen of metoprolol 50 mg daily, encouraged to keep BP log at home and bring back results.  Encouraged low-sodium diet.

## 2025-01-07 NOTE — TELEPHONE ENCOUNTER
----- Message from Cesar Wolf sent at 1/7/2025  2:37 PM CST -----  Regarding: Labs  Hi, I spoke to Ms. Guardado. She asked if someone would call her regarding lab work she would like added to her 01/31/2025.     Thank you. Cesar

## 2025-01-07 NOTE — ASSESSMENT & PLAN NOTE
Overall doing well.  Surgical sites healing nicely.  Continue with aspirin, statin, Plavix and beta blocker.

## 2025-01-07 NOTE — ASSESSMENT & PLAN NOTE
Patient did admit to some mild chest soreness during visit today.  EKG was obtained, showed sinus tach with a rate of 109 however she has been out of her beta blocker for 4 days now.    The chest pain is reproducible with palpation, likely musculoskeletal related.

## 2025-01-07 NOTE — ASSESSMENT & PLAN NOTE
Last A1c   Latest Reference Range & Units 08/30/24 20:04   Hemoglobin A1C External 4.5 - 6.2 % 10.2 (H)   (H): Data is abnormally high    Start Mounjaro weekly.

## 2025-01-07 NOTE — ASSESSMENT & PLAN NOTE
Did have some postop AFib while in the hospital last fall.  Amiodarone has since been DC.  We did an outpatient Zio monitor to assess AFib burden which occurred 1 time.    Not currently on Eliquis, continue with aspirin and Plavix.

## 2025-01-07 NOTE — ASSESSMENT & PLAN NOTE
Last lipid panel:       Latest Reference Range & Units Most Recent   Cholesterol Total 120 - 199 mg/dL 164  8/30/24 20:04   HDL 40 - 75 mg/dL 35 (L)  8/30/24 20:04   HDL/Cholesterol Ratio 20.0 - 50.0 % 21.3  8/30/24 20:04   Non HDL Chol. (LDL+VLDL) <130 mg/dL (calc) 274 (H)  1/9/20 11:11   Non-HDL Cholesterol mg/dL 129  8/30/24 20:04   Total Cholesterol/HDL Ratio 2.0 - 5.0  4.7  8/30/24 20:04   Triglycerides 30 - 150 mg/dL 184 (H)  8/30/24 20:04   LDL Cholesterol 63.0 - 159.0 mg/dL 92.2  8/30/24 20:04   (L): Data is abnormally low  (H): Data is abnormally high     Due to body aches and cramping legs, we will reduce Lipitor to 40 mg nightly and add Co Q10 supplements.  Encouraged heart healthy low-fat low-cholesterol diet and exercise as tolerated.  Add Omega 3 fatty fish oil supplements.

## 2025-01-07 NOTE — ASSESSMENT & PLAN NOTE
Body mass index is 30.33 kg/m². Morbid obesity complicates all aspects of disease management from diagnostic modalities to treatment. Weight loss encouraged and health benefits explained to patient.

## 2025-01-09 LAB
OHS QRS DURATION: 98 MS
OHS QTC CALCULATION: 476 MS

## 2025-01-27 ENCOUNTER — TELEPHONE (OUTPATIENT)
Dept: ENDOCRINOLOGY | Facility: CLINIC | Age: 75
End: 2025-01-27
Payer: MEDICARE

## 2025-02-05 ENCOUNTER — HOSPITAL ENCOUNTER (OUTPATIENT)
Dept: RADIOLOGY | Facility: HOSPITAL | Age: 75
Discharge: HOME | End: 2025-02-05
Attending: PHYSICIAN ASSISTANT
Payer: MEDICARE

## 2025-02-05 ENCOUNTER — TELEPHONE (OUTPATIENT)
Dept: FAMILY MEDICINE | Facility: CLINIC | Age: 75
End: 2025-02-05
Payer: MEDICARE

## 2025-02-05 ENCOUNTER — HOSPITAL ENCOUNTER (OUTPATIENT)
Dept: RADIOLOGY | Facility: HOSPITAL | Age: 75
Discharge: HOME | End: 2025-02-05
Attending: STUDENT IN AN ORGANIZED HEALTH CARE EDUCATION/TRAINING PROGRAM
Payer: MEDICARE

## 2025-02-05 DIAGNOSIS — R79.89 POSITIVE D DIMER: ICD-10-CM

## 2025-02-05 DIAGNOSIS — R07.9 CHEST PAIN, UNSPECIFIED TYPE: ICD-10-CM

## 2025-02-05 DIAGNOSIS — R79.89 POSITIVE D DIMER: Primary | ICD-10-CM

## 2025-02-05 DIAGNOSIS — R60.0 LOCALIZED EDEMA: ICD-10-CM

## 2025-02-05 PROCEDURE — 71275 CT ANGIOGRAPHY CHEST: CPT | Mod: TC

## 2025-02-05 PROCEDURE — 93970 EXTREMITY STUDY: CPT | Mod: TC

## 2025-02-05 PROCEDURE — 71275 CT ANGIOGRAPHY CHEST: CPT | Mod: 26,,, | Performed by: RADIOLOGY

## 2025-02-05 PROCEDURE — 93970 EXTREMITY STUDY: CPT | Mod: 26,,, | Performed by: RADIOLOGY

## 2025-02-05 PROCEDURE — 25500020 PHARM REV CODE 255: Performed by: PHYSICIAN ASSISTANT

## 2025-02-05 RX ADMIN — IOHEXOL 100 ML: 350 INJECTION, SOLUTION INTRAVENOUS at 04:02

## 2025-02-05 NOTE — TELEPHONE ENCOUNTER
On call note: Notified of positive D-dimer. Unclear why this was drawn, I think it was an old order. She is at her baseline respiratory status. Does have some leg swelling with some pains.     Renal function borderline.     Decided to do US stat today of BLE. Will hold on CTA given absence of obvious symptoms.

## 2025-02-05 NOTE — TELEPHONE ENCOUNTER
Spoke with patient notifed her a stat CT and Ultrasound was ordered for positive D-Dimer. Patient states she is picking up her granddaughter from school then she will got to [facility] to have this done.

## 2025-02-05 NOTE — TELEPHONE ENCOUNTER
----- Message from Elif sent at 2/5/2025  2:38 PM CST -----  Contact: francisca barreto/[facility] hematology  Type: Needs Medical Advice  Who Called:  francisca barreto/[facility] hematology  Symptoms (please be specific):  crital labs  How long has patient had these symptoms:  n/a  Pharmacy name and phone #:  n/a  Best Call Back Number: ext [phone] phone [phone]  Additional Information: please call

## 2025-02-06 ENCOUNTER — OFFICE VISIT (OUTPATIENT)
Dept: FAMILY MEDICINE | Facility: CLINIC | Age: 75
End: 2025-02-06
Payer: MEDICARE

## 2025-02-06 ENCOUNTER — LAB VISIT (OUTPATIENT)
Dept: LAB | Facility: HOSPITAL | Age: 75
End: 2025-02-06
Attending: STUDENT IN AN ORGANIZED HEALTH CARE EDUCATION/TRAINING PROGRAM
Payer: MEDICARE

## 2025-02-06 VITALS
WEIGHT: 198.19 LBS | HEIGHT: 67 IN | SYSTOLIC BLOOD PRESSURE: 110 MMHG | BODY MASS INDEX: 31.11 KG/M2 | DIASTOLIC BLOOD PRESSURE: 80 MMHG | HEART RATE: 71 BPM | OXYGEN SATURATION: 96 %

## 2025-02-06 DIAGNOSIS — I35.0 AORTIC VALVE STENOSIS, ETIOLOGY OF CARDIAC VALVE DISEASE UNSPECIFIED: ICD-10-CM

## 2025-02-06 DIAGNOSIS — E78.5 HYPERLIPIDEMIA ASSOCIATED WITH TYPE 2 DIABETES MELLITUS: ICD-10-CM

## 2025-02-06 DIAGNOSIS — I15.2 HYPERTENSION ASSOCIATED WITH DIABETES: ICD-10-CM

## 2025-02-06 DIAGNOSIS — E11.69 HYPERLIPIDEMIA ASSOCIATED WITH TYPE 2 DIABETES MELLITUS: ICD-10-CM

## 2025-02-06 DIAGNOSIS — E11.59 HYPERTENSION ASSOCIATED WITH DIABETES: ICD-10-CM

## 2025-02-06 DIAGNOSIS — R53.83 LOW ENERGY: ICD-10-CM

## 2025-02-06 DIAGNOSIS — E46 PROTEIN-CALORIE MALNUTRITION, UNSPECIFIED SEVERITY: ICD-10-CM

## 2025-02-06 DIAGNOSIS — I70.0 AORTIC ATHEROSCLEROSIS: ICD-10-CM

## 2025-02-06 DIAGNOSIS — Z95.1 S/P CABG (CORONARY ARTERY BYPASS GRAFT): ICD-10-CM

## 2025-02-06 DIAGNOSIS — Z71.1 CONCERN ABOUT MEMORY: ICD-10-CM

## 2025-02-06 DIAGNOSIS — I48.91 ATRIAL FIBRILLATION, UNSPECIFIED TYPE: ICD-10-CM

## 2025-02-06 DIAGNOSIS — E11.65 UNCONTROLLED TYPE 2 DIABETES MELLITUS WITH HYPERGLYCEMIA: ICD-10-CM

## 2025-02-06 DIAGNOSIS — F41.9 ANXIETY: ICD-10-CM

## 2025-02-06 DIAGNOSIS — Z12.31 ENCOUNTER FOR SCREENING MAMMOGRAM FOR MALIGNANT NEOPLASM OF BREAST: ICD-10-CM

## 2025-02-06 DIAGNOSIS — F33.9 DEPRESSION, RECURRENT: ICD-10-CM

## 2025-02-06 DIAGNOSIS — N18.32 STAGE 3B CHRONIC KIDNEY DISEASE: ICD-10-CM

## 2025-02-06 DIAGNOSIS — E11.65 UNCONTROLLED TYPE 2 DIABETES MELLITUS WITH HYPERGLYCEMIA: Primary | ICD-10-CM

## 2025-02-06 DIAGNOSIS — I70.0 CALCIFICATION OF AORTA: ICD-10-CM

## 2025-02-06 LAB
25(OH)D3+25(OH)D2 SERPL-MCNC: 20 NG/ML (ref 30–96)
CHOLEST SERPL-MCNC: 162 MG/DL (ref 120–199)
CHOLEST/HDLC SERPL: 3.9 {RATIO} (ref 2–5)
ESTIMATED AVG GLUCOSE: 272 MG/DL (ref 68–131)
FOLATE SERPL-MCNC: 7.9 NG/ML (ref 4–24)
HBA1C MFR BLD: 11.1 % (ref 4–5.6)
HDLC SERPL-MCNC: 42 MG/DL (ref 40–75)
HDLC SERPL: 25.9 % (ref 20–50)
HIV 1+2 AB+HIV1 P24 AG SERPL QL IA: NORMAL
IRON SERPL-MCNC: 77 UG/DL (ref 30–160)
LDLC SERPL CALC-MCNC: 81.2 MG/DL (ref 63–159)
NONHDLC SERPL-MCNC: 120 MG/DL
SATURATED IRON: 19 % (ref 20–50)
T4 FREE SERPL-MCNC: 1.04 NG/DL (ref 0.71–1.51)
TOTAL IRON BINDING CAPACITY: 406 UG/DL (ref 250–450)
TRANSFERRIN SERPL-MCNC: 274 MG/DL (ref 200–375)
TRIGL SERPL-MCNC: 194 MG/DL (ref 30–150)
TSH SERPL DL<=0.005 MIU/L-ACNC: 1.68 UIU/ML (ref 0.4–4)
VIT B12 SERPL-MCNC: 773 PG/ML (ref 210–950)

## 2025-02-06 PROCEDURE — 99214 OFFICE O/P EST MOD 30 MIN: CPT | Mod: PBBFAC,PO | Performed by: STUDENT IN AN ORGANIZED HEALTH CARE EDUCATION/TRAINING PROGRAM

## 2025-02-06 PROCEDURE — 84439 ASSAY OF FREE THYROXINE: CPT | Performed by: STUDENT IN AN ORGANIZED HEALTH CARE EDUCATION/TRAINING PROGRAM

## 2025-02-06 PROCEDURE — 83036 HEMOGLOBIN GLYCOSYLATED A1C: CPT | Performed by: STUDENT IN AN ORGANIZED HEALTH CARE EDUCATION/TRAINING PROGRAM

## 2025-02-06 PROCEDURE — G2211 COMPLEX E/M VISIT ADD ON: HCPCS | Mod: S$PBB,,, | Performed by: STUDENT IN AN ORGANIZED HEALTH CARE EDUCATION/TRAINING PROGRAM

## 2025-02-06 PROCEDURE — 84466 ASSAY OF TRANSFERRIN: CPT | Performed by: STUDENT IN AN ORGANIZED HEALTH CARE EDUCATION/TRAINING PROGRAM

## 2025-02-06 PROCEDURE — 82746 ASSAY OF FOLIC ACID SERUM: CPT | Performed by: STUDENT IN AN ORGANIZED HEALTH CARE EDUCATION/TRAINING PROGRAM

## 2025-02-06 PROCEDURE — 82607 VITAMIN B-12: CPT | Performed by: STUDENT IN AN ORGANIZED HEALTH CARE EDUCATION/TRAINING PROGRAM

## 2025-02-06 PROCEDURE — 99215 OFFICE O/P EST HI 40 MIN: CPT | Mod: S$PBB,,, | Performed by: STUDENT IN AN ORGANIZED HEALTH CARE EDUCATION/TRAINING PROGRAM

## 2025-02-06 PROCEDURE — 99999 PR PBB SHADOW E&M-EST. PATIENT-LVL IV: CPT | Mod: PBBFAC,,, | Performed by: STUDENT IN AN ORGANIZED HEALTH CARE EDUCATION/TRAINING PROGRAM

## 2025-02-06 PROCEDURE — 80061 LIPID PANEL: CPT | Performed by: STUDENT IN AN ORGANIZED HEALTH CARE EDUCATION/TRAINING PROGRAM

## 2025-02-06 PROCEDURE — 87389 HIV-1 AG W/HIV-1&-2 AB AG IA: CPT | Performed by: STUDENT IN AN ORGANIZED HEALTH CARE EDUCATION/TRAINING PROGRAM

## 2025-02-06 PROCEDURE — 86593 SYPHILIS TEST NON-TREP QUANT: CPT | Performed by: STUDENT IN AN ORGANIZED HEALTH CARE EDUCATION/TRAINING PROGRAM

## 2025-02-06 PROCEDURE — 36415 COLL VENOUS BLD VENIPUNCTURE: CPT | Mod: PO | Performed by: STUDENT IN AN ORGANIZED HEALTH CARE EDUCATION/TRAINING PROGRAM

## 2025-02-06 PROCEDURE — 82306 VITAMIN D 25 HYDROXY: CPT | Performed by: STUDENT IN AN ORGANIZED HEALTH CARE EDUCATION/TRAINING PROGRAM

## 2025-02-06 PROCEDURE — 84443 ASSAY THYROID STIM HORMONE: CPT | Performed by: STUDENT IN AN ORGANIZED HEALTH CARE EDUCATION/TRAINING PROGRAM

## 2025-02-06 RX ORDER — REPAGLINIDE 2 MG/1
2 TABLET ORAL
Qty: 270 TABLET | Refills: 3 | Status: SHIPPED | OUTPATIENT
Start: 2025-02-06 | End: 2026-02-06

## 2025-02-06 RX ORDER — PIOGLITAZONEHYDROCHLORIDE 15 MG/1
15 TABLET ORAL DAILY
Qty: 90 TABLET | Refills: 3 | Status: SHIPPED | OUTPATIENT
Start: 2025-02-06 | End: 2025-02-06

## 2025-02-06 RX ORDER — TIRZEPATIDE 5 MG/.5ML
5 INJECTION, SOLUTION SUBCUTANEOUS
Qty: 4 PEN | Refills: 6 | Status: SHIPPED | OUTPATIENT
Start: 2025-02-06 | End: 2025-02-06

## 2025-02-06 RX ORDER — DULAGLUTIDE 0.75 MG/.5ML
0.75 INJECTION, SOLUTION SUBCUTANEOUS
Qty: 4 PEN | Refills: 11 | Status: SHIPPED | OUTPATIENT
Start: 2025-02-06 | End: 2026-02-06

## 2025-02-06 NOTE — PATIENT INSTRUCTIONS
Diabetes  Medications:  - Toujeo at night time starting tomorrow morning (please skip the morning dose)  - Repaglinide with meals   - Trulicity (this needs prior authorization)   - Please follow up with diabetic educator   - Follow up with PA in 3 months and then with me in 6 months

## 2025-02-06 NOTE — PROGRESS NOTES
[facility]   OFFICE VISIT NOTE    Patient Name: Debby Brown  YOB: 1950    PRESENTING HISTORY     History of Present Illness:  Ms. Debby Brown is a 74 y.o. female     Last visit with cardiology 1/2/2025  Anxiety- as needed xanax   Panic attacks  HTN - metoprolol 50 mg daily   HLD - lipitor 40 mg, coQ10, omega 3 fish oil   Aortic valve stenosis: stable, asymptomatic, continue to monitor   Calcification of aorta and A fib - plavix, aspirin, and amiodarone   S/p CABG - doing well, continue with aspirin, statin, BB, and plavix     History of Present Illness    CHIEF COMPLAINT:  Patient presents today for follow up after CABG surgery    CARDIOVASCULAR:  She recently underwent CABG surgery. She denies any history of heart failure, current chest pain, or breathing difficulty.    DIABETES:  She takes Toujeo 70 units in the morning. She discontinued Mounjaro due to cost concerns and is not taking repaglinide due to difficulty remembering to take it with meals. She previously tried metformin with poor results. She experiences thirst and frequent urination at night, but denies other diabetes-related symptoms during the day. Her diet is inconsistent, typically eating eggs for breakfast but often skipping meals. She does not cook and is interested in meal services.    NEUROLOGICAL:  She reports temporary word-finding difficulty during conversations, with words returning to memory after approximately five minutes. She denies any recent head trauma or brain injuries.    Review of Systems   Constitutional:  Negative for chills and fever.        Tiredness   Respiratory:  Negative for chest tightness and shortness of breath.    Cardiovascular:  Negative for chest pain and palpitations.   Gastrointestinal:  Negative for change in bowel habit, nausea and vomiting.   Neurological:         Memory issues           OBJECTIVE:   Vital Signs:  Vitals:    02/06/25 1455   BP: 110/80   Pulse: 71  "  SpO2: 96%   Weight: 89.9 kg (198 lb 3.1 oz)   Height: 5' 7" (1.702 m)       Physical Exam  Constitutional:       General: She is not in acute distress.     Appearance: She is obese. She is not ill-appearing or toxic-appearing.   HENT:      Head: Normocephalic and atraumatic.      Mouth/Throat:      Mouth: Mucous membranes are moist.      Pharynx: Uvula midline. No pharyngeal swelling.   Cardiovascular:      Rate and Rhythm: Normal rate and regular rhythm.      Heart sounds: Murmur heard.   Pulmonary:      Effort: Pulmonary effort is normal. No tachypnea, bradypnea, accessory muscle usage, prolonged expiration or respiratory distress.      Breath sounds: Normal breath sounds. No stridor. No wheezing, rhonchi or rales.   Abdominal:      General: Bowel sounds are normal. There is no distension.      Palpations: Abdomen is soft.      Tenderness: There is no abdominal tenderness. There is no guarding or rebound.   Neurological:      General: No focal deficit present.      Mental Status: She is alert.   Psychiatric:         Mood and Affect: Mood normal.         Behavior: Behavior normal.         ASSESSMENT & PLAN:     Uncontrolled type 2 diabetes mellitus with hyperglycemia  -     Hemoglobin A1C; Future; Expected date: 02/06/2025  -     Microalbumin/Creatinine Ratio, Urine; Future; Expected date: 02/06/2025  -     Discontinue: tirzepatide (MOUNJARO) 5 mg/0.5 mL PnIj; Inject 5 mg into the skin every 7 days.  Dispense: 4 Pen; Refill: 6  -     repaglinide (PRANDIN) 2 MG tablet; Take 1 tablet (2 mg total) by mouth 3 (three) times daily before meals.  Dispense: 270 tablet; Refill: 3  -     Ambulatory referral/consult to Diabetes Education; Future; Expected date: 02/13/2025  - Assessed diabetes management, noting poor control and potential link to low energy levels.  - Evaluated medication regimen for efficacy and patient adherence.  - Instructed patient to check and record fasting glucose levels in the morning.  - Changed " Toujeo 70 units administration from morning to night, starting tomorrow.  - Restarted repaglinide 2 mg with every meal, emphasizing the importance of taking it with meals.  - Ordered HbA1c to assess diabetes control.  - Recommend revisiting the diabetic educator for dietary guidance.  - Considered adding Actos (patient does not have any history of HF) and a once-weekly injection (Trulicity) for better glycemic control.  - Emphasized the importance of regular meals, especially with insulin use.    Stage 3b chronic kidney disease  - Will recheck his kidney function  - Continue to monitor and avoid nephrotoxic meds    Depression, recurrent  - Patient takes Xanax PRN for panic attacks  - Continue to monitor     Hyperlipidemia associated with type 2 diabetes mellitus  -     LIPID PANEL; Future; Expected date: 02/06/2025  - Continue with omega 3 fish oil, coQ10, and atorvastatin  - Stable     Atrial fibrillation, unspecified type  - Stable  - Continue with aspirin, plavix, and amiodarone     Aortic valve stenosis, etiology of cardiac valve disease unspecified  - Stable, asymptomatic, continue to monitor     Hypertension associated with diabetes  - Stable  - Continue with metoprolol     Aortic atherosclerosis  - Continue with omega 3 fish oil, coQ10, and atorvastatin  - Stable     Anxiety  - Patient takes Xanax PRN for panic attacks  - Continue to monitor     S/P CABG (coronary artery bypass graft)  - Doing well, continue with aspirin, statin, BB, and plavix     Calcification of aorta  - Stable   - Continue with plavix, aspirin, and amiodarone     Encounter for screening mammogram for malignant neoplasm of breast  -     Mammo Digital Screening Bilat w/ Gabe; Future; Expected date: 02/06/2025    Low energy  -     TSH; Future; Expected date: 02/06/2025  -     T4, FREE; Future; Expected date: 02/06/2025  -     Iron and TIBC; Future; Expected date: 02/06/2025  -     Cancel: Misc Sendout Test, Blood vit D; Future; Expected  date: 02/06/2025    Concern about memory  -     TSH; Future; Expected date: 02/06/2025  -     T4, FREE; Future; Expected date: 02/06/2025  -     Vitamin B12; Future; Expected date: 02/06/2025  -     Folate; Future; Expected date: 02/06/2025  -     HIV 1/2 Ag/Ab (4th Gen); Future; Expected date: 02/06/2025  -     Treponema Pallidium Antibodies IgG, IgM; Future; Expected date: 02/06/2025    Follow up with me in 6 months or sooner if needed    I spent a total of 40 minutes on the day of the visit.  This includes face-to-face time and non face-to-face time preparing to see the patient (e.g., review of tests) , obtaining and/or reviewing separately obtain history, documenting clinical information in the electronic or other health record, independently interpreting results and communicating results to the patient/family/caregiver, or care coordinator.        Shawna Costello MD  Family Medicine  [facility]     This note was created using [employer] voice recognition software that occasionally misinterprets phrases or words

## 2025-02-07 ENCOUNTER — HOSPITAL ENCOUNTER (OUTPATIENT)
Dept: RADIOLOGY | Facility: HOSPITAL | Age: 75
Discharge: HOME | End: 2025-02-07
Attending: STUDENT IN AN ORGANIZED HEALTH CARE EDUCATION/TRAINING PROGRAM
Payer: MEDICARE

## 2025-02-07 DIAGNOSIS — Z12.31 ENCOUNTER FOR SCREENING MAMMOGRAM FOR MALIGNANT NEOPLASM OF BREAST: ICD-10-CM

## 2025-02-07 DIAGNOSIS — E55.9 VITAMIN D DEFICIENCY: ICD-10-CM

## 2025-02-07 DIAGNOSIS — E11.65 UNCONTROLLED TYPE 2 DIABETES MELLITUS WITH HYPERGLYCEMIA: Primary | ICD-10-CM

## 2025-02-07 LAB — TREPONEMA PALLIDUM IGG+IGM AB [PRESENCE] IN SERUM OR PLASMA BY IMMUNOASSAY: NONREACTIVE

## 2025-02-07 PROCEDURE — 77063 BREAST TOMOSYNTHESIS BI: CPT | Mod: 26,,, | Performed by: RADIOLOGY

## 2025-02-07 PROCEDURE — 77067 SCR MAMMO BI INCL CAD: CPT | Mod: TC,PO

## 2025-02-07 PROCEDURE — 77067 SCR MAMMO BI INCL CAD: CPT | Mod: 26,,, | Performed by: RADIOLOGY

## 2025-02-07 RX ORDER — ERGOCALCIFEROL 1.25 MG/1
50000 CAPSULE ORAL
Qty: 12 CAPSULE | Refills: 3 | Status: SHIPPED | OUTPATIENT
Start: 2025-02-07

## 2025-02-10 ENCOUNTER — CLINICAL SUPPORT (OUTPATIENT)
Dept: DIABETES | Facility: CLINIC | Age: 75
End: 2025-02-10
Payer: MEDICARE

## 2025-02-10 DIAGNOSIS — E11.65 UNCONTROLLED TYPE 2 DIABETES MELLITUS WITH HYPERGLYCEMIA: ICD-10-CM

## 2025-02-10 PROCEDURE — 99212 OFFICE O/P EST SF 10 MIN: CPT | Mod: PBBFAC,PO | Performed by: DIETITIAN, REGISTERED

## 2025-02-10 PROCEDURE — 99999PBSHW PR PBB SHADOW TECHNICAL ONLY FILED TO HB: Mod: PBBFAC,,,

## 2025-02-10 PROCEDURE — 99999 PR PBB SHADOW E&M-EST. PATIENT-LVL II: CPT | Mod: PBBFAC,,, | Performed by: DIETITIAN, REGISTERED

## 2025-02-10 PROCEDURE — G0108 DIAB MANAGE TRN  PER INDIV: HCPCS | Mod: PBBFAC,PO | Performed by: DIETITIAN, REGISTERED

## 2025-02-13 VITALS — HEIGHT: 67 IN | WEIGHT: 199.44 LBS | BODY MASS INDEX: 31.3 KG/M2

## 2025-02-13 NOTE — PROGRESS NOTES
"Diabetes Care Specialist Progress Note  Author: Stefania Jensen RD  Date: 2/13/2025    Intake    Program Intake  Reason for Diabetes Program Visit:: Intervention  Type of Intervention:: Individual  Individual: Education  Current diabetes risk level:: high (per longitudinal plan of care)  In the last month, have you used the ER or been admitted to the hospital: No  Permission to speak with others about care:: no    Current Diabetes Treatment: Insulin, Oral Medications, DM Injectables  Oral Medication Type/Dose: Prandin 2 mg TID, Jardiance 25 mg  DM Injectables Type/Dose: Trulicity .75 mg/week  Method of insulin delivery?: Injections  Injection Type: Pens  Pen Type/Dose: Toujeo 70 units QHS    Continuous Glucose Monitoring  Patient has CGM: No    Lab Results   Component Value Date    LABA1C 9.2 (H) 07/03/2018    HGBA1C 11.1 (H) 02/06/2025       Weight: 90.5 kg (199 lb 6.5 oz)   Height: 5' 7" (170.2 cm)   Body mass index is 31.23 kg/m².    Lifestyle Coping Support & Clinical    Lifestyle/Coping/Support  Does anyone in your family have diabetes or does anyone in your family support you in your diabetes care?: no  Learning Barriers:: None  Culture or Rastafari beliefs that may impact ability to access healthcare: No  Psychosocial/Coping Skills Assessment Completed: : Yes  Assessment indicates:: Adequate understanding  Area of need?: No    Problem Review  Active Comorbidities: Hypertension, Cardiovascular Disease, Mental Health Condition(s), Hyperlipidemia/Dyslipidemia, Chronic Kidney Disease, Obesity    Diabetes Self-Management Skills Assessment    Medication Skills Assessment  Patient is able to identify current diabetes medications, dosages, and appropriate timing of medications.: yes  Patient reports problems or concerns with current medication regimen.: yes  Medication regimen problems/concerns:: financial concerns  Pharmacy assistance referral placed?: No  Patient is  aware that some diabetes medications can cause " "low blood sugar?: Yes  Medication Skills Assessment Completed:: Yes  Assessment indicates:: Adequate understanding  Area of need?: No    Diabetes Disease Process/Treatment Options  Diabetes Type?: Type II  When were you diagnosed?: 6 years ago  If previous diabetes education, when/where:: [facility] diabetes 2024  What are your goals for this education session?: better manage diabetes  Is patient aware of what causes diabetes?: No  Does patient understand the pathophysiology of diabetes?: No  Diabetes Disease Process/Treatment Options: Skills Assessment Completed: Yes  Assessment indicates:: Instruction Needed  Area of need?: Yes    Nutrition/Healthy Eating  Meal Plan 24 Hour Recall - Breakfast: banana; eggs; toast, eggs + toast, cereal + coffee with heavy whipping cream and sweet n' low  Meal Plan 24 Hour Recall - Lunch: sandwich.  Meal Plan 24 Hour Recall - Dinner: "I try to eat something  Meal Plan 24 Hour Recall - Snack: grazes: popcorn, chips, fruit  Meal Plan 24 Hour Recall - Beverage: water, sugar free tea, [employer] sugar free wild cherry  Who shops/cooks?: patient  Patient can identify foods that impact blood sugar.: yes  Challenges to healthy eating:: portion control, other (see comments)  Nutrition/Healthy Eating Skills Assessment Completed:: Yes  Assessment indicates:: Instruction Needed, Knowledge deficit    Home Blood Glucose Monitoring  Patient states that blood sugar is checked at home daily.: yes  Monitoring Method:: home glucometer  Fasting BG range history:: "200s"  What are your blood glucose targets?:  mg/dl  How often do you check your blood sugar?: once daily fasting  What is your A1c Target?: less than 7%  Home Blood Glucose Monitoring Skills Assessment Completed: : Yes  Assessment indicates:: Adequate understanding  Area of need?: No    Acute Complications  Have you ever had hypoglycemia (low BG 70 or less)?: no  Have you ever had hyperglycemia (high  or more)?: yes  How often and what are " "your symptoms?: BG usually in the "200s mg/dl", increased thirst and urination  Have you ever had DKA?: no  Acute Complications Skills Assessment Completed: : No  Deffered due to:: Time    Assessment Summary and Plan    Based on today's diabetes care assessment, the following areas of need were identified:      Identified Areas of Need      Medication/Current Diabetes Treatment: No. Take Prandin 30 minutes before meals.   Lifestyle Coping/Support: No   Diabetes Disease Process/Treatment Options: Yes. Explained what is Type 2 diabetes.   Nutrition/Healthy Eating:      Physical Activity/Exercise:      Home Blood Glucose Monitoring: No . Take fasting BG.   Acute Complications:   Reviewed how to treat hypoglycemia, S/S of hypoglycemia and hyperglycemia.   Chronic Complications:         Today's interventions were provided through individual discussion, instruction, and written materials were provided.      Patient verbalized understanding of instruction and written materials.  Pt was able to return back demonstration of instructions today. Patient understood key points, needs reinforcement and further instruction.     Diabetes Self-Management Care Plan:    Today's Diabetes Self-Management Care Plan was developed with Debby's input. Debby has agreed to work toward the following goal(s) to improve his/her overall diabetes control.      Care Plan: Diabetes Management   Updates made since 2/14/2024 12:00 AM        Problem: Healthy Eating         Goal: Eat 3 meals daily with 45 g/3 servings of Carbohydrate per meal.    Start Date: 5/27/2024   Expected End Date: 4/15/2025   Priority: High   Barriers: No Barriers Identified   Note:    2/10/25: Educated patient that if she is unable to make full meals, she can group her snacks into a meal and add a protein three times over a 24 hour period.  Explained that grazing on carbohydrates for long periods of time keep her BG elevated and she is unable to take the Prandin.  Discussed " "choosing three 15 gram servings of total carbohydrates from the list.  Discussed importance of adding a protein source to the meals to slow post prandial glucose spikes.  Moderation was a focus of this education session.       5/27/24:Pt admits she is "lazy"--does not really want to cook or prepare foods.  Thinking about using a meal delivery service for meals where she can just warm up the food.  Pt will look into ones that deliver food to home (does not want to have to go p/u food herself).  Strongly encouraged pt to ask for DM meals and to look at nutrition info in regards to carb content and PRO.    Emphasized importance of combining carbs w/PRO; reading labels; and having consistent meals w/consistent amount of carbs.  Pt will"  *look at labels for total carbs  *add PRO to snacks that consist of fruit   *increase non-starchy veggies as much as possible (bags of salads, frozen veggies)  *consider plain oatmeal w/nuts or PNB, use S&L  *investigate mail order food services  *continue with water or Crystal light       Task: Reviewed the sources and role of Carbohydrate, Protein, and Fat and how each nutrient impacts blood sugar. Completed 5/27/2024        Task: Provided visual examples using dry measuring cups, food models, and other familiar objects such as computer mouse, deck or cards, tennis ball etc. to help with visualization of portions. Completed 5/27/2024        Task: Explained how to count carbohydrates using the food label and the use of dry measuring cups for accurate carb counting. Completed 5/27/2024        Task: Discussed strategies for choosing healthier menu options when dining out. Completed 5/27/2024        Task: Recommended replacing beverages containing high sugar content with noncaloric/sugar free options and/or water. Completed 5/27/2024        Task: Review the importance of balancing carbohydrates with each meal using portion control techniques to count servings of carbohydrate and label " reading to identify serving size and amount of total carbs per serving. Completed 5/27/2024        Task: Provided Sample plate method and reviewed the use of the plate to estimate amounts of carbohydrate per meal. Completed 5/27/2024        Problem: Blood Glucose Self-Monitoring         Goal: Patient agrees to check and record blood sugars daily Completed 2/13/2025   Start Date: 5/27/2024   Expected End Date: 2/10/2025   This Visit's Progress: Met   Priority: Medium   Barriers: No Barriers Identified   Note:    2/10/24: Taking Toujeo at night and checking fasting BG in the morning.  Patient was confused why she was taking BG at a different time than taking insulin. Explained fasting is how the dose of basal insulin is evaluated.    5/27/24: Pt admits that she has not been checking BG levels for quite some time, but did start after 5/16/24 appt with Katherine Scruggs.  Pt had glucometer and BG levels taken from there.  Reviewed target BG levels and HA1C. Provided BG log and asked pt to document fasting BG every morning and to alternate once in a while with fasting supper.       Task: Provided patient with a meter today and sent Rx request to provider to send to patients pharmacy. Completed 5/27/2024        Task: Reviewed the importance of self-monitoring blood glucose and keeping logs. Completed 5/27/2024        Task: Provided patient with blood glucose logs, reviewed appropriate timing and frequency to SMBG, education on parameters on when to notify provider and advised patient to bring logs to all appts with PCP/Endocrinologist/Diabetes Care Specialist. Completed 5/27/2024        Task: Discussed ways to minimize pain when monitoring blood glucose. Completed 5/27/2024        Problem: Disease Process         Goal: Patient agrees to take steps toward understanding the diabetes disease process and treatment options by taking medication as prescribed and creating BG log.    Start Date: 2/10/2025   Expected End Date:  4/15/2025   Priority: Medium   Barriers: No Barriers Identified   Note:    Patient denies any hyperglycemic symptoms.  Educated on how to treat hypoglycemia due to use of Prandin.       Task: Provided a basic introduction of the diabetes disease process, diagnosis, progression, and how diabetes can be successfully managed. Completed 2/13/2025        Task: Reviewed the following risk factors associated with diabetes: Being overweight, family history, reduced activity, ethnicity, age over 40, past history of Gestational DM         Task: Reviewed the following common signs and symptoms of diabetes:  Increased Thirst, Frequent Urination, Fatigue, Sexual Dysfunction, Blurred Vision, Frequent and Slow healing of infections Completed 2/13/2025        Task: Reviewed different types of diabetes Type 1, Type 2, Gestational, Prediabetes, and other forms, and described how each type is managed and reviewed different myths and stereotypes commonly associated with diabetes.         Task: Emphasized on the importance of controlling diabetes to prevent complications and reviewed both the short-term and long-term effects of uncontrolled diabetes. Completed 2/13/2025          Follow Up Plan     Follow up in about 2 months (around 4/10/2025).  Patient presents today with elevated BG and a low knowledge of what diabetes type 2 actually is.  Reviewed how to take all diabetes medications.  Explained Prandin is to be taken 30 minutes before meals.  She grazes throughout the day so she is unable to take Prandin in that situation. Recommended making more of a meal of the snacks by eating at one time and pairing with a protein.  Discussed moderation and not elimination of processed carbohydrates.  Provided list of 15 g portion sizes of carbohydrates encouraging patient to limit to three per meal.             Today's care plan and follow up schedule was discussed with patient.  Debby verbalized understanding of the care plan, goals, and  agrees to follow up plan.        The patient was encouraged to communicate with his/her health care provider/physician and care team regarding his/her condition(s) and treatment.  I provided the patient with my contact information today and encouraged to contact me via phone or [facility] Patient Portal as needed.     Length of Visit   Total Time: 60 Minutes

## 2025-03-15 NOTE — Clinical Note
Delivery Note     #[id number] Jose Alfredo used entire encounter.     Elli Solis is a 31 year old now  female post-delivery at 39w5d by  after presenting with spontaneous labor.  Pregnancy was significant for  group B strep positive-received adequate penicillin and Uatsdin with refusal of blood products for Oriental orthodox reasons. She received pitocin augmentation of labor and delivered a vigorous female with APGARs of 8 and 9 over an intact perineum. There was a right labial laceration that was hemostatic and did not require repair. Spontaneous delivery of the placenta. Manual sweep done to confirm no retained placenta.   ml.     Mother's Information      Labor Length    3rd stage: 0h 05m       Lacerations    Episiotomy: None  Perineal laceration: None  Labial laceration?: Yes  Labial laceration location: right  Labial laceration repaired?: No  Repair suture: None       Delivery Blood Loss   Intrapartum & Postpartum: 03/15/25 0259 - 03/15/25 1521    Delivery Admission: 03/15/25 0259 - 03/15/25 1521         Intrapartum & Postpartum Delivery Admission    QBL: Vaginal Delivery Volume Hospital Encounter 150  ML    Total  150 150               Rowena Solis [[id number]]      [place] Delivery    Birth date/time: 3/15/2024 14:59:00  Delivery type: Vaginal, Spontaneous  Complications: None       Labor Events     labor?: No  Antibiotics during labor?: Yes  Rupture date/time: 3/15/2025 1352  Rupture type: Artificial  Fluid color: Clear  Fluid odor: Normal  Labor type: Spontaneous Onset of Labor  Labor allowed to proceed with plans for an attempted vaginal birth?: Yes  Augmentation: Oxytocin  Augmentation date/time: 3/15/2025 1154  Augmentation indications: Ineffective Contraction Pattern  Complications: None       Anesthesia    Method: Epidural       Operative Delivery    Forceps attempted?: No  Vacuum extractor attempted?: No       Shoulder Dystocia    Shoulder dystocia  The catheter was removed from the ostial  left coronary artery. present?: No       Presentation    Presentation: Vertex  Position: Right Occiput Anterior       Placenta    Placenta delivery date/time: 3/15/2025 1504  Placenta removal: Spontaneous  Placenta appearance: Intact  Placenta disposition: discarded       Cord    Vessels: 3 Vessels  Complications: None  Delayed cord clamping?: Yes  Cord clamped date/time: 3/15/2025 15:00:00  Cord blood disposition: Lab  Gases sent?: No  Stem cell collection (by provider): No       Apgars    Living status: Living  Apgar Component Scores:  1 min.:  5 min.:  10 min.:  15 min.:  20 min.:    Skin color:  0  1       Heart rate:  2  2       Reflex irritability:  2  2       Muscle tone:  2  2       Respiratory effort:  2  2       Total:  8  9       Apgars assigned by: DAVID FAUSTIN RN       Resuscitation     Resuscitation Team: No  Stabilization: Bulb Syringe  Outcome: Well Nursery       Measurements    Weight:   Length:        Delivery Providers    Delivering clinician: Maite Styles MD   Provider Role    Kaushik Arnett RN Delivery Nurse    David Sterling RN Baby Nurse    Edgar Asha Tech                    Review the Delivery Report for details       Maite Styles MD

## 2025-04-01 ENCOUNTER — PATIENT OUTREACH (OUTPATIENT)
Dept: ADMINISTRATIVE | Facility: HOSPITAL | Age: 75
End: 2025-04-01
Payer: MEDICARE

## 2025-04-08 ENCOUNTER — NURSE TRIAGE (OUTPATIENT)
Dept: ADMINISTRATIVE | Facility: CLINIC | Age: 75
End: 2025-04-08
Payer: MEDICARE

## 2025-04-08 NOTE — TELEPHONE ENCOUNTER
"Patient c/o back pain that radiates to the chest. Her symptoms are intermittent. Hx of CABG. Per protocol, patient was advised to be seen in 3 days. Will send a message to her provider. Patient verbalizes understanding. Advised the patient to call back with any further questions or if symptoms worsen.      Reason for Disposition   [1] Chest pain from known angina comes and goes AND [2] is NOT happening more often (increasing in frequency) or getting worse (increasing in severity)    Additional Information   Negative: SEVERE difficulty breathing (e.g., struggling for each breath, speaks in single words)   Negative: Difficult to awaken or acting confused (e.g., disoriented, slurred speech)   Negative: Shock suspected (e.g., cold/pale/clammy skin, too weak to stand, low BP, rapid pulse)   Negative: Passed out (e.g., fainted, lost consciousness, blacked out and was not responding)   Negative: [1] Chest pain lasts > 5 minutes AND [2] age > 44   Negative: [1] Chest pain lasts > 5 minutes AND [2] age > 30 AND [3] one or more cardiac risk factors (e.g., diabetes, high blood pressure, high cholesterol, obesity with BMI 30 or higher, smoker, or strong family history of heart disease)   Negative: [1] Chest pain lasts > 5 minutes AND [2] history of heart disease (i.e., angina, heart attack, heart failure, bypass surgery, takes nitroglycerin)   Negative: [1] Chest pain lasts > 5 minutes AND [2] described as crushing, pressure-like, or heavy   Negative: Heart beating < 50 beats per minute OR > 140 beats per minute   Negative: Visible sweat on face or sweat dripping down face   Negative: Sounds like a life-threatening emergency to the triager   Negative: SEVERE chest pain   Negative: [1] Chest pain (or "angina") comes and goes AND [2] is happening more often (increasing in frequency) or getting worse (increasing in severity)  (Exception: Chest pains that last only a few seconds.)   Negative: Pain also in shoulder(s) or arm(s) or " "jaw  (Exception: Pain is clearly made worse by movement.)   Negative: Difficulty breathing   Negative: Coughing up blood   Negative: Feeling weak or lightheaded (e.g., woozy, feeling like they might faint)   Negative: Cocaine use within last 3 days   Negative: Major surgery in past month   Negative: Hip or leg fracture (broken bone) in past month (or had cast on leg or ankle in past month)   Negative: Illness requiring prolonged bedrest in past month (e.g., immobilization, long hospital stay)   Negative: Long-distance travel in past month (e.g., car, bus, train, plane; with trip lasting 6 or more hours)   Negative: History of prior "blood clot" in leg or lungs (i.e., deep vein thrombosis, pulmonary embolism)   Negative: History of inherited increased risk of blood clots (e.g., Factor 5 Leiden, Anti-thrombin 3, Protein C or Protein S deficiency, Prothrombin mutation)   Negative: Cancer treatment in past six months (or has cancer now)   Negative: [1] Chest pain lasts > 5 minutes AND [2] occurred in past 3 days (72 hours) (Exception: Feels exactly the same as previously diagnosed heartburn and has accompanying sour taste in mouth.)   Negative: Taking a deep breath makes pain worse   Negative: Patient sounds very sick or weak to the triager   Negative: [1] Chest pain lasts > 5 minutes AND [2] occurred > 3 days ago (72 hours) AND [3] NO chest pain or cardiac symptoms now   Negative: [1] Chest pain lasts < 5 minutes AND [2] NO chest pain or cardiac symptoms (e.g., breathing difficulty, sweating) now  (Exception: Chest pains that last only a few seconds.)   Negative: Fever > 100.4 F (38.0 C)   Negative: Rash in same area as pain (may be described as "small blisters")   Negative: [1] Patient says chest pain feels exactly the same as previously diagnosed "heartburn" AND [2] describes burning in chest AND [3] accompanying sour taste in mouth   Negative: [1] Chest pain lasting < 5 minutes AND [2] has not taken prescribed " nitroglycerin   Negative: [1] Chest pain lasting < 5 minutes AND [2] completely gone after taking nitroglycerin    Protocols used: Chest Pain-A-AH

## 2025-04-15 ENCOUNTER — PATIENT OUTREACH (OUTPATIENT)
Dept: DIABETES | Facility: CLINIC | Age: 75
End: 2025-04-15

## 2025-04-15 ENCOUNTER — PATIENT OUTREACH (OUTPATIENT)
Dept: DIABETES | Facility: CLINIC | Age: 75
End: 2025-04-15
Payer: MEDICARE

## 2025-04-15 NOTE — PROGRESS NOTES
"Patient presented for appointment but reported she has been sick with diarrhea and may need to leave the appointment abruptly.  Restroom is not quick to access. Patient stated "I don't want to get you sick." Set reminder to reschedule to next week.  "

## 2025-04-17 ENCOUNTER — TELEPHONE (OUTPATIENT)
Dept: DERMATOLOGY | Facility: CLINIC | Age: 75
End: 2025-04-17
Payer: MEDICARE

## 2025-04-17 NOTE — TELEPHONE ENCOUNTER
----- Message from Margaux sent at 4/17/2025 12:27 PM CDT -----  Type:  Appointment Request Name of Caller:REID KIM [[id number]]When is the first available appointment?No accessSymptoms:Skin Screening Would the patient rather a call back or a response via [facility]? Call [facility] Call Back Number:[phone] Additional Information: Patient states she would like to be seen at the soonest availability if possible. Patient would like a call back with further assistance.

## 2025-04-23 ENCOUNTER — NUTRITION (OUTPATIENT)
Dept: DIABETES | Facility: CLINIC | Age: 75
End: 2025-04-23
Payer: MEDICARE

## 2025-04-23 DIAGNOSIS — E11.65 UNCONTROLLED TYPE 2 DIABETES MELLITUS WITH HYPERGLYCEMIA: Primary | ICD-10-CM

## 2025-04-23 PROCEDURE — G0108 DIAB MANAGE TRN  PER INDIV: HCPCS | Mod: PBBFAC,PO | Performed by: DIETITIAN, REGISTERED

## 2025-04-23 PROCEDURE — 99999PBSHW PR PBB SHADOW TECHNICAL ONLY FILED TO HB: Mod: PBBFAC,,,

## 2025-04-24 VITALS — HEIGHT: 67 IN | BODY MASS INDEX: 31.24 KG/M2 | WEIGHT: 199.06 LBS

## 2025-04-28 DIAGNOSIS — Z00.00 ENCOUNTER FOR MEDICARE ANNUAL WELLNESS EXAM: ICD-10-CM

## 2025-05-05 ENCOUNTER — RESULTS FOLLOW-UP (OUTPATIENT)
Dept: FAMILY MEDICINE | Facility: CLINIC | Age: 75
End: 2025-05-05

## 2025-05-05 ENCOUNTER — LAB VISIT (OUTPATIENT)
Dept: LAB | Facility: HOSPITAL | Age: 75
End: 2025-05-05
Attending: STUDENT IN AN ORGANIZED HEALTH CARE EDUCATION/TRAINING PROGRAM
Payer: MEDICARE

## 2025-05-05 DIAGNOSIS — R73.9 HYPERGLYCEMIA: ICD-10-CM

## 2025-05-05 DIAGNOSIS — R53.83 FATIGUE, UNSPECIFIED TYPE: Primary | ICD-10-CM

## 2025-05-05 DIAGNOSIS — R07.89 ANTERIOR CHEST WALL PAIN: ICD-10-CM

## 2025-05-05 LAB
BNP SERPL-MCNC: 138 PG/ML
EAG (SMH): 209 MG/DL (ref 68–131)
HBA1C MFR BLD: 8.9 % (ref 4.5–6.2)

## 2025-05-05 PROCEDURE — 36415 COLL VENOUS BLD VENIPUNCTURE: CPT

## 2025-05-05 PROCEDURE — 83880 ASSAY OF NATRIURETIC PEPTIDE: CPT

## 2025-05-05 PROCEDURE — 83036 HEMOGLOBIN GLYCOSYLATED A1C: CPT

## 2025-05-06 ENCOUNTER — TELEPHONE (OUTPATIENT)
Dept: FAMILY MEDICINE | Facility: CLINIC | Age: 75
End: 2025-05-06

## 2025-05-06 ENCOUNTER — OFFICE VISIT (OUTPATIENT)
Dept: FAMILY MEDICINE | Facility: CLINIC | Age: 75
End: 2025-05-06
Payer: MEDICARE

## 2025-05-06 VITALS
BODY MASS INDEX: 31.28 KG/M2 | RESPIRATION RATE: 12 BRPM | OXYGEN SATURATION: 97 % | HEIGHT: 67 IN | DIASTOLIC BLOOD PRESSURE: 86 MMHG | HEART RATE: 71 BPM | WEIGHT: 199.31 LBS | TEMPERATURE: 98 F | SYSTOLIC BLOOD PRESSURE: 128 MMHG

## 2025-05-06 DIAGNOSIS — I35.0 AORTIC VALVE STENOSIS, ETIOLOGY OF CARDIAC VALVE DISEASE UNSPECIFIED: ICD-10-CM

## 2025-05-06 DIAGNOSIS — I15.2 HYPERTENSION ASSOCIATED WITH DIABETES: ICD-10-CM

## 2025-05-06 DIAGNOSIS — E55.9 VITAMIN D DEFICIENCY: ICD-10-CM

## 2025-05-06 DIAGNOSIS — E11.65 UNCONTROLLED TYPE 2 DIABETES MELLITUS WITH HYPERGLYCEMIA: ICD-10-CM

## 2025-05-06 DIAGNOSIS — E11.59 HYPERTENSION ASSOCIATED WITH DIABETES: ICD-10-CM

## 2025-05-06 DIAGNOSIS — M54.9 BACK PAIN, UNSPECIFIED BACK LOCATION, UNSPECIFIED BACK PAIN LATERALITY, UNSPECIFIED CHRONICITY: ICD-10-CM

## 2025-05-06 DIAGNOSIS — G62.9 NEUROPATHY: ICD-10-CM

## 2025-05-06 DIAGNOSIS — N18.32 STAGE 3B CHRONIC KIDNEY DISEASE: ICD-10-CM

## 2025-05-06 DIAGNOSIS — R07.9 CHEST PAIN, UNSPECIFIED TYPE: Primary | ICD-10-CM

## 2025-05-06 DIAGNOSIS — Z95.1 S/P CABG (CORONARY ARTERY BYPASS GRAFT): ICD-10-CM

## 2025-05-06 LAB
OHS QRS DURATION: 96 MS
OHS QTC CALCULATION: 438 MS

## 2025-05-06 PROCEDURE — 99999 PR PBB SHADOW E&M-EST. PATIENT-LVL IV: CPT | Mod: PBBFAC,,,

## 2025-05-06 PROCEDURE — 93005 ELECTROCARDIOGRAM TRACING: CPT | Mod: PBBFAC,PO | Performed by: INTERNAL MEDICINE

## 2025-05-06 PROCEDURE — 93010 ELECTROCARDIOGRAM REPORT: CPT | Mod: S$PBB,,, | Performed by: INTERNAL MEDICINE

## 2025-05-06 PROCEDURE — 99215 OFFICE O/P EST HI 40 MIN: CPT | Mod: S$PBB,,,

## 2025-05-06 PROCEDURE — 99214 OFFICE O/P EST MOD 30 MIN: CPT | Mod: PBBFAC,PO

## 2025-05-06 PROCEDURE — G2211 COMPLEX E/M VISIT ADD ON: HCPCS | Mod: S$PBB,,,

## 2025-05-06 RX ORDER — ERGOCALCIFEROL 1.25 MG/1
50000 CAPSULE ORAL
Qty: 12 CAPSULE | Refills: 3 | Status: SHIPPED | OUTPATIENT
Start: 2025-05-06

## 2025-05-06 RX ORDER — UBIDECARENONE 100 MG
100 CAPSULE ORAL DAILY
Qty: 90 CAPSULE | Refills: 3 | Status: SHIPPED | OUTPATIENT
Start: 2025-05-06 | End: 2026-05-06

## 2025-05-06 NOTE — PROGRESS NOTES
Subjective:       Patient ID:  [id number]     Chief Complaint: Follow-up, Medication Refill, and Back Pain (Pt states, continues to have upper back pain on and off, causing trouble sleeping.)      History of Present Illness    Ms. Brown presents today for three month follow up and back pain. She reports upper back pain radiating to the chest, occurring at night with severity of 7/10. Associated symptoms include shortness of breath and palpitations. She notes these symptoms are similar to those experienced prior to her CABG surgery. While the pain has not occurred in about a week, when present it is severe and disrupts sleep. She experiences shooting pains in her legs at night, particularly when going to bed. She describes a persistent sensation of wearing socks even after removing them. She denies foot pain but acknowledges strange sensations. These symptoms have been ongoing for an extended period. She currently takes Repaglinide before meals, Jardiance 10mg, and Tujeo 70 units. Previously discontinued Trulicity due to cost. She reports a 10-pound weight gain. History of CABG surgery.      No other concerns today.    Past Medical History:   Diagnosis Date    Allergy     Anxiety     Depression     Diabetes mellitus, type 2     Hyperlipidemia     Hypertension     Personal history of colonic polyps       Active Problem List with Overview Notes    Diagnosis Date Noted    Vitamin D deficiency 02/07/2025    Morbid obesity 01/07/2025    Chest pain 01/07/2025    Atherosclerotic heart disease of native coronary artery with other forms of angina pectoris 01/02/2025    S/P CABG (coronary artery bypass graft) 10/02/2024    A-fib 09/18/2024    Hematoma 09/18/2024    UTI due to extended-spectrum beta lactamase (ESBL) producing Escherichia coli 09/02/2024    Stage 3b chronic kidney disease 08/30/2024    NSTEMI (non-ST elevated myocardial infarction) 08/30/2024    Calcification of aorta 05/14/2024    Anterolisthesis of lumbar spine  09/06/2023    Compression fracture of L1 lumbar vertebra 09/06/2023    Compression fracture of L2 lumbar vertebra 09/06/2023    Panic attacks 07/07/2023    Low back pain 05/12/2023    Stage 3a chronic kidney disease 04/06/2023    SOB (shortness of breath) 09/06/2022    Benign familial tremor 08/24/2022    Hyperuricemia 07/28/2022    Macroalbuminuric diabetic nephropathy 07/16/2022    COVID-19 long hauler manifesting chronic concentration deficit 12/19/2021    Balance problem 12/19/2021    Memory loss 12/19/2021    Pulmonary nodule 03/31/2021    Type 2 diabetes mellitus with microalbuminuria, with long-term current use of insulin 01/19/2021    LVH (left ventricular hypertrophy) 12/09/2020    Aortic valve stenosis 12/09/2020    BMI 29.0-29.9,adult 08/01/2019    Cataracts, bilateral 06/19/2019    Benign essential tremor 11/29/2018    BMI 33.0-33.9,adult 11/29/2018    Insomnia 07/05/2018    Depression, recurrent     S/P left rotator cuff repair 03/15/2018    Anxiety 03/15/2018    Attention deficit disorder 03/15/2018    Hypertension associated with diabetes 03/15/2018    Hyperlipidemia 03/15/2018      Review of patient's allergies indicates:   Allergen Reactions    Bactrim [sulfamethoxazole-trimethoprim]     Codeine     Levaquin [levofloxacin]     Pcn [penicillins]        Current Medications[1]    Lab Results   Component Value Date    WBC 9.15 02/05/2025    HGB 14.1 02/05/2025    HCT 42.5 02/05/2025     02/05/2025    CHOL 162 02/06/2025    TRIG 194 (H) 02/06/2025    HDL 42 02/06/2025    ALT 27 02/05/2025    AST 15 02/05/2025     (L) 02/05/2025    K 4.6 02/05/2025     02/05/2025    CREATININE 1.3 02/05/2025    BUN 31 (H) 02/05/2025    CO2 23 02/05/2025    TSH 1.682 02/06/2025    INR 1.0 08/30/2024    HGBA1C 8.9 (H) 05/05/2025    MICROALBUR 0.9 01/09/2020       Review of Systems   Constitutional:  Negative for fatigue and fever.   HENT: Negative.  Negative for congestion, sneezing and sore throat.     Eyes: Negative.    Respiratory:  Negative for cough, shortness of breath and wheezing.    Cardiovascular:  Positive for chest pain. Negative for palpitations and leg swelling.   Gastrointestinal:  Negative for abdominal pain, nausea and vomiting.   Genitourinary: Negative.    Musculoskeletal:  Positive for back pain. Negative for arthralgias.   Skin: Negative.  Negative for rash.   Neurological:  Negative for dizziness, weakness, light-headedness, numbness and headaches.   Hematological: Negative.    Psychiatric/Behavioral: Negative.         Objective:      Physical Exam  Constitutional:       Appearance: Normal appearance.   HENT:      Head: Normocephalic and atraumatic.      Nose: Nose normal.   Eyes:      Extraocular Movements: Extraocular movements intact.   Cardiovascular:      Rate and Rhythm: Normal rate and regular rhythm.      Pulses:           Dorsalis pedis pulses are 2+ on the right side and 2+ on the left side.      Heart sounds: Murmur heard.   Pulmonary:      Effort: Pulmonary effort is normal.      Breath sounds: Normal breath sounds.   Musculoskeletal:         General: Normal range of motion.      Cervical back: Normal range of motion.   Skin:     General: Skin is warm and dry.   Neurological:      General: No focal deficit present.      Mental Status: She is alert and oriented to person, place, and time.   Psychiatric:         Mood and Affect: Mood normal.         Assessment:       1. Chest pain, unspecified type    2. Back pain, unspecified back location, unspecified back pain laterality, unspecified chronicity    3. Vitamin D deficiency    4. Uncontrolled type 2 diabetes mellitus with hyperglycemia    5. Stage 3b chronic kidney disease    6. Aortic valve stenosis, etiology of cardiac valve disease unspecified    7. Hypertension associated with diabetes    8. S/P CABG (coronary artery bypass graft)        Plan:       Debby was seen today for follow-up, medication refill and back  pain.    Diagnoses and all orders for this visit:    Chest pain, unspecified type  -     IN OFFICE EKG 12-LEAD (to Muse)  -     Nuclear Stress - Cardiology Interpreted; Future    - Monitored reports of chest pain that sometimes radiates to the front and is associated with shortness of breath.  - This is concerning due to its location and history of CABG surgery.  - Explained B-type natriuretic peptide (BNP) test and its relation to heart function and fluid status.  - Ordered EKG and advised consideration of stress test.  - Referred patient to cardiology for follow-up, specifically to see Dr. Meier or one of his PAs/NPs.  - Instructed patient to contact the office to schedule stress test and to go to the emergency room or contact the office if chest pain recurs and feels significant.  - Advised avoiding strenuous activity until cardiac workup is completed.    Back pain, unspecified back location, unspecified back pain laterality, unspecified chronicity  -     IN OFFICE EKG 12-LEAD (to Muse)  -     Nuclear Stress - Cardiology Interpreted; Future  - Monitored reports of back pain occurring at night, sometimes radiating to the chest.  - Pain is severe, rated 7 out of 10, and concerning due to its location and radiation pattern.  - Given the cardiac history, this symptom is being evaluated as part of the cardiac workup with EKG and stress test.  - Advised patient to seek emergency care if back pain recurs and feels severe.    Vitamin D deficiency  -     ergocalciferol (ERGOCALCIFEROL) 50,000 unit Cap; Take 1 capsule (50,000 Units total) by mouth every 7 days.    Uncontrolled type 2 diabetes mellitus with hyperglycemia  -     Hemoglobin A1C; Future  - Discussed adjusting medication - including trying GLP1 or increasing jardiance.  Patient wishes to continue to work on medication compliance.     Stage 3b chronic kidney disease  - recent GFR 43.4  - avoid nephrotoxic drugs - ie ibuprofen     Aortic valve stenosis, etiology  of cardiac valve disease unspecified  - noted on Echo 2024  - continue to monitor  - f/u with cardiology    Hypertension associated with diabetes  - well controlled   - discussed dash diet, exercise/weight loss, and increased cardiovascular exercise   - monitor BP at home w/ goal <130/80      S/P CABG (coronary artery bypass graft)  -     coenzyme Q10 100 mg capsule; Take 1 capsule (100 mg total) by mouth once daily.  -     omega-3 fatty acids-fish oil 340-1,000 mg Cap; Take 1 capsule by mouth once daily.  - advised follow-up with cardiology     Neuropathy   - Educated the patient on diabetic neuropathy as the likely cause of nighttime leg pain and its relation to glucose control.  - Monitored reports of shooting pains in legs at night, which are indicative of neuropathy, likely related to more uncontrolled diabetes.  - A1C has improved from 11.1 to 8.9, indicating better control but still not ideal.  - Discussed EMG procedure for diagnosing neuropathy, including potential discomfort.  - Recommend magnesium supplementation for potential symptom relief and offered gabapentin for pain relief, which the patient declined.           FOLLOW-UP:  - Continued coenzyme Q10, vitamin D, and omega-3 supplements.  - Follow up in August with Dr. Costello.        Future Appointments       Date Provider Specialty Appt Notes    8/11/2025 Mary Arce MD Dermatology skin check    8/13/2025 Shawna Costello MD Family Medicine 6 mo follow up    9/9/2025 Stefania Jensen RD, [facility] [place] [facility]    12/16/2025 Vneus Magaña MD Dermatology Annual           I spent a total of 42 minutes on the day of the visit.This includes face to face time and non-face to face time preparing to see the patient (eg, review of tests), obtaining and/or reviewing separately obtained history, documenting clinical information in the electronic or other health record, independently interpreting results and communicating results to the  "patient/family/caregiver, or care coordinator.      Portions of this note were dictated using voice recognition software and may contain dictation related errors in spelling / grammar / syntax not discovered on text review.     Radha Sharp PA-C         [1]   Current Outpatient Medications:     ALPRAZolam (XANAX) 0.5 MG tablet, Take 1 tablet (0.5 mg total) by mouth 2 (two) times daily. as needed for anxiety., Disp: 180 tablet, Rfl: 0    aspirin 81 MG Chew, Take 1 tablet (81 mg total) by mouth once daily., Disp: 30 tablet, Rfl: 0    atorvastatin (LIPITOR) 40 MG tablet, Take 1 tablet (40 mg total) by mouth once daily., Disp: 90 tablet, Rfl: 3    clopidogreL (PLAVIX) 75 mg tablet, Take 1 tablet (75 mg total) by mouth once daily., Disp: 90 tablet, Rfl: 3    empagliflozin (JARDIANCE) 10 mg tablet, Take 1 tablet (10 mg total) by mouth once daily., Disp: 90 tablet, Rfl: 3    insulin glargine, TOUJEO, (TOUJEO SOLOSTAR U-300 INSULIN) 300 unit/mL (1.5 mL) InPn pen, Inject 70 Units into the skin once daily., Disp: 21 mL, Rfl: 3    metoprolol succinate (TOPROL-XL) 50 MG 24 hr tablet, Take 1 tablet (50 mg total) by mouth once daily., Disp: 90 tablet, Rfl: 3    pen needle, diabetic (BD JERI 2ND GEN PEN NEEDLE) 32 gauge x 5/32" Ndle, USE AS DIRECTED, Disp: 100 each, Rfl: 5    repaglinide (PRANDIN) 2 MG tablet, Take 1 tablet (2 mg total) by mouth 3 (three) times daily before meals., Disp: 270 tablet, Rfl: 3    TRUE METRIX GLUCOSE METER Misc, , Disp: , Rfl:     TRUE METRIX GLUCOSE TEST STRIP Strp, U QAM, Disp: 100 each, Rfl: 5    TRUEPLUS LANCETS 30 gauge Misc, U QAM, Disp: 100 each, Rfl: 5    coenzyme Q10 100 mg capsule, Take 1 capsule (100 mg total) by mouth once daily., Disp: 90 capsule, Rfl: 3    ergocalciferol (ERGOCALCIFEROL) 50,000 unit Cap, Take 1 capsule (50,000 Units total) by mouth every 7 days., Disp: 12 capsule, Rfl: 3    omega-3 fatty acids-fish oil 340-1,000 mg Cap, Take 1 capsule by mouth once daily., Disp: 90 " capsule, Rfl: 3

## 2025-05-06 NOTE — Clinical Note
Good morning, I saw this patient recently who was complaining of back pain to her chest. She reports these symptoms are similar to she she had to have CABG. Her EKG has not changed much. I ordered stress test, but I would like a soon follow-up with cardiology for assessment.  Thanks, Radha

## 2025-05-06 NOTE — Clinical Note
Saw patient in clinic today. She reports have similar symptoms over the last few weeks that she had prior to CABG. EKG is unchanged. I ordered a stress test. I would like her to follow-up with cardiology soon. Are you able to help schedule? Thanks

## 2025-05-06 NOTE — TELEPHONE ENCOUNTER
omega-3 fatty acids-fish oil 340-1,000 mg      Pharm adv they do not have 340-100mg of this med. They have 300 - 1000 mg or 360-1200 mg. Pls send new RX to [facility]

## 2025-05-06 NOTE — TELEPHONE ENCOUNTER
----- Message from Rolando sent at 5/6/2025  1:05 PM CDT -----  Contact: Sampson [phone]  Type:  Pharmacy Calling to Clarify an RXName of Caller:[facility]psonPh[facility]acy [facility] Name:omega-3 fatty acids-fish oil 340-1,000 mg CapWhat do they need to clarify?: AmountBest Call Back Number:[phone] Information: Pharm adv they do not have 340-100mg of this med. They have 300 - 1000 mg or 360-1200 mg. Pls call back and adv. Thank you.

## 2025-05-07 RX ORDER — ACETAMINOPHEN 500 MG
1 TABLET ORAL DAILY
Qty: 90 CAPSULE | Refills: 3 | Status: SHIPPED | OUTPATIENT
Start: 2025-05-07 | End: 2026-05-07

## 2025-05-08 ENCOUNTER — TELEPHONE (OUTPATIENT)
Dept: CARDIOLOGY | Facility: CLINIC | Age: 75
End: 2025-05-08
Payer: MEDICARE

## 2025-05-12 ENCOUNTER — TELEPHONE (OUTPATIENT)
Dept: CARDIOLOGY | Facility: HOSPITAL | Age: 75
End: 2025-05-12

## 2025-05-12 NOTE — TELEPHONE ENCOUNTER
[facility] advised, [facility]  Will need to register on the first floor at the main entrance.  Patient advised that arrival time is 07:10.  Patient advised that they may be here about 3 hours, and may want to bring something to occupy their time, as there will be periods of waiting.    Patient advised, they may take their medications prior to testing if you need to.  Advised if food is needed to take medications, please keep it light, like toast and juice.    Patient advised to avoid all caffeine 12 hours prior to testing.  This includes sodas, chocolate, tea and decaf coffee.    Wear comfortable clothing.  No lotions, oils, or powders to the upper chest area. May wear deodorant.    No metal jewelry, buttons, or zippers to the upper body.  Patient verbalizes understanding of instructions.

## 2025-05-13 ENCOUNTER — HOSPITAL ENCOUNTER (OUTPATIENT)
Dept: CARDIOLOGY | Facility: HOSPITAL | Age: 75
Discharge: HOME | End: 2025-05-13
Payer: MEDICARE

## 2025-05-13 ENCOUNTER — HOSPITAL ENCOUNTER (OUTPATIENT)
Dept: RADIOLOGY | Facility: HOSPITAL | Age: 75
Discharge: HOME | End: 2025-05-13
Payer: MEDICARE

## 2025-05-13 DIAGNOSIS — M54.9 BACK PAIN, UNSPECIFIED BACK LOCATION, UNSPECIFIED BACK PAIN LATERALITY, UNSPECIFIED CHRONICITY: ICD-10-CM

## 2025-05-13 DIAGNOSIS — R07.9 CHEST PAIN, UNSPECIFIED TYPE: ICD-10-CM

## 2025-05-13 LAB
CV PHARM DOSE: 0.4 MG
CV STRESS BASE HR: 64 BPM
DIASTOLIC BLOOD PRESSURE: 84 MMHG
EJECTION FRACTION- HIGH: 65 %
END DIASTOLIC INDEX-HIGH: 153 ML/M2
END DIASTOLIC INDEX-LOW: 93 ML/M2
END SYSTOLIC INDEX-HIGH: 71 ML/M2
END SYSTOLIC INDEX-LOW: 31 ML/M2
NUC REST DIASTOLIC VOLUME INDEX: 56
NUC REST EJECTION FRACTION: 75
NUC REST SYSTOLIC VOLUME INDEX: 14
NUC STRESS DIASTOLIC VOLUME INDEX: 49
NUC STRESS EJECTION FRACTION: 63 %
NUC STRESS SYSTOLIC VOLUME INDEX: 18
OHS CV CPX 1 MINUTE RECOVERY HEART RATE: 82 BPM
OHS CV CPX 85 PERCENT MAX PREDICTED HEART RATE MALE: 124
OHS CV CPX MAX PREDICTED HEART RATE: 146
OHS CV CPX PATIENT IS FEMALE: 1
OHS CV CPX PATIENT IS MALE: 0
OHS CV CPX PEAK DIASTOLIC BLOOD PRESSURE: 82 MMHG
OHS CV CPX PEAK HEAR RATE: 82 BPM
OHS CV CPX PEAK RATE PRESSURE PRODUCT: NORMAL
OHS CV CPX PEAK SYSTOLIC BLOOD PRESSURE: 127 MMHG
OHS CV CPX PERCENT MAX PREDICTED HEART RATE ACHIEVED: 58
OHS CV CPX RATE PRESSURE PRODUCT PRESENTING: 8768
OHS CV INITIAL DOSE: 12 MCG/KG/MIN
OHS CV PEAK DOSE: 27.2 MCG/KG/MIN
RETIRED EF AND QEF - SEE NOTES: 53 %
SYSTOLIC BLOOD PRESSURE: 137 MMHG

## 2025-05-13 PROCEDURE — A9502 TC99M TETROFOSMIN: HCPCS

## 2025-05-13 PROCEDURE — 78452 HT MUSCLE IMAGE SPECT MULT: CPT

## 2025-05-13 PROCEDURE — 63600175 PHARM REV CODE 636 W HCPCS

## 2025-05-13 RX ORDER — REGADENOSON 0.08 MG/ML
0.4 INJECTION, SOLUTION INTRAVENOUS ONCE
Status: COMPLETED | OUTPATIENT
Start: 2025-05-13 | End: 2025-05-13

## 2025-05-13 RX ADMIN — TETROFOSMIN 27.2 MILLICURIE: 1.38 INJECTION, POWDER, LYOPHILIZED, FOR SOLUTION INTRAVENOUS at 08:05

## 2025-05-13 RX ADMIN — TETROFOSMIN 12 MILLICURIE: 1.38 INJECTION, POWDER, LYOPHILIZED, FOR SOLUTION INTRAVENOUS at 07:05

## 2025-05-13 RX ADMIN — REGADENOSON 0.4 MG: 0.08 INJECTION, SOLUTION INTRAVENOUS at 08:05

## 2025-05-14 ENCOUNTER — RESULTS FOLLOW-UP (OUTPATIENT)
Dept: FAMILY MEDICINE | Facility: CLINIC | Age: 75
End: 2025-05-14

## 2025-05-28 NOTE — PROGRESS NOTES
Subjective:    Patient ID:  Debby Brown is a 75 y.o. female who presents for follow-up of   Chief Complaint   Patient presents with    Results     STRESS/        HPI:    She comes in today to follow-up for upper back pain.  She had emergency room visit does stress test with nuclear was ordered as an outpatient and she is here for the results..  Her upper back pain has gone away for now  Her main complaint is that she gets out of breath with exertion.  She did not finish a cardiac rehab did not feel that she was getting any benefit.  She does not feel like she recovered from surgery no stamina, gets out of breath.      Normal myocardial perfusion scan. There is no evidence of myocardial ischemia or infarction.    There is a trivial to mild intensity fixed perfusion abnormality in the inferolateral wall of the left ventricle secondary to diaphragm attenuation.    The gated perfusion images showed an ejection fraction of 75% at rest. The gated perfusion images showed an ejection fraction of 63% post stress. Normal ejection fraction is greater than 53%.    There is normal wall motion at rest and post-stress.    LV cavity size is normal at rest and normal at post-stress.    The ECG portion of the study is negative for ischemia.    The patient reported no chest pain during the stress test.    There were no arrhythmias during stress.       1/3/25 CANDY MYERS  Ms. Brown is a 74-year-old female with past medical history of diabetes, hyperlipidemia, hypertension and CAD status post CABG September 2024.  She denies any shortness of breath on exertion however she does complain of some chest pain her left breast around where her surgical scar is that is reproducible with palpation.  She is also complaining of feeling achy all over and cramps in her legs.  She has not taken her metoprolol in the past few days because she ran out.  Also complains of chronic back pain.  She is also inquiring about weight loss medications.      Summary         74 year old diabetic female with poorly controlled DM Type II (A1C 10), HTN, and hyperlipidemia presenting with ACS.  Coronary angiogram revealed severe multivessel CAD including severely diseased LAD and LCx.  The patient has a large RCA with diffuse disease and evidence of thrombus in the partially recannulated RPVL.  Plan for evaluation to determine candidacy for surgical revascularization (possible endarterectomy of the mid/distal LAD to allow this diabetic patient to get a LIMA graft and revascularization of RCA).    Considered placing IABP; however, patient hemodynamically stable and chest pain free upon completion of procedure. In addition, her iliac vessel revealed tortousity and did not appear ideal for IABP placement.  Plan for heparin gtt, nitro gtt, and evaluation to determine candidacy for surgical revascularization.    The estimated blood loss was <50 mL.     The procedure log was documented by Documenter: Ashley Christina RT and verified by Ashleigh Chambers MD.     Date: 8/31/2024  Time: 9:18 AM    Eckholdt dictating with date of service being the 6th of September 2024.    Preoperative diagnosis:  Severe atherosclerotic coronary artery disease status post myocardial infarction.    Postoperative diagnosis: Same.    Operation: Coronary artery bypass grafting x5 using left internal mammary artery to left anterior descending coronary artery, the left radial artery from the aorta to the ramus intermediate coronary artery, and separate segments of saphenous vein from the aorta to the posterior descending coronary artery, from the aorta to the posterolateral branch of the circumflex coronary artery and from the left internal mammary artery to the obtuse marginal coronary artery using a combination of antegrade and retrograde cardioplegia, mild systemic hypothermia, endoscopic vein harvest from the left leg, and insertion of right femoral arterial line with percutaneous Seldinger technique          Review of patient's allergies indicates:   Allergen Reactions    Bactrim [sulfamethoxazole-trimethoprim]     Codeine     Levaquin [levofloxacin]     Pcn [penicillins]        Past Medical History:   Diagnosis Date    Allergy     Anxiety     Depression     Diabetes mellitus, type 2     Hyperlipidemia     Hypertension     Personal history of colonic polyps      Past Surgical History:   Procedure Laterality Date    APPENDECTOMY      BACK SURGERY      BREAST SURGERY      CHOLECYSTECTOMY      COLONOSCOPY      COLONOSCOPY N/A 03/13/2024    Procedure: COLONOSCOPY;  Surgeon: Isabel Noriega MD;  Location: [facility] ENDO;  Service: Endoscopy;  Laterality: N/A;  ppm    CORONARY ANGIOGRAPHY N/A 8/30/2024    Procedure: ANGIOGRAM, CORONARY ARTERY;  Surgeon: Ashleigh Chambers MD;  Location: [facility] CATH/EP LAB;  Service: Cardiology;  Laterality: N/A;    CORONARY ARTERY BYPASS GRAFT (CABG) N/A 9/6/2024    Procedure: CORONARY ARTERY BYPASS GRAFT (CABG);  Surgeon: Ke Garcia MD;  Location: [facility];  Service: Cardiothoracic;  Laterality: N/A;    ENDOSCOPIC HARVEST OF VEIN Left 9/6/2024    Procedure: HARVEST-VEIN-ENDOVASCULAR;  Surgeon: Ke Garcia MD;  Location: [facility];  Service: Cardiothoracic;  Laterality: Left;    GALLBLADDER SURGERY      HERNIA REPAIR      umbilical    lymphnode remove      SHOULDER SURGERY Left     RTR    SURGICAL PROCUREMENT, ARTERY, RADIAL, FOR CABG Left 9/6/2024    Procedure: SURGICAL PROCUREMENT,ARTERY,RADIAL,FOR CABG;  Surgeon: Ke Garcia MD;  Location: [facility];  Service: Cardiothoracic;  Laterality: Left;    TOTAL REDUCTION MAMMOPLASTY Bilateral 2001    TUBAL LIGATION       Social History[1]  Family History   Problem Relation Name Age of Onset    Mental illness Mother      Cancer Mother          female cancer?    Depression Mother      Heart disease Mother      Hypertension Mother      Stroke Mother      Heart disease Father      Hypertension Father      Stroke Father       Cancer Brother          bladder    Glaucoma Neg Hx      Macular degeneration Neg Hx          Review of Systems:   Constitution: Negative for diaphoresis and fever.   HEENT: Negative for nosebleeds.    Cardiovascular: Negative for chest pain       No dyspnea on exertion       No leg swelling        No palpitations  Respiratory: Negative for shortness of breath and wheezing.    Hematologic/Lymphatic: Negative for bleeding problem. Does not bruise/bleed easily.   Skin: Negative for color change and rash.   Musculoskeletal: Negative for falls and myalgias.   Gastrointestinal: Negative for hematemesis and hematochezia.   Genitourinary: Negative for hematuria.   Neurological: Negative for dizziness and light-headedness.   Psychiatric/Behavioral: Negative for altered mental status and memory loss.          Objective:        Vitals:    05/29/25 0930   BP: 120/87   Pulse: 75       Lab Results   Component Value Date    WBC 9.15 02/05/2025    HGB 14.1 02/05/2025    HCT 42.5 02/05/2025     02/05/2025    CHOL 162 02/06/2025    TRIG 194 (H) 02/06/2025    HDL 42 02/06/2025    ALT 27 02/05/2025    AST 15 02/05/2025     (L) 02/05/2025    K 4.6 02/05/2025     02/05/2025    CREATININE 1.3 02/05/2025    BUN 31 (H) 02/05/2025    CO2 23 02/05/2025    TSH 1.682 02/06/2025    INR 1.0 08/30/2024    HGBA1C 8.9 (H) 05/05/2025    MICROALBUR 0.9 01/09/2020        ECHOCARDIOGRAM RESULTS  Results for orders placed during the hospital encounter of 08/30/24    Echo    Interpretation Summary    Left Ventricle: The left ventricle is normal in size. Mildly increased wall thickness. There is mild concentric hypertrophy. There is normal systolic function with a visually estimated ejection fraction of 60 - 65%. There is diastolic dysfunction.    Right Ventricle: Normal right ventricular cavity size. Wall thickness is normal. Systolic function is normal.    Aortic Valve: The aortic valve is a trileaflet valve. There is moderate aortic  valve sclerosis. Moderately restricted motion. There is moderate stenosis. Aortic valve area by VTI is 1.18 cm². Aortic valve peak velocity is 2.28 m/s. Mean gradient is 12 mmHg. The dimensionless index is 0.38.    Mitral Valve: There is mild bileaflet sclerosis.    Tricuspid Valve: There is mild regurgitation with a centrally directed jet.    Pulmonary Artery: The estimated pulmonary artery systolic pressure is 26 mmHg.    IVC/SVC: Normal venous pressure at 3 mmHg.        CURRENT/PREVIOUS VISIT EKG  Results for orders placed or performed in visit on 05/06/25   IN OFFICE EKG 12-LEAD (to [employer])    Collection Time: 05/06/25 11:12 AM   Result Value Ref Range    QRS Duration 96 ms    [facility] QTC Calculation 438 ms    Narrative    Test Reason : M54.9,R07.9,    Vent. Rate :  66 BPM     Atrial Rate :  66 BPM     P-R Int : 228 ms          QRS Dur :  96 ms      QT Int : 418 ms       P-R-T Axes :  67  24  59 degrees    QTcB Int : 438 ms    Sinus rhythm with 1st degree A-V block  Otherwise normal ECG  When compared with ECG of 02-Jan-2025 13:23,  FL interval has increased  Vent. rate has decreased by  43 bpm  Confirmed by Neno Perez ([id number]) on 5/6/2025 3:39:13 PM    Referred By: Radha Sharp           Confirmed By: Neno Perez     No valid procedures specified.   Results for orders placed during the hospital encounter of 05/13/25    Nuclear Stress - Cardiology Interpreted    Interpretation Summary    Normal myocardial perfusion scan. There is no evidence of myocardial ischemia or infarction.    There is a trivial to mild intensity fixed perfusion abnormality in the inferolateral wall of the left ventricle secondary to diaphragm attenuation.    The gated perfusion images showed an ejection fraction of 75% at rest. The gated perfusion images showed an ejection fraction of 63% post stress. Normal ejection fraction is greater than 53%.    There is normal wall motion at rest and post-stress.    LV cavity size is normal at rest and  "normal at post-stress.    The ECG portion of the study is negative for ischemia.    The patient reported no chest pain during the stress test.    There were no arrhythmias during stress.      Physical Exam:  CONSTITUTIONAL: No fever, no chills  HEENT: Normocephalic, atraumatic,pupils reactive to light                 NECK:  No JVD no carotid bruit  CVS: S1S2+, RRR, no murmurs,   LUNGS: Clear  ABDOMEN: Soft, NT, BS+  EXTREMITIES: No cyanosis, edema  : No mckeon catheter  NEURO: AAO X 3  PSY: Normal affect      Medication List with Changes/Refills   New Medications    EZETIMIBE (ZETIA) 10 MG TABLET    Take 1 tablet (10 mg total) by mouth once daily.   Current Medications    ALPRAZOLAM (XANAX) 0.5 MG TABLET    Take 1 tablet (0.5 mg total) by mouth 2 (two) times daily. as needed for anxiety.    ASPIRIN 81 MG CHEW    Take 1 tablet (81 mg total) by mouth once daily.    ATORVASTATIN (LIPITOR) 40 MG TABLET    Take 1 tablet (40 mg total) by mouth once daily.    COENZYME Q10 100 MG CAPSULE    Take 1 capsule (100 mg total) by mouth once daily.    EMPAGLIFLOZIN (JARDIANCE) 10 MG TABLET    Take 1 tablet (10 mg total) by mouth once daily.    ERGOCALCIFEROL (ERGOCALCIFEROL) 50,000 UNIT CAP    Take 1 capsule (50,000 Units total) by mouth every 7 days.    INSULIN GLARGINE, TOUJEO, (TOUJEO SOLOSTAR U-300 INSULIN) 300 UNIT/ML (1.5 ML) INPN PEN    Inject 70 Units into the skin once daily.    METOPROLOL SUCCINATE (TOPROL-XL) 50 MG 24 HR TABLET    Take 1 tablet (50 mg total) by mouth once daily.    OMEGA 3-DHA-EPA-FISH -1,000 MG CAP    Take 1 capsule by mouth Daily.    PEN NEEDLE, DIABETIC (BD JERI 2ND GEN PEN NEEDLE) 32 GAUGE X 5/32" NDLE    USE AS DIRECTED    REPAGLINIDE (PRANDIN) 2 MG TABLET    Take 1 tablet (2 mg total) by mouth 3 (three) times daily before meals.    TRUE METRIX GLUCOSE METER MISC        TRUE METRIX GLUCOSE TEST STRIP STRP    U QAM    TRUEPLUS LANCETS 30 GAUGE MISC    U QAM   Discontinued Medications    " CLOPIDOGREL (PLAVIX) 75 MG TABLET    Take 1 tablet (75 mg total) by mouth once daily.    OMEGA-3 FATTY ACIDS-FISH -1,000 MG CAP    Take 1 capsule by mouth once daily.             Assessment:       1. Fatigue, unspecified type    2. S/P CABG (coronary artery bypass graft)    3. Morbid obesity    4. Paroxysmal atrial fibrillation    5. Aortic valve stenosis, etiology of cardiac valve disease unspecified    6. Calcification of aorta    7. Panic attacks    8. Type 2 diabetes mellitus with microalbuminuria, with long-term current use of insulin         Plan:     Problem List Items Addressed This Visit          Psychiatric    Panic attacks       Cardiac/Vascular    Aortic valve stenosis    Calcification of aorta    A-fib    S/P CABG (coronary artery bypass graft)    Relevant Orders    Hypertension Digital Medicine (HDMP) Enrollment Order (Completed)       Endocrine    Type 2 diabetes mellitus with microalbuminuria, with long-term current use of insulin    Morbid obesity     Other Visit Diagnoses         Fatigue, unspecified type    -  Primary    Relevant Orders    Hypertension Digital Medicine (HDMP) Enrollment Order (Completed)            Easy fatigability.  She has still not recovered from having the major of coronary bypass surgery.  She is okay to join the gym and do exercise program.  No cardiac limitations.  Recommend a variety of exercises to help with her motivation such as riding a bike or walking on the beach.      Hyperlipidemia her LDL cholesterol is above goal will add Zetia to her regimen to get the LDL to 50    BMI 31.9 consistent with abdominal obesity recommend diet exercise she has gained 10-15 lb in the last few months    Insomnia recommend over-the-counter melatonin and are Zyrtec her Benadryl.    Status post CABG patient is doing well stress test negative DC Plavix continue aspirin    Follow up in about 6 months (around 11/29/2025).    The patients questions were answered, they verbalized  understanding, and agreed with the treatment plan.     BENJAMIN GUILLEN MD  [facility]         [1]   Social History  Tobacco Use    Smoking status: Former     Passive exposure: Past    Smokeless tobacco: Never   Substance Use Topics    Alcohol use: Not Currently     Comment: occasionally    Drug use: No

## 2025-05-29 ENCOUNTER — OFFICE VISIT (OUTPATIENT)
Dept: CARDIOLOGY | Facility: CLINIC | Age: 75
End: 2025-05-29
Payer: MEDICARE

## 2025-05-29 VITALS
DIASTOLIC BLOOD PRESSURE: 87 MMHG | HEART RATE: 75 BPM | WEIGHT: 203 LBS | BODY MASS INDEX: 31.79 KG/M2 | SYSTOLIC BLOOD PRESSURE: 120 MMHG | OXYGEN SATURATION: 96 %

## 2025-05-29 DIAGNOSIS — I35.0 AORTIC VALVE STENOSIS, ETIOLOGY OF CARDIAC VALVE DISEASE UNSPECIFIED: ICD-10-CM

## 2025-05-29 DIAGNOSIS — R53.83 FATIGUE, UNSPECIFIED TYPE: Primary | ICD-10-CM

## 2025-05-29 DIAGNOSIS — I48.0 PAROXYSMAL ATRIAL FIBRILLATION: ICD-10-CM

## 2025-05-29 DIAGNOSIS — E11.29 TYPE 2 DIABETES MELLITUS WITH MICROALBUMINURIA, WITH LONG-TERM CURRENT USE OF INSULIN: ICD-10-CM

## 2025-05-29 DIAGNOSIS — Z79.4 TYPE 2 DIABETES MELLITUS WITH MICROALBUMINURIA, WITH LONG-TERM CURRENT USE OF INSULIN: ICD-10-CM

## 2025-05-29 DIAGNOSIS — Z95.1 S/P CABG (CORONARY ARTERY BYPASS GRAFT): ICD-10-CM

## 2025-05-29 DIAGNOSIS — R80.9 TYPE 2 DIABETES MELLITUS WITH MICROALBUMINURIA, WITH LONG-TERM CURRENT USE OF INSULIN: ICD-10-CM

## 2025-05-29 DIAGNOSIS — F41.0 PANIC ATTACKS: ICD-10-CM

## 2025-05-29 DIAGNOSIS — I70.0 CALCIFICATION OF AORTA: ICD-10-CM

## 2025-05-29 DIAGNOSIS — E66.01 MORBID OBESITY: ICD-10-CM

## 2025-05-29 PROCEDURE — 99214 OFFICE O/P EST MOD 30 MIN: CPT | Mod: S$PBB,,, | Performed by: GENERAL PRACTICE

## 2025-05-29 PROCEDURE — 99999 PR PBB SHADOW E&M-EST. PATIENT-LVL II: CPT | Mod: PBBFAC,,, | Performed by: GENERAL PRACTICE

## 2025-05-29 PROCEDURE — 99212 OFFICE O/P EST SF 10 MIN: CPT | Mod: PBBFAC,PN | Performed by: GENERAL PRACTICE

## 2025-05-29 RX ORDER — EZETIMIBE 10 MG/1
10 TABLET ORAL DAILY
Qty: 90 TABLET | Refills: 3 | Status: SHIPPED | OUTPATIENT
Start: 2025-05-29 | End: 2026-05-29

## 2025-07-16 ENCOUNTER — TELEPHONE (OUTPATIENT)
Dept: FAMILY MEDICINE | Facility: CLINIC | Age: 75
End: 2025-07-16
Payer: MEDICARE

## 2025-07-17 DIAGNOSIS — I15.2 HYPERTENSION ASSOCIATED WITH DIABETES: ICD-10-CM

## 2025-07-17 DIAGNOSIS — E11.65 UNCONTROLLED TYPE 2 DIABETES MELLITUS WITH HYPERGLYCEMIA: Primary | ICD-10-CM

## 2025-07-17 DIAGNOSIS — N18.32 STAGE 3B CHRONIC KIDNEY DISEASE: ICD-10-CM

## 2025-07-17 DIAGNOSIS — Z13.29 SCREENING FOR THYROID DISORDER: ICD-10-CM

## 2025-07-17 DIAGNOSIS — I70.0 AORTIC ATHEROSCLEROSIS: ICD-10-CM

## 2025-07-17 DIAGNOSIS — E11.59 HYPERTENSION ASSOCIATED WITH DIABETES: ICD-10-CM

## 2025-07-31 NOTE — PROGRESS NOTES
Subjective:       Patient ID: Debby Brown is a 75 y.o. female Body mass index is 31.01 kg/m².    Chief Complaint: Abdominal Pain, Diarrhea, Gas, Bloated, Heartburn, and Nausea (Without vomiting )    This patient is new to me.  Referring Provider: Aaareferral Self for stomach issues - gas, bloating, change in stool.  Established patient of Dr. Noriega.     Mild nausea.   Gas and bloating - abdominal pain.     BMs - daily, loose, black, tarry stools.      Review of Systems   Constitutional:  Negative for activity change, appetite change, fatigue, fever and unexpected weight change.   HENT:  Negative for sore throat and trouble swallowing.    Respiratory:  Negative for cough and shortness of breath.    Cardiovascular:  Negative for chest pain.   Gastrointestinal:  Positive for abdominal pain, diarrhea (loose stool) and nausea. Negative for abdominal distention, anal bleeding, blood in stool, constipation, rectal pain and vomiting.       No LMP recorded. Patient is postmenopausal.  Past Medical History:   Diagnosis Date    Allergy     Anxiety     Depression     Diabetes mellitus, type 2     Hyperlipidemia     Hypertension     Personal history of colonic polyps      Past Surgical History:   Procedure Laterality Date    APPENDECTOMY      BACK SURGERY      BREAST SURGERY      CHOLECYSTECTOMY      COLONOSCOPY      COLONOSCOPY N/A 03/13/2024    Procedure: COLONOSCOPY;  Surgeon: Isabel Noriega MD;  Location: [facility] ENDO;  Service: Endoscopy;  Laterality: N/A;  ppm    CORONARY ANGIOGRAPHY N/A 8/30/2024    Procedure: ANGIOGRAM, CORONARY ARTERY;  Surgeon: Ashleigh Chambers MD;  Location: [facility] CATH/EP LAB;  Service: Cardiology;  Laterality: N/A;    CORONARY ARTERY BYPASS GRAFT (CABG) N/A 9/6/2024    Procedure: CORONARY ARTERY BYPASS GRAFT (CABG);  Surgeon: Ke Garcia MD;  Location: [facility] OR;  Service: Cardiothoracic;  Laterality: N/A;    ENDOSCOPIC HARVEST OF VEIN Left 9/6/2024    Procedure:  HARVEST-VEIN-ENDOVASCULAR;  Surgeon: Ke Garcia MD;  Location: [facility] OR;  Service: Cardiothoracic;  Laterality: Left;    GALLBLADDER SURGERY      HERNIA REPAIR      umbilical    lymphnode remove      SHOULDER SURGERY Left     RTR    SURGICAL PROCUREMENT, ARTERY, RADIAL, FOR CABG Left 9/6/2024    Procedure: SURGICAL PROCUREMENT,ARTERY,RADIAL,FOR CABG;  Surgeon: Ke Garcia MD;  Location: [facility] OR;  Service: Cardiothoracic;  Laterality: Left;    TOTAL REDUCTION MAMMOPLASTY Bilateral 2001    TUBAL LIGATION       Family History   Problem Relation Name Age of Onset    Mental illness Mother      Cancer Mother          female cancer?    Depression Mother      Heart disease Mother      Hypertension Mother      Stroke Mother      Heart disease Father      Hypertension Father      Stroke Father      Cancer Brother          bladder    Glaucoma Neg Hx      Macular degeneration Neg Hx       Social History[1]  Wt Readings from Last 10 Encounters:   08/01/25 89.8 kg (197 lb 15.6 oz)   05/29/25 92.1 kg (203 lb)   05/06/25 90.4 kg (199 lb 4.7 oz)   04/24/25 90.3 kg (199 lb 0.5 oz)   02/10/25 90.5 kg (199 lb 6.5 oz)   02/06/25 89.9 kg (198 lb 3.1 oz)   01/02/25 87.9 kg (193 lb 10.8 oz)   10/25/24 85.4 kg (188 lb 4.4 oz)   10/22/24 85.3 kg (188 lb)   10/10/24 85.3 kg (188 lb 0.8 oz)     Lab Results   Component Value Date    WBC 9.15 02/05/2025    HGB 14.1 02/05/2025    HCT 42.5 02/05/2025    MCV 85 02/05/2025     02/05/2025     CMP  Sodium   Date Value Ref Range Status   02/05/2025 134 (L) 136 - 145 mmol/L Final     Potassium   Date Value Ref Range Status   02/05/2025 4.6 3.5 - 5.1 mmol/L Final     Chloride   Date Value Ref Range Status   02/05/2025 104 95 - 110 mmol/L Final     CO2   Date Value Ref Range Status   02/05/2025 23 23 - 29 mmol/L Final     Glucose   Date Value Ref Range Status   02/05/2025 372 (H) 70 - 110 mg/dL Final     BUN   Date Value Ref Range Status   02/05/2025 31 (H) 8 - 23 mg/dL Final  "    Creatinine   Date Value Ref Range Status   02/05/2025 1.3 0.5 - 1.4 mg/dL Final     Calcium   Date Value Ref Range Status   02/05/2025 9.4 8.7 - 10.5 mg/dL Final     Total Protein   Date Value Ref Range Status   02/05/2025 7.2 6.0 - 8.4 g/dL Final     Albumin   Date Value Ref Range Status   02/05/2025 3.9 3.5 - 5.2 g/dL Final     Total Bilirubin   Date Value Ref Range Status   02/05/2025 0.5 0.1 - 1.0 mg/dL Final     Comment:     For infants and newborns, interpretation of results should be based  on gestational age, weight and in agreement with clinical  observations.    Premature Infant recommended reference ranges:  Up to 24 hours.............<8.0 mg/dL  Up to 48 hours............<12.0 mg/dL  3-5 days..................<15.0 mg/dL  6-29 days.................<15.0 mg/dL       Alkaline Phosphatase   Date Value Ref Range Status   02/05/2025 67 55 - 135 U/L Final     AST   Date Value Ref Range Status   02/05/2025 15 10 - 40 U/L Final     ALT   Date Value Ref Range Status   02/05/2025 27 10 - 44 U/L Final     Anion Gap   Date Value Ref Range Status   02/05/2025 7 (L) 8 - 16 mmol/L Final     eGFR if    Date Value Ref Range Status   07/19/2022 >60.0 >60 mL/min/1.73 m^2 Final     eGFR if non    Date Value Ref Range Status   07/19/2022 56.8 (A) >60 mL/min/1.73 m^2 Final     Comment:     Calculation used to obtain the estimated glomerular filtration  rate (eGFR) is the CKD-EPI equation.        No results found for: "AMYLASE"  Lab Results   Component Value Date    LIPASE 47 02/05/2025     No results found for: "LIPASERES"  Lab Results   Component Value Date    TSH 1.682 02/06/2025       Reviewed prior medical records including radiology report of n/a & endoscopy history (see surgical history).    Objective:      Physical Exam  Vitals and nursing note reviewed.   Constitutional:       General: She is not in acute distress.     Appearance: She is obese. She is not ill-appearing.   HENT:      " Head: Normocephalic and atraumatic.      Mouth/Throat:      Mouth: Mucous membranes are moist.      Pharynx: Oropharynx is clear.   Eyes:      Conjunctiva/sclera: Conjunctivae normal.   Cardiovascular:      Rate and Rhythm: Normal rate and regular rhythm.      Pulses: Normal pulses.   Pulmonary:      Effort: Pulmonary effort is normal. No respiratory distress.   Abdominal:      General: Abdomen is flat. There is no distension.      Palpations: Abdomen is soft.      Tenderness: There is no abdominal tenderness.   Skin:     General: Skin is warm and dry.      Capillary Refill: Capillary refill takes 2 to 3 seconds.   Neurological:      Mental Status: She is alert and oriented to person, place, and time.         Assessment:       1. Black stools    2. Loose stools    3. Epigastric pain        Plan:       Black stools & Epigastric pain  Highly suspect gastric ulcer with possible upper GI bleeding  - schedule EGD, discussed procedure with patient, including risks and benefits, patient verbalized understanding    Discussed with patient avoiding alcohol, caffeine, increased stress, etc  - Avoid foods and drinks that cause discomfort for you. For many people these include alcohol, coffee, caffeinated soda, fatty foods, chocolate, and spicy foods.  - Avoid eating late night snacks.  - If you smoke or chew tobacco, try to quit. Tobacco will slow the healing of your ulcer and increase the chance that the ulcer will come back. Talk to your doctor about getting help for quitting tobacco use.  - Try to reduce your stress level and learn ways to better manage stress.  Avoid drugs such as aspirin, ibuprofen (Advil, Motrin), or naproxen (Aleve, Naprosyn). Take acetaminophen (Tylenol) to relieve pain. Take all medicines with plenty of water.    Start protonix and carafate  -     sucralfate (CARAFATE) 1 gram tablet; Take 1 tablet (1 g total) by mouth 4 (four) times daily before meals and nightly. for 10 days  Dispense: 40 tablet;  Refill: 0  -     pantoprazole (PROTONIX) 40 MG tablet; Take 1 tablet (40 mg total) by mouth once daily.  Dispense: 90 tablet; Refill: 3  -     Case Request Endoscopy: EGD (ESOPHAGOGASTRODUODENOSCOPY)    Loose stools  R/o constipation with overflow with KUB today    Discussed increased fiber and hydration  Recommend daily exercise as tolerated, adequate water intake (six 8-oz glasses of water daily), and high fiber diet. OTC fiber supplements are recommended if diet does not reach daily fiber goal (20-30 grams daily), such as Metamucil, Citrucel, or FiberCon (take as directed, separate from other oral medications by >2 hours).  -Recommend taking an OTC stool softener such as Colace as directed to avoid hard stools and straining with bowel movements PRN  -Recommend trying OTC MiraLax once daily (17g PO) as directed  - If no improvement with above recommendations, try intermittently dosed Dulcolax OTC as directed (every 3-4  days) PRN to facilitate bowel movements  -If still no improvement with these measures, call/follow-up    -     X-Ray Abdomen AP 1 View; Future; Expected date: 08/01/2025      Follow up if symptoms worsen or fail to improve.      If no improvement in symptoms or symptoms worsen, call/follow-up at clinic or go to ER.       JW Modi, KEAGANC    Encounter includes face to face time and non-face to face time preparing to see the patient (eg, review of tests), obtaining and/or reviewing separately obtained history, documenting clinical information in the electronic or other health record, independently interpreting results (not separately reported) and communicating results to the patient/family/caregiver, or care coordination (not separately reported).     A dictation software program was used for this note. Please expect some simple typographical errors in this note.         [1]   Social History  Tobacco Use    Smoking status: Former     Passive exposure: Past    Smokeless tobacco: Never    Substance Use Topics    Alcohol use: Not Currently     Comment: occasionally    Drug use: No

## 2025-08-01 ENCOUNTER — HOSPITAL ENCOUNTER (OUTPATIENT)
Dept: RADIOLOGY | Facility: HOSPITAL | Age: 75
Discharge: HOME | End: 2025-08-01
Payer: MEDICARE

## 2025-08-01 ENCOUNTER — PATIENT MESSAGE (OUTPATIENT)
Dept: GASTROENTEROLOGY | Facility: CLINIC | Age: 75
End: 2025-08-01

## 2025-08-01 ENCOUNTER — OFFICE VISIT (OUTPATIENT)
Dept: GASTROENTEROLOGY | Facility: CLINIC | Age: 75
End: 2025-08-01
Payer: MEDICARE

## 2025-08-01 VITALS — HEIGHT: 67 IN | BODY MASS INDEX: 31.08 KG/M2 | WEIGHT: 198 LBS

## 2025-08-01 DIAGNOSIS — K92.1 BLACK STOOLS: Primary | ICD-10-CM

## 2025-08-01 DIAGNOSIS — R10.13 EPIGASTRIC PAIN: ICD-10-CM

## 2025-08-01 DIAGNOSIS — R19.5 LOOSE STOOLS: ICD-10-CM

## 2025-08-01 PROCEDURE — 74018 RADEX ABDOMEN 1 VIEW: CPT | Mod: 26,,, | Performed by: RADIOLOGY

## 2025-08-01 PROCEDURE — 74018 RADEX ABDOMEN 1 VIEW: CPT | Mod: TC

## 2025-08-01 PROCEDURE — 99213 OFFICE O/P EST LOW 20 MIN: CPT | Mod: PBBFAC,PN

## 2025-08-01 PROCEDURE — 99999 PR PBB SHADOW E&M-EST. PATIENT-LVL III: CPT | Mod: PBBFAC,,,

## 2025-08-01 RX ORDER — PANTOPRAZOLE SODIUM 40 MG/1
40 TABLET, DELAYED RELEASE ORAL DAILY
Qty: 90 TABLET | Refills: 3 | Status: SHIPPED | OUTPATIENT
Start: 2025-08-01 | End: 2026-08-01

## 2025-08-01 RX ORDER — SUCRALFATE 1 G/1
1 TABLET ORAL
Qty: 40 TABLET | Refills: 0 | Status: SHIPPED | OUTPATIENT
Start: 2025-08-01 | End: 2025-08-11

## 2025-08-01 NOTE — PATIENT INSTRUCTIONS
Recommend daily exercise as tolerated, adequate water intake (six 8-oz glasses of water daily), and high fiber diet. OTC fiber supplements are recommended if diet does not reach daily fiber goal (20-30 grams daily), such as Metamucil, Citrucel, or FiberCon (take as directed, separate from other oral medications by >2 hours).  -Recommend taking an OTC stool softener such as Colace as directed to avoid hard stools and straining with bowel movements PRN  -Recommend trying OTC MiraLax once daily (17g PO) as directed  - If no improvement with above recommendations, try intermittently dosed Dulcolax OTC as directed (every 3-4 days) PRN to facilitate bowel movements  -If still no improvement with these measures, call/follow-up    - Avoid foods and drinks that cause discomfort for you. For many people these include alcohol, coffee, caffeinated soda, fatty foods, chocolate, and spicy foods.  - Avoid eating late night snacks.  - If you smoke or chew tobacco, try to quit. Tobacco will slow the healing of your ulcer and increase the chance that the ulcer will come back. Talk to your doctor about getting help for quitting tobacco use.  - Try to reduce your stress level and learn ways to better manage stress.  Avoid drugs such as aspirin, ibuprofen (Advil, Motrin), or naproxen (Aleve, Naprosyn). Take acetaminophen (Tylenol) to relieve pain. Take all medicines with plenty of water.

## 2025-08-07 ENCOUNTER — LAB VISIT (OUTPATIENT)
Dept: LAB | Facility: HOSPITAL | Age: 75
End: 2025-08-07
Attending: STUDENT IN AN ORGANIZED HEALTH CARE EDUCATION/TRAINING PROGRAM
Payer: MEDICARE

## 2025-08-07 DIAGNOSIS — I70.0 AORTIC ATHEROSCLEROSIS: ICD-10-CM

## 2025-08-07 DIAGNOSIS — E11.65 UNCONTROLLED TYPE 2 DIABETES MELLITUS WITH HYPERGLYCEMIA: ICD-10-CM

## 2025-08-07 DIAGNOSIS — I15.2 HYPERTENSION ASSOCIATED WITH DIABETES: ICD-10-CM

## 2025-08-07 DIAGNOSIS — N18.32 STAGE 3B CHRONIC KIDNEY DISEASE: ICD-10-CM

## 2025-08-07 DIAGNOSIS — Z13.29 SCREENING FOR THYROID DISORDER: ICD-10-CM

## 2025-08-07 DIAGNOSIS — E11.59 HYPERTENSION ASSOCIATED WITH DIABETES: ICD-10-CM

## 2025-08-07 LAB
ABSOLUTE EOSINOPHIL (OHS): 0.48 K/UL
ABSOLUTE MONOCYTE (OHS): 0.72 K/UL (ref 0.3–1)
ABSOLUTE NEUTROPHIL COUNT (OHS): 4.08 K/UL (ref 1.8–7.7)
ALBUMIN SERPL BCP-MCNC: 3.7 G/DL (ref 3.5–5.2)
ALP SERPL-CCNC: 74 UNIT/L (ref 40–150)
ALT SERPL W/O P-5'-P-CCNC: 68 UNIT/L (ref 0–55)
ANION GAP (OHS): 12 MMOL/L (ref 8–16)
AST SERPL-CCNC: 35 UNIT/L (ref 0–50)
BASOPHILS # BLD AUTO: 0.1 K/UL
BASOPHILS NFR BLD AUTO: 1.1 %
BILIRUB SERPL-MCNC: 0.5 MG/DL (ref 0.1–1)
BUN SERPL-MCNC: 19 MG/DL (ref 8–23)
CALCIUM SERPL-MCNC: 9.9 MG/DL (ref 8.7–10.5)
CHLORIDE SERPL-SCNC: 111 MMOL/L (ref 95–110)
CHOLEST SERPL-MCNC: 104 MG/DL (ref 120–199)
CHOLEST/HDLC SERPL: 3 {RATIO} (ref 2–5)
CO2 SERPL-SCNC: 22 MMOL/L (ref 23–29)
CREAT SERPL-MCNC: 1.2 MG/DL (ref 0.5–1.4)
EAG (OHS): 249 MG/DL (ref 68–131)
ERYTHROCYTE [DISTWIDTH] IN BLOOD BY AUTOMATED COUNT: 15.2 % (ref 11.5–14.5)
GFR SERPLBLD CREATININE-BSD FMLA CKD-EPI: 48 ML/MIN/1.73/M2
GLUCOSE SERPL-MCNC: 140 MG/DL (ref 70–110)
HBA1C MFR BLD: 10.3 % (ref 4–5.6)
HCT VFR BLD AUTO: 47.2 % (ref 37–48.5)
HDLC SERPL-MCNC: 35 MG/DL (ref 40–75)
HDLC SERPL: 33.7 % (ref 20–50)
HGB BLD-MCNC: 14.9 GM/DL (ref 12–16)
IMM GRANULOCYTES # BLD AUTO: 0.03 K/UL (ref 0–0.04)
IMM GRANULOCYTES NFR BLD AUTO: 0.3 % (ref 0–0.5)
LDLC SERPL CALC-MCNC: 49.2 MG/DL (ref 63–159)
LYMPHOCYTES # BLD AUTO: 3.86 K/UL (ref 1–4.8)
MCH RBC QN AUTO: 28 PG (ref 27–31)
MCHC RBC AUTO-ENTMCNC: 31.6 G/DL (ref 32–36)
MCV RBC AUTO: 89 FL (ref 82–98)
NONHDLC SERPL-MCNC: 69 MG/DL
NUCLEATED RBC (/100WBC) (OHS): 0 /100 WBC
PLATELET # BLD AUTO: 288 K/UL (ref 150–450)
PMV BLD AUTO: 11.1 FL (ref 9.2–12.9)
POTASSIUM SERPL-SCNC: 4.1 MMOL/L (ref 3.5–5.1)
PROT SERPL-MCNC: 7.4 GM/DL (ref 6–8.4)
RBC # BLD AUTO: 5.33 M/UL (ref 4–5.4)
RELATIVE EOSINOPHIL (OHS): 5.2 %
RELATIVE LYMPHOCYTE (OHS): 41.6 % (ref 18–48)
RELATIVE MONOCYTE (OHS): 7.8 % (ref 4–15)
RELATIVE NEUTROPHIL (OHS): 44 % (ref 38–73)
SODIUM SERPL-SCNC: 145 MMOL/L (ref 136–145)
TRIGL SERPL-MCNC: 99 MG/DL (ref 30–150)
TSH SERPL-ACNC: 2.56 UIU/ML (ref 0.4–4)
WBC # BLD AUTO: 9.27 K/UL (ref 3.9–12.7)

## 2025-08-07 PROCEDURE — 80053 COMPREHEN METABOLIC PANEL: CPT

## 2025-08-07 PROCEDURE — 80061 LIPID PANEL: CPT

## 2025-08-07 PROCEDURE — 36415 COLL VENOUS BLD VENIPUNCTURE: CPT | Mod: PO

## 2025-08-07 PROCEDURE — 83036 HEMOGLOBIN GLYCOSYLATED A1C: CPT

## 2025-08-07 PROCEDURE — 84443 ASSAY THYROID STIM HORMONE: CPT

## 2025-08-07 PROCEDURE — 85025 COMPLETE CBC W/AUTO DIFF WBC: CPT

## 2025-08-08 ENCOUNTER — ANESTHESIA (OUTPATIENT)
Dept: ENDOSCOPY | Facility: HOSPITAL | Age: 75
End: 2025-08-08
Payer: MEDICARE

## 2025-08-08 ENCOUNTER — ANESTHESIA EVENT (OUTPATIENT)
Dept: ENDOSCOPY | Facility: HOSPITAL | Age: 75
End: 2025-08-08
Payer: MEDICARE

## 2025-08-08 ENCOUNTER — HOSPITAL ENCOUNTER (OUTPATIENT)
Facility: HOSPITAL | Age: 75
Discharge: HOME | End: 2025-08-08
Attending: INTERNAL MEDICINE | Admitting: INTERNAL MEDICINE
Payer: MEDICARE

## 2025-08-08 VITALS
OXYGEN SATURATION: 95 % | HEART RATE: 61 BPM | WEIGHT: 189 LBS | TEMPERATURE: 98 F | SYSTOLIC BLOOD PRESSURE: 121 MMHG | HEIGHT: 67 IN | DIASTOLIC BLOOD PRESSURE: 66 MMHG | RESPIRATION RATE: 18 BRPM | BODY MASS INDEX: 29.66 KG/M2

## 2025-08-08 DIAGNOSIS — R10.13 EPIGASTRIC PAIN: ICD-10-CM

## 2025-08-08 PROCEDURE — 25000003 PHARM REV CODE 250: Performed by: INTERNAL MEDICINE

## 2025-08-08 PROCEDURE — D9220A PRA ANESTHESIA: Mod: ANES,,, | Performed by: ANESTHESIOLOGY

## 2025-08-08 PROCEDURE — 63600175 PHARM REV CODE 636 W HCPCS: Performed by: NURSE ANESTHETIST, CERTIFIED REGISTERED

## 2025-08-08 PROCEDURE — 88305 TISSUE EXAM BY PATHOLOGIST: CPT | Mod: TC,59 | Performed by: PATHOLOGY

## 2025-08-08 PROCEDURE — 37000008 HC ANESTHESIA 1ST 15 MINUTES: Performed by: INTERNAL MEDICINE

## 2025-08-08 PROCEDURE — 27201012 HC FORCEPS, HOT/COLD, DISP: Performed by: INTERNAL MEDICINE

## 2025-08-08 PROCEDURE — 43239 EGD BIOPSY SINGLE/MULTIPLE: CPT | Performed by: INTERNAL MEDICINE

## 2025-08-08 PROCEDURE — D9220A PRA ANESTHESIA: Mod: CRNA,,, | Performed by: NURSE ANESTHETIST, CERTIFIED REGISTERED

## 2025-08-08 PROCEDURE — 37000009 HC ANESTHESIA EA ADD 15 MINS: Performed by: INTERNAL MEDICINE

## 2025-08-08 PROCEDURE — 43239 EGD BIOPSY SINGLE/MULTIPLE: CPT | Mod: ,,, | Performed by: INTERNAL MEDICINE

## 2025-08-08 RX ORDER — SODIUM CHLORIDE 9 MG/ML
INJECTION, SOLUTION INTRAVENOUS CONTINUOUS
Status: DISCONTINUED | OUTPATIENT
Start: 2025-08-08 | End: 2025-08-08 | Stop reason: HOSPADM

## 2025-08-08 RX ORDER — LIDOCAINE HYDROCHLORIDE 20 MG/ML
INJECTION INTRAVENOUS
Status: DISCONTINUED | OUTPATIENT
Start: 2025-08-08 | End: 2025-08-08

## 2025-08-08 RX ORDER — PROPOFOL 10 MG/ML
VIAL (ML) INTRAVENOUS
Status: DISCONTINUED | OUTPATIENT
Start: 2025-08-08 | End: 2025-08-08

## 2025-08-08 RX ADMIN — PROPOFOL 50 MG: 10 INJECTION, EMULSION INTRAVENOUS at 01:08

## 2025-08-08 RX ADMIN — PROPOFOL 100 MG: 10 INJECTION, EMULSION INTRAVENOUS at 12:08

## 2025-08-08 RX ADMIN — LIDOCAINE HYDROCHLORIDE 100 MG: 20 INJECTION, SOLUTION INTRAVENOUS at 12:08

## 2025-08-08 RX ADMIN — PROPOFOL 50 MG: 10 INJECTION, EMULSION INTRAVENOUS at 12:08

## 2025-08-08 RX ADMIN — SODIUM CHLORIDE: 9 INJECTION, SOLUTION INTRAVENOUS at 11:08

## 2025-08-11 ENCOUNTER — OFFICE VISIT (OUTPATIENT)
Dept: DERMATOLOGY | Facility: CLINIC | Age: 75
End: 2025-08-11
Payer: MEDICARE

## 2025-08-11 DIAGNOSIS — L21.9 SEBORRHEIC DERMATITIS: Primary | ICD-10-CM

## 2025-08-11 DIAGNOSIS — L82.1 SEBORRHEIC KERATOSIS: ICD-10-CM

## 2025-08-11 DIAGNOSIS — L72.0 MILIA: ICD-10-CM

## 2025-08-11 PROCEDURE — 99204 OFFICE O/P NEW MOD 45 MIN: CPT | Mod: S$GLB,,, | Performed by: STUDENT IN AN ORGANIZED HEALTH CARE EDUCATION/TRAINING PROGRAM

## 2025-08-11 RX ORDER — KETOCONAZOLE 20 MG/ML
SHAMPOO, SUSPENSION TOPICAL DAILY
Qty: 120 ML | Refills: 3 | Status: SHIPPED | OUTPATIENT
Start: 2025-08-11

## 2025-08-11 RX ORDER — CLOBETASOL PROPIONATE 0.5 MG/ML
SOLUTION TOPICAL 2 TIMES DAILY
Qty: 60 ML | Refills: 2 | Status: SHIPPED | OUTPATIENT
Start: 2025-08-11

## 2025-08-14 ENCOUNTER — OFFICE VISIT (OUTPATIENT)
Dept: FAMILY MEDICINE | Facility: CLINIC | Age: 75
End: 2025-08-14
Payer: MEDICARE

## 2025-08-14 VITALS
BODY MASS INDEX: 31.63 KG/M2 | HEIGHT: 67 IN | WEIGHT: 201.5 LBS | DIASTOLIC BLOOD PRESSURE: 74 MMHG | HEART RATE: 63 BPM | SYSTOLIC BLOOD PRESSURE: 120 MMHG | OXYGEN SATURATION: 96 %

## 2025-08-14 DIAGNOSIS — E78.5 HYPERLIPIDEMIA ASSOCIATED WITH TYPE 2 DIABETES MELLITUS: ICD-10-CM

## 2025-08-14 DIAGNOSIS — N18.32 STAGE 3B CHRONIC KIDNEY DISEASE: ICD-10-CM

## 2025-08-14 DIAGNOSIS — E11.69 HYPERLIPIDEMIA ASSOCIATED WITH TYPE 2 DIABETES MELLITUS: ICD-10-CM

## 2025-08-14 DIAGNOSIS — F41.0 PANIC ATTACKS: ICD-10-CM

## 2025-08-14 DIAGNOSIS — F41.1 GAD (GENERALIZED ANXIETY DISORDER): ICD-10-CM

## 2025-08-14 DIAGNOSIS — Z95.1 S/P CABG (CORONARY ARTERY BYPASS GRAFT): ICD-10-CM

## 2025-08-14 DIAGNOSIS — I70.0 AORTIC ATHEROSCLEROSIS: ICD-10-CM

## 2025-08-14 DIAGNOSIS — E11.65 UNCONTROLLED TYPE 2 DIABETES MELLITUS WITH HYPERGLYCEMIA: Primary | ICD-10-CM

## 2025-08-14 DIAGNOSIS — I15.2 HYPERTENSION ASSOCIATED WITH DIABETES: ICD-10-CM

## 2025-08-14 DIAGNOSIS — E11.59 HYPERTENSION ASSOCIATED WITH DIABETES: ICD-10-CM

## 2025-08-14 DIAGNOSIS — M54.50 CHRONIC MIDLINE LOW BACK PAIN, UNSPECIFIED WHETHER SCIATICA PRESENT: ICD-10-CM

## 2025-08-14 DIAGNOSIS — I48.91 ATRIAL FIBRILLATION, UNSPECIFIED TYPE: ICD-10-CM

## 2025-08-14 DIAGNOSIS — G89.29 CHRONIC MIDLINE LOW BACK PAIN, UNSPECIFIED WHETHER SCIATICA PRESENT: ICD-10-CM

## 2025-08-14 LAB — GLUCOSE SERPL-MCNC: 200 MG/DL (ref 70–110)

## 2025-08-14 PROCEDURE — 82962 GLUCOSE BLOOD TEST: CPT | Mod: PBBFAC,PO | Performed by: STUDENT IN AN ORGANIZED HEALTH CARE EDUCATION/TRAINING PROGRAM

## 2025-08-14 PROCEDURE — G2211 COMPLEX E/M VISIT ADD ON: HCPCS | Mod: ,,, | Performed by: STUDENT IN AN ORGANIZED HEALTH CARE EDUCATION/TRAINING PROGRAM

## 2025-08-14 PROCEDURE — 99999PBSHW POCT GLUCOSE, HAND-HELD DEVICE: Mod: PBBFAC,,,

## 2025-08-14 PROCEDURE — 99214 OFFICE O/P EST MOD 30 MIN: CPT | Mod: S$PBB,,, | Performed by: STUDENT IN AN ORGANIZED HEALTH CARE EDUCATION/TRAINING PROGRAM

## 2025-08-14 PROCEDURE — 99999 PR PBB SHADOW E&M-EST. PATIENT-LVL V: CPT | Mod: PBBFAC,,, | Performed by: STUDENT IN AN ORGANIZED HEALTH CARE EDUCATION/TRAINING PROGRAM

## 2025-08-14 PROCEDURE — 99215 OFFICE O/P EST HI 40 MIN: CPT | Mod: PBBFAC,PO | Performed by: STUDENT IN AN ORGANIZED HEALTH CARE EDUCATION/TRAINING PROGRAM

## 2025-08-14 RX ORDER — ALPRAZOLAM 0.5 MG/1
0.5 TABLET ORAL DAILY PRN
Qty: 10 TABLET | Refills: 0 | Status: SHIPPED | OUTPATIENT
Start: 2025-08-14

## 2025-08-14 RX ORDER — BUSPIRONE HYDROCHLORIDE 5 MG/1
5 TABLET ORAL 2 TIMES DAILY
Qty: 60 TABLET | Refills: 11 | Status: SHIPPED | OUTPATIENT
Start: 2025-08-14 | End: 2026-08-14

## 2025-08-14 RX ORDER — DULAGLUTIDE 0.75 MG/.5ML
0.75 INJECTION, SOLUTION SUBCUTANEOUS
Qty: 4 PEN | Refills: 11 | Status: SHIPPED | OUTPATIENT
Start: 2025-08-14 | End: 2026-08-14

## 2025-08-14 RX ORDER — ALPRAZOLAM 0.5 MG/1
0.5 TABLET ORAL 2 TIMES DAILY
Qty: 180 TABLET | Refills: 0 | Status: CANCELLED | OUTPATIENT
Start: 2025-08-14

## 2025-08-20 ENCOUNTER — TELEPHONE (OUTPATIENT)
Dept: FAMILY MEDICINE | Facility: CLINIC | Age: 75
End: 2025-08-20
Payer: MEDICARE

## (undated) DEVICE — CATH DXTERITY JL40 100CM 6FR

## (undated) DEVICE — DRAPE INCISE IOBAN 2 23X17IN

## (undated) DEVICE — DRESSING MEPILEX 4X10IN

## (undated) DEVICE — SUT 6 18IN STEEL MONO CCS

## (undated) DEVICE — SET PERFUSION

## (undated) DEVICE — DRAPE INCISE IOBAN 2 23X33IN

## (undated) DEVICE — TUBING INSUFFLATION W/LUER LOK

## (undated) DEVICE — SOL POVIDONE SCRUB IODINE 4 OZ

## (undated) DEVICE — DRAPE SPLIT ADHESIVE 77X120IN

## (undated) DEVICE — KIT SURGICAL SUTURE ECK SMH

## (undated) DEVICE — KIT MICROINTRODUCE MINI 5X10CM

## (undated) DEVICE — CAUTERY BOVIE PENCIL

## (undated) DEVICE — SHEATH INTRODUCER 6FR 11CM

## (undated) DEVICE — GLOVE SURGICAL LATEX SZ 6.5

## (undated) DEVICE — KIT CUSTOM BLD CARDIOPLEGIA ST

## (undated) DEVICE — CATH THORACIC 28FR ST

## (undated) DEVICE — CANNULA AORTIC ROOT 12 GAUGE 9

## (undated) DEVICE — CONTRAST VISIPAQUE 150ML

## (undated) DEVICE — SUT MONOCRYL 0 CT-1 UND MON

## (undated) DEVICE — CANNULA RETROGRADE CARDIOPLEG

## (undated) DEVICE — DECANTER FLUID TRNSF WHITE 9IN

## (undated) DEVICE — APPLIER CLIP LIAGCLIP 9.375IN

## (undated) DEVICE — DRESSING MEPILEX BORDER AG 4X4

## (undated) DEVICE — DRAPE CVMAX SPLIT ANES SCRN

## (undated) DEVICE — SUT PROLENE 6-0 BL C-1 C-1

## (undated) DEVICE — NDL PERC ENTRY BSDN 18-7.0

## (undated) DEVICE — SYS PREVENA 7 DAY 25.4CM 10IN

## (undated) DEVICE — CONTAINER SPECIMEN OR STER 4OZ

## (undated) DEVICE — SUT SILK 2-0 SH 18IN BLACK

## (undated) DEVICE — CANNULA THIN-FLEX 33/43 39CM

## (undated) DEVICE — GUIDEWIRE INQWIRE SE 3MM JTIP

## (undated) DEVICE — CATH DXTERITY JR40 100CM 6FR

## (undated) DEVICE — DRESSING MEPILEX 4X6IN

## (undated) DEVICE — SUT MONO 2-0 CT-1 UNDYED

## (undated) DEVICE — DRAPE STERI INSTRUMENT 1018

## (undated) DEVICE — SUT PROLENE 7-0 BV175-6 30IN

## (undated) DEVICE — PUNCH VASC 3.5MM

## (undated) DEVICE — DRESSING MEPILEX 4X8IN

## (undated) DEVICE — TRAY SKIN SCRUB DRY PREMIUM

## (undated) DEVICE — SUT MONOCRYL PLUS UD 3-0 27

## (undated) DEVICE — NDL ECLIPSE SAF REG 25GX5/8IN

## (undated) DEVICE — SOL NACL IRR 3000ML

## (undated) DEVICE — DRAIN CHEST DRY SUCTION

## (undated) DEVICE — SYS IRRISEPT 450ML0.05% CHG

## (undated) DEVICE — GLOVE SURG BIOGEL LATEX SZ 7.5

## (undated) DEVICE — SET CARDIOPLEGIA 4:1 RATIO

## (undated) DEVICE — TRAY CATH 1-LYR URIMTR 16FR

## (undated) DEVICE — LOOP VESSEL BLUE MINI 2/CARD

## (undated) DEVICE — HEMOCONCENTRATOR W TBNG ADPT

## (undated) DEVICE — CATH THOR STND RGHT ANG 28F

## (undated) DEVICE — DRAPE THREE-QUARTER 53X77IN

## (undated) DEVICE — APPLICATOR CHLORAPREP ORN 26ML

## (undated) DEVICE — CANNULA SOFT FLOW ANG 14IN 18F

## (undated) DEVICE — SET SPRAY FOR TISSEAL

## (undated) DEVICE — Device

## (undated) DEVICE — RESERVOIR (FOR C A T S)

## (undated) DEVICE — SYS VIRTUOSAPH PLUS EVM

## (undated) DEVICE — MARKER DUAL TIP SKIN W/ RULER

## (undated) DEVICE — BANDAGE MATRIX HK LOOP 4IN 5YD

## (undated) DEVICE — SUT PROLENE 4-0 SH BLU 36IN

## (undated) DEVICE — SET AT1 AUTOTRANSFUSION

## (undated) DEVICE — SOL POVIDONE PREP IODINE 4 OZ

## (undated) DEVICE — DRESSING 5X5 4PLY QUIKCLOT

## (undated) DEVICE — PLEDGET TFLN FELT 9.5X4.8 ST

## (undated) DEVICE — ELECTRODE REM PLYHSV RETURN 9

## (undated) DEVICE — HEMOSTAT SURGICEL FIBRLR 2X4IN

## (undated) DEVICE — APPLIER LIGACLIP SM 9.38IN

## (undated) DEVICE — TRAY CV ACCESS W/ AUX B SMH

## (undated) DEVICE — ELECTRODE BLD 1 INCH TEFLON

## (undated) DEVICE — TUBING PRSS MON M/M LL 72IN